# Patient Record
Sex: MALE | Race: WHITE | NOT HISPANIC OR LATINO | Employment: OTHER | URBAN - METROPOLITAN AREA
[De-identification: names, ages, dates, MRNs, and addresses within clinical notes are randomized per-mention and may not be internally consistent; named-entity substitution may affect disease eponyms.]

---

## 2017-03-17 ENCOUNTER — TELEPHONE (OUTPATIENT)
Dept: FAMILY MEDICINE | Facility: CLINIC | Age: 62
End: 2017-03-17

## 2017-03-17 DIAGNOSIS — F41.9 ANXIETY: ICD-10-CM

## 2017-03-17 RX ORDER — ALPRAZOLAM 1 MG/1
1 TABLET ORAL 3 TIMES DAILY PRN
Qty: 90 TABLET | Refills: 0 | Status: CANCELLED | OUTPATIENT
Start: 2017-03-17

## 2017-03-17 NOTE — TELEPHONE ENCOUNTER
----- Message from Stephen Cole sent at 3/17/2017  8:55 AM CDT -----  Contact: Self/[phone]  Refill:  alprazolam (XANAX) 1 MG tablet    Thank you.

## 2017-03-20 ENCOUNTER — TELEPHONE (OUTPATIENT)
Dept: OPTOMETRY | Facility: CLINIC | Age: 62
End: 2017-03-20

## 2017-03-20 ENCOUNTER — OFFICE VISIT (OUTPATIENT)
Dept: FAMILY MEDICINE | Facility: CLINIC | Age: 62
End: 2017-03-20
Payer: MEDICARE

## 2017-03-20 ENCOUNTER — CLINICAL SUPPORT (OUTPATIENT)
Dept: OPHTHALMOLOGY | Facility: CLINIC | Age: 62
End: 2017-03-20
Attending: INTERNAL MEDICINE
Payer: MEDICARE

## 2017-03-20 ENCOUNTER — LAB VISIT (OUTPATIENT)
Dept: LAB | Facility: HOSPITAL | Age: 62
End: 2017-03-20
Attending: INTERNAL MEDICINE
Payer: MEDICARE

## 2017-03-20 VITALS
TEMPERATURE: 98 F | DIASTOLIC BLOOD PRESSURE: 79 MMHG | OXYGEN SATURATION: 98 % | HEIGHT: 71 IN | SYSTOLIC BLOOD PRESSURE: 136 MMHG | BODY MASS INDEX: 31.87 KG/M2 | WEIGHT: 227.63 LBS | HEART RATE: 78 BPM

## 2017-03-20 DIAGNOSIS — E11.9 CONTROLLED TYPE 2 DIABETES MELLITUS WITHOUT COMPLICATION, WITHOUT LONG-TERM CURRENT USE OF INSULIN: ICD-10-CM

## 2017-03-20 DIAGNOSIS — E78.5 HYPERLIPIDEMIA, UNSPECIFIED HYPERLIPIDEMIA TYPE: Chronic | ICD-10-CM

## 2017-03-20 DIAGNOSIS — F13.20 BENZODIAZEPINE DEPENDENCE: Chronic | ICD-10-CM

## 2017-03-20 DIAGNOSIS — I10 HTN (HYPERTENSION), BENIGN: Chronic | ICD-10-CM

## 2017-03-20 DIAGNOSIS — G89.29 OTHER CHRONIC PAIN: Chronic | ICD-10-CM

## 2017-03-20 DIAGNOSIS — M47.816 FACET ARTHRITIS OF LUMBAR REGION: ICD-10-CM

## 2017-03-20 DIAGNOSIS — F17.200 TOBACCO DEPENDENCE: Chronic | ICD-10-CM

## 2017-03-20 DIAGNOSIS — E11.9 CONTROLLED TYPE 2 DIABETES MELLITUS WITHOUT COMPLICATION, WITHOUT LONG-TERM CURRENT USE OF INSULIN: Primary | ICD-10-CM

## 2017-03-20 DIAGNOSIS — M47.812 FACET ARTHRITIS OF CERVICAL REGION: ICD-10-CM

## 2017-03-20 DIAGNOSIS — Z23 NEED FOR VACCINATION FOR STREP PNEUMONIAE: ICD-10-CM

## 2017-03-20 DIAGNOSIS — F32.A DEPRESSION, UNSPECIFIED DEPRESSION TYPE: Chronic | ICD-10-CM

## 2017-03-20 DIAGNOSIS — F41.9 ANXIETY: ICD-10-CM

## 2017-03-20 LAB
ALBUMIN SERPL BCP-MCNC: 4 G/DL
ALP SERPL-CCNC: 39 U/L
ALT SERPL W/O P-5'-P-CCNC: 39 U/L
ANION GAP SERPL CALC-SCNC: 11 MMOL/L
AST SERPL-CCNC: 37 U/L
BILIRUB SERPL-MCNC: 0.6 MG/DL
BUN SERPL-MCNC: 18 MG/DL
CALCIUM SERPL-MCNC: 9.3 MG/DL
CHLORIDE SERPL-SCNC: 106 MMOL/L
CO2 SERPL-SCNC: 24 MMOL/L
CREAT SERPL-MCNC: 1.2 MG/DL
EST. GFR  (AFRICAN AMERICAN): >60 ML/MIN/1.73 M^2
EST. GFR  (NON AFRICAN AMERICAN): >60 ML/MIN/1.73 M^2
GLUCOSE SERPL-MCNC: 157 MG/DL
POTASSIUM SERPL-SCNC: 5.1 MMOL/L
PROT SERPL-MCNC: 7.6 G/DL
SODIUM SERPL-SCNC: 141 MMOL/L

## 2017-03-20 PROCEDURE — 99999 PR PBB SHADOW E&M-EST. PATIENT-LVL III: CPT | Mod: PBBFAC,,, | Performed by: INTERNAL MEDICINE

## 2017-03-20 PROCEDURE — 99999 PR PBB SHADOW E&M-EST. PATIENT-LVL II: CPT | Mod: PBBFAC,,,

## 2017-03-20 PROCEDURE — 36415 COLL VENOUS BLD VENIPUNCTURE: CPT | Mod: PO

## 2017-03-20 PROCEDURE — 80053 COMPREHEN METABOLIC PANEL: CPT

## 2017-03-20 PROCEDURE — 83036 HEMOGLOBIN GLYCOSYLATED A1C: CPT

## 2017-03-20 PROCEDURE — 99215 OFFICE O/P EST HI 40 MIN: CPT | Mod: S$PBB,,, | Performed by: INTERNAL MEDICINE

## 2017-03-20 RX ORDER — ALPRAZOLAM 1 MG/1
1 TABLET ORAL 3 TIMES DAILY PRN
Qty: 90 TABLET | Refills: 0 | Status: SHIPPED | OUTPATIENT
Start: 2017-03-20 | End: 2017-04-17 | Stop reason: SDUPTHER

## 2017-03-20 RX ORDER — CLONIDINE HYDROCHLORIDE 0.2 MG/1
0.2 TABLET ORAL 2 TIMES DAILY
Qty: 60 TABLET | Refills: 6 | Status: SHIPPED | OUTPATIENT
Start: 2017-03-20 | End: 2017-12-22 | Stop reason: SDUPTHER

## 2017-03-20 RX ORDER — METFORMIN HYDROCHLORIDE 500 MG/1
500 TABLET ORAL
Qty: 30 TABLET | Refills: 6 | Status: SHIPPED | OUTPATIENT
Start: 2017-03-20 | End: 2017-12-22 | Stop reason: SDUPTHER

## 2017-03-20 RX ORDER — GABAPENTIN 300 MG/1
CAPSULE ORAL
Qty: 180 CAPSULE | Refills: 6 | Status: SHIPPED | OUTPATIENT
Start: 2017-03-20 | End: 2017-12-22 | Stop reason: SDUPTHER

## 2017-03-20 RX ORDER — ATORVASTATIN CALCIUM 40 MG/1
40 TABLET, FILM COATED ORAL DAILY
Qty: 90 TABLET | Refills: 3 | Status: SHIPPED | OUTPATIENT
Start: 2017-03-20 | End: 2017-12-22 | Stop reason: SDUPTHER

## 2017-03-20 RX ORDER — LISINOPRIL 40 MG/1
40 TABLET ORAL DAILY
Qty: 30 TABLET | Refills: 6 | Status: SHIPPED | OUTPATIENT
Start: 2017-03-20 | End: 2017-12-22 | Stop reason: SDUPTHER

## 2017-03-20 RX ORDER — VERAPAMIL HYDROCHLORIDE 180 MG/1
180 TABLET, FILM COATED, EXTENDED RELEASE ORAL 2 TIMES DAILY
Qty: 60 TABLET | Refills: 6 | Status: SHIPPED | OUTPATIENT
Start: 2017-03-20 | End: 2017-12-22 | Stop reason: SDUPTHER

## 2017-03-20 RX ORDER — SERTRALINE HYDROCHLORIDE 50 MG/1
50 TABLET, FILM COATED ORAL DAILY
Qty: 30 TABLET | Refills: 11 | Status: SHIPPED | OUTPATIENT
Start: 2017-03-20 | End: 2017-03-29 | Stop reason: SDUPTHER

## 2017-03-20 NOTE — MR AVS SNAPSHOT
[facility]  [address]  [place] [address]  Phone: [phone]  Fax: [phone]                  Butch Mcdaniel   3/20/2017 8:20 AM   Office Visit    Description:  Male : 1955   Provider:  Gabriel Evans MD   Department:  [facility]           Reason for Visit     Establish Care           Diagnoses this Visit        Comments    Controlled type 2 diabetes mellitus without complication, without long-term current use of insulin    -  Primary     HTN (hypertension), benign         Hyperlipidemia, unspecified hyperlipidemia type         Anxiety         Depression, unspecified depression type         Benzodiazepine dependence         Tobacco dependence         Other chronic pain         Facet arthritis of cervical region         Facet arthritis of lumbar region         Need for vaccination for Strep pneumoniae                To Do List           Goals (5 Years of Data)     None      Follow-Up and Disposition     Return in about 4 weeks (around 2017) for non-fasting, follow up chronic medical problem.       These Medications        Disp Refills Start End    cloNIDine (CATAPRES) 0.2 MG tablet 60 tablet 6 3/20/2017     Take 1 tablet (0.2 mg total) by mouth 2 (two) times daily. - Oral    [facility] [address]: [phone]       gabapentin (NEURONTIN) 300 MG capsule 180 capsule 6 3/20/2017     TAKE 2 CAPSULES BY MOUTH 3 TIMES A DAY    [facility] [address]: [phone]       lisinopril (PRINIVIL,ZESTRIL) 40 MG tablet 30 tablet 6 3/20/2017     Take 1 tablet (40 mg total) by mouth once daily. - Oral    Pharmacy: [facility] [address] [facility]: [phone]       metformin (GLUCOPHAGE) 500 MG tablet 30 tablet 6 3/20/2017 3/20/2018    Take 1 tablet (500 mg total) by mouth daily with breakfast. -  Oral    Pharmacy: [address] Ph #: [phone]       verapamil (CALAN-SR) 180 MG CR tablet 60 tablet 6 3/20/2017 3/20/2018    Take 1 tablet (180 mg total) by mouth 2 (two) times daily. - Oral    Pharmacy: [address] Ph #: [phone]       sertraline (ZOLOFT) 50 MG tablet 30 tablet 11 3/20/2017 3/20/2018    Take 1 tablet (50 mg total) by mouth once daily. STOP THE CITALOPRAM - Oral    Pharmacy: [address] Ph #: [phone]       alprazolam (XANAX) 1 MG tablet 90 tablet 0 3/20/2017     Take 1 tablet (1 mg total) by mouth 3 (three) times daily as needed for Anxiety. - Oral    Pharmacy: [address] Ph #: [phone]       atorvastatin (LIPITOR) 40 MG tablet 90 tablet 3 3/20/2017 3/20/2018    Take 1 tablet (40 mg total) by mouth once daily. For cholesterol.  Stop the fenofibrate - Oral    Pharmacy: [address] Ph #: [phone]         [facility]     [facility] - 24/7 Assistance  Registered nurses in the [facility] provide clinical advisement, health education, appointment booking, and other advisory services.  Call for this free service at [phone].             Medications           Message regarding Medications     Verify the changes and/or additions to your medication regime listed below are the same as discussed with your clinician today.  If any of these changes or additions are incorrect, please notify your healthcare provider.        START taking these NEW medications        Refills    sertraline (ZOLOFT) 50 MG tablet 11    Sig: Take 1 tablet (50 mg total) by mouth once daily. STOP THE CITALOPRAM    Class: Normal    Route: Oral    atorvastatin (LIPITOR) 40 MG tablet 3    Sig: Take 1 tablet (40 mg  total) by mouth once daily. For cholesterol.  Stop the fenofibrate    Class: Normal    Route: Oral      STOP taking these medications     fenofibrate micronized (LOFIBRA) 134 MG Cap Take 1 capsule (134 mg total) by mouth daily with breakfast.    citalopram (CELEXA) 20 MG tablet Take 1 tablet (20 mg total) by mouth once daily.           Verify that the below list of medications is an accurate representation of the medications you are currently taking.  If none reported, the list may be blank. If incorrect, please contact your healthcare provider. Carry this list with you in case of emergency.           Current Medications     alprazolam (XANAX) 1 MG tablet Take 1 tablet (1 mg total) by mouth 3 (three) times daily as needed for Anxiety.    cloNIDine (CATAPRES) 0.2 MG tablet Take 1 tablet (0.2 mg total) by mouth 2 (two) times daily.    cyclobenzaprine (FLEXERIL) 10 MG tablet Take 10 mg by mouth as needed.    fentaNYL (DURAGESIC) 25 mcg/hr     fluticasone (FLONASE) 50 mcg/actuation nasal spray 1 spray by Each Nare route once daily.    gabapentin (NEURONTIN) 300 MG capsule TAKE 2 CAPSULES BY MOUTH 3 TIMES A DAY    hydrocodone-acetaminophen 10-325mg (NORCO)  mg Tab     lisinopril (PRINIVIL,ZESTRIL) 40 MG tablet Take 1 tablet (40 mg total) by mouth once daily.    metformin (GLUCOPHAGE) 500 MG tablet Take 1 tablet (500 mg total) by mouth daily with breakfast.    verapamil (CALAN-SR) 180 MG CR tablet Take 1 tablet (180 mg total) by mouth 2 (two) times daily.    albuterol (PROAIR HFA) 90 mcg/actuation inhaler Inhale 2 puffs into the lungs every 6 (six) hours as needed for Wheezing.    atorvastatin (LIPITOR) 40 MG tablet Take 1 tablet (40 mg total) by mouth once daily. For cholesterol.  Stop the fenofibrate    diclofenac sodium 1.5 % Drop Apply 40 Units topically 4 (four) times daily as needed.    fish oil-omega-3 fatty acids 300-1,000 mg capsule Take 2 capsules (2 g total) by mouth once daily.    sertraline (ZOLOFT) 50  "MG tablet Take 1 tablet (50 mg total) by mouth once daily. STOP THE CITALOPRAM    tadalafil (CIALIS) 20 MG Tab Take 1 tablet (20 mg total) by mouth once daily.           Clinical Reference Information           Your Vitals Were     BP Pulse Temp Height Weight SpO2    136/79 78 98 °F (36.7 °C) (Oral) 5' 11" (1.803 m) 103.3 kg (227 lb 10 oz) 98%    BMI                31.75 kg/m2          Blood Pressure          Most Recent Value    BP  136/79      Allergies as of 3/20/2017     No Known Allergies      Immunizations Administered on Date of Encounter - 3/20/2017     Name Date Dose VIS Date Route    Pneumococcal Polysaccharide - 23 Valent  Incomplete 0.5 mL 4/24/2015 Intramuscular      Orders Placed During Today's Visit      Normal Orders This Visit    Ambulatory referral to Optometry     Pneumococcal Polysaccharide Vaccine (23 Valent) (SQ/IM)     Future Labs/Procedures Expected by Expires    Comprehensive metabolic panel  3/20/2017 3/20/2018    Hemoglobin A1c  3/20/2017 3/20/2018    Microalbumin/creatinine urine ratio  3/20/2017 3/20/2018    Diabetic Eye Screening Photo  As directed 3/21/2018      Instructions    STOP THE CITALOPRAM.  TAKE THE SERTRALINE (ZOLOFT) INSTEAD. FOR MOOD.    STOP THE FENOFIBRATE.  TAKE ATORVASTATIN INSTEAD - FOR CHOLESTEROL.  Sertraline tablets  What is this medicine?  SERTRALINE (SER tra tania) is used to treat depression. It may also be used to treat obsessive compulsive disorder, panic disorder, post-trauma stress, premenstrual dysphoric disorder (PMDD) or social anxiety.  How should I use this medicine?  Take this medicine by mouth with a glass of water. Follow the directions on the prescription label. You can take it with or without food. Take your medicine at regular intervals. Do not take your medicine more often than directed. Do not stop taking this medicine suddenly except upon the advice of your doctor. Stopping this medicine too quickly may cause serious side effects or your " condition may worsen.  A special MedGuide will be given to you by the pharmacist with each prescription and refill. Be sure to read this information carefully each time.  Talk to your pediatrician regarding the use of this medicine in children. While this drug may be prescribed for children as young as 7 years for selected conditions, precautions do apply.  What side effects may I notice from receiving this medicine?  Side effects that you should report to your doctor or health care professional as soon as possible:  · allergic reactions like skin rash, itching or hives, swelling of the face, lips, or tongue  · black or bloody stools, blood in the urine or vomit  · fast, irregular heartbeat  · feeling faint or lightheaded, falls  · hallucination, loss of contact with reality  · seizures  · suicidal thoughts or other mood changes  · unusual bleeding or bruising  · unusually weak or tired  · vomiting  Side effects that usually do not require medical attention (report to your doctor or health care professional if they continue or are bothersome):  · change in appetite  · change in sex drive or performance  · diarrhea  · increased sweating  · indigestion, nausea  · tremors  What may interact with this medicine?  Do not take this medicine with any of the following medications:  · certain medicines for fungal infections like fluconazole, itraconazole, ketoconazole, posaconazole, voriconazole  · cisapride  · disulfiram  · dofetilide  · linezolid  · MAOIs like Carbex, Eldepryl, Marplan, Nardil, and Parnate  · metronidazole  · methylene blue (injected into a vein)  · pimozide  · thioridazine  · ziprasidone  This medicine may also interact with the following medications:  · alcohol  · aspirin and aspirin-like medicines  · certain medicines for depression, anxiety, or psychotic disturbances  · certain medicines for irregular heart beat like flecainide, propafenone  · certain medicines for migraine headaches like almotriptan,  eletriptan, frovatriptan, naratriptan, rizatriptan, sumatriptan, zolmitriptan  · certain medicines for sleep  · certain medicines for seizures like carbamazepine, valproic acid, phenytoin  · certain medicines that treat or prevent blood clots like warfarin, enoxaparin, dalteparin  · cimetidine  · digoxin  · diuretics  · fentanyl  · furazolidone  · isoniazid  · lithium  · NSAIDs, medicines for pain and inflammation, like ibuprofen or naproxen  · other medicines that prolong the QT interval (cause an abnormal heart rhythm)  · procarbazine  · rasagiline  · supplements like Paia's wort, kava kava, valerian  · tolbutamide  · tramadol  · tryptophan  What if I miss a dose?  If you miss a dose, take it as soon as you can. If it is almost time for your next dose, take only that dose. Do not take double or extra doses.  Where should I keep my medicine?  Keep out of the reach of children.  Store at room temperature between 15 and 30 degrees C (59 and 86 degrees F). Throw away any unused medicine after the expiration date.  What should I tell my health care provider before I take this medicine?  They need to know if you have any of these conditions:  · bipolar disorder or a family history of bipolar disorder  · diabetes  · glaucoma  · heart disease  · high blood pressure  · history of irregular heartbeat  · history of low levels of calcium, magnesium, or potassium in the blood  · if you often drink alcohol  · liver disease  · receiving electroconvulsive therapy  · seizures  · suicidal thoughts, plans, or attempt; a previous suicide attempt by you or a family member  · thyroid disease  · an unusual or allergic reaction to sertraline, other medicines, foods, dyes, or preservatives  · pregnant or trying to get pregnant  · breast-feeding  What should I watch for while using this medicine?  Tell your doctor if your symptoms do not get better or if they get worse. Visit your doctor or health care professional for regular checks  on your progress. Because it may take several weeks to see the full effects of this medicine, it is important to continue your treatment as prescribed by your doctor.  Patients and their families should watch out for new or worsening thoughts of suicide or depression. Also watch out for sudden changes in feelings such as feeling anxious, agitated, panicky, irritable, hostile, aggressive, impulsive, severely restless, overly excited and hyperactive, or not being able to sleep. If this happens, especially at the beginning of treatment or after a change in dose, call your health care professional.  You may get drowsy or dizzy. Do not drive, use machinery, or do anything that needs mental alertness until you know how this medicine affects you. Do not stand or sit up quickly, especially if you are an older patient. This reduces the risk of dizzy or fainting spells. Alcohol may interfere with the effect of this medicine. Avoid alcoholic drinks.  Your mouth may get dry. Chewing sugarless gum or sucking hard candy, and drinking plenty of water may help. Contact your doctor if the problem does not go away or is severe.  Date Last Reviewed:   NOTE:This sheet is a summary. It may not cover all possible information. If you have questions about this medicine, talk to your doctor, pharmacist, or health care provider. Copyright© 2016 [employer]        Atorvastatin tablets  What is this medicine?  ATORVASTATIN (a TORE va sta tin) is known as a HMG-CoA reductase inhibitor or 'statin'. It lowers the level of cholesterol and triglycerides in the blood. This drug may also reduce the risk of heart attack, stroke, or other health problems in patients with risk factors for heart disease. Diet and lifestyle changes are often used with this drug.  How should I use this medicine?  Take this medicine by mouth with a glass of water. Follow the directions on the prescription label. You can take this medicine with or without food. Take your  doses at regular intervals. Do not take your medicine more often than directed.  Talk to your pediatrician regarding the use of this medicine in children. While this drug may be prescribed for children as young as 10 years old for selected conditions, precautions do apply.  What side effects may I notice from receiving this medicine?  Side effects that you should report to your doctor or health care professional as soon as possible:  · allergic reactions like skin rash, itching or hives, swelling of the face, lips, or tongue  · dark urine  · fever  · joint pain  · muscle cramps, pain  · redness, blistering, peeling or loosening of the skin, including inside the mouth  · trouble passing urine or change in the amount of urine  · unusually weak or tired  · yellowing of eyes or skin  Side effects that usually do not require medical attention (report to your doctor or health care professional if they continue or are bothersome):  · constipation  · heartburn  · stomach gas, pain, upset  What may interact with this medicine?  Do not take this medicine with any of the following medications:  · red yeast rice  · telaprevir  · telithromycin  · voriconazole  This medicine may also interact with the following medications:  · alcohol  · antiviral medicines for HIV or AIDS  · boceprevir  · certain antibiotics like clarithromycin, erythromycin, troleandomycin  · certain medicines for cholesterol like fenofibrate or gemfibrozil  · cimetidine  · clarithromycin  · colchicine  · cyclosporine  · digoxin  · female hormones, like estrogens or progestins and birth control pills  · grapefruit juice  · medicines for fungal infections like fluconazole, itraconazole, ketoconazole  · niacin  · rifampin  · spironolactone  What if I miss a dose?  If you miss a dose, take it as soon as you can. If it is almost time for your next dose, take only that dose. Do not take double or extra doses.  Where should I keep my medicine?  Keep out of the reach  of children.  Store at room temperature between 20 to 25 degrees C (68 to 77 degrees F). Throw away any unused medicine after the expiration date.  What should I tell my health care provider before I take this medicine?  They need to know if you have any of these conditions:  · frequently drink alcoholic beverages  · history of stroke, TIA  · kidney disease  · liver disease  · muscle aches or weakness  · other medical condition  · an unusual or allergic reaction to atorvastatin, other medicines, foods, dyes, or preservatives  · pregnant or trying to get pregnant  · breast-feeding  What should I watch for while using this medicine?  Visit your doctor or health care professional for regular check-ups. You may need regular tests to make sure your liver is working properly.  Tell your doctor or health care professional right away if you get any unexplained muscle pain, tenderness, or weakness, especially if you also have a fever and tiredness. Your doctor or health care professional may tell you to stop taking this medicine if you develop muscle problems. If your muscle problems do not go away after stopping this medicine, contact your health care professional.  This drug is only part of a total heart-health program. Your doctor or a dietician can suggest a low-cholesterol and low-fat diet to help. Avoid alcohol and smoking, and keep a proper exercise schedule.  Do not use this drug if you are pregnant or breast-feeding. Serious side effects to an unborn child or to an infant are possible. Talk to your doctor or pharmacist for more information.  This medicine may affect blood sugar levels. If you have diabetes, check with your doctor or health care professional before you change your diet or the dose of your diabetic medicine.  If you are going to have surgery tell your health care professional that you are taking this drug.  Date Last Reviewed:   NOTE:This sheet is a summary. It may not cover all possible information.  If you have questions about this medicine, talk to your doctor, pharmacist, or health care provider. Copyright© 2016 [employer]             Smoking Cessation     If you would like to quit smoking:   You may be eligible for free services if you are a [place] resident and started smoking cigarettes before September 1, 1988.  Call the [employer]ess[employer] ([employer]) toll free at (665) 743-1965 or (054) 256-7223.   Call 1-800-QUIT-NOW if you do not meet the above criteria.            Language Assistance Services     ATTENTION: Language assistance services are available, free of charge. Please call [phone].      ATENCIÓN: Si habla español, tiene a olivares disposición servicios gratuitos de asistencia lingüística. Llame al [phone].     CHÚ Ý: N?u b?n nói Ti?ng Vi?t, có các d?ch v? h? tr? ngôn ng? mi?n phí dành cho b?n. G?i s? [phone].         [facility] complies with applicable Federal civil rights laws and does not discriminate on the basis of race, color, national origin, age, disability, or sex.

## 2017-03-20 NOTE — PROGRESS NOTES
Pneumonia-23 vaccine administered, karoline well. Instructed to wait 15mins for observation, no reaction noted @ time of discharge.

## 2017-03-20 NOTE — PROGRESS NOTES
Assessment & Plan  Controlled type 2 diabetes mellitus without complication, without long-term current use of insulin - check A1c today.  On metformin 500.  Change fenofibrate to atorvastatin today.  Referral submitted for optometry, as well as diabetic eye camera today.  Foot exam done today.  Check urine microalbumin.  Emphasize importance of self regular foot exams.  Need to discuss ASA therapy next visit.  -     metformin (GLUCOPHAGE) 500 MG tablet; Take 1 tablet (500 mg total) by mouth daily with breakfast.  Dispense: 30 tablet; Refill: 6  -     Comprehensive metabolic panel; Future; Expected date: 3/20/17  -     Hemoglobin A1c; Future; Expected date: 3/20/17  -     Microalbumin/creatinine urine ratio; Future; Expected date: 3/20/17  -     Diabetic Eye Screening Photo; Future  -     Ambulatory referral to Optometry    HTN (hypertension), benign-stable, refilled lisinopril, verapamil, clonidine.  -     cloNIDine (CATAPRES) 0.2 MG tablet; Take 1 tablet (0.2 mg total) by mouth 2 (two) times daily.  Dispense: 60 tablet; Refill: 6  -     lisinopril (PRINIVIL,ZESTRIL) 40 MG tablet; Take 1 tablet (40 mg total) by mouth once daily.  Dispense: 30 tablet; Refill: 6  -     verapamil (CALAN-SR) 180 MG CR tablet; Take 1 tablet (180 mg total) by mouth 2 (two) times daily.  Dispense: 60 tablet; Refill: 6    Hyperlipidemia, unspecified hyperlipidemia type-change fenofibrate to atorvastatin.  Rationale discussed.  -     atorvastatin (LIPITOR) 40 MG tablet; Take 1 tablet (40 mg total) by mouth once daily. For cholesterol.  Stop the fenofibrate  Dispense: 90 tablet; Refill: 3    Anxiety-recalls a history of Prozac in the past and side effects of no emotions.  Currently on citalopram.  We talked at length today about benzodiazepines, with risks for dependency as well as for possible increased risk for dementia with this medication.  Is currently taking 1 mg 3 times a day and I would like to titrated downwards.  I'll refill it  today but change the citalopram to Zoloft and titrated Zoloft.    -     sertraline (ZOLOFT) 50 MG tablet; Take 1 tablet (50 mg total) by mouth once daily. STOP THE CITALOPRAM  Dispense: 30 tablet; Refill: 11  -     alprazolam (XANAX) 1 MG tablet; Take 1 tablet (1 mg total) by mouth 3 (three) times daily as needed for Anxiety.  Dispense: 90 tablet; Refill: 0    Depression, unspecified depression type-plan as above.  -     sertraline (ZOLOFT) 50 MG tablet; Take 1 tablet (50 mg total) by mouth once daily. STOP THE CITALOPRAM  Dispense: 30 tablet; Refill: 11  -     alprazolam (XANAX) 1 MG tablet; Take 1 tablet (1 mg total) by mouth 3 (three) times daily as needed for Anxiety.  Dispense: 90 tablet; Refill: 0    Benzodiazepine dependence -for now no change in the alprazolam dose.  I would like to try to wean him down maybe to half a milligram at a time but titrate up the SSRI first.    Tobacco dependence-has a history of albuterol inhaler which he reports he never needs to use.  No PFTs on file.    Other chronic pain - narcotics managed by pain mgmt.  Refilled gabapentin  -     gabapentin (NEURONTIN) 300 MG capsule; TAKE 2 CAPSULES BY MOUTH 3 TIMES A DAY  Dispense: 180 capsule; Refill: 6    Facet arthritis of cervical region-managed by pain management.  -     gabapentin (NEURONTIN) 300 MG capsule; TAKE 2 CAPSULES BY MOUTH 3 TIMES A DAY  Dispense: 180 capsule; Refill: 6    Facet arthritis of lumbar region  -     gabapentin (NEURONTIN) 300 MG capsule; TAKE 2 CAPSULES BY MOUTH 3 TIMES A DAY  Dispense: 180 capsule; Refill: 6    Need for vaccination for Strep pneumoniae  -     Pneumococcal Polysaccharide Vaccine (23 Valent) (SQ/IM)    He declines a flu shot today.    I feel he has limited insight into his medications and disease processes at this time.    Medications Discontinued During This Encounter   Medication Reason    fenofibrate micronized (LOFIBRA) 134 MG Cap     citalopram (CELEXA) 20 MG tablet     cloNIDine  (CATAPRES) 0.2 MG tablet Reorder    gabapentin (NEURONTIN) 300 MG capsule Reorder    lisinopril (PRINIVIL,ZESTRIL) 40 MG tablet Reorder    metformin (GLUCOPHAGE) 500 MG tablet Reorder    verapamil (CALAN-SR) 180 MG CR tablet Reorder    alprazolam (XANAX) 1 MG tablet Reorder       Follow-up: Return in about 4 weeks (around 4/17/2017) for non-fasting, follow up chronic medical problem.  F/u change to zoloft; change to atorvastatin; adjust xanax rx if appropriate; start ASA.      =================================================================      Chief Complaint   Patient presents with    [facility]       \[facility] [place] [facility]\""  Butch is a 61 y.o. male, last appointment with this clinic was 12/15/2016.    DM2; metformin x 6/2016. Son with DM.  One pill daily.  No SE. No outside glc checks.  Does not perform self exams.  Does not have an eye doctor and we talked about the need to establish.   HTN; lisinopril, clonidine; hx of verapamil.  Taking medications regularly.  Blood pressure at goal today.  hyperlipidemia; fenofibrate; does not appear to have had statin therapy. presumed for the high TG in the past up to 400.  Does not recall history of statin therapy.  I would like to try him on statin therapy and the rationale was discussed.  He is agreeable to such.  DJD C spine and L spine; chronic narcotic therapy; gabapentin; was referred to NS for R leg weakness, seen felt not to require surgery, plan was injection  10/16/2013 MRI L spine: L3/4: posterior disk protrusion with mild narrowing of the anterior aspect of the canal and mild bilateral neural foraminal narrowing at this level. L4/5: This is posterior disk extrusion resulting on moderate narrowing of the anterior aspect of the canal and moderate bilateral neural foraminal narrowing. L5/S1: There is a posterior disk protrusion resulting in mild narrowing of the anterior aspect of the canal and moderate bilateral neural foraminal narrowing.  There is grade 1  anterolisthesis of L5 with respect to S1.  There is suspected bilateral L5/S1 pars defects.  10/16/2013 MRI C spine: There are multilevel degenerative changes of the cervical spine most severe at the C4 through C7 levels.  anxiety and depression; chronic BZD use. on citalopram, alprazolam; hx of Lexapro.  90 tablets alprazolam every 30 days.  Hx of lexapro.  Recalls hx of psychiatry eval for anxiety and was started on this.  Recalls a history of prozac years ago - had SE of emotional numbness.  Does not feel like citalopram doing anything at this point.tr  Smoking - Trying to transition smoking from cigarettes to a pipe.      Past 2 weeks - no depression; no issues with concentration;  Does have issues with sleep when he runs out of the medicine.  No loss of interest.  No issues with guilt.  No issues with appetite.    Does have issues with anxiety several days of past 2 weeks;     CHENTE-7 Anxiety Screening Tool    Over the last 2 weeks, how often have you been bothered by the following problems?   (Not at all = 0, Several Days = 1, More than half the days = 2, Nearly Every Day = 3)     1.  Feeling nervous, anxious, or on edge. 1   2.  Not being able to sleep or control worrying. 0   3.  Worrying too much about different things. 0   4.  Trouble relaxing. 0   5.  Being so restless that it is hard to sit still. 1   6.  Becoming easily annoyed or irritable. 1   7.  Feeling afraid as if something awful might happen. 0    Total score: 3      Patient Care Team:  Gabriel Evans MD as PCP - General (Internal Medicine)    Patient Active Problem List    Diagnosis Date Noted    Tobacco dependence 01/12/2016    Non morbid obesity due to excess calories 01/12/2016    Controlled type 2 diabetes mellitus without complication 08/07/2015    Facet arthritis of cervical region 10/28/2013    Facet arthritis of lumbar region 10/28/2013    Benzodiazepine dependence 10/28/2013    Opioid dependence 10/28/2013    ED (erectile  dysfunction) 07/26/2013    DDD (degenerative disc disease), cervical 05/23/2013    DDD (degenerative disc disease), lumbar 05/23/2013    HTN (hypertension), benign 11/01/2012    Hyperlipidemia 11/01/2012    Anemia 11/01/2012    Chronic pain 11/01/2012    Anxiety 11/01/2012    Depression 11/01/2012       PAST MEDICAL HISTORY:  Past Medical History:   Diagnosis Date    Anxiety     Degenerative disc disease     Depression     Diabetes mellitus type II, controlled     ETOH abuse     Hyperlipidemia     Hypertension     MRSA (methicillin resistant Staphylococcus aureus)        PAST SURGICAL HISTORY:  Past Surgical History:   Procedure Laterality Date    APPENDECTOMY       Family History   Problem Relation Age of Onset    Heart disease Mother     Heart disease Father     Alcohol abuse Father     Diabetes Son        SOCIAL HISTORY:  Social History     Social History    Marital status:      Spouse name: N/A    Number of children: N/A    Years of education: N/A     Occupational History    retired -       Social History Main Topics    Smoking status: Current Every Day Smoker     Packs/day: 0.50     Years: 32.00     Types: Cigarettes     Last attempt to quit: 3/4/2013    Smokeless tobacco: Not on file    Alcohol use No    Drug use: No    Sexual activity: Yes     Partners: Female      Comment:  with children, disabled      Other Topics Concern    Not on file     Social History Narrative       ALLERGIES AND MEDICATIONS: updated and reviewed.  Review of patient's allergies indicates:  No Known Allergies  Current Outpatient Prescriptions   Medication Sig Dispense Refill    alprazolam (XANAX) 1 MG tablet Take 1 tablet (1 mg total) by mouth 3 (three) times daily as needed for Anxiety. 90 tablet 0    citalopram (CELEXA) 20 MG tablet Take 1 tablet (20 mg total) by mouth once daily. 30 tablet 6    cloNIDine (CATAPRES) 0.2 MG tablet Take 1 tablet (0.2 mg  total) by mouth 2 (two) times daily. 60 tablet 6    cyclobenzaprine (FLEXERIL) 10 MG tablet Take 10 mg by mouth as needed.  1    fenofibrate micronized (LOFIBRA) 134 MG Cap Take 1 capsule (134 mg total) by mouth daily with breakfast. 30 capsule 6    fentaNYL (DURAGESIC) 25 mcg/hr   0    fluticasone (FLONASE) 50 mcg/actuation nasal spray 1 spray by Each Nare route once daily.      gabapentin (NEURONTIN) 300 MG capsule TAKE 2 CAPSULES BY MOUTH 3 TIMES A  capsule 6    hydrocodone-acetaminophen 10-325mg (NORCO)  mg Tab       lisinopril (PRINIVIL,ZESTRIL) 40 MG tablet Take 1 tablet (40 mg total) by mouth once daily. 30 tablet 6    metformin (GLUCOPHAGE) 500 MG tablet Take 1 tablet (500 mg total) by mouth daily with breakfast. 30 tablet 6    verapamil (CALAN-SR) 180 MG CR tablet Take 1 tablet (180 mg total) by mouth 2 (two) times daily. 60 tablet 6    albuterol (PROAIR HFA) 90 mcg/actuation inhaler Inhale 2 puffs into the lungs every 6 (six) hours as needed for Wheezing. 18 g 6    diclofenac sodium 1.5 % Drop Apply 40 Units topically 4 (four) times daily as needed. 150 mL 3    fish oil-omega-3 fatty acids 300-1,000 mg capsule Take 2 capsules (2 g total) by mouth once daily. 60 capsule 5    tadalafil (CIALIS) 20 MG Tab Take 1 tablet (20 mg total) by mouth once daily. 10 tablet 11     No current facility-administered medications for this visit.        Review of Systems   Constitutional: Negative for fever, malaise/fatigue and weight loss.   HENT: Negative for congestion.    Eyes: Negative for blurred vision and pain.   Respiratory: Negative for shortness of breath and wheezing.    Cardiovascular: Negative for chest pain, palpitations and leg swelling.   Gastrointestinal: Negative for abdominal pain, blood in stool, constipation, diarrhea and heartburn.   Genitourinary: Negative for dysuria, hematuria and urgency.   Musculoskeletal: Negative for joint pain.   Neurological: Negative for tingling,  "focal weakness, weakness and headaches.   Psychiatric/Behavioral: Negative for depression. The patient is not nervous/anxious.         HPI       Physical Exam   Vitals:    03/20/17 0836   BP: 136/79   Pulse: 78   Temp: 98 °F (36.7 °C)   TempSrc: Oral   SpO2: 98%   Weight: 103.3 kg (227 lb 10 oz)   Height: 5' 11" (1.803 m)    Body mass index is 31.75 kg/(m^2).  Weight: 103.3 kg (227 lb 10 oz)   Height: 5' 11" (180.3 cm)     Physical Exam   Constitutional: He is oriented to person, place, and time. He appears well-developed and well-nourished. No distress.   Eyes: EOM are normal.   Cardiovascular: Normal rate, regular rhythm and normal heart sounds.    No murmur heard.  Pulses:       Dorsalis pedis pulses are 1+ on the right side, and 1+ on the left side.        Posterior tibial pulses are 1+ on the right side, and 1+ on the left side.   Pulmonary/Chest: Effort normal and breath sounds normal.   Musculoskeletal: Normal range of motion.        Right foot: There is no deformity.        Left foot: There is no deformity.   Feet:   Right Foot:   Protective Sensation: 5 sites tested. 5 sites sensed.   Skin Integrity: Negative for ulcer, blister, skin breakdown, erythema or warmth.   Left Foot:   Protective Sensation: 5 sites tested.   Skin Integrity: Negative for ulcer, blister, skin breakdown, erythema or warmth.   Neurological: He is alert and oriented to person, place, and time. Coordination normal.   Skin: Skin is warm and dry.   Psychiatric: He has a normal mood and affect. His behavior is normal. Thought content normal.     "

## 2017-03-20 NOTE — PATIENT INSTRUCTIONS
STOP THE CITALOPRAM.  TAKE THE SERTRALINE (ZOLOFT) INSTEAD. FOR MOOD.    STOP THE FENOFIBRATE.  TAKE ATORVASTATIN INSTEAD - FOR CHOLESTEROL.  Sertraline tablets  What is this medicine?  SERTRALINE (SER tra tania) is used to treat depression. It may also be used to treat obsessive compulsive disorder, panic disorder, post-trauma stress, premenstrual dysphoric disorder (PMDD) or social anxiety.  How should I use this medicine?  Take this medicine by mouth with a glass of water. Follow the directions on the prescription label. You can take it with or without food. Take your medicine at regular intervals. Do not take your medicine more often than directed. Do not stop taking this medicine suddenly except upon the advice of your doctor. Stopping this medicine too quickly may cause serious side effects or your condition may worsen.  A special MedGuide will be given to you by the pharmacist with each prescription and refill. Be sure to read this information carefully each time.  Talk to your pediatrician regarding the use of this medicine in children. While this drug may be prescribed for children as young as 7 years for selected conditions, precautions do apply.  What side effects may I notice from receiving this medicine?  Side effects that you should report to your doctor or health care professional as soon as possible:  · allergic reactions like skin rash, itching or hives, swelling of the face, lips, or tongue  · black or bloody stools, blood in the urine or vomit  · fast, irregular heartbeat  · feeling faint or lightheaded, falls  · hallucination, loss of contact with reality  · seizures  · suicidal thoughts or other mood changes  · unusual bleeding or bruising  · unusually weak or tired  · vomiting  Side effects that usually do not require medical attention (report to your doctor or health care professional if they continue or are bothersome):  · change in appetite  · change in sex drive or  performance  · diarrhea  · increased sweating  · indigestion, nausea  · tremors  What may interact with this medicine?  Do not take this medicine with any of the following medications:  · certain medicines for fungal infections like fluconazole, itraconazole, ketoconazole, posaconazole, voriconazole  · cisapride  · disulfiram  · dofetilide  · linezolid  · MAOIs like Carbex, Eldepryl, Marplan, Nardil, and Parnate  · metronidazole  · methylene blue (injected into a vein)  · pimozide  · thioridazine  · ziprasidone  This medicine may also interact with the following medications:  · alcohol  · aspirin and aspirin-like medicines  · certain medicines for depression, anxiety, or psychotic disturbances  · certain medicines for irregular heart beat like flecainide, propafenone  · certain medicines for migraine headaches like almotriptan, eletriptan, frovatriptan, naratriptan, rizatriptan, sumatriptan, zolmitriptan  · certain medicines for sleep  · certain medicines for seizures like carbamazepine, valproic acid, phenytoin  · certain medicines that treat or prevent blood clots like warfarin, enoxaparin, dalteparin  · cimetidine  · digoxin  · diuretics  · fentanyl  · furazolidone  · isoniazid  · lithium  · NSAIDs, medicines for pain and inflammation, like ibuprofen or naproxen  · other medicines that prolong the QT interval (cause an abnormal heart rhythm)  · procarbazine  · rasagiline  · supplements like Jana's wort, kava kava, valerian  · tolbutamide  · tramadol  · tryptophan  What if I miss a dose?  If you miss a dose, take it as soon as you can. If it is almost time for your next dose, take only that dose. Do not take double or extra doses.  Where should I keep my medicine?  Keep out of the reach of children.  Store at room temperature between 15 and 30 degrees C (59 and 86 degrees F). Throw away any unused medicine after the expiration date.  What should I tell my health care provider before I take this medicine?  They  need to know if you have any of these conditions:  · bipolar disorder or a family history of bipolar disorder  · diabetes  · glaucoma  · heart disease  · high blood pressure  · history of irregular heartbeat  · history of low levels of calcium, magnesium, or potassium in the blood  · if you often drink alcohol  · liver disease  · receiving electroconvulsive therapy  · seizures  · suicidal thoughts, plans, or attempt; a previous suicide attempt by you or a family member  · thyroid disease  · an unusual or allergic reaction to sertraline, other medicines, foods, dyes, or preservatives  · pregnant or trying to get pregnant  · breast-feeding  What should I watch for while using this medicine?  Tell your doctor if your symptoms do not get better or if they get worse. Visit your doctor or health care professional for regular checks on your progress. Because it may take several weeks to see the full effects of this medicine, it is important to continue your treatment as prescribed by your doctor.  Patients and their families should watch out for new or worsening thoughts of suicide or depression. Also watch out for sudden changes in feelings such as feeling anxious, agitated, panicky, irritable, hostile, aggressive, impulsive, severely restless, overly excited and hyperactive, or not being able to sleep. If this happens, especially at the beginning of treatment or after a change in dose, call your health care professional.  You may get drowsy or dizzy. Do not drive, use machinery, or do anything that needs mental alertness until you know how this medicine affects you. Do not stand or sit up quickly, especially if you are an older patient. This reduces the risk of dizzy or fainting spells. Alcohol may interfere with the effect of this medicine. Avoid alcoholic drinks.  Your mouth may get dry. Chewing sugarless gum or sucking hard candy, and drinking plenty of water may help. Contact your doctor if the problem does not go  away or is severe.  Date Last Reviewed:   NOTE:This sheet is a summary. It may not cover all possible information. If you have questions about this medicine, talk to your doctor, pharmacist, or health care provider. Copyright© 2016 [employer]        Atorvastatin tablets  What is this medicine?  ATORVASTATIN (a TORE va sta tin) is known as a HMG-CoA reductase inhibitor or 'statin'. It lowers the level of cholesterol and triglycerides in the blood. This drug may also reduce the risk of heart attack, stroke, or other health problems in patients with risk factors for heart disease. Diet and lifestyle changes are often used with this drug.  How should I use this medicine?  Take this medicine by mouth with a glass of water. Follow the directions on the prescription label. You can take this medicine with or without food. Take your doses at regular intervals. Do not take your medicine more often than directed.  Talk to your pediatrician regarding the use of this medicine in children. While this drug may be prescribed for children as young as 10 years old for selected conditions, precautions do apply.  What side effects may I notice from receiving this medicine?  Side effects that you should report to your doctor or health care professional as soon as possible:  · allergic reactions like skin rash, itching or hives, swelling of the face, lips, or tongue  · dark urine  · fever  · joint pain  · muscle cramps, pain  · redness, blistering, peeling or loosening of the skin, including inside the mouth  · trouble passing urine or change in the amount of urine  · unusually weak or tired  · yellowing of eyes or skin  Side effects that usually do not require medical attention (report to your doctor or health care professional if they continue or are bothersome):  · constipation  · heartburn  · stomach gas, pain, upset  What may interact with this medicine?  Do not take this medicine with any of the following medications:  · red  yeast rice  · telaprevir  · telithromycin  · voriconazole  This medicine may also interact with the following medications:  · alcohol  · antiviral medicines for HIV or AIDS  · boceprevir  · certain antibiotics like clarithromycin, erythromycin, troleandomycin  · certain medicines for cholesterol like fenofibrate or gemfibrozil  · cimetidine  · clarithromycin  · colchicine  · cyclosporine  · digoxin  · female hormones, like estrogens or progestins and birth control pills  · grapefruit juice  · medicines for fungal infections like fluconazole, itraconazole, ketoconazole  · niacin  · rifampin  · spironolactone  What if I miss a dose?  If you miss a dose, take it as soon as you can. If it is almost time for your next dose, take only that dose. Do not take double or extra doses.  Where should I keep my medicine?  Keep out of the reach of children.  Store at room temperature between 20 to 25 degrees C (68 to 77 degrees F). Throw away any unused medicine after the expiration date.  What should I tell my health care provider before I take this medicine?  They need to know if you have any of these conditions:  · frequently drink alcoholic beverages  · history of stroke, TIA  · kidney disease  · liver disease  · muscle aches or weakness  · other medical condition  · an unusual or allergic reaction to atorvastatin, other medicines, foods, dyes, or preservatives  · pregnant or trying to get pregnant  · breast-feeding  What should I watch for while using this medicine?  Visit your doctor or health care professional for regular check-ups. You may need regular tests to make sure your liver is working properly.  Tell your doctor or health care professional right away if you get any unexplained muscle pain, tenderness, or weakness, especially if you also have a fever and tiredness. Your doctor or health care professional may tell you to stop taking this medicine if you develop muscle problems. If your muscle problems do not go away  after stopping this medicine, contact your health care professional.  This drug is only part of a total heart-health program. Your doctor or a dietician can suggest a low-cholesterol and low-fat diet to help. Avoid alcohol and smoking, and keep a proper exercise schedule.  Do not use this drug if you are pregnant or breast-feeding. Serious side effects to an unborn child or to an infant are possible. Talk to your doctor or pharmacist for more information.  This medicine may affect blood sugar levels. If you have diabetes, check with your doctor or health care professional before you change your diet or the dose of your diabetic medicine.  If you are going to have surgery tell your health care professional that you are taking this drug.  Date Last Reviewed:   NOTE:This sheet is a summary. It may not cover all possible information. If you have questions about this medicine, talk to your doctor, pharmacist, or health care provider. Copyright© 2016 [employer]

## 2017-03-21 ENCOUNTER — TELEPHONE (OUTPATIENT)
Dept: FAMILY MEDICINE | Facility: CLINIC | Age: 62
End: 2017-03-21

## 2017-03-21 LAB
ESTIMATED AVG GLUCOSE: 151 MG/DL
HBA1C MFR BLD HPLC: 6.9 %

## 2017-03-21 NOTE — TELEPHONE ENCOUNTER
Called the patient to schedule his Optometry referral, patient states that he will call back to schedule an appointment.

## 2017-03-22 ENCOUNTER — OFFICE VISIT (OUTPATIENT)
Dept: OPTOMETRY | Facility: CLINIC | Age: 62
End: 2017-03-22
Payer: MEDICARE

## 2017-03-22 DIAGNOSIS — H52.7 REFRACTIVE ERROR: ICD-10-CM

## 2017-03-22 DIAGNOSIS — H25.13 NUCLEAR SCLEROSIS, BILATERAL: ICD-10-CM

## 2017-03-22 DIAGNOSIS — I10 HTN (HYPERTENSION), BENIGN: Chronic | ICD-10-CM

## 2017-03-22 DIAGNOSIS — E11.9 TYPE 2 DIABETES MELLITUS WITHOUT OPHTHALMIC MANIFESTATIONS: Primary | ICD-10-CM

## 2017-03-22 PROCEDURE — 99999 PR PBB SHADOW E&M-EST. PATIENT-LVL II: CPT | Mod: PBBFAC,,, | Performed by: OPTOMETRIST

## 2017-03-22 PROCEDURE — 92004 COMPRE OPH EXAM NEW PT 1/>: CPT | Mod: S$PBB,,, | Performed by: OPTOMETRIST

## 2017-03-22 PROCEDURE — 99212 OFFICE O/P EST SF 10 MIN: CPT | Mod: PBBFAC,PO | Performed by: OPTOMETRIST

## 2017-03-22 NOTE — MR AVS SNAPSHOT
[facility]  [address]  [place] [address]  Phone: [phone]  Fax: [phone]                  Butch Mcdaniel   3/22/2017 10:30 AM   Office Visit    Description:  Male : 1955   Provider:  Kaila Carrillo OD   Department:  [facility]           Reason for Visit     Concerns About Ocular Health     Diabetic Eye Exam     Hypertensive Eye Exam           Diagnoses this Visit        Comments    Type 2 diabetes mellitus without ophthalmic manifestations    -  Primary     HTN (hypertension), benign         Nuclear sclerosis, bilateral         Refractive error                To Do List           Future Appointments        Provider Department Dept Phone    2017 1:00 PM Gabriel Evans MD [facility] [phone]      Goals (5 Years of Data)     None      Follow-Up and Disposition     Return in about 1 year (around 3/22/2018) for Diabetic Eye Exam.      [facility]     [facility] On Call Nurse Care Line - 24/7 Assistance  Registered nurses in the [facility] provide clinical advisement, health education, appointment booking, and other advisory services.  Call for this free service at [phone].             Medications           Message regarding Medications     Verify the changes and/or additions to your medication regime listed below are the same as discussed with your clinician today.  If any of these changes or additions are incorrect, please notify your healthcare provider.             Verify that the below list of medications is an accurate representation of the medications you are currently taking.  If none reported, the list may be blank. If incorrect, please contact your healthcare provider. Carry this list with you in case of emergency.           Current Medications     alprazolam (XANAX) 1 MG tablet Take 1 tablet (1 mg total) by mouth 3 (three) times daily as needed for Anxiety.    atorvastatin (LIPITOR) 40 MG tablet Take 1  tablet (40 mg total) by mouth once daily. For cholesterol.  Stop the fenofibrate    cloNIDine (CATAPRES) 0.2 MG tablet Take 1 tablet (0.2 mg total) by mouth 2 (two) times daily.    cyclobenzaprine (FLEXERIL) 10 MG tablet Take 10 mg by mouth as needed.    fentaNYL (DURAGESIC) 25 mcg/hr     fish oil-omega-3 fatty acids 300-1,000 mg capsule Take 2 capsules (2 g total) by mouth once daily.    fluticasone (FLONASE) 50 mcg/actuation nasal spray 1 spray by Each Nare route once daily.    gabapentin (NEURONTIN) 300 MG capsule TAKE 2 CAPSULES BY MOUTH 3 TIMES A DAY    hydrocodone-acetaminophen 10-325mg (NORCO)  mg Tab     lisinopril (PRINIVIL,ZESTRIL) 40 MG tablet Take 1 tablet (40 mg total) by mouth once daily.    metformin (GLUCOPHAGE) 500 MG tablet Take 1 tablet (500 mg total) by mouth daily with breakfast.    sertraline (ZOLOFT) 50 MG tablet Take 1 tablet (50 mg total) by mouth once daily. STOP THE CITALOPRAM    tadalafil (CIALIS) 20 MG Tab Take 1 tablet (20 mg total) by mouth once daily.    verapamil (CALAN-SR) 180 MG CR tablet Take 1 tablet (180 mg total) by mouth 2 (two) times daily.    albuterol (PROAIR HFA) 90 mcg/actuation inhaler Inhale 2 puffs into the lungs every 6 (six) hours as needed for Wheezing.    diclofenac sodium 1.5 % Drop Apply 40 Units topically 4 (four) times daily as needed.           Clinical Reference Information           Allergies as of 3/22/2017     No Known Allergies      Immunizations Administered on Date of Encounter - 3/22/2017     None      Smoking Cessation     If you would like to quit smoking:   You may be eligible for free services if you are a [place] resident and started smoking cigarettes before September 1, 1988.  Call the [employer] ([employer]) toll free at (042) 631-6423 or (426) 357-7283.   Call -800-QUIT-NOW if you do not meet the above criteria.            Language Assistance Services     ATTENTION: Language assistance services are available, free of charge.  Please call [phone].      ATENCIÓN: Si habla español, tiene a olivares disposición servicios gratuitos de asistencia lingüística. Llame al [phone].     CHÚ Ý: N?u b?n nói Ti?ng Vi?t, có các d?ch v? h? tr? ngôn ng? mi?n phí dành cho b?n. G?i s? [phone].         [facility] complies with applicable Federal civil rights laws and does not discriminate on the basis of race, color, national origin, age, disability, or sex.

## 2017-03-22 NOTE — LETTER
March 22, 2017      Gabriel Evans MD  [address] [place] [address]           [facility]  [address] [place] [address]  Phone: [phone]  Fax: [phone]          Patient: Butch Mcdaniel   MR Number: [id number]   YOB: 1955   Date of Visit: 3/22/2017       Dear Dr. Gabriel Evans:    Thank you for referring Butch Mcdaniel to me for evaluation. Attached you will find relevant portions of my assessment and plan of care.    If you have questions, please do not hesitate to call me. I look forward to following Butch Mcdaniel along with you.    Sincerely,    Kaila Carrillo, OD    Enclosure  CC:  No Recipients    If you would like to receive this communication electronically, please contact [email or web address] or [phone] to request more information on [facility] access.    For providers and/or their staff who would like to refer a patient to [facility], please contact us through our one-stop-shop provider referral line, [facility], at [phone].    If you feel you have received this communication in error or would no longer like to receive these types of communications, please e-mail [email or web address]

## 2017-03-22 NOTE — PROGRESS NOTES
HPI     Dls: ? Yrs     Pt here for diabetic and hypertensive eye exam.   Pt c/o blurry vision at near ou. Pt wears otc readers. Pt states no   tearing no itching no burning no pain no ha's no floaters.     Eye meds:  None    Hemoglobin A1C       Date                     Value               Ref Range             Status                03/20/2017               6.9 (H)             4.5 - 6.2 %           Final                  12/13/2016               6.6 (H)             4.5 - 6.2 %           Final                  06/28/2016               6.9 (H)             4.5 - 6.2 %           Final            ----------         Last edited by Kaila Carrillo, OD on 3/22/2017 10:53 AM.       Assessment /Plan     For exam results, see Encounter Report.    Type 2 diabetes mellitus without ophthalmic manifestations  - No diabetic retinopathy. Educated patient on importance of good blood sugar control, compliance with meds, and follow up care with PCP. Return in 1 year for dilated eye exam, sooner PRN.    HTN (hypertension), benign  - No hypertensive retinopathy. Continue BP control. RTC 1 year for DFE.    Nuclear sclerosis, bilateral  - Not visually significant. Educated pt on presence of cataracts and effects on vision. No surgery at this time. Recheck in one year.    Refractive error  - Patient declined refraction today, continue with current spectacles.       RTC 1 year(s) or sooner PRN

## 2017-03-23 NOTE — PROGRESS NOTES
a1c good; on metformin  CMP WNL. Changed to statin last OV  microalbumin WNL.  Results to pt.  F/u scheduled for April

## 2017-03-27 ENCOUNTER — TELEPHONE (OUTPATIENT)
Dept: FAMILY MEDICINE | Facility: CLINIC | Age: 62
End: 2017-03-27

## 2017-03-27 NOTE — TELEPHONE ENCOUNTER
----- Message from Pepper Patiño sent at 3/27/2017  9:59 AM CDT -----  Contact: self  Pt needs lab results. Pt would like to know if he needs to se Dr Evans for a follow up. Please  Call [phone]. Thanks

## 2017-03-29 ENCOUNTER — TELEPHONE (OUTPATIENT)
Dept: FAMILY MEDICINE | Facility: CLINIC | Age: 62
End: 2017-03-29

## 2017-03-29 DIAGNOSIS — F32.A DEPRESSION, UNSPECIFIED DEPRESSION TYPE: Chronic | ICD-10-CM

## 2017-03-29 DIAGNOSIS — F41.9 ANXIETY: ICD-10-CM

## 2017-03-29 RX ORDER — SERTRALINE HYDROCHLORIDE 50 MG/1
100 TABLET, FILM COATED ORAL DAILY
Qty: 60 TABLET | Refills: 11
Start: 2017-03-29 | End: 2017-04-10 | Stop reason: SDUPTHER

## 2017-03-29 NOTE — TELEPHONE ENCOUNTER
Spoke with patient and he states that since he was switched from celexa to zoloft, when he and his spouse argue he runs out the house. He is not sure if it is a panic attack or avoiding a argument but he stopped this Sunday and would prefer to go back on celexa. Please advise

## 2017-03-29 NOTE — TELEPHONE ENCOUNTER
Let's try higher dose of the zoloft - try taking 100 mg (2 tablets).  If still with same effect after 2 weeks then change back to celexa.

## 2017-04-10 DIAGNOSIS — F41.9 ANXIETY: ICD-10-CM

## 2017-04-10 DIAGNOSIS — F32.A DEPRESSION, UNSPECIFIED DEPRESSION TYPE: Chronic | ICD-10-CM

## 2017-04-10 RX ORDER — SERTRALINE HYDROCHLORIDE 50 MG/1
100 TABLET, FILM COATED ORAL DAILY
Qty: 60 TABLET | Refills: 10
Start: 2017-04-10 | End: 2017-04-17 | Stop reason: SDUPTHER

## 2017-04-10 NOTE — TELEPHONE ENCOUNTER
----- Message from Pepper Patiño sent at 4/10/2017  1:40 PM CDT -----  Contact: self  Pt needs sertraline (ZOLOFT) 50 MG tablet refilled.pt nees medication sent [facility]. Please call [phone]. Thanks

## 2017-04-17 ENCOUNTER — OFFICE VISIT (OUTPATIENT)
Dept: FAMILY MEDICINE | Facility: CLINIC | Age: 62
End: 2017-04-17
Payer: MEDICARE

## 2017-04-17 VITALS
WEIGHT: 226.94 LBS | SYSTOLIC BLOOD PRESSURE: 126 MMHG | HEIGHT: 71 IN | TEMPERATURE: 98 F | HEART RATE: 82 BPM | OXYGEN SATURATION: 97 % | DIASTOLIC BLOOD PRESSURE: 74 MMHG | BODY MASS INDEX: 31.77 KG/M2

## 2017-04-17 DIAGNOSIS — F41.9 ANXIETY: ICD-10-CM

## 2017-04-17 DIAGNOSIS — Z12.11 SCREENING FOR COLON CANCER: ICD-10-CM

## 2017-04-17 DIAGNOSIS — E11.9 CONTROLLED TYPE 2 DIABETES MELLITUS WITHOUT COMPLICATION, WITHOUT LONG-TERM CURRENT USE OF INSULIN: ICD-10-CM

## 2017-04-17 DIAGNOSIS — T40.2X5A THERAPEUTIC OPIOID-INDUCED CONSTIPATION (OIC): ICD-10-CM

## 2017-04-17 DIAGNOSIS — K59.03 THERAPEUTIC OPIOID-INDUCED CONSTIPATION (OIC): ICD-10-CM

## 2017-04-17 DIAGNOSIS — F32.A DEPRESSION, UNSPECIFIED DEPRESSION TYPE: Primary | Chronic | ICD-10-CM

## 2017-04-17 PROCEDURE — 99999 PR PBB SHADOW E&M-EST. PATIENT-LVL III: CPT | Mod: PBBFAC,,, | Performed by: INTERNAL MEDICINE

## 2017-04-17 PROCEDURE — 99214 OFFICE O/P EST MOD 30 MIN: CPT | Mod: S$PBB,,, | Performed by: INTERNAL MEDICINE

## 2017-04-17 PROCEDURE — 99213 OFFICE O/P EST LOW 20 MIN: CPT | Mod: PBBFAC,PO | Performed by: INTERNAL MEDICINE

## 2017-04-17 RX ORDER — SERTRALINE HYDROCHLORIDE 50 MG/1
150 TABLET, FILM COATED ORAL DAILY
Qty: 90 TABLET | Refills: 11 | Status: SHIPPED | OUTPATIENT
Start: 2017-04-17 | End: 2017-07-11 | Stop reason: ALTCHOICE

## 2017-04-17 RX ORDER — ALPRAZOLAM 1 MG/1
1 TABLET ORAL 3 TIMES DAILY PRN
Qty: 90 TABLET | Refills: 0 | Status: SHIPPED | OUTPATIENT
Start: 2017-05-17 | End: 2017-06-15 | Stop reason: SDUPTHER

## 2017-04-17 RX ORDER — ALPRAZOLAM 1 MG/1
1 TABLET ORAL 3 TIMES DAILY PRN
Qty: 90 TABLET | Refills: 0 | Status: SHIPPED | OUTPATIENT
Start: 2017-04-17 | End: 2017-04-17 | Stop reason: SDUPTHER

## 2017-04-17 RX ORDER — ASPIRIN 81 MG/1
81 TABLET ORAL DAILY
Refills: 0 | Status: ON HOLD | COMMUNITY
Start: 2017-04-17 | End: 2021-10-08 | Stop reason: HOSPADM

## 2017-04-17 NOTE — PATIENT INSTRUCTIONS
Aspirin, ASA oral tablets  What is this medicine?  ASPIRIN (AS pir in) is a pain reliever. It is used to treat mild pain and fever. This medicine is also used as directed by a doctor to prevent and to treat heart attacks, to prevent strokes, and to treat arthritis or inflammation.  How should I use this medicine?  Take this medicine by mouth with a glass of water. Follow the directions on the package or prescription label. You can take this medicine with or without food. If it upsets your stomach, take it with food. Do not take your medicine more often than directed.  Talk to your pediatrician regarding the use of this medicine in children. While this drug may be prescribed for children as young as 12 years of age for selected conditions, precautions do apply. Children and teenagers should not use this medicine to treat chicken pox or flu symptoms unless directed by a doctor.  Patients over 65 years old may have a stronger reaction and need a smaller dose.  What side effects may I notice from receiving this medicine?  Side effects that you should report to your doctor or health care professional as soon as possible:  · allergic reactions like skin rash, itching or hives, swelling of the face, lips, or tongue  · breathing problems  · changes in hearing, ringing in the ears  · confusion  · general ill feeling or flu-like symptoms  · pain on swallowing  · redness, blistering, peeling or loosening of the skin, including inside the mouth or nose  · signs and symptoms of bleeding such as bloody or black, tarry stools; red or dark-brown urine; spitting up blood or brown material that looks like coffee grounds; red spots on the skin; unusual bruising or bleeding from the eye, gums, or nose  · trouble passing urine or change in the amount of urine  · unusually weak or tired  · yellowing of the eyes or skin  Side effects that usually do not require medical attention (report to your doctor or health care professional if they  continue or are bothersome):  · diarrhea or constipation  · headache  · nausea, vomiting  · stomach gas, heartburn  What may interact with this medicine?  Do not take this medicine with any of the following medications:  · cidofovir  · ketorolac  · probenecid  This medicine may also interact with the following medications:  · alcohol  · alendronate  · bismuth subsalicylate  · flavocoxid  · herbal supplements like feverfew, garlic, lesiva, ginkgo biloba, horse chestnut  · medicines for diabetes or glaucoma like acetazolamide, methazolamide  · medicines for gout  · medicines that treat or prevent blood clots like enoxaparin, heparin, ticlopidine, warfarin  · other aspirin and aspirin-like medicines  · NSAIDs, medicines for pain and inflammation, like ibuprofen or naproxen  · pemetrexed  · sulfinpyrazone  · varicella live vaccine  What if I miss a dose?  If you are taking this medicine on a regular schedule and miss a dose, take it as soon as you can. If it is almost time for your next dose, take only that dose. Do not take double or extra doses.  Where should I keep my medicine?  Keep out of the reach of children.  Store at room temperature between 15 and 30 degrees C (59 and 86 degrees F). Protect from heat and moisture. Do not use this medicine if it has a strong vinegar smell. Throw away any unused medicine after the expiration date.  What should I tell my health care provider before I take this medicine?  They need to know if you have any of these conditions:  · anemia  · asthma  · bleeding problems  · child with chickenpox, the flu, or other viral infection  · diabetes  · gout  · if you frequently drink alcohol containing drinks  · kidney disease  · liver disease  · low level of vitamin K  · lupus  · smoke tobacco  · stomach ulcers or other problems  · an unusual or allergic reaction to aspirin, tartrazine dye, other medicines, dyes, or preservatives  · pregnant or trying to get pregnant  · breast-feeding  What  should I watch for while using this medicine?  If you are treating yourself for pain, tell your doctor or health care professional if the pain lasts more than 10 days, if it gets worse, or if there is a new or different kind of pain. Tell your doctor if you see redness or swelling. Also, check with your doctor if you have a fever that lasts for more than 3 days. Only take this medicine to prevent heart attacks or blood clotting if prescribed by your doctor or health care professional.  Do not take aspirin or aspirin-like medicines with this medicine. Too much aspirin can be dangerous. Always read the labels carefully.  This medicine can irritate your stomach or cause bleeding problems. Do not smoke cigarettes or drink alcohol while taking this medicine. Do not lie down for 30 minutes after taking this medicine to prevent irritation to your throat.  If you are scheduled for any medical or dental procedure, tell your healthcare provider that you are taking this medicine. You may need to stop taking this medicine before the procedure.  This medicine may be used to treat migraines. If you take migraine medicines for 10 or more days a month, your migraines may get worse. Keep a diary of headache days and medicine use. Contact your healthcare professional if your migraine attacks occur more frequently.  Date Last Reviewed:   NOTE:This sheet is a summary. It may not cover all possible information. If you have questions about this medicine, talk to your doctor, pharmacist, or health care provider. Copyright© 2016 [employer]        Buspirone tablets  What is this medicine?  BUSPIRONE (byfracisco mobley) is used to treat anxiety disorders.  How should I use this medicine?  Take this medicine by mouth with a glass of water. Follow the directions on the prescription label. You may take this medicine with or without food. To ensure that this medicine always works the same way for you, you should take it either always with or  always without food. Take your doses at regular intervals. Do not take your medicine more often than directed. Do not stop taking except on the advice of your doctor or health care professional.  Talk to your pediatrician regarding the use of this medicine in children. Special care may be needed.  What side effects may I notice from receiving this medicine?  Side effects that you should report to your doctor or health care professional as soon as possible:  · blurred vision or other vision changes  · chest pain  · confusion  · difficulty breathing  · feelings of hostility or anger  · muscle aches and pains  · numbness or tingling in hands or feet  · ringing in the ears  · skin rash and itching  · vomiting  · weakness  Side effects that usually do not require medical attention (report to your doctor or health care professional if they continue or are bothersome):  · disturbed dreams, nightmares  · headache  · nausea  · restlessness or nervousness  · sore throat and nasal congestion  · stomach upset  What may interact with this medicine?  Do not take this medicine with any of the following medications:  · linezolid  · MAOIs like Carbex, Eldepryl, Marplan, Nardil, and Parnate  · methylene blue  · procarbazine  This medicine may also interact with the following medications:  · diazepam  · digoxin  · diltiazem  · erythromycin  · grapefruit juice  · haloperidol  · medicines for mental depression or mood problems  · medicines for seizures like carbamazepine, phenobarbital and phenytoin  · nefazodone  · other medications for anxiety  · rifampin  · ritonavir  · some antifungal medicines like itraconazole, ketoconazole, and voriconazole  · verapamil  · warfarin  What if I miss a dose?  If you miss a dose, take it as soon as you can. If it is almost time for your next dose, take only that dose. Do not take double or extra doses.  Where should I keep my medicine?  Keep out of the reach of children.  Store at room temperature  below 30 degrees C (86 degrees F). Protect from light. Keep container tightly closed. Throw away any unused medicine after the expiration date.  What should I tell my health care provider before I take this medicine?  They need to know if you have any of these conditions:  · kidney or liver disease  · an unusual or allergic reaction to buspirone, other medicines, foods, dyes, or preservatives  · pregnant or trying to get pregnant  · breast-feeding  What should I watch for while using this medicine?  Visit your doctor or health care professional for regular checks on your progress. It may take 1 to 2 weeks before your anxiety gets better.  You may get drowsy or dizzy. Do not drive, use machinery, or do anything that needs mental alertness until you know how this drug affects you. Do not stand or sit up quickly, especially if you are an older patient. This reduces the risk of dizzy or fainting spells. Alcohol can make you more drowsy and dizzy. Avoid alcoholic drinks.  Date Last Reviewed:   NOTE:This sheet is a summary. It may not cover all possible information. If you have questions about this medicine, talk to your doctor, pharmacist, or health care provider. Copyright© 2016 [employer]

## 2017-04-17 NOTE — PROGRESS NOTES
Assessment & Plan  Depression, unspecified depression type  Anxiety - increase the zoloft.  150 mg.  Stay on xanax 1 mg AM/2 mg PM for now.  Plan to eventually try transition to Buspar. Info sheet given to pt.    -     sertraline (ZOLOFT) 50 MG tablet; Take 3 tablets (150 mg total) by mouth once daily. Increased dose  Dispense: 90 tablet; Refill: 11  -     alprazolam (XANAX) 1 MG tablet; Take 1 tablet (1 mg total) by mouth 3 (three) times daily as needed for Anxiety.  Dispense: 90 tablet; Refill: 0  -alprazolam rx for May 2017 sent to pharmacy as well.    Therapeutic opioid-induced constipation (OIC) - trial OTC Miralax    Controlled type 2 diabetes mellitus without complication, without long-term current use of insulin - start ASA.  -     aspirin (ECOTRIN) 81 MG EC tablet; Take 1 tablet (81 mg total) by mouth once daily.; Refill: 0    Screening for colon cancer  -     Case request GI: COLONOSCOPY      Medications Discontinued During This Encounter   Medication Reason    sertraline (ZOLOFT) 50 MG tablet Reorder    alprazolam (XANAX) 1 MG tablet Reorder       Follow-up: No Follow-up on file. follow-up 1 month, follow-up on mood with higher dose Zoloft, plan to introduce BuSpar if good control.  Plan for BuSpar in a.m., Xanax in evening to start.      =================================================================      Chief Complaint   Patient presents with    lab work follow up       HPI  Butch is a 61 y.o. male, last appointment with this clinic was 3/20/2017.    Constipation.  Using ex lax.  Thinks it's either from the fibrate or from the narcotic.  Takes an OTC laxative/stool softener.  That seemed to decrease in efficacy so changed to ex lax.  Drinks sufficient water he estimates.  Never miralax.  Is due for a colonoscopy.  Did see some blood with wiping.  Has had to try manual disimpaction.    Taking the atorvastatin. 90 day supply.      zoloft - he had called c/o bad nerves and crying.  Increased the  dose to 100 mg (2 tablets).  Was increasing his xanax use.  Finds the 100 mg better than the 50 mg.  As compared to the celexa, about the same.  He is unable to quantify how many days of the past 2 weeks he has had anxiety symptoms.  He is anxious about our discussion to eventually wean off of Xanax.  Has never tried BuSpar before.  He takes Xanax 1 tablet in the mornings, 2 at night.  It helps to calm him and to help him sleep as well.    Patient Care Team:  Gabriel Evans MD as PCP - General (Internal Medicine)    Patient Active Problem List    Diagnosis Date Noted    Tobacco dependence 01/12/2016    Non morbid obesity due to excess calories 01/12/2016    Controlled type 2 diabetes mellitus without complication 08/07/2015    Facet arthritis of cervical region 10/28/2013    Facet arthritis of lumbar region 10/28/2013    Benzodiazepine dependence 10/28/2013    Opioid dependence 10/28/2013    ED (erectile dysfunction) 07/26/2013    DDD (degenerative disc disease), cervical 05/23/2013    DDD (degenerative disc disease), lumbar 05/23/2013    HTN (hypertension), benign 11/01/2012    Hyperlipidemia 11/01/2012    Anemia 11/01/2012    Chronic pain 11/01/2012    Anxiety 11/01/2012    Depression 11/01/2012       PAST MEDICAL HISTORY:  Past Medical History:   Diagnosis Date    Anxiety     Degenerative disc disease     Depression     Diabetes mellitus type II, controlled     ETOH abuse     Eye injury as a child    stick hit eye ? eye, hit in [place] due to boxing    Hyperlipidemia     Hypertension     MRSA (methicillin resistant Staphylococcus aureus)        PAST SURGICAL HISTORY:  Past Surgical History:   Procedure Laterality Date    APPENDECTOMY         SOCIAL HISTORY:  Social History     Social History    Marital status:      Spouse name: N/A    Number of children: N/A    Years of education: N/A     Occupational History    retired -       Social History Main Topics     Smoking status: Current Every Day Smoker     Packs/day: 0.50     Years: 32.00     Types: Cigarettes     Last attempt to quit: 3/4/2013    Smokeless tobacco: Not on file    Alcohol use No    Drug use: No    Sexual activity: Yes     Partners: Female      Comment:  with children, disabled      Other Topics Concern    Not on file     Social History Narrative       ALLERGIES AND MEDICATIONS: updated and reviewed.  Review of patient's allergies indicates:  No Known Allergies  Current Outpatient Prescriptions   Medication Sig Dispense Refill    alprazolam (XANAX) 1 MG tablet Take 1 tablet (1 mg total) by mouth 3 (three) times daily as needed for Anxiety. 90 tablet 0    atorvastatin (LIPITOR) 40 MG tablet Take 1 tablet (40 mg total) by mouth once daily. For cholesterol.  Stop the fenofibrate 90 tablet 3    cloNIDine (CATAPRES) 0.2 MG tablet Take 1 tablet (0.2 mg total) by mouth 2 (two) times daily. 60 tablet 6    fentaNYL (DURAGESIC) 25 mcg/hr   0    gabapentin (NEURONTIN) 300 MG capsule TAKE 2 CAPSULES BY MOUTH 3 TIMES A  capsule 6    hydrocodone-acetaminophen 10-325mg (NORCO)  mg Tab       lisinopril (PRINIVIL,ZESTRIL) 40 MG tablet Take 1 tablet (40 mg total) by mouth once daily. 30 tablet 6    metformin (GLUCOPHAGE) 500 MG tablet Take 1 tablet (500 mg total) by mouth daily with breakfast. 30 tablet 6    sertraline (ZOLOFT) 50 MG tablet Take 2 tablets (100 mg total) by mouth once daily. STOP THE CITALOPRAM 60 tablet 10    verapamil (CALAN-SR) 180 MG CR tablet Take 1 tablet (180 mg total) by mouth 2 (two) times daily. 60 tablet 6    albuterol (PROAIR HFA) 90 mcg/actuation inhaler Inhale 2 puffs into the lungs every 6 (six) hours as needed for Wheezing. 18 g 6    cyclobenzaprine (FLEXERIL) 10 MG tablet Take 10 mg by mouth as needed.  1    diclofenac sodium 1.5 % Drop Apply 40 Units topically 4 (four) times daily as needed. 150 mL 3    fish oil-omega-3 fatty acids  "300-1,000 mg capsule Take 2 capsules (2 g total) by mouth once daily. 60 capsule 5    fluticasone (FLONASE) 50 mcg/actuation nasal spray 1 spray by Each Nare route once daily.      tadalafil (CIALIS) 20 MG Tab Take 1 tablet (20 mg total) by mouth once daily. 10 tablet 11     No current facility-administered medications for this visit.        Review of Systems   Constitutional: Negative for chills and fever.   Gastrointestinal: Positive for blood in stool and constipation.   Psychiatric/Behavioral: The patient is nervous/anxious and has insomnia.        Physical Exam   Vitals:    04/17/17 1000   BP: 126/74   BP Location: Left arm   Patient Position: Sitting   BP Method: Manual   Pulse: 82   Temp: 98.1 °F (36.7 °C)   TempSrc: Oral   SpO2: 97%   Weight: 103 kg (226 lb 15.4 oz)   Height: 5' 11" (1.803 m)    Body mass index is 31.66 kg/(m^2).  Weight: 103 kg (226 lb 15.4 oz)   Height: 5' 11" (180.3 cm)     Physical Exam   Constitutional: He is oriented to person, place, and time. He appears well-developed and well-nourished. No distress.   Neurological: He is alert and oriented to person, place, and time.   Psychiatric: He has a normal mood and affect. His behavior is normal. Thought content normal.     "

## 2017-05-09 ENCOUNTER — ANESTHESIA EVENT (OUTPATIENT)
Dept: ENDOSCOPY | Facility: HOSPITAL | Age: 62
End: 2017-05-09
Payer: MEDICARE

## 2017-05-10 ENCOUNTER — HOSPITAL ENCOUNTER (OUTPATIENT)
Facility: HOSPITAL | Age: 62
Discharge: HOME | End: 2017-05-10
Attending: INTERNAL MEDICINE | Admitting: INTERNAL MEDICINE
Payer: MEDICARE

## 2017-05-10 ENCOUNTER — SURGERY (OUTPATIENT)
Age: 62
End: 2017-05-10

## 2017-05-10 ENCOUNTER — ANESTHESIA (OUTPATIENT)
Dept: ENDOSCOPY | Facility: HOSPITAL | Age: 62
End: 2017-05-10
Payer: MEDICARE

## 2017-05-10 VITALS
HEIGHT: 71 IN | WEIGHT: 230 LBS | BODY MASS INDEX: 32.2 KG/M2 | RESPIRATION RATE: 18 BRPM | DIASTOLIC BLOOD PRESSURE: 60 MMHG | SYSTOLIC BLOOD PRESSURE: 134 MMHG | HEART RATE: 60 BPM | OXYGEN SATURATION: 98 % | TEMPERATURE: 98 F

## 2017-05-10 DIAGNOSIS — Z12.11 COLON CANCER SCREENING: ICD-10-CM

## 2017-05-10 LAB — POCT GLUCOSE: 143 MG/DL (ref 70–110)

## 2017-05-10 PROCEDURE — 37000008 HC ANESTHESIA 1ST 15 MINUTES: Performed by: INTERNAL MEDICINE

## 2017-05-10 PROCEDURE — 25000003 PHARM REV CODE 250: Performed by: ANESTHESIOLOGY

## 2017-05-10 PROCEDURE — 45380 COLONOSCOPY AND BIOPSY: CPT | Performed by: INTERNAL MEDICINE

## 2017-05-10 PROCEDURE — 45385 COLONOSCOPY W/LESION REMOVAL: CPT | Performed by: INTERNAL MEDICINE

## 2017-05-10 PROCEDURE — 25000003 PHARM REV CODE 250

## 2017-05-10 PROCEDURE — 27201012 HC FORCEPS, HOT/COLD, DISP: Performed by: INTERNAL MEDICINE

## 2017-05-10 PROCEDURE — 37000009 HC ANESTHESIA EA ADD 15 MINS: Performed by: INTERNAL MEDICINE

## 2017-05-10 PROCEDURE — 88305 TISSUE EXAM BY PATHOLOGIST: CPT | Mod: 26,,, | Performed by: PATHOLOGY

## 2017-05-10 PROCEDURE — 27201089 HC SNARE, DISP (ANY): Performed by: INTERNAL MEDICINE

## 2017-05-10 PROCEDURE — 63600175 PHARM REV CODE 636 W HCPCS

## 2017-05-10 PROCEDURE — D9220A PRA ANESTHESIA: Mod: PT,CRNA,, | Performed by: NURSE ANESTHETIST, CERTIFIED REGISTERED

## 2017-05-10 PROCEDURE — D9220A PRA ANESTHESIA: Mod: PT,ANES,, | Performed by: ANESTHESIOLOGY

## 2017-05-10 PROCEDURE — 88305 TISSUE EXAM BY PATHOLOGIST: CPT | Performed by: PATHOLOGY

## 2017-05-10 RX ORDER — PROPOFOL 10 MG/ML
VIAL (ML) INTRAVENOUS
Status: COMPLETED
Start: 2017-05-10 | End: 2017-05-10

## 2017-05-10 RX ORDER — LIDOCAINE HYDROCHLORIDE 20 MG/ML
INJECTION, SOLUTION EPIDURAL; INFILTRATION; INTRACAUDAL; PERINEURAL
Status: COMPLETED
Start: 2017-05-10 | End: 2017-05-10

## 2017-05-10 RX ORDER — SODIUM CHLORIDE 9 MG/ML
INJECTION, SOLUTION INTRAVENOUS CONTINUOUS
Status: DISCONTINUED | OUTPATIENT
Start: 2017-05-10 | End: 2017-05-10 | Stop reason: HOSPADM

## 2017-05-10 RX ADMIN — PROPOFOL 50 MG: 10 INJECTION, EMULSION INTRAVENOUS at 10:05

## 2017-05-10 RX ADMIN — PROPOFOL 100 MG: 10 INJECTION, EMULSION INTRAVENOUS at 10:05

## 2017-05-10 RX ADMIN — SODIUM CHLORIDE: 0.9 INJECTION, SOLUTION INTRAVENOUS at 09:05

## 2017-05-10 RX ADMIN — LIDOCAINE HYDROCHLORIDE 100 MG: 20 INJECTION, SOLUTION INTRAVENOUS at 10:05

## 2017-05-10 NOTE — ANESTHESIA PREPROCEDURE EVALUATION
05/10/2017  Butch Mcdaniel is a 61 y.o., male   Pre-operative evaluation for Procedure(s) (LRB):  COLONOSCOPY (N/A)    Butch Mcdaniel is a 61 y.o. male     Patient Active Problem List   Diagnosis    HTN (hypertension), benign    Hyperlipidemia    Anemia    Chronic pain    Anxiety    Depression    DDD (degenerative disc disease), cervical    DDD (degenerative disc disease), lumbar    ED (erectile dysfunction)    Facet arthritis of cervical region    Facet arthritis of lumbar region    Benzodiazepine dependence    Opioid dependence    Controlled type 2 diabetes mellitus without complication    Tobacco dependence    Non morbid obesity due to excess calories    Therapeutic opioid-induced constipation (OIC)       Review of patient's allergies indicates:  No Known Allergies    No current facility-administered medications on file prior to encounter.      Current Outpatient Prescriptions on File Prior to Encounter   Medication Sig Dispense Refill    albuterol (PROAIR HFA) 90 mcg/actuation inhaler Inhale 2 puffs into the lungs every 6 (six) hours as needed for Wheezing. 18 g 6    [START ON 5/17/2017] alprazolam (XANAX) 1 MG tablet Take 1 tablet (1 mg total) by mouth 3 (three) times daily as needed for Anxiety. 90 tablet 0    aspirin (ECOTRIN) 81 MG EC tablet Take 1 tablet (81 mg total) by mouth once daily.  0    atorvastatin (LIPITOR) 40 MG tablet Take 1 tablet (40 mg total) by mouth once daily. For cholesterol.  Stop the fenofibrate 90 tablet 3    cloNIDine (CATAPRES) 0.2 MG tablet Take 1 tablet (0.2 mg total) by mouth 2 (two) times daily. 60 tablet 6    cyclobenzaprine (FLEXERIL) 10 MG tablet Take 10 mg by mouth as needed.  1    diclofenac sodium 1.5 % Drop Apply 40 Units topically 4 (four) times daily as needed. 150 mL 3    fentaNYL (DURAGESIC) 25 mcg/hr   0    fish oil-omega-3 fatty acids  300-1,000 mg capsule Take 2 capsules (2 g total) by mouth once daily. 60 capsule 5    fluticasone (FLONASE) 50 mcg/actuation nasal spray 1 spray by Each Nare route once daily.      gabapentin (NEURONTIN) 300 MG capsule TAKE 2 CAPSULES BY MOUTH 3 TIMES A  capsule 6    hydrocodone-acetaminophen 10-325mg (NORCO)  mg Tab       lisinopril (PRINIVIL,ZESTRIL) 40 MG tablet Take 1 tablet (40 mg total) by mouth once daily. 30 tablet 6    metformin (GLUCOPHAGE) 500 MG tablet Take 1 tablet (500 mg total) by mouth daily with breakfast. 30 tablet 6    sertraline (ZOLOFT) 50 MG tablet Take 3 tablets (150 mg total) by mouth once daily. Increased dose 90 tablet 11    tadalafil (CIALIS) 20 MG Tab Take 1 tablet (20 mg total) by mouth once daily. 10 tablet 11    verapamil (CALAN-SR) 180 MG CR tablet Take 1 tablet (180 mg total) by mouth 2 (two) times daily. 60 tablet 6       Past Surgical History:   Procedure Laterality Date    APPENDECTOMY         Social History     Social History    Marital status:      Spouse name: N/A    Number of children: N/A    Years of education: N/A     Occupational History    retired -       Social History Main Topics    Smoking status: Current Every Day Smoker     Packs/day: 0.50     Years: 32.00     Types: Cigarettes     Last attempt to quit: 3/4/2013    Smokeless tobacco: Not on file    Alcohol use No    Drug use: No    Sexual activity: Yes     Partners: Female      Comment:  with children, disabled      Other Topics Concern    Not on file     Social History Narrative         Vital Signs Range (Last 24H):         Lab Results   Component Value Date    WBC 10.57 01/15/2016    HGB 14.4 01/15/2016    HCT 41.7 01/15/2016    MCV 96 01/15/2016     01/15/2016           Anesthesia Evaluation    I have reviewed the Patient Summary Reports.     I have reviewed the Medications.     Review of Systems  Anesthesia Hx:  No problems with  previous Anesthesia Denies Hx of Anesthetic complications  History of prior surgery of interest to airway management or planning: Denies Family Hx of Anesthesia complications.   Denies Personal Hx of Anesthesia complications.   Social:  Smoker    Hematology/Oncology:  Hematology Normal   Oncology Normal     EENT/Dental:EENT/Dental Normal   Cardiovascular:   Exercise tolerance: good Hypertension    Pulmonary:  Pulmonary Normal    Renal/:  Renal/ Normal     Hepatic/GI:  Hepatic/GI Normal Bowel Prep.    Musculoskeletal:   Arthritis   Spine Disorders: lumbar Degenerative disease, Disc disease and Chronic Pain    Neurological:  Neurology Normal    Endocrine:   Diabetes, type 2    Psych:   depression          Physical Exam  General:  Obesity    Airway/Jaw/Neck:  Airway Findings: Mouth Opening: Normal Tongue: Normal  General Airway Assessment: Adult, Average  Mallampati: II  TM Distance: Normal, at least 6 cm  Jaw/Neck Findings:  Neck ROM: Normal ROM      Dental:  Dental Findings: Edentulous   Chest/Lungs:  Chest/Lungs Findings: Normal Respiratory Rate, Clear to auscultation     Heart/Vascular:  Heart Findings: Rate: Normal  Rhythm: Regular Rhythm        Mental Status:  Mental Status Findings:  Alert and Oriented, Cooperative         Anesthesia Plan  Type of Anesthesia, risks & benefits discussed:  Anesthesia Type:  general  Patient's Preference:   Intra-op Monitoring Plan: standard ASA monitors  Intra-op Monitoring Plan Comments:   Post Op Pain Control Plan:   Post Op Pain Control Plan Comments:   Induction:   IV  Beta Blocker:  Patient is not currently on a Beta-Blocker (No further documentation required).       Informed Consent: Patient understands risks and agrees with Anesthesia plan.  Questions answered. Anesthesia consent signed with patient.  ASA Score: 3     Day of Surgery Review of History & Physical:    H&P update referred to the provider.         Ready For Surgery From Anesthesia Perspective.

## 2017-05-10 NOTE — ADDENDUM NOTE
Addendum  created 05/10/17 1120 by Sara Caceres CRNA    Anesthesia Event edited, Procedure Event Log accessed

## 2017-05-10 NOTE — H&P
"Gastroenterology    CC: screening    HPI 61 y.o. male here for screening      Past Medical History:   Diagnosis Date    Anxiety     Degenerative disc disease     Depression     Diabetes mellitus type II, controlled     ETOH abuse     Eye injury as a child    stick hit eye ? eye, hit in [place] due to boxing    Hyperlipidemia     Hypertension     MRSA (methicillin resistant Staphylococcus aureus)          Review of Systems  General ROS: negative for chills, fever or weight loss  Cardiovascular ROS: no chest pain or dyspnea on exertion    Physical Examination  BP (!) 142/66 (BP Location: Right arm, BP Method: Automatic)  Pulse (!) 50  Temp 97.8 °F (36.6 °C) (Oral)   Resp 18  Ht 5' 11" (1.803 m)  Wt 104.3 kg (230 lb)  SpO2 99%  BMI 32.08 kg/m2  General appearance: alert, cooperative, no distress  HENT: Normocephalic, atraumatic, neck symmetrical, no nasal discharge   Eyes: conjunctivae/corneas clear, no icterus, EOM's intact  Lungs: resp non-labored  Heart: regular rate   Abdomen: soft, non-tender; bowel sounds normoactive; no organomegaly  Extremities: extremities symmetric; no clubbing, cyanosis, or edema  Neurologic: Alert and oriented X 3, normal strength, normal coordination and gait    Assessment:     Screening    Plan:   Colon exam    "

## 2017-05-10 NOTE — IP AVS SNAPSHOT
[place] [address] [place] [address]  Phone: [phone]           Patient Discharge Instructions   Our goal is to set you up for success. This packet includes information on your condition, medications, and your home care.  It will help you care for yourself to prevent having to return to the hospital.     Please ask your nurse if you have any questions.      There are many details to remember when preparing to leave the hospital. Here is what you will need to do:    1. Take your medicine. If you are prescribed medications, review your Medication List on the following pages. You may have new medications to  at the pharmacy and others that you'll need to stop taking. Review the instructions for how and when to take your medications. Talk with your doctor or nurses if you are unsure of what to do.     2. Go to your follow-up appointments. Specific follow-up information is listed in the following pages. Your may be contacted by a nurse or clinical provider about future appointments. Be sure we have all of the phone numbers to reach you. Please contact your provider's office if you are unable to make an appointment.     3. Watch for warning signs. Your doctor or nurse will give you detailed warning signs to watch for and when to call for assistance. These instructions may also include educational information about your condition. If you experience any of warning signs to your health, call your doctor.               ** Verify the list of medication(s) below is accurate and up to date. Carry this with you in case of emergency. If your medications have changed, please notify your healthcare provider.             Medication List      ASK your doctor about these medications        Additional Info                      albuterol 90 mcg/actuation inhaler   Commonly known as:  PROAIR HFA   Quantity:  18 g   Refills:  6   Dose:  2 puff    Instructions:  Inhale 2 puffs into the lungs  every 6 (six) hours as needed for Wheezing.     Begin Date    AM    Noon    PM    Bedtime       alprazolam 1 MG tablet   Commonly known as:  XANAX   Quantity:  90 tablet   Refills:  0   Dose:  1 mg    Start Date:  5/17/2017   Instructions:  Take 1 tablet (1 mg total) by mouth 3 (three) times daily as needed for Anxiety.     Begin Date    AM    Noon    PM    Bedtime       aspirin 81 MG EC tablet   Commonly known as:  ECOTRIN   Refills:  0   Dose:  81 mg    Instructions:  Take 1 tablet (81 mg total) by mouth once daily.     Begin Date    AM    Noon    PM    Bedtime       atorvastatin 40 MG tablet   Commonly known as:  LIPITOR   Quantity:  90 tablet   Refills:  3   Dose:  40 mg    Instructions:  Take 1 tablet (40 mg total) by mouth once daily. For cholesterol.  Stop the fenofibrate     Begin Date    AM    Noon    PM    Bedtime       cloNIDine 0.2 MG tablet   Commonly known as:  CATAPRES   Quantity:  60 tablet   Refills:  6   Dose:  0.2 mg    Instructions:  Take 1 tablet (0.2 mg total) by mouth 2 (two) times daily.     Begin Date    AM    Noon    PM    Bedtime       cyclobenzaprine 10 MG tablet   Commonly known as:  FLEXERIL   Refills:  1   Dose:  10 mg    Instructions:  Take 10 mg by mouth as needed.     Begin Date    AM    Noon    PM    Bedtime       diclofenac sodium 1.5 % Drop   Quantity:  150 mL   Refills:  3   Dose:  40 Units    Instructions:  Apply 40 Units topically 4 (four) times daily as needed.     Begin Date    AM    Noon    PM    Bedtime       fentaNYL 25 mcg/hr   Commonly known as:  DURAGESIC   Refills:  0      Begin Date    AM    Noon    PM    Bedtime       fish oil-omega-3 fatty acids 300-1,000 mg capsule   Quantity:  60 capsule   Refills:  5   Dose:  2 g   Indications:  Hypertriglyceridemia    Instructions:  Take 2 capsules (2 g total) by mouth once daily.     Begin Date    AM    Noon    PM    Bedtime       fluticasone 50 mcg/actuation nasal spray   Commonly known as:  FLONASE   Refills:  0   Dose:  1  spray   Indications:  Allergic Rhinitis    Instructions:  1 spray by Each Nare route once daily.     Begin Date    AM    Noon    PM    Bedtime       gabapentin 300 MG capsule   Commonly known as:  NEURONTIN   Quantity:  180 capsule   Refills:  6    Instructions:  TAKE 2 CAPSULES BY MOUTH 3 TIMES A DAY     Begin Date    AM    Noon    PM    Bedtime       hydrocodone-acetaminophen 10-325mg  mg Tab   Commonly known as:  NORCO   Refills:  0      Begin Date    AM    Noon    PM    Bedtime       lisinopril 40 MG tablet   Commonly known as:  PRINIVIL,ZESTRIL   Quantity:  30 tablet   Refills:  6   Dose:  40 mg    Instructions:  Take 1 tablet (40 mg total) by mouth once daily.     Begin Date    AM    Noon    PM    Bedtime       metformin 500 MG tablet   Commonly known as:  GLUCOPHAGE   Quantity:  30 tablet   Refills:  6   Dose:  500 mg    Instructions:  Take 1 tablet (500 mg total) by mouth daily with breakfast.     Begin Date    AM    Noon    PM    Bedtime       sertraline 50 MG tablet   Commonly known as:  ZOLOFT   Quantity:  90 tablet   Refills:  11   Dose:  150 mg    Instructions:  Take 3 tablets (150 mg total) by mouth once daily. Increased dose     Begin Date    AM    Noon    PM    Bedtime       tadalafil 20 MG Tab   Commonly known as:  CIALIS   Quantity:  10 tablet   Refills:  11   Dose:  20 mg    Instructions:  Take 1 tablet (20 mg total) by mouth once daily.     Begin Date    AM    Noon    PM    Bedtime       verapamil 180 MG CR tablet   Commonly known as:  CALAN-SR   Quantity:  60 tablet   Refills:  6   Dose:  180 mg    Instructions:  Take 1 tablet (180 mg total) by mouth 2 (two) times daily.     Begin Date    AM    Noon    PM    Bedtime                  Please bring to all follow up appointments:    1. A copy of your discharge instructions.  2. All medicines you are currently taking in their original bottles.  3. Identification and insurance card.    Please arrive 15 minutes ahead of scheduled appointment  time.    Please call 24 hours in advance if you must reschedule your appointment and/or time.        Your Scheduled Appointments     May 22, 2017 10:40 AM CDT   Established Patient Visit with Gabriel Evans MD   [facility]    [address] [facility]  [place] [address]   [phone]                  Discharge Instructions         Diverticulosis    Diverticulosis means that small pouches have formed in the wall of your large intestine (colon). Most often, this problem causes no symptoms and is common as people age. But the pouches in the colon are at risk of becoming infected. When this happens, the condition is called diverticulitis. Although most people with diverticulosis never develop diverticulitis, it is still not uncommon. Rectal bleeding can also occur and in less common situations, a type of colon inflammation called colitis.  While most people do not have symptoms, some people with diverticulosis may have:  · Abdominal cramps and pain  · Bloating  · Constipation  · Change in bowel habits  Causes  The exact cause of diverticulosis (and diverticulitis) has not been proved, but a few things are associated with the condition:  · Low-fiber diet  · Constipation  · Lack of exercise  Your healthcare provider will talk with you about how to manage your condition. Diet changes may be all that are needed to help control diverticulosis and prevent progression to diverticulitis. If you develop diverticulitis, you will likely need other treatments.  Home care  You may be told to take fiber supplements daily. Fiber adds bulk to the stool so that it passes through the colon more easily. Stool softeners may be recommended. You may also be given medications for pain relief. Be sure to take all medications as directed.  In the past, people were told to avoid corn, nuts, and seeds. This is no longer necessary.  Follow these guidelines when caring for yourself at home:  · Eat unprocessed foods  that are high in fiber. Whole grains, fruits, and vegetables are good choices.  · Drink 6 to 8 glasses of water every day unless your healthcare provider has you limit how much fluid you should have.  · Watch for changes in your bowel movements. Tell your provider if you notice any changes.  · Begin an exercise program. Ask your provider how to get started. Generally, walking is the best.  · Get plenty of rest and sleep.  Follow-up care  Follow up with your healthcare provider, or as advised. Regular visits may be needed to check on your health. Sometimes special procedures such as colonoscopy, are needed after an episode of diverticulitis or blooding. Be sure to keep all your appointments.  If a stool sample was taken, or cultures were done, you should be told if they are positive, or if your treatment needs to be changed. You can call as directed for the results.  If X-rays were done, a radiologist will look at them. You will be told if there is a change in your treatment.  If antibiotics were prescribed, be sure to finish them all.  When to seek medical advice  Call your healthcare provider right away if any of these occur:  · Fever of 100.4°F (38°C) or higher, or as directed by your healthcare provider  · Severe cramps in the lower left side of the abdomen or pain that is getting worse  · Tenderness in the lower left side of the abdomen or worsening pain throughout the abdomen  · Diarrhea or constipation that doesn't get better within 24 hours  · Nausea and vomiting  · Bleeding from the rectum  Call 911  Call emergency services if any of the following occur:  · Trouble breathing  · Confusion  · Very drowsy or trouble awakening  · Fainting or loss of consciousness  · Rapid heart rate  · Chest pain  Date Last Reviewed: 12/30/2015  © 3043-0645 [employer]. [address] [place] [address], [place] [address]. All rights reserved. This information is not intended as a substitute for professional medical care.  Always follow your healthcare professional's instructions.        High-Fiber Diet  Fiber is in fruits, vegetables, cereals, and grains. Fiber passes through your body undigested. A high-fiber diet helps food move through your intestinal tract. The added bulk is helpful in preventing constipation. In people with diverticulosis, fiber helps clean out the pouches along the colon wall. It also prevents new pouches from forming. A high-fiber diet reduces the risk of colon cancer. It also lowers blood cholesterol and prevents high blood sugar in people with diabetes.    The fiber-rich foods listed below should be part of your diet. If you are not used to high-fiber foods, start with 1 or 2 foods from this list. Every 3 to 4 days add a new one to your diet. Do this until you are eating 4 high-fiber foods per day. This should give you 20 to 35 grams of fiber a day. It is also important to drink a lot of water when you are on this diet. You should have 6 to 8 glasses of water a day. Water makes the fiber swell and increases the benefit.  Foods high in dietary fiber  The following foods are high in dietary fiber:  · Breads. Breads made with 100% whole-wheat flour; juanita, wheat, or rye crackers; whole-grain tortillas, bran muffins.  · Cereals. Whole-grain and bran cereals with bran (shredded wheat, wheat flakes, raisin bran, corn bran); oatmeal, rolled oats, granola, and brown rice.  · Fruits. Fresh fruits and their edible skins (pears, prunes, raisins, berries, apples, and apricots); bananas, citrus fruit, mangoes, pineapple; and prune juice.  · Nuts. Any nuts and seeds.  · Vegetables. Best served raw or lightly cooked. All types, especially: green peas, celery, eggplant, potatoes, spinach, broccoli, [place] sprouts, winter squash, carrots, cauliflower, soybeans, lentils, and fresh and dried beans of all kinds.  · Other. Popcorn, any spices.  Date Last Reviewed: 8/1/2016  © 6724-1822 [employer]. [address] [place] [address]  "[place] [address]. All rights reserved. This information is not intended as a substitute for professional medical care. Always follow your healthcare professional's instructions.            Admission Information     Date & Time Provider Department CSN    5/10/2017  7:53 AM Shantanu Marley MD [facility] [id number]      Care Providers     Provider Role Specialty Primary office phone    Shantanu Marley MD Attending Provider Gastroenterology [phone]    Shantanu Marley MD Surgeon  Gastroenterology [phone]      Your Vitals Were     BP Pulse Temp Resp Height Weight    134/66 62 97.7 °F (36.5 °C) 16 5' 11" (1.803 m) 104.3 kg (230 lb)    SpO2 BMI             96% 32.08 kg/m2         Recent Lab Values        1/10/2013 8/14/2013 1/24/2014 8/6/2015 1/15/2016 6/28/2016 12/13/2016 3/20/2017     11:21 AM 10:14 AM  9:28 AM  9:07 AM  9:08 AM  7:54 AM  8:29 AM  9:49 AM    A1C 6.5 (H) 6.7 (H) 6.4 (H) 6.5 (H) 6.4 (H) 6.9 (H) 6.6 (H) 6.9 (H)    Comment for A1C at  8:29 AM on 12/13/2016:  According to ADA guidelines, hemoglobin A1C <7.0% represents  optimal control in non-pregnant diabetic patients.  Different  metrics may apply to specific populations.   Standards of Medical Care in Diabetes - 2016.  For the purpose of screening for the presence of diabetes:  <5.7%     Consistent with the absence of diabetes  5.7-6.4%  Consistent with increasing risk for diabetes   (prediabetes)  >or=6.5%  Consistent with diabetes  Currently no consensus exists for use of hemoglobin A1C  for diagnosis of diabetes for children.      Comment for A1C at  9:49 AM on 3/20/2017:  According to ADA guidelines, hemoglobin A1C <7.0% represents  optimal control in non-pregnant diabetic patients.  Different  metrics may apply to specific populations.   Standards of Medical Care in Diabetes - 2016.  For the purpose of screening for the presence of diabetes:  <5.7%     Consistent with the absence of diabetes  5.7-6.4%  Consistent with " increasing risk for diabetes   (prediabetes)  >or=6.5%  Consistent with diabetes  Currently no consensus exists for use of hemoglobin A1C  for diagnosis of diabetes for children.        Pending Labs     Order Current Status    Specimen to Pathology - Surgery Collected (05/10/17 1026)      Allergies as of 5/10/2017     No Known Allergies      [facility]     [facility] Nurse Care Line - 24/7 Assistance  Unless otherwise directed by your provider, please contact [facility], our nurse care line that is available for 24/7 assistance.     Registered nurses in the [facility] provide clinical advisement, health education, appointment booking, and other advisory services.  Call for this free service at [phone].        Advance Directives     An advance directive is a document which, in the event you are no longer able to make decisions for yourself, tells your healthcare team what kind of treatment you do or do not want to receive, or who you would like to make those decisions for you.  If you do not currently have an advance directive, [facility] encourages you to create one.  For more information call:  [phone]), [phone] ([phone]),  or log on to [email or web address].        Smoking Cessation     If you would like to quit smoking:   You may be eligible for free services if you are a [place] resident and started smoking cigarettes before September 1, 1988.  Call the Smoking Cessation Trust ([facility]) toll free at (759) 714-5215 or (240) 517-3457.   Call 3-988-QUIT-NOW if you do not meet the above criteria.   Contact us via email: [email or web address]   View our website for more information: [email or web address]        Language Assistance Services     ATTENTION: Language assistance services are available, free of charge. Please call [phone].      ATENCIÓN: Si habla español, tiene a olivares disposición servicios gratuitos de asistencia lingüística.  Joellen lewis [phone].     [facility] Ý: N?u b?n nói Ti?ng Vi?t, có các d?ch v? h? tr? ngôn ng? mi?n phí dành cho b?n. G?i s? [phone].        Diabetes Discharge Instructions                                   [facility] Sign-Up     Activating your [facility] account is as easy as 1-2-3!     1) Visit [email or web address], select Sign Up Now, enter this activation code and your date of birth, then select Next.  Activation code not generated  Current Patient Portal Status: Account disabled      2) Create a username and password to use when you visit [facility] in the future and select a security question in case you lose your password and select Next.    3) Enter your e-mail address and click Sign Up!    Additional Information  If you have questions, please e-mail [email or web address] or call [phone] to talk to our [facility] staff. Remember, [facility] is NOT to be used for urgent needs. For medical emergencies, dial 911.          [facility] complies with applicable Federal civil rights laws and does not discriminate on the basis of race, color, national origin, age, disability, or sex.

## 2017-05-10 NOTE — DISCHARGE INSTRUCTIONS
Diverticulosis    Diverticulosis means that small pouches have formed in the wall of your large intestine (colon). Most often, this problem causes no symptoms and is common as people age. But the pouches in the colon are at risk of becoming infected. When this happens, the condition is called diverticulitis. Although most people with diverticulosis never develop diverticulitis, it is still not uncommon. Rectal bleeding can also occur and in less common situations, a type of colon inflammation called colitis.  While most people do not have symptoms, some people with diverticulosis may have:  · Abdominal cramps and pain  · Bloating  · Constipation  · Change in bowel habits  Causes  The exact cause of diverticulosis (and diverticulitis) has not been proved, but a few things are associated with the condition:  · Low-fiber diet  · Constipation  · Lack of exercise  Your healthcare provider will talk with you about how to manage your condition. Diet changes may be all that are needed to help control diverticulosis and prevent progression to diverticulitis. If you develop diverticulitis, you will likely need other treatments.  Home care  You may be told to take fiber supplements daily. Fiber adds bulk to the stool so that it passes through the colon more easily. Stool softeners may be recommended. You may also be given medications for pain relief. Be sure to take all medications as directed.  In the past, people were told to avoid corn, nuts, and seeds. This is no longer necessary.  Follow these guidelines when caring for yourself at home:  · Eat unprocessed foods that are high in fiber. Whole grains, fruits, and vegetables are good choices.  · Drink 6 to 8 glasses of water every day unless your healthcare provider has you limit how much fluid you should have.  · Watch for changes in your bowel movements. Tell your provider if you notice any changes.  · Begin an exercise program. Ask your provider how to get started.  Generally, walking is the best.  · Get plenty of rest and sleep.  Follow-up care  Follow up with your healthcare provider, or as advised. Regular visits may be needed to check on your health. Sometimes special procedures such as colonoscopy, are needed after an episode of diverticulitis or blooding. Be sure to keep all your appointments.  If a stool sample was taken, or cultures were done, you should be told if they are positive, or if your treatment needs to be changed. You can call as directed for the results.  If X-rays were done, a radiologist will look at them. You will be told if there is a change in your treatment.  If antibiotics were prescribed, be sure to finish them all.  When to seek medical advice  Call your healthcare provider right away if any of these occur:  · Fever of 100.4°F (38°C) or higher, or as directed by your healthcare provider  · Severe cramps in the lower left side of the abdomen or pain that is getting worse  · Tenderness in the lower left side of the abdomen or worsening pain throughout the abdomen  · Diarrhea or constipation that doesn't get better within 24 hours  · Nausea and vomiting  · Bleeding from the rectum  Call 911  Call emergency services if any of the following occur:  · Trouble breathing  · Confusion  · Very drowsy or trouble awakening  · Fainting or loss of consciousness  · Rapid heart rate  · Chest pain  Date Last Reviewed: 12/30/2015 © 2000-2016 [employer]. [address] [place] [address]. All rights reserved. This information is not intended as a substitute for professional medical care. Always follow your healthcare professional's instructions.        High-Fiber Diet  Fiber is in fruits, vegetables, cereals, and grains. Fiber passes through your body undigested. A high-fiber diet helps food move through your intestinal tract. The added bulk is helpful in preventing constipation. In people with diverticulosis, fiber helps clean out the pouches  along the colon wall. It also prevents new pouches from forming. A high-fiber diet reduces the risk of colon cancer. It also lowers blood cholesterol and prevents high blood sugar in people with diabetes.    The fiber-rich foods listed below should be part of your diet. If you are not used to high-fiber foods, start with 1 or 2 foods from this list. Every 3 to 4 days add a new one to your diet. Do this until you are eating 4 high-fiber foods per day. This should give you 20 to 35 grams of fiber a day. It is also important to drink a lot of water when you are on this diet. You should have 6 to 8 glasses of water a day. Water makes the fiber swell and increases the benefit.  Foods high in dietary fiber  The following foods are high in dietary fiber:  · Breads. Breads made with 100% whole-wheat flour; juanita, wheat, or rye crackers; whole-grain tortillas, bran muffins.  · Cereals. Whole-grain and bran cereals with bran (shredded wheat, wheat flakes, raisin bran, corn bran); oatmeal, rolled oats, granola, and brown rice.  · Fruits. Fresh fruits and their edible skins (pears, prunes, raisins, berries, apples, and apricots); bananas, citrus fruit, mangoes, pineapple; and prune juice.  · Nuts. Any nuts and seeds.  · Vegetables. Best served raw or lightly cooked. All types, especially: green peas, celery, eggplant, potatoes, spinach, broccoli, [place] sprouts, winter squash, carrots, cauliflower, soybeans, lentils, and fresh and dried beans of all kinds.  · Other. Popcorn, any spices.  Date Last Reviewed: 8/1/2016  © 6140-6950 [employer]. [address] [place] [address], [place] [address]. All rights reserved. This information is not intended as a substitute for professional medical care. Always follow your healthcare professional's instructions.

## 2017-05-10 NOTE — ANESTHESIA POSTPROCEDURE EVALUATION
"Anesthesia Post Evaluation    Patient: Butch Mcdaniel    Procedure(s) Performed: Procedure(s) (LRB):  COLONOSCOPY (N/A)    Final Anesthesia Type: general  Patient location during evaluation: GI PACU  Patient participation: Yes- Able to Participate  Level of consciousness: awake and alert and oriented  Post-procedure vital signs: reviewed and stable  Pain management: adequate  Airway patency: patent  PONV status at discharge: No PONV  Anesthetic complications: no      Cardiovascular status: blood pressure returned to baseline and hemodynamically stable  Respiratory status: unassisted, spontaneous ventilation and room air  Hydration status: euvolemic  Follow-up not needed.        Visit Vitals    /66    Pulse 62    Temp 36.5 °C (97.7 °F)    Resp 16    Ht 5' 11" (1.803 m)    Wt 104.3 kg (230 lb)    SpO2 96%    BMI 32.08 kg/m2       Pain/Chaparro Score: Pain Assessment Performed: Yes (5/10/2017 10:30 AM)  Presence of Pain: denies (5/10/2017 10:30 AM)  Chaparro Score: 9 (5/10/2017 10:30 AM)      "

## 2017-05-10 NOTE — TRANSFER OF CARE
"Anesthesia Transfer of Care Note    Patient: Butch Mcdaniel    Procedure(s) Performed: Procedure(s) (LRB):  COLONOSCOPY (N/A)    Patient location: PACU    Anesthesia Type: general    Transport from OR: Transported from OR on room air with adequate spontaneous ventilation    Post pain: adequate analgesia    Post assessment: no apparent anesthetic complications    Post vital signs: stable    Level of consciousness: awake, oriented and alert    Nausea/Vomiting: no nausea/vomiting    Complications: none          Last vitals:   Visit Vitals    /66    Pulse 62    Temp 36.5 °C (97.7 °F)    Resp 16    Ht 5' 11" (1.803 m)    Wt 104.3 kg (230 lb)    SpO2 96%    BMI 32.08 kg/m2     "

## 2017-05-17 PROBLEM — D12.6 TUBULAR ADENOMA OF COLON: Chronic | Status: ACTIVE | Noted: 2017-05-10

## 2017-05-17 PROBLEM — D12.6 TUBULAR ADENOMA OF COLON: Status: ACTIVE | Noted: 2017-05-10

## 2017-05-22 ENCOUNTER — OFFICE VISIT (OUTPATIENT)
Dept: FAMILY MEDICINE | Facility: CLINIC | Age: 62
End: 2017-05-22
Payer: MEDICARE

## 2017-05-22 VITALS
HEIGHT: 71 IN | OXYGEN SATURATION: 96 % | HEART RATE: 62 BPM | DIASTOLIC BLOOD PRESSURE: 60 MMHG | TEMPERATURE: 98 F | SYSTOLIC BLOOD PRESSURE: 98 MMHG | BODY MASS INDEX: 32.2 KG/M2 | WEIGHT: 230 LBS

## 2017-05-22 DIAGNOSIS — F32.A DEPRESSION, UNSPECIFIED DEPRESSION TYPE: Primary | Chronic | ICD-10-CM

## 2017-05-22 DIAGNOSIS — F41.9 ANXIETY: ICD-10-CM

## 2017-05-22 PROCEDURE — 99213 OFFICE O/P EST LOW 20 MIN: CPT | Mod: PBBFAC,PO | Performed by: INTERNAL MEDICINE

## 2017-05-22 PROCEDURE — 99999 PR PBB SHADOW E&M-EST. PATIENT-LVL III: CPT | Mod: PBBFAC,,, | Performed by: INTERNAL MEDICINE

## 2017-05-22 PROCEDURE — 99214 OFFICE O/P EST MOD 30 MIN: CPT | Mod: S$PBB,,, | Performed by: INTERNAL MEDICINE

## 2017-05-22 NOTE — PROGRESS NOTES
Assessment & Plan  Depression, unspecified depression type  Anxiety  - sertraline SE of sedation - taking in the AM; stay on the sertraline but take at night.  Still using xanax but has cut down use. Limit use of the xanax - ideally 0-1 tablets a day.  Currently 2-3 tablets a day.      There are no discontinued medications.    Follow-up: No Follow-up on file.OV 1 month follow up on zoloft 150 at night.  Try to cut down on the xanax.  Consider buspar.      =================================================================      Chief Complaint   Patient presents with    Follow-up       DIPESH Rae is a 61 y.o. male, last appointment with this clinic was 4/17/2017.    I previously saw him in mid April.  Anxiety, increased Zoloft.  History of Prozac with side effects.  History of citalopram.  Plan was to stay on Xanax twice daily and eventually consider transition to BuSpar.  Notes sedation.  So avoids taking the xanax in the morning.  Taking 150 mg.  He takes it 3 tablets in the morning.  Has not tried taking it at night.  Has always had a hard time sleeping.  Xanax - 1 tablet in the morning time, and 2 at night.  1-2 at night.   Wife is concerned about his memory issues.  Diabetes  Hyperlipidemia, statin  Hypertension, clonidine, lisinopril, verapamil  Chronic neck and back pain followed by pain management.  Narcotics.  I fill his gabapentin.    CHENTE-7 Anxiety Screening Tool    Over the last 2 weeks, how often have you been bothered by the following problems?   (Not at all = 0, Several Days = 1, More than half the days = 2, Nearly Every Day = 3)     1.  Feeling nervous, anxious, or on edge. 2   2.  Not being able to sleep or control worrying. 1   3.  Worrying too much about different things. 3   4.  Trouble relaxing. 0   5.  Being so restless that it is hard to sit still.  1; (3 due to back pain not psych)   6.  Becoming easily annoyed or irritable. 1   7.  Feeling afraid as if something awful might happen. 0    Total  score: 8    PHQ-9 Depression Patient Health Questionnaire 5/22/2017   In the last two weeks how often have you had little interest or pleasure in doing things 1   In the last two weeks how often have you felt down, depressed or hopeless 0   In the last two weeks how often have you had trouble falling or staying asleep, or sleeping too much 1   In the last two weeks how often have you felt tired or having little energy 0   In the last two weeks how often have you had a poor appetite or overeating 0   In the last two weeks how often have you felt bad about yourself - or that you are a failure or have let yourself or your family down 0   In the last two weeks how often have you had trouble concentrating on things, such as reading the newspaper or watching television 0   In the last two weeks how often have you been moving or speaking so slowly that other people could have noticed. Or the opposite - being so fidgety or restless that you have been moving around a lot more than usual. 3   In the last two weeks how often have you had thoughts that you would be better off dead, or of hurting yourself 0   Total Score 5       Patient Care Team:  Gabriel Evans MD as PCP - General (Internal Medicine)    Patient Active Problem List    Diagnosis Date Noted    Tubular adenoma of colon 05/10/2017     5/10/2017 colonoscopy tubular adenoma      Therapeutic opioid-induced constipation (OIC) 04/17/2017    Tobacco dependence 01/12/2016    Non morbid obesity due to excess calories 01/12/2016    Controlled type 2 diabetes mellitus without complication 08/07/2015    Facet arthritis of cervical region 10/28/2013    Facet arthritis of lumbar region 10/28/2013    Benzodiazepine dependence 10/28/2013    Opioid dependence 10/28/2013    ED (erectile dysfunction) 07/26/2013    DDD (degenerative disc disease), cervical 05/23/2013    DDD (degenerative disc disease), lumbar 05/23/2013    HTN (hypertension), benign 11/01/2012     Hyperlipidemia 11/01/2012    Anemia 11/01/2012    Chronic pain 11/01/2012    Anxiety 11/01/2012    Depression 11/01/2012       PAST MEDICAL HISTORY:  Past Medical History:   Diagnosis Date    Anxiety     Degenerative disc disease     Depression     Diabetes mellitus type II, controlled     ETOH abuse     Eye injury as a child    stick hit eye ? eye, hit in ou due to boxing    Hyperlipidemia     Hypertension     MRSA (methicillin resistant Staphylococcus aureus)        PAST SURGICAL HISTORY:  Past Surgical History:   Procedure Laterality Date    APPENDECTOMY      COLONOSCOPY N/A 5/10/2017    Procedure: COLONOSCOPY;  Surgeon: Shantanu Marley MD;  Location: [facility];  Service: Endoscopy;  Laterality: N/A;       SOCIAL HISTORY:  Social History     Social History    Marital status:      Spouse name: N/A    Number of children: N/A    Years of education: N/A     Occupational History    retired -       Social History Main Topics    Smoking status: Current Every Day Smoker     Packs/day: 0.50     Years: 32.00     Types: Cigarettes     Last attempt to quit: 3/4/2013    Smokeless tobacco: Not on file    Alcohol use No    Drug use: No    Sexual activity: Yes     Partners: Female      Comment:  with children, disabled      Other Topics Concern    Not on file     Social History Narrative    No narrative on file       ALLERGIES AND MEDICATIONS: updated and reviewed.  Review of patient's allergies indicates:  No Known Allergies  Current Outpatient Prescriptions   Medication Sig Dispense Refill    alprazolam (XANAX) 1 MG tablet Take 1 tablet (1 mg total) by mouth 3 (three) times daily as needed for Anxiety. 90 tablet 0    aspirin (ECOTRIN) 81 MG EC tablet Take 1 tablet (81 mg total) by mouth once daily.  0    atorvastatin (LIPITOR) 40 MG tablet Take 1 tablet (40 mg total) by mouth once daily. For cholesterol.  Stop the fenofibrate 90 tablet 3    cloNIDine  "(CATAPRES) 0.2 MG tablet Take 1 tablet (0.2 mg total) by mouth 2 (two) times daily. 60 tablet 6    cyclobenzaprine (FLEXERIL) 10 MG tablet Take 10 mg by mouth as needed.  1    fentaNYL (DURAGESIC) 25 mcg/hr   0    fish oil-omega-3 fatty acids 300-1,000 mg capsule Take 2 capsules (2 g total) by mouth once daily. 60 capsule 5    fluticasone (FLONASE) 50 mcg/actuation nasal spray 1 spray by Each Nare route once daily.      gabapentin (NEURONTIN) 300 MG capsule TAKE 2 CAPSULES BY MOUTH 3 TIMES A  capsule 6    hydrocodone-acetaminophen 10-325mg (NORCO)  mg Tab       lisinopril (PRINIVIL,ZESTRIL) 40 MG tablet Take 1 tablet (40 mg total) by mouth once daily. 30 tablet 6    metformin (GLUCOPHAGE) 500 MG tablet Take 1 tablet (500 mg total) by mouth daily with breakfast. 30 tablet 6    sertraline (ZOLOFT) 50 MG tablet Take 3 tablets (150 mg total) by mouth once daily. Increased dose 90 tablet 11    tadalafil (CIALIS) 20 MG Tab Take 1 tablet (20 mg total) by mouth once daily. 10 tablet 11    verapamil (CALAN-SR) 180 MG CR tablet Take 1 tablet (180 mg total) by mouth 2 (two) times daily. 60 tablet 6    albuterol (PROAIR HFA) 90 mcg/actuation inhaler Inhale 2 puffs into the lungs every 6 (six) hours as needed for Wheezing. 18 g 6    diclofenac sodium 1.5 % Drop Apply 40 Units topically 4 (four) times daily as needed. 150 mL 3     No current facility-administered medications for this visit.        Review of Systems   Constitutional: Positive for malaise/fatigue. Negative for chills and fever.   Psychiatric/Behavioral: The patient is nervous/anxious and has insomnia.        Physical Exam   Vitals:    05/22/17 1341   BP: 98/60   Pulse: 62   Temp: 98 °F (36.7 °C)   SpO2: 96%   Weight: 104.3 kg (230 lb)   Height: 5' 11" (1.803 m)    Body mass index is 32.08 kg/m².  Weight: 104.3 kg (230 lb)   Height: 5' 11" (180.3 cm)     Physical Exam   Constitutional: He appears well-developed and well-nourished. No " distress.   Psychiatric:   No internal stimuli.  No psychomotor retardation or agitation.  Normal eye contact.  Appropriate response to questions.

## 2017-05-22 NOTE — PATIENT INSTRUCTIONS
TAKE THE ZOLOFT AT NIGHT.    GOAL IS TO FURTHER REDUCE YOUR XANAX - IDEALLY, TO TAKE 0-1 TABLETS A DAY.  WOULD TRY TO SEE IF YOU CAN STOP USING THE MORNING DOSE OF XANAX.

## 2017-06-15 DIAGNOSIS — F32.A DEPRESSION, UNSPECIFIED DEPRESSION TYPE: Chronic | ICD-10-CM

## 2017-06-15 DIAGNOSIS — F41.9 ANXIETY: ICD-10-CM

## 2017-06-15 RX ORDER — ALPRAZOLAM 1 MG/1
TABLET ORAL
Qty: 90 TABLET | Refills: 0 | Status: SHIPPED | OUTPATIENT
Start: 2017-06-15 | End: 2017-06-22 | Stop reason: SDUPTHER

## 2017-06-21 NOTE — PROGRESS NOTES
This note was created by combination of typed  and Dragon dictation.  Transcription errors may be present.  If there are any questions, please contact me.    Assessment & Plan  Anxiety  Depression, unspecified depression type  -Scoring on GAD7 and PHQ9 is okay, still using alprazolam typically one in the morning 2 at night.  I like to try to transition him to BuSpar talked about this at length.  Stay on Zoloft.  Alprazolam reduced to 2 tablets at night, stop the morning dose.  Start BuSpar 15 mg in the morning.  -     alprazolam (XANAX) 1 MG tablet; 2 at night, buspar in the AM  Dispense: 60 tablet; Refill: 0  -     busPIRone (BUSPAR) 15 MG tablet; 1 buspar in AM; 2 xanax in evening  Dispense: 30 tablet; Refill: 11    Medications Discontinued During This Encounter   Medication Reason    cyclobenzaprine (FLEXERIL) 10 MG tablet Therapy completed       Follow-up: No Follow-up on file.OV 1 month 7/18 for new medication adjustment.      =================================================================      Chief Complaint   Patient presents with    Follow-up     medications       DIPESH  Butch is a 61 y.o. male, last appointment with this clinic was 5/22/2017.    I previously saw him in mid May.  Depression and anxiety, history of Prozac with side effects.  History of citalopram. Sertraline side effect of sedation, changed him to nighttime dosing.  Had cut down on Xanax.  Would like to see Xanax use 0-1 tablet daily.  Consider BuSpar.  Diabetes, metformin, last A1c March 2017, 6.9, micrograms and was normal  Hyperlipidemia, statin, liver profile 12/2016 good on statin  Hypertension, clonidine, lisinopril, verapamil  Chronic neck and back pain followed by pain management.  Narcotics.  I fill his gabapentin.    Recent death of a friend and also a cousin.  He'd been very close to his cousin.  Handling it OK he feels.  Taking the sertraline at night.  Denies outright depression.  Xanax - takes 2 at night, 1 in  AM.    CHENTE-7 Anxiety Screening Tool    Over the last 2 weeks, how often have you been bothered by the following problems?   (Not at all = 0, Several Days = 1, More than half the days = 2, Nearly Every Day = 3)     1.  Feeling nervous, anxious, or on edge. 1 (previous 2)   2.  Not being able to sleep or control worrying. 0 (b/c of xanax) (previous 1)   3.  Worrying too much about different things. 0 (previous 3)   4.  Trouble relaxing. 0   5.  Being so restless that it is hard to sit still. 0 (previous 1)   6.  Becoming easily annoyed or irritable. 2 (previous 1)   7.  Feeling afraid as if something awful might happen. 0    Total score: 3    PHQ-9 Depression Patient Health Questionnaire 5/22/2017   In the last two weeks how often have you had little interest or pleasure in doing things 1   In the last two weeks how often have you felt down, depressed or hopeless 0   In the last two weeks how often have you had trouble falling or staying asleep, or sleeping too much 1   In the last two weeks how often have you felt tired or having little energy 0   In the last two weeks how often have you had a poor appetite or overeating 0   In the last two weeks how often have you felt bad about yourself - or that you are a failure or have let yourself or your family down 0   In the last two weeks how often have you had trouble concentrating on things, such as reading the newspaper or watching television 0   In the last two weeks how often have you been moving or speaking so slowly that other people could have noticed. Or the opposite - being so fidgety or restless that you have been moving around a lot more than usual. 3   In the last two weeks how often have you had thoughts that you would be better off dead, or of hurting yourself 0   Total Score 5           Patient Care Team:  Gabriel Evans MD as PCP - General (Internal Medicine)    Patient Active Problem List    Diagnosis Date Noted    Tubular adenoma of colon 05/10/2017      5/10/2017 colonoscopy tubular adenoma      Therapeutic opioid-induced constipation (OIC) 04/17/2017    Tobacco dependence 01/12/2016    Non morbid obesity due to excess calories 01/12/2016    Controlled type 2 diabetes mellitus without complication 08/07/2015    Facet arthritis of cervical region 10/28/2013    Facet arthritis of lumbar region 10/28/2013    Benzodiazepine dependence 10/28/2013    Opioid dependence 10/28/2013    ED (erectile dysfunction) 07/26/2013    DDD (degenerative disc disease), cervical 05/23/2013    DDD (degenerative disc disease), lumbar 05/23/2013    HTN (hypertension), benign 11/01/2012    Hyperlipidemia 11/01/2012    Anemia 11/01/2012    Chronic pain 11/01/2012    Anxiety 11/01/2012    Depression 11/01/2012       PAST MEDICAL HISTORY:  Past Medical History:   Diagnosis Date    Anxiety     Degenerative disc disease     Depression     Diabetes mellitus type II, controlled     ETOH abuse     Eye injury as a child    stick hit eye ? eye, hit in [place] due to boxing    Hyperlipidemia     Hypertension     MRSA (methicillin resistant Staphylococcus aureus)        PAST SURGICAL HISTORY:  Past Surgical History:   Procedure Laterality Date    APPENDECTOMY      COLONOSCOPY N/A 5/10/2017    Procedure: COLONOSCOPY;  Surgeon: Shantanu Marley MD;  Location: [facility];  Service: Endoscopy;  Laterality: N/A;       SOCIAL HISTORY:  Social History     Social History    Marital status:      Spouse name: N/A    Number of children: N/A    Years of education: N/A     Occupational History    retired -       Social History Main Topics    Smoking status: Current Every Day Smoker     Packs/day: 0.50     Years: 32.00     Types: Cigarettes     Last attempt to quit: 3/4/2013    Smokeless tobacco: Not on file    Alcohol use No    Drug use: No    Sexual activity: Yes     Partners: Female      Comment:  with children, disabled      Other Topics  Concern    Not on file     Social History Narrative    No narrative on file       ALLERGIES AND MEDICATIONS: updated and reviewed.  Review of patient's allergies indicates:  No Known Allergies  Current Outpatient Prescriptions   Medication Sig Dispense Refill    alprazolam (XANAX) 1 MG tablet TAKE ONE TABLET BY MOUTH THREE TIMES A DAY AS NEEDED FOR ANXIETY 90 tablet 0    aspirin (ECOTRIN) 81 MG EC tablet Take 1 tablet (81 mg total) by mouth once daily.  0    atorvastatin (LIPITOR) 40 MG tablet Take 1 tablet (40 mg total) by mouth once daily. For cholesterol.  Stop the fenofibrate 90 tablet 3    cloNIDine (CATAPRES) 0.2 MG tablet Take 1 tablet (0.2 mg total) by mouth 2 (two) times daily. 60 tablet 6    fentaNYL (DURAGESIC) 25 mcg/hr   0    fish oil-omega-3 fatty acids 300-1,000 mg capsule Take 2 capsules (2 g total) by mouth once daily. 60 capsule 5    fluticasone (FLONASE) 50 mcg/actuation nasal spray 1 spray by Each Nare route once daily.      gabapentin (NEURONTIN) 300 MG capsule TAKE 2 CAPSULES BY MOUTH 3 TIMES A  capsule 6    hydrocodone-acetaminophen 10-325mg (NORCO)  mg Tab       lisinopril (PRINIVIL,ZESTRIL) 40 MG tablet Take 1 tablet (40 mg total) by mouth once daily. 30 tablet 6    metformin (GLUCOPHAGE) 500 MG tablet Take 1 tablet (500 mg total) by mouth daily with breakfast. 30 tablet 6    sertraline (ZOLOFT) 50 MG tablet Take 3 tablets (150 mg total) by mouth once daily. Increased dose 90 tablet 11    tadalafil (CIALIS) 20 MG Tab Take 1 tablet (20 mg total) by mouth once daily. 10 tablet 11    verapamil (CALAN-SR) 180 MG CR tablet Take 1 tablet (180 mg total) by mouth 2 (two) times daily. 60 tablet 6    albuterol (PROAIR HFA) 90 mcg/actuation inhaler Inhale 2 puffs into the lungs every 6 (six) hours as needed for Wheezing. 18 g 6    diclofenac sodium 1.5 % Drop Apply 40 Units topically 4 (four) times daily as needed. 150 mL 3     No current facility-administered medications  "for this visit.        Review of Systems   Psychiatric/Behavioral: Positive for depression. The patient is nervous/anxious.        Physical Exam   Vitals:    06/22/17 1049   Weight: 103 kg (227 lb 2.9 oz)   Height: 5' 11" (1.803 m)    Body mass index is 31.69 kg/m².  Weight: 103 kg (227 lb 2.9 oz)   Height: 5' 11" (180.3 cm)     Physical Exam   Constitutional: He appears well-developed and well-nourished. No distress.   Psychiatric: He has a normal mood and affect. His behavior is normal. Thought content normal.     "

## 2017-06-22 ENCOUNTER — TELEPHONE (OUTPATIENT)
Dept: FAMILY MEDICINE | Facility: CLINIC | Age: 62
End: 2017-06-22

## 2017-06-22 ENCOUNTER — OFFICE VISIT (OUTPATIENT)
Dept: FAMILY MEDICINE | Facility: CLINIC | Age: 62
End: 2017-06-22
Payer: MEDICARE

## 2017-06-22 VITALS — BODY MASS INDEX: 31.81 KG/M2 | WEIGHT: 227.19 LBS | HEIGHT: 71 IN

## 2017-06-22 DIAGNOSIS — G89.29 OTHER CHRONIC PAIN: Primary | Chronic | ICD-10-CM

## 2017-06-22 DIAGNOSIS — F32.A DEPRESSION, UNSPECIFIED DEPRESSION TYPE: Chronic | ICD-10-CM

## 2017-06-22 DIAGNOSIS — F41.9 ANXIETY: ICD-10-CM

## 2017-06-22 PROCEDURE — 99999 PR PBB SHADOW E&M-EST. PATIENT-LVL II: CPT | Mod: PBBFAC,,, | Performed by: INTERNAL MEDICINE

## 2017-06-22 PROCEDURE — 99214 OFFICE O/P EST MOD 30 MIN: CPT | Mod: S$PBB,,, | Performed by: INTERNAL MEDICINE

## 2017-06-22 PROCEDURE — 99212 OFFICE O/P EST SF 10 MIN: CPT | Mod: PBBFAC,PO | Performed by: INTERNAL MEDICINE

## 2017-06-22 RX ORDER — BUSPIRONE HYDROCHLORIDE 15 MG/1
TABLET ORAL
Qty: 30 TABLET | Refills: 11 | Status: SHIPPED | OUTPATIENT
Start: 2017-06-22 | End: 2017-07-11 | Stop reason: ALTCHOICE

## 2017-06-22 RX ORDER — ALPRAZOLAM 1 MG/1
TABLET ORAL
Qty: 60 TABLET | Refills: 0 | Status: SHIPPED | OUTPATIENT
Start: 2017-06-22 | End: 2017-07-11 | Stop reason: SDUPTHER

## 2017-06-22 NOTE — TELEPHONE ENCOUNTER
----- Message from Zainab Bonilla sent at 6/22/2017 11:38 AM CDT -----  Contact: self  Has questions regarding being taken off the Flexeril . He states he needs it for leg cramps , he can not sleep without it .       [phone]      LL

## 2017-06-23 RX ORDER — CYCLOBENZAPRINE HCL 10 MG
10 TABLET ORAL
Qty: 60 TABLET | Refills: 1 | Status: SHIPPED | OUTPATIENT
Start: 2017-06-23 | End: 2018-05-03

## 2017-06-29 ENCOUNTER — TELEPHONE (OUTPATIENT)
Dept: FAMILY MEDICINE | Facility: CLINIC | Age: 62
End: 2017-06-29

## 2017-07-10 NOTE — PROGRESS NOTES
This note was created by combination of typed  and Dragon dictation.  Transcription errors may be present.  If there are any questions, please contact me.    Assessment & Plan  Anxiety  Depression, unspecified depression type  -did not do well with sertraline and attempts to wean Xanax using BuSpar.  His previous visits with me, notes that he was not under complete control with the sertraline.  He wants to revert back to his previous regimen, so I will revert him back to citalopram 20 g a day with Xanax to take 2 at night and 1 during the day if needed.  Rx's were refilled today.  -     citalopram (CELEXA) 20 MG tablet; Take 1 tablet (20 mg total) by mouth once daily.  Dispense: 90 tablet; Refill: 3  -     alprazolam (XANAX) 1 MG tablet; 2 at night, 1 in AM PRN  Dispense: 90 tablet; Refill: 5      Medications Discontinued During This Encounter   Medication Reason    busPIRone (BUSPAR) 15 MG tablet Therapy completed    sertraline (ZOLOFT) 50 MG tablet Therapy completed       Follow-up: No Follow-up on file. OV 6 months fasting f/u on mood, DM, lipid      =================================================================      Chief Complaint   Patient presents with    Hypertension       DIPESH Rae is a 61 y.o. male, last appointment with this clinic was 6/22/2017.    Depression and anxiety, history of Prozac with side effects.  History of citalopram.  Most recently tried him on sertraline and BuSpar to transition from Xanax.  He had episodes of hypotension and feeling poorly that ultimately seemed to improve with reverting back to Xanax and cutting down the dose of sertraline.  Xanax 2 HS.  Diabetes, metformin, last A1c March 2017, 6.9, microalbumin was normal  Hyperlipidemia, statin, liver profile 12/2016 good on statin  Hypertension, clonidine, lisinopril, verapamil  Chronic neck and back pain followed by pain management.  Narcotics.  I fill his gabapentin.    Really wants to revert back to what he was  taking.  He and his wife note that [place] [facility] [place] [facility] [place] and had had him on what they felt was a very reasonable regimen.  He's been stable on this for years.  Citalopram and Xanax.  When I first met him he told me that he didn't feel like Celexa was doing anything and that is why I try to transition him to Zoloft.      He recalls going through a similar time when he established care here in the past with Dr. Nolasco, that she had wanted to make adjust this to his medications as well with the idea of trying to wean down his Xanax.      Phone call earlier this month-he had gotten hypotensive, and he realizes in hindsight now that it was combination of BuSpar as well as the Flexeril.      He has been taking Xanax now, taking 2 at night, and 1 during the day of his blood pressure is up.  He attributes fluctuations in his blood pressure to anxiety.  His wife monitors his intake and let him have no more than 3 in a day.  She also has been rigorous and monitoring his narcotic regimen as well.      He takes the Flexeril regularly.  Without it he gets muscle cramping regularly.      Checks his blood pressures twice a day and for a diastolic greater than 90 takes the alprazolam.        Patient Care Team:  Gabriel Evans MD as PCP - General (Internal Medicine)  Vlad Nava MD (Pain Medicine)  Kaila Carrillo OD as Consulting Physician (Optometry)  Eamon Hammond MD as Consulting Physician (Neurosurgery)    Patient Active Problem List    Diagnosis Date Noted    Tubular adenoma of colon 05/10/2017     5/10/2017 colonoscopy tubular adenoma      Therapeutic opioid-induced constipation (OIC) 04/17/2017    Tobacco dependence 01/12/2016    Non morbid obesity due to excess calories 01/12/2016    Controlled type 2 diabetes mellitus without complication 08/07/2015    Facet arthritis of cervical region 10/28/2013    Facet arthritis of lumbar region 10/28/2013     Benzodiazepine dependence 10/28/2013    Opioid dependence 10/28/2013    ED (erectile dysfunction) 07/26/2013    DDD (degenerative disc disease), cervical 05/23/2013    DDD (degenerative disc disease), lumbar 05/23/2013    HTN (hypertension), benign 11/01/2012    Hyperlipidemia 11/01/2012    Anemia 11/01/2012    Chronic pain 11/01/2012    Anxiety 11/01/2012    Depression 11/01/2012       PAST MEDICAL HISTORY:  Past Medical History:   Diagnosis Date    Anxiety     Degenerative disc disease     Depression     Diabetes mellitus type II, controlled     ETOH abuse     Eye injury as a child    stick hit eye ? eye, hit in ou due to boxing    Hyperlipidemia     Hypertension     MRSA (methicillin resistant Staphylococcus aureus)        PAST SURGICAL HISTORY:  Past Surgical History:   Procedure Laterality Date    APPENDECTOMY      COLONOSCOPY N/A 5/10/2017    Procedure: COLONOSCOPY;  Surgeon: Shantanu Marley MD;  Location: [facility];  Service: Endoscopy;  Laterality: N/A;       SOCIAL HISTORY:  Social History     Social History    Marital status:      Spouse name: N/A    Number of children: N/A    Years of education: N/A     Occupational History    retired -       Social History Main Topics    Smoking status: Current Every Day Smoker     Packs/day: 0.50     Years: 32.00     Types: Cigarettes     Last attempt to quit: 3/4/2013    Smokeless tobacco: Never Used    Alcohol use No    Drug use: No    Sexual activity: Yes     Partners: Female      Comment:  with children, disabled      Other Topics Concern    Not on file     Social History Narrative    No narrative on file       ALLERGIES AND MEDICATIONS: updated and reviewed.  Review of patient's allergies indicates:  No Known Allergies  Current Outpatient Prescriptions   Medication Sig Dispense Refill    alprazolam (XANAX) 1 MG tablet 2 at night, buspar in the AM 60 tablet 0    aspirin (ECOTRIN) 81 MG EC  "tablet Take 1 tablet (81 mg total) by mouth once daily.  0    atorvastatin (LIPITOR) 40 MG tablet Take 1 tablet (40 mg total) by mouth once daily. For cholesterol.  Stop the fenofibrate 90 tablet 3    cloNIDine (CATAPRES) 0.2 MG tablet Take 1 tablet (0.2 mg total) by mouth 2 (two) times daily. 60 tablet 6    cyclobenzaprine (FLEXERIL) 10 MG tablet Take 1 tablet (10 mg total) by mouth as needed. 60 tablet 1    fentaNYL (DURAGESIC) 25 mcg/hr   0    fish oil-omega-3 fatty acids 300-1,000 mg capsule Take 2 capsules (2 g total) by mouth once daily. 60 capsule 5    gabapentin (NEURONTIN) 300 MG capsule TAKE 2 CAPSULES BY MOUTH 3 TIMES A  capsule 6    hydrocodone-acetaminophen 10-325mg (NORCO)  mg Tab       lisinopril (PRINIVIL,ZESTRIL) 40 MG tablet Take 1 tablet (40 mg total) by mouth once daily. 30 tablet 6    metformin (GLUCOPHAGE) 500 MG tablet Take 1 tablet (500 mg total) by mouth daily with breakfast. 30 tablet 6    POLYETHYLENE GLYCOL 3350 (MIRALAX ORAL) Take 1 application by mouth.      tadalafil (CIALIS) 20 MG Tab Take 1 tablet (20 mg total) by mouth once daily. 10 tablet 11    verapamil (CALAN-SR) 180 MG CR tablet Take 1 tablet (180 mg total) by mouth 2 (two) times daily. 60 tablet 6     No current facility-administered medications for this visit.        Review of Systems   Constitutional: Negative for chills and fever.   Respiratory: Negative for shortness of breath.    Cardiovascular: Negative for chest pain.       Physical Exam  Vitals:    07/11/17 1122   BP: (!) 148/80   Pulse: 75   Temp: 98.6 °F (37 °C)   TempSrc: Oral   SpO2: 97%   Weight: 99.8 kg (220 lb 0.3 oz)   Height: 5' 11" (1.803 m)    Body mass index is 30.69 kg/m².  Weight: 99.8 kg (220 lb 0.3 oz)   Height: 5' 11" (180.3 cm)     Physical Exam   Constitutional: He appears well-developed and well-nourished. No distress.   Psychiatric: He has a normal mood and affect. His behavior is normal. Thought content normal.     "

## 2017-07-11 ENCOUNTER — OFFICE VISIT (OUTPATIENT)
Dept: FAMILY MEDICINE | Facility: CLINIC | Age: 62
End: 2017-07-11
Payer: MEDICARE

## 2017-07-11 VITALS
SYSTOLIC BLOOD PRESSURE: 148 MMHG | WEIGHT: 220 LBS | BODY MASS INDEX: 30.8 KG/M2 | TEMPERATURE: 99 F | HEIGHT: 71 IN | DIASTOLIC BLOOD PRESSURE: 80 MMHG | OXYGEN SATURATION: 97 % | HEART RATE: 75 BPM

## 2017-07-11 DIAGNOSIS — F41.9 ANXIETY: ICD-10-CM

## 2017-07-11 DIAGNOSIS — F32.A DEPRESSION, UNSPECIFIED DEPRESSION TYPE: Chronic | ICD-10-CM

## 2017-07-11 PROCEDURE — 99999 PR PBB SHADOW E&M-EST. PATIENT-LVL III: CPT | Mod: PBBFAC,,, | Performed by: INTERNAL MEDICINE

## 2017-07-11 PROCEDURE — 99213 OFFICE O/P EST LOW 20 MIN: CPT | Mod: PBBFAC,PO | Performed by: INTERNAL MEDICINE

## 2017-07-11 PROCEDURE — 99214 OFFICE O/P EST MOD 30 MIN: CPT | Mod: S$PBB,,, | Performed by: INTERNAL MEDICINE

## 2017-07-11 RX ORDER — ALPRAZOLAM 1 MG/1
TABLET ORAL
Qty: 90 TABLET | Refills: 5 | Status: SHIPPED | OUTPATIENT
Start: 2017-07-11 | End: 2017-12-22 | Stop reason: SDUPTHER

## 2017-07-11 RX ORDER — CITALOPRAM 20 MG/1
20 TABLET, FILM COATED ORAL DAILY
Qty: 90 TABLET | Refills: 3 | Status: SHIPPED | OUTPATIENT
Start: 2017-07-11 | End: 2018-05-03 | Stop reason: SDUPTHER

## 2017-10-13 DIAGNOSIS — E11.9 TYPE 2 DIABETES MELLITUS WITHOUT COMPLICATION: ICD-10-CM

## 2017-11-29 ENCOUNTER — TELEPHONE (OUTPATIENT)
Dept: FAMILY MEDICINE | Facility: CLINIC | Age: 62
End: 2017-11-29

## 2017-11-29 DIAGNOSIS — I10 ESSENTIAL HYPERTENSION: ICD-10-CM

## 2017-11-29 DIAGNOSIS — E11.9 CONTROLLED TYPE 2 DIABETES MELLITUS WITHOUT COMPLICATION, WITHOUT LONG-TERM CURRENT USE OF INSULIN: ICD-10-CM

## 2017-11-29 DIAGNOSIS — E78.5 HYPERLIPIDEMIA, UNSPECIFIED HYPERLIPIDEMIA TYPE: Primary | ICD-10-CM

## 2017-11-29 NOTE — TELEPHONE ENCOUNTER
----- Message from Rupa Ruelas sent at 11/29/2017 12:58 PM CST -----  Contact: self  Pt calling to have labs placed in system so he may have them done on 12/22. Please call [phone].

## 2017-12-15 ENCOUNTER — LAB VISIT (OUTPATIENT)
Dept: LAB | Facility: HOSPITAL | Age: 62
End: 2017-12-15
Attending: INTERNAL MEDICINE
Payer: MEDICARE

## 2017-12-15 DIAGNOSIS — E78.5 HYPERLIPIDEMIA, UNSPECIFIED HYPERLIPIDEMIA TYPE: ICD-10-CM

## 2017-12-15 DIAGNOSIS — E11.9 TYPE 2 DIABETES MELLITUS WITHOUT COMPLICATION: ICD-10-CM

## 2017-12-15 DIAGNOSIS — E11.9 CONTROLLED TYPE 2 DIABETES MELLITUS WITHOUT COMPLICATION, WITHOUT LONG-TERM CURRENT USE OF INSULIN: ICD-10-CM

## 2017-12-15 DIAGNOSIS — I10 ESSENTIAL HYPERTENSION: ICD-10-CM

## 2017-12-15 LAB
ALBUMIN SERPL BCP-MCNC: 3.6 G/DL
ALP SERPL-CCNC: 63 U/L
ALT SERPL W/O P-5'-P-CCNC: 36 U/L
ANION GAP SERPL CALC-SCNC: 7 MMOL/L
AST SERPL-CCNC: 36 U/L
BILIRUB SERPL-MCNC: 1.2 MG/DL
BUN SERPL-MCNC: 23 MG/DL
CALCIUM SERPL-MCNC: 9.2 MG/DL
CHLORIDE SERPL-SCNC: 101 MMOL/L
CHOLEST SERPL-MCNC: 100 MG/DL
CHOLEST/HDLC SERPL: 4 {RATIO}
CO2 SERPL-SCNC: 29 MMOL/L
CREAT SERPL-MCNC: 1.9 MG/DL
EST. GFR  (AFRICAN AMERICAN): 42.4 ML/MIN/1.73 M^2
EST. GFR  (NON AFRICAN AMERICAN): 36.7 ML/MIN/1.73 M^2
ESTIMATED AVG GLUCOSE: 192 MG/DL
ESTIMATED AVG GLUCOSE: 192 MG/DL
GLUCOSE SERPL-MCNC: 147 MG/DL
HBA1C MFR BLD HPLC: 8.3 %
HBA1C MFR BLD HPLC: 8.3 %
HDLC SERPL-MCNC: 25 MG/DL
HDLC SERPL: 25 %
LDLC SERPL CALC-MCNC: 39.8 MG/DL
NONHDLC SERPL-MCNC: 75 MG/DL
POTASSIUM SERPL-SCNC: 5.2 MMOL/L
PROT SERPL-MCNC: 7.2 G/DL
SODIUM SERPL-SCNC: 137 MMOL/L
TRIGL SERPL-MCNC: 176 MG/DL

## 2017-12-15 PROCEDURE — 83036 HEMOGLOBIN GLYCOSYLATED A1C: CPT

## 2017-12-15 PROCEDURE — 36415 COLL VENOUS BLD VENIPUNCTURE: CPT | Mod: PO

## 2017-12-15 PROCEDURE — 80061 LIPID PANEL: CPT

## 2017-12-15 PROCEDURE — 80053 COMPREHEN METABOLIC PANEL: CPT

## 2017-12-21 NOTE — PROGRESS NOTES
This note was created by combination of typed  and Dragon dictation.  Transcription errors may be present.  If there are any questions, please contact me.    Assessment & Plan  Uncontrolled type 2 diabetes mellitus without complication, without long-term current use of insulin  -I was initially going to increase his metformin but on discussion he drinks 2 L of soda a day.  I've asked him to cut this out completely.  No sweet drinks.  Just water.  For now no change in metformin but has room to increase if necessary.  Metformin once daily.  -     metFORMIN (GLUCOPHAGE) 500 MG tablet; Take 1 tablet (500 mg total) by mouth daily with breakfast.  Dispense: 30 tablet; Refill: 6    Depression, unspecified depression type  Anxiety  -His pain specialist was concerned about interactions of benzodiazepines with his narcotics.  I agree with attempts to lower his benzodiazepine dose.  Xanax 2 mg at night, BuSpar 15 mg in the morning.  Also on citalopram.  Did not do well with Zoloft  -     ALPRAZolam (XANAX) 1 MG tablet; 2 at night PRN; buspar in the morning  Dispense: 60 tablet; Refill: 2  -     busPIRone (BUSPAR) 15 MG tablet; Take 1 tablet (15 mg total) by mouth every morning.  Dispense: 30 tablet; Refill: 11    HTN (hypertension), benign-stable, verapamil, lisinopril, clonidine  -     verapamil (CALAN-SR) 180 MG CR tablet; Take 1 tablet (180 mg total) by mouth 2 (two) times daily.  Dispense: 60 tablet; Refill: 6  -     lisinopril (PRINIVIL,ZESTRIL) 40 MG tablet; Take 1 tablet (40 mg total) by mouth once daily.  Dispense: 30 tablet; Refill: 6  -     cloNIDine (CATAPRES) 0.2 MG tablet; Take 1 tablet (0.2 mg total) by mouth 2 (two) times daily.  Dispense: 60 tablet; Refill: 6    Hyperlipidemia, unspecified hyperlipidemia type-refilled atorvastatin.  -     atorvastatin (LIPITOR) 40 MG tablet; Take 1 tablet (40 mg total) by mouth once daily. For cholesterol.  Stop the fenofibrate  Dispense: 90 tablet; Refill: 3    Other  chronic pain  Facet arthritis of cervical region  Facet arthritis of lumbar region  -Gets narcotics from pain management.  I've refilled his gabapentin.  -     gabapentin (NEURONTIN) 300 MG capsule; TAKE 2 CAPSULES BY MOUTH 3 TIMES A DAY  Dispense: 180 capsule; Refill: 6    Elevated serum creatinine-hopefully due to fasting state.  He is nonfasting today, repeat basic metabolic panel.  -     Basic metabolic panel; Future; Expected date: 12/22/2017    Needs flu shot  -     Influenza - Quadrivalent (3 years & older) (PF)    Medications Discontinued During This Encounter   Medication Reason    alprazolam (XANAX) 1 MG tablet Reorder    metformin (GLUCOPHAGE) 500 MG tablet Reorder    verapamil (CALAN-SR) 180 MG CR tablet Reorder    lisinopril (PRINIVIL,ZESTRIL) 40 MG tablet Reorder    cloNIDine (CATAPRES) 0.2 MG tablet Reorder    atorvastatin (LIPITOR) 40 MG tablet Reorder    gabapentin (NEURONTIN) 300 MG capsule Reorder       Follow-up: No Follow-up on file. OV 3 months recall entered      =================================================================      Chief Complaint   Patient presents with    Medication Refill       HPI  Butch is a 62 y.o. male, last appointment with this clinic was 7/11/2017.    Depression and anxiety, history of Prozac with side effects.  History of citalopram.  Most recently tried him on sertraline and BuSpar to transition from Xanax.  He had episodes of hypotension and feeling poorly that ultimately seemed to improve with reverting back to Xanax and cutting down the dose of sertraline.  Xanax 2 HS.  Diabetes, metformin, last A1c March 2017, 6.9, microalbumin was normal  Hyperlipidemia, statin, liver profile 12/2016 good on statin  Hypertension, clonidine, lisinopril, verapamil  Chronic neck and back pain followed by pain management.  Narcotics.  I fill his gabapentin.    Last visit - did not do well with sertraline and attempts to wean Xanax using BuSpar. Reverted back to previous  regimen: celexa 20, xanax 2 PM/1 AM    previsit labs Cr elevated.  Needs repeat.   a1c high 8.3.  He takes metformin once a day.  On discussion however he drinks 2 L of soft drink every day.  I've instructed him that he absolutely needs to stop that.  For now I won't change his metformin unless his sugars do not improve.     12/7 norco #90, fentanyl  12/1 xanax 1 mg #90    Would like to change back to 2 xanax at night and 1 buspar in the morning.  His pain specialist discussed with him the high-risk nature of his medications in combination with each other and the patient would like to reduce his benzodiazepine use.  Finds that the citalopram is helpful.    Patient tells me that his pain specialist would like a copy of his labs and his notes.    Patient Care Team:  Gabriel Evans MD as PCP - General (Internal Medicine)  Vlda Nava MD (Pain Medicine)  Kaila Carrillo OD as Consulting Physician (Optometry)    Patient Active Problem List    Diagnosis Date Noted    Tubular adenoma of colon 05/10/2017     5/10/2017 colonoscopy tubular adenoma      Therapeutic opioid-induced constipation (OIC) 04/17/2017    Tobacco dependence 01/12/2016    Non morbid obesity due to excess calories 01/12/2016    Controlled type 2 diabetes mellitus without complication 08/07/2015    Facet arthritis of cervical region 10/28/2013    Facet arthritis of lumbar region 10/28/2013    Benzodiazepine dependence 10/28/2013    Opioid dependence 10/28/2013    ED (erectile dysfunction) 07/26/2013    DDD (degenerative disc disease), cervical 05/23/2013    DDD (degenerative disc disease), lumbar 05/23/2013    HTN (hypertension), benign 11/01/2012    Hyperlipidemia 11/01/2012    Anemia 11/01/2012    Chronic pain 11/01/2012    Anxiety 11/01/2012    Depression 11/01/2012       PAST MEDICAL HISTORY:  Past Medical History:   Diagnosis Date    Anxiety     Degenerative disc disease     Depression     Diabetes mellitus type II, controlled      ETOH abuse     Eye injury as a child    stick hit eye ? eye, hit in [place] due to boxing    Hyperlipidemia     Hypertension     MRSA (methicillin resistant Staphylococcus aureus)        PAST SURGICAL HISTORY:  Past Surgical History:   Procedure Laterality Date    APPENDECTOMY      COLONOSCOPY N/A 5/10/2017    Procedure: COLONOSCOPY;  Surgeon: Shantanu Marley MD;  Location: [facility];  Service: Endoscopy;  Laterality: N/A;       SOCIAL HISTORY:  Social History     Social History    Marital status:      Spouse name: N/A    Number of children: N/A    Years of education: N/A     Occupational History    retired -       Social History Main Topics    Smoking status: Current Every Day Smoker     Packs/day: 0.50     Years: 32.00     Types: Cigarettes     Last attempt to quit: 3/4/2013    Smokeless tobacco: Never Used    Alcohol use No    Drug use: No    Sexual activity: Yes     Partners: Female      Comment:  with children, disabled      Other Topics Concern    Not on file     Social History Narrative    No narrative on file       ALLERGIES AND MEDICATIONS: updated and reviewed.  Review of patient's allergies indicates:   Allergen Reactions    Buspar [buspirone] Other (See Comments)     Hypotension with flexeril     Current Outpatient Prescriptions   Medication Sig Dispense Refill    alprazolam (XANAX) 1 MG tablet 2 at night, 1 in AM PRN 90 tablet 5    aspirin (ECOTRIN) 81 MG EC tablet Take 1 tablet (81 mg total) by mouth once daily.  0    atorvastatin (LIPITOR) 40 MG tablet Take 1 tablet (40 mg total) by mouth once daily. For cholesterol.  Stop the fenofibrate 90 tablet 3    citalopram (CELEXA) 20 MG tablet Take 1 tablet (20 mg total) by mouth once daily. 90 tablet 3    cloNIDine (CATAPRES) 0.2 MG tablet Take 1 tablet (0.2 mg total) by mouth 2 (two) times daily. 60 tablet 6    cyclobenzaprine (FLEXERIL) 10 MG tablet Take 1 tablet (10 mg total) by mouth as  "needed. 60 tablet 1    fentaNYL (DURAGESIC) 25 mcg/hr   0    fish oil-omega-3 fatty acids 300-1,000 mg capsule Take 2 capsules (2 g total) by mouth once daily. 60 capsule 5    gabapentin (NEURONTIN) 300 MG capsule TAKE 2 CAPSULES BY MOUTH 3 TIMES A  capsule 6    hydrocodone-acetaminophen 10-325mg (NORCO)  mg Tab       lisinopril (PRINIVIL,ZESTRIL) 40 MG tablet Take 1 tablet (40 mg total) by mouth once daily. 30 tablet 6    metformin (GLUCOPHAGE) 500 MG tablet Take 1 tablet (500 mg total) by mouth daily with breakfast. 30 tablet 6    POLYETHYLENE GLYCOL 3350 (MIRALAX ORAL) Take 1 application by mouth.      tadalafil (CIALIS) 20 MG Tab Take 1 tablet (20 mg total) by mouth once daily. 10 tablet 11    verapamil (CALAN-SR) 180 MG CR tablet Take 1 tablet (180 mg total) by mouth 2 (two) times daily. 60 tablet 6     No current facility-administered medications for this visit.        Review of Systems   Constitutional: Negative for chills and fever.   Respiratory: Negative for shortness of breath.    Cardiovascular: Negative for chest pain and palpitations.       Physical Exam   Vitals:    12/22/17 1105   BP: 128/88   Pulse: 78   Temp: 98 °F (36.7 °C)   SpO2: 99%   Weight: 101.2 kg (223 lb 3.5 oz)   Height: 5' 11" (1.803 m)    Body mass index is 31.13 kg/m².            Physical Exam   Constitutional: He is oriented to person, place, and time. He appears well-developed and well-nourished. No distress.   Eyes: EOM are normal.   Cardiovascular: Normal rate, regular rhythm and normal heart sounds.    No murmur heard.  Pulmonary/Chest: Effort normal and breath sounds normal.   Musculoskeletal: Normal range of motion.   Neurological: He is alert and oriented to person, place, and time. Coordination normal.   Skin: Skin is warm and dry.   Psychiatric: He has a normal mood and affect. His behavior is normal. Thought content normal.        Component      Latest Ref Rng & Units 12/15/2017 12/15/2017          " 10:20 AM 10:20 AM   Sodium      136 - 145 mmol/L  137   Potassium      3.5 - 5.1 mmol/L  5.2 (H)   Chloride      95 - 110 mmol/L  101   CO2      23 - 29 mmol/L  29   Glucose      70 - 110 mg/dL  147 (H)   BUN, Bld      8 - 23 mg/dL  23   Creatinine      0.5 - 1.4 mg/dL  1.9 (H)   Calcium      8.7 - 10.5 mg/dL  9.2   Total Protein      6.0 - 8.4 g/dL  7.2   Albumin      3.5 - 5.2 g/dL  3.6   Total Bilirubin      0.1 - 1.0 mg/dL  1.2 (H)   Alkaline Phosphatase      55 - 135 U/L  63   AST      10 - 40 U/L  36   ALT      10 - 44 U/L  36   Anion Gap      8 - 16 mmol/L  7 (L)   eGFR if African American      >60 mL/min/1.73 m:2  42.4 (A)   eGFR if non African American      >60 mL/min/1.73 m:2  36.7 (A)   Cholesterol      120 - 199 mg/dL  100 (L)   Triglycerides      30 - 150 mg/dL  176 (H)   HDL      40 - 75 mg/dL  25 (L)   LDL Cholesterol      63.0 - 159.0 mg/dL  39.8 (L)   HDL/Chol Ratio      20.0 - 50.0 %  25.0   Total Cholesterol/HDL Ratio      2.0 - 5.0  4.0   Non-HDL Cholesterol      mg/dL  75   Hemoglobin A1C      4.0 - 5.6 % 8.3 (H) 8.3 (H)   Estimated Avg Glucose      68 - 131 mg/dL 192 (H) 192 (H)

## 2017-12-22 ENCOUNTER — LAB VISIT (OUTPATIENT)
Dept: LAB | Facility: HOSPITAL | Age: 62
End: 2017-12-22
Attending: INTERNAL MEDICINE
Payer: MEDICARE

## 2017-12-22 ENCOUNTER — OFFICE VISIT (OUTPATIENT)
Dept: FAMILY MEDICINE | Facility: CLINIC | Age: 62
End: 2017-12-22
Payer: MEDICARE

## 2017-12-22 VITALS
OXYGEN SATURATION: 99 % | TEMPERATURE: 98 F | BODY MASS INDEX: 31.25 KG/M2 | SYSTOLIC BLOOD PRESSURE: 128 MMHG | WEIGHT: 223.19 LBS | DIASTOLIC BLOOD PRESSURE: 88 MMHG | HEIGHT: 71 IN | HEART RATE: 78 BPM

## 2017-12-22 DIAGNOSIS — E78.5 HYPERLIPIDEMIA, UNSPECIFIED HYPERLIPIDEMIA TYPE: Chronic | ICD-10-CM

## 2017-12-22 DIAGNOSIS — G89.29 OTHER CHRONIC PAIN: Chronic | ICD-10-CM

## 2017-12-22 DIAGNOSIS — M47.812 FACET ARTHRITIS OF CERVICAL REGION: ICD-10-CM

## 2017-12-22 DIAGNOSIS — F41.9 ANXIETY: ICD-10-CM

## 2017-12-22 DIAGNOSIS — Z23 NEEDS FLU SHOT: ICD-10-CM

## 2017-12-22 DIAGNOSIS — R79.89 ELEVATED SERUM CREATININE: ICD-10-CM

## 2017-12-22 DIAGNOSIS — I10 HTN (HYPERTENSION), BENIGN: Chronic | ICD-10-CM

## 2017-12-22 DIAGNOSIS — F32.A DEPRESSION, UNSPECIFIED DEPRESSION TYPE: Chronic | ICD-10-CM

## 2017-12-22 DIAGNOSIS — M47.816 FACET ARTHRITIS OF LUMBAR REGION: ICD-10-CM

## 2017-12-22 LAB
ANION GAP SERPL CALC-SCNC: 6 MMOL/L
BUN SERPL-MCNC: 15 MG/DL
CALCIUM SERPL-MCNC: 9.7 MG/DL
CHLORIDE SERPL-SCNC: 104 MMOL/L
CO2 SERPL-SCNC: 28 MMOL/L
CREAT SERPL-MCNC: 1.1 MG/DL
EST. GFR  (AFRICAN AMERICAN): >60 ML/MIN/1.73 M^2
EST. GFR  (NON AFRICAN AMERICAN): >60 ML/MIN/1.73 M^2
GLUCOSE SERPL-MCNC: 167 MG/DL
POTASSIUM SERPL-SCNC: 5.1 MMOL/L
SODIUM SERPL-SCNC: 138 MMOL/L

## 2017-12-22 PROCEDURE — G0008 ADMIN INFLUENZA VIRUS VAC: HCPCS | Mod: PBBFAC,PO

## 2017-12-22 PROCEDURE — 80048 BASIC METABOLIC PNL TOTAL CA: CPT

## 2017-12-22 PROCEDURE — 99214 OFFICE O/P EST MOD 30 MIN: CPT | Mod: S$PBB,,, | Performed by: INTERNAL MEDICINE

## 2017-12-22 PROCEDURE — 36415 COLL VENOUS BLD VENIPUNCTURE: CPT | Mod: PO

## 2017-12-22 PROCEDURE — 99213 OFFICE O/P EST LOW 20 MIN: CPT | Mod: PBBFAC,PO | Performed by: INTERNAL MEDICINE

## 2017-12-22 PROCEDURE — 99999 PR PBB SHADOW E&M-EST. PATIENT-LVL III: CPT | Mod: PBBFAC,,, | Performed by: INTERNAL MEDICINE

## 2017-12-22 RX ORDER — BUSPIRONE HYDROCHLORIDE 15 MG/1
15 TABLET ORAL EVERY MORNING
Qty: 30 TABLET | Refills: 11 | Status: SHIPPED | OUTPATIENT
Start: 2017-12-22 | End: 2018-05-03 | Stop reason: SDUPTHER

## 2017-12-22 RX ORDER — CLONIDINE HYDROCHLORIDE 0.2 MG/1
0.2 TABLET ORAL 2 TIMES DAILY
Qty: 60 TABLET | Refills: 6 | Status: SHIPPED | OUTPATIENT
Start: 2017-12-22 | End: 2018-05-03 | Stop reason: SDUPTHER

## 2017-12-22 RX ORDER — METFORMIN HYDROCHLORIDE 500 MG/1
500 TABLET ORAL
Qty: 30 TABLET | Refills: 6 | Status: SHIPPED | OUTPATIENT
Start: 2017-12-22 | End: 2018-05-03 | Stop reason: SDUPTHER

## 2017-12-22 RX ORDER — ATORVASTATIN CALCIUM 40 MG/1
40 TABLET, FILM COATED ORAL DAILY
Qty: 90 TABLET | Refills: 3 | Status: SHIPPED | OUTPATIENT
Start: 2017-12-22 | End: 2018-05-03 | Stop reason: SDUPTHER

## 2017-12-22 RX ORDER — ALPRAZOLAM 1 MG/1
TABLET ORAL
Qty: 60 TABLET | Refills: 2 | Status: SHIPPED | OUTPATIENT
Start: 2017-12-22 | End: 2018-03-28 | Stop reason: SDUPTHER

## 2017-12-22 RX ORDER — LISINOPRIL 40 MG/1
40 TABLET ORAL DAILY
Qty: 30 TABLET | Refills: 6 | Status: SHIPPED | OUTPATIENT
Start: 2017-12-22 | End: 2018-05-03 | Stop reason: SDUPTHER

## 2017-12-22 RX ORDER — VERAPAMIL HYDROCHLORIDE 180 MG/1
180 TABLET, FILM COATED, EXTENDED RELEASE ORAL 2 TIMES DAILY
Qty: 60 TABLET | Refills: 6 | Status: SHIPPED | OUTPATIENT
Start: 2017-12-22 | End: 2018-05-03 | Stop reason: SDUPTHER

## 2017-12-22 RX ORDER — GABAPENTIN 300 MG/1
CAPSULE ORAL
Qty: 180 CAPSULE | Refills: 6 | Status: SHIPPED | OUTPATIENT
Start: 2017-12-22 | End: 2018-05-03 | Stop reason: SDUPTHER

## 2017-12-22 NOTE — PROGRESS NOTES
Influenza vaccine administered, karoline well. Instructed to wait 15mins for observation, no reaction noted @ time of discharge.

## 2018-01-05 ENCOUNTER — TELEPHONE (OUTPATIENT)
Dept: FAMILY MEDICINE | Facility: CLINIC | Age: 63
End: 2018-01-05

## 2018-01-05 NOTE — TELEPHONE ENCOUNTER
----- Message from Kirit Singh sent at 1/5/2018 11:58 AM CST -----  Contact: [phone]/PT   Calling to get lab results for bone and joint . Need copy for bone and joint doc

## 2018-01-19 ENCOUNTER — DOCUMENTATION ONLY (OUTPATIENT)
Dept: FAMILY MEDICINE | Facility: CLINIC | Age: 63
End: 2018-01-19

## 2018-01-19 NOTE — PROGRESS NOTES
rec'd notice from insurance that he is not on statin.  Has been on atorvastatin.  Will need to inquire with pt at follow up as to whether he is actually filling and taking the rx, though the labs 12/2017 sure do look like he's taking it with LDL of 39.

## 2018-03-06 NOTE — TELEPHONE ENCOUNTER
----- Message from Dia Chin sent at 6/29/2017 10:24 AM CDT -----  Contact: Deann / wife  Wife requested a call from [facility].    Pt's wife is calling because  is taking medications wrong and BP dropped.  Please call Deann at  or .    Thanks   
----- Message from Juliette Asif sent at 6/29/2017  1:22 PM CDT -----  Contact: Self  Pt calling to speak to a nurse regarding medications. Please call [phone]  
66/44, 94/56, 89/52, 97/57, 80/50, 85/50, patient is coming in tomorrow with all pills to go over.   
PATIENT STATES HE WOULD LIKE HIS ZOLOFT & BUSPAR CHANGED BACK TO THE ORIGINAL PRESCRIPTION. PATIENT LAST OV 6/22/17.  
Patient came in with all of his medications and his bp machine and these are the numbers that I copied down. 107/65 P-70  67/39 P 70  73/45 P 64  75/46 P 68  70/40 P 74  80/50 P 76  73/54 P 81  79/49 P 85  84/37 P 83  74/47 P 88  89/52 P 76  97/57 P 86  80/51 P 84  85/50 P 87  94/56 P 76  71/42 P 69  66/44 P 79  58/44 P 81    medcard has been reconciled   
Spoke again with the wife.  She notes his blood pressure just now is much better, with systolic 120s.  Discussed at length with the patient and wife about my concerns, that his hypotension could represent something else such as infection/sepsis, or cardiac.  He's feeling better.  However I told him repeatedly that if his blood pressure was low again tomorrow morning that he needs to be evaluated in the ER.  I emphasized with them multiple times.  
Spoke with patient. Stated he is feeling fine.. bp have been running   153/98  122/79  129/75  140/79  96/68  Did feel different  115/85  137/72    Said he stopped the buspar and zoloft on Friday and restarted his xanax (2 hs)  
Spoke with pt's wife extended length of time.  Pt lethargic.  Wife convinced it was transitioning from the xanax that caused low BP.  Discussed with her that that would have opposite effect most likely.  Given lethargy, continued low BP, concerning that something else is going on.  Strongly recommended she take him to ER tonight.  
150

## 2018-03-28 DIAGNOSIS — F32.A DEPRESSION, UNSPECIFIED DEPRESSION TYPE: Chronic | ICD-10-CM

## 2018-03-28 DIAGNOSIS — F41.9 ANXIETY: ICD-10-CM

## 2018-03-28 RX ORDER — ALPRAZOLAM 1 MG/1
TABLET ORAL
Qty: 60 TABLET | Refills: 2 | Status: SHIPPED | OUTPATIENT
Start: 2018-03-28 | End: 2018-05-03 | Stop reason: SDUPTHER

## 2018-04-13 DIAGNOSIS — E11.9 DIABETES MELLITUS WITHOUT COMPLICATION: ICD-10-CM

## 2018-04-27 ENCOUNTER — TELEPHONE (OUTPATIENT)
Dept: FAMILY MEDICINE | Facility: CLINIC | Age: 63
End: 2018-04-27

## 2018-04-27 DIAGNOSIS — I10 HTN (HYPERTENSION), BENIGN: Primary | ICD-10-CM

## 2018-04-27 DIAGNOSIS — E78.5 HYPERLIPIDEMIA, UNSPECIFIED HYPERLIPIDEMIA TYPE: ICD-10-CM

## 2018-04-27 NOTE — TELEPHONE ENCOUNTER
----- Message from Jalen Jordan sent at 4/27/2018  1:50 PM CDT -----  Contact: Butch [phone]  Pt has set an appointment for May 1, but would like orders in for blood work on April 30. Please call at your earliest convenience.

## 2018-04-27 NOTE — TELEPHONE ENCOUNTER
Patient booked... This is the wrong number   Patient stated this number has been disconnected a long time... I updated his number.

## 2018-05-02 ENCOUNTER — LAB VISIT (OUTPATIENT)
Dept: LAB | Facility: HOSPITAL | Age: 63
End: 2018-05-02
Attending: INTERNAL MEDICINE
Payer: MEDICARE

## 2018-05-02 DIAGNOSIS — I10 HTN (HYPERTENSION), BENIGN: ICD-10-CM

## 2018-05-02 DIAGNOSIS — E78.5 HYPERLIPIDEMIA, UNSPECIFIED HYPERLIPIDEMIA TYPE: ICD-10-CM

## 2018-05-02 LAB
ALBUMIN SERPL BCP-MCNC: 4.2 G/DL
ALP SERPL-CCNC: 61 U/L
ALT SERPL W/O P-5'-P-CCNC: 33 U/L
ANION GAP SERPL CALC-SCNC: 9 MMOL/L
AST SERPL-CCNC: 29 U/L
BILIRUB SERPL-MCNC: 1.1 MG/DL
BUN SERPL-MCNC: 20 MG/DL
CALCIUM SERPL-MCNC: 9.9 MG/DL
CHLORIDE SERPL-SCNC: 105 MMOL/L
CHOLEST SERPL-MCNC: 110 MG/DL
CHOLEST/HDLC SERPL: 3.9 {RATIO}
CO2 SERPL-SCNC: 25 MMOL/L
CREAT SERPL-MCNC: 1.2 MG/DL
EST. GFR  (AFRICAN AMERICAN): >60 ML/MIN/1.73 M^2
EST. GFR  (NON AFRICAN AMERICAN): >60 ML/MIN/1.73 M^2
ESTIMATED AVG GLUCOSE: 163 MG/DL
GLUCOSE SERPL-MCNC: 151 MG/DL
HBA1C MFR BLD HPLC: 7.3 %
HDLC SERPL-MCNC: 28 MG/DL
HDLC SERPL: 25.5 %
LDLC SERPL CALC-MCNC: 51 MG/DL
NONHDLC SERPL-MCNC: 82 MG/DL
POTASSIUM SERPL-SCNC: 5 MMOL/L
PROT SERPL-MCNC: 7.7 G/DL
SODIUM SERPL-SCNC: 139 MMOL/L
TRIGL SERPL-MCNC: 155 MG/DL

## 2018-05-02 PROCEDURE — 36415 COLL VENOUS BLD VENIPUNCTURE: CPT | Mod: PO

## 2018-05-02 PROCEDURE — 80053 COMPREHEN METABOLIC PANEL: CPT

## 2018-05-02 PROCEDURE — 83036 HEMOGLOBIN GLYCOSYLATED A1C: CPT

## 2018-05-02 PROCEDURE — 80061 LIPID PANEL: CPT

## 2018-05-02 NOTE — PROGRESS NOTES
This note was created by combination of typed  and Dragon dictation.  Transcription errors may be present.  If there are any questions, please contact me.    Assessment & Plan:   Essential hypertension - stable refilled clonidine, lisinopril, verapamil  -     cloNIDine (CATAPRES) 0.2 MG tablet; Take 1 tablet (0.2 mg total) by mouth 2 (two) times daily.  Dispense: 60 tablet; Refill: 6  -     lisinopril (PRINIVIL,ZESTRIL) 40 MG tablet; Take 1 tablet (40 mg total) by mouth once daily.  Dispense: 30 tablet; Refill: 6  -     verapamil (CALAN-SR) 180 MG CR tablet; Take 1 tablet (180 mg total) by mouth 2 (two) times daily.  Dispense: 60 tablet; Refill: 6    Mixed hyperlipidemia - good on lipitor no change refilled to pharmacy.  -     atorvastatin (LIPITOR) 40 MG tablet; Take 1 tablet (40 mg total) by mouth once daily. For cholesterol  Dispense: 30 tablet; Refill: 5    Controlled type 2 diabetes mellitus without complication, without long-term current use of insulin - ordered eye photo but forgot to tell the pt and will need to order next visit.  -     Diabetic Eye Screening Photo; Future  -     metFORMIN (GLUCOPHAGE) 500 MG tablet; Take 1 tablet (500 mg total) by mouth daily with breakfast.  Dispense: 30 tablet; Refill: 6    Need for shingles vaccine - rx to pharmacy.  -     varicella-zoster gE-AS01B, PF, (SHINGRIX, PF,) 50 mcg/0.5 mL injection; Inject 0.5 mLs into the muscle once.  Dispense: 0.5 mL; Refill: 1    Uncomplicated opioid dependence  DDD (degenerative disc disease), cervical  DDD (degenerative disc disease), lumbar  Other chronic pain  Facet arthritis of cervical region  Facet arthritis of lumbar region   - managed by ortho.  They are concerned about combined sedatives with BZD, opiates and they declined to continue muscle relaxer.  Discussed high risk nature of medications additively and will not rx the muscle relaxer.  Most recently tizanidine.  -     gabapentin (NEURONTIN) 300 MG capsule; TAKE 2  CAPSULES BY MOUTH 3 TIMES A DAY  Dispense: 180 capsule; Refill: 6      Benzodiazepine dependence  Anxiety  Depression, unspecified depression type - stable on current regimen celexa, xanax 2 at night, buspar in AM.  Unsuccessful at attempts to wean down.  -     citalopram (CELEXA) 20 MG tablet; Take 1 tablet (20 mg total) by mouth once daily.  Dispense: 90 tablet; Refill: 3  -     busPIRone (BUSPAR) 15 MG tablet; Take 1 tablet (15 mg total) by mouth every morning.  Dispense: 30 tablet; Refill: 11  -     ALPRAZolam (XANAX) 1 MG tablet; TAKE 2 TABLETS BY MOUTH AT night (taking buspirone IN THE MORNING)  Dispense: 60 tablet; Refill: 2    Memory loss - he thinks this is due to not paying attention/listening except when he thinks it's important.  Did OK actually on the MOCA today.  Will refer to neurology for assistance in evaluation as to whether truly with memory issues.  -     Ambulatory referral to Neurology    Other orders  -     Cancel: varicella-zoster gE-AS01B, PF, (SHINGRIX, PF,) 50 mcg/0.5 mL injection; Inject 0.5 mLs into the muscle once.  Dispense: 0.5 mL; Refill: 0        Medications Discontinued During This Encounter   Medication Reason    cyclobenzaprine (FLEXERIL) 10 MG tablet     gabapentin (NEURONTIN) 300 MG capsule Reorder    citalopram (CELEXA) 20 MG tablet Reorder    busPIRone (BUSPAR) 15 MG tablet Reorder    ALPRAZolam (XANAX) 1 MG tablet Reorder    cloNIDine (CATAPRES) 0.2 MG tablet Reorder    lisinopril (PRINIVIL,ZESTRIL) 40 MG tablet Reorder    verapamil (CALAN-SR) 180 MG CR tablet Reorder    metFORMIN (GLUCOPHAGE) 500 MG tablet Reorder    atorvastatin (LIPITOR) 40 MG tablet Reorder     Modified Medications    Modified Medication Previous Medication    ALPRAZOLAM (XANAX) 1 MG TABLET ALPRAZolam (XANAX) 1 MG tablet       TAKE 2 TABLETS BY MOUTH AT night (taking buspirone IN THE MORNING)    TAKE 2 TABLETS BY MOUTH AT night (taking buspirone IN THE MORNING)    ATORVASTATIN (LIPITOR) 40 MG  TABLET atorvastatin (LIPITOR) 40 MG tablet       Take 1 tablet (40 mg total) by mouth once daily. For cholesterol    Take 1 tablet (40 mg total) by mouth once daily. For cholesterol.  Stop the fenofibrate    BUSPIRONE (BUSPAR) 15 MG TABLET busPIRone (BUSPAR) 15 MG tablet       Take 1 tablet (15 mg total) by mouth every morning.    Take 1 tablet (15 mg total) by mouth every morning.    CITALOPRAM (CELEXA) 20 MG TABLET citalopram (CELEXA) 20 MG tablet       Take 1 tablet (20 mg total) by mouth once daily.    Take 1 tablet (20 mg total) by mouth once daily.    CLONIDINE (CATAPRES) 0.2 MG TABLET cloNIDine (CATAPRES) 0.2 MG tablet       Take 1 tablet (0.2 mg total) by mouth 2 (two) times daily.    Take 1 tablet (0.2 mg total) by mouth 2 (two) times daily.    GABAPENTIN (NEURONTIN) 300 MG CAPSULE gabapentin (NEURONTIN) 300 MG capsule       TAKE 2 CAPSULES BY MOUTH 3 TIMES A DAY    TAKE 2 CAPSULES BY MOUTH 3 TIMES A DAY    LISINOPRIL (PRINIVIL,ZESTRIL) 40 MG TABLET lisinopril (PRINIVIL,ZESTRIL) 40 MG tablet       Take 1 tablet (40 mg total) by mouth once daily.    Take 1 tablet (40 mg total) by mouth once daily.    METFORMIN (GLUCOPHAGE) 500 MG TABLET metFORMIN (GLUCOPHAGE) 500 MG tablet       Take 1 tablet (500 mg total) by mouth daily with breakfast.    Take 1 tablet (500 mg total) by mouth daily with breakfast.    VERAPAMIL (CALAN-SR) 180 MG CR TABLET verapamil (CALAN-SR) 180 MG CR tablet       Take 1 tablet (180 mg total) by mouth 2 (two) times daily.    Take 1 tablet (180 mg total) by mouth 2 (two) times daily.     New Prescriptions    VARICELLA-ZOSTER GE-AS01B, PF, (SHINGRIX, PF,) 50 MCG/0.5 ML INJECTION    Inject 0.5 mLs into the muscle once.       Follow Up: No Follow-up on file. OV 6 months.    Subjective:     Chief Complaint   Patient presents with    Diabetes       DIPESH Rae is a 62 y.o. male, last appointment with this clinic was 12/22/2017.    Depression and anxiety, history of Prozac with side effects.   History of citalopram.  did not do well with sertraline and attempts to wean Xanax using BuSpar. Xanax 2 HS.  Diabetes, metformin, last A1c March 2017, 6.9, microalbumin was normal  Hyperlipidemia, statin, liver profile 12/2016 good on statin  Hypertension, clonidine, lisinopril, verapamil  Chronic neck and back pain followed by pain management.  Narcotics.  I fill his gabapentin.    Last visit 12/2017.  a1c high but drinking 2 liters soda daily.     previsit labs a1c 7.3.  Lipid good. microalbumin WNL.  He has cut down on his soda drinking tremendously.  Now drinks mostly water.    He is followed by pain management doctor for his lumbar spine as well as his cervical spine.  He brings me a copy of his C-spine MRI.  Shows multilevel degeneration and stenosis but the patient notes that his main issue is his lumbar spine.  He is due to get a lumbar injection next week.  He most recently was seen by the PA with pain management.  He was getting muscle relaxers but PA was concerned about his other medications namely his benzodiazepine as well as his narcotics and did not fill his muscle relaxer.  I had tried previously to wean him off of Xanax without success.  I did discuss with him the concerns from pain management and that I would recommend he stay off of sedating muscle relaxers given that he is already on benzodiazepines and opiate therapy.  Most recently on tizanidine and pain mgmt declined to refill those.     He is going to get lumbar injection next week.  They recommended CBD rub but it's expensive $40 so not going to get it.     Wife is concerned about his memory.  She notes he forgets things on the stove, and the other day ate a bowl of cereal and forgot to rinse it out before putting the dish away.  He thinks that this is due to him not really listening to her.  He will remember things that are important to him and not remember details when they are not important to him.    Animal fluency test 15 animals in 1  minute. MOCA 27/30 today.                    Patient Care Team:  Gabriel Evans MD as PCP - General (Internal Medicine)  Oniel Elmore MD as PCP - [facility] Attributed  Vlad Nava MD (Pain Medicine)  Kaila Carrillo OD as Consulting Physician (Optometry)    Patient Active Problem List    Diagnosis Date Noted    Tubular adenoma of colon 05/10/2017     5/10/2017 colonoscopy tubular adenoma      Therapeutic opioid-induced constipation (OIC) 04/17/2017    Tobacco dependence 01/12/2016    Non morbid obesity due to excess calories 01/12/2016    Controlled type 2 diabetes mellitus without complication, without long-term current use of insulin 08/07/2015    Facet arthritis of cervical region 10/28/2013    Facet arthritis of lumbar region 10/28/2013    Benzodiazepine dependence 10/28/2013    Opioid dependence 10/28/2013    ED (erectile dysfunction) 07/26/2013    DDD (degenerative disc disease), cervical 05/23/2013    DDD (degenerative disc disease), lumbar 05/23/2013    Essential hypertension 11/01/2012    Mixed hyperlipidemia 11/01/2012    Anemia 11/01/2012    Chronic pain 11/01/2012    Anxiety 11/01/2012    Depression 11/01/2012       PAST MEDICAL HISTORY:  Past Medical History:   Diagnosis Date    Anxiety     Degenerative disc disease     Depression     Diabetes mellitus type II, controlled     ETOH abuse     Eye injury as a child    stick hit eye ? eye, hit in [place] due to boxing    Hyperlipidemia     Hypertension     MRSA (methicillin resistant Staphylococcus aureus)        PAST SURGICAL HISTORY:  Past Surgical History:   Procedure Laterality Date    APPENDECTOMY      COLONOSCOPY N/A 5/10/2017    Procedure: COLONOSCOPY;  Surgeon: Shantanu Marley MD;  Location: [facility];  Service: Endoscopy;  Laterality: N/A;       SOCIAL HISTORY:  Social History     Social History    Marital status:      Spouse name: N/A    Number of children: N/A    Years of education: N/A     Occupational History     retired -       Social History Main Topics    Smoking status: Current Every Day Smoker     Packs/day: 0.50     Years: 32.00     Types: Cigarettes     Last attempt to quit: 3/4/2013    Smokeless tobacco: Never Used    Alcohol use No    Drug use: No    Sexual activity: Yes     Partners: Female      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  Review of patient's allergies indicates:   Allergen Reactions    Buspar [buspirone] Other (See Comments)     Hypotension with flexeril     Current Outpatient Prescriptions   Medication Sig Dispense Refill    ALPRAZolam (XANAX) 1 MG tablet TAKE 2 TABLETS BY MOUTH AT night (taking buspirone IN THE MORNING) 60 tablet 2    aspirin (ECOTRIN) 81 MG EC tablet Take 1 tablet (81 mg total) by mouth once daily.  0    atorvastatin (LIPITOR) 40 MG tablet Take 1 tablet (40 mg total) by mouth once daily. For cholesterol.  Stop the fenofibrate 90 tablet 3    busPIRone (BUSPAR) 15 MG tablet Take 1 tablet (15 mg total) by mouth every morning. 30 tablet 11    citalopram (CELEXA) 20 MG tablet Take 1 tablet (20 mg total) by mouth once daily. 90 tablet 3    cloNIDine (CATAPRES) 0.2 MG tablet Take 1 tablet (0.2 mg total) by mouth 2 (two) times daily. 60 tablet 6    fentaNYL (DURAGESIC) 25 mcg/hr   0    fish oil-omega-3 fatty acids 300-1,000 mg capsule Take 2 capsules (2 g total) by mouth once daily. 60 capsule 5    gabapentin (NEURONTIN) 300 MG capsule TAKE 2 CAPSULES BY MOUTH 3 TIMES A  capsule 6    hydrocodone-acetaminophen 10-325mg (NORCO)  mg Tab       lisinopril (PRINIVIL,ZESTRIL) 40 MG tablet Take 1 tablet (40 mg total) by mouth once daily. 30 tablet 6    metFORMIN (GLUCOPHAGE) 500 MG tablet Take 1 tablet (500 mg total) by mouth daily with breakfast. 30 tablet 6    POLYETHYLENE GLYCOL  "3350 (MIRALAX ORAL) Take 1 application by mouth.      tadalafil (CIALIS) 20 MG Tab Take 1 tablet (20 mg total) by mouth once daily. 10 tablet 11    verapamil (CALAN-SR) 180 MG CR tablet Take 1 tablet (180 mg total) by mouth 2 (two) times daily. 60 tablet 6     No current facility-administered medications for this visit.        Review of Systems   All other systems reviewed and are negative.      Objective:   Physical Exam   Vitals:    05/03/18 1336   BP: 119/71   Pulse: (!) 48   Temp: 98 °F (36.7 °C)   SpO2: 97%   Weight: 100 kg (220 lb 7.4 oz)   Height: 5' 11" (1.803 m)    Body mass index is 30.75 kg/m².  Weight: 100 kg (220 lb 7.4 oz)   Height: 5' 11" (180.3 cm)     Physical Exam   Constitutional: He is oriented to person, place, and time. He appears well-developed and well-nourished. No distress.   HENT:   Head: Normocephalic and atraumatic.   Eyes: No scleral icterus.   Musculoskeletal: He exhibits no edema.   Neurological: He is alert and oriented to person, place, and time.   Skin: Skin is warm and dry. No rash noted.   Psychiatric: He has a normal mood and affect. His behavior is normal. Thought content normal.        Component      Latest Ref Rng & Units 5/2/2018   Sodium      136 - 145 mmol/L 139   Potassium      3.5 - 5.1 mmol/L 5.0   Chloride      95 - 110 mmol/L 105   CO2      23 - 29 mmol/L 25   Glucose      70 - 110 mg/dL 151 (H)   BUN, Bld      8 - 23 mg/dL 20   Creatinine      0.5 - 1.4 mg/dL 1.2   Calcium      8.7 - 10.5 mg/dL 9.9   Total Protein      6.0 - 8.4 g/dL 7.7   Albumin      3.5 - 5.2 g/dL 4.2   Total Bilirubin      0.1 - 1.0 mg/dL 1.1 (H)   Alkaline Phosphatase      55 - 135 U/L 61   AST      10 - 40 U/L 29   ALT      10 - 44 U/L 33   Anion Gap      8 - 16 mmol/L 9   eGFR if African American      >60 mL/min/1.73 m:2 >60.0   eGFR if non African American      >60 mL/min/1.73 m:2 >60.0   Cholesterol      120 - 199 mg/dL 110 (L)   Triglycerides      30 - 150 mg/dL 155 (H)   HDL      40 - 75 " mg/dL 28 (L)   LDL Cholesterol      63.0 - 159.0 mg/dL 51.0 (L)   HDL/Chol Ratio      20.0 - 50.0 % 25.5   Total Cholesterol/HDL Ratio      2.0 - 5.0 3.9   Non-HDL Cholesterol      mg/dL 82   Microalbum.,U,Random      ug/mL 22.0   Creatinine, Random Ur      23.0 - 375.0 mg/dL 220.0   Microalb Creat Ratio      0.0 - 30.0 ug/mg 10.0   Hemoglobin A1C      4.0 - 5.6 % 7.3 (H)   Estimated Avg Glucose      68 - 131 mg/dL 163 (H)

## 2018-05-03 ENCOUNTER — OFFICE VISIT (OUTPATIENT)
Dept: FAMILY MEDICINE | Facility: CLINIC | Age: 63
End: 2018-05-03
Payer: MEDICARE

## 2018-05-03 VITALS
HEIGHT: 71 IN | SYSTOLIC BLOOD PRESSURE: 119 MMHG | OXYGEN SATURATION: 97 % | BODY MASS INDEX: 30.86 KG/M2 | TEMPERATURE: 98 F | WEIGHT: 220.44 LBS | DIASTOLIC BLOOD PRESSURE: 71 MMHG | HEART RATE: 48 BPM

## 2018-05-03 DIAGNOSIS — Z23 NEED FOR SHINGLES VACCINE: ICD-10-CM

## 2018-05-03 DIAGNOSIS — M50.30 DDD (DEGENERATIVE DISC DISEASE), CERVICAL: Chronic | ICD-10-CM

## 2018-05-03 DIAGNOSIS — I10 ESSENTIAL HYPERTENSION: Primary | ICD-10-CM

## 2018-05-03 DIAGNOSIS — M51.36 DDD (DEGENERATIVE DISC DISEASE), LUMBAR: Chronic | ICD-10-CM

## 2018-05-03 DIAGNOSIS — F11.20 UNCOMPLICATED OPIOID DEPENDENCE: Chronic | ICD-10-CM

## 2018-05-03 DIAGNOSIS — E11.9 CONTROLLED TYPE 2 DIABETES MELLITUS WITHOUT COMPLICATION, WITHOUT LONG-TERM CURRENT USE OF INSULIN: ICD-10-CM

## 2018-05-03 DIAGNOSIS — M47.812 FACET ARTHRITIS OF CERVICAL REGION: ICD-10-CM

## 2018-05-03 DIAGNOSIS — G89.29 OTHER CHRONIC PAIN: Chronic | ICD-10-CM

## 2018-05-03 DIAGNOSIS — F32.A DEPRESSION, UNSPECIFIED DEPRESSION TYPE: Chronic | ICD-10-CM

## 2018-05-03 DIAGNOSIS — F13.20 BENZODIAZEPINE DEPENDENCE: Chronic | ICD-10-CM

## 2018-05-03 DIAGNOSIS — R41.3 MEMORY LOSS: ICD-10-CM

## 2018-05-03 DIAGNOSIS — F41.9 ANXIETY: ICD-10-CM

## 2018-05-03 DIAGNOSIS — E78.2 MIXED HYPERLIPIDEMIA: ICD-10-CM

## 2018-05-03 DIAGNOSIS — M47.816 FACET ARTHRITIS OF LUMBAR REGION: ICD-10-CM

## 2018-05-03 PROCEDURE — 99214 OFFICE O/P EST MOD 30 MIN: CPT | Mod: PBBFAC,PO | Performed by: INTERNAL MEDICINE

## 2018-05-03 PROCEDURE — 99999 PR PBB SHADOW E&M-EST. PATIENT-LVL IV: CPT | Mod: PBBFAC,,, | Performed by: INTERNAL MEDICINE

## 2018-05-03 PROCEDURE — 99214 OFFICE O/P EST MOD 30 MIN: CPT | Mod: S$PBB,,, | Performed by: INTERNAL MEDICINE

## 2018-05-03 RX ORDER — CITALOPRAM 20 MG/1
20 TABLET, FILM COATED ORAL DAILY
Qty: 90 TABLET | Refills: 3 | Status: SHIPPED | OUTPATIENT
Start: 2018-05-03 | End: 2018-09-10 | Stop reason: SDUPTHER

## 2018-05-03 RX ORDER — VERAPAMIL HYDROCHLORIDE 180 MG/1
180 TABLET, FILM COATED, EXTENDED RELEASE ORAL 2 TIMES DAILY
Qty: 60 TABLET | Refills: 6 | Status: SHIPPED | OUTPATIENT
Start: 2018-05-03 | End: 2018-09-10 | Stop reason: SDUPTHER

## 2018-05-03 RX ORDER — ALPRAZOLAM 1 MG/1
TABLET ORAL
Qty: 60 TABLET | Refills: 2 | Status: SHIPPED | OUTPATIENT
Start: 2018-05-03 | End: 2018-09-10

## 2018-05-03 RX ORDER — METFORMIN HYDROCHLORIDE 500 MG/1
500 TABLET ORAL
Qty: 30 TABLET | Refills: 6 | Status: SHIPPED | OUTPATIENT
Start: 2018-05-03 | End: 2018-09-10 | Stop reason: SDUPTHER

## 2018-05-03 RX ORDER — BUSPIRONE HYDROCHLORIDE 15 MG/1
15 TABLET ORAL EVERY MORNING
Qty: 30 TABLET | Refills: 11 | Status: SHIPPED | OUTPATIENT
Start: 2018-05-03 | End: 2018-08-27

## 2018-05-03 RX ORDER — GABAPENTIN 300 MG/1
CAPSULE ORAL
Qty: 180 CAPSULE | Refills: 6 | Status: SHIPPED | OUTPATIENT
Start: 2018-05-03 | End: 2018-09-10 | Stop reason: SDUPTHER

## 2018-05-03 RX ORDER — LISINOPRIL 40 MG/1
40 TABLET ORAL DAILY
Qty: 30 TABLET | Refills: 6 | Status: SHIPPED | OUTPATIENT
Start: 2018-05-03 | End: 2018-09-10 | Stop reason: SDUPTHER

## 2018-05-03 RX ORDER — ATORVASTATIN CALCIUM 40 MG/1
40 TABLET, FILM COATED ORAL DAILY
Qty: 30 TABLET | Refills: 5 | Status: SHIPPED | OUTPATIENT
Start: 2018-05-03 | End: 2018-09-10 | Stop reason: SDUPTHER

## 2018-05-03 RX ORDER — CLONIDINE HYDROCHLORIDE 0.2 MG/1
0.2 TABLET ORAL 2 TIMES DAILY
Qty: 60 TABLET | Refills: 6 | Status: SHIPPED | OUTPATIENT
Start: 2018-05-03 | End: 2018-09-10 | Stop reason: SDUPTHER

## 2018-05-04 ENCOUNTER — TELEPHONE (OUTPATIENT)
Dept: FAMILY MEDICINE | Facility: CLINIC | Age: 63
End: 2018-05-04

## 2018-05-04 DIAGNOSIS — Z23 NEED FOR SHINGLES VACCINE: Primary | ICD-10-CM

## 2018-05-04 RX ORDER — TADALAFIL 20 MG/1
20 TABLET ORAL DAILY
Qty: 10 TABLET | Refills: 10 | Status: SHIPPED | OUTPATIENT
Start: 2018-05-04 | End: 2022-04-21

## 2018-05-04 RX ORDER — ALBUTEROL SULFATE 90 UG/1
2 AEROSOL, METERED RESPIRATORY (INHALATION) 2 TIMES DAILY PRN
Qty: 18 G | Refills: 0 | Status: SHIPPED | OUTPATIENT
Start: 2018-05-04 | End: 2019-05-04

## 2018-05-04 NOTE — TELEPHONE ENCOUNTER
----- Message from Shannon Mancilla sent at 5/4/2018  8:27 AM CDT -----  Contact: Self/ [phone]  Pt requesting a phone call from staff regarding some personal issues. Thank you.

## 2018-05-04 NOTE — TELEPHONE ENCOUNTER
----- Message from Rae Gonzalez sent at 5/4/2018  3:47 PM CDT -----  Contact: Self   Patient would like to speak with you about his Shingles injection. Please call at [phone].

## 2018-05-04 NOTE — TELEPHONE ENCOUNTER
Spoke w/patient, requesting refill. States the Zoster vaccine at the store cost 178.00 out of pocket and he cannot afford it at this time. States the albuterol inhaler is used twice daily as needed.

## 2018-05-07 PROBLEM — M48.02 CERVICAL STENOSIS OF SPINAL CANAL: Status: ACTIVE | Noted: 2018-05-07

## 2018-05-08 NOTE — TELEPHONE ENCOUNTER
Patient states he was advised by the pharmacy it would cost between $125 and $170 to obtain the shingles injection and he cannot afford that.  He states that he has not spoken with his insurance company regarding their coverage of the cost.  Instructed to contact his insurance to determine how much of the vaccine cost they cover as it costs around $300 out of pocket and also where he should receive the injection.  Verbalized understanding and states that he will call back after speaking with his insurance.

## 2018-05-08 NOTE — TELEPHONE ENCOUNTER
Patient states that his insurance does not cover the cost of the injection and was advised that he may contact the [facility] about obtaining the vaccine.  Verbalized understanding and states that he would like to cancel his appointment for today because he was only coming to obtain the injection.  Appointment cancelled.

## 2018-05-14 ENCOUNTER — TELEPHONE (OUTPATIENT)
Dept: FAMILY MEDICINE | Facility: CLINIC | Age: 63
End: 2018-05-14

## 2018-05-14 NOTE — TELEPHONE ENCOUNTER
I spoke with the patient regarding a referral to Neurology, he refuse to schedule without a explanation.

## 2018-08-27 ENCOUNTER — OFFICE VISIT (OUTPATIENT)
Dept: FAMILY MEDICINE | Facility: CLINIC | Age: 63
End: 2018-08-27
Payer: MEDICARE

## 2018-08-27 ENCOUNTER — TELEPHONE (OUTPATIENT)
Dept: FAMILY MEDICINE | Facility: CLINIC | Age: 63
End: 2018-08-27

## 2018-08-27 VITALS
SYSTOLIC BLOOD PRESSURE: 108 MMHG | HEART RATE: 43 BPM | HEIGHT: 71 IN | OXYGEN SATURATION: 97 % | DIASTOLIC BLOOD PRESSURE: 50 MMHG | WEIGHT: 220 LBS | TEMPERATURE: 98 F | BODY MASS INDEX: 30.8 KG/M2

## 2018-08-27 DIAGNOSIS — J06.9 VIRAL URI WITH COUGH: Primary | ICD-10-CM

## 2018-08-27 DIAGNOSIS — F17.200 TOBACCO DEPENDENCE: Chronic | ICD-10-CM

## 2018-08-27 DIAGNOSIS — E11.9 CONTROLLED TYPE 2 DIABETES MELLITUS WITHOUT COMPLICATION, WITHOUT LONG-TERM CURRENT USE OF INSULIN: ICD-10-CM

## 2018-08-27 DIAGNOSIS — E78.2 MIXED HYPERLIPIDEMIA: ICD-10-CM

## 2018-08-27 DIAGNOSIS — I10 ESSENTIAL HYPERTENSION: ICD-10-CM

## 2018-08-27 PROCEDURE — 99999 PR PBB SHADOW E&M-EST. PATIENT-LVL IV: CPT | Mod: PBBFAC,,, | Performed by: NURSE PRACTITIONER

## 2018-08-27 PROCEDURE — 99214 OFFICE O/P EST MOD 30 MIN: CPT | Mod: PBBFAC,PO | Performed by: NURSE PRACTITIONER

## 2018-08-27 PROCEDURE — 99214 OFFICE O/P EST MOD 30 MIN: CPT | Mod: S$PBB,,, | Performed by: NURSE PRACTITIONER

## 2018-08-27 RX ORDER — LEVOCETIRIZINE DIHYDROCHLORIDE 5 MG/1
5 TABLET, FILM COATED ORAL NIGHTLY
Qty: 30 TABLET | Refills: 0 | Status: SHIPPED | OUTPATIENT
Start: 2018-08-27 | End: 2018-10-01 | Stop reason: SDUPTHER

## 2018-08-27 RX ORDER — FLUTICASONE PROPIONATE 50 MCG
1 SPRAY, SUSPENSION (ML) NASAL DAILY
Qty: 1 BOTTLE | Refills: 0 | Status: SHIPPED | OUTPATIENT
Start: 2018-08-27 | End: 2018-09-30 | Stop reason: SDUPTHER

## 2018-08-27 RX ORDER — ALBUTEROL SULFATE 90 UG/1
2 AEROSOL, METERED RESPIRATORY (INHALATION) EVERY 6 HOURS PRN
Qty: 18 G | Refills: 0 | Status: SHIPPED | OUTPATIENT
Start: 2018-08-27 | End: 2020-06-03 | Stop reason: SDUPTHER

## 2018-08-27 RX ORDER — BENZONATATE 200 MG/1
200 CAPSULE ORAL 3 TIMES DAILY PRN
Qty: 90 CAPSULE | Refills: 0 | Status: SHIPPED | OUTPATIENT
Start: 2018-08-27 | End: 2018-09-10 | Stop reason: ALTCHOICE

## 2018-08-27 NOTE — TELEPHONE ENCOUNTER
----- Message from Gordy Pabon sent at 8/27/2018 11:26 AM CDT -----  Contact: Self/[phone]  The patient would like orders placed for blood work.    Thank you

## 2018-08-27 NOTE — PROGRESS NOTES
History of Present Illness   Butch Mcdaniel is a 63 y.o. man with medical history as listed below who presents today for evaluation of URI symptoms for four days. He reports congestion, post nasal drip, scratchy throat, ear fullness, sinus pressure, productive cough, and wheezing. He has had no fevers or chills. He has been using Flonase with minimal relief. He has had known sick contacts. He has no additional complaints and is otherwise healthy on today's visit.    Past Medical History:   Diagnosis Date    Anxiety     Degenerative disc disease     Depression     Diabetes mellitus type II, controlled     ETOH abuse     Eye injury as a child    stick hit eye ? eye, hit in [place] due to boxing    Hyperlipidemia     Hypertension     MRSA (methicillin resistant Staphylococcus aureus)        Past Surgical History:   Procedure Laterality Date    APPENDECTOMY         Social History     Socioeconomic History    Marital status:      Spouse name: None    Number of children: None    Years of education: None    Highest education level: None   Social Needs    Financial resource strain: None    Food insecurity - worry: None    Food insecurity - inability: None    Transportation needs - medical: None    Transportation needs - non-medical: None   Occupational History    Occupation: retired -    Tobacco Use    Smoking status: Current Every Day Smoker     Packs/day: 0.50     Years: 32.00     Pack years: 16.00     Types: Cigarettes     Last attempt to quit: 3/4/2013     Years since quittin.4    Smokeless tobacco: Never Used   Substance and Sexual Activity    Alcohol use: No     Alcohol/week: 0.0 oz    Drug use: No    Sexual activity: Yes     Partners: Female     Comment:  with children, disabled    Other Topics Concern    None   Social History Narrative    None       Family History   Problem Relation Age of Onset    Heart disease Mother     Heart disease Father  "    Alcohol abuse Father     Diabetes Son     No Known Problems Sister     No Known Problems Brother     No Known Problems Maternal Aunt     No Known Problems Maternal Uncle     No Known Problems Paternal Aunt     No Known Problems Paternal Uncle     No Known Problems Maternal Grandmother     No Known Problems Maternal Grandfather     No Known Problems Paternal Grandmother     No Known Problems Paternal Grandfather     Amblyopia Neg Hx     Blindness Neg Hx     Cancer Neg Hx     Cataracts Neg Hx     Glaucoma Neg Hx     Hypertension Neg Hx     Macular degeneration Neg Hx     Retinal detachment Neg Hx     Strabismus Neg Hx     Stroke Neg Hx     Thyroid disease Neg Hx        Review of Systems  Review of Systems   Constitutional: Positive for malaise/fatigue. Negative for chills and fever.   HENT: Positive for congestion, ear pain, sinus pain and sore throat. Negative for ear discharge.    Eyes: Negative for discharge and redness.   Respiratory: Positive for cough and wheezing. Negative for sputum production and shortness of breath.    Cardiovascular: Negative for chest pain.   Gastrointestinal: Negative for nausea and vomiting.   Neurological: Positive for headaches.     A complete review of systems was otherwise negative.    Physical Exam  BP (!) 108/50 (BP Location: Right arm, Patient Position: Sitting, BP Method: Medium (Manual))   Pulse (!) 43   Temp 97.9 °F (36.6 °C) (Oral)   Ht 5' 11" (1.803 m)   Wt 99.8 kg (220 lb)   SpO2 97%   BMI 30.68 kg/m²   General appearance: alert, appears stated age, cooperative and no distress  Eyes: negative findings: lids and lashes normal and conjunctivae and sclerae normal  Ears: normal TM's and external ear canals both ears and bilateral middle ear effusion  Nose: clear discharge, moderate congestion, turbinates red, swollen, sinus tenderness bilateral  Throat: lips, mucosa, and tongue normal; teeth and gums normal and moderate erythema with clear post " nasal drainage  Lungs: clear to auscultation bilaterally and bronchial wheezing, expiratory  Heart: regular rate and rhythm, S1, S2 normal, no murmur, click, rub or gallop  Extremities: extremities normal, atraumatic, no cyanosis or edema  Pulses: 2+ and symmetric  Skin: Skin color, texture, turgor normal. No rashes or lesions  Lymph nodes: Cervical, supraclavicular, and axillary nodes normal.  Neurologic: Grossly normal    Assessment/Plan  Viral URI with cough  Xyzal and Flonase daily.  Tessalon PRN cough.  Albuterol Q6H for bronchospasm and wheezing.  Tylenol for fever.  Rest and drink plenty of fluids.  Cut back on the smoking.  ER precautions discussed.  RTC PRN.  -     fluticasone (FLONASE) 50 mcg/actuation nasal spray; 1 spray (50 mcg total) by Each Nare route once daily.  Dispense: 1 Bottle; Refill: 0  -     levocetirizine (XYZAL) 5 MG tablet; Take 1 tablet (5 mg total) by mouth every evening.  Dispense: 30 tablet; Refill: 0  -     benzonatate (TESSALON) 200 MG capsule; Take 1 capsule (200 mg total) by mouth 3 (three) times daily as needed.  Dispense: 90 capsule; Refill: 0  -     albuterol 90 mcg/actuation inhaler; Inhale 2 puffs into the lungs every 6 (six) hours as needed for Wheezing. Rescue  Dispense: 18 g; Refill: 0  -     inhalation spacing device; Use as directed for inhalation.  Dispense: 1 each; Refill: 0    Essential hypertension  The current medical regimen is effective;  continue present plan and medications.  -     Comprehensive metabolic panel; Future; Expected date: 08/27/2018    Mixed hyperlipidemia  The current medical regimen is effective;  continue present plan and medications.    Controlled type 2 diabetes mellitus without complication, without long-term current use of insulin  The current medical regimen is effective;  continue present plan and medications.  -     Comprehensive metabolic panel; Future; Expected date: 08/27/2018  -     Hemoglobin A1c; Future; Expected date:  08/27/2018    Tobacco dependence  Discussed importance of cessation, patient not interested in attempt at this time.      Follow-up in about 2 weeks (around 9/10/2018) for as scheduled with PCP.

## 2018-08-27 NOTE — PATIENT INSTRUCTIONS
Viral Upper Respiratory Illness (Adult)  You have a viral upper respiratory illness (URI), which is another term for the common cold. This illness is contagious during the first few days. It is spread through the air by coughing and sneezing. It may also be spread by direct contact (touching the sick person and then touching your own eyes, nose, or mouth). Frequent handwashing will decrease risk of spread. Most viral illnesses go away within 7 to 10 days with rest and simple home remedies. Sometimes the illness may last for several weeks. Antibiotics will not kill a virus, and they are generally not prescribed for this condition.    Home care  · If symptoms are severe, rest at home for the first 2 to 3 days. When you resume activity, don't let yourself get too tired.  · Avoid being exposed to cigarette smoke (yours or others).  · You may use acetaminophen or ibuprofen to control pain and fever, unless another medicine was prescribed. (Note: If you have chronic liver or kidney disease, have ever had a stomach ulcer or gastrointestinal bleeding, or are taking blood-thinning medicines, talk with your healthcare provider before using these medicines.) Aspirin should never be given to anyone under 18 years of age who is ill with a viral infection or fever. It may cause severe liver or brain damage.  · Your appetite may be poor, so a light diet is fine. Avoid dehydration by drinking 6 to 8 glasses of fluids per day (water, soft drinks, juices, tea, or soup). Extra fluids will help loosen secretions in the nose and lungs.  · Over-the-counter cold medicines will not shorten the length of time youre sick, but they may be helpful for the following symptoms: cough, sore throat, and nasal and sinus congestion. (Note: Do not use decongestants if you have high blood pressure.)  Follow-up care  Follow up with your healthcare provider, or as advised.  When to seek medical advice  Call your healthcare provider right away if any  of these occur:  · Cough with lots of colored sputum (mucus)  · Severe headache; face, neck, or ear pain  · Difficulty swallowing due to throat pain  · Fever of 100.4°F (38°C)  Call 911, or get immediate medical care  Call emergency services right away if any of these occur:  · Chest pain, shortness of breath, wheezing, or difficulty breathing  · Coughing up blood  · Inability to swallow due to throat pain  Date Last Reviewed: 9/13/2015  © 8012-2637 [employer]. [address] [place] [address]. All rights reserved. This information is not intended as a substitute for professional medical care. Always follow your healthcare professional's instructions.

## 2018-08-27 NOTE — TELEPHONE ENCOUNTER
Patient being seen today by A Juma NP. Suresh Eller MA patient is requesting lab work to mention to MATTEO Dennison.

## 2018-08-29 ENCOUNTER — TELEPHONE (OUTPATIENT)
Dept: FAMILY MEDICINE | Facility: CLINIC | Age: 63
End: 2018-08-29

## 2018-08-29 DIAGNOSIS — E11.9 DIABETES MELLITUS WITHOUT COMPLICATION: Primary | ICD-10-CM

## 2018-08-29 DIAGNOSIS — Z23 NEED FOR PROPHYLACTIC VACCINATION AND INOCULATION AGAINST INFLUENZA: ICD-10-CM

## 2018-08-29 NOTE — TELEPHONE ENCOUNTER
Spoke w/ patient, overdue Health Maintenance was discussed.     Diabetic Eye Exam- Referral placed today.    Diabetic Foot Exam- To be done at the visit.    Immunizations- Flu ordered. Patient to receive at the visit. Zoster/Tetanus not ordered secondary to patient's insurance. Retail pharmacy preferred.

## 2018-09-07 ENCOUNTER — LAB VISIT (OUTPATIENT)
Dept: LAB | Facility: HOSPITAL | Age: 63
End: 2018-09-07
Attending: INTERNAL MEDICINE
Payer: MEDICARE

## 2018-09-07 DIAGNOSIS — E11.9 CONTROLLED TYPE 2 DIABETES MELLITUS WITHOUT COMPLICATION, WITHOUT LONG-TERM CURRENT USE OF INSULIN: ICD-10-CM

## 2018-09-07 DIAGNOSIS — I10 ESSENTIAL HYPERTENSION: ICD-10-CM

## 2018-09-07 LAB
ALBUMIN SERPL BCP-MCNC: 3.8 G/DL
ALP SERPL-CCNC: 65 U/L
ALT SERPL W/O P-5'-P-CCNC: 18 U/L
ANION GAP SERPL CALC-SCNC: 9 MMOL/L
AST SERPL-CCNC: 23 U/L
BILIRUB SERPL-MCNC: 1 MG/DL
BUN SERPL-MCNC: 17 MG/DL
CALCIUM SERPL-MCNC: 9.7 MG/DL
CHLORIDE SERPL-SCNC: 105 MMOL/L
CO2 SERPL-SCNC: 26 MMOL/L
CREAT SERPL-MCNC: 1.4 MG/DL
EST. GFR  (AFRICAN AMERICAN): >60 ML/MIN/1.73 M^2
EST. GFR  (NON AFRICAN AMERICAN): 52.7 ML/MIN/1.73 M^2
ESTIMATED AVG GLUCOSE: 154 MG/DL
GLUCOSE SERPL-MCNC: 155 MG/DL
HBA1C MFR BLD HPLC: 7 %
POTASSIUM SERPL-SCNC: 4.8 MMOL/L
PROT SERPL-MCNC: 7.5 G/DL
SODIUM SERPL-SCNC: 140 MMOL/L

## 2018-09-07 PROCEDURE — 83036 HEMOGLOBIN GLYCOSYLATED A1C: CPT

## 2018-09-07 PROCEDURE — 80053 COMPREHEN METABOLIC PANEL: CPT

## 2018-09-07 PROCEDURE — 36415 COLL VENOUS BLD VENIPUNCTURE: CPT | Mod: PO

## 2018-09-07 NOTE — PROGRESS NOTES
This note was created by combination of typed  and Dragon dictation.  Transcription errors may be present.  If there are any questions, please contact me.    Assessment & Plan:   Controlled type 2 diabetes mellitus without complication, without long-term current use of insulin  -A1c good 7.0.  No changes, refilled metformin.  Overdue for eye exam, unable to stay for 1 today, scheduled for photograph tomorrow.  Future labs ordered.  On ACE-inhibitor.  On statin.  Foot exam done today.  -     metFORMIN (GLUCOPHAGE) 500 MG tablet; Take 1 tablet (500 mg total) by mouth daily with breakfast.  Dispense: 30 tablet; Refill: 6  -     Comprehensive metabolic panel; Future; Expected date: 03/09/2019  -     Lipid panel; Future; Expected date: 03/09/2019  -     Hemoglobin A1c; Future; Expected date: 03/09/2019  -     Diabetic Eye Screening Photo; Future    Anxiety  Depression, unspecified depression type  Benzodiazepine dependence  -unable to wean off of benzodiazepine. Xanax 2 mg at night, has been taking 2 tablets of 1 mg, rx changed to 2 mg, 1 tablet at night. buspar durign the day  -     ALPRAZolam (XANAX) 2 MG Tab; Take 1 tablet (2 mg total) by mouth nightly as needed for Anxiety. TAKE 2 TABLETS BY MOUTH AT night (taking buspirone IN THE MORNING)  Dispense: 30 tablet; Refill: 2  -     citalopram (CELEXA) 20 MG tablet; Take 1 tablet (20 mg total) by mouth once daily.  Dispense: 90 tablet; Refill: 3  -     buspar 15 mg in AM, #30 with 11 RF    Essential hypertension  -stable, refilled clonidine, lisinopril, verapamil.  -     cloNIDine (CATAPRES) 0.2 MG tablet; Take 1 tablet (0.2 mg total) by mouth 2 (two) times daily.  Dispense: 60 tablet; Refill: 6  -     lisinopril (PRINIVIL,ZESTRIL) 40 MG tablet; Take 1 tablet (40 mg total) by mouth once daily.  Dispense: 30 tablet; Refill: 6  -     verapamil (CALAN-SR) 180 MG CR tablet; Take 1 tablet (180 mg total) by mouth 2 (two) times daily.  Dispense: 60 tablet; Refill:  6    Mixed hyperlipidemia  -labs in May good, no change, lipitor 40  -     atorvastatin (LIPITOR) 40 MG tablet; Take 1 tablet (40 mg total) by mouth once daily. For cholesterol  Dispense: 30 tablet; Refill: 5    Other chronic pain  Facet arthritis of cervical region  Facet arthritis of lumbar region  DDD (degenerative disc disease), cervical  DDD (degenerative disc disease), lumbar  -narcotics with pain mgmt; gabapentin refilled today.  -     gabapentin (NEURONTIN) 300 MG capsule; TAKE 2 CAPSULES BY MOUTH 3 TIMES A DAY  Dispense: 180 capsule; Refill: 6    Needs flu shot  -     Influenza - Quadrivalent (3 years & older) (PF)    Tobacco dependence, hx of bupropion  -quit in anticipation of wife undergoing surgical procedure.  Encouraged the both of them to continue to abstain from tobacco.  Qualifies for smoking cessation classes if they want to in the future.    Screening for prostate cancer  -future PSA testing with upcoming labs 6 months.  -     PSA, Screening; Future; Expected date: 03/09/2019        Medications Discontinued During This Encounter   Medication Reason    benzonatate (TESSALON) 200 MG capsule Therapy completed    ALPRAZolam (XANAX) 1 MG tablet     citalopram (CELEXA) 20 MG tablet Reorder    cloNIDine (CATAPRES) 0.2 MG tablet Reorder    lisinopril (PRINIVIL,ZESTRIL) 40 MG tablet Reorder    verapamil (CALAN-SR) 180 MG CR tablet Reorder    atorvastatin (LIPITOR) 40 MG tablet Reorder    metFORMIN (GLUCOPHAGE) 500 MG tablet Reorder    gabapentin (NEURONTIN) 300 MG capsule Reorder         Current Outpatient Medications:     albuterol 90 mcg/actuation inhaler, Inhale 2 puffs into the lungs 2 (two) times daily as needed for Wheezing. Rescue, Disp: 18 g, Rfl: 0    albuterol 90 mcg/actuation inhaler, Inhale 2 puffs into the lungs every 6 (six) hours as needed for Wheezing. Rescue, Disp: 18 g, Rfl: 0    ALPRAZolam (XANAX) 2 MG Tab, Take 1 tablet (2 mg total) by mouth nightly as needed for Anxiety.  TAKE 2 TABLETS BY MOUTH AT night (taking buspirone IN THE MORNING), Disp: 30 tablet, Rfl: 2    aspirin (ECOTRIN) 81 MG EC tablet, Take 1 tablet (81 mg total) by mouth once daily., Disp: , Rfl: 0    atorvastatin (LIPITOR) 40 MG tablet, Take 1 tablet (40 mg total) by mouth once daily. For cholesterol, Disp: 30 tablet, Rfl: 5    citalopram (CELEXA) 20 MG tablet, Take 1 tablet (20 mg total) by mouth once daily., Disp: 90 tablet, Rfl: 3    cloNIDine (CATAPRES) 0.2 MG tablet, Take 1 tablet (0.2 mg total) by mouth 2 (two) times daily., Disp: 60 tablet, Rfl: 6    fentaNYL (DURAGESIC) 25 mcg/hr, , Disp: , Rfl: 0    fish oil-omega-3 fatty acids 300-1,000 mg capsule, Take 2 capsules (2 g total) by mouth once daily., Disp: 60 capsule, Rfl: 5    fluticasone (FLONASE) 50 mcg/actuation nasal spray, 1 spray (50 mcg total) by Each Nare route once daily., Disp: 1 Bottle, Rfl: 0    gabapentin (NEURONTIN) 300 MG capsule, TAKE 2 CAPSULES BY MOUTH 3 TIMES A DAY, Disp: 180 capsule, Rfl: 6    hydrocodone-acetaminophen 10-325mg (NORCO)  mg Tab, , Disp: , Rfl:     inhalation spacing device, Use as directed for inhalation., Disp: 1 each, Rfl: 0    levocetirizine (XYZAL) 5 MG tablet, Take 1 tablet (5 mg total) by mouth every evening., Disp: 30 tablet, Rfl: 0    lisinopril (PRINIVIL,ZESTRIL) 40 MG tablet, Take 1 tablet (40 mg total) by mouth once daily., Disp: 30 tablet, Rfl: 6    metFORMIN (GLUCOPHAGE) 500 MG tablet, Take 1 tablet (500 mg total) by mouth daily with breakfast., Disp: 30 tablet, Rfl: 6    POLYETHYLENE GLYCOL 3350 (MIRALAX ORAL), Take 1 application by mouth., Disp: , Rfl:     tadalafil (CIALIS) 20 MG Tab, Take 1 tablet (20 mg total) by mouth once daily., Disp: 10 tablet, Rfl: 10    verapamil (CALAN-SR) 180 MG CR tablet, Take 1 tablet (180 mg total) by mouth 2 (two) times daily., Disp: 60 tablet, Rfl: 6    busPIRone (BUSPAR) 15 MG tablet, Take 1 tablet (15 mg total) by mouth once daily., Disp: 30 tablet,  Rfl: 11    Follow Up: No Follow-up on file. OV 6 months recall entered, previsit labs ordered.    Subjective:     Chief Complaint   Patient presents with    Diabetes       HPI  Butch is a 63 y.o. male, last appointment with this clinic was 8/27/2018.    Depression and anxiety, history of Prozac with side effects.  History of citalopram.  did not do well with sertraline and attempts to wean Xanax using BuSpar. Xanax 2 HS.  Diabetes, metforminl  Hyperlipidemia, atorvastatin  Hypertension, clonidine, lisinopril, verapamil  Chronic neck and back pain followed by pain management.  Narcotics.  I fill his gabapentin.  Smoker.    Last visit - memory issues.  MOCA test 27/30, animal fluency test 15 in 1 minute.  He thinks it's due to not paying attention.  Referred to neuro.  He refused.    Unsuccessful previously in weaning down BZD    Eligible for digital HTN but does not have a smart phone.    Pharmacy changed.  He needs rx's printed.  He wants 3 month rx's.   Taking buspar 15 mg in the AM, Xanax 2 mg at night.  I have been giving him 1 mg pills, 2 to take at night and I will change him to 2 mg, 1 tablet at night for simplicity.    previsit labs - creatinine bumped. Had come in fasting but also recent illness with decreased PO intake.  He is slowly recovering.    Continues to smoke.  Wife also smokes.  However they both have recently stopped smoking in anticipation of surgical procedure on his wife upcoming in October.  They plan to try and continue this after the surgery.  I did discuss with them that they probably both qualify 1st 3 smoking cessation classes.  They declined at this time.    Patient Care Team:  Gabriel Evans MD as PCP - General (Internal Medicine)  Oniel Elmore MD as PCP - MSSP Attributed  Vlad Nava MD (Pain Medicine)  Kaila Carrillo OD as Consulting Physician (Optometry)    Patient Active Problem List    Diagnosis Date Noted    Cervical stenosis of spinal canal 05/07/2018 02/14/2018 MRI  C-spine impression:  Slight retrolisthesis of C4 with respect to C5.  Narrowing of the central spinal canal most prominent at the C4-C5, C5-C6, and C6-C7 levels and to a lesser extent at C2-C3 and C3-C4.  Annular disc bulges posteriorly at C2-C3, C3-C4.  Diffuse disc herniation/protrusion posteriorly at C4-C5 level and a disc herniation/extrusion posteriorly at the C5-C6 level with a central disc herniation/protrusion posteriorly at the C6-C7 level.  Narrowing of the neural foramen bilaterally from C3-C4 through C6-C7.      Tubular adenoma of colon 5/10/17 05/10/2017     5/10/2017 colonoscopy tubular adenoma      Therapeutic opioid-induced constipation (OIC) 04/17/2017    Tobacco dependence 01/12/2016    Non morbid obesity due to excess calories 01/12/2016    Controlled type 2 diabetes mellitus without complication, without long-term current use of insulin 08/07/2015    Facet arthritis of cervical region 10/28/2013    Facet arthritis of lumbar region 10/28/2013    Benzodiazepine dependence, did not do well with attempt to wean down 2017 10/28/2013    Opioid dependence 10/28/2013    ED (erectile dysfunction) 07/26/2013    DDD (degenerative disc disease), cervical 05/23/2013    DDD (degenerative disc disease), lumbar 05/23/2013    Essential hypertension 11/01/2012    Mixed hyperlipidemia 11/01/2012    Anemia 11/01/2012    Chronic pain 11/01/2012    Anxiety 11/01/2012    Depression 11/01/2012       PAST MEDICAL HISTORY:  Past Medical History:   Diagnosis Date    Anxiety     Degenerative disc disease     Depression     Diabetes mellitus type II, controlled     ETOH abuse     Eye injury as a child    stick hit eye ? eye, hit in ou due to boxing    Hyperlipidemia     Hypertension     MRSA (methicillin resistant Staphylococcus aureus)        PAST SURGICAL HISTORY:  Past Surgical History:   Procedure Laterality Date    APPENDECTOMY      COLONOSCOPY N/A 5/10/2017    Procedure: COLONOSCOPY;   Surgeon: Shantanu Marley MD;  Location: [facility] END[facility];  Service: Endoscopy;  Laterality: N/A;    COLONOSCOPY N/A 5/10/2017    Performed by Shantanu Marley MD at [facility]       SOCIAL HISTORY:  Social History     Socioeconomic History    Marital status:      Spouse name: Not on file    Number of children: Not on file    Years of education: Not on file    Highest education level: Not on file   Social Needs    Financial resource strain: Not on file    Food insecurity - worry: Not on file    Food insecurity - inability: Not on file    Transportation needs - medical: Not on file    Transportation needs - non-medical: Not on file   Occupational History    Occupation: retired -    Tobacco Use    Smoking status: Current Every Day Smoker     Packs/day: 0.50     Years: 32.00     Pack years: 16.00     Types: Cigarettes     Last attempt to quit: 3/4/2013     Years since quittin.5    Smokeless tobacco: Never Used   Substance and Sexual Activity    Alcohol use: No     Alcohol/week: 0.0 oz    Drug use: No    Sexual activity: Yes     Partners: Female     Comment:  with children, disabled    Other Topics Concern    Not on file   Social History Narrative    Not on file       ALLERGIES AND MEDICATIONS: updated and reviewed.  Review of patient's allergies indicates:   Allergen Reactions    Buspar [buspirone] Other (See Comments)     Hypotension with flexeril     Current Outpatient Medications   Medication Sig Dispense Refill    albuterol 90 mcg/actuation inhaler Inhale 2 puffs into the lungs 2 (two) times daily as needed for Wheezing. Rescue 18 g 0    albuterol 90 mcg/actuation inhaler Inhale 2 puffs into the lungs every 6 (six) hours as needed for Wheezing. Rescue 18 g 0    ALPRAZolam (XANAX) 1 MG tablet TAKE 2 TABLETS BY MOUTH AT night (taking buspirone IN THE MORNING) 60 tablet 2    aspirin (ECOTRIN) 81 MG EC tablet Take 1 tablet (81 mg total) by mouth once daily.  0  "   atorvastatin (LIPITOR) 40 MG tablet Take 1 tablet (40 mg total) by mouth once daily. For cholesterol 30 tablet 5    citalopram (CELEXA) 20 MG tablet Take 1 tablet (20 mg total) by mouth once daily. 90 tablet 3    cloNIDine (CATAPRES) 0.2 MG tablet Take 1 tablet (0.2 mg total) by mouth 2 (two) times daily. 60 tablet 6    fentaNYL (DURAGESIC) 25 mcg/hr   0    fish oil-omega-3 fatty acids 300-1,000 mg capsule Take 2 capsules (2 g total) by mouth once daily. 60 capsule 5    fluticasone (FLONASE) 50 mcg/actuation nasal spray 1 spray (50 mcg total) by Each Nare route once daily. 1 Bottle 0    gabapentin (NEURONTIN) 300 MG capsule TAKE 2 CAPSULES BY MOUTH 3 TIMES A  capsule 6    hydrocodone-acetaminophen 10-325mg (NORCO)  mg Tab       inhalation spacing device Use as directed for inhalation. 1 each 0    levocetirizine (XYZAL) 5 MG tablet Take 1 tablet (5 mg total) by mouth every evening. 30 tablet 0    lisinopril (PRINIVIL,ZESTRIL) 40 MG tablet Take 1 tablet (40 mg total) by mouth once daily. 30 tablet 6    metFORMIN (GLUCOPHAGE) 500 MG tablet Take 1 tablet (500 mg total) by mouth daily with breakfast. 30 tablet 6    POLYETHYLENE GLYCOL 3350 (MIRALAX ORAL) Take 1 application by mouth.      tadalafil (CIALIS) 20 MG Tab Take 1 tablet (20 mg total) by mouth once daily. 10 tablet 10    verapamil (CALAN-SR) 180 MG CR tablet Take 1 tablet (180 mg total) by mouth 2 (two) times daily. 60 tablet 6     No current facility-administered medications for this visit.        Review of Systems   Constitutional: Negative for chills and fever.   Respiratory: Negative for shortness of breath.    Cardiovascular: Negative for chest pain and palpitations.       Objective:   Physical Exam   Vitals:    09/10/18 1102   BP: 100/60   Pulse: (!) 58   Temp: 98.4 °F (36.9 °C)   SpO2: 95%   Weight: 100.4 kg (221 lb 3.7 oz)   Height: 5' 11" (1.803 m)    Body mass index is 30.86 kg/m².  Weight: 100.4 kg (221 lb 3.7 oz) " "  Height: 5' 11" (180.3 cm)     Physical Exam   Constitutional: He is oriented to person, place, and time. He appears well-developed and well-nourished. No distress.   Eyes: EOM are normal.   Cardiovascular: Normal rate, regular rhythm and normal heart sounds.   No murmur heard.  Pulses:       Dorsalis pedis pulses are 1+ on the right side.        Posterior tibial pulses are 1+ on the right side.   Pulmonary/Chest: Effort normal and breath sounds normal.   Musculoskeletal: Normal range of motion.        Right foot: There is no deformity.   Feet:   Right Foot:   Protective Sensation: 5 sites tested. 5 sites sensed.   Skin Integrity: Negative for ulcer, blister, skin breakdown, erythema or warmth.   Neurological: He is alert and oriented to person, place, and time. Coordination normal.   Skin: Skin is warm and dry.   Psychiatric: He has a normal mood and affect. His behavior is normal. Thought content normal.     Component      Latest Ref Rng & Units 9/7/2018   Sodium      136 - 145 mmol/L 140   Potassium      3.5 - 5.1 mmol/L 4.8   Chloride      95 - 110 mmol/L 105   CO2      23 - 29 mmol/L 26   Glucose      70 - 110 mg/dL 155 (H)   BUN, Bld      8 - 23 mg/dL 17   Creatinine      0.5 - 1.4 mg/dL 1.4   Calcium      8.7 - 10.5 mg/dL 9.7   Total Protein      6.0 - 8.4 g/dL 7.5   Albumin      3.5 - 5.2 g/dL 3.8   Total Bilirubin      0.1 - 1.0 mg/dL 1.0   Alkaline Phosphatase      55 - 135 U/L 65   AST      10 - 40 U/L 23   ALT      10 - 44 U/L 18   Anion Gap      8 - 16 mmol/L 9   eGFR if African American      >60 mL/min/1.73 m:2 >60.0   eGFR if non African American      >60 mL/min/1.73 m:2 52.7 (A)   Hemoglobin A1C      4.0 - 5.6 % 7.0 (H)   Estimated Avg Glucose      68 - 131 mg/dL 154 (H)     "

## 2018-09-10 ENCOUNTER — OFFICE VISIT (OUTPATIENT)
Dept: FAMILY MEDICINE | Facility: CLINIC | Age: 63
End: 2018-09-10
Payer: MEDICARE

## 2018-09-10 VITALS
SYSTOLIC BLOOD PRESSURE: 100 MMHG | TEMPERATURE: 98 F | HEIGHT: 71 IN | BODY MASS INDEX: 30.98 KG/M2 | DIASTOLIC BLOOD PRESSURE: 60 MMHG | HEART RATE: 58 BPM | OXYGEN SATURATION: 95 % | WEIGHT: 221.25 LBS

## 2018-09-10 DIAGNOSIS — F41.9 ANXIETY: ICD-10-CM

## 2018-09-10 DIAGNOSIS — Z23 NEEDS FLU SHOT: ICD-10-CM

## 2018-09-10 DIAGNOSIS — E78.2 MIXED HYPERLIPIDEMIA: ICD-10-CM

## 2018-09-10 DIAGNOSIS — E11.9 CONTROLLED TYPE 2 DIABETES MELLITUS WITHOUT COMPLICATION, WITHOUT LONG-TERM CURRENT USE OF INSULIN: Primary | ICD-10-CM

## 2018-09-10 DIAGNOSIS — M50.30 DDD (DEGENERATIVE DISC DISEASE), CERVICAL: Chronic | ICD-10-CM

## 2018-09-10 DIAGNOSIS — F13.20 BENZODIAZEPINE DEPENDENCE: Chronic | ICD-10-CM

## 2018-09-10 DIAGNOSIS — Z12.5 SCREENING FOR PROSTATE CANCER: ICD-10-CM

## 2018-09-10 DIAGNOSIS — I10 ESSENTIAL HYPERTENSION: ICD-10-CM

## 2018-09-10 DIAGNOSIS — G89.29 OTHER CHRONIC PAIN: Chronic | ICD-10-CM

## 2018-09-10 DIAGNOSIS — M47.816 FACET ARTHRITIS OF LUMBAR REGION: ICD-10-CM

## 2018-09-10 DIAGNOSIS — M51.36 DDD (DEGENERATIVE DISC DISEASE), LUMBAR: Chronic | ICD-10-CM

## 2018-09-10 DIAGNOSIS — M47.812 FACET ARTHRITIS OF CERVICAL REGION: ICD-10-CM

## 2018-09-10 DIAGNOSIS — F32.A DEPRESSION, UNSPECIFIED DEPRESSION TYPE: Chronic | ICD-10-CM

## 2018-09-10 DIAGNOSIS — F17.200 TOBACCO DEPENDENCE: Chronic | ICD-10-CM

## 2018-09-10 PROCEDURE — 99214 OFFICE O/P EST MOD 30 MIN: CPT | Mod: S$PBB,,, | Performed by: INTERNAL MEDICINE

## 2018-09-10 PROCEDURE — 99213 OFFICE O/P EST LOW 20 MIN: CPT | Mod: PBBFAC,PO | Performed by: INTERNAL MEDICINE

## 2018-09-10 PROCEDURE — 99999 PR PBB SHADOW E&M-EST. PATIENT-LVL III: CPT | Mod: PBBFAC,,, | Performed by: INTERNAL MEDICINE

## 2018-09-10 PROCEDURE — 90686 IIV4 VACC NO PRSV 0.5 ML IM: CPT | Mod: PBBFAC,PO

## 2018-09-10 RX ORDER — CITALOPRAM 20 MG/1
20 TABLET, FILM COATED ORAL DAILY
Qty: 90 TABLET | Refills: 3 | Status: SHIPPED | OUTPATIENT
Start: 2018-09-10 | End: 2019-05-03 | Stop reason: SDUPTHER

## 2018-09-10 RX ORDER — BUSPIRONE HYDROCHLORIDE 15 MG/1
15 TABLET ORAL DAILY
Qty: 30 TABLET | Refills: 11 | Status: SHIPPED | OUTPATIENT
Start: 2018-09-10 | End: 2019-05-04 | Stop reason: SDUPTHER

## 2018-09-10 RX ORDER — CLONIDINE HYDROCHLORIDE 0.2 MG/1
0.2 TABLET ORAL 2 TIMES DAILY
Qty: 60 TABLET | Refills: 6 | Status: SHIPPED | OUTPATIENT
Start: 2018-09-10 | End: 2019-07-05 | Stop reason: SDUPTHER

## 2018-09-10 RX ORDER — LISINOPRIL 40 MG/1
40 TABLET ORAL DAILY
Qty: 30 TABLET | Refills: 6 | Status: SHIPPED | OUTPATIENT
Start: 2018-09-10 | End: 2019-07-05 | Stop reason: SDUPTHER

## 2018-09-10 RX ORDER — METFORMIN HYDROCHLORIDE 500 MG/1
500 TABLET ORAL
Qty: 30 TABLET | Refills: 6 | Status: SHIPPED | OUTPATIENT
Start: 2018-09-10 | End: 2019-07-05 | Stop reason: SDUPTHER

## 2018-09-10 RX ORDER — GABAPENTIN 300 MG/1
CAPSULE ORAL
Qty: 180 CAPSULE | Refills: 6 | Status: SHIPPED | OUTPATIENT
Start: 2018-09-10 | End: 2019-04-03 | Stop reason: ALTCHOICE

## 2018-09-10 RX ORDER — VERAPAMIL HYDROCHLORIDE 180 MG/1
180 TABLET, FILM COATED, EXTENDED RELEASE ORAL 2 TIMES DAILY
Qty: 60 TABLET | Refills: 6 | Status: SHIPPED | OUTPATIENT
Start: 2018-09-10 | End: 2019-04-03 | Stop reason: SDUPTHER

## 2018-09-10 RX ORDER — ATORVASTATIN CALCIUM 40 MG/1
40 TABLET, FILM COATED ORAL DAILY
Qty: 30 TABLET | Refills: 5 | Status: SHIPPED | OUTPATIENT
Start: 2018-09-10 | End: 2019-03-21 | Stop reason: SDUPTHER

## 2018-09-10 RX ORDER — ALPRAZOLAM 2 MG/1
2 TABLET ORAL NIGHTLY PRN
Qty: 30 TABLET | Refills: 2 | Status: SHIPPED | OUTPATIENT
Start: 2018-09-10 | End: 2018-12-19 | Stop reason: SDUPTHER

## 2018-09-11 ENCOUNTER — CLINICAL SUPPORT (OUTPATIENT)
Dept: OPHTHALMOLOGY | Facility: CLINIC | Age: 63
End: 2018-09-11
Attending: INTERNAL MEDICINE
Payer: MEDICARE

## 2018-09-11 ENCOUNTER — TELEPHONE (OUTPATIENT)
Dept: OPTOMETRY | Facility: CLINIC | Age: 63
End: 2018-09-11

## 2018-09-11 DIAGNOSIS — E11.9 CONTROLLED TYPE 2 DIABETES MELLITUS WITHOUT COMPLICATION, WITHOUT LONG-TERM CURRENT USE OF INSULIN: ICD-10-CM

## 2018-09-11 PROBLEM — H26.9 CATARACT, BILATERAL: Status: ACTIVE | Noted: 2018-09-11

## 2018-09-11 LAB
LEFT EYE DM RETINOPATHY: NEGATIVE
RIGHT EYE DM RETINOPATHY: NEGATIVE

## 2018-09-11 PROCEDURE — 99999 PR PBB SHADOW E&M-EST. PATIENT-LVL I: CPT | Mod: PBBFAC,,,

## 2018-09-11 PROCEDURE — 92250 FUNDUS PHOTOGRAPHY W/I&R: CPT | Mod: 26,S$PBB,, | Performed by: OPHTHALMOLOGY

## 2018-09-11 PROCEDURE — 92250 FUNDUS PHOTOGRAPHY W/I&R: CPT | Mod: PBBFAC,PO

## 2018-09-11 PROCEDURE — 99211 OFF/OP EST MAY X REQ PHY/QHP: CPT | Mod: PBBFAC,PO,25

## 2018-09-11 NOTE — Clinical Note
HPI   Butch Mcdaniel here for diabetic eye exam with non-dilated fundus photos.Small pupils: noPt cooperativity: good  Last edited by Sid Asif on 9/11/2018 11:06 AM. (History)

## 2018-09-11 NOTE — PROGRESS NOTES
HPI     Butch Mcdaniel here for diabetic eye exam with non-dilated fundus photos.    Small pupils: no  Pt cooperativity: good      Last edited by Sid Asif on 9/11/2018 11:06 AM. (History)            Assessment /Plan     For exam results, see Encounter Report.    Controlled type 2 diabetes mellitus without complication, without long-term current use of insulin  -     Diabetic Eye Screening Photo      Please see Dr. Stewart's progress notes for interpretation.

## 2018-09-30 DIAGNOSIS — J06.9 VIRAL URI WITH COUGH: ICD-10-CM

## 2018-10-01 DIAGNOSIS — J06.9 VIRAL URI WITH COUGH: ICD-10-CM

## 2018-10-01 RX ORDER — FLUTICASONE PROPIONATE 50 MCG
SPRAY, SUSPENSION (ML) NASAL
Qty: 16 ML | Refills: 0 | Status: SHIPPED | OUTPATIENT
Start: 2018-10-01 | End: 2018-11-25 | Stop reason: SDUPTHER

## 2018-10-01 RX ORDER — LEVOCETIRIZINE DIHYDROCHLORIDE 5 MG/1
TABLET, FILM COATED ORAL
Qty: 30 TABLET | Refills: 0 | Status: SHIPPED | OUTPATIENT
Start: 2018-10-01 | End: 2018-10-31 | Stop reason: SDUPTHER

## 2018-10-31 DIAGNOSIS — J06.9 VIRAL URI WITH COUGH: ICD-10-CM

## 2018-10-31 RX ORDER — LEVOCETIRIZINE DIHYDROCHLORIDE 5 MG/1
TABLET, FILM COATED ORAL
Qty: 30 TABLET | Refills: 0 | Status: ON HOLD | OUTPATIENT
Start: 2018-10-31 | End: 2021-10-04 | Stop reason: CLARIF

## 2018-11-25 DIAGNOSIS — J06.9 VIRAL URI WITH COUGH: ICD-10-CM

## 2018-11-26 RX ORDER — FLUTICASONE PROPIONATE 50 MCG
SPRAY, SUSPENSION (ML) NASAL
Qty: 16 ML | Refills: 0 | Status: SHIPPED | OUTPATIENT
Start: 2018-11-26 | End: 2022-11-17

## 2018-12-19 ENCOUNTER — TELEPHONE (OUTPATIENT)
Dept: FAMILY MEDICINE | Facility: CLINIC | Age: 63
End: 2018-12-19

## 2018-12-19 DIAGNOSIS — F13.20 BENZODIAZEPINE DEPENDENCE: Chronic | ICD-10-CM

## 2018-12-19 DIAGNOSIS — F41.9 ANXIETY: ICD-10-CM

## 2018-12-19 DIAGNOSIS — F32.A DEPRESSION, UNSPECIFIED DEPRESSION TYPE: Chronic | ICD-10-CM

## 2018-12-19 RX ORDER — ALPRAZOLAM 2 MG/1
2 TABLET ORAL NIGHTLY PRN
Qty: 30 TABLET | Refills: 2 | Status: SHIPPED | OUTPATIENT
Start: 2018-12-19 | End: 2019-02-13 | Stop reason: SDUPTHER

## 2018-12-19 NOTE — TELEPHONE ENCOUNTER
Notified [facility] and spoke with Samra. Informed her that provider wants Xanax 2 mg one tablet nightly

## 2018-12-19 NOTE — TELEPHONE ENCOUNTER
----- Message from Ema Rea sent at 12/19/2018  2:23 PM CST -----  Contact: [facility] /[phone]  [facility] need clarification on  ALPRAZolam (XANAX) 2 MG Tab, 2 different directions were sent over.  Thank you

## 2019-02-13 ENCOUNTER — OFFICE VISIT (OUTPATIENT)
Dept: FAMILY MEDICINE | Facility: CLINIC | Age: 64
End: 2019-02-13
Payer: MEDICARE

## 2019-02-13 VITALS
OXYGEN SATURATION: 97 % | WEIGHT: 221.56 LBS | TEMPERATURE: 98 F | HEART RATE: 75 BPM | BODY MASS INDEX: 31.02 KG/M2 | DIASTOLIC BLOOD PRESSURE: 78 MMHG | SYSTOLIC BLOOD PRESSURE: 132 MMHG | HEIGHT: 71 IN

## 2019-02-13 DIAGNOSIS — F13.20 BENZODIAZEPINE DEPENDENCE: Chronic | ICD-10-CM

## 2019-02-13 DIAGNOSIS — F41.9 ANXIETY: ICD-10-CM

## 2019-02-13 DIAGNOSIS — M47.812 FACET ARTHRITIS OF CERVICAL REGION: ICD-10-CM

## 2019-02-13 DIAGNOSIS — I10 ESSENTIAL HYPERTENSION: ICD-10-CM

## 2019-02-13 DIAGNOSIS — F32.A DEPRESSION, UNSPECIFIED DEPRESSION TYPE: Chronic | ICD-10-CM

## 2019-02-13 DIAGNOSIS — E78.2 MIXED HYPERLIPIDEMIA: ICD-10-CM

## 2019-02-13 DIAGNOSIS — J06.9 VIRAL UPPER RESPIRATORY TRACT INFECTION: Primary | ICD-10-CM

## 2019-02-13 PROCEDURE — 99214 OFFICE O/P EST MOD 30 MIN: CPT | Mod: S$PBB,,, | Performed by: PHYSICIAN ASSISTANT

## 2019-02-13 PROCEDURE — 99999 PR PBB SHADOW E&M-EST. PATIENT-LVL III: ICD-10-PCS | Mod: PBBFAC,,, | Performed by: PHYSICIAN ASSISTANT

## 2019-02-13 PROCEDURE — 99213 OFFICE O/P EST LOW 20 MIN: CPT | Mod: PBBFAC,PO | Performed by: PHYSICIAN ASSISTANT

## 2019-02-13 PROCEDURE — 99499 UNLISTED E&M SERVICE: CPT | Mod: S$PBB,,, | Performed by: PHYSICIAN ASSISTANT

## 2019-02-13 PROCEDURE — 99499 RISK ADDL DX/OHS AUDIT: ICD-10-PCS | Mod: S$PBB,,, | Performed by: PHYSICIAN ASSISTANT

## 2019-02-13 PROCEDURE — 99214 PR OFFICE/OUTPT VISIT, EST, LEVL IV, 30-39 MIN: ICD-10-PCS | Mod: S$PBB,,, | Performed by: PHYSICIAN ASSISTANT

## 2019-02-13 PROCEDURE — 99999 PR PBB SHADOW E&M-EST. PATIENT-LVL III: CPT | Mod: PBBFAC,,, | Performed by: PHYSICIAN ASSISTANT

## 2019-02-13 RX ORDER — ALPRAZOLAM 2 MG/1
2 TABLET ORAL NIGHTLY PRN
Qty: 30 TABLET | Refills: 2 | Status: SHIPPED | OUTPATIENT
Start: 2019-02-13 | End: 2019-02-13

## 2019-02-13 RX ORDER — ALPRAZOLAM 2 MG/1
2 TABLET ORAL NIGHTLY PRN
Qty: 30 TABLET | Refills: 2 | Status: SHIPPED | OUTPATIENT
Start: 2019-02-13 | End: 2019-04-11 | Stop reason: SDUPTHER

## 2019-02-13 RX ORDER — PROMETHAZINE HYDROCHLORIDE AND DEXTROMETHORPHAN HYDROBROMIDE 6.25; 15 MG/5ML; MG/5ML
5 SYRUP ORAL EVERY 6 HOURS PRN
Qty: 118 ML | Refills: 0 | Status: SHIPPED | OUTPATIENT
Start: 2019-02-13 | End: 2019-02-20

## 2019-02-13 RX ORDER — ALPRAZOLAM 2 MG/1
2 TABLET ORAL NIGHTLY PRN
Qty: 30 TABLET | Refills: 2 | Status: CANCELLED | OUTPATIENT
Start: 2019-02-13

## 2019-02-13 RX ORDER — VERAPAMIL HYDROCHLORIDE 180 MG/1
CAPSULE, EXTENDED RELEASE ORAL
COMMUNITY
Start: 2019-01-21 | End: 2019-10-04 | Stop reason: SDUPTHER

## 2019-02-13 NOTE — PROGRESS NOTES
Patient Name: Butch Mcdaniel    : 1955  MRN: [id number]    Subjective:  Butch is a 63 y.o. male who presents today for:    Chief Complaint   Patient presents with    Nasal Congestion    Cough       HPI  Patient has multiple medical diagnoses as listed below in the history. Patient is new to me.  He complains of congestion and cough for approximately one week. It is a nonproductive cough. He describes the congestion as being in his sinus area and causing pressure and neck ache. Associated symptoms include rhinorrhea and post nasal drip causing an irritated throat. He has only taken Zyrtec which he denies relief. He denies any exacerbating factors. He denies fever, chills, fatigue, dyspnea, wheezing or chest pain.     Past Medical History    Past Medical History:   Diagnosis Date    Anxiety     Cataract, bilateral 2018    Degenerative disc disease     Depression     Diabetes mellitus type II, controlled     ETOH abuse     Eye injury as a child    stick hit eye ? eye, hit in [place] due to boxing    Hyperlipidemia     Hypertension     MRSA (methicillin resistant Staphylococcus aureus)        Past Surgical History  Past Surgical History:   Procedure Laterality Date    APPENDECTOMY      COLONOSCOPY N/A 5/10/2017    Performed by Shantanu Marley [facility] at [facility]       Family History  Family History   Problem Relation Age of Onset    Heart disease Mother     Heart disease Father     Alcohol abuse Father     Diabetes Son     No Known Problems Sister     No Known Problems Brother     No Known Problems Maternal Aunt     No Known Problems Maternal Uncle     No Known Problems Paternal Aunt     No Known Problems Paternal Uncle     No Known Problems Maternal Grandmother     No Known Problems Maternal Grandfather     No Known Problems Paternal Grandmother     No Known Problems Paternal Grandfather     Amblyopia Neg Hx     Blindness Neg Hx     Cancer Neg Hx     Cataracts Neg Hx      Glaucoma Neg Hx     Hypertension Neg Hx     Macular degeneration Neg Hx     Retinal detachment Neg Hx     Strabismus Neg Hx     Stroke Neg Hx     Thyroid disease Neg Hx        Social History  Social History     Socioeconomic History    Marital status:      Spouse name: Not on file    Number of children: Not on file    Years of education: Not on file    Highest education level: Not on file   Social Needs    Financial resource strain: Not on file    Food insecurity - worry: Not on file    Food insecurity - inability: Not on file    Transportation needs - medical: Not on file    Transportation needs - non-medical: Not on file   Occupational History    Occupation: retired -    Tobacco Use    Smoking status: Current Every Day Smoker     Packs/day: 0.50     Years: 32.00     Pack years: 16.00     Types: Cigarettes     Last attempt to quit: 3/4/2013     Years since quittin.9    Smokeless tobacco: Never Used   Substance and Sexual Activity    Alcohol use: No     Alcohol/week: 0.0 oz    Drug use: No    Sexual activity: Yes     Partners: Female     Comment:  with children, disabled    Other Topics Concern    Not on file   Social History Narrative    Not on file       Current Medications  Current Outpatient Medications on File Prior to Visit   Medication Sig Dispense Refill    albuterol 90 mcg/actuation inhaler Inhale 2 puffs into the lungs 2 (two) times daily as needed for Wheezing. Rescue 18 g 0    albuterol 90 mcg/actuation inhaler Inhale 2 puffs into the lungs every 6 (six) hours as needed for Wheezing. Rescue 18 g 0    ALPRAZolam (XANAX) 2 MG Tab Take 1 tablet (2 mg total) by mouth nightly as needed. TAKE 2 TABLETS BY MOUTH AT night (taking buspirone IN THE MORNING) 30 tablet 2    atorvastatin (LIPITOR) 40 MG tablet Take 1 tablet (40 mg total) by mouth once daily. For cholesterol 30 tablet 5    busPIRone (BUSPAR) 15 MG tablet Take 1 tablet (15 mg  total) by mouth once daily. 30 tablet 11    citalopram (CELEXA) 20 MG tablet Take 1 tablet (20 mg total) by mouth once daily. 90 tablet 3    cloNIDine (CATAPRES) 0.2 MG tablet Take 1 tablet (0.2 mg total) by mouth 2 (two) times daily. 60 tablet 6    fentaNYL (DURAGESIC) 25 mcg/hr   0    fish oil-omega-3 fatty acids 300-1,000 mg capsule Take 2 capsules (2 g total) by mouth once daily. 60 capsule 5    fluticasone (FLONASE) 50 mcg/actuation nasal spray INHALE 1 SPRAY IN EACH NOSTRIL EVERY DAY 16 mL 0    gabapentin (NEURONTIN) 300 MG capsule TAKE 2 CAPSULES BY MOUTH 3 TIMES A  capsule 6    hydrocodone-acetaminophen 10-325mg (NORCO)  mg Tab       inhalation spacing device Use as directed for inhalation. 1 each 0    levocetirizine (XYZAL) 5 MG tablet TAKE 1 TABLET(5 MG) BY MOUTH EVERY EVENING 30 tablet 0    lisinopril (PRINIVIL,ZESTRIL) 40 MG tablet Take 1 tablet (40 mg total) by mouth once daily. 30 tablet 6    metFORMIN (GLUCOPHAGE) 500 MG tablet Take 1 tablet (500 mg total) by mouth daily with breakfast. 30 tablet 6    POLYETHYLENE GLYCOL 3350 (MIRALAX ORAL) Take 1 application by mouth.      tadalafil (CIALIS) 20 MG Tab Take 1 tablet (20 mg total) by mouth once daily. 10 tablet 10    verapamil (CALAN-SR) 180 MG CR tablet Take 1 tablet (180 mg total) by mouth 2 (two) times daily. 60 tablet 6    verapamil (VERELAN) 180 MG C24P       aspirin (ECOTRIN) 81 MG EC tablet Take 1 tablet (81 mg total) by mouth once daily.  0     No current facility-administered medications on file prior to visit.        Allergies   Review of patient's allergies indicates:   Allergen Reactions    Buspar [buspirone] Other (See Comments)     Hypotension with flexeril         ROS  Review of Systems   Constitutional: Negative for chills, fatigue and fever.   HENT: Positive for congestion, postnasal drip, sinus pressure and sore throat. Negative for ear pain, rhinorrhea, sinus pain and sneezing.    Respiratory: Positive  "for cough. Negative for chest tightness, shortness of breath and wheezing.    Cardiovascular: Negative for chest pain and palpitations.   Gastrointestinal: Negative for abdominal pain and nausea.   Musculoskeletal: Negative for arthralgias and myalgias.   Skin: Negative for pallor and rash.   Neurological: Negative for light-headedness and headaches.   Hematological: Negative for adenopathy.   Psychiatric/Behavioral: Negative for sleep disturbance. The patient is not nervous/anxious.          Objective:    /78   Pulse 75   Temp 98.4 °F (36.9 °C) (Oral)   Ht 5' 11" (1.803 m)   Wt 100.5 kg (221 lb 9 oz)   SpO2 97%   BMI 30.90 kg/m²     Physical Exam   Constitutional: He is oriented to person, place, and time. Vital signs are normal. He appears well-developed.   HENT:   Head: Normocephalic.   Right Ear: Hearing, tympanic membrane, external ear and ear canal normal. Tympanic membrane is not erythematous and not bulging. No middle ear effusion.   Left Ear: Hearing, tympanic membrane, external ear and ear canal normal. Tympanic membrane is not erythematous and not bulging.  No middle ear effusion.   Nose: Mucosal edema and rhinorrhea present. Right sinus exhibits no maxillary sinus tenderness and no frontal sinus tenderness. Left sinus exhibits no maxillary sinus tenderness and no frontal sinus tenderness.   Mouth/Throat: Uvula is midline and mucous membranes are normal. Posterior oropharyngeal edema and posterior oropharyngeal erythema present.   Eyes: Conjunctivae, EOM and lids are normal. Pupils are equal, round, and reactive to light.   Neck: Normal range of motion. Neck supple. Carotid bruit is not present. No thyroid mass present.   Cardiovascular: Regular rhythm, S2 normal and normal heart sounds. Exam reveals no gallop and no friction rub.   No murmur heard.  Pulses:       Radial pulses are 2+ on the right side, and 2+ on the left side.        Dorsalis pedis pulses are 2+ on the right side, and 2+ on " the left side.   Pulmonary/Chest: Effort normal and breath sounds normal. He has no wheezes. He has no rhonchi. He has no rales.   Abdominal: Normal appearance and bowel sounds are normal. There is no tenderness.   Lymphadenopathy:     He has no cervical adenopathy.        Right: No supraclavicular adenopathy present.        Left: No supraclavicular adenopathy present.   Neurological: He is alert and oriented to person, place, and time.   Skin: Skin is warm, dry and intact. No rash noted.   Psychiatric: He has a normal mood and affect. Judgment normal.       Assessment/Plan:  Butch Mcdaniel is a 63 y.o. male who presents today for :    Butch was seen today for nasal congestion and cough.    Diagnoses and all orders for this visit:    Viral upper respiratory tract infection  -     promethazine-dextromethorphan (PROMETHAZINE-DM) 6.25-15 mg/5 mL Syrp; Take 5 mLs by mouth every 6 (six) hours as needed.  Advised patient to seek urgent/emergent care if symptoms intensify in the setting of fever body aches and/or dyspnea  Sent patient with informational material about diagnosis  Counseled regarding treatment of likely upper respiratory viral syndrome with increased fluid intake/hydration, OTC decongestants, mucolytic therapy and analgesics (e.g., ibuprofen, acetaminophen). Encouraged to call/return to clinic if symptoms persist/worsening beyond 48-72 hours approximately.  Patient gave verbal understanding and agreement of plan      Problem list issues addresses during this visit    Essential hypertension  chronic and stable as reviewed in record, controlled with medication   followed by PCP  Continue current treatment plan      Mixed hyperlipidemia  chronic and stable as reviewed in record, controlled with medication   followed by PCP  Continue current treatment plan      Uncontrolled type 2 diabetes mellitus without complication, without long-term current use of insulin  chronic  as reviewed in record, on  home metformin  followed by PCP  Continue current treatment plan      Facet arthritis of cervical region  chronic and stable as reviewed in record, controlled with medication   followed by PCP  Continue current treatment plan      Benzodiazepine dependence, did not do well with attempt to wean down 2017  Currently taking Xanax, followed by PCP           Health maintenance reviewed and disussed, deferred at this time due to acute illness           Encouraged to call/return to clinic if symptoms persist or worsen    Namrata Serna PA-C  [facility]/ Internal Med/ Peds

## 2019-02-13 NOTE — PATIENT INSTRUCTIONS
Viral Upper Respiratory Illness (Adult)  You have a viral upper respiratory illness (URI), which is another term for the common cold. This illness is contagious during the first few days. It is spread through the air by coughing and sneezing. It may also be spread by direct contact (touching the sick person and then touching your own eyes, nose, or mouth). Frequent handwashing will decrease risk of spread. Most viral illnesses go away within 7 to 10 days with rest and simple home remedies. Sometimes the illness may last for several weeks. Antibiotics will not kill a virus, and they are generally not prescribed for this condition.    Home care  · If symptoms are severe, rest at home for the first 2 to 3 days. When you resume activity, don't let yourself get too tired.  · Avoid being exposed to cigarette smoke (yours or others).  · You may use acetaminophen or ibuprofen to control pain and fever, unless another medicine was prescribed. (Note: If you have chronic liver or kidney disease, have ever had a stomach ulcer or gastrointestinal bleeding, or are taking blood-thinning medicines, talk with your healthcare provider before using these medicines.) Aspirin should never be given to anyone under 18 years of age who is ill with a viral infection or fever. It may cause severe liver or brain damage.  · Your appetite may be poor, so a light diet is fine. Avoid dehydration by drinking 6 to 8 glasses of fluids per day (water, soft drinks, juices, tea, or soup). Extra fluids will help loosen secretions in the nose and lungs.  · Over-the-counter cold medicines will not shorten the length of time youre sick, but they may be helpful for the following symptoms: cough, sore throat, and nasal and sinus congestion. (Note: Do not use decongestants if you have high blood pressure.)  Follow-up care  Follow up with your healthcare provider, or as advised.  When to seek medical advice  Call your healthcare provider right away if any  of these occur:  · Cough with lots of colored sputum (mucus)  · Severe headache; face, neck, or ear pain  · Difficulty swallowing due to throat pain  · Fever of 100.4°F (38°C)  Call 911, or get immediate medical care  Call emergency services right away if any of these occur:  · Chest pain, shortness of breath, wheezing, or difficulty breathing  · Coughing up blood  · Inability to swallow due to throat pain  Date Last Reviewed: 9/13/2015  © 8105-0747 [employer]. [address] [place] [id number]. All rights reserved. This information is not intended as a substitute for professional medical care. Always follow your healthcare professional's instructions.

## 2019-02-13 NOTE — ASSESSMENT & PLAN NOTE
chronic and stable as reviewed in record, controlled with medication   followed by PCP  Continue current treatment plan

## 2019-02-13 NOTE — TELEPHONE ENCOUNTER
Take 1 tablet (2 mg total) by mouth nightly as needed. TAKE 2 TABLETS BY MOUTH AT night (taking buspirone IN THE MORNING        Has 2 different sigs      ----- Message from Shannon Mancilla sent at 2/13/2019  1:38 PM CST -----  Contact: [facility]/ [phone]  Pharmacist calling to verify directions for RX of XANAX. Please call to advise. Thank you.  .  [facility] - [place], [place] - [address]  [address] [facility] [place] [address]  Phone: [phone] Fax: [phone]

## 2019-02-13 NOTE — TELEPHONE ENCOUNTER
Patient was going to [facility] pharmacy.  Needs his xanax refilled at a new pharmacy.  Told him its not due till the 19th and he stated please send it when its due.

## 2019-02-13 NOTE — ASSESSMENT & PLAN NOTE
chronic  as reviewed in record, on home metformin  followed by PCP  Continue current treatment plan

## 2019-03-21 DIAGNOSIS — E78.2 MIXED HYPERLIPIDEMIA: ICD-10-CM

## 2019-03-21 RX ORDER — ATORVASTATIN CALCIUM 40 MG/1
TABLET, FILM COATED ORAL
Qty: 30 TABLET | Refills: 0 | Status: SHIPPED | OUTPATIENT
Start: 2019-03-21 | End: 2019-05-03 | Stop reason: SDUPTHER

## 2019-04-02 ENCOUNTER — LAB VISIT (OUTPATIENT)
Dept: LAB | Facility: HOSPITAL | Age: 64
End: 2019-04-02
Attending: INTERNAL MEDICINE
Payer: MEDICARE

## 2019-04-02 DIAGNOSIS — Z12.5 SCREENING FOR PROSTATE CANCER: ICD-10-CM

## 2019-04-02 DIAGNOSIS — E11.9 CONTROLLED TYPE 2 DIABETES MELLITUS WITHOUT COMPLICATION, WITHOUT LONG-TERM CURRENT USE OF INSULIN: ICD-10-CM

## 2019-04-02 LAB
ALBUMIN SERPL BCP-MCNC: 4.1 G/DL (ref 3.5–5.2)
ALP SERPL-CCNC: 59 U/L (ref 55–135)
ALT SERPL W/O P-5'-P-CCNC: 35 U/L (ref 10–44)
ANION GAP SERPL CALC-SCNC: 8 MMOL/L (ref 8–16)
AST SERPL-CCNC: 26 U/L (ref 10–40)
BILIRUB SERPL-MCNC: 1.1 MG/DL (ref 0.1–1)
BUN SERPL-MCNC: 15 MG/DL (ref 8–23)
CALCIUM SERPL-MCNC: 9.5 MG/DL (ref 8.7–10.5)
CHLORIDE SERPL-SCNC: 105 MMOL/L (ref 95–110)
CHOLEST SERPL-MCNC: 106 MG/DL (ref 120–199)
CHOLEST/HDLC SERPL: 3.9 {RATIO} (ref 2–5)
CO2 SERPL-SCNC: 24 MMOL/L (ref 23–29)
COMPLEXED PSA SERPL-MCNC: 0.17 NG/ML (ref 0–4)
CREAT SERPL-MCNC: 1.2 MG/DL (ref 0.5–1.4)
EST. GFR  (AFRICAN AMERICAN): >60 ML/MIN/1.73 M^2
EST. GFR  (NON AFRICAN AMERICAN): >60 ML/MIN/1.73 M^2
ESTIMATED AVG GLUCOSE: 163 MG/DL (ref 68–131)
GLUCOSE SERPL-MCNC: 167 MG/DL (ref 70–110)
HBA1C MFR BLD HPLC: 7.3 % (ref 4–5.6)
HDLC SERPL-MCNC: 27 MG/DL (ref 40–75)
HDLC SERPL: 25.5 % (ref 20–50)
LDLC SERPL CALC-MCNC: 56.6 MG/DL (ref 63–159)
NONHDLC SERPL-MCNC: 79 MG/DL
POTASSIUM SERPL-SCNC: 4.6 MMOL/L (ref 3.5–5.1)
PROT SERPL-MCNC: 7.4 G/DL (ref 6–8.4)
SODIUM SERPL-SCNC: 137 MMOL/L (ref 136–145)
TRIGL SERPL-MCNC: 112 MG/DL (ref 30–150)

## 2019-04-02 PROCEDURE — 80053 COMPREHEN METABOLIC PANEL: CPT

## 2019-04-02 PROCEDURE — 36415 COLL VENOUS BLD VENIPUNCTURE: CPT | Mod: PO

## 2019-04-02 PROCEDURE — 84153 ASSAY OF PSA TOTAL: CPT

## 2019-04-02 PROCEDURE — 80061 LIPID PANEL: CPT

## 2019-04-02 PROCEDURE — 83036 HEMOGLOBIN GLYCOSYLATED A1C: CPT

## 2019-04-02 NOTE — PROGRESS NOTES
This note was created by combination of typed  and Dragon dictation.  Transcription errors may be present.  If there are any questions, please contact me.    Assessment & Plan:   Uncontrolled type 2 diabetes mellitus without complication, without long-term current use of insulin  -A1c higher than ideal.  Previously had been better.  Needs to work on diet modification.  For now no change.  Future labs ordered.  -     Comprehensive metabolic panel; Future; Expected date: 09/30/2019  -     Lipid panel; Future; Expected date: 09/30/2019  -     Hemoglobin A1c; Future; Expected date: 09/30/2019  -     Microalbumin/creatinine urine ratio; Future; Expected date: 09/30/2019    Mixed hyperlipidemia  -stable on Lipitor 40.  Pre visit labs stable.  Possible side effects of statin myalgias.  We talked about taking over-the-counter vitamin-D supplement 1000 units.  If still no improvement consider coenzyme Q10.    Anxiety, unable to wean off BZD  Depression, unspecified depression type  Benzodiazepine dependence, did not do well with attempt to wean down 2017  -stable on citalopram, BuSpar in the mornings, Xanax at night.  No change.    DDD (degenerative disc disease), cervical  Other chronic pain  -managed by pain management.  Gabapentin was not really helping so we talked about stopping this.    Essential hypertension  -stable, on verapamil, clonidine, lisinopril    Need for shingles vaccine  -prescription to pharmacy  -     varicella-zoster gE-AS01B, PF, (SHINGRIX, PF,) 50 mcg/0.5 mL injection; Inject 0.5 mLs into the muscle once. Repeat in 2 months for 1 dose  Dispense: 0.5 mL; Refill: 1    Tobacco dependence, patch with irritation; hx of bupropion  -recalls that bupropion had been helpful in the past.  He would be willing to rechallenge.  Prescription sent to pharmacy.  -     Discontinue: buPROPion (WELLBUTRIN SR) 150 MG TBSR 12 hr tablet; Take 1 tablet (150 mg total) by mouth 2 (two) times daily.  Dispense: 60  tablet; Refill: 5  -     buPROPion (WELLBUTRIN SR) 150 MG TBSR 12 hr tablet; Take 1 tablet (150 mg total) by mouth 2 (two) times daily.  Dispense: 60 tablet; Refill: 5        Medications Discontinued During This Encounter   Medication Reason    verapamil (CALAN-SR) 180 MG CR tablet Duplicate Order    gabapentin (NEURONTIN) 300 MG capsule Therapy completed    buPROPion (WELLBUTRIN SR) 150 MG TBSR 12 hr tablet Reorder       meds sent this encounter:  Medications Ordered This Encounter   Medications    buPROPion (WELLBUTRIN SR) 150 MG TBSR 12 hr tablet     Sig: Take 1 tablet (150 mg total) by mouth 2 (two) times daily.     Dispense:  60 tablet     Refill:  5    varicella-zoster gE-AS01B, PF, (SHINGRIX, PF,) 50 mcg/0.5 mL injection     Sig: Inject 0.5 mLs into the muscle once. Repeat in 2 months for 1 dose     Dispense:  0.5 mL     Refill:  1       Follow Up: No follow-ups on file.  Follow-up 6 months, follow up on smoking on bupropion; follow-up off of gabapentin; follow-up myalgias on vitamin-D    Subjective:     Chief Complaint   Patient presents with    Hyperlipidemia    Hypertension       DIPESH Rae is a 63 y.o. male, last appointment with this clinic was 2/13/2019.    Previous labs A1c acceptable  Depression with benzodiazepine dependence.  Unable to wean off of benzodiazepine.  Xanax 2 mg at night.  BuSpar during day    Charley horses in the legs.  Especially during sleep but sometimes during the day.  A sensation of cramping.  One leg or the other.  But not the same time.  He is taking a statin and I wonder if this is statin myalgias.    Not really finding the gabapentin helpful. Takes 2 maybe daily. Does not really find it helpful for anxiety or for his chronic pain.     We reviewed his labs.  A1c was a bit high.  He knows he can work on dietary modification.    Reports compliance on medications.  Feels like overall his mood is controlled on current regimen.  BuSpar in the morning, Xanax at night,  Celexa.    On review of systems, sounds like a gets a bit orthostatic, with sudden position change from sitting to standing he gets lightheaded.  Otherwise no issues.  No chest pain, shortness of breath    Smoker, had tried patches with irritation.  Previously had tried bupropion and thinks that it was helpful and would be willing to rechallenge.    Patient Care Team:  Gabriel Evans MD as PCP - General (Internal Medicine)  Gabriel Evans MD as PCP - MSSP Attributed  Vlad Nava MD (Pain Medicine)  Kaila Carrillo OD as Consulting Physician (Optometry)    Patient Active Problem List    Diagnosis Date Noted    Cataract, bilateral 09/11/2018    Cervical stenosis of spinal canal 05/07/2018 02/14/2018 MRI C-spine impression:  Slight retrolisthesis of C4 with respect to C5.  Narrowing of the central spinal canal most prominent at the C4-C5, C5-C6, and C6-C7 levels and to a lesser extent at C2-C3 and C3-C4.  Annular disc bulges posteriorly at C2-C3, C3-C4.  Diffuse disc herniation/protrusion posteriorly at C4-C5 level and a disc herniation/extrusion posteriorly at the C5-C6 level with a central disc herniation/protrusion posteriorly at the C6-C7 level.  Narrowing of the neural foramen bilaterally from C3-C4 through C6-C7.      Tubular adenoma of colon 5/10/17 05/10/2017     5/10/2017 colonoscopy tubular adenoma      Therapeutic opioid-induced constipation (OIC) 04/17/2017    Tobacco dependence, hx of bupropion 01/12/2016    Non morbid obesity due to excess calories 01/12/2016    Uncontrolled type 2 diabetes mellitus without complication, without long-term current use of insulin 08/07/2015    Facet arthritis of cervical region 10/28/2013    Facet arthritis of lumbar region 10/28/2013    Benzodiazepine dependence, did not do well with attempt to wean down 2017 10/28/2013    Opioid dependence, pain mgmt 10/28/2013    ED (erectile dysfunction) 07/26/2013    DDD (degenerative disc disease), cervical 05/23/2013     DDD (degenerative disc disease), lumbar 2013    Essential hypertension 2012    Mixed hyperlipidemia 2012    Anemia 2012    Chronic pain 2012    Anxiety, unable to wean off BZD 2012    Depression 2012       PAST MEDICAL HISTORY:  Past Medical History:   Diagnosis Date    Anxiety     Cataract, bilateral 2018    Degenerative disc disease     Depression     Diabetes mellitus type II, controlled     ETOH abuse     Eye injury as a child    stick hit eye ? eye, hit in ou due to boxing    Hyperlipidemia     Hypertension     MRSA (methicillin resistant Staphylococcus aureus)        PAST SURGICAL HISTORY:  Past Surgical History:   Procedure Laterality Date    APPENDECTOMY      COLONOSCOPY N/A 5/10/2017    Performed by [facility]antanu [facility]arlye [facility] at [facility] END[facility]       SOCIAL HISTORY:  Social History     Socioeconomic History    Marital status:      Spouse name: Not on file    Number of children: Not on file    Years of education: Not on file    Highest education level: Not on file   Occupational History    Occupation: retired -    Social Needs    Financial resource strain: Not on file    Food insecurity:     Worry: Not on file     Inability: Not on file    Transportation needs:     Medical: Not on file     Non-medical: Not on file   Tobacco Use    Smoking status: Current Every Day Smoker     Packs/day: 0.50     Years: 32.00     Pack years: 16.00     Types: Cigarettes     Last attempt to quit: 3/4/2013     Years since quittin.0    Smokeless tobacco: Never Used   Substance and Sexual Activity    Alcohol use: No     Alcohol/week: 0.0 oz    Drug use: No    Sexual activity: Yes     Partners: Female     Comment:  with children, disabled    Lifestyle    Physical activity:     Days per week: Not on file     Minutes per session: Not on file    Stress: Very much   Relationships    Social connections:     Talks on  phone: Not on file     Gets together: Not on file     Attends Yarsani service: Not on file     Active member of club or organization: Not on file     Attends meetings of clubs or organizations: Not on file     Relationship status: Not on file   Other Topics Concern    Not on file   Social History Narrative    Not on file       ALLERGIES AND MEDICATIONS: updated and reviewed.  Review of patient's allergies indicates:   Allergen Reactions    Buspar [buspirone] Other (See Comments)     Hypotension with flexeril     Current Outpatient Medications   Medication Sig Dispense Refill    albuterol 90 mcg/actuation inhaler Inhale 2 puffs into the lungs 2 (two) times daily as needed for Wheezing. Rescue 18 g 0    albuterol 90 mcg/actuation inhaler Inhale 2 puffs into the lungs every 6 (six) hours as needed for Wheezing. Rescue 18 g 0    ALPRAZolam (XANAX) 2 MG Tab Take 1 tablet (2 mg total) by mouth nightly as needed. 1 tablet at night; taking buspirone IN THE MORNING 30 tablet 2    aspirin (ECOTRIN) 81 MG EC tablet Take 1 tablet (81 mg total) by mouth once daily.  0    atorvastatin (LIPITOR) 40 MG tablet TAKE 1 TABLET BY MOUTH EVERY DAY FOR CHOLESTEROL 30 tablet 0    busPIRone (BUSPAR) 15 MG tablet Take 1 tablet (15 mg total) by mouth once daily. 30 tablet 11    citalopram (CELEXA) 20 MG tablet Take 1 tablet (20 mg total) by mouth once daily. 90 tablet 3    cloNIDine (CATAPRES) 0.2 MG tablet Take 1 tablet (0.2 mg total) by mouth 2 (two) times daily. 60 tablet 6    fentaNYL (DURAGESIC) 25 mcg/hr   0    fish oil-omega-3 fatty acids 300-1,000 mg capsule Take 2 capsules (2 g total) by mouth once daily. 60 capsule 5    fluticasone (FLONASE) 50 mcg/actuation nasal spray INHALE 1 SPRAY IN EACH NOSTRIL EVERY DAY 16 mL 0    gabapentin (NEURONTIN) 300 MG capsule TAKE 2 CAPSULES BY MOUTH 3 TIMES A  capsule 6    hydrocodone-acetaminophen 10-325mg (NORCO)  mg Tab       inhalation spacing device Use as  "directed for inhalation. 1 each 0    levocetirizine (XYZAL) 5 MG tablet TAKE 1 TABLET(5 MG) BY MOUTH EVERY EVENING 30 tablet 0    lisinopril (PRINIVIL,ZESTRIL) 40 MG tablet Take 1 tablet (40 mg total) by mouth once daily. 30 tablet 6    metFORMIN (GLUCOPHAGE) 500 MG tablet Take 1 tablet (500 mg total) by mouth daily with breakfast. 30 tablet 6    POLYETHYLENE GLYCOL 3350 (MIRALAX ORAL) Take 1 application by mouth.      tadalafil (CIALIS) 20 MG Tab Take 1 tablet (20 mg total) by mouth once daily. 10 tablet 10    verapamil (VERELAN) 180 MG C24P        No current facility-administered medications for this visit.        Review of Systems   All other systems reviewed and are negative.      Objective:   Physical Exam   Vitals:    04/03/19 1329 04/03/19 1352   BP: (!) 140/90 130/70   BP Location:  Left arm   Patient Position:  Sitting   BP Method:  Large (Manual)   Pulse: 78    Temp: 98 °F (36.7 °C)    SpO2: 98%    Weight: 100.8 kg (222 lb 3.6 oz)    Height: 5' 11" (1.803 m)     Body mass index is 30.99 kg/m².  Weight: 100.8 kg (222 lb 3.6 oz)   Height: 5' 11" (180.3 cm)     Physical Exam   Constitutional: He is oriented to person, place, and time. He appears well-developed and well-nourished. No distress.   Eyes: EOM are normal.   Cardiovascular: Normal rate, regular rhythm and normal heart sounds.   No murmur heard.  Pulmonary/Chest: Effort normal and breath sounds normal.   Musculoskeletal: Normal range of motion. He exhibits no edema.   PTpulses 3+ bilaterally   Neurological: He is alert and oriented to person, place, and time. Coordination normal.   Skin: Skin is warm and dry.   Psychiatric: He has a normal mood and affect. His behavior is normal. Thought content normal.        Component      Latest Ref Rng & Units 4/2/2019 9/7/2018 5/2/2018   Sodium      136 - 145 mmol/L 137 140    Potassium      3.5 - 5.1 mmol/L 4.6 4.8    Chloride      95 - 110 mmol/L 105 105    CO2      23 - 29 mmol/L 24 26    Glucose      " 70 - 110 mg/dL 167 (H) 155 (H)    BUN, Bld      8 - 23 mg/dL 15 17    Creatinine      0.5 - 1.4 mg/dL 1.2 1.4    Calcium      8.7 - 10.5 mg/dL 9.5 9.7    Total Protein      6.0 - 8.4 g/dL 7.4 7.5    Albumin      3.5 - 5.2 g/dL 4.1 3.8    Total Bilirubin      0.1 - 1.0 mg/dL 1.1 (H) 1.0    Alkaline Phosphatase      55 - 135 U/L 59 65    AST      10 - 40 U/L 26 23    ALT      10 - 44 U/L 35 18    Anion Gap      8 - 16 mmol/L 8 9    eGFR if African American      >60 mL/min/1.73 m:2 >60.0 >60.0    eGFR if non African American      >60 mL/min/1.73 m:2 >60.0 52.7 (A)    Cholesterol      120 - 199 mg/dL 106 (L)  110 (L)   Triglycerides      30 - 150 mg/dL 112  155 (H)   HDL      40 - 75 mg/dL 27 (L)  28 (L)   LDL Cholesterol      63.0 - 159.0 mg/dL 56.6 (L)  51.0 (L)   HDL/Chol Ratio      20.0 - 50.0 % 25.5  25.5   Total Cholesterol/HDL Ratio      2.0 - 5.0 3.9  3.9   Non-HDL Cholesterol      mg/dL 79  82   Hemoglobin A1C External      4.0 - 5.6 % 7.3 (H) 7.0 (H)    Estimated Avg Glucose      68 - 131 mg/dL 163 (H) 154 (H)    PSA, SCREEN      0.00 - 4.00 ng/mL 0.17

## 2019-04-03 ENCOUNTER — OFFICE VISIT (OUTPATIENT)
Dept: FAMILY MEDICINE | Facility: CLINIC | Age: 64
End: 2019-04-03
Payer: MEDICARE

## 2019-04-03 VITALS
TEMPERATURE: 98 F | DIASTOLIC BLOOD PRESSURE: 70 MMHG | BODY MASS INDEX: 31.11 KG/M2 | WEIGHT: 222.25 LBS | SYSTOLIC BLOOD PRESSURE: 130 MMHG | HEIGHT: 71 IN | OXYGEN SATURATION: 98 % | HEART RATE: 78 BPM

## 2019-04-03 DIAGNOSIS — F32.A DEPRESSION, UNSPECIFIED DEPRESSION TYPE: Chronic | ICD-10-CM

## 2019-04-03 DIAGNOSIS — E78.2 MIXED HYPERLIPIDEMIA: ICD-10-CM

## 2019-04-03 DIAGNOSIS — Z23 NEED FOR SHINGLES VACCINE: ICD-10-CM

## 2019-04-03 DIAGNOSIS — M50.30 DDD (DEGENERATIVE DISC DISEASE), CERVICAL: Chronic | ICD-10-CM

## 2019-04-03 DIAGNOSIS — F41.9 ANXIETY: ICD-10-CM

## 2019-04-03 DIAGNOSIS — G89.29 OTHER CHRONIC PAIN: Chronic | ICD-10-CM

## 2019-04-03 DIAGNOSIS — F13.20 BENZODIAZEPINE DEPENDENCE: Chronic | ICD-10-CM

## 2019-04-03 DIAGNOSIS — F17.200 TOBACCO DEPENDENCE: Chronic | ICD-10-CM

## 2019-04-03 DIAGNOSIS — I10 ESSENTIAL HYPERTENSION: ICD-10-CM

## 2019-04-03 PROCEDURE — 99213 OFFICE O/P EST LOW 20 MIN: CPT | Mod: PBBFAC,PO | Performed by: INTERNAL MEDICINE

## 2019-04-03 PROCEDURE — 2024F 7 FLD RTA PHOTO EVC RTNOPTHY: CPT | Mod: ,,, | Performed by: INTERNAL MEDICINE

## 2019-04-03 PROCEDURE — 99214 PR OFFICE/OUTPT VISIT, EST, LEVL IV, 30-39 MIN: ICD-10-PCS | Mod: S$PBB,,, | Performed by: INTERNAL MEDICINE

## 2019-04-03 PROCEDURE — 99214 OFFICE O/P EST MOD 30 MIN: CPT | Mod: S$PBB,,, | Performed by: INTERNAL MEDICINE

## 2019-04-03 PROCEDURE — 99999 PR PBB SHADOW E&M-EST. PATIENT-LVL III: CPT | Mod: PBBFAC,,, | Performed by: INTERNAL MEDICINE

## 2019-04-03 PROCEDURE — 2024F PR 7 FIELD PHOTOS WITH INTERP/ REVIEW: ICD-10-PCS | Mod: ,,, | Performed by: INTERNAL MEDICINE

## 2019-04-03 PROCEDURE — 99999 PR PBB SHADOW E&M-EST. PATIENT-LVL III: ICD-10-PCS | Mod: PBBFAC,,, | Performed by: INTERNAL MEDICINE

## 2019-04-03 RX ORDER — BUPROPION HYDROCHLORIDE 150 MG/1
150 TABLET, EXTENDED RELEASE ORAL 2 TIMES DAILY
Qty: 60 TABLET | Refills: 5 | Status: SHIPPED | OUTPATIENT
Start: 2019-04-03 | End: 2019-04-03 | Stop reason: SDUPTHER

## 2019-04-03 RX ORDER — BUPROPION HYDROCHLORIDE 150 MG/1
150 TABLET, EXTENDED RELEASE ORAL 2 TIMES DAILY
Qty: 60 TABLET | Refills: 5 | Status: SHIPPED | OUTPATIENT
Start: 2019-04-03 | End: 2019-07-15 | Stop reason: SINTOL

## 2019-04-11 DIAGNOSIS — F13.20 BENZODIAZEPINE DEPENDENCE: Chronic | ICD-10-CM

## 2019-04-11 DIAGNOSIS — F32.A DEPRESSION, UNSPECIFIED DEPRESSION TYPE: Chronic | ICD-10-CM

## 2019-04-11 DIAGNOSIS — F41.9 ANXIETY: ICD-10-CM

## 2019-04-11 RX ORDER — ALPRAZOLAM 2 MG/1
TABLET ORAL
Qty: 30 TABLET | Refills: 0 | Status: SHIPPED | OUTPATIENT
Start: 2019-04-11 | End: 2019-08-29 | Stop reason: SDUPTHER

## 2019-05-03 DIAGNOSIS — F32.A DEPRESSION, UNSPECIFIED DEPRESSION TYPE: Chronic | ICD-10-CM

## 2019-05-03 DIAGNOSIS — F41.9 ANXIETY: ICD-10-CM

## 2019-05-03 DIAGNOSIS — E78.2 MIXED HYPERLIPIDEMIA: ICD-10-CM

## 2019-05-03 RX ORDER — ATORVASTATIN CALCIUM 40 MG/1
TABLET, FILM COATED ORAL
Qty: 90 TABLET | Refills: 1 | Status: SHIPPED | OUTPATIENT
Start: 2019-05-03 | End: 2019-08-02 | Stop reason: SDUPTHER

## 2019-05-03 RX ORDER — CITALOPRAM 20 MG/1
TABLET, FILM COATED ORAL
Qty: 90 TABLET | Refills: 1 | Status: SHIPPED | OUTPATIENT
Start: 2019-05-03 | End: 2019-09-08 | Stop reason: SDUPTHER

## 2019-05-04 DIAGNOSIS — F41.9 ANXIETY: ICD-10-CM

## 2019-05-05 RX ORDER — VERAPAMIL HYDROCHLORIDE 180 MG/1
TABLET, FILM COATED, EXTENDED RELEASE ORAL
Qty: 180 TABLET | Refills: 3 | Status: SHIPPED | OUTPATIENT
Start: 2019-05-05 | End: 2019-10-04 | Stop reason: SDUPTHER

## 2019-05-05 RX ORDER — BUSPIRONE HYDROCHLORIDE 15 MG/1
TABLET ORAL
Qty: 90 TABLET | Refills: 3 | Status: SHIPPED | OUTPATIENT
Start: 2019-05-05 | End: 2019-12-10 | Stop reason: SDUPTHER

## 2019-06-07 ENCOUNTER — TELEPHONE (OUTPATIENT)
Dept: FAMILY MEDICINE | Facility: CLINIC | Age: 64
End: 2019-06-07

## 2019-06-07 NOTE — TELEPHONE ENCOUNTER
----- Message from Jalen Jordan sent at 6/7/2019 10:56 AM CDT -----  Contact: Son from [facility] [phone]  Type:  Pharmacy Calling to Clarify an RX    Name of Caller: Son    Pharmacy Name: [facility]    Prescription Name: ALPRAZolam (XANAX) 2 MG Tab    What do they need to clarify? Son from [facility], called to speak to the staff, in regards to the directions for the patient's medication: ALPRAZolam (XANAX) 2 MG Tab    Best Call Back Number: [phone]

## 2019-06-07 NOTE — TELEPHONE ENCOUNTER
----- Message from Stephanie Boland sent at 6/7/2019 11:49 AM CDT -----  Contact: Self  Type: Patient Call Back    Who called:Butch    What is the request in detail: patient stated he received a summons for jury duty and needs a note. Note can be faxed to [phone]    Can the clinic reply by [facility]?    Would the patient rather a call back or a response via [facility]? Call    Best call back number: [phone]

## 2019-06-07 NOTE — TELEPHONE ENCOUNTER
Alprazolam to take as needed.  Take the wellbutrin (bupropion) twice a day scheduled. And buspar take daily.

## 2019-06-10 NOTE — TELEPHONE ENCOUNTER
He has chronic back pain, and anxiety. Last visit - anxiety was controlled on current regimen.    From my standpoint I don't have cause to give him jury duty note.  If he is trying to get out of duty because of back pain then he needs to get this from his orthopedist/pain management specialist.

## 2019-07-05 DIAGNOSIS — I10 ESSENTIAL HYPERTENSION: ICD-10-CM

## 2019-07-05 DIAGNOSIS — E11.9 CONTROLLED TYPE 2 DIABETES MELLITUS WITHOUT COMPLICATION, WITHOUT LONG-TERM CURRENT USE OF INSULIN: ICD-10-CM

## 2019-07-05 RX ORDER — CLONIDINE HYDROCHLORIDE 0.2 MG/1
TABLET ORAL
Qty: 60 TABLET | Refills: 2 | Status: SHIPPED | OUTPATIENT
Start: 2019-07-05 | End: 2019-10-04 | Stop reason: SDUPTHER

## 2019-07-05 RX ORDER — LISINOPRIL 40 MG/1
TABLET ORAL
Qty: 30 TABLET | Refills: 2 | Status: SHIPPED | OUTPATIENT
Start: 2019-07-05 | End: 2019-10-04 | Stop reason: SDUPTHER

## 2019-07-05 RX ORDER — METFORMIN HYDROCHLORIDE 500 MG/1
TABLET ORAL
Qty: 30 TABLET | Refills: 2 | Status: SHIPPED | OUTPATIENT
Start: 2019-07-05 | End: 2019-10-04

## 2019-07-15 ENCOUNTER — TELEPHONE (OUTPATIENT)
Dept: FAMILY MEDICINE | Facility: CLINIC | Age: 64
End: 2019-07-15

## 2019-07-15 DIAGNOSIS — F17.200 TOBACCO DEPENDENCE: Primary | Chronic | ICD-10-CM

## 2019-07-15 RX ORDER — IBUPROFEN 200 MG
1 TABLET ORAL DAILY
Qty: 30 PATCH | Refills: 2 | Status: SHIPPED | OUTPATIENT
Start: 2019-07-15 | End: 2019-10-08

## 2019-07-15 NOTE — TELEPHONE ENCOUNTER
Already taking celexa. And xanax and buspar. And wellbutrin.    Will taper off the wellbutrin - have him take 1 daily x 3 days, then 1 every other day x 4 days, then stop. stay on celexa and others.  Follow up with me in 1 month.

## 2019-07-15 NOTE — TELEPHONE ENCOUNTER
Spoke with pt requesting a medication changed. Pt states Wellbutrin is no longer effective. Also states he's having trouble remembering things due to the medication. Please advise.

## 2019-07-15 NOTE — TELEPHONE ENCOUNTER
----- Message from Myesha Christianson sent at 7/15/2019  8:05 AM CDT -----  Contact: NIVIA MARROQUIN [[id number]]  Name of Who is Calling: NIVIA MARROQUIN [[id number]]      What is the request in detail: Pt is requesting to speak with clinical staff.Pt is calling about meds to quit smoking.    Please contact to further discuss and advise.       Can the clinic reply by [facility]: N      What Number to Call Back [facility] not [facility]: [phone]

## 2019-07-15 NOTE — TELEPHONE ENCOUNTER
----- Message from Tamika Feliz sent at 7/15/2019  8:32 AM CDT -----  Contact: Patient ph [phone]  Type:  Patient Returning Call    Who Called: Patient    Who Left Message for Patient: Tangela    Does the patient know what this is regarding?: Problem with smoking    Would the patient rather a call back or a response via [facility]? Call back    Best Call Back Number: [phone]

## 2019-07-15 NOTE — TELEPHONE ENCOUNTER
rx sent for nicotine patch but he had issues with skin irritation with this in the past. If it recurs - stop using it.

## 2019-08-02 DIAGNOSIS — E78.2 MIXED HYPERLIPIDEMIA: ICD-10-CM

## 2019-08-02 RX ORDER — ATORVASTATIN CALCIUM 40 MG/1
TABLET, FILM COATED ORAL
Qty: 90 TABLET | Refills: 1 | Status: SHIPPED | OUTPATIENT
Start: 2019-08-02 | End: 2019-10-04 | Stop reason: SDUPTHER

## 2019-08-29 ENCOUNTER — TELEPHONE (OUTPATIENT)
Dept: FAMILY MEDICINE | Facility: CLINIC | Age: 64
End: 2019-08-29

## 2019-08-29 DIAGNOSIS — F13.20 BENZODIAZEPINE DEPENDENCE: Chronic | ICD-10-CM

## 2019-08-29 DIAGNOSIS — F32.A DEPRESSION, UNSPECIFIED DEPRESSION TYPE: Chronic | ICD-10-CM

## 2019-08-29 DIAGNOSIS — F41.9 ANXIETY: ICD-10-CM

## 2019-08-29 RX ORDER — ALPRAZOLAM 2 MG/1
TABLET ORAL
Qty: 30 TABLET | Refills: 0 | Status: SHIPPED | OUTPATIENT
Start: 2019-08-29 | End: 2019-09-27 | Stop reason: SDUPTHER

## 2019-08-29 NOTE — TELEPHONE ENCOUNTER
----- Message from Tamika Feliz sent at 8/29/2019 11:27 AM CDT -----  Contact: Patient ph [phone]  Type: Patient Call Back    Who called: Patient    What is the request in detail: Patient is asking to speak to nurse before setting up appointment with Dr Evans and the blood work. Please call.    Would the patient rather a call back or a response via [facility]? Call back    Best call back number: [phone]

## 2019-08-29 NOTE — TELEPHONE ENCOUNTER
Spoke with patient and schedule lab and office visit for 10/3/19 and 10/04/19. Appointment slips in the mail.

## 2019-09-08 DIAGNOSIS — F32.A DEPRESSION, UNSPECIFIED DEPRESSION TYPE: Chronic | ICD-10-CM

## 2019-09-08 DIAGNOSIS — F41.9 ANXIETY: ICD-10-CM

## 2019-09-08 RX ORDER — CITALOPRAM 20 MG/1
TABLET, FILM COATED ORAL
Qty: 90 TABLET | Refills: 1 | Status: SHIPPED | OUTPATIENT
Start: 2019-09-08 | End: 2020-05-12

## 2019-09-20 ENCOUNTER — PATIENT OUTREACH (OUTPATIENT)
Dept: ADMINISTRATIVE | Facility: HOSPITAL | Age: 64
End: 2019-09-20

## 2019-09-27 DIAGNOSIS — F41.9 ANXIETY: ICD-10-CM

## 2019-09-27 DIAGNOSIS — F32.A DEPRESSION, UNSPECIFIED DEPRESSION TYPE: Chronic | ICD-10-CM

## 2019-09-27 DIAGNOSIS — F13.20 BENZODIAZEPINE DEPENDENCE: Chronic | ICD-10-CM

## 2019-09-27 RX ORDER — ALPRAZOLAM 2 MG/1
TABLET ORAL
Qty: 30 TABLET | Refills: 0 | Status: SHIPPED | OUTPATIENT
Start: 2019-09-27 | End: 2019-10-04 | Stop reason: SDUPTHER

## 2019-09-27 NOTE — TELEPHONE ENCOUNTER
----- Message from Rae Gonzalez sent at 9/27/2019  9:59 AM CDT -----  Contact: Self   Type: RX Refill Request    Who Called:  Self     Have you contacted your pharmacy: no     Refill or New Rx: Refill     RX Name and Strength:ALPRAZolam (XANAX) 2 MG Tab    Preferred Pharmacy with phone number:[facility] [place] - [address] [phone] (Phone)  [phone] (Fax)        Local or Mail Order: Local     Ordering Provider: Dr. Evans     Would the patient rather a call back or a response via [facility]?  Call     Best Call Back Number:[phone]    Additional Information:  Patient says he is scheduled for a appt next Friday but will be out of medication on tomorrow. Please call

## 2019-10-03 ENCOUNTER — LAB VISIT (OUTPATIENT)
Dept: LAB | Facility: HOSPITAL | Age: 64
End: 2019-10-03
Attending: INTERNAL MEDICINE
Payer: MEDICARE

## 2019-10-03 LAB
ALBUMIN SERPL BCP-MCNC: 4.4 G/DL (ref 3.5–5.2)
ALP SERPL-CCNC: 65 U/L (ref 55–135)
ALT SERPL W/O P-5'-P-CCNC: 29 U/L (ref 10–44)
ANION GAP SERPL CALC-SCNC: 14 MMOL/L (ref 8–16)
AST SERPL-CCNC: 27 U/L (ref 10–40)
BILIRUB SERPL-MCNC: 0.6 MG/DL (ref 0.1–1)
BUN SERPL-MCNC: 25 MG/DL (ref 8–23)
CALCIUM SERPL-MCNC: 9.7 MG/DL (ref 8.7–10.5)
CHLORIDE SERPL-SCNC: 103 MMOL/L (ref 95–110)
CHOLEST SERPL-MCNC: 117 MG/DL (ref 120–199)
CHOLEST/HDLC SERPL: 3.9 {RATIO} (ref 2–5)
CO2 SERPL-SCNC: 22 MMOL/L (ref 23–29)
CREAT SERPL-MCNC: 1.5 MG/DL (ref 0.5–1.4)
EST. GFR  (AFRICAN AMERICAN): 55.7 ML/MIN/1.73 M^2
EST. GFR  (NON AFRICAN AMERICAN): 48.2 ML/MIN/1.73 M^2
ESTIMATED AVG GLUCOSE: 183 MG/DL (ref 68–131)
GLUCOSE SERPL-MCNC: 155 MG/DL (ref 70–110)
HBA1C MFR BLD HPLC: 8 % (ref 4–5.6)
HDLC SERPL-MCNC: 30 MG/DL (ref 40–75)
HDLC SERPL: 25.6 % (ref 20–50)
LDLC SERPL CALC-MCNC: 39.8 MG/DL (ref 63–159)
NONHDLC SERPL-MCNC: 87 MG/DL
POTASSIUM SERPL-SCNC: 5 MMOL/L (ref 3.5–5.1)
PROT SERPL-MCNC: 7.9 G/DL (ref 6–8.4)
SODIUM SERPL-SCNC: 139 MMOL/L (ref 136–145)
TRIGL SERPL-MCNC: 236 MG/DL (ref 30–150)

## 2019-10-03 PROCEDURE — 80053 COMPREHEN METABOLIC PANEL: CPT

## 2019-10-03 PROCEDURE — 80061 LIPID PANEL: CPT

## 2019-10-03 PROCEDURE — 36415 COLL VENOUS BLD VENIPUNCTURE: CPT | Mod: PO

## 2019-10-03 PROCEDURE — 83036 HEMOGLOBIN GLYCOSYLATED A1C: CPT

## 2019-10-03 NOTE — PROGRESS NOTES
This note was created by combination of typed  and M-Modal dictation.  Transcription errors may be present.  If there are any questions, please contact me.    Assessment & Plan:   Uncontrolled type 2 diabetes mellitus without complication, without long-term current use of insulin  -needs to work on dietary modification.  Increase the metformin, go from 500 daily to 500 twice daily.  Still has room to increase further if needed.  Diabetic eye photo today.  -     Comprehensive metabolic panel; Future; Expected date: 01/02/2020  -     Hemoglobin A1c; Future; Expected date: 01/02/2020  -     metFORMIN (GLUCOPHAGE) 500 MG tablet; Take 1 tablet (500 mg total) by mouth 2 (two) times daily with meals.  Dispense: 180 tablet; Refill: 3    Screening for AAA (abdominal aortic aneurysm)  -      Abdominal Aorta; Future; Expected date: 10/04/2019    Anxiety, unable to wean off BZD  Depression, unspecified depression type  Benzodiazepine dependence, did not do well with attempt to wean down 2017  Anxiety  Benzodiazepine dependence  -stable on alprazolam has tried unsuccessfully to wean away.  Refilled alprazolam to pharmacy.  On SSRI and buspar as well.  -     ALPRAZolam (XANAX) 2 MG Tab; TAKE ONE TABLET BY MOUTH NIGHTLY AS NEEDED, take buspirone IN THE MORNING  Dispense: 30 tablet; Refill: 2    Essential hypertension  -stable, refilled clonidine, lisinopril, verapamil  -     cloNIDine (CATAPRES) 0.2 MG tablet; Take 1 tablet (0.2 mg total) by mouth 2 (two) times daily.  Dispense: 180 tablet; Refill: 3  -     lisinopril (PRINIVIL,ZESTRIL) 40 MG tablet; Take 1 tablet (40 mg total) by mouth once daily.  Dispense: 90 tablet; Refill: 3  -     verapamil (CALAN-SR) 180 MG CR tablet; Take 1 tablet (180 mg total) by mouth 2 (two) times daily.  Dispense: 180 tablet; Refill: 3    Mixed hyperlipidemia  -stable on atorvastatin  -     atorvastatin (LIPITOR) 40 MG tablet; TAKE ONE TABLET BY MOUTH ONCE A DAY FOR for cholesterol   Dispense: 90 tablet; Refill: 3    Tobacco dependence, patch with irritation; hx of bupropion  -referred for smoking cessation; bupropion with SE  -     Ambulatory referral to Smoking Cessation Program    Needs flu shot  -     Influenza - High Dose (65+) (PF) (IM)    Need for vaccination for Strep pneumoniae  -     (In Office Administered) Pneumococcal Conjugate Vaccine (13 Valent) (IM)        Medications Discontinued During This Encounter   Medication Reason    verapamil (VERELAN) 180 MG C24P Duplicate Order    metFORMIN (GLUCOPHAGE) 500 MG tablet     cloNIDine (CATAPRES) 0.2 MG tablet Reorder    lisinopril (PRINIVIL,ZESTRIL) 40 MG tablet Reorder    atorvastatin (LIPITOR) 40 MG tablet Reorder    verapamil (CALAN-SR) 180 MG CR tablet Reorder    ALPRAZolam (XANAX) 2 MG Tab Reorder       meds sent this encounter:  Medications Ordered This Encounter   Medications    ALPRAZolam (XANAX) 2 MG Tab     Sig: TAKE ONE TABLET BY MOUTH NIGHTLY AS NEEDED, take buspirone IN THE MORNING     Dispense:  30 tablet     Refill:  2    atorvastatin (LIPITOR) 40 MG tablet     Sig: TAKE ONE TABLET BY MOUTH ONCE A DAY FOR for cholesterol     Dispense:  90 tablet     Refill:  3     This prescription was filled on 8/2/2019. Any refills authorized will be placed on file.    cloNIDine (CATAPRES) 0.2 MG tablet     Sig: Take 1 tablet (0.2 mg total) by mouth 2 (two) times daily.     Dispense:  180 tablet     Refill:  3     This prescription was filled on 7/5/2019. Any refills authorized will be placed on file.    lisinopril (PRINIVIL,ZESTRIL) 40 MG tablet     Sig: Take 1 tablet (40 mg total) by mouth once daily.     Dispense:  90 tablet     Refill:  3     This prescription was filled on 7/5/2019. Any refills authorized will be placed on file.    metFORMIN (GLUCOPHAGE) 500 MG tablet     Sig: Take 1 tablet (500 mg total) by mouth 2 (two) times daily with meals.     Dispense:  180 tablet     Refill:  3     This prescription was filled on  7/5/2019. Any refills authorized will be placed on file.    verapamil (CALAN-SR) 180 MG CR tablet     Sig: Take 1 tablet (180 mg total) by mouth 2 (two) times daily.     Dispense:  180 tablet     Refill:  3       Follow Up: Follow up in about 3 months (around 1/4/2020) for follow up chronic medical problem, non-fasting. previsit lab ordered    Subjective:     Chief Complaint   Patient presents with    Follow-up       HPI  Butch is a 64 y.o. male, last appointment with this clinic was Visit date not found.    I previously saw him back in April.  Diabetes at that time not to goal A1c higher than ideal.  Plan was work on dietary modification.  Anxiety unable to wean off of benzos.    Smoking, trial of bupropion.  Stopped in July because of complaints of memory issues and ineffectiveness.  Cervical and lumbar disc disease.  Chronic narcotics.  Followed by pain management.  Hypertension at that time stable    Pre visit labs A1c 8.0 from previous 7.3.    Needs to increase the metformin.    Does not do specific back stretching.  Does bend to  dog waste. Feels like he sufficiently stretches. Just baseline back pain.     previsit labs creatinine elevated. Lipid good.  a1c went up 8.0 from 7.3.    Has been eating more ice cream. 2 cones nightly. He'll try cutting down.     Metformin 500 daily; instructed to increase to BID.     ROS constipation from opioids. Blood with wiping. Occasional laxative.     Needs eye photo, foot, shots, AAA screening    Agreeable for referral to smoking cessation.    Patient Care Team:  Gabriel Evans MD as PCP - General (Internal Medicine)  [facility]  Vlad Nava MD (Pain Medicine)  Kaila Carrillo OD as Consulting Physician (Optometry)  Yasmin Chen MA as Care Coordinator    Patient Active Problem List    Diagnosis Date Noted    Cataract, bilateral 09/11/2018    Cervical stenosis of spinal canal 05/07/2018 02/14/2018 MRI C-spine impression:   Slight retrolisthesis of C4 with respect to C5.  Narrowing of the central spinal canal most prominent at the C4-C5, C5-C6, and C6-C7 levels and to a lesser extent at C2-C3 and C3-C4.  Annular disc bulges posteriorly at C2-C3, C3-C4.  Diffuse disc herniation/protrusion posteriorly at C4-C5 level and a disc herniation/extrusion posteriorly at the C5-C6 level with a central disc herniation/protrusion posteriorly at the C6-C7 level.  Narrowing of the neural foramen bilaterally from C3-C4 through C6-C7.      Tubular adenoma of colon 5/10/17 05/10/2017     5/10/2017 colonoscopy tubular adenoma      Therapeutic opioid-induced constipation (OIC) 04/17/2017    Tobacco dependence, patch with irritation; hx of bupropion 01/12/2016    Non morbid obesity due to excess calories 01/12/2016    Uncontrolled type 2 diabetes mellitus without complication, without long-term current use of insulin 08/07/2015    Facet arthritis of cervical region 10/28/2013    Facet arthritis of lumbar region 10/28/2013    Benzodiazepine dependence, did not do well with attempt to wean down 2017 10/28/2013    Opioid dependence, pain mgmt 10/28/2013    ED (erectile dysfunction) 07/26/2013    DDD (degenerative disc disease), cervical 05/23/2013    DDD (degenerative disc disease), lumbar 05/23/2013    Essential hypertension 11/01/2012    Mixed hyperlipidemia 11/01/2012    Anemia 11/01/2012    Chronic pain 11/01/2012    Anxiety, unable to wean off BZD 11/01/2012    Depression 11/01/2012       PAST MEDICAL HISTORY:  Past Medical History:   Diagnosis Date    Anxiety     Cataract, bilateral 9/11/2018    Degenerative disc disease     Depression     Diabetes mellitus type II, controlled     ETOH abuse     Eye injury as a child    stick hit eye ? eye, hit in [place] due to boxing    Hyperlipidemia     Hypertension     MRSA (methicillin resistant Staphylococcus aureus)        PAST SURGICAL HISTORY:  Past Surgical History:   Procedure  Laterality Date    APPENDECTOMY      COLONOSCOPY N/A 5/10/2017    Procedure: COLONOSCOPY;  Surgeon: Shantanu Marley MD;  [facility];  Service: Endoscopy;  Laterality: N/A;       SOCIAL HISTORY:  Social History     Socioeconomic History    Marital status:      Spouse name: Not on file    Number of children: Not on file    Years of education: Not on file    Highest education level: Not on file   Occupational History    Occupation: retired -    Social Needs    Financial resource strain: Not on file    Food insecurity:     Worry: Not on file     Inability: Not on file    Transportation needs:     Medical: Not on file     Non-medical: Not on file   Tobacco Use    Smoking status: Current Every Day Smoker     Packs/day: 0.50     Years: 32.00     Pack years: 16.00     Types: Cigarettes     Last attempt to quit: 3/4/2013     Years since quittin.5    Smokeless tobacco: Never Used   Substance and Sexual Activity    Alcohol use: No     Alcohol/week: 0.0 standard drinks    Drug use: No    Sexual activity: Yes     Partners: Female     Comment:  with children, disabled    Lifestyle    Physical activity:     Days per week: Not on file     Minutes per session: Not on file    Stress: Very much   Relationships    Social connections:     Talks on phone: Not on file     Gets together: Not on file     Attends Sikh service: Not on file     Active member of club or organization: Not on file     Attends meetings of clubs or organizations: Not on file     Relationship status: Not on file   Other Topics Concern    Not on file   Social History Narrative    Not on file       ALLERGIES AND MEDICATIONS: updated and reviewed.  Review of patient's allergies indicates:  No Known Allergies  Current Outpatient Medications   Medication Sig Dispense Refill    ALPRAZolam (XANAX) 2 MG Tab TAKE ONE TABLET BY MOUTH NIGHTLY AS NEEDED, take buspirone IN THE MORNING 30 tablet 0     atorvastatin (LIPITOR) 40 MG tablet TAKE ONE TABLET BY MOUTH ONCE A DAY FOR for cholesterol 90 tablet 1    busPIRone (BUSPAR) 15 MG tablet TAKE ONE TABLET BY MOUTH EVERY MORNING 90 tablet 3    citalopram (CELEXA) 20 MG tablet TAKE ONE TABLET BY MOUTH ONCE A DAY 90 tablet 1    cloNIDine (CATAPRES) 0.2 MG tablet TAKE ONE TABLET BY MOUTH TWICE DAILY 60 tablet 2    fentaNYL (DURAGESIC) 25 mcg/hr   0    fish oil-omega-3 fatty acids 300-1,000 mg capsule Take 2 capsules (2 g total) by mouth once daily. 60 capsule 5    fluticasone (FLONASE) 50 mcg/actuation nasal spray INHALE 1 SPRAY IN EACH NOSTRIL EVERY DAY 16 mL 0    hydrocodone-acetaminophen 10-325mg (NORCO)  mg Tab       inhalation spacing device Use as directed for inhalation. 1 each 0    levocetirizine (XYZAL) 5 MG tablet TAKE 1 TABLET(5 MG) BY MOUTH EVERY EVENING 30 tablet 0    lisinopril (PRINIVIL,ZESTRIL) 40 MG tablet TAKE ONE TABLET BY MOUTH ONCE A DAY 30 tablet 2    metFORMIN (GLUCOPHAGE) 500 MG tablet TAKE ONE TABLET BY MOUTH ONCE A DAY WITH BREAKFAST 30 tablet 2    nicotine (NICODERM CQ) 21 mg/24 hr Place 1 patch onto the skin once daily. 30 patch 2    POLYETHYLENE GLYCOL 3350 (MIRALAX ORAL) Take 1 application by mouth.      tadalafil (CIALIS) 20 MG Tab Take 1 tablet (20 mg total) by mouth once daily. 10 tablet 10    verapamil (CALAN-SR) 180 MG CR tablet TAKE ONE TABLET BY MOUTH TWICE DAILY 180 tablet 3    verapamil (VERELAN) 180 MG C24P       albuterol 90 mcg/actuation inhaler Inhale 2 puffs into the lungs every 6 (six) hours as needed for Wheezing. Rescue 18 g 0    aspirin (ECOTRIN) 81 MG EC tablet Take 1 tablet (81 mg total) by mouth once daily.  0     No current facility-administered medications for this visit.        Review of Systems   Constitutional: Negative for chills and fever.   Respiratory: Negative for cough.    Cardiovascular: Negative for chest pain and palpitations.       Objective:   Physical Exam   Vitals:     "10/04/19 1056   BP: 137/87   Pulse: 65   Temp: 98 °F (36.7 °C)   TempSrc: Oral   SpO2: 97%   Weight: 100.8 kg (222 lb 5.3 oz)   Height: 5' 11" (1.803 m)    Body mass index is 31.01 kg/m².  Weight: 100.8 kg (222 lb 5.3 oz)   Height: 5' 11" (180.3 cm)     Physical Exam   Constitutional: He is oriented to person, place, and time. He appears well-developed and well-nourished. No distress.   Eyes: EOM are normal.   Cardiovascular: Normal rate, regular rhythm and normal heart sounds.   No murmur heard.  Pulses:       Dorsalis pedis pulses are 3+ on the right side.        Posterior tibial pulses are 3+ on the right side.   Pulmonary/Chest: Effort normal and breath sounds normal.   Musculoskeletal: Normal range of motion.        Right foot: There is no deformity.   Feet:   Right Foot:   Protective Sensation: 5 sites tested. 5 sites sensed.   Skin Integrity: Negative for ulcer, blister, skin breakdown, erythema or warmth.   Neurological: He is alert and oriented to person, place, and time. Coordination normal.   Skin: Skin is warm and dry.   Psychiatric: He has a normal mood and affect. His behavior is normal. Thought content normal.        Component      Latest Ref Rng & Units 10/3/2019 4/2/2019   Sodium      136 - 145 mmol/L 139 137   Potassium      3.5 - 5.1 mmol/L 5.0 4.6   Chloride      95 - 110 mmol/L 103 105   CO2      23 - 29 mmol/L 22 (L) 24   Glucose      70 - 110 mg/dL 155 (H) 167 (H)   BUN, Bld      8 - 23 mg/dL 25 (H) 15   Creatinine      0.5 - 1.4 mg/dL 1.5 (H) 1.2   Calcium      8.7 - 10.5 mg/dL 9.7 9.5   PROTEIN TOTAL      6.0 - 8.4 g/dL 7.9 7.4   Albumin      3.5 - 5.2 g/dL 4.4 4.1   BILIRUBIN TOTAL      0.1 - 1.0 mg/dL 0.6 1.1 (H)   Alkaline Phosphatase      55 - 135 U/L 65 59   AST      10 - 40 U/L 27 26   ALT      10 - 44 U/L 29 35   Anion Gap      8 - 16 mmol/L 14 8   eGFR if African American      >60 mL/min/1.73 m:2 55.7 (A) >60.0   eGFR if non African American      >60 mL/min/1.73 m:2 48.2 (A) >60.0 "   Cholesterol      120 - 199 mg/dL 117 (L) 106 (L)   Triglycerides      30 - 150 mg/dL 236 (H) 112   HDL      40 - 75 mg/dL 30 (L) 27 (L)   LDL Cholesterol External      63.0 - 159.0 mg/dL 39.8 (L) 56.6 (L)   Hdl/Cholesterol Ratio      20.0 - 50.0 % 25.6 25.5   Total Cholesterol/HDL Ratio      2.0 - 5.0 3.9 3.9   Non-HDL Cholesterol      mg/dL 87 79   Microalbum.,U,Random      ug/mL 8.0    Creatinine, Random Ur      23.0 - 375.0 mg/dL 171.0    MICROALB/CREAT RATIO      0.0 - 30.0 ug/mg 4.7    Hemoglobin A1C External      4.0 - 5.6 % 8.0 (H) 7.3 (H)   Estimated Avg Glucose      68 - 131 mg/dL 183 (H) 163 (H)   PSA, SCREEN      0.00 - 4.00 ng/mL  0.17

## 2019-10-04 ENCOUNTER — OFFICE VISIT (OUTPATIENT)
Dept: FAMILY MEDICINE | Facility: CLINIC | Age: 64
End: 2019-10-04
Payer: MEDICARE

## 2019-10-04 ENCOUNTER — CLINICAL SUPPORT (OUTPATIENT)
Dept: OPHTHALMOLOGY | Facility: CLINIC | Age: 64
End: 2019-10-04
Attending: INTERNAL MEDICINE
Payer: MEDICARE

## 2019-10-04 VITALS
TEMPERATURE: 98 F | BODY MASS INDEX: 31.12 KG/M2 | OXYGEN SATURATION: 97 % | SYSTOLIC BLOOD PRESSURE: 137 MMHG | HEART RATE: 65 BPM | DIASTOLIC BLOOD PRESSURE: 87 MMHG | WEIGHT: 222.31 LBS | HEIGHT: 71 IN

## 2019-10-04 DIAGNOSIS — E11.9 CONTROLLED TYPE 2 DIABETES MELLITUS WITHOUT COMPLICATION, WITHOUT LONG-TERM CURRENT USE OF INSULIN: ICD-10-CM

## 2019-10-04 DIAGNOSIS — Z23 NEED FOR VACCINATION FOR STREP PNEUMONIAE: ICD-10-CM

## 2019-10-04 DIAGNOSIS — F41.9 ANXIETY: ICD-10-CM

## 2019-10-04 DIAGNOSIS — I10 ESSENTIAL HYPERTENSION: ICD-10-CM

## 2019-10-04 DIAGNOSIS — F13.20 BENZODIAZEPINE DEPENDENCE: Chronic | ICD-10-CM

## 2019-10-04 DIAGNOSIS — Z13.6 SCREENING FOR AAA (ABDOMINAL AORTIC ANEURYSM): ICD-10-CM

## 2019-10-04 DIAGNOSIS — Z23 NEEDS FLU SHOT: ICD-10-CM

## 2019-10-04 DIAGNOSIS — E78.2 MIXED HYPERLIPIDEMIA: ICD-10-CM

## 2019-10-04 DIAGNOSIS — F17.200 TOBACCO DEPENDENCE: Chronic | ICD-10-CM

## 2019-10-04 DIAGNOSIS — F32.A DEPRESSION, UNSPECIFIED DEPRESSION TYPE: Chronic | ICD-10-CM

## 2019-10-04 DIAGNOSIS — H40.023 OPEN ANGLE WITH BORDERLINE FINDINGS AND HIGH GLAUCOMA RISK IN BOTH EYES: Primary | ICD-10-CM

## 2019-10-04 PROCEDURE — 99999 PR PBB SHADOW E&M-EST. PATIENT-LVL IV: ICD-10-PCS | Mod: PBBFAC,,, | Performed by: INTERNAL MEDICINE

## 2019-10-04 PROCEDURE — G0009 ADMIN PNEUMOCOCCAL VACCINE: HCPCS | Mod: PBBFAC,PO

## 2019-10-04 PROCEDURE — 99214 PR OFFICE/OUTPT VISIT, EST, LEVL IV, 30-39 MIN: ICD-10-PCS | Mod: S$GLB,,, | Performed by: INTERNAL MEDICINE

## 2019-10-04 PROCEDURE — 99499 RISK ADDL DX/OHS AUDIT: ICD-10-PCS | Mod: S$PBB,,, | Performed by: INTERNAL MEDICINE

## 2019-10-04 PROCEDURE — 99214 OFFICE O/P EST MOD 30 MIN: CPT | Mod: S$GLB,,, | Performed by: INTERNAL MEDICINE

## 2019-10-04 PROCEDURE — 92250 DIABETIC EYE SCREENING PHOTO: ICD-10-PCS | Mod: 26,S$GLB,, | Performed by: OPHTHALMOLOGY

## 2019-10-04 PROCEDURE — 92250 FUNDUS PHOTOGRAPHY W/I&R: CPT | Mod: 26,S$GLB,, | Performed by: OPHTHALMOLOGY

## 2019-10-04 PROCEDURE — 99499 UNLISTED E&M SERVICE: CPT | Mod: S$PBB,,, | Performed by: INTERNAL MEDICINE

## 2019-10-04 PROCEDURE — 99214 OFFICE O/P EST MOD 30 MIN: CPT | Mod: PBBFAC,PO | Performed by: INTERNAL MEDICINE

## 2019-10-04 PROCEDURE — 92250 DIABETIC EYE SCREENING PHOTO: ICD-10-PCS | Mod: TC,S$GLB,, | Performed by: INTERNAL MEDICINE

## 2019-10-04 PROCEDURE — 99999 PR PBB SHADOW E&M-EST. PATIENT-LVL IV: CPT | Mod: PBBFAC,,, | Performed by: INTERNAL MEDICINE

## 2019-10-04 PROCEDURE — 92250 FUNDUS PHOTOGRAPHY W/I&R: CPT | Mod: TC,S$GLB,, | Performed by: INTERNAL MEDICINE

## 2019-10-04 PROCEDURE — 90662 IIV NO PRSV INCREASED AG IM: CPT | Mod: PBBFAC,PO

## 2019-10-04 RX ORDER — METFORMIN HYDROCHLORIDE 500 MG/1
500 TABLET ORAL 2 TIMES DAILY WITH MEALS
Qty: 180 TABLET | Refills: 3 | Status: SHIPPED | OUTPATIENT
Start: 2019-10-04 | End: 2020-07-01

## 2019-10-04 RX ORDER — VERAPAMIL HYDROCHLORIDE 180 MG/1
180 TABLET, FILM COATED, EXTENDED RELEASE ORAL 2 TIMES DAILY
Qty: 180 TABLET | Refills: 3 | Status: SHIPPED | OUTPATIENT
Start: 2019-10-04 | End: 2020-05-29

## 2019-10-04 RX ORDER — LISINOPRIL 40 MG/1
40 TABLET ORAL DAILY
Qty: 90 TABLET | Refills: 3 | Status: SHIPPED | OUTPATIENT
Start: 2019-10-04 | End: 2020-07-01

## 2019-10-04 RX ORDER — ALPRAZOLAM 2 MG/1
TABLET ORAL
Qty: 30 TABLET | Refills: 2 | Status: SHIPPED | OUTPATIENT
Start: 2019-10-04 | End: 2019-12-20

## 2019-10-04 RX ORDER — ATORVASTATIN CALCIUM 40 MG/1
TABLET, FILM COATED ORAL
Qty: 90 TABLET | Refills: 3 | Status: SHIPPED | OUTPATIENT
Start: 2019-10-04 | End: 2020-07-01

## 2019-10-04 RX ORDER — CLONIDINE HYDROCHLORIDE 0.2 MG/1
0.2 TABLET ORAL 2 TIMES DAILY
Qty: 180 TABLET | Refills: 3 | Status: SHIPPED | OUTPATIENT
Start: 2019-10-04 | End: 2020-07-01

## 2019-10-04 NOTE — PROGRESS NOTES
Patient received Pneumococcal 13 vaccine and Flu vaccine tolerated it well. Patient received VIS information.

## 2019-10-04 NOTE — PROGRESS NOTES
HPI     63 Y/o here for screening for diabetes retinopathy with non-dilated   fundus photos per Dr. Evans     Last edited by Rakesh Kumar MA on 10/4/2019  1:59 PM. (History)            Assessment /Plan     For exam results, see Encounter Report.    Uncontrolled type 2 diabetes mellitus without complication, without long-term current use of insulin  -     Diabetic Eye Screening Photo      Please see Dr. Stewart progress note interpretation

## 2019-10-08 ENCOUNTER — CLINICAL SUPPORT (OUTPATIENT)
Dept: SMOKING CESSATION | Facility: CLINIC | Age: 64
End: 2019-10-08
Payer: COMMERCIAL

## 2019-10-08 DIAGNOSIS — F17.200 NICOTINE DEPENDENCE: Primary | ICD-10-CM

## 2019-10-08 PROBLEM — H40.023 OPEN ANGLE WITH BORDERLINE FINDINGS AND HIGH GLAUCOMA RISK IN BOTH EYES: Status: ACTIVE | Noted: 2019-10-08

## 2019-10-08 PROCEDURE — 99999 PR PBB SHADOW E&M-EST. PATIENT-LVL I: ICD-10-PCS | Mod: PBBFAC,,,

## 2019-10-08 PROCEDURE — 99999 PR PBB SHADOW E&M-EST. PATIENT-LVL I: CPT | Mod: PBBFAC,,,

## 2019-10-08 PROCEDURE — 99404 PR PREVENT COUNSEL,INDIV,60 MIN: ICD-10-PCS | Mod: S$GLB,,,

## 2019-10-08 PROCEDURE — 99404 PREV MED CNSL INDIV APPRX 60: CPT | Mod: S$GLB,,,

## 2019-10-08 RX ORDER — DM/P-EPHED/ACETAMINOPH/DOXYLAM 30-7.5/3
2 LIQUID (ML) ORAL
Qty: 100 LOZENGE | Refills: 0 | Status: SHIPPED | OUTPATIENT
Start: 2019-10-08 | End: 2019-11-05 | Stop reason: SDUPTHER

## 2019-10-08 RX ORDER — IBUPROFEN 200 MG
1 TABLET ORAL DAILY
Qty: 14 PATCH | Refills: 0 | Status: SHIPPED | OUTPATIENT
Start: 2019-10-08 | End: 2019-10-22 | Stop reason: SDUPTHER

## 2019-10-08 NOTE — Clinical Note
Pt presents for intake smoking 1 pk of cigarettes per day, after assessment and discussion recommend an abrupt quit, recommend the 21mg nicotine patch daily In conjunction with the 2mg nicotine lozenge as needed for strong thoughts and urges to smoke or habit times, discussed strategies to help cut back and to help break the routine and habit associated with smoking, intake handout provided to the patient and discussed, all prescribed NRT discussed in depth as well as tobacco cessation medication s/e as well as usage discussed, contact card provided to the patient. Will continue to follow every two weeks while in the program.

## 2019-10-10 ENCOUNTER — TELEPHONE (OUTPATIENT)
Dept: OPTOMETRY | Facility: CLINIC | Age: 64
End: 2019-10-10

## 2019-10-10 NOTE — TELEPHONE ENCOUNTER
Called patient regarding diabetic eye screening results ; gave patient his results and schedule him an appointment     ----- Message from Kassie Bonilla sent at 10/9/2019  8:22 AM CDT -----      ----- Message -----  From: Gavino Stewart MD  Sent: 10/8/2019   5:22 PM CDT  To: Gerardo Cardenas

## 2019-10-22 ENCOUNTER — CLINICAL SUPPORT (OUTPATIENT)
Dept: SMOKING CESSATION | Facility: CLINIC | Age: 64
End: 2019-10-22
Payer: COMMERCIAL

## 2019-10-22 DIAGNOSIS — F17.200 NICOTINE DEPENDENCE: ICD-10-CM

## 2019-10-22 PROCEDURE — 99402 PREV MED CNSL INDIV APPRX 30: CPT | Mod: S$GLB,,,

## 2019-10-22 PROCEDURE — 99402 PR PREVENT COUNSEL,INDIV,30 MIN: ICD-10-PCS | Mod: S$GLB,,,

## 2019-10-22 PROCEDURE — 99999 PR PBB SHADOW E&M-EST. PATIENT-LVL I: CPT | Mod: PBBFAC,,,

## 2019-10-22 PROCEDURE — 99999 PR PBB SHADOW E&M-EST. PATIENT-LVL I: ICD-10-PCS | Mod: PBBFAC,,,

## 2019-10-22 RX ORDER — IBUPROFEN 200 MG
1 TABLET ORAL DAILY
Qty: 14 PATCH | Refills: 0 | Status: SHIPPED | OUTPATIENT
Start: 2019-10-22 | End: 2019-11-05 | Stop reason: SDUPTHER

## 2019-10-22 NOTE — PROGRESS NOTES
Individual Follow-Up Form    10/22/2019    Quit Date: 10/22    Clinical Status of Patient: Outpatient    Length of Service: 30 minutes    Continuing Medication: yes  Patches or Nicotine Lozenges    Other Medications: n/a     Target Symptoms: Withdrawal and medication side effects. The following were  rated moderate (3) to severe (4) on TCRS:  · Moderate (3): 0  · Severe (4): 0    Comments: pt presents for follow up smoking 5-6 cigarettes per day, he is currently down from a pk per day, he is using the 2mg nicotine lozenge throughout the day a few times and using the 21mg nicotine patch daily. Recommend he continue the 21mg nicotine patch daily and the 2mg nicotine lozenge and increase the use of the lozenge to further cut back from his smoking, discussed triggers and strategies, recommend he continue the same regimen and dose of the nrt at this time, session handout provided and discussed. Will follow, goal set for 3 cigs per day over the next two weeks if not quit completely     Diagnosis: F17.210    Next Visit: 2 weeks

## 2019-10-30 ENCOUNTER — PATIENT OUTREACH (OUTPATIENT)
Dept: ADMINISTRATIVE | Facility: OTHER | Age: 64
End: 2019-10-30

## 2019-11-04 ENCOUNTER — OFFICE VISIT (OUTPATIENT)
Dept: OPTOMETRY | Facility: CLINIC | Age: 64
End: 2019-11-04
Payer: MEDICARE

## 2019-11-04 DIAGNOSIS — Z13.5 SCREENING FOR GLAUCOMA: ICD-10-CM

## 2019-11-04 DIAGNOSIS — E11.9 TYPE 2 DIABETES MELLITUS WITHOUT RETINOPATHY: Primary | ICD-10-CM

## 2019-11-04 DIAGNOSIS — H25.13 NUCLEAR SCLEROSIS OF BOTH EYES: ICD-10-CM

## 2019-11-04 PROCEDURE — 92014 PR EYE EXAM, EST PATIENT,COMPREHESV: ICD-10-PCS | Mod: S$GLB,,, | Performed by: OPTOMETRIST

## 2019-11-04 PROCEDURE — 92014 COMPRE OPH EXAM EST PT 1/>: CPT | Mod: S$GLB,,, | Performed by: OPTOMETRIST

## 2019-11-04 PROCEDURE — 99499 UNLISTED E&M SERVICE: CPT | Mod: S$GLB,,, | Performed by: OPTOMETRIST

## 2019-11-04 PROCEDURE — 99999 PR PBB SHADOW E&M-EST. PATIENT-LVL II: CPT | Mod: PBBFAC,,, | Performed by: OPTOMETRIST

## 2019-11-04 PROCEDURE — 99999 PR PBB SHADOW E&M-EST. PATIENT-LVL II: ICD-10-PCS | Mod: PBBFAC,,, | Performed by: OPTOMETRIST

## 2019-11-04 PROCEDURE — 99499 RISK ADDL DX/OHS AUDIT: ICD-10-PCS | Mod: S$GLB,,, | Performed by: OPTOMETRIST

## 2019-11-04 NOTE — PROGRESS NOTES
Subjective:       Patient ID: Butch Mcdaniel is a 64 y.o. male      Chief Complaint   Patient presents with    Glaucoma Suspect    Diabetic Eye Exam     History of Present Illness  Dls: 3/22/17 Dr. Carrillo     63 y/o male presents today for diabetic and hypertensive eye exam and glaucoma ck.   Pt states no changes in vision. Pt wears otc readers.     LBS ?     No tearing   No itching  No burning  No pain  + ha's  No floaters  No flashes    Eye meds  None    Hemoglobin A1C       Date                     Value               Ref Range           Status              10/03/2019               8.0 (H)             4.0 - 5.6 %         Final           04/02/2019               7.3 (H)             4.0 - 5.6 %         Final        09/07/2018               7.0 (H)             4.0 - 5.6 %         Final         Assessment/Plan:     1. Type 2 diabetes mellitus without retinopathy  No diabetic retinopathy. Discussed with pt the effects of diabetes on vision, importance of good blood sugar control, compliance with meds, and follow up care with PCP. Return in 1 year for dilated eye exam, sooner PRN.    2. Nuclear sclerosis of both eyes  Educated pt on presence of cataracts and effects on vision. No surgery at this time. Recheck in one year.    3. Screening for glaucoma  Glc suspect per eye cam. ONH on exam. IOP WNL. No FHx. No changes related to glaucoma. Monitor yearly.      Follow up in about 1 year (around 11/4/2020) for Diabetic Eye Exam.

## 2019-11-05 ENCOUNTER — CLINICAL SUPPORT (OUTPATIENT)
Dept: SMOKING CESSATION | Facility: CLINIC | Age: 64
End: 2019-11-05
Payer: COMMERCIAL

## 2019-11-05 DIAGNOSIS — F17.200 NICOTINE DEPENDENCE: ICD-10-CM

## 2019-11-05 PROCEDURE — 99402 PR PREVENT COUNSEL,INDIV,30 MIN: ICD-10-PCS | Mod: S$GLB,,,

## 2019-11-05 PROCEDURE — 99402 PREV MED CNSL INDIV APPRX 30: CPT | Mod: S$GLB,,,

## 2019-11-05 PROCEDURE — 99999 PR PBB SHADOW E&M-EST. PATIENT-LVL I: CPT | Mod: PBBFAC,,,

## 2019-11-05 PROCEDURE — 99999 PR PBB SHADOW E&M-EST. PATIENT-LVL I: ICD-10-PCS | Mod: PBBFAC,,,

## 2019-11-05 RX ORDER — IBUPROFEN 200 MG
2 TABLET ORAL DAILY
Qty: 28 PATCH | Refills: 0 | Status: SHIPPED | OUTPATIENT
Start: 2019-11-05 | End: 2019-11-19 | Stop reason: SDUPTHER

## 2019-11-05 RX ORDER — DM/P-EPHED/ACETAMINOPH/DOXYLAM 30-7.5/3
2 LIQUID (ML) ORAL
Qty: 100 LOZENGE | Refills: 0 | Status: SHIPPED | OUTPATIENT
Start: 2019-11-05 | End: 2019-11-19

## 2019-11-05 NOTE — PROGRESS NOTES
Individual Follow-Up Form    11/5/2019    Quit Date: not selected at this time     Clinical Status of Patient: Outpatient    Length of Service: 30 minutes    Continuing Medication: yes  Patches or Nicotine Lozenges    Other Medications: n/a     Target Symptoms: Withdrawal and medication side effects. The following were  rated moderate (3) to severe (4) on TCRS:  · Moderate (3): 0  · Severe (4): 0    Comments: pt presents for follow up smoking 10 cigarettes per day but relighting the cigarettes, which is nothing new for the patient, this is what he would always do, discussed the behavior associated with smoking and the habit, with him this has become a behavior that he does so often that it will take time breaking this and changing. He is currently on the 21mg nicotine patch daily in conjunction with the 2mg nicotine lozenge he is using throughout the day, session handout provided and discussed. After assessment recommend increasing to double nicotine patch use. Feel pt would benefit from the 21mg nicotine patch x 2 daily and to continue the 2mg nicotine lozenge.     Diagnosis: F17.210    Next Visit: 2 weeks

## 2019-11-19 ENCOUNTER — CLINICAL SUPPORT (OUTPATIENT)
Dept: SMOKING CESSATION | Facility: CLINIC | Age: 64
End: 2019-11-19
Payer: COMMERCIAL

## 2019-11-19 DIAGNOSIS — F17.200 NICOTINE DEPENDENCE: Primary | ICD-10-CM

## 2019-11-19 PROCEDURE — 99999 PR PBB SHADOW E&M-EST. PATIENT-LVL I: ICD-10-PCS | Mod: PBBFAC,,,

## 2019-11-19 PROCEDURE — 99402 PREV MED CNSL INDIV APPRX 30: CPT | Mod: S$GLB,,,

## 2019-11-19 PROCEDURE — 99999 PR PBB SHADOW E&M-EST. PATIENT-LVL I: CPT | Mod: PBBFAC,,,

## 2019-11-19 PROCEDURE — 99402 PR PREVENT COUNSEL,INDIV,30 MIN: ICD-10-PCS | Mod: S$GLB,,,

## 2019-11-19 RX ORDER — IBUPROFEN 200 MG
2 TABLET ORAL DAILY
Qty: 28 PATCH | Refills: 0 | Status: SHIPPED | OUTPATIENT
Start: 2019-11-19 | End: 2019-12-03 | Stop reason: SDUPTHER

## 2019-11-19 RX ORDER — MICONAZOLE NITRATE 2 %
2 CREAM (GRAM) TOPICAL
Qty: 14 EACH | Refills: 0 | Status: SHIPPED | OUTPATIENT
Start: 2019-11-19 | End: 2019-12-03

## 2019-12-03 ENCOUNTER — CLINICAL SUPPORT (OUTPATIENT)
Dept: SMOKING CESSATION | Facility: CLINIC | Age: 64
End: 2019-12-03
Payer: COMMERCIAL

## 2019-12-03 DIAGNOSIS — F17.200 NICOTINE DEPENDENCE: Primary | ICD-10-CM

## 2019-12-03 PROCEDURE — 99403 PR PREVENT COUNSEL,INDIV,45 MIN: ICD-10-PCS | Mod: S$GLB,,,

## 2019-12-03 PROCEDURE — 99403 PREV MED CNSL INDIV APPRX 45: CPT | Mod: S$GLB,,,

## 2019-12-03 PROCEDURE — 99999 PR PBB SHADOW E&M-EST. PATIENT-LVL I: CPT | Mod: PBBFAC,,,

## 2019-12-03 PROCEDURE — 99999 PR PBB SHADOW E&M-EST. PATIENT-LVL I: ICD-10-PCS | Mod: PBBFAC,,,

## 2019-12-03 RX ORDER — ASPIRIN/CALCIUM CARB/MAGNESIUM 325 MG
4 TABLET ORAL
Qty: 100 LOZENGE | Refills: 0 | Status: SHIPPED | OUTPATIENT
Start: 2019-12-03 | End: 2019-12-17 | Stop reason: SDUPTHER

## 2019-12-03 RX ORDER — IBUPROFEN 200 MG
2 TABLET ORAL DAILY
Qty: 28 PATCH | Refills: 0 | Status: SHIPPED | OUTPATIENT
Start: 2019-12-03 | End: 2019-12-17 | Stop reason: SDUPTHER

## 2019-12-03 NOTE — Clinical Note
Comments: pt presents for follow up smoking a few cigarettes per day and other days none, he is currently on the 21mg nicotine patches doubled and the 2mg nicotine lozenge, he is satisfied nicotine wise however the routine and habit is what causes him to slip up, recommend he continue this dose of the nicotine patch daily for another 2 weeks. Session handout provided and discussed, will follow

## 2019-12-04 DIAGNOSIS — F17.200 NICOTINE DEPENDENCE: ICD-10-CM

## 2019-12-04 RX ORDER — MICONAZOLE NITRATE 2 %
CREAM (GRAM) TOPICAL
OUTPATIENT
Start: 2019-12-04

## 2019-12-05 NOTE — PROGRESS NOTES
Individual Follow-Up Form    12/5/2019    Quit Date: not selected at this time     Clinical Status of Patient: Outpatient    Length of Service: 30 minutes    Continuing Medication: yes  Patches or Nicotine Lozenges    Other Medications: n/a     Target Symptoms: Withdrawal and medication side effects. The following were  rated moderate (3) to severe (4) on TCRS:  · Moderate (3): 0  · Severe (4): 0    Comments: pt presents for follow up smoking a few cigarettes per day and other days none, he is currently on the 21mg nicotine patches doubled and the 2mg nicotine lozenge, he is satisfied nicotine wise however the routine and habit is what causes him to slip up, recommend he continue this dose of the nicotine patch daily for another 2 weeks. Session handout provided and discussed, will follow     Diagnosis: F17.210    Next Visit: 2 weeks

## 2019-12-10 DIAGNOSIS — F41.9 ANXIETY: ICD-10-CM

## 2019-12-10 RX ORDER — BUSPIRONE HYDROCHLORIDE 15 MG/1
TABLET ORAL
Qty: 90 TABLET | Refills: 3 | Status: SHIPPED | OUTPATIENT
Start: 2019-12-10 | End: 2020-05-08 | Stop reason: DRUGHIGH

## 2019-12-17 ENCOUNTER — CLINICAL SUPPORT (OUTPATIENT)
Dept: SMOKING CESSATION | Facility: CLINIC | Age: 64
End: 2019-12-17
Payer: COMMERCIAL

## 2019-12-17 DIAGNOSIS — F17.200 NICOTINE DEPENDENCE: ICD-10-CM

## 2019-12-17 PROCEDURE — 99999 PR PBB SHADOW E&M-EST. PATIENT-LVL I: ICD-10-PCS | Mod: PBBFAC,,,

## 2019-12-17 PROCEDURE — 99999 PR PBB SHADOW E&M-EST. PATIENT-LVL I: CPT | Mod: PBBFAC,,,

## 2019-12-17 PROCEDURE — 99403 PREV MED CNSL INDIV APPRX 45: CPT | Mod: S$GLB,,,

## 2019-12-17 PROCEDURE — 99403 PR PREVENT COUNSEL,INDIV,45 MIN: ICD-10-PCS | Mod: S$GLB,,,

## 2019-12-17 RX ORDER — ASPIRIN/CALCIUM CARB/MAGNESIUM 325 MG
4 TABLET ORAL
Qty: 100 LOZENGE | Refills: 0 | Status: SHIPPED | OUTPATIENT
Start: 2019-12-17 | End: 2020-01-14

## 2019-12-17 RX ORDER — IBUPROFEN 200 MG
1 TABLET ORAL DAILY
Qty: 14 PATCH | Refills: 0 | Status: SHIPPED | OUTPATIENT
Start: 2019-12-17 | End: 2019-12-31

## 2019-12-17 NOTE — Clinical Note
Comments: pt presents for follow up continuing to cut back, smoking less and less but would not give a number, is working on habit and associations with smoking, it is recommended that he decrease to the 21mg nicotine patches and the 14mg nicotine patch to be worn in conjunction, recommend he continue the 4mg nicotine lozenge, he is using them throughout the day. He has related his smoking at this point in his quit as habit only, he is comfortable nicotine wise and is ready to drop, will need to continue to work on his quit, session handout provided and discussed. Will follow

## 2019-12-17 NOTE — PROGRESS NOTES
Individual Follow-Up Form    12/17/2019    Quit Date: n/a    Clinical Status of Patient: Outpatient    Length of Service: 60 minutes    Continuing Medication: yes  Patches or Nicotine Lozenges    Other Medications: n/a     Target Symptoms: Withdrawal and medication side effects. The following were  rated moderate (3) to severe (4) on TCRS:  · Moderate (3): 0  · Severe (4): 0    Comments: pt presents for follow up continuing to cut back, smoking less and less but would not give a number, is working on habit and associations with smoking, it is recommended that he decrease to the 21mg nicotine patches and the 14mg nicotine patch to be worn in conjunction, recommend he continue the 4mg nicotine lozenge, he is using them throughout the day. He has related his smoking at this point in his quit as habit only, he is comfortable nicotine wise and is ready to drop, will need to continue to work on his quit, session handout provided and discussed. Will follow     Diagnosis: F17.210    Next Visit: 2 weeks

## 2019-12-20 DIAGNOSIS — F41.9 ANXIETY: ICD-10-CM

## 2019-12-20 DIAGNOSIS — F32.A DEPRESSION, UNSPECIFIED DEPRESSION TYPE: Chronic | ICD-10-CM

## 2019-12-20 DIAGNOSIS — F13.20 BENZODIAZEPINE DEPENDENCE: Chronic | ICD-10-CM

## 2019-12-20 RX ORDER — ALPRAZOLAM 2 MG/1
TABLET ORAL
Qty: 30 TABLET | Refills: 2 | Status: SHIPPED | OUTPATIENT
Start: 2019-12-20 | End: 2020-01-08 | Stop reason: SDUPTHER

## 2019-12-31 ENCOUNTER — CLINICAL SUPPORT (OUTPATIENT)
Dept: SMOKING CESSATION | Facility: CLINIC | Age: 64
End: 2019-12-31
Payer: COMMERCIAL

## 2019-12-31 DIAGNOSIS — F17.200 NICOTINE DEPENDENCE: ICD-10-CM

## 2019-12-31 PROCEDURE — 99403 PR PREVENT COUNSEL,INDIV,45 MIN: ICD-10-PCS | Mod: S$GLB,,,

## 2019-12-31 PROCEDURE — 99403 PREV MED CNSL INDIV APPRX 45: CPT | Mod: S$GLB,,,

## 2019-12-31 PROCEDURE — 99999 PR PBB SHADOW E&M-EST. PATIENT-LVL I: CPT | Mod: PBBFAC,,,

## 2019-12-31 PROCEDURE — 99999 PR PBB SHADOW E&M-EST. PATIENT-LVL I: ICD-10-PCS | Mod: PBBFAC,,,

## 2019-12-31 RX ORDER — IBUPROFEN 200 MG
1 TABLET ORAL DAILY
Qty: 14 PATCH | Refills: 0 | Status: SHIPPED | OUTPATIENT
Start: 2019-12-31 | End: 2020-01-14 | Stop reason: SDUPTHER

## 2019-12-31 NOTE — Clinical Note
Comments: pt presents for follow up smoking 1-2 cigarettes per day, he states he is just taking a puff, reminded him that every light is one cigarette, he is currently on the 21mg nicotine patch and the 14mg nicotine patch and using the nicotine lozenge as needed. He finds that he is using the nicotine lozenge less now, he is double patching daily x 24 hours and reports his dreams are vivid, recommend removing patch an hour before bed or after dinner, whatever helps him remember to remove it and this should help with the dreaming, recommend decreasing to the 21mg nicotine patch daily x 14 days, session handout provided and discussed. Will follow

## 2019-12-31 NOTE — PROGRESS NOTES
Individual Follow-Up Form    12/31/2019    Quit Date: n/a    Clinical Status of Patient: Outpatient    Length of Service: 45 minutes    Continuing Medication: yes  Patches or Nicotine Lozenges    Other Medications: n/a     Target Symptoms: Withdrawal and medication side effects. The following were  rated moderate (3) to severe (4) on TCRS:  · Moderate (3): 0  · Severe (4): 0    Comments: pt presents for follow up smoking 1-2 cigarettes per day, he states he is just taking a puff, reminded him that every light is one cigarette, he is currently on the 21mg nicotine patch and the 14mg nicotine patch and using the nicotine lozenge as needed. He finds that he is using the nicotine lozenge less now, he is double patching daily x 24 hours and reports his dreams are vivid, recommend removing patch an hour before bed or after dinner, whatever helps him remember to remove it and this should help with the dreaming, recommend decreasing to the 21mg nicotine patch daily x 14 days, session handout provided and discussed. Will follow     Diagnosis: F17.210    Next Visit: 2 weeks

## 2020-01-06 ENCOUNTER — LAB VISIT (OUTPATIENT)
Dept: LAB | Facility: HOSPITAL | Age: 65
End: 2020-01-06
Attending: INTERNAL MEDICINE
Payer: MEDICARE

## 2020-01-06 LAB
ALBUMIN SERPL BCP-MCNC: 4.1 G/DL (ref 3.5–5.2)
ALP SERPL-CCNC: 54 U/L (ref 55–135)
ALT SERPL W/O P-5'-P-CCNC: 30 U/L (ref 10–44)
ANION GAP SERPL CALC-SCNC: 9 MMOL/L (ref 8–16)
AST SERPL-CCNC: 29 U/L (ref 10–40)
BILIRUB SERPL-MCNC: 0.9 MG/DL (ref 0.1–1)
BUN SERPL-MCNC: 21 MG/DL (ref 8–23)
CALCIUM SERPL-MCNC: 9.4 MG/DL (ref 8.7–10.5)
CHLORIDE SERPL-SCNC: 105 MMOL/L (ref 95–110)
CO2 SERPL-SCNC: 24 MMOL/L (ref 23–29)
CREAT SERPL-MCNC: 1.2 MG/DL (ref 0.5–1.4)
EST. GFR  (AFRICAN AMERICAN): >60 ML/MIN/1.73 M^2
EST. GFR  (NON AFRICAN AMERICAN): >60 ML/MIN/1.73 M^2
ESTIMATED AVG GLUCOSE: 157 MG/DL (ref 68–131)
GLUCOSE SERPL-MCNC: 154 MG/DL (ref 70–110)
HBA1C MFR BLD HPLC: 7.1 % (ref 4–5.6)
POTASSIUM SERPL-SCNC: 4.9 MMOL/L (ref 3.5–5.1)
PROT SERPL-MCNC: 7.2 G/DL (ref 6–8.4)
SODIUM SERPL-SCNC: 138 MMOL/L (ref 136–145)

## 2020-01-06 PROCEDURE — 36415 COLL VENOUS BLD VENIPUNCTURE: CPT | Mod: PO

## 2020-01-06 PROCEDURE — 80053 COMPREHEN METABOLIC PANEL: CPT

## 2020-01-06 PROCEDURE — 83036 HEMOGLOBIN GLYCOSYLATED A1C: CPT

## 2020-01-08 ENCOUNTER — LAB VISIT (OUTPATIENT)
Dept: LAB | Facility: HOSPITAL | Age: 65
End: 2020-01-08
Attending: INTERNAL MEDICINE
Payer: MEDICARE

## 2020-01-08 ENCOUNTER — OFFICE VISIT (OUTPATIENT)
Dept: FAMILY MEDICINE | Facility: CLINIC | Age: 65
End: 2020-01-08
Payer: MEDICARE

## 2020-01-08 VITALS
TEMPERATURE: 98 F | DIASTOLIC BLOOD PRESSURE: 80 MMHG | SYSTOLIC BLOOD PRESSURE: 128 MMHG | WEIGHT: 218 LBS | HEART RATE: 76 BPM | HEIGHT: 71 IN | OXYGEN SATURATION: 98 % | BODY MASS INDEX: 30.52 KG/M2

## 2020-01-08 DIAGNOSIS — F32.A DEPRESSION, UNSPECIFIED DEPRESSION TYPE: Chronic | ICD-10-CM

## 2020-01-08 DIAGNOSIS — I10 ESSENTIAL HYPERTENSION: ICD-10-CM

## 2020-01-08 DIAGNOSIS — E78.2 MIXED HYPERLIPIDEMIA: ICD-10-CM

## 2020-01-08 DIAGNOSIS — N63.20 LEFT BREAST MASS: ICD-10-CM

## 2020-01-08 DIAGNOSIS — N62 GYNECOMASTIA, MALE: ICD-10-CM

## 2020-01-08 DIAGNOSIS — N63.0 BREAST LUMP: ICD-10-CM

## 2020-01-08 DIAGNOSIS — F13.20 BENZODIAZEPINE DEPENDENCE: Chronic | ICD-10-CM

## 2020-01-08 DIAGNOSIS — F41.9 ANXIETY: ICD-10-CM

## 2020-01-08 LAB
FSH SERPL-ACNC: 1.9 MIU/ML (ref 0.95–11.95)
LH SERPL-ACNC: 1 MIU/ML (ref 0.6–12.1)
PROLACTIN SERPL IA-MCNC: 23.6 NG/ML (ref 3.5–19.4)
T4 FREE SERPL-MCNC: 0.87 NG/DL (ref 0.71–1.51)
TESTOST SERPL-MCNC: 82 NG/DL (ref 304–1227)
TSH SERPL DL<=0.005 MIU/L-ACNC: 2.55 UIU/ML (ref 0.4–4)

## 2020-01-08 PROCEDURE — 84443 ASSAY THYROID STIM HORMONE: CPT

## 2020-01-08 PROCEDURE — 99999 PR PBB SHADOW E&M-EST. PATIENT-LVL III: ICD-10-PCS | Mod: PBBFAC,,, | Performed by: INTERNAL MEDICINE

## 2020-01-08 PROCEDURE — 3008F BODY MASS INDEX DOCD: CPT | Mod: CPTII,S$GLB,, | Performed by: INTERNAL MEDICINE

## 2020-01-08 PROCEDURE — 99499 UNLISTED E&M SERVICE: CPT | Mod: S$GLB,,, | Performed by: INTERNAL MEDICINE

## 2020-01-08 PROCEDURE — 3074F PR MOST RECENT SYSTOLIC BLOOD PRESSURE < 130 MM HG: ICD-10-PCS | Mod: CPTII,S$GLB,, | Performed by: INTERNAL MEDICINE

## 2020-01-08 PROCEDURE — 99499 RISK ADDL DX/OHS AUDIT: ICD-10-PCS | Mod: ,,, | Performed by: INTERNAL MEDICINE

## 2020-01-08 PROCEDURE — 82672 ASSAY OF ESTROGEN: CPT

## 2020-01-08 PROCEDURE — 84146 ASSAY OF PROLACTIN: CPT

## 2020-01-08 PROCEDURE — 83002 ASSAY OF GONADOTROPIN (LH): CPT

## 2020-01-08 PROCEDURE — 99499 RISK ADDL DX/OHS AUDIT: ICD-10-PCS | Mod: S$GLB,,, | Performed by: INTERNAL MEDICINE

## 2020-01-08 PROCEDURE — 3079F DIAST BP 80-89 MM HG: CPT | Mod: CPTII,S$GLB,, | Performed by: INTERNAL MEDICINE

## 2020-01-08 PROCEDURE — 3074F SYST BP LT 130 MM HG: CPT | Mod: CPTII,S$GLB,, | Performed by: INTERNAL MEDICINE

## 2020-01-08 PROCEDURE — 3008F PR BODY MASS INDEX (BMI) DOCUMENTED: ICD-10-PCS | Mod: CPTII,S$GLB,, | Performed by: INTERNAL MEDICINE

## 2020-01-08 PROCEDURE — 99999 PR PBB SHADOW E&M-EST. PATIENT-LVL III: CPT | Mod: PBBFAC,,, | Performed by: INTERNAL MEDICINE

## 2020-01-08 PROCEDURE — 99214 OFFICE O/P EST MOD 30 MIN: CPT | Mod: S$GLB,,, | Performed by: INTERNAL MEDICINE

## 2020-01-08 PROCEDURE — 84403 ASSAY OF TOTAL TESTOSTERONE: CPT

## 2020-01-08 PROCEDURE — 36415 COLL VENOUS BLD VENIPUNCTURE: CPT | Mod: PO

## 2020-01-08 PROCEDURE — 3079F PR MOST RECENT DIASTOLIC BLOOD PRESSURE 80-89 MM HG: ICD-10-PCS | Mod: CPTII,S$GLB,, | Performed by: INTERNAL MEDICINE

## 2020-01-08 PROCEDURE — 99214 PR OFFICE/OUTPT VISIT, EST, LEVL IV, 30-39 MIN: ICD-10-PCS | Mod: S$GLB,,, | Performed by: INTERNAL MEDICINE

## 2020-01-08 PROCEDURE — 1101F PT FALLS ASSESS-DOCD LE1/YR: CPT | Mod: CPTII,S$GLB,, | Performed by: INTERNAL MEDICINE

## 2020-01-08 PROCEDURE — 99499 UNLISTED E&M SERVICE: CPT | Mod: ,,, | Performed by: INTERNAL MEDICINE

## 2020-01-08 PROCEDURE — 1101F PR PT FALLS ASSESS DOC 0-1 FALLS W/OUT INJ PAST YR: ICD-10-PCS | Mod: CPTII,S$GLB,, | Performed by: INTERNAL MEDICINE

## 2020-01-08 PROCEDURE — 84439 ASSAY OF FREE THYROXINE: CPT

## 2020-01-08 PROCEDURE — 83001 ASSAY OF GONADOTROPIN (FSH): CPT

## 2020-01-08 RX ORDER — ALPRAZOLAM 1 MG/1
1.5 TABLET ORAL NIGHTLY PRN
Qty: 45 TABLET | Refills: 2 | Status: SHIPPED | OUTPATIENT
Start: 2020-01-08 | End: 2020-03-13

## 2020-01-08 NOTE — PROGRESS NOTES
This note was created by combination of typed  and M-Modal dictation.  Transcription errors may be present.  If there are any questions, please contact me.    Assessment & Plan:   Uncontrolled type 2 diabetes mellitus without complication, without long-term current use of insulin  -improvement in A1c with dietary modification, eating less ice cream.  Congratulated him on his success.  No changes.  -     TSH; Future; Expected date: 01/08/2020  -     T4, free; Future; Expected date: 01/08/2020    Mixed hyperlipidemia  -last lipid profile good on statin    Essential hypertension  -stable, on lisinopril and verapamil    Anxiety  Depression, unspecified depression type  Benzodiazepine dependence  -long discussion with the patient and his wife today.  He is taking high doses of narcotics and high-dose of benzodiazepine.  In combination very high risk.  Has taken both longstanding.  He is agreeable for slow initiation of wean.  Currently taking alprazolam 2 mg daily.  Decrease it to 1.5 mg daily.  Do that for 3 months.  Then will challenge down to 1 mg.  I would like to see if I can get him down to 0.25 or 0.5 daily.  -     ALPRAZolam (XANAX) 1 MG tablet; Take 1.5 tablets (1.5 mg total) by mouth nightly as needed. Reducing overall dose  Dispense: 45 tablet; Refill: 2    Left breast mass  Gynecomastia, male  Breast lump  -suspect gynecomastia but I will check him for a breast mass.  Check mammogram.  Check pituitary hormone axis, estrogen and testosterone.  If all normal would monitor.  Work on TLC.  Don't think he is taking meds taht would cause this - no cimetidine, no THC, no spironolactone etc.   -     Mammo Digital Diagnostic Left; Future; Expected date: 01/08/2020  -     TSH; Future; Expected date: 01/08/2020  -     T4, free; Future; Expected date: 01/08/2020  -     Prolactin; Future; Expected date: 01/08/2020  -     Follicle stimulating hormone; Future; Expected date: 01/08/2020  -     Luteinizing hormone;  Future; Expected date: 01/08/2020  -     ESTROGENS, TOTAL; Future; Expected date: 01/08/2020  -     Testosterone; Future; Expected date: 01/08/2020  -     Mammo Digital Diagnostic Left; Future; Expected date: 01/08/2020    Contact info for radiology dept given to schdule AAA US    Medications Discontinued During This Encounter   Medication Reason    ALPRAZolam (XANAX) 2 MG Tab Reorder       meds sent this encounter:  Medications Ordered This Encounter   Medications    ALPRAZolam (XANAX) 1 MG tablet     Sig: Take 1.5 tablets (1.5 mg total) by mouth nightly as needed. Reducing overall dose     Dispense:  45 tablet     Refill:  2     This prescription was filled on 12/20/2019. Any refills authorized will be placed on file.       Follow Up: No follow-ups on file. OV 3 months work on reducing alprazolam further    Subjective:     Chief Complaint   Patient presents with    Mass       HPI  Butch is a 64 y.o. male, last appointment with this clinic was 10/4/2019.    Last visit increased metformin from 500 daily to 500 b.i.d.  Anxiety and depression, stable on alprazolam  Still seeing smoking cessation program    Did not get AAA screening    Previous labs A1c better 7.1 from previous 8.0.  Creatinine improved.  10/2019 lipid profile was good    Currently xanax 2 mg at night.  Had previously been on 1 mg TID  Taking high doses of narcotics.    Would cut down to 1.5    Has been having tender sensitive lump under the left areola.  Thinks it's growing. Hx of MRSA remote.     Followed by Dr. Nava.     High stress.  Son had hypoglycemic episode, severe requiring EMS.  That certainly was a stressor so he took xanax.  He is prescribed xanax 2 mg. He breaks it into 3 pieces.  Takes 1/3 in the AM. And 2 pieces in the PM.      We talked at length today about risks of high dose narcotic and high dose benzo.  After discussion I am going to slowly wean him down.  Go from 2 mg to 1.5 mg for the next 3 months.     Reports that he went  to his pain specialist and he has concerns about concomitant use of benzodiazepines and narcotics.  I discussed with patient and wife that I agree.  He has taken benzodiazepines longstanding and I am going to wean him down.    a1c better. Has cut out the ice cream with improved a1c.    Patient Care Team:  Gabriel Evans MD as PCP - General (Internal Medicine)  Vlad Nava MD (Pain Medicine)  Kaila Carrillo, RIVERA as Consulting Physician (Optometry)  Yasmin Chen MA as Care Coordinator    Patient Active Problem List    Diagnosis Date Noted    Nuclear sclerosis of both eyes 11/04/2019    Open angle with borderline findings and high glaucoma risk in both eyes 10/08/2019    Cataract, bilateral 09/11/2018    Cervical stenosis of spinal canal 05/07/2018 02/14/2018 MRI C-spine impression:  Slight retrolisthesis of C4 with respect to C5.  Narrowing of the central spinal canal most prominent at the C4-C5, C5-C6, and C6-C7 levels and to a lesser extent at C2-C3 and C3-C4.  Annular disc bulges posteriorly at C2-C3, C3-C4.  Diffuse disc herniation/protrusion posteriorly at C4-C5 level and a disc herniation/extrusion posteriorly at the C5-C6 level with a central disc herniation/protrusion posteriorly at the C6-C7 level.  Narrowing of the neural foramen bilaterally from C3-C4 through C6-C7.      Tubular adenoma of colon 5/10/17 05/10/2017     5/10/2017 colonoscopy tubular adenoma      Therapeutic opioid-induced constipation (OIC) 04/17/2017    Tobacco dependence, patch with irritation; hx of bupropion 01/12/2016    Non morbid obesity due to excess calories 01/12/2016    Uncontrolled type 2 diabetes mellitus without complication, without long-term current use of insulin 08/07/2015    Facet arthritis of cervical region 10/28/2013    Facet arthritis of lumbar region 10/28/2013    Benzodiazepine dependence, did not do well with attempt to wean down 2017 10/28/2013    Opioid dependence, pain mgmt 10/28/2013    ED  (erectile dysfunction) 2013    DDD (degenerative disc disease), cervical 2013    DDD (degenerative disc disease), lumbar 2013    Essential hypertension 2012    Mixed hyperlipidemia 2012    Anemia 2012    Chronic pain 2012    Anxiety, unable to wean off BZD 2012    Depression 2012       PAST MEDICAL HISTORY:  Past Medical History:   Diagnosis Date    Anxiety     Cataract, bilateral 2018    Degenerative disc disease     Depression     Diabetes mellitus type II, controlled     ETOH abuse     Eye injury as a child    stick hit eye ? 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 both eyes 10/8/2019       PAST SURGICAL HISTORY:  Past Surgical History:   Procedure Laterality Date    APPENDECTOMY      COLONOSCOPY N/A 5/10/2017    Procedure: COLONOSCOPY;  Surgeon: Shantanu Marley MD;  Location: [facility];  Service: Endoscopy;  Laterality: N/A;       SOCIAL HISTORY:  Social History     Socioeconomic History    Marital status:      Spouse name: Not on file    Number of children: Not on file    Years of education: Not on file    Highest education level: Not on file   Occupational History    Occupation: retired -    Social Needs    Financial resource strain: Not on file    Food insecurity:     Worry: Not on file     Inability: Not on file    Transportation needs:     Medical: Not on file     Non-medical: Not on file   Tobacco Use    Smoking status: Current Every Day Smoker     Packs/day: 0.50     Years: 32.00     Pack years: 16.00     Types: Cigarettes     Last attempt to quit: 3/4/2013     Years since quittin.8    Smokeless tobacco: Never Used   Substance and Sexual Activity    Alcohol use: No     Alcohol/week: 0.0 standard drinks    Drug use: No    Sexual activity: Yes      Partners: Female     Comment:  with children, disabled    Lifestyle    Physical activity:     Days per week: Not on file     Minutes per session: Not on file    Stress: Very much   Relationships    Social connections:     Talks on phone: Not on file     Gets together: Not on file     Attends Yarsanism service: Not on file     Active member of club or organization: Not on file     Attends meetings of clubs or organizations: Not on file     Relationship status: Not on file   Other Topics Concern    Not on file   Social History Narrative    Not on file       ALLERGIES AND MEDICATIONS: updated and reviewed.  Review of patient's allergies indicates:  No Known Allergies  Current Outpatient Medications   Medication Sig Dispense Refill    albuterol 90 mcg/actuation inhaler Inhale 2 puffs into the lungs every 6 (six) hours as needed for Wheezing. Rescue 18 g 0    ALPRAZolam (XANAX) 2 MG Tab TAKE ONE TABLET BY MOUTH NIGHTLY AS NEEDED TAKE buspirone IN THE MORNING 30 tablet 2    aspirin (ECOTRIN) 81 MG EC tablet Take 1 tablet (81 mg total) by mouth once daily.  0    atorvastatin (LIPITOR) 40 MG tablet TAKE ONE TABLET BY MOUTH ONCE A DAY FOR for cholesterol 90 tablet 3    busPIRone (BUSPAR) 15 MG tablet TAKE ONE TABLET BY MOUTH EVERY MORNING 90 tablet 3    citalopram (CELEXA) 20 MG tablet TAKE ONE TABLET BY MOUTH ONCE A DAY 90 tablet 1    cloNIDine (CATAPRES) 0.2 MG tablet Take 1 tablet (0.2 mg total) by mouth 2 (two) times daily. 180 tablet 3    fentaNYL (DURAGESIC) 25 mcg/hr Place 1 patch onto the skin Every 3 (three) days.  0    fish oil-omega-3 fatty acids 300-1,000 mg capsule Take 2 capsules (2 g total) by mouth once daily. (Patient not taking: Reported on 11/4/2019) 60 capsule 5    fluticasone (FLONASE) 50 mcg/actuation nasal spray INHALE 1 SPRAY IN EACH NOSTRIL EVERY DAY (Patient not taking: Reported on 11/4/2019) 16 mL 0    hydrocodone-acetaminophen 10-325mg (NORCO)  mg Tab  "Take 1 tablet by mouth 2 (two) times daily.      inhalation spacing device Use as directed for inhalation. 1 each 0    levocetirizine (XYZAL) 5 MG tablet TAKE 1 TABLET(5 MG) BY MOUTH EVERY EVENING 30 tablet 0    lisinopril (PRINIVIL,ZESTRIL) 40 MG tablet Take 1 tablet (40 mg total) by mouth once daily. 90 tablet 3    metFORMIN (GLUCOPHAGE) 500 MG tablet Take 1 tablet (500 mg total) by mouth 2 (two) times daily with meals. 180 tablet 3    nicotine (NICODERM CQ) 21 mg/24 hr Place 1 patch onto the skin once daily. 14 patch 0    nicotine polacrilex 4 MG Lozg Take 1 lozenge (4 mg total) by mouth as needed (1 per hour as needed in place of a cigarette, max of 15 per day). 100 lozenge 0    POLYETHYLENE GLYCOL 3350 (MIRALAX ORAL) Take 1 application by mouth.      tadalafil (CIALIS) 20 MG Tab Take 1 tablet (20 mg total) by mouth once daily. 10 tablet 10    verapamil (CALAN-SR) 180 MG CR tablet Take 1 tablet (180 mg total) by mouth 2 (two) times daily. 180 tablet 3     No current facility-administered medications for this visit.        Review of Systems   All other systems reviewed and are negative.      Objective:   Physical Exam   Vitals:    01/08/20 1108   BP: 128/80   BP Location: Right arm   Patient Position: Sitting   BP Method: Medium (Manual)   Pulse: 76   Temp: 98 °F (36.7 °C)   TempSrc: Oral   SpO2: 98%   Weight: 98.9 kg (218 lb)   Height: 5' 11" (1.803 m)    Body mass index is 30.4 kg/m².            Physical Exam   Constitutional: He is oriented to person, place, and time. He appears well-developed and well-nourished. No distress.   Eyes: EOM are normal.   Cardiovascular: Normal rate, regular rhythm and normal heart sounds.   No murmur heard.  Pulmonary/Chest: Effort normal and breath sounds normal.   Left breast under the area along with firm density, it is about 4 cm x 2 cm, nonfluctuant.  No overlying erythema or induration  No left axillary lymphadenopathy   Musculoskeletal: Normal range of motion. "   Neurological: He is alert and oriented to person, place, and time. Coordination normal.   Skin: Skin is warm and dry.   Psychiatric: He has a normal mood and affect. His behavior is normal. Thought content normal.           Component      Latest Ref Rng & Units 1/6/2020   Sodium      136 - 145 mmol/L 138   Potassium      3.5 - 5.1 mmol/L 4.9   Chloride      95 - 110 mmol/L 105   CO2      23 - 29 mmol/L 24   Glucose      70 - 110 mg/dL 154 (H)   BUN, Bld      8 - 23 mg/dL 21   Creatinine      0.5 - 1.4 mg/dL 1.2   Calcium      8.7 - 10.5 mg/dL 9.4   PROTEIN TOTAL      6.0 - 8.4 g/dL 7.2   Albumin      3.5 - 5.2 g/dL 4.1   BILIRUBIN TOTAL      0.1 - 1.0 mg/dL 0.9   Alkaline Phosphatase      55 - 135 U/L 54 (L)   AST      10 - 40 U/L 29   ALT      10 - 44 U/L 30   Anion Gap      8 - 16 mmol/L 9   eGFR if African American      >60 mL/min/1.73 m:2 >60.0   eGFR if non African American      >60 mL/min/1.73 m:2 >60.0   Hemoglobin A1C External      4.0 - 5.6 % 7.1 (H)   Estimated Avg Glucose      68 - 131 mg/dL 157 (H)

## 2020-01-08 NOTE — LETTER
January 8, 2020      [facility]  [address]  [place] [address]  Phone: [phone]  Fax: [phone]       Patient: Butch Mcdaniel  YOB: 1955  Date of Visit: 1/8/20      To Whom It May Concern:    Butch Mcdaniel  was at [facility] on 1/8/20. I am monitoring his chronic alprazolam use.  We discussed this today.  I am working with him to reduce his dose but it will need to be very gradual.      I am reducing his dose from 2 mg to 1.5 at this time.  Eventual goal will be to wean him to below 1 mg.      If you have any questions or concerns, or if I can be of further assistance, please do not hesitate to contact me.      Sincerely,        Gabriel Evans MD

## 2020-01-08 NOTE — PATIENT INSTRUCTIONS
Please call the radiology department to schedule your test at [phone]    Please schedule your abdominal aortic aneurysm ultrasound

## 2020-01-09 ENCOUNTER — CLINICAL SUPPORT (OUTPATIENT)
Dept: SMOKING CESSATION | Facility: CLINIC | Age: 65
End: 2020-01-09
Payer: COMMERCIAL

## 2020-01-09 DIAGNOSIS — F17.200 NICOTINE DEPENDENCE: Primary | ICD-10-CM

## 2020-01-09 PROCEDURE — 99407 BEHAV CHNG SMOKING > 10 MIN: CPT | Mod: S$GLB,,, | Performed by: INTERNAL MEDICINE

## 2020-01-09 PROCEDURE — 99407 PR TOBACCO USE CESSATION INTENSIVE >10 MINUTES: ICD-10-PCS | Mod: S$GLB,,, | Performed by: INTERNAL MEDICINE

## 2020-01-10 LAB — ESTROGEN SERPL-MCNC: 91 PG/ML

## 2020-01-13 ENCOUNTER — TELEPHONE (OUTPATIENT)
Dept: FAMILY MEDICINE | Facility: CLINIC | Age: 65
End: 2020-01-13

## 2020-01-13 DIAGNOSIS — R79.89 ELEVATED PROLACTIN LEVEL: Primary | ICD-10-CM

## 2020-01-13 NOTE — PROGRESS NOTES
Estrogen normal  Testosterone total low.  FSH and LH normal.  Prolactin elevated.  TSH and free T4 were normal.  He will need pituitary imaging.  01/06/2020, creatinine normal.

## 2020-01-13 NOTE — TELEPHONE ENCOUNTER
Please call patient.  His lab tests were abnormal.  There may be a hormone that may be causing him to have breast enlargement and sensitivity.  I need to check an MRI of his brain actually to see if there is something stimulating his hormones.  I need to check his pituitary gland.  I will order the MRI brain.

## 2020-01-14 ENCOUNTER — CLINICAL SUPPORT (OUTPATIENT)
Dept: SMOKING CESSATION | Facility: CLINIC | Age: 65
End: 2020-01-14
Payer: COMMERCIAL

## 2020-01-14 DIAGNOSIS — F17.200 NICOTINE DEPENDENCE: ICD-10-CM

## 2020-01-14 PROCEDURE — 99999 PR PBB SHADOW E&M-EST. PATIENT-LVL I: ICD-10-PCS | Mod: PBBFAC,,,

## 2020-01-14 PROCEDURE — 99403 PREV MED CNSL INDIV APPRX 45: CPT | Mod: S$GLB,,,

## 2020-01-14 PROCEDURE — 99403 PR PREVENT COUNSEL,INDIV,45 MIN: ICD-10-PCS | Mod: S$GLB,,,

## 2020-01-14 PROCEDURE — 99999 PR PBB SHADOW E&M-EST. PATIENT-LVL I: CPT | Mod: PBBFAC,,,

## 2020-01-14 RX ORDER — IBUPROFEN 200 MG
1 TABLET ORAL DAILY
Qty: 14 PATCH | Refills: 0 | Status: SHIPPED | OUTPATIENT
Start: 2020-01-14 | End: 2021-05-21

## 2020-01-14 NOTE — Clinical Note
Comments: pt presents no longer doing the nicotine lozenge, he is smoking a few per day, he wants to quit smoking however does not have the determination to put them down, he has an excuse for smoking and his reasons to him are valid. It will be difficult for him to quit smoking unless it is a health scare. He is currently on the 21mg nicotine patches daily however benefit perior is about to run out therefore recommend the next follow up appt start the weaning process. Session handout provided and discussed, will continue to monitor and follow.

## 2020-01-15 ENCOUNTER — HOSPITAL ENCOUNTER (OUTPATIENT)
Dept: RADIOLOGY | Facility: HOSPITAL | Age: 65
Discharge: HOME | End: 2020-01-15
Attending: INTERNAL MEDICINE
Payer: MEDICARE

## 2020-01-15 VITALS — BODY MASS INDEX: 30.52 KG/M2 | WEIGHT: 218 LBS | HEIGHT: 71 IN

## 2020-01-15 DIAGNOSIS — R92.8 ABNORMAL MAMMOGRAM OF LEFT BREAST: ICD-10-CM

## 2020-01-15 DIAGNOSIS — Z13.6 SCREENING FOR AAA (ABDOMINAL AORTIC ANEURYSM): ICD-10-CM

## 2020-01-15 DIAGNOSIS — N62 GYNECOMASTIA, MALE: ICD-10-CM

## 2020-01-15 DIAGNOSIS — N63.0 BREAST LUMP: ICD-10-CM

## 2020-01-15 DIAGNOSIS — N63.20 LEFT BREAST MASS: ICD-10-CM

## 2020-01-15 PROCEDURE — 77062 BREAST TOMOSYNTHESIS BI: CPT | Mod: TC

## 2020-01-15 PROCEDURE — 77062 MAMMO DIGITAL DIAGNOSTIC BILAT WITH TOMOSYNTHESIS_CAD: ICD-10-PCS | Mod: 26,,, | Performed by: RADIOLOGY

## 2020-01-15 PROCEDURE — 76775 US EXAM ABDO BACK WALL LIM: CPT | Mod: TC

## 2020-01-15 PROCEDURE — 76642 US BREAST LEFT LIMITED: ICD-10-PCS | Mod: 26,LT,, | Performed by: RADIOLOGY

## 2020-01-15 PROCEDURE — 76642 ULTRASOUND BREAST LIMITED: CPT | Mod: TC,LT

## 2020-01-15 PROCEDURE — 77066 DX MAMMO INCL CAD BI: CPT | Mod: 26,,, | Performed by: RADIOLOGY

## 2020-01-15 PROCEDURE — 77062 BREAST TOMOSYNTHESIS BI: CPT | Mod: 26,,, | Performed by: RADIOLOGY

## 2020-01-15 PROCEDURE — 76775 US EXAM ABDO BACK WALL LIM: CPT | Mod: 26,,, | Performed by: RADIOLOGY

## 2020-01-15 PROCEDURE — 76642 ULTRASOUND BREAST LIMITED: CPT | Mod: 26,LT,, | Performed by: RADIOLOGY

## 2020-01-15 PROCEDURE — 76775 US ABDOMINAL AORTA: ICD-10-PCS | Mod: 26,,, | Performed by: RADIOLOGY

## 2020-01-15 PROCEDURE — 77066 MAMMO DIGITAL DIAGNOSTIC BILAT WITH TOMOSYNTHESIS_CAD: ICD-10-PCS | Mod: 26,,, | Performed by: RADIOLOGY

## 2020-01-15 NOTE — PROGRESS NOTES
Individual Follow-Up Form    1/15/2020    Quit Date: n/a    Clinical Status of Patient: Outpatient    Length of Service: 45 minutes    Continuing Medication: yes  Patches    Other Medications: n/a     Target Symptoms: Withdrawal and medication side effects. The following were  rated moderate (3) to severe (4) on TCRS:  · Moderate (3): 0  · Severe (4): 0    Comments: pt presents no longer doing the nicotine lozenge, he is smoking a few per day, he wants to quit smoking however does not have the determination to put them down, he has an excuse for smoking and his reasons to him are valid. It will be difficult for him to quit smoking unless it is a health scare. He is currently on the 21mg nicotine patches daily however benefit perior is about to run out therefore recommend the next follow up appt start the weaning process. Session handout provided and discussed, will continue to monitor and follow.     Diagnosis: F17.210    Next Visit: 2 weeks

## 2020-01-15 NOTE — PROGRESS NOTES
BI-RADS Category 1: Negative     Recommendation:  No fllowup imaging recommended.    Gynecomastia needs MRI pituitary

## 2020-01-15 NOTE — PROGRESS NOTES
1. No aneurysmal degeneration of the imaged abdominal aorta or bilateral common iliac arteries.    AAA screening negative results to pt

## 2020-01-28 ENCOUNTER — CLINICAL SUPPORT (OUTPATIENT)
Dept: SMOKING CESSATION | Facility: CLINIC | Age: 65
End: 2020-01-28
Payer: COMMERCIAL

## 2020-01-28 DIAGNOSIS — F17.200 NICOTINE DEPENDENCE: Primary | ICD-10-CM

## 2020-01-28 PROCEDURE — 99403 PR PREVENT COUNSEL,INDIV,45 MIN: ICD-10-PCS | Mod: S$GLB,,,

## 2020-01-28 PROCEDURE — 99403 PREV MED CNSL INDIV APPRX 45: CPT | Mod: S$GLB,,,

## 2020-01-28 RX ORDER — IBUPROFEN 200 MG
1 TABLET ORAL DAILY
Qty: 14 PATCH | Refills: 0 | Status: SHIPPED | OUTPATIENT
Start: 2020-01-28 | End: 2020-02-28

## 2020-01-28 RX ORDER — DM/P-EPHED/ACETAMINOPH/DOXYLAM 30-7.5/3
2 LIQUID (ML) ORAL
Qty: 100 LOZENGE | Refills: 0 | Status: SHIPPED | OUTPATIENT
Start: 2020-01-28 | End: 2020-02-28

## 2020-01-28 RX ORDER — NICOTINE 7MG/24HR
1 PATCH, TRANSDERMAL 24 HOURS TRANSDERMAL DAILY
Qty: 14 PATCH | Refills: 0 | Status: SHIPPED | OUTPATIENT
Start: 2020-01-28 | End: 2020-02-21

## 2020-01-28 NOTE — PROGRESS NOTES
Individual Follow-Up Form    1/28/2020    Quit Date: n/a    Clinical Status of Patient: Outpatient    Length of Service: 45 minutes    Continuing Medication: yes  Patches or Nicotine gum    Other Medications: n/a     Target Symptoms: Withdrawal and medication side effects. The following were  rated moderate (3) to severe (4) on TCRS:  · Moderate (3): 0  · Severe (4): 0    Comments: pt presents for follow up smoking 10 cigarettes or so a day, was down to just a few per day however has had multiple stressors that have caused him to slip back into smoking more, he continues ot not smoke 2 pks per day as he was prior to this attempt, encouraged that he remain at where he is now and work back down from here. He is currently on the 21mg nicotine patch daily and using the nicotine lozenge throughout the day, recommend he continue to work on his quit and cut back, recommend finishing what he has left of the 21mg nicotine patches then starting on the 14mg nicotine patch x 2 week then follow with the 7mg nicotine patch thereafter for 1 week then every other day until comfortable not wearing at all.session handout provided and discussed will follow     Diagnosis: F17.210    Next Visit: 2 weeks

## 2020-01-28 NOTE — Clinical Note
Comments: pt presents for follow up smoking 10 cigarettes or so a day, was down to just a few per day however has had multiple stressors that have caused him to slip back into smoking more, he continues ot not smoke 2 pks per day as he was prior to this attempt, encouraged that he remain at where he is now and work back down from here. He is currently on the 21mg nicotine patch daily and using the nicotine lozenge throughout the day, recommend he continue to work on his quit and cut back, recommend finishing what he has left of the 21mg nicotine patches then starting on the 14mg nicotine patch x 2 week then follow with the 7mg nicotine patch thereafter for 1 week then every other day until comfortable not wearing at all.session handout provided and discussed will follow

## 2020-03-13 DIAGNOSIS — F41.9 ANXIETY: ICD-10-CM

## 2020-03-13 DIAGNOSIS — F13.20 BENZODIAZEPINE DEPENDENCE: Chronic | ICD-10-CM

## 2020-03-13 DIAGNOSIS — F32.A DEPRESSION, UNSPECIFIED DEPRESSION TYPE: Chronic | ICD-10-CM

## 2020-03-13 RX ORDER — ALPRAZOLAM 1 MG/1
TABLET ORAL
Qty: 45 TABLET | Refills: 0 | Status: SHIPPED | OUTPATIENT
Start: 2020-03-13 | End: 2020-05-08

## 2020-03-20 DIAGNOSIS — R79.89 ELEVATED PROLACTIN LEVEL: Primary | ICD-10-CM

## 2020-05-02 ENCOUNTER — HOSPITAL ENCOUNTER (OUTPATIENT)
Dept: RADIOLOGY | Facility: HOSPITAL | Age: 65
Discharge: HOME | End: 2020-05-02
Attending: INTERNAL MEDICINE
Payer: MEDICARE

## 2020-05-02 DIAGNOSIS — R79.89 ELEVATED PROLACTIN LEVEL: ICD-10-CM

## 2020-05-02 PROCEDURE — 70553 MRI BRAIN STEM W/O & W/DYE: CPT | Mod: TC

## 2020-05-02 PROCEDURE — 25500020 PHARM REV CODE 255: Performed by: INTERNAL MEDICINE

## 2020-05-02 PROCEDURE — 70553 MRI BRAIN STEM W/O & W/DYE: CPT | Mod: 26,,, | Performed by: INTERNAL MEDICINE

## 2020-05-02 PROCEDURE — A9585 GADOBUTROL INJECTION: HCPCS | Performed by: INTERNAL MEDICINE

## 2020-05-02 PROCEDURE — 70553 MRI PITUITARY W W/O CONTRAST: ICD-10-PCS | Mod: 26,,, | Performed by: INTERNAL MEDICINE

## 2020-05-02 RX ORDER — GADOBUTROL 604.72 MG/ML
5 INJECTION INTRAVENOUS
Status: COMPLETED | OUTPATIENT
Start: 2020-05-02 | End: 2020-05-02

## 2020-05-02 RX ADMIN — GADOBUTROL 5 ML: 604.72 INJECTION INTRAVENOUS at 12:05

## 2020-05-07 DIAGNOSIS — E11.9 TYPE 2 DIABETES MELLITUS WITHOUT COMPLICATION: ICD-10-CM

## 2020-05-08 ENCOUNTER — OFFICE VISIT (OUTPATIENT)
Dept: FAMILY MEDICINE | Facility: CLINIC | Age: 65
End: 2020-05-08
Payer: MEDICARE

## 2020-05-08 DIAGNOSIS — F13.20 BENZODIAZEPINE DEPENDENCE: Chronic | ICD-10-CM

## 2020-05-08 DIAGNOSIS — F32.A DEPRESSION, UNSPECIFIED DEPRESSION TYPE: Chronic | ICD-10-CM

## 2020-05-08 DIAGNOSIS — R79.89 LOW TESTOSTERONE: ICD-10-CM

## 2020-05-08 DIAGNOSIS — F41.9 ANXIETY: ICD-10-CM

## 2020-05-08 DIAGNOSIS — R79.89 ELEVATED PROLACTIN LEVEL: Primary | ICD-10-CM

## 2020-05-08 DIAGNOSIS — F13.20 BENZODIAZEPINE DEPENDENCE: ICD-10-CM

## 2020-05-08 DIAGNOSIS — F32.A DEPRESSION, UNSPECIFIED DEPRESSION TYPE: ICD-10-CM

## 2020-05-08 PROCEDURE — 99442 PR PHYSICIAN TELEPHONE EVALUATION 11-20 MIN: ICD-10-PCS | Mod: 95,,, | Performed by: INTERNAL MEDICINE

## 2020-05-08 PROCEDURE — 99442 PR PHYSICIAN TELEPHONE EVALUATION 11-20 MIN: CPT | Mod: 95,,, | Performed by: INTERNAL MEDICINE

## 2020-05-08 RX ORDER — ALPRAZOLAM 1 MG/1
TABLET ORAL
Qty: 45 TABLET | Refills: 0 | Status: SHIPPED | OUTPATIENT
Start: 2020-05-08 | End: 2020-06-03 | Stop reason: SDUPTHER

## 2020-05-08 RX ORDER — BUSPIRONE HYDROCHLORIDE 30 MG/1
30 TABLET ORAL DAILY
Qty: 30 TABLET | Refills: 11 | Status: SHIPPED | OUTPATIENT
Start: 2020-05-08 | End: 2020-11-27

## 2020-05-08 NOTE — PROGRESS NOTES
This note was created by combination of typed  and M-Modal dictation.  Transcription errors may be present.  If there are any questions, please contact me.    Assessment & Plan:   Elevated prolactin level,low testosterone, gynecomastia; ordered MRI pituitary 1/2020  Low testosterone  -markedly low testosterone level.  Elevated prolactin.  MRI pituitary negative for mass/tumor.  Repeat prolactin and check testosterone panel.  If prolactin remains elevated and testosterone markedly low I would have him see endocrinology for evaluation.  -     Prolactin; Future; Expected date: 05/08/2020  -     TESTOSTERONE PANEL; Future; Expected date: 05/08/2020    Uncontrolled type 2 diabetes mellitus without complication, without long-term current use of insulin  -last A1c was good.   On Metformin.    Anxiety  Depression, unspecified depression type  Benzodiazepine dependence  -not controlled, weaning down Xanax as he is already on high risk medication with his chronic narcotic.  Currently 1.5 mg at night.  I would like to get him down 2.25 or 0.5 at night.  Long discussion with the patient regarding the rationale.  He understands.  But overall still feeling anxious.  I will not increase the Xanax back to previous dose.  I will increase the BuSpar to 30 mg in the morning.  Refilled Xanax for same dose, 45 tablets in 1 month.  Plan to challenge in the future on 1 mg.  -     busPIRone (BUSPAR) 30 MG Tab; Take 1 tablet (30 mg total) by mouth once daily.  Dispense: 30 tablet; Refill: 11      Medications Discontinued During This Encounter   Medication Reason    busPIRone (BUSPAR) 15 MG tablet Dose adjustment       meds sent this encounter:  Medications Ordered This Encounter   Medications    busPIRone (BUSPAR) 30 MG Tab     Sig: Take 1 tablet (30 mg total) by mouth once daily.     Dispense:  30 tablet     Refill:  11       Follow Up: No follow-ups on file. f/u lab results    Subjective:     Chief Complaint   Patient presents  with    Low Testosterone    Anxiety       HPI  Butch is a 64 y.o. male, last appointment with this clinic was 1/8/2020.    The patient location is: [place]  The chief complaint leading to consultation is: labs prolactin; MRI results. anxiety  Visit type: Virtual visit with synchronous audio   Total time spent with patient: 15 min  Each patient to whom he or she provides medical services by telemedicine is:  (1) informed of the relationship between the physician and patient and the respective role of any other health care provider with respect to management of the patient; and (2) notified that he or she may decline to receive medical services by telemedicine and may withdraw from such care at any time.    Notes:  Telephonic visit    Last visit with me 1/2020  DM improved with dietary modification.  HTN stable at that time  Anxiety and depression, BZD dependence. Along with chronic narcotic use. Last visit cut him down from 2 mg daily to 1.5 mg daily x 3 months.  Then plan was to cut down to 1 mg daily.   Breast mass/gynecomastia, mammo negative, pituitary testing elevated prolactin.   MRI brain no prolactinoma    10/2019 lipid good   5/3 hydrocodone  4/28 fentanyl  4/10 xanax  3/13 xanax    Would repeat prolactin level, if still high see endo    Anxiety.  I have been trying to wean him down from his Xanax.  He notes though that it does not seem to be controlling him as well.  He takes the BuSpar in the morning.  15 mg.  He cut down the dose of Xanax from 2 mg at night to 1 and half.  But when he does that he feels like he is anxious in the morning, and has supplement that with a half tablet of Xanax.  So he has been trying to conserve-taking 1 mg of Xanax at night and resolving the half for morning if needed.  The BuSpar does not seem to be controlling him.  He feels like it results in high blood pressure and makes him feel funny, makes him feel tired.  He estimates this happens maybe 3 times a week.  I  discussed with him the high risk nature of the benzodiazepine and that coupled with narcotic use, I would continue to reduce the dose.  He denies obvious side effects of the BuSpar.  I will increase the dose.  He notes that BuSpar is much more expensive than the Xanax.  The BuSpar 25 dollars, the Xanax is maybe 4 dollars.    We reviewed the results of his MRI.  Shows no prolactinoma.  His last check of his labs, prolactin was elevated, and testosterone was markedly low.    I am going to repeat labs and if labs remain abnormal with elevated prolactin I will have him see endocrinology.    Otherwise doing OK. Reviewed his meds.  Taking as prescribed.      Patient Care Team:  Gabriel Evans MD as PCP - General (Internal Medicine)  Vlad Nava MD (Pain Medicine)  Kaila Carrillo OD as Consulting Physician (Optometry)  Yasmin Chen MA as Care Coordinator    Patient Active Problem List    Diagnosis Date Noted    Elevated prolactin level,low testosterone, gynecomastia; ordered MRI pituitary 1/2020 01/13/2020    Nuclear sclerosis of both eyes 11/04/2019    Open angle with borderline findings and high glaucoma risk in both eyes 10/08/2019    Cataract, bilateral 09/11/2018    Cervical stenosis of spinal canal 05/07/2018 02/14/2018 MRI C-spine impression:  Slight retrolisthesis of C4 with respect to C5.  Narrowing of the central spinal canal most prominent at the C4-C5, C5-C6, and C6-C7 levels and to a lesser extent at C2-C3 and C3-C4.  Annular disc bulges posteriorly at C2-C3, C3-C4.  Diffuse disc herniation/protrusion posteriorly at C4-C5 level and a disc herniation/extrusion posteriorly at the C5-C6 level with a central disc herniation/protrusion posteriorly at the C6-C7 level.  Narrowing of the neural foramen bilaterally from C3-C4 through C6-C7.      Tubular adenoma of colon 5/10/17 05/10/2017     5/10/2017 colonoscopy tubular adenoma      Therapeutic opioid-induced constipation (OIC) 04/17/2017    Tobacco  dependence, patch with irritation; hx of bupropion 01/12/2016    Non morbid obesity due to excess calories 01/12/2016    Uncontrolled type 2 diabetes mellitus without complication, without long-term current use of insulin 08/07/2015    Facet arthritis of cervical region 10/28/2013    Facet arthritis of lumbar region 10/28/2013    Benzodiazepine dependence, did not do well with attempt to wean down 2017 10/28/2013    Opioid dependence, pain mgmt 10/28/2013    ED (erectile dysfunction) 07/26/2013    DDD (degenerative disc disease), cervical 05/23/2013    DDD (degenerative disc disease), lumbar 05/23/2013    Essential hypertension 11/01/2012    Mixed hyperlipidemia 11/01/2012    Anemia 11/01/2012    Chronic pain 11/01/2012    Anxiety, unable to wean off BZD 11/01/2012    Depression 11/01/2012       PAST MEDICAL HISTORY:  Past Medical History:   Diagnosis Date    Anxiety     Cataract, bilateral 9/11/2018    Degenerative disc disease     Depression     Diabetes mellitus type II, controlled     ETOH abuse     Eye injury as a child    stick hit eye ? 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 both eyes 10/8/2019       PAST SURGICAL HISTORY:  Past Surgical History:   Procedure Laterality Date    APPENDECTOMY      COLONOSCOPY N/A 5/10/2017    Procedure: COLONOSCOPY;  Surgeon: Shantanu Marley MD;  [facility];  Service: Endoscopy;  Laterality: N/A;       SOCIAL HISTORY:  Social History     Socioeconomic History    Marital status:      Spouse name: Not on file    Number of children: Not on file    Years of education: Not on file    Highest education level: Not on file   Occupational History    Occupation: retired -    Social Needs    Financial resource strain: Not on file    Food insecurity:     Worry: Not on file      Inability: Not on file    Transportation needs:     Medical: Not on file     Non-medical: Not on file   Tobacco Use    Smoking status: Current Every Day Smoker     Packs/day: 0.50     Years: 32.00     Pack years: 16.00     Types: Cigarettes     Last attempt to quit: 3/4/2013     Years since quittin.1    Smokeless tobacco: Never Used   Substance and Sexual Activity    Alcohol use: No     Alcohol/week: 0.0 standard drinks    Drug use: No    Sexual activity: Yes     Partners: Female     Comment:  with children, disabled    Lifestyle    Physical activity:     Days per week: Not on file     Minutes per session: Not on file    Stress: Very much   Relationships    Social connections:     Talks on phone: Not on file     Gets together: Not on file     Attends [employer] service: Not on file     Active member of club or organization: Not on file     Attends meetings of clubs or organizations: Not on file     Relationship status: Not on file   Other Topics Concern    Not on file   Social History Narrative    Not on file       ALLERGIES AND MEDICATIONS: updated and reviewed.  Review of patient's allergies indicates:  No Known Allergies  Current Outpatient Medications   Medication Sig Dispense Refill    albuterol 90 mcg/actuation inhaler Inhale 2 puffs into the lungs every 6 (six) hours as needed for Wheezing. Rescue 18 g 0    ALPRAZolam (XANAX) 1 MG tablet TAKE ONE AND ONE-HALF TABLETS BY MOUTH NIGHTLY AS NEEDED reducing overall DOSE 45 tablet 0    aspirin (ECOTRIN) 81 MG EC tablet Take 1 tablet (81 mg total) by mouth once daily.  0    atorvastatin (LIPITOR) 40 MG tablet TAKE ONE TABLET BY MOUTH ONCE A DAY FOR for cholesterol 90 tablet 3    busPIRone (BUSPAR) 15 MG tablet TAKE ONE TABLET BY MOUTH EVERY MORNING 90 tablet 3    citalopram (CELEXA) 20 MG tablet TAKE ONE TABLET BY MOUTH ONCE A DAY 90 tablet 1    cloNIDine (CATAPRES) 0.2 MG tablet Take 1 tablet (0.2 mg total) by mouth 2  (two) times daily. 180 tablet 3    fentaNYL (DURAGESIC) 25 mcg/hr Place 1 patch onto the skin Every 3 (three) days.  0    fish oil-omega-3 fatty acids 300-1,000 mg capsule Take 2 capsules (2 g total) by mouth once daily. (Patient not taking: Reported on 11/4/2019) 60 capsule 5    fluticasone (FLONASE) 50 mcg/actuation nasal spray INHALE 1 SPRAY IN EACH NOSTRIL EVERY DAY (Patient not taking: Reported on 11/4/2019) 16 mL 0    hydrocodone-acetaminophen 10-325mg (NORCO)  mg Tab Take 1 tablet by mouth 2 (two) times daily.      inhalation spacing device Use as directed for inhalation. 1 each 0    levocetirizine (XYZAL) 5 MG tablet TAKE 1 TABLET(5 MG) BY MOUTH EVERY EVENING 30 tablet 0    lisinopril (PRINIVIL,ZESTRIL) 40 MG tablet Take 1 tablet (40 mg total) by mouth once daily. 90 tablet 3    metFORMIN (GLUCOPHAGE) 500 MG tablet Take 1 tablet (500 mg total) by mouth 2 (two) times daily with meals. 180 tablet 3    POLYETHYLENE GLYCOL 3350 (MIRALAX ORAL) Take 1 application by mouth.      tadalafil (CIALIS) 20 MG Tab Take 1 tablet (20 mg total) by mouth once daily. 10 tablet 10    verapamil (CALAN-SR) 180 MG CR tablet Take 1 tablet (180 mg total) by mouth 2 (two) times daily. 180 tablet 3     No current facility-administered medications for this visit.        Review of Systems   Constitutional: Negative for chills and fever.   Respiratory: Negative for shortness of breath.    Cardiovascular: Negative for chest pain.   Psychiatric/Behavioral: The patient is nervous/anxious.        Objective:   Physical Exam   There were no vitals filed for this visit. There is no height or weight on file to calculate BMI.          No physical exam performed-telephonic visit

## 2020-05-12 ENCOUNTER — LAB VISIT (OUTPATIENT)
Dept: LAB | Facility: HOSPITAL | Age: 65
End: 2020-05-12
Attending: INTERNAL MEDICINE
Payer: MEDICARE

## 2020-05-12 DIAGNOSIS — R79.89 LOW TESTOSTERONE: ICD-10-CM

## 2020-05-12 DIAGNOSIS — R79.89 ELEVATED PROLACTIN LEVEL: ICD-10-CM

## 2020-05-12 DIAGNOSIS — F32.A DEPRESSION, UNSPECIFIED DEPRESSION TYPE: Chronic | ICD-10-CM

## 2020-05-12 DIAGNOSIS — F41.9 ANXIETY: ICD-10-CM

## 2020-05-12 DIAGNOSIS — E11.9 TYPE 2 DIABETES MELLITUS WITHOUT COMPLICATION: ICD-10-CM

## 2020-05-12 LAB — PROLACTIN SERPL IA-MCNC: 19.1 NG/ML (ref 3.5–19.4)

## 2020-05-12 PROCEDURE — 82040 ASSAY OF SERUM ALBUMIN: CPT

## 2020-05-12 PROCEDURE — 83036 HEMOGLOBIN GLYCOSYLATED A1C: CPT

## 2020-05-12 PROCEDURE — 84146 ASSAY OF PROLACTIN: CPT

## 2020-05-12 RX ORDER — CITALOPRAM 20 MG/1
TABLET, FILM COATED ORAL
Qty: 90 TABLET | Refills: 1 | Status: SHIPPED | OUTPATIENT
Start: 2020-05-12 | End: 2020-09-04

## 2020-05-13 LAB
ESTIMATED AVG GLUCOSE: 140 MG/DL (ref 68–131)
HBA1C MFR BLD HPLC: 6.5 % (ref 4–5.6)

## 2020-05-13 NOTE — TELEPHONE ENCOUNTER
----- Message from Tamika Feliz sent at 5/13/2020  1:14 PM CDT -----  Contact: Patient [phone]  Type:  Test Results    Who Called:  Patient    Name of Test (Lab/Mammo/Etc): Labs    Date of Test: 05-12-20    Where the test was performed: [facility]    Would the patient rather a call back or a response via [facility]? Call back    Best Call Back Number: [phone]      For Clinical Team:Has the provider reviewed the results?

## 2020-05-17 LAB
ALBUMIN SERPL-MCNC: 4.3 G/DL (ref 3.6–5.1)
SHBG SERPL-SCNC: 27 NMOL/L (ref 22–77)
TESTOST FREE SERPL-MCNC: 12.5 PG/ML (ref 46–224)
TESTOST SERPL-MCNC: 89 NG/DL (ref 250–1100)
TESTOSTERONE.FREE+WB SERPL-MCNC: 24.7 NG/DL (ref 110–575)

## 2020-05-18 NOTE — PROGRESS NOTES
Prolactin previously elevated, normal on repeat.  MRI pituitary normal/negative  Testosterone low - total testosterone; free testosterone low SHBG normal  a1c good  Plan to start testosterone injection 200 mg every 2 weeks.   Has f/u appt 6/2020

## 2020-05-29 DIAGNOSIS — I10 ESSENTIAL HYPERTENSION: ICD-10-CM

## 2020-05-29 RX ORDER — VERAPAMIL HYDROCHLORIDE 180 MG/1
TABLET, FILM COATED, EXTENDED RELEASE ORAL
Qty: 180 TABLET | Refills: 3 | Status: SHIPPED | OUTPATIENT
Start: 2020-05-29 | End: 2021-02-05

## 2020-06-02 NOTE — PROGRESS NOTES
This note was created by combination of typed  and M-Modal dictation.  Transcription errors may be present.  If there are any questions, please contact me.    Assessment & Plan:   Benzodiazepine dependence, did not do well with attempt to wean down 2017  Benzodiazepine dependence  Anxiety  Depression, unspecified depression type  -I think he is still having a tough time adjusting to the decreased dose of Xanax.  For now no change, 45 tablets in 1 month.  BuSpar 30 mg in the morning.  For now no change.  Eventual goal is to challenge on 1 mg total in 1 day.  This may take a while.  -     ALPRAZolam (XANAX) 1 MG tablet; TAKE ONE AND ONE-HALF TABLETS BY MOUTH NIGHTLY AS NEEDED reducing overall DOSE  Dispense: 45 tablet; Refill: 2    Uncomplicated opioid dependence  -managed by pain mgmt    Elevated prolactin level,low testosterone, gynecomastia; MRI pituitary normal 5/2020  -prolactin on repeat normal. No further testing.    Low testosterone in male  -persisting, with low total and free testosterone. SHBG WNL  Start replacement therapy. SE profile discussed.  Including risks of elevated LFTs, elevated hemoglobin, elevated lipids, more aggressive prostate cancer.  ALEXIS done today normal  PSA to be drawn with next labs  He is comfortable with the process of self injection.  Our staff to give 1st injection today.  And I sent in prescription with testing supplies for future self injection every 2 weeks.  After 3 months he will need to schedule labs 7 days after injection to check levels.  Discussed length with him about the scheduling.  -     testosterone cypionate injection 200 mg  -     Comprehensive metabolic panel; Future; Expected date: 09/02/2020  -     CBC auto differential; Future; Expected date: 09/02/2020  -     Lipid Panel; Future; Expected date: 09/02/2020  -     PSA, Screening; Future; Expected date: 09/02/2020  -     Testosterone; Future; Expected date: 09/02/2020  -     testosterone cypionate  "(DEPOTESTOTERONE CYPIONATE) 200 mg/mL injection; Inject 1 mL (200 mg total) into the muscle every 14 (fourteen) days.  Dispense: 10 mL; Refill: 0  -     syringe, disposable, (BD LUER-STARLA SYRINGE) 1 mL Syrg; Testosterone injection every 2 weeks  Dispense: 6 Syringe; Refill: 0  -     needle, disp, 22 G 22 gauge x 1" Ndle; Testosterone injection every 2 weeks  Dispense: 6 each; Refill: 0    Screening for prostate cancer  -future labs  -     PSA, Screening; Future; Expected date: 09/02/2020    Encounter for screening for HIV  -future labs  -     HIV 1/2 Ag/Ab (4th Gen); Future; Expected date: 09/02/2020    Wheezing  -refilled albuterol PRN.  Smoker. Active smoking.  -     albuterol (PROVENTIL/VENTOLIN HFA) 90 mcg/actuation inhaler; Inhale 2 puffs into the lungs every 6 (six) hours as needed for Wheezing. Rescue  Dispense: 18 g; Refill: 0      Medications Discontinued During This Encounter   Medication Reason    albuterol 90 mcg/actuation inhaler Reorder    ALPRAZolam (XANAX) 1 MG tablet Reorder       meds sent this encounter:  Medications Ordered This Encounter   Medications    albuterol (PROVENTIL/VENTOLIN HFA) 90 mcg/actuation inhaler     Sig: Inhale 2 puffs into the lungs every 6 (six) hours as needed for Wheezing. Rescue     Dispense:  18 g     Refill:  0    ALPRAZolam (XANAX) 1 MG tablet     Sig: TAKE ONE AND ONE-HALF TABLETS BY MOUTH NIGHTLY AS NEEDED reducing overall DOSE     Dispense:  45 tablet     Refill:  2    needle, disp, 22 G 22 gauge x 1" Ndle     Sig: Testosterone injection every 2 weeks     Dispense:  6 each     Refill:  0    syringe, disposable, (BD LUER-STARLA SYRINGE) 1 mL Syrg     Sig: Testosterone injection every 2 weeks     Dispense:  6 Syringe     Refill:  0    testosterone cypionate (DEPOTESTOTERONE CYPIONATE) 200 mg/mL injection     Sig: Inject 1 mL (200 mg total) into the muscle every 14 (fourteen) days.     Dispense:  10 mL     Refill:  0    testosterone cypionate injection 200 mg     " Order Specific Question:   Is the patient competent?     Answer:   Yes       Follow Up: No follow-ups on file. labs and OV 3 months. previsit labs ordered.    Subjective:     Chief Complaint   Patient presents with    Wheezing       HPI  Butch is a 64 y.o. male, last appointment with this clinic was 5/8/2020.    Last visit, markedly low testosterone level with elevated prolactin.  MRI pituitary was negative for a mass or tumor.  Plan at that time was repeat labs.  Anxiety and depression and benzodiazepine dependence.  He was having some anxiety, with wean down on the Xanax.  But he is high risk with combination benzodiazepine and chronic narcotic.  I increased his BuSpar instead.    Repeat labs prolactin was normal.  Testosterone was low.  Free testosterone was low and sex hormone binding globulin was normal.  A1c was good.  Plan to start testosterone injection 200 mg every 2 weeks.    Occasional intermittent wheezing.  Still smoking.  Nerves are still up.  So started smoking again. buspar at higher dose - does not think that it has helped.      He is still having significant amount of anxiety.  Keeps coming back to the reduced dose of Xanax.  Takes 1-1 and half a day.  BuSpar in the mornings.  Has grandchildren home and finds them very aggravating.  Talks a lot about the only thing that helps his anxiety when he gets worked up is the Xanax.    Denies obvious side effects of the BuSpar.  I had previously increase the dose.    We talked about his testosterone levels and he would be agreeable to start therapy.  We talked about side effect profile of testosterone therapy.  He would be agreeable to start.  He is familiar with injections, has family members who are diabetic and would be agreeable to inject himself.    Patient Care Team:  Gabriel Evans MD as PCP - General (Internal Medicine)  Vlad Nava MD (Pain Medicine)  Kaila Carrillo OD as Consulting Physician (Optometry)  Yasmin Chen MA as Care  Coordinator    Patient Active Problem List    Diagnosis Date Noted    Elevated prolactin level,low testosterone, gynecomastia; MRI pituitary normal 5/2020 01/13/2020    Nuclear sclerosis of both eyes 11/04/2019    Open angle with borderline findings and high glaucoma risk in both eyes 10/08/2019    Cataract, bilateral 09/11/2018    Cervical stenosis of spinal canal 05/07/2018 02/14/2018 MRI C-spine impression:  Slight retrolisthesis of C4 with respect to C5.  Narrowing of the central spinal canal most prominent at the C4-C5, C5-C6, and C6-C7 levels and to a lesser extent at C2-C3 and C3-C4.  Annular disc bulges posteriorly at C2-C3, C3-C4.  Diffuse disc herniation/protrusion posteriorly at C4-C5 level and a disc herniation/extrusion posteriorly at the C5-C6 level with a central disc herniation/protrusion posteriorly at the C6-C7 level.  Narrowing of the neural foramen bilaterally from C3-C4 through C6-C7.      Tubular adenoma of colon 5/10/17 05/10/2017     5/10/2017 colonoscopy tubular adenoma      Therapeutic opioid-induced constipation (OIC) 04/17/2017    Tobacco dependence, patch with irritation; hx of bupropion 01/12/2016    Non morbid obesity due to excess calories 01/12/2016    Uncontrolled type 2 diabetes mellitus without complication, without long-term current use of insulin 08/07/2015    Facet arthritis of cervical region 10/28/2013    Facet arthritis of lumbar region 10/28/2013    Benzodiazepine dependence, did not do well with attempt to wean down 2017 10/28/2013    Opioid dependence, pain mgmt 10/28/2013    ED (erectile dysfunction) 07/26/2013    DDD (degenerative disc disease), cervical 05/23/2013    DDD (degenerative disc disease), lumbar 05/23/2013    Essential hypertension 11/01/2012    Mixed hyperlipidemia 11/01/2012    Anemia 11/01/2012    Chronic pain 11/01/2012    Anxiety, unable to wean off BZD 11/01/2012    Depression 11/01/2012       PAST MEDICAL HISTORY:  Past  Medical History:   Diagnosis Date    Anxiety     Cataract, bilateral 2018    Degenerative disc disease     Depression     Diabetes mellitus type II, controlled     ETOH abuse     Eye injury as a child    stick hit eye ? 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 both eyes 10/8/2019       PAST SURGICAL HISTORY:  Past Surgical History:   Procedure Laterality Date    APPENDECTOMY      COLONOSCOPY N/A 5/10/2017    Procedure: COLONOSCOPY;  Surgeon: Shantanu Marley MD;  Location: [facility];  Service: Endoscopy;  Laterality: N/A;       SOCIAL HISTORY:  Social History     Socioeconomic History    Marital status:      Spouse name: Not on file    Number of children: Not on file    Years of education: Not on file    Highest education level: Not on file   Occupational History    Occupation: retired -    Social Needs    Financial resource strain: Not on file    Food insecurity:     Worry: Not on file     Inability: Not on file    Transportation needs:     Medical: Not on file     Non-medical: Not on file   Tobacco Use    Smoking status: Current Every Day Smoker     Packs/day: 0.50     Years: 32.00     Pack years: 16.00     Types: Cigarettes     Last attempt to quit: 3/4/2013     Years since quittin.2    Smokeless tobacco: Never Used   Substance and Sexual Activity    Alcohol use: No     Alcohol/week: 0.0 standard drinks    Drug use: No    Sexual activity: Yes     Partners: Female     Comment:  with children, disabled    Lifestyle    Physical activity:     Days per week: Not on file     Minutes per session: Not on file    Stress: Very much   Relationships    Social connections:     Talks on phone: Not on file     Gets together: Not on file     Attends Yarsanism service: Not on file     Active member of club or  organization: Not on file     Attends meetings of clubs or organizations: Not on file     Relationship status: Not on file   Other Topics Concern    Not on file   Social History Narrative    Not on file       ALLERGIES AND MEDICATIONS: updated and reviewed.  Review of patient's allergies indicates:  No Known Allergies  Current Outpatient Medications   Medication Sig Dispense Refill    ALPRAZolam (XANAX) 1 MG tablet TAKE ONE AND ONE-HALF TABLETS BY MOUTH NIGHTLY AS NEEDED reducing overall DOSE 45 tablet 0    atorvastatin (LIPITOR) 40 MG tablet TAKE ONE TABLET BY MOUTH ONCE A DAY FOR for cholesterol 90 tablet 3    busPIRone (BUSPAR) 30 MG Tab Take 1 tablet (30 mg total) by mouth once daily. 30 tablet 11    citalopram (CELEXA) 20 MG tablet TAKE ONE TABLET BY MOUTH ONCE A DAY 90 tablet 1    cloNIDine (CATAPRES) 0.2 MG tablet Take 1 tablet (0.2 mg total) by mouth 2 (two) times daily. 180 tablet 3    fentaNYL (DURAGESIC) 25 mcg/hr Place 1 patch onto the skin Every 3 (three) days.  0    fish oil-omega-3 fatty acids 300-1,000 mg capsule Take 2 capsules (2 g total) by mouth once daily. 60 capsule 5    fluticasone (FLONASE) 50 mcg/actuation nasal spray INHALE 1 SPRAY IN EACH NOSTRIL EVERY DAY 16 mL 0    hydrocodone-acetaminophen 10-325mg (NORCO)  mg Tab Take 1 tablet by mouth 2 (two) times daily.      inhalation spacing device Use as directed for inhalation. 1 each 0    levocetirizine (XYZAL) 5 MG tablet TAKE 1 TABLET(5 MG) BY MOUTH EVERY EVENING 30 tablet 0    lisinopril (PRINIVIL,ZESTRIL) 40 MG tablet Take 1 tablet (40 mg total) by mouth once daily. 90 tablet 3    metFORMIN (GLUCOPHAGE) 500 MG tablet Take 1 tablet (500 mg total) by mouth 2 (two) times daily with meals. 180 tablet 3    POLYETHYLENE GLYCOL 3350 (MIRALAX ORAL) Take 1 application by mouth.      tadalafil (CIALIS) 20 MG Tab Take 1 tablet (20 mg total) by mouth once daily. 10 tablet 10    verapamiL (CALAN-SR) 180 MG CR tablet TAKE ONE  "TABLET BY MOUTH TWICE DAILY 180 tablet 3    albuterol 90 mcg/actuation inhaler Inhale 2 puffs into the lungs every 6 (six) hours as needed for Wheezing. Rescue 18 g 0    aspirin (ECOTRIN) 81 MG EC tablet Take 1 tablet (81 mg total) by mouth once daily.  0     No current facility-administered medications for this visit.        Review of Systems   All other systems reviewed and are negative.      Objective:   Physical Exam   Vitals:    06/03/20 1533 06/03/20 1559   BP: (!) 150/86 (!) 136/54   BP Location: Left arm Left arm   Patient Position: Sitting Sitting   BP Method: Large (Manual) Large (Manual)   Pulse: (!) 49    Temp: 97.8 °F (36.6 °C)    TempSrc: Temporal    SpO2: 97%    Weight: 98 kg (216 lb)    Height: 5' 11" (1.803 m)     Body mass index is 30.13 kg/m².  Weight: 98 kg (216 lb)   Height: 5' 11" (180.3 cm)     Physical Exam   Constitutional: He is oriented to person, place, and time. He appears well-developed and well-nourished. No distress.   Eyes: EOM are normal.   Cardiovascular: Normal rate, regular rhythm and normal heart sounds.   No murmur heard.  Pulmonary/Chest: Effort normal. He has wheezes.   Intermittent inspiratory wheeze, improved with deep inspiration   Genitourinary:   Genitourinary Comments: External rectal exam unremarkable  Prostate unremarkable   Musculoskeletal: Normal range of motion.   Neurological: He is alert and oriented to person, place, and time. Coordination normal.   Skin: Skin is warm and dry.   Psychiatric: He has a normal mood and affect. His behavior is normal. Thought content normal.     "

## 2020-06-03 ENCOUNTER — OFFICE VISIT (OUTPATIENT)
Dept: FAMILY MEDICINE | Facility: CLINIC | Age: 65
End: 2020-06-03
Payer: MEDICARE

## 2020-06-03 VITALS
BODY MASS INDEX: 30.24 KG/M2 | WEIGHT: 216 LBS | HEART RATE: 49 BPM | SYSTOLIC BLOOD PRESSURE: 136 MMHG | TEMPERATURE: 98 F | OXYGEN SATURATION: 97 % | DIASTOLIC BLOOD PRESSURE: 54 MMHG | HEIGHT: 71 IN

## 2020-06-03 DIAGNOSIS — R79.89 LOW TESTOSTERONE IN MALE: ICD-10-CM

## 2020-06-03 DIAGNOSIS — F41.9 ANXIETY: ICD-10-CM

## 2020-06-03 DIAGNOSIS — J06.9 VIRAL URI WITH COUGH: ICD-10-CM

## 2020-06-03 DIAGNOSIS — F32.A DEPRESSION, UNSPECIFIED DEPRESSION TYPE: Chronic | ICD-10-CM

## 2020-06-03 DIAGNOSIS — F13.20 BENZODIAZEPINE DEPENDENCE: Primary | Chronic | ICD-10-CM

## 2020-06-03 DIAGNOSIS — R06.2 WHEEZING: ICD-10-CM

## 2020-06-03 DIAGNOSIS — F11.20 UNCOMPLICATED OPIOID DEPENDENCE: Chronic | ICD-10-CM

## 2020-06-03 DIAGNOSIS — Z11.4 ENCOUNTER FOR SCREENING FOR HIV: ICD-10-CM

## 2020-06-03 DIAGNOSIS — F13.20 BENZODIAZEPINE DEPENDENCE: Chronic | ICD-10-CM

## 2020-06-03 DIAGNOSIS — R79.89 ELEVATED PROLACTIN LEVEL: ICD-10-CM

## 2020-06-03 DIAGNOSIS — Z12.5 SCREENING FOR PROSTATE CANCER: ICD-10-CM

## 2020-06-03 PROCEDURE — 96372 THER/PROPH/DIAG INJ SC/IM: CPT | Mod: S$GLB,,, | Performed by: INTERNAL MEDICINE

## 2020-06-03 PROCEDURE — 1100F PTFALLS ASSESS-DOCD GE2>/YR: CPT | Mod: CPTII,S$GLB,, | Performed by: INTERNAL MEDICINE

## 2020-06-03 PROCEDURE — 99999 PR PBB SHADOW E&M-EST. PATIENT-LVL IV: ICD-10-PCS | Mod: PBBFAC,,, | Performed by: INTERNAL MEDICINE

## 2020-06-03 PROCEDURE — 99214 PR OFFICE/OUTPT VISIT, EST, LEVL IV, 30-39 MIN: ICD-10-PCS | Mod: 25,S$GLB,, | Performed by: INTERNAL MEDICINE

## 2020-06-03 PROCEDURE — 96372 PR INJECTION,THERAP/PROPH/DIAG2ST, IM OR SUBCUT: ICD-10-PCS | Mod: S$GLB,,, | Performed by: INTERNAL MEDICINE

## 2020-06-03 PROCEDURE — 3078F DIAST BP <80 MM HG: CPT | Mod: CPTII,S$GLB,, | Performed by: INTERNAL MEDICINE

## 2020-06-03 PROCEDURE — 99214 OFFICE O/P EST MOD 30 MIN: CPT | Mod: 25,S$GLB,, | Performed by: INTERNAL MEDICINE

## 2020-06-03 PROCEDURE — 3008F PR BODY MASS INDEX (BMI) DOCUMENTED: ICD-10-PCS | Mod: CPTII,S$GLB,, | Performed by: INTERNAL MEDICINE

## 2020-06-03 PROCEDURE — 3075F SYST BP GE 130 - 139MM HG: CPT | Mod: CPTII,S$GLB,, | Performed by: INTERNAL MEDICINE

## 2020-06-03 PROCEDURE — 3078F PR MOST RECENT DIASTOLIC BLOOD PRESSURE < 80 MM HG: ICD-10-PCS | Mod: CPTII,S$GLB,, | Performed by: INTERNAL MEDICINE

## 2020-06-03 PROCEDURE — 99999 PR PBB SHADOW E&M-EST. PATIENT-LVL IV: CPT | Mod: PBBFAC,,, | Performed by: INTERNAL MEDICINE

## 2020-06-03 PROCEDURE — 1100F PR PT FALLS ASSESS DOC 2+ FALLS/FALL W/INJURY/YR: ICD-10-PCS | Mod: CPTII,S$GLB,, | Performed by: INTERNAL MEDICINE

## 2020-06-03 PROCEDURE — 3008F BODY MASS INDEX DOCD: CPT | Mod: CPTII,S$GLB,, | Performed by: INTERNAL MEDICINE

## 2020-06-03 PROCEDURE — 3288F FALL RISK ASSESSMENT DOCD: CPT | Mod: CPTII,S$GLB,, | Performed by: INTERNAL MEDICINE

## 2020-06-03 PROCEDURE — 3288F PR FALLS RISK ASSESSMENT DOCUMENTED: ICD-10-PCS | Mod: CPTII,S$GLB,, | Performed by: INTERNAL MEDICINE

## 2020-06-03 PROCEDURE — 3075F PR MOST RECENT SYSTOLIC BLOOD PRESS GE 130-139MM HG: ICD-10-PCS | Mod: CPTII,S$GLB,, | Performed by: INTERNAL MEDICINE

## 2020-06-03 RX ORDER — ALPRAZOLAM 1 MG/1
TABLET ORAL
Qty: 45 TABLET | Refills: 2 | Status: SHIPPED | OUTPATIENT
Start: 2020-06-03 | End: 2020-07-31

## 2020-06-03 RX ORDER — TESTOSTERONE CYPIONATE 200 MG/ML
200 INJECTION, SOLUTION INTRAMUSCULAR
Status: SHIPPED | OUTPATIENT
Start: 2020-06-03 | End: 2020-08-26

## 2020-06-03 RX ORDER — TESTOSTERONE CYPIONATE 200 MG/ML
200 INJECTION, SOLUTION INTRAMUSCULAR
Qty: 10 ML | Refills: 0 | Status: SHIPPED | OUTPATIENT
Start: 2020-06-03 | End: 2020-09-04 | Stop reason: SDUPTHER

## 2020-06-03 RX ORDER — ALBUTEROL SULFATE 90 UG/1
2 AEROSOL, METERED RESPIRATORY (INHALATION) EVERY 6 HOURS PRN
Qty: 18 G | Refills: 0 | Status: SHIPPED | OUTPATIENT
Start: 2020-06-03 | End: 2021-11-24

## 2020-06-03 RX ADMIN — TESTOSTERONE CYPIONATE 200 MG: 200 INJECTION, SOLUTION INTRAMUSCULAR at 04:06

## 2020-06-03 NOTE — PROGRESS NOTES
Patient tolerate Depo Testosterone 200 mg IM  injection. Patient advise to wait 15 min after injection  for assessment of any posssible side effects.

## 2020-08-21 DIAGNOSIS — E11.9 TYPE 2 DIABETES MELLITUS WITHOUT COMPLICATION: ICD-10-CM

## 2020-09-02 ENCOUNTER — LAB VISIT (OUTPATIENT)
Dept: LAB | Facility: HOSPITAL | Age: 65
End: 2020-09-02
Attending: INTERNAL MEDICINE
Payer: MEDICARE

## 2020-09-02 DIAGNOSIS — R79.89 LOW TESTOSTERONE IN MALE: ICD-10-CM

## 2020-09-02 DIAGNOSIS — Z11.4 ENCOUNTER FOR SCREENING FOR HIV: ICD-10-CM

## 2020-09-02 DIAGNOSIS — Z12.5 SCREENING FOR PROSTATE CANCER: ICD-10-CM

## 2020-09-02 LAB
ALBUMIN SERPL BCP-MCNC: 3.9 G/DL (ref 3.5–5.2)
ALP SERPL-CCNC: 45 U/L (ref 55–135)
ALT SERPL W/O P-5'-P-CCNC: 16 U/L (ref 10–44)
ANION GAP SERPL CALC-SCNC: 9 MMOL/L (ref 8–16)
AST SERPL-CCNC: 21 U/L (ref 10–40)
BASOPHILS # BLD AUTO: 0.05 K/UL (ref 0–0.2)
BASOPHILS NFR BLD: 0.6 % (ref 0–1.9)
BILIRUB SERPL-MCNC: 0.6 MG/DL (ref 0.1–1)
BUN SERPL-MCNC: 18 MG/DL (ref 8–23)
CALCIUM SERPL-MCNC: 9.2 MG/DL (ref 8.7–10.5)
CHLORIDE SERPL-SCNC: 103 MMOL/L (ref 95–110)
CHOLEST SERPL-MCNC: 93 MG/DL (ref 120–199)
CHOLEST/HDLC SERPL: 3.1 {RATIO} (ref 2–5)
CO2 SERPL-SCNC: 26 MMOL/L (ref 23–29)
COMPLEXED PSA SERPL-MCNC: 0.32 NG/ML (ref 0–4)
CREAT SERPL-MCNC: 1.6 MG/DL (ref 0.5–1.4)
DIFFERENTIAL METHOD: ABNORMAL
EOSINOPHIL # BLD AUTO: 0.3 K/UL (ref 0–0.5)
EOSINOPHIL NFR BLD: 3.9 % (ref 0–8)
ERYTHROCYTE [DISTWIDTH] IN BLOOD BY AUTOMATED COUNT: 12.5 % (ref 11.5–14.5)
EST. GFR  (AFRICAN AMERICAN): 51.1 ML/MIN/1.73 M^2
EST. GFR  (NON AFRICAN AMERICAN): 44.2 ML/MIN/1.73 M^2
GLUCOSE SERPL-MCNC: 172 MG/DL (ref 70–110)
HCT VFR BLD AUTO: 46.5 % (ref 40–54)
HDLC SERPL-MCNC: 30 MG/DL (ref 40–75)
HDLC SERPL: 32.3 % (ref 20–50)
HGB BLD-MCNC: 14.6 G/DL (ref 14–18)
IMM GRANULOCYTES # BLD AUTO: 0.03 K/UL (ref 0–0.04)
IMM GRANULOCYTES NFR BLD AUTO: 0.3 % (ref 0–0.5)
LDLC SERPL CALC-MCNC: 42.4 MG/DL (ref 63–159)
LYMPHOCYTES # BLD AUTO: 2.9 K/UL (ref 1–4.8)
LYMPHOCYTES NFR BLD: 32.7 % (ref 18–48)
MCH RBC QN AUTO: 32.4 PG (ref 27–31)
MCHC RBC AUTO-ENTMCNC: 31.4 G/DL (ref 32–36)
MCV RBC AUTO: 103 FL (ref 82–98)
MONOCYTES # BLD AUTO: 0.7 K/UL (ref 0.3–1)
MONOCYTES NFR BLD: 8.3 % (ref 4–15)
NEUTROPHILS # BLD AUTO: 4.8 K/UL (ref 1.8–7.7)
NEUTROPHILS NFR BLD: 54.2 % (ref 38–73)
NONHDLC SERPL-MCNC: 63 MG/DL
NRBC BLD-RTO: 0 /100 WBC
PLATELET # BLD AUTO: 176 K/UL (ref 150–350)
PMV BLD AUTO: 12.4 FL (ref 9.2–12.9)
POTASSIUM SERPL-SCNC: 4.6 MMOL/L (ref 3.5–5.1)
PROT SERPL-MCNC: 6.8 G/DL (ref 6–8.4)
RBC # BLD AUTO: 4.5 M/UL (ref 4.6–6.2)
SODIUM SERPL-SCNC: 138 MMOL/L (ref 136–145)
TESTOST SERPL-MCNC: 450 NG/DL (ref 304–1227)
TRIGL SERPL-MCNC: 103 MG/DL (ref 30–150)
WBC # BLD AUTO: 8.83 K/UL (ref 3.9–12.7)

## 2020-09-02 PROCEDURE — 85025 COMPLETE CBC W/AUTO DIFF WBC: CPT

## 2020-09-02 PROCEDURE — 86703 HIV-1/HIV-2 1 RESULT ANTBDY: CPT

## 2020-09-02 PROCEDURE — 80061 LIPID PANEL: CPT

## 2020-09-02 PROCEDURE — 36415 COLL VENOUS BLD VENIPUNCTURE: CPT | Mod: PO

## 2020-09-02 PROCEDURE — 84403 ASSAY OF TOTAL TESTOSTERONE: CPT

## 2020-09-02 PROCEDURE — 80053 COMPREHEN METABOLIC PANEL: CPT

## 2020-09-02 PROCEDURE — 84153 ASSAY OF PSA TOTAL: CPT

## 2020-09-03 LAB — HIV 1+2 AB+HIV1 P24 AG SERPL QL IA: NEGATIVE

## 2020-09-03 NOTE — PROGRESS NOTES
"This note was created by combination of typed  and M-Modal dictation.  Transcription errors may be present.  If there are any questions, please contact me.    Assessment and Plan:   Anxiety, unable to wean off BZD  Benzodiazepine dependence, did not do well with attempt to wean down 2017  Depression, unspecified depression type  -goal is to still limit exposure to benzodiazepine  Not controlled  Increase Celexa to 40  Stay on buspar once daily  And in the future consider increasing buspar to 30 bid when stabilized on celexa 40  Goal remains to limit xanax intake  Stay on 45 tablets for 30 days.  -     citalopram (CELEXA) 40 MG tablet; Take 1 tablet (40 mg total) by mouth once daily. Increased dose  Dispense: 90 tablet; Refill: 3  -     ALPRAZolam (XANAX) 1 MG tablet; TAKE ONE & ONE-HALF TABLET BY MOUTH NIGHTLY AS NEEDED. Reducing overall DOSE  Dispense: 45 tablet; Refill: 2    Other chronic pain  Uncomplicated opioid dependence  -managed by pain mgmt with reduced dose of hydrocodone. His pain is a factor in his insomnia as well.    Essential hypertension  -stable. No changes. On lisinopril, verapamil.    Tobacco dependence, patch with irritation; hx of bupropion  -precontemplative stage of quitting.    Low testosterone in male  -previsit labs good. No changes.  Refilled for same dose 200 mg every 14 days.  -     testosterone cypionate (DEPOTESTOTERONE CYPIONATE) 200 mg/mL injection; Inject 1 mL (200 mg total) into the muscle every 14 (fourteen) days.  Dispense: 10 mL; Refill: 0  -     needle, disp, 22 G 22 gauge x 1" Ndle; Testosterone injection every 2 weeks  Dispense: 6 each; Refill: 0  -     syringe, disposable, (BD LUER-STARLA SYRINGE) 1 mL Syrg; Testosterone injection every 2 weeks  Dispense: 6 Syringe; Refill: 0  -     Testosterone; Future; Expected date: 12/04/2020    Uncontrolled type 2 diabetes mellitus without complication, without long-term current use of insulin  -check next labs a1c.  -     " "Comprehensive metabolic panel; Future; Expected date: 12/04/2020  -     CBC auto differential; Future; Expected date: 12/04/2020  -     Hemoglobin A1C; Future; Expected date: 12/04/2020    Need for shingles vaccine  -shingrix #1 today.  -     (In Office Administered) Zoster Recombinant Vaccine      Medications Discontinued During This Encounter   Medication Reason    citalopram (CELEXA) 20 MG tablet     testosterone cypionate (DEPOTESTOTERONE CYPIONATE) 200 mg/mL injection Reorder    syringe, disposable, (BD LUER-STARLA SYRINGE) 1 mL Syrg Reorder    needle, disp, 22 G 22 gauge x 1" Ndle Reorder    ALPRAZolam (XANAX) 1 MG tablet Reorder       meds sent this encounter:  Medications Ordered This Encounter   Medications    ALPRAZolam (XANAX) 1 MG tablet     Sig: TAKE ONE & ONE-HALF TABLET BY MOUTH NIGHTLY AS NEEDED. Reducing overall DOSE     Dispense:  45 tablet     Refill:  2     This prescription was filled on 7/31/2020. Any refills authorized will be placed on file.    citalopram (CELEXA) 40 MG tablet     Sig: Take 1 tablet (40 mg total) by mouth once daily. Increased dose     Dispense:  90 tablet     Refill:  3    needle, disp, 22 G 22 gauge x 1" Ndle     Sig: Testosterone injection every 2 weeks     Dispense:  6 each     Refill:  0    syringe, disposable, (BD LUER-STARLA SYRINGE) 1 mL Syrg     Sig: Testosterone injection every 2 weeks     Dispense:  6 Syringe     Refill:  0    testosterone cypionate (DEPOTESTOTERONE CYPIONATE) 200 mg/mL injection     Sig: Inject 1 mL (200 mg total) into the muscle every 14 (fourteen) days.     Dispense:  10 mL     Refill:  0       Follow Up: No follow-ups on file. OV 3 months previsit labs ordered. At that time increase buspar if not controlled on higher dose celexa.    Subjective:     Chief Complaint   Patient presents with    Anxiety    Insomnia    Low Testosterone       HPI  Butch is a 65 y.o. male, last appointment with this clinic was 6/3/2020.    6/2020 OV  BZD " dependence; anxiety and depression  still having a tough time adjusting to the decreased dose of Xanax.  For now no change, 45 tablets in 1 month.  BuSpar 30 mg in the morning.  For now no change.  Eventual goal is to challenge on 1 mg total in 1 day.  This may take a while.  Low testosterone started on replacement therapy.    preOV labs  CBC elevated MCV  Creatinine 1.6  Lipid good  PSA WNL  Testosterone WNL    Creatinine variable between 1.0 and 1.5    Brings in BP cuff - readings are high when wife argues  Otherwise 130s  Poor sleep last night.  And reduced pain pills. Previously 4 pills now down to 2 pills.  Did not sleep well last night. Anticipates that he'll sleep better tonight to catch up with sleep.  Sleep is interrupted by pain.   Increased the buspar without relief.  30 mg daily.  buspar takes in the morning  Takes a 1/2 xanax during the day.   Still high stress - lives with son and several grandchildren, daughter having issues with EtOH I believe.  Overall high stress.    Still on fentanyl and norco, norco BID down from QID    Testosterone Injecting every 2 weeks.  Last use was  8/23 and then labs done 9/2. Self injection in the buttock.    Patient Care Team:  Gabriel Evans MD as PCP - General (Internal Medicine)  Vlad Nava MD (Pain Medicine)  Kaila Carrillo OD as Consulting Physician (Optometry)  Yasmin Chen MA as Care Coordinator    Patient Active Problem List    Diagnosis Date Noted    Low testosterone in male 09/02/2020 6/2020 Repeat labs prolactin was normal.  Testosterone was low.  Free testosterone was low and sex hormone binding globulin was normal      Elevated prolactin level,low testosterone, gynecomastia; MRI pituitary normal 5/2020 01/13/2020    Nuclear sclerosis of both eyes 11/04/2019    Open angle with borderline findings and high glaucoma risk in both eyes 10/08/2019    Cataract, bilateral 09/11/2018    Cervical stenosis of spinal canal 05/07/2018 02/14/2018 MRI  C-spine impression:  Slight retrolisthesis of C4 with respect to C5.  Narrowing of the central spinal canal most prominent at the C4-C5, C5-C6, and C6-C7 levels and to a lesser extent at C2-C3 and C3-C4.  Annular disc bulges posteriorly at C2-C3, C3-C4.  Diffuse disc herniation/protrusion posteriorly at C4-C5 level and a disc herniation/extrusion posteriorly at the C5-C6 level with a central disc herniation/protrusion posteriorly at the C6-C7 level.  Narrowing of the neural foramen bilaterally from C3-C4 through C6-C7.      Tubular adenoma of colon 5/10/17 05/10/2017     5/10/2017 colonoscopy tubular adenoma      Therapeutic opioid-induced constipation (OIC) 04/17/2017    Tobacco dependence, patch with irritation; hx of bupropion 01/12/2016    Non morbid obesity due to excess calories 01/12/2016    Uncontrolled type 2 diabetes mellitus without complication, without long-term current use of insulin 08/07/2015    Facet arthritis of cervical region 10/28/2013    Facet arthritis of lumbar region 10/28/2013    Benzodiazepine dependence, did not do well with attempt to wean down 2017 10/28/2013    Opioid dependence, pain mgmt 10/28/2013    ED (erectile dysfunction) 07/26/2013    DDD (degenerative disc disease), cervical 05/23/2013    DDD (degenerative disc disease), lumbar 05/23/2013    Essential hypertension 11/01/2012    Mixed hyperlipidemia 11/01/2012    Anemia 11/01/2012    Chronic pain 11/01/2012    Anxiety, unable to wean off BZD 11/01/2012    Depression 11/01/2012       PAST MEDICAL HISTORY:  Past Medical History:   Diagnosis Date    Anxiety     Cataract, bilateral 9/11/2018    Degenerative disc disease     Depression     Diabetes mellitus type II, controlled     ETOH abuse     Eye injury as a child    stick hit eye ? eye, hit in ou due to boxing    Hyperlipidemia     Hypertension     MRSA (methicillin resistant Staphylococcus aureus)     Open angle with borderline findings and high  glaucoma risk in both eyes 10/8/2019       PAST SURGICAL HISTORY:  Past Surgical History:   Procedure Laterality Date    APPENDECTOMY      COLONOSCOPY N/A 5/10/2017    Procedure: COLONOSCOPY;  Surgeon: Shantanu Marley MD;  Location: [facility];  Service: Endoscopy;  Laterality: N/A;       SOCIAL HISTORY:  Social History     Socioeconomic History    Marital status:      Spouse name: Not on file    Number of children: Not on file    Years of education: Not on file    Highest education level: Not on file   Occupational History    Occupation: retired -    Social Needs    Financial resource strain: Not on file    Food insecurity     Worry: Not on file     Inability: Not on file    Transportation needs     Medical: Not on file     Non-medical: Not on file   Tobacco Use    Smoking status: Current Every Day Smoker     Packs/day: 0.50     Years: 32.00     Pack years: 16.00     Types: Cigarettes     Last attempt to quit: 3/4/2013     Years since quittin.5    Smokeless tobacco: Never Used   Substance and Sexual Activity    Alcohol use: No     Alcohol/week: 0.0 standard drinks    Drug use: No    Sexual activity: Yes     Partners: Female     Comment:  with children, disabled    Lifestyle    Physical activity     Days per week: Not on file     Minutes per session: Not on file    Stress: Very much   Relationships    Social connections     Talks on phone: Not on file     Gets together: Not on file     Attends Christian service: Not on file     Active member of club or organization: Not on file     Attends meetings of clubs or organizations: Not on file     Relationship status: Not on file   Other Topics Concern    Not on file   Social History Narrative    Not on file       ALLERGIES AND MEDICATIONS: updated and reviewed.  Review of patient's allergies indicates:  No Known Allergies    Medication List with Changes/Refills   Current Medications    ALBUTEROL  "(PROVENTIL/VENTOLIN HFA) 90 MCG/ACTUATION INHALER    Inhale 2 puffs into the lungs every 6 (six) hours as needed for Wheezing. Rescue    ALPRAZOLAM (XANAX) 1 MG TABLET    TAKE ONE & ONE-HALF TABLET BY MOUTH NIGHTLY AS NEEDED. Reducing overall DOSE    ASPIRIN (ECOTRIN) 81 MG EC TABLET    Take 1 tablet (81 mg total) by mouth once daily.    ATORVASTATIN (LIPITOR) 40 MG TABLET    TAKE ONE TABLET BY MOUTH ONCE A DAY FOR cholesterol    BUSPIRONE (BUSPAR) 30 MG TAB    Take 1 tablet (30 mg total) by mouth once daily.    CITALOPRAM (CELEXA) 20 MG TABLET    TAKE ONE TABLET BY MOUTH ONCE A DAY    CLONIDINE (CATAPRES) 0.2 MG TABLET    TAKE ONE TABLET BY MOUTH TWICE DAILY    FENTANYL (DURAGESIC) 25 MCG/HR    Place 1 patch onto the skin Every 3 (three) days.    FISH OIL-OMEGA-3 FATTY ACIDS 300-1,000 MG CAPSULE    Take 2 capsules (2 g total) by mouth once daily.    FLUTICASONE (FLONASE) 50 MCG/ACTUATION NASAL SPRAY    INHALE 1 SPRAY IN EACH NOSTRIL EVERY DAY    HYDROCODONE-ACETAMINOPHEN 10-325MG (NORCO)  MG TAB    Take 1 tablet by mouth 2 (two) times daily.    INHALATION SPACING DEVICE    Use as directed for inhalation.    LEVOCETIRIZINE (XYZAL) 5 MG TABLET    TAKE 1 TABLET(5 MG) BY MOUTH EVERY EVENING    LISINOPRIL (PRINIVIL,ZESTRIL) 40 MG TABLET    TAKE ONE TABLET BY MOUTH ONCE A DAY    METFORMIN (GLUCOPHAGE) 500 MG TABLET    TAKE ONE TABLET BY MOUTH TWICE DAILY WITH MEALS    NEEDLE, DISP, 22 G 22 GAUGE X 1" NDLE    Testosterone injection every 2 weeks    POLYETHYLENE GLYCOL 3350 (MIRALAX ORAL)    Take 1 application by mouth.    SYRINGE, DISPOSABLE, (BD LUER-STARLA SYRINGE) 1 ML SYRG    Testosterone injection every 2 weeks    TADALAFIL (CIALIS) 20 MG TAB    Take 1 tablet (20 mg total) by mouth once daily.    TESTOSTERONE CYPIONATE (DEPOTESTOTERONE CYPIONATE) 200 MG/ML INJECTION    Inject 1 mL (200 mg total) into the muscle every 14 (fourteen) days.    VERAPAMIL (CALAN-SR) 180 MG CR TABLET    TAKE ONE TABLET BY MOUTH TWICE " "DAILY        Review of Systems   Constitutional: Negative for chills and fever.   Respiratory: Negative for cough.    Cardiovascular: Negative for chest pain and palpitations.   Psychiatric/Behavioral: The patient is nervous/anxious and has insomnia.        Objective:   Physical Exam   Vitals:    09/04/20 1036   BP: 134/66   BP Location: Right arm   Patient Position: Sitting   BP Method: Medium (Manual)   Pulse: (!) 56   Temp: 97.7 °F (36.5 °C)   TempSrc: Temporal   SpO2: 97%   Weight: 99 kg (218 lb 4.1 oz)   Height: 5' 11" (1.803 m)    Body mass index is 30.44 kg/m².  Weight: 99 kg (218 lb 4.1 oz)   Height: 5' 11" (180.3 cm)     Physical Exam  Constitutional:       General: He is not in acute distress.     Appearance: He is well-developed.   HENT:      Head: Normocephalic and atraumatic.   Eyes:      General: No scleral icterus.  Pulmonary:      Effort: Pulmonary effort is normal.   Skin:     General: Skin is warm and dry.   Neurological:      Mental Status: He is alert and oriented to person, place, and time.   Psychiatric:         Behavior: Behavior normal.         Thought Content: Thought content normal.         Component      Latest Ref Rng & Units 9/2/2020   WBC      3.90 - 12.70 K/uL 8.83   RBC      4.60 - 6.20 M/uL 4.50 (L)   Hemoglobin      14.0 - 18.0 g/dL 14.6   Hematocrit      40.0 - 54.0 % 46.5   MCV      82 - 98 fL 103 (H)   MCH      27.0 - 31.0 pg 32.4 (H)   MCHC      32.0 - 36.0 g/dL 31.4 (L)   RDW      11.5 - 14.5 % 12.5   Platelets      150 - 350 K/uL 176   MPV      9.2 - 12.9 fL 12.4   Immature Granulocytes      0.0 - 0.5 % 0.3   Gran # (ANC)      1.8 - 7.7 K/uL 4.8   Immature Grans (Abs)      0.00 - 0.04 K/uL 0.03   Lymph #      1.0 - 4.8 K/uL 2.9   Mono #      0.3 - 1.0 K/uL 0.7   Eos #      0.0 - 0.5 K/uL 0.3   Baso #      0.00 - 0.20 K/uL 0.05   nRBC      0 /100 WBC 0   Gran%      38.0 - 73.0 % 54.2   Lymph%      18.0 - 48.0 % 32.7   Mono%      4.0 - 15.0 % 8.3   Eosinophil%      0.0 - 8.0 % " 3.9   Basophil%      0.0 - 1.9 % 0.6   Differential Method       Automated   Sodium      136 - 145 mmol/L 138   Potassium      3.5 - 5.1 mmol/L 4.6   Chloride      95 - 110 mmol/L 103   CO2      23 - 29 mmol/L 26   Glucose      70 - 110 mg/dL 172 (H)   BUN, Bld      8 - 23 mg/dL 18   Creatinine      0.5 - 1.4 mg/dL 1.6 (H)   Calcium      8.7 - 10.5 mg/dL 9.2   PROTEIN TOTAL      6.0 - 8.4 g/dL 6.8   Albumin      3.5 - 5.2 g/dL 3.9   BILIRUBIN TOTAL      0.1 - 1.0 mg/dL 0.6   Alkaline Phosphatase      55 - 135 U/L 45 (L)   AST      10 - 40 U/L 21   ALT      10 - 44 U/L 16   Anion Gap      8 - 16 mmol/L 9   eGFR if African American      >60 mL/min/1.73 m:2 51.1 (A)   eGFR if non African American      >60 mL/min/1.73 m:2 44.2 (A)   Cholesterol      120 - 199 mg/dL 93 (L)   Triglycerides      30 - 150 mg/dL 103   HDL      40 - 75 mg/dL 30 (L)   LDL Cholesterol External      63.0 - 159.0 mg/dL 42.4 (L)   Hdl/Cholesterol Ratio      20.0 - 50.0 % 32.3   Total Cholesterol/HDL Ratio      2.0 - 5.0 3.1   Non-HDL Cholesterol      mg/dL 63   PSA, SCREEN      0.00 - 4.00 ng/mL 0.32   Testosterone, Total      304 - 1227 ng/dL 450

## 2020-09-04 ENCOUNTER — OFFICE VISIT (OUTPATIENT)
Dept: FAMILY MEDICINE | Facility: CLINIC | Age: 65
End: 2020-09-04
Payer: MEDICARE

## 2020-09-04 VITALS
HEART RATE: 56 BPM | DIASTOLIC BLOOD PRESSURE: 66 MMHG | SYSTOLIC BLOOD PRESSURE: 134 MMHG | WEIGHT: 218.25 LBS | BODY MASS INDEX: 30.56 KG/M2 | HEIGHT: 71 IN | OXYGEN SATURATION: 97 % | TEMPERATURE: 98 F

## 2020-09-04 DIAGNOSIS — R79.89 LOW TESTOSTERONE IN MALE: ICD-10-CM

## 2020-09-04 DIAGNOSIS — F32.A DEPRESSION, UNSPECIFIED DEPRESSION TYPE: Chronic | ICD-10-CM

## 2020-09-04 DIAGNOSIS — F13.20 BENZODIAZEPINE DEPENDENCE: Chronic | ICD-10-CM

## 2020-09-04 DIAGNOSIS — F41.9 ANXIETY: Primary | ICD-10-CM

## 2020-09-04 DIAGNOSIS — F17.200 TOBACCO DEPENDENCE: Chronic | ICD-10-CM

## 2020-09-04 DIAGNOSIS — Z23 NEED FOR SHINGLES VACCINE: ICD-10-CM

## 2020-09-04 DIAGNOSIS — F11.20 UNCOMPLICATED OPIOID DEPENDENCE: Chronic | ICD-10-CM

## 2020-09-04 DIAGNOSIS — I10 ESSENTIAL HYPERTENSION: ICD-10-CM

## 2020-09-04 DIAGNOSIS — G89.29 OTHER CHRONIC PAIN: Chronic | ICD-10-CM

## 2020-09-04 PROCEDURE — 3075F PR MOST RECENT SYSTOLIC BLOOD PRESS GE 130-139MM HG: ICD-10-PCS | Mod: CPTII,S$GLB,, | Performed by: INTERNAL MEDICINE

## 2020-09-04 PROCEDURE — 1159F MED LIST DOCD IN RCRD: CPT | Mod: S$GLB,,, | Performed by: INTERNAL MEDICINE

## 2020-09-04 PROCEDURE — 3044F PR MOST RECENT HEMOGLOBIN A1C LEVEL <7.0%: ICD-10-PCS | Mod: CPTII,S$GLB,, | Performed by: INTERNAL MEDICINE

## 2020-09-04 PROCEDURE — 3078F PR MOST RECENT DIASTOLIC BLOOD PRESSURE < 80 MM HG: ICD-10-PCS | Mod: CPTII,S$GLB,, | Performed by: INTERNAL MEDICINE

## 2020-09-04 PROCEDURE — 1126F PR PAIN SEVERITY QUANTIFIED, NO PAIN PRESENT: ICD-10-PCS | Mod: S$GLB,,, | Performed by: INTERNAL MEDICINE

## 2020-09-04 PROCEDURE — 1159F PR MEDICATION LIST DOCUMENTED IN MEDICAL RECORD: ICD-10-PCS | Mod: S$GLB,,, | Performed by: INTERNAL MEDICINE

## 2020-09-04 PROCEDURE — 99999 PR PBB SHADOW E&M-EST. PATIENT-LVL IV: CPT | Mod: PBBFAC,,, | Performed by: INTERNAL MEDICINE

## 2020-09-04 PROCEDURE — 99499 UNLISTED E&M SERVICE: CPT | Mod: S$GLB,,, | Performed by: INTERNAL MEDICINE

## 2020-09-04 PROCEDURE — 3008F BODY MASS INDEX DOCD: CPT | Mod: CPTII,S$GLB,, | Performed by: INTERNAL MEDICINE

## 2020-09-04 PROCEDURE — 99999 PR PBB SHADOW E&M-EST. PATIENT-LVL IV: ICD-10-PCS | Mod: PBBFAC,,, | Performed by: INTERNAL MEDICINE

## 2020-09-04 PROCEDURE — 3008F PR BODY MASS INDEX (BMI) DOCUMENTED: ICD-10-PCS | Mod: CPTII,S$GLB,, | Performed by: INTERNAL MEDICINE

## 2020-09-04 PROCEDURE — 99499 RISK ADDL DX/OHS AUDIT: ICD-10-PCS | Mod: S$GLB,,, | Performed by: INTERNAL MEDICINE

## 2020-09-04 PROCEDURE — 99214 PR OFFICE/OUTPT VISIT, EST, LEVL IV, 30-39 MIN: ICD-10-PCS | Mod: 25,S$GLB,, | Performed by: INTERNAL MEDICINE

## 2020-09-04 PROCEDURE — 1126F AMNT PAIN NOTED NONE PRSNT: CPT | Mod: S$GLB,,, | Performed by: INTERNAL MEDICINE

## 2020-09-04 PROCEDURE — 3078F DIAST BP <80 MM HG: CPT | Mod: CPTII,S$GLB,, | Performed by: INTERNAL MEDICINE

## 2020-09-04 PROCEDURE — 90750 ZOSTER RECOMBINANT VACCINE: ICD-10-PCS | Mod: S$GLB,,, | Performed by: INTERNAL MEDICINE

## 2020-09-04 PROCEDURE — 3044F HG A1C LEVEL LT 7.0%: CPT | Mod: CPTII,S$GLB,, | Performed by: INTERNAL MEDICINE

## 2020-09-04 PROCEDURE — 90750 HZV VACC RECOMBINANT IM: CPT | Mod: S$GLB,,, | Performed by: INTERNAL MEDICINE

## 2020-09-04 PROCEDURE — 99214 OFFICE O/P EST MOD 30 MIN: CPT | Mod: 25,S$GLB,, | Performed by: INTERNAL MEDICINE

## 2020-09-04 PROCEDURE — 90471 ZOSTER RECOMBINANT VACCINE: ICD-10-PCS | Mod: S$GLB,,, | Performed by: INTERNAL MEDICINE

## 2020-09-04 PROCEDURE — 1101F PT FALLS ASSESS-DOCD LE1/YR: CPT | Mod: CPTII,S$GLB,, | Performed by: INTERNAL MEDICINE

## 2020-09-04 PROCEDURE — 3075F SYST BP GE 130 - 139MM HG: CPT | Mod: CPTII,S$GLB,, | Performed by: INTERNAL MEDICINE

## 2020-09-04 PROCEDURE — 1101F PR PT FALLS ASSESS DOC 0-1 FALLS W/OUT INJ PAST YR: ICD-10-PCS | Mod: CPTII,S$GLB,, | Performed by: INTERNAL MEDICINE

## 2020-09-04 PROCEDURE — 90471 IMMUNIZATION ADMIN: CPT | Mod: S$GLB,,, | Performed by: INTERNAL MEDICINE

## 2020-09-04 RX ORDER — CITALOPRAM 40 MG/1
40 TABLET, FILM COATED ORAL DAILY
Qty: 90 TABLET | Refills: 3 | Status: SHIPPED | OUTPATIENT
Start: 2020-09-04 | End: 2021-07-28

## 2020-09-04 RX ORDER — SYRINGE, DISPOSABLE, 1 ML
SYRINGE, EMPTY DISPOSABLE MISCELLANEOUS
Qty: 6 SYRINGE | Refills: 0 | Status: SHIPPED | OUTPATIENT
Start: 2020-09-04

## 2020-09-04 RX ORDER — ALPRAZOLAM 1 MG/1
TABLET ORAL
Qty: 45 TABLET | Refills: 2 | Status: SHIPPED | OUTPATIENT
Start: 2020-09-04 | End: 2020-12-04 | Stop reason: SDUPTHER

## 2020-09-04 RX ORDER — TESTOSTERONE CYPIONATE 200 MG/ML
200 INJECTION, SOLUTION INTRAMUSCULAR
Qty: 10 ML | Refills: 0 | Status: SHIPPED | OUTPATIENT
Start: 2020-09-04 | End: 2020-12-04 | Stop reason: SDUPTHER

## 2020-09-04 NOTE — PROGRESS NOTES
Patient, Butch Mcdaniel (MRN #[id number]), presented with a recent Estimated Glumerular Filtration Rate (EGFR) between 30 and 45 consistent with the definition of chronic kidney disease stage 3 - moderate (ICD10 - N18.3).    eGFR if non    Date Value Ref Range Status   09/02/2020 44.2 (A) >60 mL/min/1.73 m^2 Final     Comment:     Calculation used to obtain the estimated glomerular filtration  rate (eGFR) is the CKD-EPI equation.          The patient's chronic kidney disease stage 3 was monitored, evaluated, addressed and/or treated. This addendum to the medical record is made on 09/04/2020.

## 2020-11-18 ENCOUNTER — CLINICAL SUPPORT (OUTPATIENT)
Dept: SMOKING CESSATION | Facility: CLINIC | Age: 65
End: 2020-11-18
Payer: COMMERCIAL

## 2020-11-18 DIAGNOSIS — F17.200 NICOTINE DEPENDENCE: Primary | ICD-10-CM

## 2020-11-18 PROCEDURE — 99407 PR TOBACCO USE CESSATION INTENSIVE >10 MINUTES: ICD-10-PCS | Mod: S$GLB,,,

## 2020-11-18 PROCEDURE — 99407 BEHAV CHNG SMOKING > 10 MIN: CPT | Mod: S$GLB,,,

## 2020-11-18 NOTE — PROGRESS NOTES
Spoke with patient today in regard to smoking cessation progress for 12 month phone follow up on quit 1. Patient not tobacco free at this time. Patient has scheduled an appointment to return to the program for Quit attempt #2. Informed patient of benefit period, future follow ups, and contact information if any further help or support is needed. Will resolve episode and complete smart form for Quit attempt #1.

## 2020-11-23 ENCOUNTER — TELEPHONE (OUTPATIENT)
Dept: FAMILY MEDICINE | Facility: CLINIC | Age: 65
End: 2020-11-23

## 2020-11-23 NOTE — TELEPHONE ENCOUNTER
----- Message from Gabriel Evans MD sent at 11/23/2020  2:25 PM CST -----  Labs are ordered  ----- Message -----  From: Salma Oden  Sent: 11/23/2020   2:19 PM CST  To: Nathan Rios Staff    Type: Patient Call Back    Who called:self    What is the request in detail:pt has blood work that he want done on Dec  3rd     Can the clinic reply by [facility]LAIN[facility]?no    Would the patient rather a call back or a response via [facility]?call    Best call back number:

## 2020-12-01 ENCOUNTER — CLINICAL SUPPORT (OUTPATIENT)
Dept: SMOKING CESSATION | Facility: CLINIC | Age: 65
End: 2020-12-01
Payer: COMMERCIAL

## 2020-12-01 DIAGNOSIS — F17.200 NICOTINE DEPENDENCE: Primary | ICD-10-CM

## 2020-12-01 PROCEDURE — 99404 PREV MED CNSL INDIV APPRX 60: CPT | Mod: S$GLB,,,

## 2020-12-01 PROCEDURE — 99999 PR PBB SHADOW E&M-EST. PATIENT-LVL I: ICD-10-PCS | Mod: PBBFAC,,,

## 2020-12-01 PROCEDURE — 99999 PR PBB SHADOW E&M-EST. PATIENT-LVL I: CPT | Mod: PBBFAC,,,

## 2020-12-01 PROCEDURE — 99404 PR PREVENT COUNSEL,INDIV,60 MIN: ICD-10-PCS | Mod: S$GLB,,,

## 2020-12-01 RX ORDER — IBUPROFEN 200 MG
1 TABLET ORAL DAILY
Qty: 14 PATCH | Refills: 0 | Status: SHIPPED | OUTPATIENT
Start: 2020-12-01 | End: 2020-12-15 | Stop reason: SDUPTHER

## 2020-12-01 RX ORDER — ASPIRIN/CALCIUM CARB/MAGNESIUM 325 MG
4 TABLET ORAL
Qty: 100 LOZENGE | Refills: 0 | Status: SHIPPED | OUTPATIENT
Start: 2020-12-01 | End: 2021-01-01

## 2020-12-01 NOTE — Clinical Note
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

## 2020-12-03 ENCOUNTER — LAB VISIT (OUTPATIENT)
Dept: LAB | Facility: HOSPITAL | Age: 65
End: 2020-12-03
Attending: INTERNAL MEDICINE
Payer: MEDICARE

## 2020-12-03 DIAGNOSIS — R79.89 LOW TESTOSTERONE IN MALE: ICD-10-CM

## 2020-12-03 LAB
ALBUMIN SERPL BCP-MCNC: 3.8 G/DL (ref 3.5–5.2)
ALP SERPL-CCNC: 44 U/L (ref 55–135)
ALT SERPL W/O P-5'-P-CCNC: 23 U/L (ref 10–44)
ANION GAP SERPL CALC-SCNC: 9 MMOL/L (ref 8–16)
AST SERPL-CCNC: 25 U/L (ref 10–40)
BASOPHILS # BLD AUTO: 0.06 K/UL (ref 0–0.2)
BASOPHILS NFR BLD: 0.5 % (ref 0–1.9)
BILIRUB SERPL-MCNC: 0.9 MG/DL (ref 0.1–1)
BUN SERPL-MCNC: 18 MG/DL (ref 8–23)
CALCIUM SERPL-MCNC: 9 MG/DL (ref 8.7–10.5)
CHLORIDE SERPL-SCNC: 101 MMOL/L (ref 95–110)
CO2 SERPL-SCNC: 30 MMOL/L (ref 23–29)
CREAT SERPL-MCNC: 1.1 MG/DL (ref 0.5–1.4)
DIFFERENTIAL METHOD: ABNORMAL
EOSINOPHIL # BLD AUTO: 0.3 K/UL (ref 0–0.5)
EOSINOPHIL NFR BLD: 2.1 % (ref 0–8)
ERYTHROCYTE [DISTWIDTH] IN BLOOD BY AUTOMATED COUNT: 13.9 % (ref 11.5–14.5)
EST. GFR  (AFRICAN AMERICAN): >60 ML/MIN/1.73 M^2
EST. GFR  (NON AFRICAN AMERICAN): >60 ML/MIN/1.73 M^2
GLUCOSE SERPL-MCNC: 111 MG/DL (ref 70–110)
HCT VFR BLD AUTO: 47.3 % (ref 40–54)
HGB BLD-MCNC: 14.9 G/DL (ref 14–18)
IMM GRANULOCYTES # BLD AUTO: 0.04 K/UL (ref 0–0.04)
IMM GRANULOCYTES NFR BLD AUTO: 0.3 % (ref 0–0.5)
LYMPHOCYTES # BLD AUTO: 2.5 K/UL (ref 1–4.8)
LYMPHOCYTES NFR BLD: 20.7 % (ref 18–48)
MCH RBC QN AUTO: 31.8 PG (ref 27–31)
MCHC RBC AUTO-ENTMCNC: 31.5 G/DL (ref 32–36)
MCV RBC AUTO: 101 FL (ref 82–98)
MONOCYTES # BLD AUTO: 1.2 K/UL (ref 0.3–1)
MONOCYTES NFR BLD: 10.1 % (ref 4–15)
NEUTROPHILS # BLD AUTO: 8 K/UL (ref 1.8–7.7)
NEUTROPHILS NFR BLD: 66.3 % (ref 38–73)
NRBC BLD-RTO: 0 /100 WBC
PLATELET # BLD AUTO: 161 K/UL (ref 150–350)
PMV BLD AUTO: 12.2 FL (ref 9.2–12.9)
POTASSIUM SERPL-SCNC: 5 MMOL/L (ref 3.5–5.1)
PROT SERPL-MCNC: 6.7 G/DL (ref 6–8.4)
RBC # BLD AUTO: 4.69 M/UL (ref 4.6–6.2)
SODIUM SERPL-SCNC: 140 MMOL/L (ref 136–145)
TESTOST SERPL-MCNC: 710 NG/DL (ref 304–1227)
WBC # BLD AUTO: 12.1 K/UL (ref 3.9–12.7)

## 2020-12-03 PROCEDURE — 85025 COMPLETE CBC W/AUTO DIFF WBC: CPT

## 2020-12-03 PROCEDURE — 36415 COLL VENOUS BLD VENIPUNCTURE: CPT | Mod: PO

## 2020-12-03 PROCEDURE — 80053 COMPREHEN METABOLIC PANEL: CPT

## 2020-12-03 PROCEDURE — 84403 ASSAY OF TOTAL TESTOSTERONE: CPT

## 2020-12-03 PROCEDURE — 83036 HEMOGLOBIN GLYCOSYLATED A1C: CPT

## 2020-12-04 ENCOUNTER — OFFICE VISIT (OUTPATIENT)
Dept: FAMILY MEDICINE | Facility: CLINIC | Age: 65
End: 2020-12-04
Payer: MEDICARE

## 2020-12-04 ENCOUNTER — HOSPITAL ENCOUNTER (OUTPATIENT)
Dept: RADIOLOGY | Facility: HOSPITAL | Age: 65
Discharge: HOME | End: 2020-12-04
Attending: INTERNAL MEDICINE
Payer: MEDICARE

## 2020-12-04 VITALS
BODY MASS INDEX: 30.52 KG/M2 | HEART RATE: 86 BPM | WEIGHT: 218 LBS | HEIGHT: 71 IN | TEMPERATURE: 98 F | SYSTOLIC BLOOD PRESSURE: 136 MMHG | OXYGEN SATURATION: 96 % | DIASTOLIC BLOOD PRESSURE: 66 MMHG

## 2020-12-04 DIAGNOSIS — F13.20 BENZODIAZEPINE DEPENDENCE: Primary | Chronic | ICD-10-CM

## 2020-12-04 DIAGNOSIS — D75.89 MACROCYTOSIS: ICD-10-CM

## 2020-12-04 DIAGNOSIS — Z23 NEED FOR TD VACCINE: ICD-10-CM

## 2020-12-04 DIAGNOSIS — Z23 NEEDS FLU SHOT: ICD-10-CM

## 2020-12-04 DIAGNOSIS — E11.9 TYPE 2 DIABETES MELLITUS WITHOUT COMPLICATION: ICD-10-CM

## 2020-12-04 DIAGNOSIS — E11.9 TYPE 2 DIABETES MELLITUS WITHOUT COMPLICATION, WITHOUT LONG-TERM CURRENT USE OF INSULIN: ICD-10-CM

## 2020-12-04 DIAGNOSIS — F32.A DEPRESSION, UNSPECIFIED DEPRESSION TYPE: Chronic | ICD-10-CM

## 2020-12-04 DIAGNOSIS — R79.89 LOW TESTOSTERONE IN MALE: ICD-10-CM

## 2020-12-04 DIAGNOSIS — E78.2 MIXED HYPERLIPIDEMIA: ICD-10-CM

## 2020-12-04 DIAGNOSIS — F41.9 ANXIETY: ICD-10-CM

## 2020-12-04 DIAGNOSIS — R79.89 ELEVATED PROLACTIN LEVEL: ICD-10-CM

## 2020-12-04 DIAGNOSIS — F17.200 TOBACCO DEPENDENCE: ICD-10-CM

## 2020-12-04 DIAGNOSIS — F17.200 TOBACCO DEPENDENCE: Chronic | ICD-10-CM

## 2020-12-04 LAB
ESTIMATED AVG GLUCOSE: 143 MG/DL (ref 68–131)
HBA1C MFR BLD HPLC: 6.6 % (ref 4–5.6)

## 2020-12-04 PROCEDURE — 3288F PR FALLS RISK ASSESSMENT DOCUMENTED: ICD-10-PCS | Mod: CPTII,S$GLB,, | Performed by: INTERNAL MEDICINE

## 2020-12-04 PROCEDURE — 71046 X-RAY EXAM CHEST 2 VIEWS: CPT | Mod: TC,FY,PO

## 2020-12-04 PROCEDURE — 1126F AMNT PAIN NOTED NONE PRSNT: CPT | Mod: S$GLB,,, | Performed by: INTERNAL MEDICINE

## 2020-12-04 PROCEDURE — 3288F FALL RISK ASSESSMENT DOCD: CPT | Mod: CPTII,S$GLB,, | Performed by: INTERNAL MEDICINE

## 2020-12-04 PROCEDURE — 90694 VACC AIIV4 NO PRSRV 0.5ML IM: CPT | Mod: S$GLB,,, | Performed by: INTERNAL MEDICINE

## 2020-12-04 PROCEDURE — 3078F PR MOST RECENT DIASTOLIC BLOOD PRESSURE < 80 MM HG: ICD-10-PCS | Mod: CPTII,S$GLB,, | Performed by: INTERNAL MEDICINE

## 2020-12-04 PROCEDURE — 99214 PR OFFICE/OUTPT VISIT, EST, LEVL IV, 30-39 MIN: ICD-10-PCS | Mod: 25,S$GLB,, | Performed by: INTERNAL MEDICINE

## 2020-12-04 PROCEDURE — 90471 TD VACCINE GREATER THAN OR EQUAL TO 7YO WITH PRESERVATIVE IM: ICD-10-PCS | Mod: S$GLB,,, | Performed by: INTERNAL MEDICINE

## 2020-12-04 PROCEDURE — 90694 FLU VACCINE - QUADRIVALENT - ADJUVANTED: ICD-10-PCS | Mod: S$GLB,,, | Performed by: INTERNAL MEDICINE

## 2020-12-04 PROCEDURE — 1159F PR MEDICATION LIST DOCUMENTED IN MEDICAL RECORD: ICD-10-PCS | Mod: S$GLB,,, | Performed by: INTERNAL MEDICINE

## 2020-12-04 PROCEDURE — 99499 RISK ADDL DX/OHS AUDIT: ICD-10-PCS | Mod: S$GLB,,, | Performed by: INTERNAL MEDICINE

## 2020-12-04 PROCEDURE — 3075F SYST BP GE 130 - 139MM HG: CPT | Mod: CPTII,S$GLB,, | Performed by: INTERNAL MEDICINE

## 2020-12-04 PROCEDURE — 3044F PR MOST RECENT HEMOGLOBIN A1C LEVEL <7.0%: ICD-10-PCS | Mod: CPTII,S$GLB,, | Performed by: INTERNAL MEDICINE

## 2020-12-04 PROCEDURE — 71046 X-RAY EXAM CHEST 2 VIEWS: CPT | Mod: 26,,, | Performed by: RADIOLOGY

## 2020-12-04 PROCEDURE — 99214 OFFICE O/P EST MOD 30 MIN: CPT | Mod: 25,S$GLB,, | Performed by: INTERNAL MEDICINE

## 2020-12-04 PROCEDURE — G0008 ADMIN INFLUENZA VIRUS VAC: HCPCS | Mod: 59,S$GLB,, | Performed by: INTERNAL MEDICINE

## 2020-12-04 PROCEDURE — 1126F PR PAIN SEVERITY QUANTIFIED, NO PAIN PRESENT: ICD-10-PCS | Mod: S$GLB,,, | Performed by: INTERNAL MEDICINE

## 2020-12-04 PROCEDURE — 1159F MED LIST DOCD IN RCRD: CPT | Mod: S$GLB,,, | Performed by: INTERNAL MEDICINE

## 2020-12-04 PROCEDURE — 3008F PR BODY MASS INDEX (BMI) DOCUMENTED: ICD-10-PCS | Mod: CPTII,S$GLB,, | Performed by: INTERNAL MEDICINE

## 2020-12-04 PROCEDURE — 71046 XR CHEST PA AND LATERAL: ICD-10-PCS | Mod: 26,,, | Performed by: RADIOLOGY

## 2020-12-04 PROCEDURE — 1101F PR PT FALLS ASSESS DOC 0-1 FALLS W/OUT INJ PAST YR: ICD-10-PCS | Mod: CPTII,S$GLB,, | Performed by: INTERNAL MEDICINE

## 2020-12-04 PROCEDURE — 3075F PR MOST RECENT SYSTOLIC BLOOD PRESS GE 130-139MM HG: ICD-10-PCS | Mod: CPTII,S$GLB,, | Performed by: INTERNAL MEDICINE

## 2020-12-04 PROCEDURE — G0008 FLU VACCINE - QUADRIVALENT - ADJUVANTED: ICD-10-PCS | Mod: 59,S$GLB,, | Performed by: INTERNAL MEDICINE

## 2020-12-04 PROCEDURE — 1101F PT FALLS ASSESS-DOCD LE1/YR: CPT | Mod: CPTII,S$GLB,, | Performed by: INTERNAL MEDICINE

## 2020-12-04 PROCEDURE — 3078F DIAST BP <80 MM HG: CPT | Mod: CPTII,S$GLB,, | Performed by: INTERNAL MEDICINE

## 2020-12-04 PROCEDURE — 90714 TD VACC NO PRESV 7 YRS+ IM: CPT | Mod: S$GLB,,, | Performed by: INTERNAL MEDICINE

## 2020-12-04 PROCEDURE — 90471 IMMUNIZATION ADMIN: CPT | Mod: S$GLB,,, | Performed by: INTERNAL MEDICINE

## 2020-12-04 PROCEDURE — 90714 TD VACCINE GREATER THAN OR EQUAL TO 7YO WITH PRESERVATIVE IM: ICD-10-PCS | Mod: S$GLB,,, | Performed by: INTERNAL MEDICINE

## 2020-12-04 PROCEDURE — 99999 PR PBB SHADOW E&M-EST. PATIENT-LVL V: CPT | Mod: PBBFAC,,, | Performed by: INTERNAL MEDICINE

## 2020-12-04 PROCEDURE — 3008F BODY MASS INDEX DOCD: CPT | Mod: CPTII,S$GLB,, | Performed by: INTERNAL MEDICINE

## 2020-12-04 PROCEDURE — 3044F HG A1C LEVEL LT 7.0%: CPT | Mod: CPTII,S$GLB,, | Performed by: INTERNAL MEDICINE

## 2020-12-04 PROCEDURE — 99999 PR PBB SHADOW E&M-EST. PATIENT-LVL V: ICD-10-PCS | Mod: PBBFAC,,, | Performed by: INTERNAL MEDICINE

## 2020-12-04 PROCEDURE — 99499 UNLISTED E&M SERVICE: CPT | Mod: S$GLB,,, | Performed by: INTERNAL MEDICINE

## 2020-12-04 RX ORDER — TESTOSTERONE CYPIONATE 200 MG/ML
200 INJECTION, SOLUTION INTRAMUSCULAR
Qty: 10 ML | Refills: 0 | Status: SHIPPED | OUTPATIENT
Start: 2020-12-04 | End: 2021-02-24

## 2020-12-04 RX ORDER — ALPRAZOLAM 1 MG/1
TABLET ORAL
Qty: 45 TABLET | Refills: 2 | Status: SHIPPED | OUTPATIENT
Start: 2020-12-04 | End: 2021-02-24 | Stop reason: SDUPTHER

## 2020-12-04 NOTE — PROGRESS NOTES
This note was created by combination of typed  and M-Modal dictation.  Transcription errors may be present.  If there are any questions, please contact me.    Assessment and Plan:   Benzodiazepine dependence, did not do well with attempt to wean down 2017  Anxiety, unable to wean off BZD  Depression, unspecified depression type  -stable on current dose Xanax, refill to pharmacy, 45 tablets 30 days.  Takes 1-2 tablets at night.  Recently restarted taking BuSpar in the morning and I have asked him to go ahead and take it every day.  Feels like some improvement with the higher dose of Celexa.  Could increase the BuSpar to twice daily if necessary.  -     ALPRAZolam (XANAX) 1 MG tablet; TAKE ONE & ONE-HALF TABLET BY MOUTH NIGHTLY AS NEEDED. Reducing overall DOSE  Dispense: 45 tablet; Refill: 2    Type 2 diabetes mellitus without complication, without long-term current use of insulin  -A1c was good.  No changes.  On metformin.  Foot exam normal.  I have asked him to reschedule his follow-up Optometry as he is overdue.  -     Comprehensive Metabolic Panel; Future; Expected date: 03/04/2021  -     Hemoglobin A1C; Future; Expected date: 03/04/2021    Mixed hyperlipidemia  -check future labs  -     Lipid Panel; Future; Expected date: 03/04/2021    Low testosterone in male  Elevated prolactin level,low testosterone, gynecomastia; MRI pituitary normal 5/2020  -check future testosterone.  Levels are going up.  On 200 mg every 2 weeks.  -     Testosterone; Future; Expected date: 03/04/2021  -     testosterone cypionate (DEPOTESTOTERONE CYPIONATE) 200 mg/mL injection; Inject 1 mL (200 mg total) into the muscle every 14 (fourteen) days.  Dispense: 10 mL; Refill: 0    Tobacco dependence, patch with irritation; hx of bupropion  -check chest x-ray does not qualify for lung CT screening.  Working with smoking cessation classes  -     X-Ray Chest PA And Lateral; Future; Expected date: 12/04/2020    Macrocytosis  -denies  alcohol use.  Check RBC folate and MMA next visit  -     Folate RBC; Future; Expected date: 03/04/2021  -     Methylmalonic Acid, Serum; Future; Expected date: 03/04/2021    Needs flu shot  -     Influenza - Quadrivalent (Adjuvanted)    Need for Td vaccine  -     (In Office Administered) Td Vaccine          Medications Discontinued During This Encounter   Medication Reason    ALPRAZolam (XANAX) 1 MG tablet Reorder    testosterone cypionate (DEPOTESTOTERONE CYPIONATE) 200 mg/mL injection Reorder       meds sent this encounter:  Medications Ordered This Encounter   Medications    ALPRAZolam (XANAX) 1 MG tablet     Sig: TAKE ONE & ONE-HALF TABLET BY MOUTH NIGHTLY AS NEEDED. Reducing overall DOSE     Dispense:  45 tablet     Refill:  2     This prescription was filled on 7/31/2020. Any refills authorized will be placed on file.    testosterone cypionate (DEPOTESTOTERONE CYPIONATE) 200 mg/mL injection     Sig: Inject 1 mL (200 mg total) into the muscle every 14 (fourteen) days.     Dispense:  10 mL     Refill:  0       Follow Up: No follow-ups on file. OV 3 months with pre visit labs ordered.    Subjective:     Chief Complaint   Patient presents with    Follow-up       HPI  Butch is a 65 y.o. male, last appointment with this clinic was 9/4/2020.    Last visit with me in September.  Anxiety with benzodiazepine dependence.  Not controlled and I increased his citalopram.  BuSpar.  Xanax 45 tablets for 30 days.  Chronic narcotic dependence, followed by pain management.  Hypertension stable  Smoker, pre contemplative stage of quitting.  Testosterone replacement therapy.  Labs at that time were good  Diabetes    Taking Xanax 1-2 tablets at night.  Finds it really helps him sleep.  Sounds like he is limited from sleeping because of pain.  He does find like the higher dose Celexa is helping him some.  Feels like he is less irritable.  Stop taking his BuSpar for a couple of weeks period and then restarted it because he was  getting more irritable.  He thought that he was not supposed to take both of them.  I told him could take both BuSpar in the morning and the Xanax at night.  Has tried melatonin without relief    Reports that he had injections for his neck done recently which was helpful.  But he is having some pain in his right hip.  Right hip/back area.  Wonders if this is because he administers the testosterone injection around that area.    Blood pressure is borderline today.  Reports that his home blood pressure cuff he is not sure the readings but he thinks it may be somewhere between [address]335.  He is going to bring in his cuff next time to verify and he is going to pay more attention to it.  He denies over sedation with clonidine so has room to increase if necessary.  Reports compliance on his other medications otherwise.    His testosterone he injects every 2 weeks on Wednesdays.  So he had his most recent lab work done about 8 days afterwards.  I will need to check this and see if it continues to trend up in which case I will need to lower the dose  Does feel like his energy is a bit better on the medication.    Smoking cessation classes.  Still smoking a few cigarettes in the week.  Not limited by breathing. Is limited by pain not by his breathing.   Cough he thinks due to post nasal drip.   He also thinks that he was previously exposed to asbestos.  So I will check a chest x-ray on today.    Elevated MCV, denies EtOH use.    Patient Care Team:  Gabriel Evans MD as PCP - General (Internal Medicine)  Vlad Nava MD (Pain Medicine)  Kaila Carrillo OD as Consulting Physician (Optometry)  Yasmin Chen MA as Care Coordinator    Patient Active Problem List    Diagnosis Date Noted    Low testosterone in male 09/02/2020 6/2020 Repeat labs prolactin was normal.  Testosterone was low.  Free testosterone was low and sex hormone binding globulin was normal      Elevated prolactin level,low testosterone, gynecomastia; MRI  pituitary normal 5/2020 01/13/2020    Nuclear sclerosis of both eyes 11/04/2019    Open angle with borderline findings and high glaucoma risk in both eyes 10/08/2019    Cataract, bilateral 09/11/2018    Cervical stenosis of spinal canal 05/07/2018 02/14/2018 MRI C-spine impression:  Slight retrolisthesis of C4 with respect to C5.  Narrowing of the central spinal canal most prominent at the C4-C5, C5-C6, and C6-C7 levels and to a lesser extent at C2-C3 and C3-C4.  Annular disc bulges posteriorly at C2-C3, C3-C4.  Diffuse disc herniation/protrusion posteriorly at C4-C5 level and a disc herniation/extrusion posteriorly at the C5-C6 level with a central disc herniation/protrusion posteriorly at the C6-C7 level.  Narrowing of the neural foramen bilaterally from C3-C4 through C6-C7.      Tubular adenoma of colon 5/10/17 05/10/2017     5/10/2017 colonoscopy tubular adenoma      Therapeutic opioid-induced constipation (OIC) 04/17/2017    Tobacco dependence, patch with irritation; hx of bupropion 01/12/2016    Non morbid obesity due to excess calories 01/12/2016    Uncontrolled type 2 diabetes mellitus without complication, without long-term current use of insulin 08/07/2015    Facet arthritis of cervical region 10/28/2013    Facet arthritis of lumbar region 10/28/2013    Benzodiazepine dependence, did not do well with attempt to wean down 2017 10/28/2013    Opioid dependence, pain mgmt 10/28/2013    ED (erectile dysfunction) 07/26/2013    DDD (degenerative disc disease), cervical 05/23/2013    DDD (degenerative disc disease), lumbar 05/23/2013    Essential hypertension 11/01/2012    Mixed hyperlipidemia 11/01/2012    Anemia 11/01/2012    Chronic pain 11/01/2012    Anxiety, unable to wean off BZD 11/01/2012    Depression 11/01/2012       PAST MEDICAL HISTORY:  Past Medical History:   Diagnosis Date    Anxiety     Cataract, bilateral 9/11/2018    Degenerative disc disease     Depression      Diabetes mellitus type II, controlled     ETOH abuse     Eye injury as a child    stick hit eye ? 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 both eyes 10/8/2019       PAST SURGICAL HISTORY:  Past Surgical History:   Procedure Laterality Date    APPENDECTOMY      COLONOSCOPY N/A 5/10/2017    Procedure: COLONOSCOPY;  Surgeon: Shantanu Marley MD;  Location: [facility];  Service: Endoscopy;  Laterality: N/A;       SOCIAL HISTORY:  Social History     Socioeconomic History    Marital status:      Spouse name: Not on file    Number of children: Not on file    Years of education: Not on file    Highest education level: Not on file   Occupational History    Occupation: retired -    Social Needs    Financial resource strain: Not on file    Food insecurity     Worry: Not on file     Inability: Not on file    Transportation needs     Medical: Not on file     Non-medical: Not on file   Tobacco Use    Smoking status: Current Every Day Smoker     Packs/day: 0.50     Years: 32.00     Pack years: 16.00     Types: Cigarettes     Last attempt to quit: 3/4/2013     Years since quittin.7    Smokeless tobacco: Never Used   Substance and Sexual Activity    Alcohol use: No     Alcohol/week: 0.0 standard drinks    Drug use: No    Sexual activity: Yes     Partners: Female     Comment:  with children, disabled    Lifestyle    Physical activity     Days per week: Not on file     Minutes per session: Not on file    Stress: Very much   Relationships    Social connections     Talks on phone: Not on file     Gets together: Not on file     Attends Congregation service: Not on file     Active member of club or organization: Not on file     Attends meetings of clubs or organizations: Not on file     Relationship status: Not on file   Other  "Topics Concern    Not on file   Social History Narrative    Not on file       ALLERGIES AND MEDICATIONS: updated and reviewed.  Review of patient's allergies indicates:  No Known Allergies    Medication List with Changes/Refills   Current Medications    ALBUTEROL (PROVENTIL/VENTOLIN HFA) 90 MCG/ACTUATION INHALER    Inhale 2 puffs into the lungs every 6 (six) hours as needed for Wheezing. Rescue    ALPRAZOLAM (XANAX) 1 MG TABLET    TAKE ONE & ONE-HALF TABLET BY MOUTH NIGHTLY AS NEEDED. Reducing overall DOSE    ASPIRIN (ECOTRIN) 81 MG EC TABLET    Take 1 tablet (81 mg total) by mouth once daily.    ATORVASTATIN (LIPITOR) 40 MG TABLET    TAKE ONE TABLET BY MOUTH ONCE A DAY FOR cholesterol    BUSPIRONE (BUSPAR) 30 MG TAB    TAKE ONE TABLET BY MOUTH ONCE A DAY    CITALOPRAM (CELEXA) 40 MG TABLET    Take 1 tablet (40 mg total) by mouth once daily. Increased dose    CLONIDINE (CATAPRES) 0.2 MG TABLET    TAKE ONE TABLET BY MOUTH TWICE DAILY    FENTANYL (DURAGESIC) 25 MCG/HR    Place 1 patch onto the skin Every 3 (three) days.    FISH OIL-OMEGA-3 FATTY ACIDS 300-1,000 MG CAPSULE    Take 2 capsules (2 g total) by mouth once daily.    FLUTICASONE (FLONASE) 50 MCG/ACTUATION NASAL SPRAY    INHALE 1 SPRAY IN EACH NOSTRIL EVERY DAY    HYDROCODONE-ACETAMINOPHEN 10-325MG (NORCO)  MG TAB    Take 1 tablet by mouth 2 (two) times daily.    INHALATION SPACING DEVICE    Use as directed for inhalation.    LEVOCETIRIZINE (XYZAL) 5 MG TABLET    TAKE 1 TABLET(5 MG) BY MOUTH EVERY EVENING    LISINOPRIL (PRINIVIL,ZESTRIL) 40 MG TABLET    TAKE ONE TABLET BY MOUTH ONCE A DAY    METFORMIN (GLUCOPHAGE) 500 MG TABLET    TAKE ONE TABLET BY MOUTH TWICE DAILY WITH MEALS    NEEDLE, DISP, 22 G 22 GAUGE X 1" NDLE    Testosterone injection every 2 weeks    NICOTINE (NICODERM CQ) 21 MG/24 HR    Place 1 patch onto the skin once daily.    NICOTINE POLACRILEX 4 MG LOZG    Take 1 lozenge (4 mg total) by mouth as needed (1 per hour as needed in place of " "a cigarette, max of 20 per day).    POLYETHYLENE GLYCOL 3350 (MIRALAX ORAL)    Take 1 application by mouth.    SYRINGE, DISPOSABLE, (BD LUER-STARLA SYRINGE) 1 ML SYRG    Testosterone injection every 2 weeks    TADALAFIL (CIALIS) 20 MG TAB    Take 1 tablet (20 mg total) by mouth once daily.    TESTOSTERONE CYPIONATE (DEPOTESTOTERONE CYPIONATE) 200 MG/ML INJECTION    Inject 1 mL (200 mg total) into the muscle every 14 (fourteen) days.    VERAPAMIL (CALAN-SR) 180 MG CR TABLET    TAKE ONE TABLET BY MOUTH TWICE DAILY        Review of Systems   All other systems reviewed and are negative.      Objective:   Physical Exam   Vitals:    12/04/20 1103 12/04/20 1126   BP: (!) 140/80 136/66   BP Location: Left arm Left arm   Patient Position: Sitting Sitting   BP Method: Medium (Automatic) Medium (Manual)   Pulse: 86    Temp: 97.7 °F (36.5 °C)    TempSrc: Temporal    SpO2: 96%    Weight: 98.9 kg (218 lb)    Height: 5' 11" (1.803 m)     Body mass index is 30.4 kg/m².            Physical Exam  Constitutional:       General: He is not in acute distress.     Appearance: He is well-developed.   Cardiovascular:      Rate and Rhythm: Normal rate and regular rhythm.      Pulses:           Posterior tibial pulses are 3+ on the right side and 3+ on the left side.      Heart sounds: Normal heart sounds. No murmur.   Pulmonary:      Effort: Pulmonary effort is normal.      Breath sounds: Normal breath sounds.   Musculoskeletal: Normal range of motion.      Right lower leg: No edema.      Left lower leg: No edema.      Right foot: No deformity.      Left foot: No deformity.   Feet:      Right foot:      Protective Sensation: 5 sites tested. 5 sites sensed.      Skin integrity: No ulcer, blister, skin breakdown, erythema or warmth.      Left foot:      Protective Sensation: 5 sites tested. 5 sites sensed.      Skin integrity: No ulcer, blister, skin breakdown, erythema or warmth.   Skin:     General: Skin is warm and dry.   Neurological:      " Mental Status: He is alert and oriented to person, place, and time.      Coordination: Coordination normal.   Psychiatric:         Behavior: Behavior normal.         Thought Content: Thought content normal.          Component      Latest Ref Rng & Units 12/3/2020   WBC      3.90 - 12.70 K/uL 12.10   RBC      4.60 - 6.20 M/uL 4.69   Hemoglobin      14.0 - 18.0 g/dL 14.9   Hematocrit      40.0 - 54.0 % 47.3   MCV      82 - 98 fL 101 (H)   MCH      27.0 - 31.0 pg 31.8 (H)   MCHC      32.0 - 36.0 g/dL 31.5 (L)   RDW      11.5 - 14.5 % 13.9   Platelets      150 - 350 K/uL 161   MPV      9.2 - 12.9 fL 12.2   Immature Granulocytes      0.0 - 0.5 % 0.3   Gran # (ANC)      1.8 - 7.7 K/uL 8.0 (H)   Immature Grans (Abs)      0.00 - 0.04 K/uL 0.04   Lymph #      1.0 - 4.8 K/uL 2.5   Mono #      0.3 - 1.0 K/uL 1.2 (H)   Eos #      0.0 - 0.5 K/uL 0.3   Baso #      0.00 - 0.20 K/uL 0.06   nRBC      0 /100 WBC 0   Gran %      38.0 - 73.0 % 66.3   Lymph %      18.0 - 48.0 % 20.7   Mono %      4.0 - 15.0 % 10.1   Eosinophil %      0.0 - 8.0 % 2.1   Basophil %      0.0 - 1.9 % 0.5   Differential Method       Automated   Sodium      136 - 145 mmol/L 140   Potassium      3.5 - 5.1 mmol/L 5.0   Chloride      95 - 110 mmol/L 101   CO2      23 - 29 mmol/L 30 (H)   Glucose      70 - 110 mg/dL 111 (H)   BUN      8 - 23 mg/dL 18   Creatinine      0.5 - 1.4 mg/dL 1.1   Calcium      8.7 - 10.5 mg/dL 9.0   PROTEIN TOTAL      6.0 - 8.4 g/dL 6.7   Albumin      3.5 - 5.2 g/dL 3.8   BILIRUBIN TOTAL      0.1 - 1.0 mg/dL 0.9   Alkaline Phosphatase      55 - 135 U/L 44 (L)   AST      10 - 40 U/L 25   ALT      10 - 44 U/L 23   Anion Gap      8 - 16 mmol/L 9   eGFR if African American      >60 mL/min/1.73 m:2 >60.0   eGFR if non African American      >60 mL/min/1.73 m:2 >60.0   Hemoglobin A1C External      4.0 - 5.6 % 6.6 (H)   Estimated Avg Glucose      68 - 131 mg/dL 143 (H)   Testosterone, Total      304 - 1227 ng/dL 710

## 2020-12-04 NOTE — PATIENT INSTRUCTIONS
Call Dr. Carrillo (eye doctor) for a follow up - [phone]    Take the buspar every day.    Bring in your blood pressure cuff next visit.

## 2020-12-05 NOTE — PROGRESS NOTES
Chest x-ray normal.  Done for complaints of occasional dyspnea in a smoker, and history of asbestos exposure.  Results to patient.

## 2020-12-15 ENCOUNTER — CLINICAL SUPPORT (OUTPATIENT)
Dept: SMOKING CESSATION | Facility: CLINIC | Age: 65
End: 2020-12-15
Payer: COMMERCIAL

## 2020-12-15 DIAGNOSIS — F17.200 NICOTINE DEPENDENCE: ICD-10-CM

## 2020-12-15 PROCEDURE — 99999 PR PBB SHADOW E&M-EST. PATIENT-LVL I: CPT | Mod: PBBFAC,,,

## 2020-12-15 PROCEDURE — 99999 PR PBB SHADOW E&M-EST. PATIENT-LVL I: ICD-10-PCS | Mod: PBBFAC,,,

## 2020-12-15 PROCEDURE — 99403 PREV MED CNSL INDIV APPRX 45: CPT | Mod: S$GLB,,,

## 2020-12-15 PROCEDURE — 99403 PR PREVENT COUNSEL,INDIV,45 MIN: ICD-10-PCS | Mod: S$GLB,,,

## 2020-12-15 RX ORDER — ASPIRIN/CALCIUM CARB/MAGNESIUM 325 MG
TABLET ORAL
Qty: 72 LOZENGE | Refills: 0 | OUTPATIENT
Start: 2020-12-15

## 2020-12-15 RX ORDER — IBUPROFEN 200 MG
2 TABLET ORAL DAILY
Qty: 42 PATCH | Refills: 0 | Status: SHIPPED | OUTPATIENT
Start: 2020-12-15 | End: 2021-01-05 | Stop reason: SDUPTHER

## 2020-12-15 RX ORDER — IBUPROFEN 200 MG
TABLET ORAL
Qty: 14 PATCH | OUTPATIENT
Start: 2020-12-15

## 2020-12-15 RX ORDER — IBUPROFEN 200 MG
TABLET ORAL
Qty: 14 PATCH | Refills: 0 | OUTPATIENT
Start: 2020-12-15

## 2020-12-15 NOTE — Clinical Note
Comments: pt presents for clinic appt follow up out of nicotine patches, stats he was double nicotine patching the 21mg (which was advised to wear one daily) and admits to smoking more then a pack per day prior to the start of the program, pt ran out of patches and has been smoking again however went days without smoking when double patching, after discussion recommend the 21mg nicotine patch x 2 daily, recommend he quit smoking when double patching, session handout discussed as well as strategies, its challenging for the patient to quit smoking because of his mindset and pain level. Will continue to work with him. Will follow

## 2020-12-15 NOTE — PROGRESS NOTES
Individual Follow-Up Form    12/15/2020    Quit Date: n/a    Clinical Status of Patient: Outpatient    Length of Service: 30 minutes    Continuing Medication: yes  Patches or Nicotine Lozenges    Other Medications: n/a     Target Symptoms: Withdrawal and medication side effects. The following were  rated moderate (3) to severe (4) on TCRS:  · Moderate (3): 0  · Severe (4): 0    Comments: pt presents for clinic appt follow up out of nicotine patches, stats he was double nicotine patching the 21mg (which was advised to wear one daily) and admits to smoking more then a pack per day prior to the start of the program, pt ran out of patches and has been smoking again however went days without smoking when double patching, after discussion recommend the 21mg nicotine patch x 2 daily, recommend he quit smoking when double patching, session handout discussed as well as strategies, its challenging for the patient to quit smoking because of his mindset and pain level. Will continue to work with him. Will follow     Diagnosis: F17.210    Next Visit: 2 weeks

## 2021-01-05 ENCOUNTER — CLINICAL SUPPORT (OUTPATIENT)
Dept: SMOKING CESSATION | Facility: CLINIC | Age: 66
End: 2021-01-05
Payer: COMMERCIAL

## 2021-01-05 DIAGNOSIS — F17.200 NICOTINE DEPENDENCE: ICD-10-CM

## 2021-01-05 PROCEDURE — 99403 PR PREVENT COUNSEL,INDIV,45 MIN: ICD-10-PCS | Mod: S$GLB,,,

## 2021-01-05 PROCEDURE — 99403 PREV MED CNSL INDIV APPRX 45: CPT | Mod: S$GLB,,,

## 2021-01-05 PROCEDURE — 99999 PR PBB SHADOW E&M-EST. PATIENT-LVL I: CPT | Mod: PBBFAC,,,

## 2021-01-05 PROCEDURE — 99999 PR PBB SHADOW E&M-EST. PATIENT-LVL I: ICD-10-PCS | Mod: PBBFAC,,,

## 2021-01-05 RX ORDER — IBUPROFEN 200 MG
2 TABLET ORAL DAILY
Qty: 28 PATCH | Refills: 0 | Status: SHIPPED | OUTPATIENT
Start: 2021-01-05 | End: 2021-01-19 | Stop reason: SDUPTHER

## 2021-01-06 ENCOUNTER — TELEPHONE (OUTPATIENT)
Dept: FAMILY MEDICINE | Facility: CLINIC | Age: 66
End: 2021-01-06

## 2021-01-13 ENCOUNTER — CLINICAL SUPPORT (OUTPATIENT)
Dept: FAMILY MEDICINE | Facility: CLINIC | Age: 66
End: 2021-01-13
Payer: MEDICARE

## 2021-01-13 ENCOUNTER — OFFICE VISIT (OUTPATIENT)
Dept: OPTOMETRY | Facility: CLINIC | Age: 66
End: 2021-01-13
Payer: MEDICARE

## 2021-01-13 DIAGNOSIS — H25.13 NUCLEAR SCLEROSIS OF BOTH EYES: ICD-10-CM

## 2021-01-13 DIAGNOSIS — Z23 NEED FOR ZOSTER VACCINE: Primary | ICD-10-CM

## 2021-01-13 DIAGNOSIS — E11.36 TYPE 2 DIABETES MELLITUS WITH DIABETIC CATARACT, WITHOUT LONG-TERM CURRENT USE OF INSULIN: Primary | ICD-10-CM

## 2021-01-13 PROCEDURE — 90471 ZOSTER RECOMBINANT VACCINE: ICD-10-PCS | Mod: S$GLB,,, | Performed by: INTERNAL MEDICINE

## 2021-01-13 PROCEDURE — 92014 COMPRE OPH EXAM EST PT 1/>: CPT | Mod: S$GLB,,, | Performed by: OPTOMETRIST

## 2021-01-13 PROCEDURE — 99999 PR PBB SHADOW E&M-EST. PATIENT-LVL III: ICD-10-PCS | Mod: PBBFAC,,, | Performed by: OPTOMETRIST

## 2021-01-13 PROCEDURE — 99999 PR PBB SHADOW E&M-EST. PATIENT-LVL III: CPT | Mod: PBBFAC,,, | Performed by: OPTOMETRIST

## 2021-01-13 PROCEDURE — 90471 IMMUNIZATION ADMIN: CPT | Mod: S$GLB,,, | Performed by: INTERNAL MEDICINE

## 2021-01-13 PROCEDURE — 99499 NO LOS: ICD-10-PCS | Mod: S$GLB,,, | Performed by: INTERNAL MEDICINE

## 2021-01-13 PROCEDURE — 1100F PR PT FALLS ASSESS DOC 2+ FALLS/FALL W/INJURY/YR: ICD-10-PCS | Mod: CPTII,S$GLB,, | Performed by: OPTOMETRIST

## 2021-01-13 PROCEDURE — 99499 RISK ADDL DX/OHS AUDIT: ICD-10-PCS | Mod: S$GLB,,, | Performed by: OPTOMETRIST

## 2021-01-13 PROCEDURE — 1100F PTFALLS ASSESS-DOCD GE2>/YR: CPT | Mod: CPTII,S$GLB,, | Performed by: OPTOMETRIST

## 2021-01-13 PROCEDURE — 3288F PR FALLS RISK ASSESSMENT DOCUMENTED: ICD-10-PCS | Mod: CPTII,S$GLB,, | Performed by: OPTOMETRIST

## 2021-01-13 PROCEDURE — 3288F FALL RISK ASSESSMENT DOCD: CPT | Mod: CPTII,S$GLB,, | Performed by: OPTOMETRIST

## 2021-01-13 PROCEDURE — 99499 UNLISTED E&M SERVICE: CPT | Mod: S$GLB,,, | Performed by: OPTOMETRIST

## 2021-01-13 PROCEDURE — 92014 PR EYE EXAM, EST PATIENT,COMPREHESV: ICD-10-PCS | Mod: S$GLB,,, | Performed by: OPTOMETRIST

## 2021-01-13 PROCEDURE — 90750 HZV VACC RECOMBINANT IM: CPT | Mod: S$GLB,,, | Performed by: INTERNAL MEDICINE

## 2021-01-13 PROCEDURE — 99499 UNLISTED E&M SERVICE: CPT | Mod: S$GLB,,, | Performed by: INTERNAL MEDICINE

## 2021-01-13 PROCEDURE — 90750 ZOSTER RECOMBINANT VACCINE: ICD-10-PCS | Mod: S$GLB,,, | Performed by: INTERNAL MEDICINE

## 2021-01-19 ENCOUNTER — CLINICAL SUPPORT (OUTPATIENT)
Dept: SMOKING CESSATION | Facility: CLINIC | Age: 66
End: 2021-01-19
Payer: COMMERCIAL

## 2021-01-19 DIAGNOSIS — F17.200 NICOTINE DEPENDENCE: ICD-10-CM

## 2021-01-19 PROCEDURE — 99999 PR PBB SHADOW E&M-EST. PATIENT-LVL I: CPT | Mod: PBBFAC,,,

## 2021-01-19 PROCEDURE — 99403 PREV MED CNSL INDIV APPRX 45: CPT | Mod: S$GLB,,,

## 2021-01-19 PROCEDURE — 99999 PR PBB SHADOW E&M-EST. PATIENT-LVL I: ICD-10-PCS | Mod: PBBFAC,,,

## 2021-01-19 PROCEDURE — 99403 PR PREVENT COUNSEL,INDIV,45 MIN: ICD-10-PCS | Mod: S$GLB,,,

## 2021-01-19 RX ORDER — IBUPROFEN 200 MG
2 TABLET ORAL DAILY
Qty: 28 PATCH | Refills: 0 | Status: SHIPPED | OUTPATIENT
Start: 2021-01-19 | End: 2021-02-02

## 2021-02-02 ENCOUNTER — CLINICAL SUPPORT (OUTPATIENT)
Dept: SMOKING CESSATION | Facility: CLINIC | Age: 66
End: 2021-02-02
Payer: COMMERCIAL

## 2021-02-02 DIAGNOSIS — F17.200 NICOTINE DEPENDENCE: Primary | ICD-10-CM

## 2021-02-02 PROCEDURE — 99402 PR PREVENT COUNSEL,INDIV,30 MIN: ICD-10-PCS | Mod: S$GLB,,,

## 2021-02-02 PROCEDURE — 99999 PR PBB SHADOW E&M-EST. PATIENT-LVL I: ICD-10-PCS | Mod: PBBFAC,,,

## 2021-02-02 PROCEDURE — 99402 PREV MED CNSL INDIV APPRX 30: CPT | Mod: S$GLB,,,

## 2021-02-02 PROCEDURE — 99999 PR PBB SHADOW E&M-EST. PATIENT-LVL I: CPT | Mod: PBBFAC,,,

## 2021-02-02 RX ORDER — DM/P-EPHED/ACETAMINOPH/DOXYLAM 30-7.5/3
2 LIQUID (ML) ORAL
Qty: 100 LOZENGE | Refills: 0 | Status: SHIPPED | OUTPATIENT
Start: 2021-02-02 | End: 2021-03-02 | Stop reason: SDUPTHER

## 2021-02-02 RX ORDER — IBUPROFEN 200 MG
1 TABLET ORAL DAILY
Qty: 14 PATCH | Refills: 0 | Status: SHIPPED | OUTPATIENT
Start: 2021-02-02 | End: 2021-02-17 | Stop reason: SDUPTHER

## 2021-02-17 ENCOUNTER — CLINICAL SUPPORT (OUTPATIENT)
Dept: SMOKING CESSATION | Facility: CLINIC | Age: 66
End: 2021-02-17
Payer: COMMERCIAL

## 2021-02-17 DIAGNOSIS — F17.200 NICOTINE DEPENDENCE: Primary | ICD-10-CM

## 2021-02-17 PROCEDURE — 99999 PR PBB SHADOW E&M-EST. PATIENT-LVL I: CPT | Mod: PBBFAC,,,

## 2021-02-17 PROCEDURE — 99402 PR PREVENT COUNSEL,INDIV,30 MIN: ICD-10-PCS | Mod: S$GLB,,,

## 2021-02-17 PROCEDURE — 99402 PREV MED CNSL INDIV APPRX 30: CPT | Mod: S$GLB,,,

## 2021-02-17 PROCEDURE — 99999 PR PBB SHADOW E&M-EST. PATIENT-LVL I: ICD-10-PCS | Mod: PBBFAC,,,

## 2021-02-17 RX ORDER — IBUPROFEN 200 MG
1 TABLET ORAL DAILY
Qty: 14 PATCH | Refills: 0 | Status: SHIPPED | OUTPATIENT
Start: 2021-02-17 | End: 2021-03-02

## 2021-02-23 ENCOUNTER — LAB VISIT (OUTPATIENT)
Dept: LAB | Facility: HOSPITAL | Age: 66
End: 2021-02-23
Attending: INTERNAL MEDICINE
Payer: MEDICARE

## 2021-02-23 DIAGNOSIS — D75.89 MACROCYTOSIS: ICD-10-CM

## 2021-02-23 DIAGNOSIS — E78.2 MIXED HYPERLIPIDEMIA: ICD-10-CM

## 2021-02-23 DIAGNOSIS — R79.89 LOW TESTOSTERONE IN MALE: ICD-10-CM

## 2021-02-23 DIAGNOSIS — E11.9 TYPE 2 DIABETES MELLITUS WITHOUT COMPLICATION, WITHOUT LONG-TERM CURRENT USE OF INSULIN: ICD-10-CM

## 2021-02-23 LAB
ALBUMIN SERPL BCP-MCNC: 3.9 G/DL (ref 3.5–5.2)
ALP SERPL-CCNC: 48 U/L (ref 55–135)
ALT SERPL W/O P-5'-P-CCNC: 19 U/L (ref 10–44)
ANION GAP SERPL CALC-SCNC: 9 MMOL/L (ref 8–16)
AST SERPL-CCNC: 18 U/L (ref 10–40)
BILIRUB SERPL-MCNC: 0.7 MG/DL (ref 0.1–1)
BUN SERPL-MCNC: 14 MG/DL (ref 8–23)
CALCIUM SERPL-MCNC: 8.6 MG/DL (ref 8.7–10.5)
CHLORIDE SERPL-SCNC: 100 MMOL/L (ref 95–110)
CHOLEST SERPL-MCNC: 102 MG/DL (ref 120–199)
CHOLEST/HDLC SERPL: 3.4 {RATIO} (ref 2–5)
CO2 SERPL-SCNC: 27 MMOL/L (ref 23–29)
CREAT SERPL-MCNC: 1.3 MG/DL (ref 0.5–1.4)
EST. GFR  (AFRICAN AMERICAN): >60 ML/MIN/1.73 M^2
EST. GFR  (NON AFRICAN AMERICAN): 57 ML/MIN/1.73 M^2
ESTIMATED AVG GLUCOSE: 128 MG/DL (ref 68–131)
GLUCOSE SERPL-MCNC: 137 MG/DL (ref 70–110)
HBA1C MFR BLD: 6.1 % (ref 4–5.6)
HDLC SERPL-MCNC: 30 MG/DL (ref 40–75)
HDLC SERPL: 29.4 % (ref 20–50)
LDLC SERPL CALC-MCNC: 39 MG/DL (ref 63–159)
NONHDLC SERPL-MCNC: 72 MG/DL
POTASSIUM SERPL-SCNC: 5 MMOL/L (ref 3.5–5.1)
PROT SERPL-MCNC: 6.9 G/DL (ref 6–8.4)
SODIUM SERPL-SCNC: 136 MMOL/L (ref 136–145)
TRIGL SERPL-MCNC: 165 MG/DL (ref 30–150)

## 2021-02-23 PROCEDURE — 83921 ORGANIC ACID SINGLE QUANT: CPT

## 2021-02-23 PROCEDURE — 36415 COLL VENOUS BLD VENIPUNCTURE: CPT | Mod: PO

## 2021-02-23 PROCEDURE — 80061 LIPID PANEL: CPT

## 2021-02-23 PROCEDURE — 83036 HEMOGLOBIN GLYCOSYLATED A1C: CPT

## 2021-02-23 PROCEDURE — 84403 ASSAY OF TOTAL TESTOSTERONE: CPT

## 2021-02-23 PROCEDURE — 80053 COMPREHEN METABOLIC PANEL: CPT

## 2021-02-23 PROCEDURE — 82747 ASSAY OF FOLIC ACID RBC: CPT

## 2021-02-24 ENCOUNTER — OFFICE VISIT (OUTPATIENT)
Dept: FAMILY MEDICINE | Facility: CLINIC | Age: 66
End: 2021-02-24
Payer: MEDICARE

## 2021-02-24 VITALS
OXYGEN SATURATION: 98 % | TEMPERATURE: 98 F | HEIGHT: 71 IN | HEART RATE: 79 BPM | SYSTOLIC BLOOD PRESSURE: 136 MMHG | DIASTOLIC BLOOD PRESSURE: 86 MMHG | BODY MASS INDEX: 30.4 KG/M2

## 2021-02-24 DIAGNOSIS — G89.29 OTHER CHRONIC PAIN: Chronic | ICD-10-CM

## 2021-02-24 DIAGNOSIS — F41.9 ANXIETY: ICD-10-CM

## 2021-02-24 DIAGNOSIS — I10 ESSENTIAL HYPERTENSION: ICD-10-CM

## 2021-02-24 DIAGNOSIS — E11.9 TYPE 2 DIABETES MELLITUS WITHOUT COMPLICATION, WITHOUT LONG-TERM CURRENT USE OF INSULIN: ICD-10-CM

## 2021-02-24 DIAGNOSIS — F11.20 UNCOMPLICATED OPIOID DEPENDENCE: Chronic | ICD-10-CM

## 2021-02-24 DIAGNOSIS — S83.92XA SPRAIN OF LEFT KNEE, UNSPECIFIED LIGAMENT, INITIAL ENCOUNTER: Primary | ICD-10-CM

## 2021-02-24 DIAGNOSIS — R79.89 LOW TESTOSTERONE IN MALE: ICD-10-CM

## 2021-02-24 DIAGNOSIS — F17.200 TOBACCO DEPENDENCE: Chronic | ICD-10-CM

## 2021-02-24 DIAGNOSIS — E78.2 MIXED HYPERLIPIDEMIA: ICD-10-CM

## 2021-02-24 DIAGNOSIS — F13.20 BENZODIAZEPINE DEPENDENCE: Chronic | ICD-10-CM

## 2021-02-24 DIAGNOSIS — F33.41 RECURRENT MAJOR DEPRESSIVE DISORDER, IN PARTIAL REMISSION: Chronic | ICD-10-CM

## 2021-02-24 DIAGNOSIS — M48.02 CERVICAL STENOSIS OF SPINAL CANAL: ICD-10-CM

## 2021-02-24 LAB — TESTOST SERPL-MCNC: 1000 NG/DL (ref 304–1227)

## 2021-02-24 PROCEDURE — 99499 RISK ADDL DX/OHS AUDIT: ICD-10-PCS | Mod: S$GLB,,, | Performed by: INTERNAL MEDICINE

## 2021-02-24 PROCEDURE — 99999 PR PBB SHADOW E&M-EST. PATIENT-LVL V: CPT | Mod: PBBFAC,,, | Performed by: INTERNAL MEDICINE

## 2021-02-24 PROCEDURE — 3044F PR MOST RECENT HEMOGLOBIN A1C LEVEL <7.0%: ICD-10-PCS | Mod: CPTII,S$GLB,, | Performed by: INTERNAL MEDICINE

## 2021-02-24 PROCEDURE — 99499 UNLISTED E&M SERVICE: CPT | Mod: S$GLB,,, | Performed by: INTERNAL MEDICINE

## 2021-02-24 PROCEDURE — 3288F FALL RISK ASSESSMENT DOCD: CPT | Mod: CPTII,S$GLB,, | Performed by: INTERNAL MEDICINE

## 2021-02-24 PROCEDURE — 1159F MED LIST DOCD IN RCRD: CPT | Mod: S$GLB,,, | Performed by: INTERNAL MEDICINE

## 2021-02-24 PROCEDURE — 99999 PR PBB SHADOW E&M-EST. PATIENT-LVL V: ICD-10-PCS | Mod: PBBFAC,,, | Performed by: INTERNAL MEDICINE

## 2021-02-24 PROCEDURE — 3288F PR FALLS RISK ASSESSMENT DOCUMENTED: ICD-10-PCS | Mod: CPTII,S$GLB,, | Performed by: INTERNAL MEDICINE

## 2021-02-24 PROCEDURE — 1125F AMNT PAIN NOTED PAIN PRSNT: CPT | Mod: S$GLB,,, | Performed by: INTERNAL MEDICINE

## 2021-02-24 PROCEDURE — 1101F PR PT FALLS ASSESS DOC 0-1 FALLS W/OUT INJ PAST YR: ICD-10-PCS | Mod: CPTII,S$GLB,, | Performed by: INTERNAL MEDICINE

## 2021-02-24 PROCEDURE — 3008F BODY MASS INDEX DOCD: CPT | Mod: CPTII,S$GLB,, | Performed by: INTERNAL MEDICINE

## 2021-02-24 PROCEDURE — 99214 OFFICE O/P EST MOD 30 MIN: CPT | Mod: S$GLB,,, | Performed by: INTERNAL MEDICINE

## 2021-02-24 PROCEDURE — 1101F PT FALLS ASSESS-DOCD LE1/YR: CPT | Mod: CPTII,S$GLB,, | Performed by: INTERNAL MEDICINE

## 2021-02-24 PROCEDURE — 99214 PR OFFICE/OUTPT VISIT, EST, LEVL IV, 30-39 MIN: ICD-10-PCS | Mod: S$GLB,,, | Performed by: INTERNAL MEDICINE

## 2021-02-24 PROCEDURE — 3044F HG A1C LEVEL LT 7.0%: CPT | Mod: CPTII,S$GLB,, | Performed by: INTERNAL MEDICINE

## 2021-02-24 PROCEDURE — 1159F PR MEDICATION LIST DOCUMENTED IN MEDICAL RECORD: ICD-10-PCS | Mod: S$GLB,,, | Performed by: INTERNAL MEDICINE

## 2021-02-24 PROCEDURE — 3075F SYST BP GE 130 - 139MM HG: CPT | Mod: CPTII,S$GLB,, | Performed by: INTERNAL MEDICINE

## 2021-02-24 PROCEDURE — 3079F DIAST BP 80-89 MM HG: CPT | Mod: CPTII,S$GLB,, | Performed by: INTERNAL MEDICINE

## 2021-02-24 PROCEDURE — 3008F PR BODY MASS INDEX (BMI) DOCUMENTED: ICD-10-PCS | Mod: CPTII,S$GLB,, | Performed by: INTERNAL MEDICINE

## 2021-02-24 PROCEDURE — 3075F PR MOST RECENT SYSTOLIC BLOOD PRESS GE 130-139MM HG: ICD-10-PCS | Mod: CPTII,S$GLB,, | Performed by: INTERNAL MEDICINE

## 2021-02-24 PROCEDURE — 3079F PR MOST RECENT DIASTOLIC BLOOD PRESSURE 80-89 MM HG: ICD-10-PCS | Mod: CPTII,S$GLB,, | Performed by: INTERNAL MEDICINE

## 2021-02-24 PROCEDURE — 1125F PR PAIN SEVERITY QUANTIFIED, PAIN PRESENT: ICD-10-PCS | Mod: S$GLB,,, | Performed by: INTERNAL MEDICINE

## 2021-02-24 RX ORDER — BUSPIRONE HYDROCHLORIDE 30 MG/1
30 TABLET ORAL 2 TIMES DAILY
Qty: 60 TABLET | Refills: 11 | Status: SHIPPED | OUTPATIENT
Start: 2021-02-24 | End: 2022-02-24 | Stop reason: SDUPTHER

## 2021-02-24 RX ORDER — ALPRAZOLAM 2 MG/1
2 TABLET ORAL NIGHTLY PRN
Status: CANCELLED | OUTPATIENT
Start: 2021-02-24 | End: 2021-03-26

## 2021-02-24 RX ORDER — TESTOSTERONE CYPIONATE 200 MG/ML
100 INJECTION, SOLUTION INTRAMUSCULAR
Qty: 10 ML | Refills: 0 | Status: SHIPPED | OUTPATIENT
Start: 2021-02-24 | End: 2021-08-17 | Stop reason: SDUPTHER

## 2021-02-24 RX ORDER — ALPRAZOLAM 1 MG/1
TABLET ORAL
Qty: 45 TABLET | Refills: 2 | Status: SHIPPED | OUTPATIENT
Start: 2021-02-24 | End: 2021-06-20

## 2021-02-25 LAB — FOLATE RBC-MCNC: 681 NG/ML (ref 280–791)

## 2021-03-01 LAB — METHYLMALONATE SERPL-SCNC: 0.18 UMOL/L

## 2021-03-02 ENCOUNTER — CLINICAL SUPPORT (OUTPATIENT)
Dept: SMOKING CESSATION | Facility: CLINIC | Age: 66
End: 2021-03-02
Payer: COMMERCIAL

## 2021-03-02 DIAGNOSIS — F17.200 NICOTINE DEPENDENCE: Primary | ICD-10-CM

## 2021-03-02 PROCEDURE — 99999 PR PBB SHADOW E&M-EST. PATIENT-LVL I: ICD-10-PCS | Mod: PBBFAC,,,

## 2021-03-02 PROCEDURE — 99402 PREV MED CNSL INDIV APPRX 30: CPT | Mod: S$GLB,,,

## 2021-03-02 PROCEDURE — 99402 PR PREVENT COUNSEL,INDIV,30 MIN: ICD-10-PCS | Mod: S$GLB,,,

## 2021-03-02 PROCEDURE — 99999 PR PBB SHADOW E&M-EST. PATIENT-LVL I: CPT | Mod: PBBFAC,,,

## 2021-03-02 RX ORDER — DM/P-EPHED/ACETAMINOPH/DOXYLAM 30-7.5/3
2 LIQUID (ML) ORAL
Qty: 100 LOZENGE | Refills: 0 | Status: SHIPPED | OUTPATIENT
Start: 2021-03-02 | End: 2021-03-18 | Stop reason: SDUPTHER

## 2021-03-02 RX ORDER — IBUPROFEN 200 MG
1 TABLET ORAL DAILY
Qty: 14 PATCH | Refills: 0 | Status: SHIPPED | OUTPATIENT
Start: 2021-03-02 | End: 2021-03-18 | Stop reason: SDUPTHER

## 2021-03-18 ENCOUNTER — CLINICAL SUPPORT (OUTPATIENT)
Dept: SMOKING CESSATION | Facility: CLINIC | Age: 66
End: 2021-03-18
Payer: COMMERCIAL

## 2021-03-18 DIAGNOSIS — F17.200 NICOTINE DEPENDENCE: ICD-10-CM

## 2021-03-18 PROCEDURE — 99402 PR PREVENT COUNSEL,INDIV,30 MIN: ICD-10-PCS | Mod: S$GLB,,,

## 2021-03-18 PROCEDURE — 99999 PR PBB SHADOW E&M-EST. PATIENT-LVL I: CPT | Mod: PBBFAC,,,

## 2021-03-18 PROCEDURE — 99402 PREV MED CNSL INDIV APPRX 30: CPT | Mod: S$GLB,,,

## 2021-03-18 PROCEDURE — 99999 PR PBB SHADOW E&M-EST. PATIENT-LVL I: ICD-10-PCS | Mod: PBBFAC,,,

## 2021-03-18 RX ORDER — IBUPROFEN 200 MG
1 TABLET ORAL DAILY
Qty: 14 PATCH | Refills: 0 | Status: SHIPPED | OUTPATIENT
Start: 2021-03-18 | End: 2021-03-30 | Stop reason: SDUPTHER

## 2021-03-18 RX ORDER — DM/P-EPHED/ACETAMINOPH/DOXYLAM 30-7.5/3
2 LIQUID (ML) ORAL
Qty: 100 LOZENGE | Refills: 0 | Status: SHIPPED | OUTPATIENT
Start: 2021-03-18 | End: 2021-04-13 | Stop reason: SDUPTHER

## 2021-03-30 ENCOUNTER — CLINICAL SUPPORT (OUTPATIENT)
Dept: SMOKING CESSATION | Facility: CLINIC | Age: 66
End: 2021-03-30
Payer: COMMERCIAL

## 2021-03-30 DIAGNOSIS — F17.200 NICOTINE DEPENDENCE: Primary | ICD-10-CM

## 2021-03-30 PROCEDURE — 99999 PR PBB SHADOW E&M-EST. PATIENT-LVL I: CPT | Mod: PBBFAC,,,

## 2021-03-30 PROCEDURE — 99402 PREV MED CNSL INDIV APPRX 30: CPT | Mod: S$GLB,,,

## 2021-03-30 PROCEDURE — 99402 PR PREVENT COUNSEL,INDIV,30 MIN: ICD-10-PCS | Mod: S$GLB,,,

## 2021-03-30 PROCEDURE — 99999 PR PBB SHADOW E&M-EST. PATIENT-LVL I: ICD-10-PCS | Mod: PBBFAC,,,

## 2021-03-30 RX ORDER — IBUPROFEN 200 MG
1 TABLET ORAL DAILY
Qty: 14 PATCH | Refills: 0 | Status: SHIPPED | OUTPATIENT
Start: 2021-03-30 | End: 2021-04-13 | Stop reason: SDUPTHER

## 2021-04-02 DIAGNOSIS — F17.200 NICOTINE DEPENDENCE: ICD-10-CM

## 2021-04-05 RX ORDER — NICOTINE POLACRILEX 2 MG/1
LOZENGE ORAL
Refills: 0 | OUTPATIENT
Start: 2021-04-05

## 2021-04-13 ENCOUNTER — CLINICAL SUPPORT (OUTPATIENT)
Dept: SMOKING CESSATION | Facility: CLINIC | Age: 66
End: 2021-04-13
Payer: COMMERCIAL

## 2021-04-13 DIAGNOSIS — F17.200 NICOTINE DEPENDENCE: ICD-10-CM

## 2021-04-13 PROCEDURE — 99402 PR PREVENT COUNSEL,INDIV,30 MIN: ICD-10-PCS | Mod: S$GLB,,,

## 2021-04-13 PROCEDURE — 99402 PREV MED CNSL INDIV APPRX 30: CPT | Mod: S$GLB,,,

## 2021-04-13 RX ORDER — DM/P-EPHED/ACETAMINOPH/DOXYLAM 30-7.5/3
2 LIQUID (ML) ORAL
Qty: 100 LOZENGE | Refills: 0 | Status: SHIPPED | OUTPATIENT
Start: 2021-04-13 | End: 2021-04-27 | Stop reason: SDUPTHER

## 2021-04-13 RX ORDER — IBUPROFEN 200 MG
1 TABLET ORAL DAILY
Qty: 14 PATCH | Refills: 0 | Status: SHIPPED | OUTPATIENT
Start: 2021-04-13 | End: 2021-04-27

## 2021-04-27 ENCOUNTER — CLINICAL SUPPORT (OUTPATIENT)
Dept: SMOKING CESSATION | Facility: CLINIC | Age: 66
End: 2021-04-27
Payer: COMMERCIAL

## 2021-04-27 DIAGNOSIS — F17.200 NICOTINE DEPENDENCE: ICD-10-CM

## 2021-04-27 PROCEDURE — 99404 PREV MED CNSL INDIV APPRX 60: CPT | Mod: S$GLB,,,

## 2021-04-27 PROCEDURE — 99404 PR PREVENT COUNSEL,INDIV,60 MIN: ICD-10-PCS | Mod: S$GLB,,,

## 2021-04-27 RX ORDER — DM/P-EPHED/ACETAMINOPH/DOXYLAM 30-7.5/3
2 LIQUID (ML) ORAL
Qty: 100 LOZENGE | Refills: 0 | Status: SHIPPED | OUTPATIENT
Start: 2021-04-27 | End: 2021-05-18 | Stop reason: SDUPTHER

## 2021-04-27 RX ORDER — IBUPROFEN 200 MG
1 TABLET ORAL DAILY
Qty: 14 PATCH | Refills: 0 | Status: SHIPPED | OUTPATIENT
Start: 2021-04-27 | End: 2021-05-18 | Stop reason: SDUPTHER

## 2021-05-18 ENCOUNTER — CLINICAL SUPPORT (OUTPATIENT)
Dept: SMOKING CESSATION | Facility: CLINIC | Age: 66
End: 2021-05-18
Payer: COMMERCIAL

## 2021-05-18 ENCOUNTER — TELEPHONE (OUTPATIENT)
Dept: SMOKING CESSATION | Facility: CLINIC | Age: 66
End: 2021-05-18

## 2021-05-18 DIAGNOSIS — F17.200 NICOTINE DEPENDENCE: Primary | ICD-10-CM

## 2021-05-18 PROCEDURE — 99999 PR PBB SHADOW E&M-EST. PATIENT-LVL I: ICD-10-PCS | Mod: PBBFAC,,,

## 2021-05-18 PROCEDURE — 99999 PR PBB SHADOW E&M-EST. PATIENT-LVL I: CPT | Mod: PBBFAC,,,

## 2021-05-18 PROCEDURE — 99402 PR PREVENT COUNSEL,INDIV,30 MIN: ICD-10-PCS | Mod: S$GLB,,,

## 2021-05-18 PROCEDURE — 99402 PREV MED CNSL INDIV APPRX 30: CPT | Mod: S$GLB,,,

## 2021-05-18 RX ORDER — IBUPROFEN 200 MG
1 TABLET ORAL DAILY
Qty: 14 PATCH | Refills: 0 | Status: SHIPPED | OUTPATIENT
Start: 2021-05-18 | End: 2021-05-21

## 2021-05-18 RX ORDER — DM/P-EPHED/ACETAMINOPH/DOXYLAM 30-7.5/3
2 LIQUID (ML) ORAL
Qty: 100 LOZENGE | Refills: 0 | Status: SHIPPED | OUTPATIENT
Start: 2021-05-18 | End: 2021-06-09

## 2021-05-20 DIAGNOSIS — F17.200 NICOTINE DEPENDENCE: ICD-10-CM

## 2021-05-20 RX ORDER — IBUPROFEN 200 MG
TABLET ORAL
Qty: 42 PATCH | Refills: 0 | OUTPATIENT
Start: 2021-05-20

## 2021-05-21 ENCOUNTER — CLINICAL SUPPORT (OUTPATIENT)
Dept: SMOKING CESSATION | Facility: CLINIC | Age: 66
End: 2021-05-21
Payer: COMMERCIAL

## 2021-05-21 DIAGNOSIS — F17.200 NICOTINE DEPENDENCE: Primary | ICD-10-CM

## 2021-05-21 PROCEDURE — 99407 BEHAV CHNG SMOKING > 10 MIN: CPT | Mod: S$GLB,,,

## 2021-05-21 PROCEDURE — 99407 PR TOBACCO USE CESSATION INTENSIVE >10 MINUTES: ICD-10-PCS | Mod: S$GLB,,,

## 2021-06-09 ENCOUNTER — CLINICAL SUPPORT (OUTPATIENT)
Dept: SMOKING CESSATION | Facility: CLINIC | Age: 66
End: 2021-06-09
Payer: COMMERCIAL

## 2021-06-09 DIAGNOSIS — F17.200 NICOTINE DEPENDENCE: Primary | ICD-10-CM

## 2021-06-09 PROCEDURE — 99402 PREV MED CNSL INDIV APPRX 30: CPT | Mod: S$GLB,,,

## 2021-06-09 PROCEDURE — 99402 PR PREVENT COUNSEL,INDIV,30 MIN: ICD-10-PCS | Mod: S$GLB,,,

## 2021-06-09 PROCEDURE — 99999 PR PBB SHADOW E&M-EST. PATIENT-LVL I: CPT | Mod: PBBFAC,,,

## 2021-06-09 PROCEDURE — 99999 PR PBB SHADOW E&M-EST. PATIENT-LVL I: ICD-10-PCS | Mod: PBBFAC,,,

## 2021-06-09 RX ORDER — DIPHENHYDRAMINE HCL 25 MG
4 CAPSULE ORAL
Qty: 100 EACH | Refills: 0 | Status: SHIPPED | OUTPATIENT
Start: 2021-06-09 | End: 2021-06-30 | Stop reason: SDUPTHER

## 2021-06-09 RX ORDER — ASPIRIN/CALCIUM CARB/MAGNESIUM 325 MG
4 TABLET ORAL
Qty: 100 LOZENGE | Refills: 0 | Status: SHIPPED | OUTPATIENT
Start: 2021-06-09 | End: 2021-06-30 | Stop reason: SDUPTHER

## 2021-06-24 ENCOUNTER — TELEPHONE (OUTPATIENT)
Dept: SMOKING CESSATION | Facility: CLINIC | Age: 66
End: 2021-06-24

## 2021-06-30 ENCOUNTER — CLINICAL SUPPORT (OUTPATIENT)
Dept: SMOKING CESSATION | Facility: CLINIC | Age: 66
End: 2021-06-30
Payer: COMMERCIAL

## 2021-06-30 DIAGNOSIS — F17.200 NICOTINE DEPENDENCE: ICD-10-CM

## 2021-06-30 PROCEDURE — 99999 PR PBB SHADOW E&M-EST. PATIENT-LVL I: CPT | Mod: PBBFAC,,,

## 2021-06-30 PROCEDURE — 99402 PR PREVENT COUNSEL,INDIV,30 MIN: ICD-10-PCS | Mod: S$GLB,,,

## 2021-06-30 PROCEDURE — 99402 PREV MED CNSL INDIV APPRX 30: CPT | Mod: S$GLB,,,

## 2021-06-30 PROCEDURE — 99999 PR PBB SHADOW E&M-EST. PATIENT-LVL I: ICD-10-PCS | Mod: PBBFAC,,,

## 2021-06-30 RX ORDER — ASPIRIN/CALCIUM CARB/MAGNESIUM 325 MG
4 TABLET ORAL
Qty: 100 LOZENGE | Refills: 0 | Status: SHIPPED | OUTPATIENT
Start: 2021-06-30 | End: 2021-07-14 | Stop reason: SDUPTHER

## 2021-06-30 RX ORDER — DIPHENHYDRAMINE HCL 25 MG
4 CAPSULE ORAL
Qty: 100 EACH | Refills: 0 | Status: SHIPPED | OUTPATIENT
Start: 2021-06-30 | End: 2021-07-14 | Stop reason: SDUPTHER

## 2021-07-14 ENCOUNTER — CLINICAL SUPPORT (OUTPATIENT)
Dept: SMOKING CESSATION | Facility: CLINIC | Age: 66
End: 2021-07-14
Payer: COMMERCIAL

## 2021-07-14 DIAGNOSIS — F17.200 NICOTINE DEPENDENCE: ICD-10-CM

## 2021-07-14 PROCEDURE — 99999 PR PBB SHADOW E&M-EST. PATIENT-LVL I: ICD-10-PCS | Mod: PBBFAC,,,

## 2021-07-14 PROCEDURE — 99402 PR PREVENT COUNSEL,INDIV,30 MIN: ICD-10-PCS | Mod: S$GLB,,,

## 2021-07-14 PROCEDURE — 99999 PR PBB SHADOW E&M-EST. PATIENT-LVL I: CPT | Mod: PBBFAC,,,

## 2021-07-14 PROCEDURE — 99402 PREV MED CNSL INDIV APPRX 30: CPT | Mod: S$GLB,,,

## 2021-07-14 RX ORDER — DIPHENHYDRAMINE HCL 25 MG
4 CAPSULE ORAL
Qty: 100 EACH | Refills: 0 | Status: SHIPPED | OUTPATIENT
Start: 2021-07-14 | End: 2021-07-28 | Stop reason: SDUPTHER

## 2021-07-14 RX ORDER — ASPIRIN/CALCIUM CARB/MAGNESIUM 325 MG
4 TABLET ORAL
Qty: 100 LOZENGE | Refills: 0 | Status: SHIPPED | OUTPATIENT
Start: 2021-07-14 | End: 2021-07-28 | Stop reason: SDUPTHER

## 2021-07-28 ENCOUNTER — CLINICAL SUPPORT (OUTPATIENT)
Dept: SMOKING CESSATION | Facility: CLINIC | Age: 66
End: 2021-07-28
Payer: COMMERCIAL

## 2021-07-28 DIAGNOSIS — F17.200 NICOTINE DEPENDENCE: Primary | ICD-10-CM

## 2021-07-28 PROCEDURE — 99407 PR TOBACCO USE CESSATION INTENSIVE >10 MINUTES: ICD-10-PCS | Mod: S$GLB,,,

## 2021-07-28 PROCEDURE — 99407 BEHAV CHNG SMOKING > 10 MIN: CPT | Mod: S$GLB,,,

## 2021-07-28 RX ORDER — DIPHENHYDRAMINE HCL 25 MG
4 CAPSULE ORAL
Qty: 100 EACH | Refills: 0 | Status: SHIPPED | OUTPATIENT
Start: 2021-07-28 | End: 2021-08-27

## 2021-07-28 RX ORDER — ASPIRIN/CALCIUM CARB/MAGNESIUM 325 MG
4 TABLET ORAL
Qty: 100 LOZENGE | Refills: 0 | Status: SHIPPED | OUTPATIENT
Start: 2021-07-28 | End: 2021-08-27

## 2021-08-07 ENCOUNTER — LAB VISIT (OUTPATIENT)
Dept: LAB | Facility: HOSPITAL | Age: 66
End: 2021-08-07
Attending: INTERNAL MEDICINE
Payer: MEDICARE

## 2021-08-07 DIAGNOSIS — E11.9 TYPE 2 DIABETES MELLITUS WITHOUT COMPLICATION, WITHOUT LONG-TERM CURRENT USE OF INSULIN: ICD-10-CM

## 2021-08-07 DIAGNOSIS — R79.89 LOW TESTOSTERONE IN MALE: ICD-10-CM

## 2021-08-07 DIAGNOSIS — E78.2 MIXED HYPERLIPIDEMIA: ICD-10-CM

## 2021-08-07 LAB
ALBUMIN SERPL BCP-MCNC: 3.9 G/DL (ref 3.5–5.2)
ALP SERPL-CCNC: 42 U/L (ref 55–135)
ALT SERPL W/O P-5'-P-CCNC: 19 U/L (ref 10–44)
ANION GAP SERPL CALC-SCNC: 11 MMOL/L (ref 8–16)
AST SERPL-CCNC: 17 U/L (ref 10–40)
BASOPHILS # BLD AUTO: 0.05 K/UL (ref 0–0.2)
BASOPHILS NFR BLD: 0.6 % (ref 0–1.9)
BILIRUB SERPL-MCNC: 0.8 MG/DL (ref 0.1–1)
BUN SERPL-MCNC: 12 MG/DL (ref 8–23)
CALCIUM SERPL-MCNC: 9.1 MG/DL (ref 8.7–10.5)
CHLORIDE SERPL-SCNC: 107 MMOL/L (ref 95–110)
CHOLEST SERPL-MCNC: 113 MG/DL (ref 120–199)
CHOLEST/HDLC SERPL: 3.2 {RATIO} (ref 2–5)
CO2 SERPL-SCNC: 23 MMOL/L (ref 23–29)
CREAT SERPL-MCNC: 1.2 MG/DL (ref 0.5–1.4)
DIFFERENTIAL METHOD: ABNORMAL
EOSINOPHIL # BLD AUTO: 0.2 K/UL (ref 0–0.5)
EOSINOPHIL NFR BLD: 1.9 % (ref 0–8)
ERYTHROCYTE [DISTWIDTH] IN BLOOD BY AUTOMATED COUNT: 12.3 % (ref 11.5–14.5)
EST. GFR  (AFRICAN AMERICAN): >60 ML/MIN/1.73 M^2
EST. GFR  (NON AFRICAN AMERICAN): >60 ML/MIN/1.73 M^2
ESTIMATED AVG GLUCOSE: 131 MG/DL (ref 68–131)
GLUCOSE SERPL-MCNC: 123 MG/DL (ref 70–110)
HBA1C MFR BLD: 6.2 % (ref 4–5.6)
HCT VFR BLD AUTO: 41.4 % (ref 40–54)
HDLC SERPL-MCNC: 35 MG/DL (ref 40–75)
HDLC SERPL: 31 % (ref 20–50)
HGB BLD-MCNC: 13.7 G/DL (ref 14–18)
IMM GRANULOCYTES # BLD AUTO: 0.02 K/UL (ref 0–0.04)
IMM GRANULOCYTES NFR BLD AUTO: 0.2 % (ref 0–0.5)
LDLC SERPL CALC-MCNC: 57.4 MG/DL (ref 63–159)
LYMPHOCYTES # BLD AUTO: 2.4 K/UL (ref 1–4.8)
LYMPHOCYTES NFR BLD: 28.1 % (ref 18–48)
MCH RBC QN AUTO: 33.1 PG (ref 27–31)
MCHC RBC AUTO-ENTMCNC: 33.1 G/DL (ref 32–36)
MCV RBC AUTO: 100 FL (ref 82–98)
MONOCYTES # BLD AUTO: 0.5 K/UL (ref 0.3–1)
MONOCYTES NFR BLD: 6.1 % (ref 4–15)
NEUTROPHILS # BLD AUTO: 5.4 K/UL (ref 1.8–7.7)
NEUTROPHILS NFR BLD: 63.1 % (ref 38–73)
NONHDLC SERPL-MCNC: 78 MG/DL
NRBC BLD-RTO: 0 /100 WBC
PLATELET # BLD AUTO: 186 K/UL (ref 150–450)
PMV BLD AUTO: 12 FL (ref 9.2–12.9)
POTASSIUM SERPL-SCNC: 4.8 MMOL/L (ref 3.5–5.1)
PROT SERPL-MCNC: 6.8 G/DL (ref 6–8.4)
RBC # BLD AUTO: 4.14 M/UL (ref 4.6–6.2)
SODIUM SERPL-SCNC: 141 MMOL/L (ref 136–145)
TESTOST SERPL-MCNC: 265 NG/DL (ref 304–1227)
TRIGL SERPL-MCNC: 103 MG/DL (ref 30–150)
WBC # BLD AUTO: 8.55 K/UL (ref 3.9–12.7)

## 2021-08-07 PROCEDURE — 84403 ASSAY OF TOTAL TESTOSTERONE: CPT | Performed by: INTERNAL MEDICINE

## 2021-08-07 PROCEDURE — 36415 COLL VENOUS BLD VENIPUNCTURE: CPT | Mod: PO | Performed by: INTERNAL MEDICINE

## 2021-08-07 PROCEDURE — 80061 LIPID PANEL: CPT | Performed by: INTERNAL MEDICINE

## 2021-08-07 PROCEDURE — 85025 COMPLETE CBC W/AUTO DIFF WBC: CPT | Performed by: INTERNAL MEDICINE

## 2021-08-07 PROCEDURE — 83036 HEMOGLOBIN GLYCOSYLATED A1C: CPT | Performed by: INTERNAL MEDICINE

## 2021-08-07 PROCEDURE — 80053 COMPREHEN METABOLIC PANEL: CPT | Performed by: INTERNAL MEDICINE

## 2021-08-17 ENCOUNTER — OFFICE VISIT (OUTPATIENT)
Dept: FAMILY MEDICINE | Facility: CLINIC | Age: 66
End: 2021-08-17
Payer: MEDICARE

## 2021-08-17 VITALS
SYSTOLIC BLOOD PRESSURE: 136 MMHG | HEIGHT: 71 IN | TEMPERATURE: 98 F | WEIGHT: 210.75 LBS | BODY MASS INDEX: 29.5 KG/M2 | DIASTOLIC BLOOD PRESSURE: 80 MMHG | OXYGEN SATURATION: 97 % | HEART RATE: 63 BPM

## 2021-08-17 DIAGNOSIS — M48.02 CERVICAL STENOSIS OF SPINAL CANAL: ICD-10-CM

## 2021-08-17 DIAGNOSIS — M51.36 DDD (DEGENERATIVE DISC DISEASE), LUMBAR: ICD-10-CM

## 2021-08-17 DIAGNOSIS — F33.41 RECURRENT MAJOR DEPRESSIVE DISORDER, IN PARTIAL REMISSION: ICD-10-CM

## 2021-08-17 DIAGNOSIS — F41.9 ANXIETY: ICD-10-CM

## 2021-08-17 DIAGNOSIS — R79.89 LOW TESTOSTERONE IN MALE: ICD-10-CM

## 2021-08-17 DIAGNOSIS — E66.3 OVERWEIGHT (BMI 25.0-29.9): ICD-10-CM

## 2021-08-17 DIAGNOSIS — Z72.0 TOBACCO USE: ICD-10-CM

## 2021-08-17 DIAGNOSIS — G89.29 OTHER CHRONIC PAIN: ICD-10-CM

## 2021-08-17 DIAGNOSIS — M50.30 DDD (DEGENERATIVE DISC DISEASE), CERVICAL: ICD-10-CM

## 2021-08-17 DIAGNOSIS — I10 ESSENTIAL HYPERTENSION: ICD-10-CM

## 2021-08-17 DIAGNOSIS — Z00.00 ANNUAL PHYSICAL EXAM: Primary | ICD-10-CM

## 2021-08-17 DIAGNOSIS — E11.9 TYPE 2 DIABETES MELLITUS WITHOUT COMPLICATION, WITHOUT LONG-TERM CURRENT USE OF INSULIN: ICD-10-CM

## 2021-08-17 DIAGNOSIS — E78.2 MIXED HYPERLIPIDEMIA: ICD-10-CM

## 2021-08-17 DIAGNOSIS — F11.20 UNCOMPLICATED OPIOID DEPENDENCE: ICD-10-CM

## 2021-08-17 PROCEDURE — 99214 OFFICE O/P EST MOD 30 MIN: CPT | Mod: S$GLB,,, | Performed by: FAMILY MEDICINE

## 2021-08-17 PROCEDURE — 1159F PR MEDICATION LIST DOCUMENTED IN MEDICAL RECORD: ICD-10-PCS | Mod: CPTII,S$GLB,, | Performed by: FAMILY MEDICINE

## 2021-08-17 PROCEDURE — 3288F PR FALLS RISK ASSESSMENT DOCUMENTED: ICD-10-PCS | Mod: CPTII,S$GLB,, | Performed by: FAMILY MEDICINE

## 2021-08-17 PROCEDURE — 1101F PT FALLS ASSESS-DOCD LE1/YR: CPT | Mod: CPTII,S$GLB,, | Performed by: FAMILY MEDICINE

## 2021-08-17 PROCEDURE — 1160F PR REVIEW ALL MEDS BY PRESCRIBER/CLIN PHARMACIST DOCUMENTED: ICD-10-PCS | Mod: CPTII,S$GLB,, | Performed by: FAMILY MEDICINE

## 2021-08-17 PROCEDURE — 1160F RVW MEDS BY RX/DR IN RCRD: CPT | Mod: CPTII,S$GLB,, | Performed by: FAMILY MEDICINE

## 2021-08-17 PROCEDURE — 1101F PR PT FALLS ASSESS DOC 0-1 FALLS W/OUT INJ PAST YR: ICD-10-PCS | Mod: CPTII,S$GLB,, | Performed by: FAMILY MEDICINE

## 2021-08-17 PROCEDURE — 1125F PR PAIN SEVERITY QUANTIFIED, PAIN PRESENT: ICD-10-PCS | Mod: CPTII,S$GLB,, | Performed by: FAMILY MEDICINE

## 2021-08-17 PROCEDURE — 3075F SYST BP GE 130 - 139MM HG: CPT | Mod: CPTII,S$GLB,, | Performed by: FAMILY MEDICINE

## 2021-08-17 PROCEDURE — 3079F PR MOST RECENT DIASTOLIC BLOOD PRESSURE 80-89 MM HG: ICD-10-PCS | Mod: CPTII,S$GLB,, | Performed by: FAMILY MEDICINE

## 2021-08-17 PROCEDURE — 99214 PR OFFICE/OUTPT VISIT, EST, LEVL IV, 30-39 MIN: ICD-10-PCS | Mod: S$GLB,,, | Performed by: FAMILY MEDICINE

## 2021-08-17 PROCEDURE — 3079F DIAST BP 80-89 MM HG: CPT | Mod: CPTII,S$GLB,, | Performed by: FAMILY MEDICINE

## 2021-08-17 PROCEDURE — 3075F PR MOST RECENT SYSTOLIC BLOOD PRESS GE 130-139MM HG: ICD-10-PCS | Mod: CPTII,S$GLB,, | Performed by: FAMILY MEDICINE

## 2021-08-17 PROCEDURE — 3008F PR BODY MASS INDEX (BMI) DOCUMENTED: ICD-10-PCS | Mod: CPTII,S$GLB,, | Performed by: FAMILY MEDICINE

## 2021-08-17 PROCEDURE — 99499 RISK ADDL DX/OHS AUDIT: ICD-10-PCS | Mod: S$GLB,,, | Performed by: FAMILY MEDICINE

## 2021-08-17 PROCEDURE — 1157F PR ADVANCE CARE PLAN OR EQUIV PRESENT IN MEDICAL RECORD: ICD-10-PCS | Mod: CPTII,S$GLB,, | Performed by: FAMILY MEDICINE

## 2021-08-17 PROCEDURE — 3288F FALL RISK ASSESSMENT DOCD: CPT | Mod: CPTII,S$GLB,, | Performed by: FAMILY MEDICINE

## 2021-08-17 PROCEDURE — 1157F ADVNC CARE PLAN IN RCRD: CPT | Mod: CPTII,S$GLB,, | Performed by: FAMILY MEDICINE

## 2021-08-17 PROCEDURE — 1159F MED LIST DOCD IN RCRD: CPT | Mod: CPTII,S$GLB,, | Performed by: FAMILY MEDICINE

## 2021-08-17 PROCEDURE — 3044F PR MOST RECENT HEMOGLOBIN A1C LEVEL <7.0%: ICD-10-PCS | Mod: CPTII,S$GLB,, | Performed by: FAMILY MEDICINE

## 2021-08-17 PROCEDURE — 3008F BODY MASS INDEX DOCD: CPT | Mod: CPTII,S$GLB,, | Performed by: FAMILY MEDICINE

## 2021-08-17 PROCEDURE — 99499 UNLISTED E&M SERVICE: CPT | Mod: S$GLB,,, | Performed by: FAMILY MEDICINE

## 2021-08-17 PROCEDURE — 3044F HG A1C LEVEL LT 7.0%: CPT | Mod: CPTII,S$GLB,, | Performed by: FAMILY MEDICINE

## 2021-08-17 PROCEDURE — 1125F AMNT PAIN NOTED PAIN PRSNT: CPT | Mod: CPTII,S$GLB,, | Performed by: FAMILY MEDICINE

## 2021-08-17 PROCEDURE — 99999 PR PBB SHADOW E&M-EST. PATIENT-LVL III: CPT | Mod: PBBFAC,,, | Performed by: FAMILY MEDICINE

## 2021-08-17 PROCEDURE — 99999 PR PBB SHADOW E&M-EST. PATIENT-LVL III: ICD-10-PCS | Mod: PBBFAC,,, | Performed by: FAMILY MEDICINE

## 2021-08-17 RX ORDER — TESTOSTERONE CYPIONATE 200 MG/ML
200 INJECTION, SOLUTION INTRAMUSCULAR
Qty: 10 ML | Refills: 0 | Status: SHIPPED | OUTPATIENT
Start: 2021-08-17 | End: 2021-12-01

## 2021-10-01 ENCOUNTER — HOSPITAL ENCOUNTER (INPATIENT)
Facility: HOSPITAL | Age: 66
LOS: 6 days | Discharge: NURSING, REHAB OR LONG-TERM CARE | DRG: 518 | End: 2021-10-09
Attending: EMERGENCY MEDICINE | Admitting: NEUROLOGICAL SURGERY
Payer: MEDICARE

## 2021-10-01 DIAGNOSIS — E11.9 TYPE 2 DIABETES MELLITUS WITHOUT COMPLICATION, WITHOUT LONG-TERM CURRENT USE OF INSULIN: ICD-10-CM

## 2021-10-01 DIAGNOSIS — M48.02 CERVICAL STENOSIS OF SPINAL CANAL: ICD-10-CM

## 2021-10-01 DIAGNOSIS — I10 HYPERTENSION, UNSPECIFIED TYPE: ICD-10-CM

## 2021-10-01 DIAGNOSIS — R90.89 ABNORMAL CT OF BRAIN: ICD-10-CM

## 2021-10-01 DIAGNOSIS — Z74.09 IMPAIRED MOBILITY AND ADLS: ICD-10-CM

## 2021-10-01 DIAGNOSIS — Z78.9 IMPAIRED MOBILITY AND ADLS: ICD-10-CM

## 2021-10-01 DIAGNOSIS — I10 ESSENTIAL HYPERTENSION: ICD-10-CM

## 2021-10-01 DIAGNOSIS — R20.2 PARESTHESIA OF LEFT UPPER AND LOWER EXTREMITY: Primary | ICD-10-CM

## 2021-10-01 DIAGNOSIS — E87.1 HYPONATREMIA: ICD-10-CM

## 2021-10-01 LAB
ALBUMIN SERPL BCP-MCNC: 4.2 G/DL (ref 3.5–5.2)
ALP SERPL-CCNC: 47 U/L (ref 55–135)
ALT SERPL W/O P-5'-P-CCNC: 22 U/L (ref 10–44)
ANION GAP SERPL CALC-SCNC: 12 MMOL/L (ref 8–16)
ANION GAP SERPL CALC-SCNC: 19 MMOL/L (ref 8–16)
AST SERPL-CCNC: 21 U/L (ref 10–40)
BASOPHILS # BLD AUTO: 0.05 K/UL (ref 0–0.2)
BASOPHILS NFR BLD: 0.3 % (ref 0–1.9)
BILIRUB SERPL-MCNC: 1.2 MG/DL (ref 0.1–1)
BUN SERPL-MCNC: 11 MG/DL (ref 8–23)
BUN SERPL-MCNC: 12 MG/DL (ref 6–30)
CALCIUM SERPL-MCNC: 10 MG/DL (ref 8.7–10.5)
CHLORIDE SERPL-SCNC: 102 MMOL/L (ref 95–110)
CHLORIDE SERPL-SCNC: 107 MMOL/L (ref 95–110)
CHOLEST SERPL-MCNC: 106 MG/DL (ref 120–199)
CHOLEST/HDLC SERPL: 3.4 {RATIO} (ref 2–5)
CO2 SERPL-SCNC: 20 MMOL/L (ref 23–29)
CREAT SERPL-MCNC: 0.9 MG/DL (ref 0.5–1.4)
CREAT SERPL-MCNC: 1.1 MG/DL (ref 0.5–1.4)
CTP QC/QA: YES
DIFFERENTIAL METHOD: ABNORMAL
EOSINOPHIL # BLD AUTO: 0 K/UL (ref 0–0.5)
EOSINOPHIL NFR BLD: 0.1 % (ref 0–8)
ERYTHROCYTE [DISTWIDTH] IN BLOOD BY AUTOMATED COUNT: 13.3 % (ref 11.5–14.5)
EST. GFR  (AFRICAN AMERICAN): >60 ML/MIN/1.73 M^2
EST. GFR  (NON AFRICAN AMERICAN): >60 ML/MIN/1.73 M^2
GLUCOSE SERPL-MCNC: 147 MG/DL (ref 70–110)
GLUCOSE SERPL-MCNC: 150 MG/DL (ref 70–110)
HCT VFR BLD AUTO: 45.6 % (ref 40–54)
HCT VFR BLD CALC: 49 %PCV (ref 36–54)
HDLC SERPL-MCNC: 31 MG/DL (ref 40–75)
HDLC SERPL: 29.2 % (ref 20–50)
HGB BLD-MCNC: 16 G/DL (ref 14–18)
IMM GRANULOCYTES # BLD AUTO: 0.06 K/UL (ref 0–0.04)
IMM GRANULOCYTES NFR BLD AUTO: 0.4 % (ref 0–0.5)
INR PPP: 1 (ref 0.8–1.2)
LDLC SERPL CALC-MCNC: 50.2 MG/DL (ref 63–159)
LYMPHOCYTES # BLD AUTO: 1 K/UL (ref 1–4.8)
LYMPHOCYTES NFR BLD: 6 % (ref 18–48)
MCH RBC QN AUTO: 34.4 PG (ref 27–31)
MCHC RBC AUTO-ENTMCNC: 35.1 G/DL (ref 32–36)
MCV RBC AUTO: 98 FL (ref 82–98)
MONOCYTES # BLD AUTO: 0.1 K/UL (ref 0.3–1)
MONOCYTES NFR BLD: 0.9 % (ref 4–15)
NEUTROPHILS # BLD AUTO: 14.6 K/UL (ref 1.8–7.7)
NEUTROPHILS NFR BLD: 92.3 % (ref 38–73)
NONHDLC SERPL-MCNC: 75 MG/DL
NRBC BLD-RTO: 0 /100 WBC
PLATELET # BLD AUTO: 291 K/UL (ref 150–450)
PMV BLD AUTO: 10.9 FL (ref 9.2–12.9)
POC IONIZED CALCIUM: 1.24 MMOL/L (ref 1.06–1.42)
POC TCO2 (MEASURED): 23 MMOL/L (ref 23–29)
POCT GLUCOSE: 142 MG/DL (ref 70–110)
POTASSIUM BLD-SCNC: 4.4 MMOL/L (ref 3.5–5.1)
POTASSIUM SERPL-SCNC: 4.5 MMOL/L (ref 3.5–5.1)
PROT SERPL-MCNC: 7.6 G/DL (ref 6–8.4)
PROTHROMBIN TIME: 10.8 SEC (ref 9–12.5)
RBC # BLD AUTO: 4.65 M/UL (ref 4.6–6.2)
SAMPLE: ABNORMAL
SARS-COV-2 RDRP RESP QL NAA+PROBE: NEGATIVE
SODIUM BLD-SCNC: 138 MMOL/L (ref 136–145)
SODIUM SERPL-SCNC: 139 MMOL/L (ref 136–145)
TRIGL SERPL-MCNC: 124 MG/DL (ref 30–150)
TSH SERPL DL<=0.005 MIU/L-ACNC: 1.9 UIU/ML (ref 0.4–4)
WBC # BLD AUTO: 15.82 K/UL (ref 3.9–12.7)

## 2021-10-01 PROCEDURE — 99291 CRITICAL CARE FIRST HOUR: CPT | Mod: 25

## 2021-10-01 PROCEDURE — 96365 THER/PROPH/DIAG IV INF INIT: CPT

## 2021-10-01 PROCEDURE — 84443 ASSAY THYROID STIM HORMONE: CPT | Performed by: EMERGENCY MEDICINE

## 2021-10-01 PROCEDURE — 80053 COMPREHEN METABOLIC PANEL: CPT | Performed by: EMERGENCY MEDICINE

## 2021-10-01 PROCEDURE — 99900035 HC TECH TIME PER 15 MIN (STAT)

## 2021-10-01 PROCEDURE — 82565 ASSAY OF CREATININE: CPT

## 2021-10-01 PROCEDURE — 82962 GLUCOSE BLOOD TEST: CPT

## 2021-10-01 PROCEDURE — 93005 ELECTROCARDIOGRAM TRACING: CPT

## 2021-10-01 PROCEDURE — 82330 ASSAY OF CALCIUM: CPT

## 2021-10-01 PROCEDURE — 84295 ASSAY OF SERUM SODIUM: CPT

## 2021-10-01 PROCEDURE — G0425 PR INPT TELEHEALTH CONSULT 30M: ICD-10-PCS | Mod: 95,,, | Performed by: PSYCHIATRY & NEUROLOGY

## 2021-10-01 PROCEDURE — 25500020 PHARM REV CODE 255: Performed by: EMERGENCY MEDICINE

## 2021-10-01 PROCEDURE — 80061 LIPID PANEL: CPT | Performed by: EMERGENCY MEDICINE

## 2021-10-01 PROCEDURE — G0378 HOSPITAL OBSERVATION PER HR: HCPCS

## 2021-10-01 PROCEDURE — 63600175 PHARM REV CODE 636 W HCPCS: Performed by: EMERGENCY MEDICINE

## 2021-10-01 PROCEDURE — 84132 ASSAY OF SERUM POTASSIUM: CPT

## 2021-10-01 PROCEDURE — 93010 EKG 12-LEAD: ICD-10-PCS | Mod: ,,, | Performed by: INTERNAL MEDICINE

## 2021-10-01 PROCEDURE — 99223 1ST HOSP IP/OBS HIGH 75: CPT | Mod: GC,,, | Performed by: NEUROLOGICAL SURGERY

## 2021-10-01 PROCEDURE — 96366 THER/PROPH/DIAG IV INF ADDON: CPT

## 2021-10-01 PROCEDURE — 25000003 PHARM REV CODE 250: Performed by: PHYSICIAN ASSISTANT

## 2021-10-01 PROCEDURE — 85610 PROTHROMBIN TIME: CPT | Performed by: EMERGENCY MEDICINE

## 2021-10-01 PROCEDURE — G0425 INPT/ED TELECONSULT30: HCPCS | Mod: 95,,, | Performed by: PSYCHIATRY & NEUROLOGY

## 2021-10-01 PROCEDURE — 85025 COMPLETE CBC W/AUTO DIFF WBC: CPT | Performed by: EMERGENCY MEDICINE

## 2021-10-01 PROCEDURE — 93010 ELECTROCARDIOGRAM REPORT: CPT | Mod: ,,, | Performed by: INTERNAL MEDICINE

## 2021-10-01 PROCEDURE — 85014 HEMATOCRIT: CPT

## 2021-10-01 PROCEDURE — U0002 COVID-19 LAB TEST NON-CDC: HCPCS | Performed by: EMERGENCY MEDICINE

## 2021-10-01 PROCEDURE — 96375 TX/PRO/DX INJ NEW DRUG ADDON: CPT

## 2021-10-01 PROCEDURE — 99223 PR INITIAL HOSPITAL CARE,LEVL III: ICD-10-PCS | Mod: GC,,, | Performed by: NEUROLOGICAL SURGERY

## 2021-10-01 RX ORDER — IBUPROFEN 200 MG
16 TABLET ORAL
Status: DISCONTINUED | OUTPATIENT
Start: 2021-10-02 | End: 2021-10-02

## 2021-10-01 RX ORDER — DEXAMETHASONE SODIUM PHOSPHATE 4 MG/ML
4 INJECTION, SOLUTION INTRA-ARTICULAR; INTRALESIONAL; INTRAMUSCULAR; INTRAVENOUS; SOFT TISSUE EVERY 6 HOURS
Status: DISCONTINUED | OUTPATIENT
Start: 2021-10-02 | End: 2021-10-06

## 2021-10-01 RX ORDER — IBUPROFEN 200 MG
24 TABLET ORAL
Status: DISCONTINUED | OUTPATIENT
Start: 2021-10-02 | End: 2021-10-09 | Stop reason: HOSPADM

## 2021-10-01 RX ORDER — GABAPENTIN 400 MG/1
400 CAPSULE ORAL 3 TIMES DAILY
Status: DISCONTINUED | OUTPATIENT
Start: 2021-10-02 | End: 2021-10-09 | Stop reason: HOSPADM

## 2021-10-01 RX ORDER — MORPHINE SULFATE 2 MG/ML
3 INJECTION, SOLUTION INTRAMUSCULAR; INTRAVENOUS
Status: COMPLETED | OUTPATIENT
Start: 2021-10-01 | End: 2021-10-01

## 2021-10-01 RX ORDER — LISINOPRIL 20 MG/1
40 TABLET ORAL DAILY
Status: DISCONTINUED | OUTPATIENT
Start: 2021-10-02 | End: 2021-10-02

## 2021-10-01 RX ORDER — CITALOPRAM 10 MG/1
40 TABLET ORAL DAILY
Status: DISCONTINUED | OUTPATIENT
Start: 2021-10-02 | End: 2021-10-09 | Stop reason: HOSPADM

## 2021-10-01 RX ORDER — HYDROCODONE BITARTRATE AND ACETAMINOPHEN 5; 325 MG/1; MG/1
1 TABLET ORAL EVERY 4 HOURS PRN
Status: DISCONTINUED | OUTPATIENT
Start: 2021-10-02 | End: 2021-10-03

## 2021-10-01 RX ORDER — LORAZEPAM 2 MG/ML
1 INJECTION INTRAMUSCULAR
Status: COMPLETED | OUTPATIENT
Start: 2021-10-01 | End: 2021-10-01

## 2021-10-01 RX ORDER — NICARDIPINE HYDROCHLORIDE 0.2 MG/ML
0-15 INJECTION INTRAVENOUS CONTINUOUS
Status: DISCONTINUED | OUTPATIENT
Start: 2021-10-01 | End: 2021-10-02

## 2021-10-01 RX ORDER — BUSPIRONE HYDROCHLORIDE 10 MG/1
30 TABLET ORAL 2 TIMES DAILY
Status: DISCONTINUED | OUTPATIENT
Start: 2021-10-02 | End: 2021-10-09 | Stop reason: HOSPADM

## 2021-10-01 RX ORDER — IBUPROFEN 200 MG
24 TABLET ORAL
Status: DISCONTINUED | OUTPATIENT
Start: 2021-10-02 | End: 2021-10-02

## 2021-10-01 RX ORDER — INSULIN ASPART 100 [IU]/ML
1-10 INJECTION, SOLUTION INTRAVENOUS; SUBCUTANEOUS
Status: DISCONTINUED | OUTPATIENT
Start: 2021-10-02 | End: 2021-10-09 | Stop reason: HOSPADM

## 2021-10-01 RX ORDER — ONDANSETRON 8 MG/1
8 TABLET, ORALLY DISINTEGRATING ORAL EVERY 6 HOURS PRN
Status: DISCONTINUED | OUTPATIENT
Start: 2021-10-02 | End: 2021-10-09 | Stop reason: HOSPADM

## 2021-10-01 RX ORDER — HEPARIN SODIUM 5000 [USP'U]/ML
5000 INJECTION, SOLUTION INTRAVENOUS; SUBCUTANEOUS EVERY 8 HOURS
Status: DISCONTINUED | OUTPATIENT
Start: 2021-10-02 | End: 2021-10-02

## 2021-10-01 RX ORDER — IBUPROFEN 200 MG
16 TABLET ORAL
Status: DISCONTINUED | OUTPATIENT
Start: 2021-10-02 | End: 2021-10-09 | Stop reason: HOSPADM

## 2021-10-01 RX ORDER — VERAPAMIL HYDROCHLORIDE 180 MG/1
180 TABLET, FILM COATED, EXTENDED RELEASE ORAL 2 TIMES DAILY
Status: DISCONTINUED | OUTPATIENT
Start: 2021-10-02 | End: 2021-10-09 | Stop reason: HOSPADM

## 2021-10-01 RX ORDER — HYDROCODONE BITARTRATE AND ACETAMINOPHEN 10; 325 MG/1; MG/1
1 TABLET ORAL 2 TIMES DAILY
Status: DISCONTINUED | OUTPATIENT
Start: 2021-10-02 | End: 2021-10-03

## 2021-10-01 RX ORDER — GLUCAGON 1 MG
1 KIT INJECTION
Status: DISCONTINUED | OUTPATIENT
Start: 2021-10-02 | End: 2021-10-02

## 2021-10-01 RX ORDER — ALPRAZOLAM 1 MG/1
1 TABLET ORAL 2 TIMES DAILY PRN
Status: DISCONTINUED | OUTPATIENT
Start: 2021-10-02 | End: 2021-10-08

## 2021-10-01 RX ORDER — GLUCAGON 1 MG
1 KIT INJECTION
Status: DISCONTINUED | OUTPATIENT
Start: 2021-10-02 | End: 2021-10-09 | Stop reason: HOSPADM

## 2021-10-01 RX ORDER — METHOCARBAMOL 500 MG/1
500 TABLET, FILM COATED ORAL 4 TIMES DAILY
Status: DISCONTINUED | OUTPATIENT
Start: 2021-10-02 | End: 2021-10-03

## 2021-10-01 RX ORDER — DOCUSATE SODIUM 100 MG/1
100 CAPSULE, LIQUID FILLED ORAL 2 TIMES DAILY
Status: DISCONTINUED | OUTPATIENT
Start: 2021-10-02 | End: 2021-10-09 | Stop reason: HOSPADM

## 2021-10-01 RX ORDER — HYDRALAZINE HYDROCHLORIDE 20 MG/ML
10 INJECTION INTRAMUSCULAR; INTRAVENOUS
Status: COMPLETED | OUTPATIENT
Start: 2021-10-01 | End: 2021-10-01

## 2021-10-01 RX ORDER — SODIUM CHLORIDE 9 MG/ML
INJECTION, SOLUTION INTRAVENOUS CONTINUOUS
Status: DISCONTINUED | OUTPATIENT
Start: 2021-10-02 | End: 2021-10-03

## 2021-10-01 RX ADMIN — IOHEXOL 75 ML: 350 INJECTION, SOLUTION INTRAVENOUS at 05:10

## 2021-10-01 RX ADMIN — MORPHINE SULFATE 3 MG: 2 INJECTION, SOLUTION INTRAMUSCULAR; INTRAVENOUS at 10:10

## 2021-10-01 RX ADMIN — NICARDIPINE HYDROCHLORIDE 5 MG/HR: 0.2 INJECTION, SOLUTION INTRAVENOUS at 08:10

## 2021-10-01 RX ADMIN — LORAZEPAM 1 MG: 2 INJECTION INTRAMUSCULAR; INTRAVENOUS at 06:10

## 2021-10-01 RX ADMIN — HYDRALAZINE HYDROCHLORIDE 10 MG: 20 INJECTION INTRAMUSCULAR; INTRAVENOUS at 06:10

## 2021-10-02 PROBLEM — Z87.891 HISTORY OF SMOKING 10-25 PACK YEARS: Status: ACTIVE | Noted: 2021-10-02

## 2021-10-02 PROBLEM — E87.20 METABOLIC ACIDOSIS: Status: ACTIVE | Noted: 2021-10-02

## 2021-10-02 LAB
ANION GAP SERPL CALC-SCNC: 12 MMOL/L (ref 8–16)
BASOPHILS # BLD AUTO: 0.02 K/UL (ref 0–0.2)
BASOPHILS NFR BLD: 0.2 % (ref 0–1.9)
BUN SERPL-MCNC: 16 MG/DL (ref 8–23)
CALCIUM SERPL-MCNC: 9.2 MG/DL (ref 8.7–10.5)
CHLORIDE SERPL-SCNC: 107 MMOL/L (ref 95–110)
CO2 SERPL-SCNC: 16 MMOL/L (ref 23–29)
CREAT SERPL-MCNC: 1.1 MG/DL (ref 0.5–1.4)
DIFFERENTIAL METHOD: ABNORMAL
EOSINOPHIL # BLD AUTO: 0 K/UL (ref 0–0.5)
EOSINOPHIL NFR BLD: 0 % (ref 0–8)
ERYTHROCYTE [DISTWIDTH] IN BLOOD BY AUTOMATED COUNT: 13.5 % (ref 11.5–14.5)
EST. GFR  (AFRICAN AMERICAN): >60 ML/MIN/1.73 M^2
EST. GFR  (NON AFRICAN AMERICAN): >60 ML/MIN/1.73 M^2
GLUCOSE SERPL-MCNC: 174 MG/DL (ref 70–110)
HCT VFR BLD AUTO: 44.4 % (ref 40–54)
HGB BLD-MCNC: 14.8 G/DL (ref 14–18)
IMM GRANULOCYTES # BLD AUTO: 0.06 K/UL (ref 0–0.04)
IMM GRANULOCYTES NFR BLD AUTO: 0.5 % (ref 0–0.5)
LYMPHOCYTES # BLD AUTO: 1 K/UL (ref 1–4.8)
LYMPHOCYTES NFR BLD: 7.5 % (ref 18–48)
MCH RBC QN AUTO: 33.4 PG (ref 27–31)
MCHC RBC AUTO-ENTMCNC: 33.3 G/DL (ref 32–36)
MCV RBC AUTO: 100 FL (ref 82–98)
MONOCYTES # BLD AUTO: 0.2 K/UL (ref 0.3–1)
MONOCYTES NFR BLD: 1.1 % (ref 4–15)
NEUTROPHILS # BLD AUTO: 11.9 K/UL (ref 1.8–7.7)
NEUTROPHILS NFR BLD: 90.7 % (ref 38–73)
NRBC BLD-RTO: 0 /100 WBC
PLATELET # BLD AUTO: 258 K/UL (ref 150–450)
PMV BLD AUTO: 10.9 FL (ref 9.2–12.9)
POCT GLUCOSE: 155 MG/DL (ref 70–110)
POCT GLUCOSE: 166 MG/DL (ref 70–110)
POCT GLUCOSE: 181 MG/DL (ref 70–110)
POTASSIUM SERPL-SCNC: 4.7 MMOL/L (ref 3.5–5.1)
RBC # BLD AUTO: 4.43 M/UL (ref 4.6–6.2)
SODIUM SERPL-SCNC: 135 MMOL/L (ref 136–145)
WBC # BLD AUTO: 13.09 K/UL (ref 3.9–12.7)

## 2021-10-02 PROCEDURE — 63600175 PHARM REV CODE 636 W HCPCS: Performed by: STUDENT IN AN ORGANIZED HEALTH CARE EDUCATION/TRAINING PROGRAM

## 2021-10-02 PROCEDURE — 25000003 PHARM REV CODE 250: Performed by: STUDENT IN AN ORGANIZED HEALTH CARE EDUCATION/TRAINING PROGRAM

## 2021-10-02 PROCEDURE — G0378 HOSPITAL OBSERVATION PER HR: HCPCS

## 2021-10-02 PROCEDURE — 80048 BASIC METABOLIC PNL TOTAL CA: CPT | Performed by: STUDENT IN AN ORGANIZED HEALTH CARE EDUCATION/TRAINING PROGRAM

## 2021-10-02 PROCEDURE — 94640 AIRWAY INHALATION TREATMENT: CPT

## 2021-10-02 PROCEDURE — 85025 COMPLETE CBC W/AUTO DIFF WBC: CPT | Performed by: STUDENT IN AN ORGANIZED HEALTH CARE EDUCATION/TRAINING PROGRAM

## 2021-10-02 PROCEDURE — 94761 N-INVAS EAR/PLS OXIMETRY MLT: CPT

## 2021-10-02 PROCEDURE — 25000242 PHARM REV CODE 250 ALT 637 W/ HCPCS: Performed by: STUDENT IN AN ORGANIZED HEALTH CARE EDUCATION/TRAINING PROGRAM

## 2021-10-02 RX ORDER — LABETALOL HCL 20 MG/4 ML
10 SYRINGE (ML) INTRAVENOUS EVERY 10 MIN PRN
Status: DISCONTINUED | OUTPATIENT
Start: 2021-10-02 | End: 2021-10-03

## 2021-10-02 RX ORDER — ATORVASTATIN CALCIUM 20 MG/1
40 TABLET, FILM COATED ORAL DAILY
Status: DISCONTINUED | OUTPATIENT
Start: 2021-10-02 | End: 2021-10-09 | Stop reason: HOSPADM

## 2021-10-02 RX ORDER — IPRATROPIUM BROMIDE AND ALBUTEROL SULFATE 2.5; .5 MG/3ML; MG/3ML
3 SOLUTION RESPIRATORY (INHALATION)
Status: DISCONTINUED | OUTPATIENT
Start: 2021-10-02 | End: 2021-10-03

## 2021-10-02 RX ADMIN — INSULIN ASPART 2 UNITS: 100 INJECTION, SOLUTION INTRAVENOUS; SUBCUTANEOUS at 05:10

## 2021-10-02 RX ADMIN — BUSPIRONE HYDROCHLORIDE 30 MG: 10 TABLET ORAL at 09:10

## 2021-10-02 RX ADMIN — METHOCARBAMOL 500 MG: 500 TABLET ORAL at 01:10

## 2021-10-02 RX ADMIN — VERAPAMIL HYDROCHLORIDE 180 MG: 180 TABLET, FILM COATED, EXTENDED RELEASE ORAL at 09:10

## 2021-10-02 RX ADMIN — IPRATROPIUM BROMIDE AND ALBUTEROL SULFATE 3 ML: .5; 3 SOLUTION RESPIRATORY (INHALATION) at 04:10

## 2021-10-02 RX ADMIN — DEXAMETHASONE SODIUM PHOSPHATE 4 MG: 4 INJECTION INTRA-ARTICULAR; INTRALESIONAL; INTRAMUSCULAR; INTRAVENOUS; SOFT TISSUE at 01:10

## 2021-10-02 RX ADMIN — DEXAMETHASONE SODIUM PHOSPHATE 4 MG: 4 INJECTION INTRA-ARTICULAR; INTRALESIONAL; INTRAMUSCULAR; INTRAVENOUS; SOFT TISSUE at 11:10

## 2021-10-02 RX ADMIN — DOCUSATE SODIUM 100 MG: 100 CAPSULE, LIQUID FILLED ORAL at 09:10

## 2021-10-02 RX ADMIN — IPRATROPIUM BROMIDE AND ALBUTEROL SULFATE 3 ML: .5; 3 SOLUTION RESPIRATORY (INHALATION) at 11:10

## 2021-10-02 RX ADMIN — FLUTICASONE FUROATE 1 PUFF: 100 POWDER RESPIRATORY (INHALATION) at 04:10

## 2021-10-02 RX ADMIN — HYDROCODONE BITARTRATE AND ACETAMINOPHEN 1 TABLET: 10; 325 TABLET ORAL at 09:10

## 2021-10-02 RX ADMIN — BUSPIRONE HYDROCHLORIDE 30 MG: 10 TABLET ORAL at 08:10

## 2021-10-02 RX ADMIN — BUSPIRONE HYDROCHLORIDE 30 MG: 10 TABLET ORAL at 01:10

## 2021-10-02 RX ADMIN — SODIUM CHLORIDE: 0.9 INJECTION, SOLUTION INTRAVENOUS at 01:10

## 2021-10-02 RX ADMIN — INSULIN ASPART 2 UNITS: 100 INJECTION, SOLUTION INTRAVENOUS; SUBCUTANEOUS at 11:10

## 2021-10-02 RX ADMIN — HEPARIN SODIUM 5000 UNITS: 5000 INJECTION INTRAVENOUS; SUBCUTANEOUS at 05:10

## 2021-10-02 RX ADMIN — DEXAMETHASONE SODIUM PHOSPHATE 4 MG: 4 INJECTION INTRA-ARTICULAR; INTRALESIONAL; INTRAMUSCULAR; INTRAVENOUS; SOFT TISSUE at 05:10

## 2021-10-02 RX ADMIN — METHOCARBAMOL 500 MG: 500 TABLET ORAL at 05:10

## 2021-10-02 RX ADMIN — GABAPENTIN 400 MG: 400 CAPSULE ORAL at 08:10

## 2021-10-02 RX ADMIN — HYDROCODONE BITARTRATE AND ACETAMINOPHEN 1 TABLET: 10; 325 TABLET ORAL at 08:10

## 2021-10-02 RX ADMIN — INSULIN ASPART 2 UNITS: 100 INJECTION, SOLUTION INTRAVENOUS; SUBCUTANEOUS at 09:10

## 2021-10-02 RX ADMIN — CITALOPRAM HYDROBROMIDE 40 MG: 10 TABLET ORAL at 09:10

## 2021-10-02 RX ADMIN — DOCUSATE SODIUM 100 MG: 100 CAPSULE, LIQUID FILLED ORAL at 01:10

## 2021-10-02 RX ADMIN — VERAPAMIL HYDROCHLORIDE 180 MG: 180 TABLET, FILM COATED, EXTENDED RELEASE ORAL at 08:10

## 2021-10-02 RX ADMIN — ATORVASTATIN CALCIUM 40 MG: 20 TABLET, FILM COATED ORAL at 11:10

## 2021-10-02 RX ADMIN — HYDROCODONE BITARTRATE AND ACETAMINOPHEN 1 TABLET: 5; 325 TABLET ORAL at 05:10

## 2021-10-02 RX ADMIN — HYDROCODONE BITARTRATE AND ACETAMINOPHEN 1 TABLET: 10; 325 TABLET ORAL at 01:10

## 2021-10-02 RX ADMIN — ALPRAZOLAM 1 MG: 1 TABLET ORAL at 05:10

## 2021-10-02 RX ADMIN — GABAPENTIN 400 MG: 400 CAPSULE ORAL at 09:10

## 2021-10-02 RX ADMIN — LISINOPRIL 40 MG: 20 TABLET ORAL at 09:10

## 2021-10-02 RX ADMIN — METHOCARBAMOL 500 MG: 500 TABLET ORAL at 09:10

## 2021-10-02 RX ADMIN — GABAPENTIN 400 MG: 400 CAPSULE ORAL at 02:10

## 2021-10-02 RX ADMIN — SODIUM CHLORIDE: 0.9 INJECTION, SOLUTION INTRAVENOUS at 11:10

## 2021-10-02 RX ADMIN — DOCUSATE SODIUM 100 MG: 100 CAPSULE, LIQUID FILLED ORAL at 08:10

## 2021-10-02 RX ADMIN — TIOTROPIUM BROMIDE INHALATION SPRAY 2 PUFF: 3.12 SPRAY, METERED RESPIRATORY (INHALATION) at 04:10

## 2021-10-02 RX ADMIN — METHOCARBAMOL 500 MG: 500 TABLET ORAL at 08:10

## 2021-10-03 ENCOUNTER — ANESTHESIA EVENT (OUTPATIENT)
Dept: SURGERY | Facility: HOSPITAL | Age: 66
DRG: 518 | End: 2021-10-03
Payer: MEDICARE

## 2021-10-03 ENCOUNTER — ANESTHESIA (OUTPATIENT)
Dept: SURGERY | Facility: HOSPITAL | Age: 66
DRG: 518 | End: 2021-10-03
Payer: MEDICARE

## 2021-10-03 PROBLEM — Z98.890 STATUS POST SURGERY: Status: ACTIVE | Noted: 2021-10-03

## 2021-10-03 LAB
ABO + RH BLD: NORMAL
ANION GAP SERPL CALC-SCNC: 10 MMOL/L (ref 8–16)
BASOPHILS # BLD AUTO: 0.02 K/UL (ref 0–0.2)
BASOPHILS NFR BLD: 0.1 % (ref 0–1.9)
BLD GP AB SCN CELLS X3 SERPL QL: NORMAL
BUN SERPL-MCNC: 20 MG/DL (ref 8–23)
CALCIUM SERPL-MCNC: 8.7 MG/DL (ref 8.7–10.5)
CHLORIDE SERPL-SCNC: 107 MMOL/L (ref 95–110)
CO2 SERPL-SCNC: 21 MMOL/L (ref 23–29)
CREAT SERPL-MCNC: 0.9 MG/DL (ref 0.5–1.4)
DIFFERENTIAL METHOD: ABNORMAL
EOSINOPHIL # BLD AUTO: 0 K/UL (ref 0–0.5)
EOSINOPHIL NFR BLD: 0 % (ref 0–8)
ERYTHROCYTE [DISTWIDTH] IN BLOOD BY AUTOMATED COUNT: 13.7 % (ref 11.5–14.5)
EST. GFR  (AFRICAN AMERICAN): >60 ML/MIN/1.73 M^2
EST. GFR  (NON AFRICAN AMERICAN): >60 ML/MIN/1.73 M^2
GLUCOSE SERPL-MCNC: 164 MG/DL (ref 70–110)
HCT VFR BLD AUTO: 39.9 % (ref 40–54)
HGB BLD-MCNC: 13.6 G/DL (ref 14–18)
IMM GRANULOCYTES # BLD AUTO: 0.2 K/UL (ref 0–0.04)
IMM GRANULOCYTES NFR BLD AUTO: 1.1 % (ref 0–0.5)
LYMPHOCYTES # BLD AUTO: 0.9 K/UL (ref 1–4.8)
LYMPHOCYTES NFR BLD: 5 % (ref 18–48)
MCH RBC QN AUTO: 33.4 PG (ref 27–31)
MCHC RBC AUTO-ENTMCNC: 34.1 G/DL (ref 32–36)
MCV RBC AUTO: 98 FL (ref 82–98)
MONOCYTES # BLD AUTO: 0.6 K/UL (ref 0.3–1)
MONOCYTES NFR BLD: 3.4 % (ref 4–15)
NEUTROPHILS # BLD AUTO: 16.7 K/UL (ref 1.8–7.7)
NEUTROPHILS NFR BLD: 90.4 % (ref 38–73)
NRBC BLD-RTO: 0 /100 WBC
PLATELET # BLD AUTO: 258 K/UL (ref 150–450)
PMV BLD AUTO: 11.5 FL (ref 9.2–12.9)
POCT GLUCOSE: 159 MG/DL (ref 70–110)
POCT GLUCOSE: 179 MG/DL (ref 70–110)
POCT GLUCOSE: 203 MG/DL (ref 70–110)
POCT GLUCOSE: 229 MG/DL (ref 70–110)
POTASSIUM SERPL-SCNC: 4.5 MMOL/L (ref 3.5–5.1)
RBC # BLD AUTO: 4.07 M/UL (ref 4.6–6.2)
SODIUM SERPL-SCNC: 138 MMOL/L (ref 136–145)
WBC # BLD AUTO: 18.43 K/UL (ref 3.9–12.7)

## 2021-10-03 PROCEDURE — 99900035 HC TECH TIME PER 15 MIN (STAT)

## 2021-10-03 PROCEDURE — 99232 SBSQ HOSP IP/OBS MODERATE 35: CPT | Mod: GC,,, | Performed by: INTERNAL MEDICINE

## 2021-10-03 PROCEDURE — 36620 INSERTION CATHETER ARTERY: CPT | Mod: 59,,, | Performed by: ANESTHESIOLOGY

## 2021-10-03 PROCEDURE — 20000000 HC ICU ROOM

## 2021-10-03 PROCEDURE — 25000242 PHARM REV CODE 250 ALT 637 W/ HCPCS: Performed by: STUDENT IN AN ORGANIZED HEALTH CARE EDUCATION/TRAINING PROGRAM

## 2021-10-03 PROCEDURE — 22600 ARTHRD PST TQ 1NTRSPC CRV: CPT | Mod: 51,,, | Performed by: NEUROLOGICAL SURGERY

## 2021-10-03 PROCEDURE — 20930 SP BONE ALGRFT MORSEL ADD-ON: CPT | Mod: ,,, | Performed by: NEUROLOGICAL SURGERY

## 2021-10-03 PROCEDURE — 25000242 PHARM REV CODE 250 ALT 637 W/ HCPCS: Performed by: PSYCHIATRY & NEUROLOGY

## 2021-10-03 PROCEDURE — 25000003 PHARM REV CODE 250: Performed by: STUDENT IN AN ORGANIZED HEALTH CARE EDUCATION/TRAINING PROGRAM

## 2021-10-03 PROCEDURE — 63600175 PHARM REV CODE 636 W HCPCS: Performed by: STUDENT IN AN ORGANIZED HEALTH CARE EDUCATION/TRAINING PROGRAM

## 2021-10-03 PROCEDURE — 22843 INSERT SPINE FIXATION DEVICE: CPT | Mod: ,,, | Performed by: NEUROLOGICAL SURGERY

## 2021-10-03 PROCEDURE — 37000008 HC ANESTHESIA 1ST 15 MINUTES: Performed by: NEUROLOGICAL SURGERY

## 2021-10-03 PROCEDURE — 99233 SBSQ HOSP IP/OBS HIGH 50: CPT | Mod: 95,,, | Performed by: PSYCHIATRY & NEUROLOGY

## 2021-10-03 PROCEDURE — D9220A PRA ANESTHESIA: Mod: ANES,,, | Performed by: ANESTHESIOLOGY

## 2021-10-03 PROCEDURE — 22843 PR POSTERIOR SEGMENTAL INSTRUMENTATION 7-12 VRT SEG: ICD-10-PCS | Mod: ,,, | Performed by: NEUROLOGICAL SURGERY

## 2021-10-03 PROCEDURE — 25000242 PHARM REV CODE 250 ALT 637 W/ HCPCS: Performed by: ANESTHESIOLOGY

## 2021-10-03 PROCEDURE — 99232 PR SUBSEQUENT HOSPITAL CARE,LEVL II: ICD-10-PCS | Mod: 57,GC,, | Performed by: NEUROLOGICAL SURGERY

## 2021-10-03 PROCEDURE — D9220A PRA ANESTHESIA: ICD-10-PCS | Mod: ANES,,, | Performed by: ANESTHESIOLOGY

## 2021-10-03 PROCEDURE — 37000009 HC ANESTHESIA EA ADD 15 MINS: Performed by: NEUROLOGICAL SURGERY

## 2021-10-03 PROCEDURE — 22600 PR ARTHRODESIS, POST/POSTLAT, SNGL INTERSPACE, CERVICAL BELOW C2: ICD-10-PCS | Mod: 51,,, | Performed by: NEUROLOGICAL SURGERY

## 2021-10-03 PROCEDURE — 36415 COLL VENOUS BLD VENIPUNCTURE: CPT | Performed by: NEUROLOGICAL SURGERY

## 2021-10-03 PROCEDURE — 99232 SBSQ HOSP IP/OBS MODERATE 35: CPT | Mod: 57,GC,, | Performed by: NEUROLOGICAL SURGERY

## 2021-10-03 PROCEDURE — C1729 CATH, DRAINAGE: HCPCS | Performed by: NEUROLOGICAL SURGERY

## 2021-10-03 PROCEDURE — 63600175 PHARM REV CODE 636 W HCPCS: Performed by: NEUROLOGICAL SURGERY

## 2021-10-03 PROCEDURE — 27000221 HC OXYGEN, UP TO 24 HOURS

## 2021-10-03 PROCEDURE — D9220A PRA ANESTHESIA: Mod: CRNA,,, | Performed by: STUDENT IN AN ORGANIZED HEALTH CARE EDUCATION/TRAINING PROGRAM

## 2021-10-03 PROCEDURE — 20936 PR AUTOGRAFT SPINE SURGERY LOCAL FROM SAME INCISION: ICD-10-PCS | Mod: ,,, | Performed by: NEUROLOGICAL SURGERY

## 2021-10-03 PROCEDURE — 99232 PR SUBSEQUENT HOSPITAL CARE,LEVL II: ICD-10-PCS | Mod: GC,,, | Performed by: INTERNAL MEDICINE

## 2021-10-03 PROCEDURE — 63015 PR LAMINECTOMY,>2 SGMT,CERVICAL: ICD-10-PCS | Mod: ,,, | Performed by: NEUROLOGICAL SURGERY

## 2021-10-03 PROCEDURE — 36000711: Performed by: NEUROLOGICAL SURGERY

## 2021-10-03 PROCEDURE — 20930 PR ALLOGRAFT FOR SPINE SURGERY ONLY MORSELIZED: ICD-10-PCS | Mod: ,,, | Performed by: NEUROLOGICAL SURGERY

## 2021-10-03 PROCEDURE — 99233 PR SUBSEQUENT HOSPITAL CARE,LEVL III: ICD-10-PCS | Mod: 95,,, | Performed by: PSYCHIATRY & NEUROLOGY

## 2021-10-03 PROCEDURE — 94640 AIRWAY INHALATION TREATMENT: CPT

## 2021-10-03 PROCEDURE — 36000710: Performed by: NEUROLOGICAL SURGERY

## 2021-10-03 PROCEDURE — 85025 COMPLETE CBC W/AUTO DIFF WBC: CPT | Performed by: NEUROLOGICAL SURGERY

## 2021-10-03 PROCEDURE — 20936 SP BONE AGRFT LOCAL ADD-ON: CPT | Mod: ,,, | Performed by: NEUROLOGICAL SURGERY

## 2021-10-03 PROCEDURE — 25000003 PHARM REV CODE 250: Performed by: NEUROLOGICAL SURGERY

## 2021-10-03 PROCEDURE — 86920 COMPATIBILITY TEST SPIN: CPT | Performed by: NEUROLOGICAL SURGERY

## 2021-10-03 PROCEDURE — 36620 ARTERIAL: ICD-10-PCS | Mod: 59,,, | Performed by: ANESTHESIOLOGY

## 2021-10-03 PROCEDURE — C1713 ANCHOR/SCREW BN/BN,TIS/BN: HCPCS | Performed by: NEUROLOGICAL SURGERY

## 2021-10-03 PROCEDURE — 63015 REMOVE SPINE LAMINA >2 CRVCL: CPT | Mod: ,,, | Performed by: NEUROLOGICAL SURGERY

## 2021-10-03 PROCEDURE — 25000003 PHARM REV CODE 250: Performed by: ANESTHESIOLOGY

## 2021-10-03 PROCEDURE — 22614 PR ARTHRODESIS, POST/POSTLAT, SNGL INTERSPACE, EA ADDTL: ICD-10-PCS | Mod: ,,, | Performed by: NEUROLOGICAL SURGERY

## 2021-10-03 PROCEDURE — 86900 BLOOD TYPING SEROLOGIC ABO: CPT | Performed by: NEUROLOGICAL SURGERY

## 2021-10-03 PROCEDURE — 80048 BASIC METABOLIC PNL TOTAL CA: CPT | Performed by: NEUROLOGICAL SURGERY

## 2021-10-03 PROCEDURE — 94761 N-INVAS EAR/PLS OXIMETRY MLT: CPT

## 2021-10-03 PROCEDURE — D9220A PRA ANESTHESIA: ICD-10-PCS | Mod: CRNA,,, | Performed by: STUDENT IN AN ORGANIZED HEALTH CARE EDUCATION/TRAINING PROGRAM

## 2021-10-03 PROCEDURE — 22614 ARTHRD PST TQ 1NTRSPC EA ADD: CPT | Mod: ,,, | Performed by: NEUROLOGICAL SURGERY

## 2021-10-03 PROCEDURE — 82962 GLUCOSE BLOOD TEST: CPT | Performed by: NEUROLOGICAL SURGERY

## 2021-10-03 PROCEDURE — 25000003 PHARM REV CODE 250: Performed by: NURSE PRACTITIONER

## 2021-10-03 PROCEDURE — 27201423 OPTIME MED/SURG SUP & DEVICES STERILE SUPPLY: Performed by: NEUROLOGICAL SURGERY

## 2021-10-03 PROCEDURE — 27800903 OPTIME MED/SURG SUP & DEVICES OTHER IMPLANTS: Performed by: NEUROLOGICAL SURGERY

## 2021-10-03 DEVICE — SCREW POLYAXIAL CERV 3.8X14MM: Type: IMPLANTABLE DEVICE | Site: SPINE CERVICAL | Status: FUNCTIONAL

## 2021-10-03 DEVICE — SCREW POLYAXIAL CERV 3.8X18MM: Type: IMPLANTABLE DEVICE | Site: SPINE CERVICAL | Status: FUNCTIONAL

## 2021-10-03 DEVICE — SCREW PROFICIENT LOCKING: Type: IMPLANTABLE DEVICE | Site: SPINE CERVICAL | Status: FUNCTIONAL

## 2021-10-03 DEVICE — GRAFT ALTAPORE MED CYL 2.6ML: Type: IMPLANTABLE DEVICE | Site: SPINE CERVICAL | Status: FUNCTIONAL

## 2021-10-03 DEVICE — GRAFT ALTAPORE BIOACTIVE 5ML: Type: IMPLANTABLE DEVICE | Site: SPINE CERVICAL | Status: FUNCTIONAL

## 2021-10-03 DEVICE — IMPLANTABLE DEVICE: Type: IMPLANTABLE DEVICE | Site: SPINE CERVICAL | Status: FUNCTIONAL

## 2021-10-03 RX ORDER — HYDROMORPHONE HYDROCHLORIDE 1 MG/ML
0.5 INJECTION, SOLUTION INTRAMUSCULAR; INTRAVENOUS; SUBCUTANEOUS EVERY 5 MIN PRN
Status: CANCELLED | OUTPATIENT
Start: 2021-10-03

## 2021-10-03 RX ORDER — LABETALOL HYDROCHLORIDE 5 MG/ML
INJECTION, SOLUTION INTRAVENOUS
Status: DISCONTINUED | OUTPATIENT
Start: 2021-10-03 | End: 2021-10-03

## 2021-10-03 RX ORDER — HEPARIN SODIUM 5000 [USP'U]/ML
5000 INJECTION, SOLUTION INTRAVENOUS; SUBCUTANEOUS EVERY 8 HOURS
Status: DISCONTINUED | OUTPATIENT
Start: 2021-10-03 | End: 2021-10-09 | Stop reason: HOSPADM

## 2021-10-03 RX ORDER — PROPOFOL 10 MG/ML
VIAL (ML) INTRAVENOUS CONTINUOUS PRN
Status: DISCONTINUED | OUTPATIENT
Start: 2021-10-03 | End: 2021-10-03

## 2021-10-03 RX ORDER — DEXAMETHASONE SODIUM PHOSPHATE 4 MG/ML
INJECTION, SOLUTION INTRA-ARTICULAR; INTRALESIONAL; INTRAMUSCULAR; INTRAVENOUS; SOFT TISSUE
Status: DISCONTINUED | OUTPATIENT
Start: 2021-10-03 | End: 2021-10-03

## 2021-10-03 RX ORDER — IPRATROPIUM BROMIDE AND ALBUTEROL SULFATE 2.5; .5 MG/3ML; MG/3ML
3 SOLUTION RESPIRATORY (INHALATION) EVERY 6 HOURS
Status: DISCONTINUED | OUTPATIENT
Start: 2021-10-03 | End: 2021-10-09 | Stop reason: HOSPADM

## 2021-10-03 RX ORDER — FENTANYL CITRATE 50 UG/ML
25 INJECTION, SOLUTION INTRAMUSCULAR; INTRAVENOUS EVERY 5 MIN PRN
Status: CANCELLED | OUTPATIENT
Start: 2021-10-03

## 2021-10-03 RX ORDER — PROPOFOL 10 MG/ML
VIAL (ML) INTRAVENOUS
Status: DISCONTINUED | OUTPATIENT
Start: 2021-10-03 | End: 2021-10-03

## 2021-10-03 RX ORDER — OXYCODONE HYDROCHLORIDE 10 MG/1
10 TABLET ORAL EVERY 4 HOURS PRN
Status: DISCONTINUED | OUTPATIENT
Start: 2021-10-03 | End: 2021-10-04

## 2021-10-03 RX ORDER — METHOCARBAMOL 750 MG/1
750 TABLET, FILM COATED ORAL 4 TIMES DAILY
Status: DISCONTINUED | OUTPATIENT
Start: 2021-10-03 | End: 2021-10-04

## 2021-10-03 RX ORDER — SUCCINYLCHOLINE CHLORIDE 20 MG/ML
INJECTION INTRAMUSCULAR; INTRAVENOUS
Status: DISCONTINUED | OUTPATIENT
Start: 2021-10-03 | End: 2021-10-03

## 2021-10-03 RX ORDER — HYDRALAZINE HYDROCHLORIDE 25 MG/1
25 TABLET, FILM COATED ORAL EVERY 8 HOURS PRN
Status: DISCONTINUED | OUTPATIENT
Start: 2021-10-03 | End: 2021-10-05

## 2021-10-03 RX ORDER — ROCURONIUM BROMIDE 10 MG/ML
INJECTION, SOLUTION INTRAVENOUS
Status: DISCONTINUED | OUTPATIENT
Start: 2021-10-03 | End: 2021-10-03

## 2021-10-03 RX ORDER — CEFTRIAXONE 1 G/1
INJECTION, POWDER, FOR SOLUTION INTRAMUSCULAR; INTRAVENOUS
Status: DISCONTINUED | OUTPATIENT
Start: 2021-10-03 | End: 2021-10-03 | Stop reason: HOSPADM

## 2021-10-03 RX ORDER — PHENYLEPHRINE HYDROCHLORIDE 10 MG/ML
INJECTION INTRAVENOUS
Status: DISCONTINUED | OUTPATIENT
Start: 2021-10-03 | End: 2021-10-03

## 2021-10-03 RX ORDER — LIDOCAINE HYDROCHLORIDE AND EPINEPHRINE 10; 10 MG/ML; UG/ML
INJECTION, SOLUTION INFILTRATION; PERINEURAL
Status: DISCONTINUED | OUTPATIENT
Start: 2021-10-03 | End: 2021-10-03 | Stop reason: HOSPADM

## 2021-10-03 RX ORDER — ONDANSETRON 2 MG/ML
INJECTION INTRAMUSCULAR; INTRAVENOUS
Status: DISCONTINUED | OUTPATIENT
Start: 2021-10-03 | End: 2021-10-03

## 2021-10-03 RX ORDER — FENTANYL CITRATE 50 UG/ML
INJECTION, SOLUTION INTRAMUSCULAR; INTRAVENOUS
Status: DISCONTINUED | OUTPATIENT
Start: 2021-10-03 | End: 2021-10-03

## 2021-10-03 RX ORDER — HYDROCODONE BITARTRATE AND ACETAMINOPHEN 10; 325 MG/1; MG/1
1 TABLET ORAL EVERY 4 HOURS PRN
Status: DISCONTINUED | OUTPATIENT
Start: 2021-10-03 | End: 2021-10-03

## 2021-10-03 RX ORDER — LIDOCAINE HYDROCHLORIDE 20 MG/ML
INJECTION INTRAVENOUS
Status: DISCONTINUED | OUTPATIENT
Start: 2021-10-03 | End: 2021-10-03

## 2021-10-03 RX ORDER — PROCHLORPERAZINE EDISYLATE 5 MG/ML
5 INJECTION INTRAMUSCULAR; INTRAVENOUS EVERY 30 MIN PRN
Status: CANCELLED | OUTPATIENT
Start: 2021-10-03

## 2021-10-03 RX ORDER — BUPIVACAINE HYDROCHLORIDE AND EPINEPHRINE 5; 5 MG/ML; UG/ML
INJECTION, SOLUTION EPIDURAL; INTRACAUDAL; PERINEURAL
Status: DISCONTINUED | OUTPATIENT
Start: 2021-10-03 | End: 2021-10-03 | Stop reason: HOSPADM

## 2021-10-03 RX ORDER — ACETAMINOPHEN 10 MG/ML
INJECTION, SOLUTION INTRAVENOUS
Status: DISCONTINUED | OUTPATIENT
Start: 2021-10-03 | End: 2021-10-03

## 2021-10-03 RX ORDER — OXYCODONE HYDROCHLORIDE 5 MG/1
5 TABLET ORAL
Status: CANCELLED | OUTPATIENT
Start: 2021-10-03

## 2021-10-03 RX ORDER — ONDANSETRON 2 MG/ML
4 INJECTION INTRAMUSCULAR; INTRAVENOUS DAILY PRN
Status: CANCELLED | OUTPATIENT
Start: 2021-10-03

## 2021-10-03 RX ORDER — CEFAZOLIN SODIUM 1 G/3ML
2 INJECTION, POWDER, FOR SOLUTION INTRAMUSCULAR; INTRAVENOUS
Status: DISCONTINUED | OUTPATIENT
Start: 2021-10-03 | End: 2021-10-06

## 2021-10-03 RX ORDER — NICARDIPINE HYDROCHLORIDE 0.2 MG/ML
0-15 INJECTION INTRAVENOUS CONTINUOUS
Status: DISCONTINUED | OUTPATIENT
Start: 2021-10-03 | End: 2021-10-04

## 2021-10-03 RX ORDER — BACITRACIN ZINC 500 UNIT/G
OINTMENT (GRAM) TOPICAL
Status: DISCONTINUED | OUTPATIENT
Start: 2021-10-03 | End: 2021-10-03 | Stop reason: HOSPADM

## 2021-10-03 RX ORDER — HYDROMORPHONE HYDROCHLORIDE 2 MG/ML
INJECTION, SOLUTION INTRAMUSCULAR; INTRAVENOUS; SUBCUTANEOUS
Status: DISCONTINUED | OUTPATIENT
Start: 2021-10-03 | End: 2021-10-03

## 2021-10-03 RX ORDER — HYDROMORPHONE HYDROCHLORIDE 1 MG/ML
2 INJECTION, SOLUTION INTRAMUSCULAR; INTRAVENOUS; SUBCUTANEOUS
Status: DISCONTINUED | OUTPATIENT
Start: 2021-10-03 | End: 2021-10-07

## 2021-10-03 RX ORDER — ALBUTEROL SULFATE 90 UG/1
AEROSOL, METERED RESPIRATORY (INHALATION)
Status: DISCONTINUED | OUTPATIENT
Start: 2021-10-03 | End: 2021-10-03

## 2021-10-03 RX ORDER — KETAMINE HCL IN 0.9 % NACL 50 MG/5 ML
SYRINGE (ML) INTRAVENOUS
Status: DISCONTINUED | OUTPATIENT
Start: 2021-10-03 | End: 2021-10-03

## 2021-10-03 RX ORDER — CEFAZOLIN SODIUM 1 G/3ML
INJECTION, POWDER, FOR SOLUTION INTRAMUSCULAR; INTRAVENOUS
Status: DISCONTINUED | OUTPATIENT
Start: 2021-10-03 | End: 2021-10-03

## 2021-10-03 RX ORDER — NICARDIPINE HYDROCHLORIDE 0.2 MG/ML
0-15 INJECTION INTRAVENOUS CONTINUOUS
Status: DISCONTINUED | OUTPATIENT
Start: 2021-10-03 | End: 2021-10-03

## 2021-10-03 RX ORDER — SODIUM CHLORIDE 9 MG/ML
INJECTION, SOLUTION INTRAVENOUS CONTINUOUS
Status: DISCONTINUED | OUTPATIENT
Start: 2021-10-03 | End: 2021-10-04

## 2021-10-03 RX ORDER — MUPIROCIN 20 MG/G
OINTMENT TOPICAL 2 TIMES DAILY
Status: COMPLETED | OUTPATIENT
Start: 2021-10-03 | End: 2021-10-08

## 2021-10-03 RX ORDER — EPHEDRINE SULFATE 50 MG/ML
INJECTION, SOLUTION INTRAVENOUS
Status: DISCONTINUED | OUTPATIENT
Start: 2021-10-03 | End: 2021-10-03

## 2021-10-03 RX ORDER — ACETAMINOPHEN 325 MG/1
650 TABLET ORAL EVERY 6 HOURS PRN
Status: DISCONTINUED | OUTPATIENT
Start: 2021-10-03 | End: 2021-10-04

## 2021-10-03 RX ADMIN — ACETAMINOPHEN 1000 MG: 10 INJECTION INTRAVENOUS at 11:10

## 2021-10-03 RX ADMIN — INSULIN ASPART 2 UNITS: 100 INJECTION, SOLUTION INTRAVENOUS; SUBCUTANEOUS at 09:10

## 2021-10-03 RX ADMIN — HYDROMORPHONE HYDROCHLORIDE 0.4 MG: 2 INJECTION INTRAMUSCULAR; INTRAVENOUS; SUBCUTANEOUS at 11:10

## 2021-10-03 RX ADMIN — SODIUM CHLORIDE 100 ML/HR: 0.9 INJECTION, SOLUTION INTRAVENOUS at 02:10

## 2021-10-03 RX ADMIN — DOCUSATE SODIUM 100 MG: 100 CAPSULE, LIQUID FILLED ORAL at 08:10

## 2021-10-03 RX ADMIN — ACETAMINOPHEN 650 MG: 325 TABLET ORAL at 10:10

## 2021-10-03 RX ADMIN — FENTANYL CITRATE 100 MCG: 50 INJECTION, SOLUTION INTRAMUSCULAR; INTRAVENOUS at 08:10

## 2021-10-03 RX ADMIN — HYDROMORPHONE HYDROCHLORIDE 2 MG: 1 INJECTION, SOLUTION INTRAMUSCULAR; INTRAVENOUS; SUBCUTANEOUS at 04:10

## 2021-10-03 RX ADMIN — ALPRAZOLAM 1 MG: 1 TABLET ORAL at 09:10

## 2021-10-03 RX ADMIN — HYDROCODONE BITARTRATE AND ACETAMINOPHEN 1 TABLET: 10; 325 TABLET ORAL at 08:10

## 2021-10-03 RX ADMIN — SUCCINYLCHOLINE CHLORIDE 140 MG: 20 INJECTION, SOLUTION INTRAMUSCULAR; INTRAVENOUS at 08:10

## 2021-10-03 RX ADMIN — VERAPAMIL HYDROCHLORIDE 180 MG: 180 TABLET, FILM COATED, EXTENDED RELEASE ORAL at 09:10

## 2021-10-03 RX ADMIN — ALBUTEROL SULFATE 2 PUFF: 90 AEROSOL, METERED RESPIRATORY (INHALATION) at 01:10

## 2021-10-03 RX ADMIN — SODIUM CHLORIDE: 0.9 INJECTION, SOLUTION INTRAVENOUS at 12:10

## 2021-10-03 RX ADMIN — DEXAMETHASONE SODIUM PHOSPHATE 4 MG: 4 INJECTION INTRA-ARTICULAR; INTRALESIONAL; INTRAMUSCULAR; INTRAVENOUS; SOFT TISSUE at 05:10

## 2021-10-03 RX ADMIN — IPRATROPIUM BROMIDE AND ALBUTEROL SULFATE 3 ML: .5; 3 SOLUTION RESPIRATORY (INHALATION) at 03:10

## 2021-10-03 RX ADMIN — METHOCARBAMOL 750 MG: 750 TABLET ORAL at 08:10

## 2021-10-03 RX ADMIN — GABAPENTIN 400 MG: 400 CAPSULE ORAL at 08:10

## 2021-10-03 RX ADMIN — Medication 125 MCG/KG/MIN: at 08:10

## 2021-10-03 RX ADMIN — Medication 10 MG: at 09:10

## 2021-10-03 RX ADMIN — GLYCOPYRROLATE 0.2 MG: 0.2 INJECTION, SOLUTION INTRAMUSCULAR; INTRAVITREAL at 12:10

## 2021-10-03 RX ADMIN — EPHEDRINE SULFATE 20 MG: 50 INJECTION INTRAVENOUS at 09:10

## 2021-10-03 RX ADMIN — DEXAMETHASONE SODIUM PHOSPHATE 4 MG: 4 INJECTION INTRA-ARTICULAR; INTRALESIONAL; INTRAMUSCULAR; INTRAVENOUS; SOFT TISSUE at 11:10

## 2021-10-03 RX ADMIN — DEXAMETHASONE SODIUM PHOSPHATE 10 MG: 4 INJECTION, SOLUTION INTRAMUSCULAR; INTRAVENOUS at 08:10

## 2021-10-03 RX ADMIN — NICARDIPINE HYDROCHLORIDE 5 MG/HR: 0.2 INJECTION INTRAVENOUS at 02:10

## 2021-10-03 RX ADMIN — BUSPIRONE HYDROCHLORIDE 30 MG: 10 TABLET ORAL at 08:10

## 2021-10-03 RX ADMIN — FENTANYL CITRATE 50 MCG: 50 INJECTION, SOLUTION INTRAMUSCULAR; INTRAVENOUS at 10:10

## 2021-10-03 RX ADMIN — HYDROMORPHONE HYDROCHLORIDE 2 MG: 1 INJECTION, SOLUTION INTRAMUSCULAR; INTRAVENOUS; SUBCUTANEOUS at 11:10

## 2021-10-03 RX ADMIN — SODIUM CHLORIDE: 0.9 INJECTION, SOLUTION INTRAVENOUS at 08:10

## 2021-10-03 RX ADMIN — HYDROCODONE BITARTRATE AND ACETAMINOPHEN 1 TABLET: 10; 325 TABLET ORAL at 03:10

## 2021-10-03 RX ADMIN — HYDROMORPHONE HYDROCHLORIDE 0.4 MG: 2 INJECTION INTRAMUSCULAR; INTRAVENOUS; SUBCUTANEOUS at 01:10

## 2021-10-03 RX ADMIN — Medication 10 MG: at 10:10

## 2021-10-03 RX ADMIN — PHENYLEPHRINE HYDROCHLORIDE 100 MCG: 10 INJECTION INTRAVENOUS at 08:10

## 2021-10-03 RX ADMIN — INSULIN ASPART 2 UNITS: 100 INJECTION, SOLUTION INTRAVENOUS; SUBCUTANEOUS at 05:10

## 2021-10-03 RX ADMIN — PROPOFOL 50 MG: 10 INJECTION, EMULSION INTRAVENOUS at 09:10

## 2021-10-03 RX ADMIN — HEPARIN SODIUM 5000 UNITS: 5000 INJECTION INTRAVENOUS; SUBCUTANEOUS at 09:10

## 2021-10-03 RX ADMIN — EPHEDRINE SULFATE 10 MG: 50 INJECTION INTRAVENOUS at 11:10

## 2021-10-03 RX ADMIN — HYDROMORPHONE HYDROCHLORIDE 0.2 MG: 2 INJECTION INTRAMUSCULAR; INTRAVENOUS; SUBCUTANEOUS at 11:10

## 2021-10-03 RX ADMIN — MUPIROCIN: 20 OINTMENT TOPICAL at 08:10

## 2021-10-03 RX ADMIN — NICARDIPINE HYDROCHLORIDE 12.5 MG/HR: 0.2 INJECTION INTRAVENOUS at 10:10

## 2021-10-03 RX ADMIN — CEFAZOLIN 2 G: 330 INJECTION, POWDER, FOR SOLUTION INTRAMUSCULAR; INTRAVENOUS at 08:10

## 2021-10-03 RX ADMIN — SODIUM CHLORIDE: 0.9 INJECTION, SOLUTION INTRAVENOUS at 02:10

## 2021-10-03 RX ADMIN — PROPOFOL 50 MG: 10 INJECTION, EMULSION INTRAVENOUS at 08:10

## 2021-10-03 RX ADMIN — LIDOCAINE HYDROCHLORIDE 100 MG: 20 INJECTION, SOLUTION INTRAVENOUS at 08:10

## 2021-10-03 RX ADMIN — Medication 20 MG: at 08:10

## 2021-10-03 RX ADMIN — SODIUM CHLORIDE 0.3 MCG/KG/MIN: 9 INJECTION, SOLUTION INTRAVENOUS at 08:10

## 2021-10-03 RX ADMIN — Medication 10 MG: at 11:10

## 2021-10-03 RX ADMIN — HYDROMORPHONE HYDROCHLORIDE 0.2 MG: 2 INJECTION INTRAMUSCULAR; INTRAVENOUS; SUBCUTANEOUS at 10:10

## 2021-10-03 RX ADMIN — SUGAMMADEX 200 MG: 100 INJECTION, SOLUTION INTRAVENOUS at 10:10

## 2021-10-03 RX ADMIN — ROCURONIUM BROMIDE 20 MG: 10 INJECTION, SOLUTION INTRAVENOUS at 09:10

## 2021-10-03 RX ADMIN — OXYCODONE HYDROCHLORIDE 10 MG: 10 TABLET ORAL at 10:10

## 2021-10-03 RX ADMIN — LABETALOL HYDROCHLORIDE 5 MG: 5 INJECTION, SOLUTION INTRAVENOUS at 02:10

## 2021-10-03 RX ADMIN — HYDROMORPHONE HYDROCHLORIDE 2 MG: 1 INJECTION, SOLUTION INTRAMUSCULAR; INTRAVENOUS; SUBCUTANEOUS at 07:10

## 2021-10-03 RX ADMIN — PHENYLEPHRINE HYDROCHLORIDE 150 MCG: 10 INJECTION INTRAVENOUS at 08:10

## 2021-10-03 RX ADMIN — PROPOFOL 200 MG: 10 INJECTION, EMULSION INTRAVENOUS at 08:10

## 2021-10-03 RX ADMIN — LABETALOL HYDROCHLORIDE 10 MG: 5 INJECTION, SOLUTION INTRAVENOUS at 01:10

## 2021-10-03 RX ADMIN — FENTANYL CITRATE 50 MCG: 50 INJECTION, SOLUTION INTRAMUSCULAR; INTRAVENOUS at 09:10

## 2021-10-03 RX ADMIN — CEFAZOLIN 2 G: 330 INJECTION, POWDER, FOR SOLUTION INTRAMUSCULAR; INTRAVENOUS at 12:10

## 2021-10-03 RX ADMIN — ONDANSETRON 4 MG: 2 INJECTION INTRAMUSCULAR; INTRAVENOUS at 12:10

## 2021-10-03 RX ADMIN — GABAPENTIN 400 MG: 400 CAPSULE ORAL at 03:10

## 2021-10-03 RX ADMIN — REMIFENTANIL HYDROCHLORIDE 0.2 MCG/KG/MIN: 1 INJECTION, POWDER, LYOPHILIZED, FOR SOLUTION INTRAVENOUS at 08:10

## 2021-10-03 RX ADMIN — HYDROMORPHONE HYDROCHLORIDE 0.2 MG: 2 INJECTION INTRAMUSCULAR; INTRAVENOUS; SUBCUTANEOUS at 12:10

## 2021-10-03 RX ADMIN — METHOCARBAMOL 750 MG: 750 TABLET ORAL at 04:10

## 2021-10-03 RX ADMIN — NICARDIPINE HYDROCHLORIDE 5 MG/HR: 0.2 INJECTION INTRAVENOUS at 04:10

## 2021-10-03 RX ADMIN — ALPRAZOLAM 1 MG: 1 TABLET ORAL at 12:10

## 2021-10-03 RX ADMIN — IPRATROPIUM BROMIDE AND ALBUTEROL SULFATE 3 ML: 2.5; .5 SOLUTION RESPIRATORY (INHALATION) at 07:10

## 2021-10-03 RX ADMIN — PHENYLEPHRINE HYDROCHLORIDE 100 MCG: 10 INJECTION INTRAVENOUS at 09:10

## 2021-10-03 RX ADMIN — DEXAMETHASONE SODIUM PHOSPHATE 4 MG: 4 INJECTION INTRA-ARTICULAR; INTRALESIONAL; INTRAMUSCULAR; INTRAVENOUS; SOFT TISSUE at 12:10

## 2021-10-04 LAB
ANION GAP SERPL CALC-SCNC: 10 MMOL/L (ref 8–16)
BASOPHILS # BLD AUTO: 0.02 K/UL (ref 0–0.2)
BASOPHILS NFR BLD: 0.1 % (ref 0–1.9)
BUN SERPL-MCNC: 23 MG/DL (ref 8–23)
CALCIUM SERPL-MCNC: 7.5 MG/DL (ref 8.7–10.5)
CHLORIDE SERPL-SCNC: 109 MMOL/L (ref 95–110)
CO2 SERPL-SCNC: 19 MMOL/L (ref 23–29)
CREAT SERPL-MCNC: 1.3 MG/DL (ref 0.5–1.4)
DIFFERENTIAL METHOD: ABNORMAL
EOSINOPHIL # BLD AUTO: 0 K/UL (ref 0–0.5)
EOSINOPHIL NFR BLD: 0 % (ref 0–8)
ERYTHROCYTE [DISTWIDTH] IN BLOOD BY AUTOMATED COUNT: 14 % (ref 11.5–14.5)
EST. GFR  (AFRICAN AMERICAN): >60 ML/MIN/1.73 M^2
EST. GFR  (NON AFRICAN AMERICAN): 56.5 ML/MIN/1.73 M^2
GLUCOSE SERPL-MCNC: 169 MG/DL (ref 70–110)
GLUCOSE SERPL-MCNC: 192 MG/DL (ref 70–110)
HCO3 UR-SCNC: 24.8 MMOL/L (ref 24–28)
HCT VFR BLD AUTO: 35.6 % (ref 40–54)
HCT VFR BLD CALC: 40 %PCV (ref 36–54)
HGB BLD-MCNC: 12 G/DL (ref 14–18)
IMM GRANULOCYTES # BLD AUTO: 0.17 K/UL (ref 0–0.04)
IMM GRANULOCYTES NFR BLD AUTO: 0.8 % (ref 0–0.5)
LYMPHOCYTES # BLD AUTO: 0.6 K/UL (ref 1–4.8)
LYMPHOCYTES NFR BLD: 2.8 % (ref 18–48)
MAGNESIUM SERPL-MCNC: 1.9 MG/DL (ref 1.6–2.6)
MCH RBC QN AUTO: 33.9 PG (ref 27–31)
MCHC RBC AUTO-ENTMCNC: 33.7 G/DL (ref 32–36)
MCV RBC AUTO: 101 FL (ref 82–98)
MONOCYTES # BLD AUTO: 1.1 K/UL (ref 0.3–1)
MONOCYTES NFR BLD: 5.2 % (ref 4–15)
NEUTROPHILS # BLD AUTO: 19.9 K/UL (ref 1.8–7.7)
NEUTROPHILS NFR BLD: 91.1 % (ref 38–73)
NRBC BLD-RTO: 0 /100 WBC
PCO2 BLDA: 48.4 MMHG (ref 35–45)
PH SMN: 7.32 [PH] (ref 7.35–7.45)
PHOSPHATE SERPL-MCNC: 3.3 MG/DL (ref 2.7–4.5)
PLATELET # BLD AUTO: 212 K/UL (ref 150–450)
PMV BLD AUTO: 11.4 FL (ref 9.2–12.9)
PO2 BLDA: 100 MMHG (ref 80–100)
POC BE: -1 MMOL/L
POC IONIZED CALCIUM: 1.07 MMOL/L (ref 1.06–1.42)
POC SATURATED O2: 97 % (ref 95–100)
POC TCO2: 26 MMOL/L (ref 23–27)
POCT GLUCOSE: 166 MG/DL (ref 70–110)
POCT GLUCOSE: 202 MG/DL (ref 70–110)
POCT GLUCOSE: 212 MG/DL (ref 70–110)
POCT GLUCOSE: 213 MG/DL (ref 70–110)
POCT GLUCOSE: 230 MG/DL (ref 70–110)
POTASSIUM BLD-SCNC: 5.1 MMOL/L (ref 3.5–5.1)
POTASSIUM SERPL-SCNC: 4.7 MMOL/L (ref 3.5–5.1)
RBC # BLD AUTO: 3.54 M/UL (ref 4.6–6.2)
SAMPLE: ABNORMAL
SODIUM BLD-SCNC: 139 MMOL/L (ref 136–145)
SODIUM SERPL-SCNC: 138 MMOL/L (ref 136–145)
WBC # BLD AUTO: 21.88 K/UL (ref 3.9–12.7)

## 2021-10-04 PROCEDURE — 94640 AIRWAY INHALATION TREATMENT: CPT

## 2021-10-04 PROCEDURE — 20000000 HC ICU ROOM

## 2021-10-04 PROCEDURE — 97110 THERAPEUTIC EXERCISES: CPT

## 2021-10-04 PROCEDURE — 97162 PT EVAL MOD COMPLEX 30 MIN: CPT

## 2021-10-04 PROCEDURE — 25000003 PHARM REV CODE 250: Performed by: STUDENT IN AN ORGANIZED HEALTH CARE EDUCATION/TRAINING PROGRAM

## 2021-10-04 PROCEDURE — 25000242 PHARM REV CODE 250 ALT 637 W/ HCPCS: Performed by: PSYCHIATRY & NEUROLOGY

## 2021-10-04 PROCEDURE — 97530 THERAPEUTIC ACTIVITIES: CPT

## 2021-10-04 PROCEDURE — 97165 OT EVAL LOW COMPLEX 30 MIN: CPT

## 2021-10-04 PROCEDURE — 27000221 HC OXYGEN, UP TO 24 HOURS

## 2021-10-04 PROCEDURE — 94761 N-INVAS EAR/PLS OXIMETRY MLT: CPT

## 2021-10-04 PROCEDURE — 97112 NEUROMUSCULAR REEDUCATION: CPT

## 2021-10-04 PROCEDURE — 84100 ASSAY OF PHOSPHORUS: CPT | Performed by: NURSE PRACTITIONER

## 2021-10-04 PROCEDURE — 99233 SBSQ HOSP IP/OBS HIGH 50: CPT | Mod: GC,,, | Performed by: PSYCHIATRY & NEUROLOGY

## 2021-10-04 PROCEDURE — 97116 GAIT TRAINING THERAPY: CPT

## 2021-10-04 PROCEDURE — 25000242 PHARM REV CODE 250 ALT 637 W/ HCPCS: Performed by: STUDENT IN AN ORGANIZED HEALTH CARE EDUCATION/TRAINING PROGRAM

## 2021-10-04 PROCEDURE — 80048 BASIC METABOLIC PNL TOTAL CA: CPT | Performed by: STUDENT IN AN ORGANIZED HEALTH CARE EDUCATION/TRAINING PROGRAM

## 2021-10-04 PROCEDURE — 63600175 PHARM REV CODE 636 W HCPCS: Performed by: STUDENT IN AN ORGANIZED HEALTH CARE EDUCATION/TRAINING PROGRAM

## 2021-10-04 PROCEDURE — 94799 UNLISTED PULMONARY SVC/PX: CPT

## 2021-10-04 PROCEDURE — 99900035 HC TECH TIME PER 15 MIN (STAT)

## 2021-10-04 PROCEDURE — 83735 ASSAY OF MAGNESIUM: CPT | Performed by: NURSE PRACTITIONER

## 2021-10-04 PROCEDURE — 85025 COMPLETE CBC W/AUTO DIFF WBC: CPT | Performed by: STUDENT IN AN ORGANIZED HEALTH CARE EDUCATION/TRAINING PROGRAM

## 2021-10-04 PROCEDURE — 25000003 PHARM REV CODE 250: Performed by: NURSE PRACTITIONER

## 2021-10-04 PROCEDURE — 99233 PR SUBSEQUENT HOSPITAL CARE,LEVL III: ICD-10-PCS | Mod: GC,,, | Performed by: PSYCHIATRY & NEUROLOGY

## 2021-10-04 RX ORDER — METHOCARBAMOL 500 MG/1
1000 TABLET, FILM COATED ORAL 4 TIMES DAILY
Status: DISCONTINUED | OUTPATIENT
Start: 2021-10-04 | End: 2021-10-09 | Stop reason: HOSPADM

## 2021-10-04 RX ORDER — CLONIDINE HYDROCHLORIDE 0.2 MG/1
0.2 TABLET ORAL 2 TIMES DAILY
Status: DISCONTINUED | OUTPATIENT
Start: 2021-10-04 | End: 2021-10-06

## 2021-10-04 RX ORDER — NICARDIPINE HYDROCHLORIDE 0.2 MG/ML
0-15 INJECTION INTRAVENOUS CONTINUOUS
Status: DISCONTINUED | OUTPATIENT
Start: 2021-10-04 | End: 2021-10-05

## 2021-10-04 RX ORDER — NOREPINEPHRINE BITARTRATE/D5W 4MG/250ML
PLASTIC BAG, INJECTION (ML) INTRAVENOUS
Status: DISPENSED
Start: 2021-10-04 | End: 2021-10-04

## 2021-10-04 RX ORDER — ACETAMINOPHEN 325 MG/1
650 TABLET ORAL EVERY 6 HOURS
Status: DISCONTINUED | OUTPATIENT
Start: 2021-10-04 | End: 2021-10-09 | Stop reason: HOSPADM

## 2021-10-04 RX ADMIN — OXYCODONE HYDROCHLORIDE 15 MG: 10 TABLET ORAL at 07:10

## 2021-10-04 RX ADMIN — HYDROMORPHONE HYDROCHLORIDE 2 MG: 1 INJECTION, SOLUTION INTRAMUSCULAR; INTRAVENOUS; SUBCUTANEOUS at 10:10

## 2021-10-04 RX ADMIN — GABAPENTIN 400 MG: 400 CAPSULE ORAL at 02:10

## 2021-10-04 RX ADMIN — DEXAMETHASONE SODIUM PHOSPHATE 4 MG: 4 INJECTION INTRA-ARTICULAR; INTRALESIONAL; INTRAMUSCULAR; INTRAVENOUS; SOFT TISSUE at 11:10

## 2021-10-04 RX ADMIN — ACETAMINOPHEN 650 MG: 325 TABLET ORAL at 12:10

## 2021-10-04 RX ADMIN — DOCUSATE SODIUM 100 MG: 100 CAPSULE, LIQUID FILLED ORAL at 08:10

## 2021-10-04 RX ADMIN — METHOCARBAMOL 1000 MG: 500 TABLET ORAL at 08:10

## 2021-10-04 RX ADMIN — ATORVASTATIN CALCIUM 40 MG: 20 TABLET, FILM COATED ORAL at 09:10

## 2021-10-04 RX ADMIN — FLUTICASONE FUROATE 1 PUFF: 100 POWDER RESPIRATORY (INHALATION) at 09:10

## 2021-10-04 RX ADMIN — IPRATROPIUM BROMIDE AND ALBUTEROL SULFATE 3 ML: 2.5; .5 SOLUTION RESPIRATORY (INHALATION) at 08:10

## 2021-10-04 RX ADMIN — CEFAZOLIN 2 G: 330 INJECTION, POWDER, FOR SOLUTION INTRAMUSCULAR; INTRAVENOUS at 05:10

## 2021-10-04 RX ADMIN — OXYCODONE HYDROCHLORIDE 15 MG: 10 TABLET ORAL at 08:10

## 2021-10-04 RX ADMIN — ACETAMINOPHEN 650 MG: 325 TABLET ORAL at 05:10

## 2021-10-04 RX ADMIN — CITALOPRAM HYDROBROMIDE 40 MG: 10 TABLET ORAL at 09:10

## 2021-10-04 RX ADMIN — GABAPENTIN 400 MG: 400 CAPSULE ORAL at 08:10

## 2021-10-04 RX ADMIN — DEXAMETHASONE SODIUM PHOSPHATE 4 MG: 4 INJECTION INTRA-ARTICULAR; INTRALESIONAL; INTRAMUSCULAR; INTRAVENOUS; SOFT TISSUE at 05:10

## 2021-10-04 RX ADMIN — ALPRAZOLAM 1 MG: 1 TABLET ORAL at 10:10

## 2021-10-04 RX ADMIN — ACETAMINOPHEN 650 MG: 325 TABLET ORAL at 11:10

## 2021-10-04 RX ADMIN — OXYCODONE HYDROCHLORIDE 15 MG: 10 TABLET ORAL at 03:10

## 2021-10-04 RX ADMIN — MUPIROCIN: 20 OINTMENT TOPICAL at 08:10

## 2021-10-04 RX ADMIN — CLONIDINE HYDROCHLORIDE 0.2 MG: 0.2 TABLET ORAL at 10:10

## 2021-10-04 RX ADMIN — CEFAZOLIN 2 G: 330 INJECTION, POWDER, FOR SOLUTION INTRAMUSCULAR; INTRAVENOUS at 08:10

## 2021-10-04 RX ADMIN — HYDROMORPHONE HYDROCHLORIDE 2 MG: 1 INJECTION, SOLUTION INTRAMUSCULAR; INTRAVENOUS; SUBCUTANEOUS at 05:10

## 2021-10-04 RX ADMIN — VERAPAMIL HYDROCHLORIDE 180 MG: 180 TABLET, FILM COATED, EXTENDED RELEASE ORAL at 09:10

## 2021-10-04 RX ADMIN — METHOCARBAMOL 1000 MG: 500 TABLET ORAL at 09:10

## 2021-10-04 RX ADMIN — INSULIN ASPART 2 UNITS: 100 INJECTION, SOLUTION INTRAVENOUS; SUBCUTANEOUS at 08:10

## 2021-10-04 RX ADMIN — NICARDIPINE HYDROCHLORIDE 7.5 MG/HR: 0.2 INJECTION INTRAVENOUS at 03:10

## 2021-10-04 RX ADMIN — CLONIDINE HYDROCHLORIDE 0.2 MG: 0.2 TABLET ORAL at 08:10

## 2021-10-04 RX ADMIN — HEPARIN SODIUM 5000 UNITS: 5000 INJECTION INTRAVENOUS; SUBCUTANEOUS at 02:10

## 2021-10-04 RX ADMIN — HYDROMORPHONE HYDROCHLORIDE 2 MG: 1 INJECTION, SOLUTION INTRAMUSCULAR; INTRAVENOUS; SUBCUTANEOUS at 03:10

## 2021-10-04 RX ADMIN — HEPARIN SODIUM 5000 UNITS: 5000 INJECTION INTRAVENOUS; SUBCUTANEOUS at 09:10

## 2021-10-04 RX ADMIN — CEFAZOLIN 2 G: 330 INJECTION, POWDER, FOR SOLUTION INTRAMUSCULAR; INTRAVENOUS at 12:10

## 2021-10-04 RX ADMIN — GABAPENTIN 400 MG: 400 CAPSULE ORAL at 09:10

## 2021-10-04 RX ADMIN — INSULIN ASPART 4 UNITS: 100 INJECTION, SOLUTION INTRAVENOUS; SUBCUTANEOUS at 12:10

## 2021-10-04 RX ADMIN — VERAPAMIL HYDROCHLORIDE 180 MG: 180 TABLET, FILM COATED, EXTENDED RELEASE ORAL at 08:10

## 2021-10-04 RX ADMIN — IPRATROPIUM BROMIDE AND ALBUTEROL SULFATE 3 ML: 2.5; .5 SOLUTION RESPIRATORY (INHALATION) at 12:10

## 2021-10-04 RX ADMIN — BUSPIRONE HYDROCHLORIDE 30 MG: 10 TABLET ORAL at 08:10

## 2021-10-04 RX ADMIN — HEPARIN SODIUM 5000 UNITS: 5000 INJECTION INTRAVENOUS; SUBCUTANEOUS at 05:10

## 2021-10-04 RX ADMIN — BUSPIRONE HYDROCHLORIDE 30 MG: 10 TABLET ORAL at 09:10

## 2021-10-04 RX ADMIN — METHOCARBAMOL 1000 MG: 500 TABLET ORAL at 12:10

## 2021-10-04 RX ADMIN — DOCUSATE SODIUM 100 MG: 100 CAPSULE, LIQUID FILLED ORAL at 09:10

## 2021-10-04 RX ADMIN — OXYCODONE HYDROCHLORIDE 15 MG: 10 TABLET ORAL at 12:10

## 2021-10-04 RX ADMIN — HYDRALAZINE HYDROCHLORIDE 25 MG: 25 TABLET, FILM COATED ORAL at 06:10

## 2021-10-04 RX ADMIN — METHOCARBAMOL 1000 MG: 500 TABLET ORAL at 05:10

## 2021-10-04 RX ADMIN — NICARDIPINE HYDROCHLORIDE 10 MG/HR: 0.2 INJECTION INTRAVENOUS at 10:10

## 2021-10-04 RX ADMIN — DEXAMETHASONE SODIUM PHOSPHATE 4 MG: 4 INJECTION INTRA-ARTICULAR; INTRALESIONAL; INTRAMUSCULAR; INTRAVENOUS; SOFT TISSUE at 12:10

## 2021-10-05 LAB
ANION GAP SERPL CALC-SCNC: 9 MMOL/L (ref 8–16)
BASOPHILS # BLD AUTO: 0.01 K/UL (ref 0–0.2)
BASOPHILS NFR BLD: 0.1 % (ref 0–1.9)
BUN SERPL-MCNC: 33 MG/DL (ref 8–23)
CALCIUM SERPL-MCNC: 7.6 MG/DL (ref 8.7–10.5)
CHLORIDE SERPL-SCNC: 107 MMOL/L (ref 95–110)
CO2 SERPL-SCNC: 18 MMOL/L (ref 23–29)
CREAT SERPL-MCNC: 1 MG/DL (ref 0.5–1.4)
DIFFERENTIAL METHOD: ABNORMAL
EOSINOPHIL # BLD AUTO: 0 K/UL (ref 0–0.5)
EOSINOPHIL NFR BLD: 0 % (ref 0–8)
ERYTHROCYTE [DISTWIDTH] IN BLOOD BY AUTOMATED COUNT: 13.8 % (ref 11.5–14.5)
EST. GFR  (AFRICAN AMERICAN): >60 ML/MIN/1.73 M^2
EST. GFR  (NON AFRICAN AMERICAN): >60 ML/MIN/1.73 M^2
GLUCOSE SERPL-MCNC: 155 MG/DL (ref 70–110)
HCT VFR BLD AUTO: 33 % (ref 40–54)
HGB BLD-MCNC: 11.1 G/DL (ref 14–18)
IMM GRANULOCYTES # BLD AUTO: 0.13 K/UL (ref 0–0.04)
IMM GRANULOCYTES NFR BLD AUTO: 0.8 % (ref 0–0.5)
LYMPHOCYTES # BLD AUTO: 0.7 K/UL (ref 1–4.8)
LYMPHOCYTES NFR BLD: 4.5 % (ref 18–48)
MAGNESIUM SERPL-MCNC: 2.3 MG/DL (ref 1.6–2.6)
MCH RBC QN AUTO: 33.9 PG (ref 27–31)
MCHC RBC AUTO-ENTMCNC: 33.6 G/DL (ref 32–36)
MCV RBC AUTO: 101 FL (ref 82–98)
MONOCYTES # BLD AUTO: 0.8 K/UL (ref 0.3–1)
MONOCYTES NFR BLD: 5.1 % (ref 4–15)
NEUTROPHILS # BLD AUTO: 14 K/UL (ref 1.8–7.7)
NEUTROPHILS NFR BLD: 89.5 % (ref 38–73)
NRBC BLD-RTO: 0 /100 WBC
PHOSPHATE SERPL-MCNC: 3.4 MG/DL (ref 2.7–4.5)
PLATELET # BLD AUTO: 170 K/UL (ref 150–450)
PMV BLD AUTO: 11.8 FL (ref 9.2–12.9)
POCT GLUCOSE: 164 MG/DL (ref 70–110)
POCT GLUCOSE: 180 MG/DL (ref 70–110)
POCT GLUCOSE: 189 MG/DL (ref 70–110)
POCT GLUCOSE: 250 MG/DL (ref 70–110)
POTASSIUM SERPL-SCNC: 5 MMOL/L (ref 3.5–5.1)
RBC # BLD AUTO: 3.27 M/UL (ref 4.6–6.2)
SODIUM SERPL-SCNC: 134 MMOL/L (ref 136–145)
WBC # BLD AUTO: 15.61 K/UL (ref 3.9–12.7)

## 2021-10-05 PROCEDURE — 85025 COMPLETE CBC W/AUTO DIFF WBC: CPT | Performed by: STUDENT IN AN ORGANIZED HEALTH CARE EDUCATION/TRAINING PROGRAM

## 2021-10-05 PROCEDURE — 94640 AIRWAY INHALATION TREATMENT: CPT

## 2021-10-05 PROCEDURE — 80048 BASIC METABOLIC PNL TOTAL CA: CPT | Performed by: STUDENT IN AN ORGANIZED HEALTH CARE EDUCATION/TRAINING PROGRAM

## 2021-10-05 PROCEDURE — 25000242 PHARM REV CODE 250 ALT 637 W/ HCPCS: Performed by: PSYCHIATRY & NEUROLOGY

## 2021-10-05 PROCEDURE — 25000003 PHARM REV CODE 250

## 2021-10-05 PROCEDURE — 97530 THERAPEUTIC ACTIVITIES: CPT

## 2021-10-05 PROCEDURE — 25000003 PHARM REV CODE 250: Performed by: STUDENT IN AN ORGANIZED HEALTH CARE EDUCATION/TRAINING PROGRAM

## 2021-10-05 PROCEDURE — 99233 SBSQ HOSP IP/OBS HIGH 50: CPT | Mod: GC,,, | Performed by: PSYCHIATRY & NEUROLOGY

## 2021-10-05 PROCEDURE — 99900035 HC TECH TIME PER 15 MIN (STAT)

## 2021-10-05 PROCEDURE — 27000221 HC OXYGEN, UP TO 24 HOURS

## 2021-10-05 PROCEDURE — 25000003 PHARM REV CODE 250: Performed by: NURSE PRACTITIONER

## 2021-10-05 PROCEDURE — 84100 ASSAY OF PHOSPHORUS: CPT | Performed by: NURSE PRACTITIONER

## 2021-10-05 PROCEDURE — 63600175 PHARM REV CODE 636 W HCPCS: Performed by: STUDENT IN AN ORGANIZED HEALTH CARE EDUCATION/TRAINING PROGRAM

## 2021-10-05 PROCEDURE — 63600175 PHARM REV CODE 636 W HCPCS

## 2021-10-05 PROCEDURE — 94761 N-INVAS EAR/PLS OXIMETRY MLT: CPT

## 2021-10-05 PROCEDURE — 83735 ASSAY OF MAGNESIUM: CPT | Performed by: NURSE PRACTITIONER

## 2021-10-05 PROCEDURE — 25000242 PHARM REV CODE 250 ALT 637 W/ HCPCS

## 2021-10-05 PROCEDURE — 99233 PR SUBSEQUENT HOSPITAL CARE,LEVL III: ICD-10-PCS | Mod: GC,,, | Performed by: PSYCHIATRY & NEUROLOGY

## 2021-10-05 PROCEDURE — 94799 UNLISTED PULMONARY SVC/PX: CPT

## 2021-10-05 PROCEDURE — 20000000 HC ICU ROOM

## 2021-10-05 RX ORDER — BISACODYL 10 MG
10 SUPPOSITORY, RECTAL RECTAL DAILY PRN
Status: DISCONTINUED | OUTPATIENT
Start: 2021-10-05 | End: 2021-10-09 | Stop reason: HOSPADM

## 2021-10-05 RX ORDER — SENNOSIDES 8.6 MG/1
8.6 TABLET ORAL 2 TIMES DAILY
Status: DISCONTINUED | OUTPATIENT
Start: 2021-10-05 | End: 2021-10-09 | Stop reason: HOSPADM

## 2021-10-05 RX ORDER — POLYETHYLENE GLYCOL 3350 17 G/17G
17 POWDER, FOR SOLUTION ORAL DAILY
Status: DISCONTINUED | OUTPATIENT
Start: 2021-10-05 | End: 2021-10-05

## 2021-10-05 RX ORDER — SYRING-NEEDL,DISP,INSUL,0.3 ML 29 G X1/2"
296 SYRINGE, EMPTY DISPOSABLE MISCELLANEOUS ONCE
Status: COMPLETED | OUTPATIENT
Start: 2021-10-05 | End: 2021-10-05

## 2021-10-05 RX ORDER — SENNOSIDES 8.6 MG/1
8.6 TABLET ORAL DAILY
Status: DISCONTINUED | OUTPATIENT
Start: 2021-10-05 | End: 2021-10-05

## 2021-10-05 RX ORDER — LABETALOL HCL 20 MG/4 ML
10 SYRINGE (ML) INTRAVENOUS EVERY 6 HOURS PRN
Status: DISCONTINUED | OUTPATIENT
Start: 2021-10-05 | End: 2021-10-07

## 2021-10-05 RX ORDER — SENNOSIDES 8.6 MG/1
8.6 TABLET ORAL DAILY PRN
Status: DISCONTINUED | OUTPATIENT
Start: 2021-10-05 | End: 2021-10-05

## 2021-10-05 RX ORDER — LABETALOL HCL 20 MG/4 ML
SYRINGE (ML) INTRAVENOUS
Status: COMPLETED
Start: 2021-10-05 | End: 2021-10-05

## 2021-10-05 RX ORDER — AMLODIPINE BESYLATE 5 MG/1
5 TABLET ORAL DAILY
Status: DISCONTINUED | OUTPATIENT
Start: 2021-10-05 | End: 2021-10-05

## 2021-10-05 RX ORDER — LISINOPRIL 20 MG/1
40 TABLET ORAL DAILY
Status: DISCONTINUED | OUTPATIENT
Start: 2021-10-05 | End: 2021-10-09 | Stop reason: HOSPADM

## 2021-10-05 RX ORDER — POLYETHYLENE GLYCOL 3350 17 G/17G
17 POWDER, FOR SOLUTION ORAL 2 TIMES DAILY
Status: DISCONTINUED | OUTPATIENT
Start: 2021-10-05 | End: 2021-10-09 | Stop reason: HOSPADM

## 2021-10-05 RX ORDER — HYDRALAZINE HYDROCHLORIDE 20 MG/ML
10 INJECTION INTRAMUSCULAR; INTRAVENOUS ONCE
Status: COMPLETED | OUTPATIENT
Start: 2021-10-05 | End: 2021-10-05

## 2021-10-05 RX ORDER — SYRING-NEEDL,DISP,INSUL,0.3 ML 29 G X1/2"
296 SYRINGE, EMPTY DISPOSABLE MISCELLANEOUS ONCE AS NEEDED
Status: DISCONTINUED | OUTPATIENT
Start: 2021-10-05 | End: 2021-10-05

## 2021-10-05 RX ORDER — HYDRALAZINE HYDROCHLORIDE 20 MG/ML
10 INJECTION INTRAMUSCULAR; INTRAVENOUS EVERY 4 HOURS PRN
Status: DISCONTINUED | OUTPATIENT
Start: 2021-10-05 | End: 2021-10-09 | Stop reason: HOSPADM

## 2021-10-05 RX ADMIN — HYDRALAZINE HYDROCHLORIDE 10 MG: 20 INJECTION, SOLUTION INTRAMUSCULAR; INTRAVENOUS at 04:10

## 2021-10-05 RX ADMIN — SENNOSIDES 8.6 MG: 8.6 TABLET, COATED ORAL at 08:10

## 2021-10-05 RX ADMIN — INSULIN ASPART 4 UNITS: 100 INJECTION, SOLUTION INTRAVENOUS; SUBCUTANEOUS at 12:10

## 2021-10-05 RX ADMIN — OXYCODONE HYDROCHLORIDE 15 MG: 10 TABLET ORAL at 04:10

## 2021-10-05 RX ADMIN — INSULIN ASPART 1 UNITS: 100 INJECTION, SOLUTION INTRAVENOUS; SUBCUTANEOUS at 08:10

## 2021-10-05 RX ADMIN — IPRATROPIUM BROMIDE AND ALBUTEROL SULFATE 3 ML: 2.5; .5 SOLUTION RESPIRATORY (INHALATION) at 12:10

## 2021-10-05 RX ADMIN — GABAPENTIN 400 MG: 400 CAPSULE ORAL at 08:10

## 2021-10-05 RX ADMIN — CEFAZOLIN 2 G: 330 INJECTION, POWDER, FOR SOLUTION INTRAMUSCULAR; INTRAVENOUS at 12:10

## 2021-10-05 RX ADMIN — DOCUSATE SODIUM 100 MG: 100 CAPSULE, LIQUID FILLED ORAL at 08:10

## 2021-10-05 RX ADMIN — CITALOPRAM HYDROBROMIDE 40 MG: 10 TABLET ORAL at 08:10

## 2021-10-05 RX ADMIN — ATORVASTATIN CALCIUM 40 MG: 20 TABLET, FILM COATED ORAL at 08:10

## 2021-10-05 RX ADMIN — CEFAZOLIN 2 G: 330 INJECTION, POWDER, FOR SOLUTION INTRAMUSCULAR; INTRAVENOUS at 05:10

## 2021-10-05 RX ADMIN — HEPARIN SODIUM 5000 UNITS: 5000 INJECTION INTRAVENOUS; SUBCUTANEOUS at 09:10

## 2021-10-05 RX ADMIN — METHOCARBAMOL 1000 MG: 500 TABLET ORAL at 08:10

## 2021-10-05 RX ADMIN — GABAPENTIN 400 MG: 400 CAPSULE ORAL at 03:10

## 2021-10-05 RX ADMIN — HYDRALAZINE HYDROCHLORIDE 25 MG: 25 TABLET, FILM COATED ORAL at 04:10

## 2021-10-05 RX ADMIN — ALPRAZOLAM 1 MG: 1 TABLET ORAL at 09:10

## 2021-10-05 RX ADMIN — ACETAMINOPHEN 650 MG: 325 TABLET ORAL at 12:10

## 2021-10-05 RX ADMIN — ALPRAZOLAM 1 MG: 1 TABLET ORAL at 04:10

## 2021-10-05 RX ADMIN — METHOCARBAMOL 1000 MG: 500 TABLET ORAL at 01:10

## 2021-10-05 RX ADMIN — HEPARIN SODIUM 5000 UNITS: 5000 INJECTION INTRAVENOUS; SUBCUTANEOUS at 05:10

## 2021-10-05 RX ADMIN — CLONIDINE HYDROCHLORIDE 0.2 MG: 0.2 TABLET ORAL at 08:10

## 2021-10-05 RX ADMIN — FLUTICASONE FUROATE 1 PUFF: 100 POWDER RESPIRATORY (INHALATION) at 08:10

## 2021-10-05 RX ADMIN — ACETAMINOPHEN 650 MG: 325 TABLET ORAL at 05:10

## 2021-10-05 RX ADMIN — MUPIROCIN: 20 OINTMENT TOPICAL at 08:10

## 2021-10-05 RX ADMIN — MAGNESIUM CITRATE 296 ML: 1.75 LIQUID ORAL at 03:10

## 2021-10-05 RX ADMIN — DEXAMETHASONE SODIUM PHOSPHATE 4 MG: 4 INJECTION INTRA-ARTICULAR; INTRALESIONAL; INTRAMUSCULAR; INTRAVENOUS; SOFT TISSUE at 06:10

## 2021-10-05 RX ADMIN — HYDROMORPHONE HYDROCHLORIDE 2 MG: 1 INJECTION, SOLUTION INTRAMUSCULAR; INTRAVENOUS; SUBCUTANEOUS at 11:10

## 2021-10-05 RX ADMIN — INSULIN ASPART 2 UNITS: 100 INJECTION, SOLUTION INTRAVENOUS; SUBCUTANEOUS at 07:10

## 2021-10-05 RX ADMIN — VERAPAMIL HYDROCHLORIDE 180 MG: 180 TABLET, FILM COATED, EXTENDED RELEASE ORAL at 08:10

## 2021-10-05 RX ADMIN — LABETALOL HYDROCHLORIDE 10 MG: 5 INJECTION, SOLUTION INTRAVENOUS at 12:10

## 2021-10-05 RX ADMIN — HEPARIN SODIUM 5000 UNITS: 5000 INJECTION INTRAVENOUS; SUBCUTANEOUS at 02:10

## 2021-10-05 RX ADMIN — OXYCODONE HYDROCHLORIDE 15 MG: 10 TABLET ORAL at 07:10

## 2021-10-05 RX ADMIN — IPRATROPIUM BROMIDE AND ALBUTEROL SULFATE 3 ML: 2.5; .5 SOLUTION RESPIRATORY (INHALATION) at 07:10

## 2021-10-05 RX ADMIN — LISINOPRIL 40 MG: 20 TABLET ORAL at 08:10

## 2021-10-05 RX ADMIN — METHOCARBAMOL 1000 MG: 500 TABLET ORAL at 04:10

## 2021-10-05 RX ADMIN — IPRATROPIUM BROMIDE AND ALBUTEROL SULFATE 3 ML: 2.5; .5 SOLUTION RESPIRATORY (INHALATION) at 08:10

## 2021-10-05 RX ADMIN — Medication 10 MG: at 12:10

## 2021-10-05 RX ADMIN — BUSPIRONE HYDROCHLORIDE 30 MG: 10 TABLET ORAL at 08:10

## 2021-10-05 RX ADMIN — HYDROMORPHONE HYDROCHLORIDE 2 MG: 1 INJECTION, SOLUTION INTRAMUSCULAR; INTRAVENOUS; SUBCUTANEOUS at 03:10

## 2021-10-05 RX ADMIN — DEXAMETHASONE SODIUM PHOSPHATE 4 MG: 4 INJECTION INTRA-ARTICULAR; INTRALESIONAL; INTRAMUSCULAR; INTRAVENOUS; SOFT TISSUE at 05:10

## 2021-10-05 RX ADMIN — CEFAZOLIN 2 G: 330 INJECTION, POWDER, FOR SOLUTION INTRAMUSCULAR; INTRAVENOUS at 08:10

## 2021-10-05 RX ADMIN — HYDROMORPHONE HYDROCHLORIDE 2 MG: 1 INJECTION, SOLUTION INTRAMUSCULAR; INTRAVENOUS; SUBCUTANEOUS at 06:10

## 2021-10-05 RX ADMIN — OXYCODONE HYDROCHLORIDE 15 MG: 10 TABLET ORAL at 09:10

## 2021-10-05 RX ADMIN — INSULIN ASPART 2 UNITS: 100 INJECTION, SOLUTION INTRAVENOUS; SUBCUTANEOUS at 04:10

## 2021-10-05 RX ADMIN — DEXAMETHASONE SODIUM PHOSPHATE 4 MG: 4 INJECTION INTRA-ARTICULAR; INTRALESIONAL; INTRAMUSCULAR; INTRAVENOUS; SOFT TISSUE at 12:10

## 2021-10-05 RX ADMIN — ACETAMINOPHEN 650 MG: 325 TABLET ORAL at 06:10

## 2021-10-05 RX ADMIN — SENNOSIDES 8.6 MG: 8.6 TABLET, COATED ORAL at 03:10

## 2021-10-06 LAB
ANION GAP SERPL CALC-SCNC: 12 MMOL/L (ref 8–16)
BASOPHILS # BLD AUTO: 0.02 K/UL (ref 0–0.2)
BASOPHILS NFR BLD: 0.1 % (ref 0–1.9)
BUN SERPL-MCNC: 33 MG/DL (ref 8–23)
CALCIUM SERPL-MCNC: 7.9 MG/DL (ref 8.7–10.5)
CHLORIDE SERPL-SCNC: 103 MMOL/L (ref 95–110)
CO2 SERPL-SCNC: 19 MMOL/L (ref 23–29)
CREAT SERPL-MCNC: 1 MG/DL (ref 0.5–1.4)
DIFFERENTIAL METHOD: ABNORMAL
EOSINOPHIL # BLD AUTO: 0 K/UL (ref 0–0.5)
EOSINOPHIL NFR BLD: 0 % (ref 0–8)
ERYTHROCYTE [DISTWIDTH] IN BLOOD BY AUTOMATED COUNT: 13.8 % (ref 11.5–14.5)
EST. GFR  (AFRICAN AMERICAN): >60 ML/MIN/1.73 M^2
EST. GFR  (NON AFRICAN AMERICAN): >60 ML/MIN/1.73 M^2
GLUCOSE SERPL-MCNC: 184 MG/DL (ref 70–110)
HCT VFR BLD AUTO: 32.9 % (ref 40–54)
HGB BLD-MCNC: 11.4 G/DL (ref 14–18)
IMM GRANULOCYTES # BLD AUTO: 0.13 K/UL (ref 0–0.04)
IMM GRANULOCYTES NFR BLD AUTO: 0.9 % (ref 0–0.5)
LYMPHOCYTES # BLD AUTO: 0.9 K/UL (ref 1–4.8)
LYMPHOCYTES NFR BLD: 6.1 % (ref 18–48)
MAGNESIUM SERPL-MCNC: 2.8 MG/DL (ref 1.6–2.6)
MCH RBC QN AUTO: 34 PG (ref 27–31)
MCHC RBC AUTO-ENTMCNC: 34.7 G/DL (ref 32–36)
MCV RBC AUTO: 98 FL (ref 82–98)
MONOCYTES # BLD AUTO: 0.7 K/UL (ref 0.3–1)
MONOCYTES NFR BLD: 4.6 % (ref 4–15)
NEUTROPHILS # BLD AUTO: 12.5 K/UL (ref 1.8–7.7)
NEUTROPHILS NFR BLD: 88.3 % (ref 38–73)
NRBC BLD-RTO: 0 /100 WBC
PHOSPHATE SERPL-MCNC: 1.9 MG/DL (ref 2.7–4.5)
PLATELET # BLD AUTO: 179 K/UL (ref 150–450)
PMV BLD AUTO: 11.8 FL (ref 9.2–12.9)
POCT GLUCOSE: 145 MG/DL (ref 70–110)
POCT GLUCOSE: 146 MG/DL (ref 70–110)
POCT GLUCOSE: 160 MG/DL (ref 70–110)
POTASSIUM SERPL-SCNC: 5.3 MMOL/L (ref 3.5–5.1)
RBC # BLD AUTO: 3.35 M/UL (ref 4.6–6.2)
SODIUM SERPL-SCNC: 134 MMOL/L (ref 136–145)
WBC # BLD AUTO: 14.17 K/UL (ref 3.9–12.7)

## 2021-10-06 PROCEDURE — 85025 COMPLETE CBC W/AUTO DIFF WBC: CPT | Performed by: STUDENT IN AN ORGANIZED HEALTH CARE EDUCATION/TRAINING PROGRAM

## 2021-10-06 PROCEDURE — 94640 AIRWAY INHALATION TREATMENT: CPT

## 2021-10-06 PROCEDURE — 63600175 PHARM REV CODE 636 W HCPCS: Performed by: STUDENT IN AN ORGANIZED HEALTH CARE EDUCATION/TRAINING PROGRAM

## 2021-10-06 PROCEDURE — 25000242 PHARM REV CODE 250 ALT 637 W/ HCPCS

## 2021-10-06 PROCEDURE — 25000003 PHARM REV CODE 250: Performed by: STUDENT IN AN ORGANIZED HEALTH CARE EDUCATION/TRAINING PROGRAM

## 2021-10-06 PROCEDURE — 94761 N-INVAS EAR/PLS OXIMETRY MLT: CPT

## 2021-10-06 PROCEDURE — 63600175 PHARM REV CODE 636 W HCPCS: Performed by: NURSE PRACTITIONER

## 2021-10-06 PROCEDURE — 97116 GAIT TRAINING THERAPY: CPT

## 2021-10-06 PROCEDURE — 99233 PR SUBSEQUENT HOSPITAL CARE,LEVL III: ICD-10-PCS | Mod: GC,,, | Performed by: PSYCHIATRY & NEUROLOGY

## 2021-10-06 PROCEDURE — 99233 SBSQ HOSP IP/OBS HIGH 50: CPT | Mod: GC,,, | Performed by: PSYCHIATRY & NEUROLOGY

## 2021-10-06 PROCEDURE — 63600175 PHARM REV CODE 636 W HCPCS

## 2021-10-06 PROCEDURE — 25000003 PHARM REV CODE 250

## 2021-10-06 PROCEDURE — 25000003 PHARM REV CODE 250: Performed by: PHYSICIAN ASSISTANT

## 2021-10-06 PROCEDURE — 83735 ASSAY OF MAGNESIUM: CPT

## 2021-10-06 PROCEDURE — 25000003 PHARM REV CODE 250: Performed by: NURSE PRACTITIONER

## 2021-10-06 PROCEDURE — 84100 ASSAY OF PHOSPHORUS: CPT

## 2021-10-06 PROCEDURE — 11000001 HC ACUTE MED/SURG PRIVATE ROOM

## 2021-10-06 PROCEDURE — 80048 BASIC METABOLIC PNL TOTAL CA: CPT | Performed by: STUDENT IN AN ORGANIZED HEALTH CARE EDUCATION/TRAINING PROGRAM

## 2021-10-06 RX ORDER — DEXAMETHASONE SODIUM PHOSPHATE 4 MG/ML
4 INJECTION, SOLUTION INTRA-ARTICULAR; INTRALESIONAL; INTRAMUSCULAR; INTRAVENOUS; SOFT TISSUE EVERY 12 HOURS
Status: DISCONTINUED | OUTPATIENT
Start: 2021-10-06 | End: 2021-10-09

## 2021-10-06 RX ORDER — SYRING-NEEDL,DISP,INSUL,0.3 ML 29 G X1/2"
296 SYRINGE, EMPTY DISPOSABLE MISCELLANEOUS ONCE
Status: DISCONTINUED | OUTPATIENT
Start: 2021-10-06 | End: 2021-10-09 | Stop reason: HOSPADM

## 2021-10-06 RX ORDER — CLONIDINE HYDROCHLORIDE 0.1 MG/1
0.2 TABLET ORAL 3 TIMES DAILY
Status: DISCONTINUED | OUTPATIENT
Start: 2021-10-06 | End: 2021-10-07

## 2021-10-06 RX ADMIN — CITALOPRAM HYDROBROMIDE 40 MG: 10 TABLET ORAL at 08:10

## 2021-10-06 RX ADMIN — ACETAMINOPHEN 650 MG: 325 TABLET ORAL at 05:10

## 2021-10-06 RX ADMIN — METHOCARBAMOL 1000 MG: 500 TABLET ORAL at 05:10

## 2021-10-06 RX ADMIN — VERAPAMIL HYDROCHLORIDE 180 MG: 180 TABLET, FILM COATED, EXTENDED RELEASE ORAL at 08:10

## 2021-10-06 RX ADMIN — FLUTICASONE FUROATE 1 PUFF: 100 POWDER RESPIRATORY (INHALATION) at 08:10

## 2021-10-06 RX ADMIN — BISACODYL 10 MG: 10 SUPPOSITORY RECTAL at 11:10

## 2021-10-06 RX ADMIN — CLONIDINE HYDROCHLORIDE 0.2 MG: 0.2 TABLET ORAL at 08:10

## 2021-10-06 RX ADMIN — GABAPENTIN 400 MG: 400 CAPSULE ORAL at 08:10

## 2021-10-06 RX ADMIN — GABAPENTIN 400 MG: 400 CAPSULE ORAL at 02:10

## 2021-10-06 RX ADMIN — IPRATROPIUM BROMIDE AND ALBUTEROL SULFATE 3 ML: 2.5; .5 SOLUTION RESPIRATORY (INHALATION) at 07:10

## 2021-10-06 RX ADMIN — ACETAMINOPHEN 650 MG: 325 TABLET ORAL at 06:10

## 2021-10-06 RX ADMIN — IPRATROPIUM BROMIDE AND ALBUTEROL SULFATE 3 ML: 2.5; .5 SOLUTION RESPIRATORY (INHALATION) at 02:10

## 2021-10-06 RX ADMIN — SENNOSIDES 8.6 MG: 8.6 TABLET, COATED ORAL at 08:10

## 2021-10-06 RX ADMIN — METHOCARBAMOL 1000 MG: 500 TABLET ORAL at 02:10

## 2021-10-06 RX ADMIN — ACETAMINOPHEN 650 MG: 325 TABLET ORAL at 12:10

## 2021-10-06 RX ADMIN — MUPIROCIN: 20 OINTMENT TOPICAL at 08:10

## 2021-10-06 RX ADMIN — POLYETHYLENE GLYCOL 3350 17 G: 17 POWDER, FOR SOLUTION ORAL at 08:10

## 2021-10-06 RX ADMIN — BUSPIRONE HYDROCHLORIDE 30 MG: 10 TABLET ORAL at 08:10

## 2021-10-06 RX ADMIN — IPRATROPIUM BROMIDE AND ALBUTEROL SULFATE 3 ML: 2.5; .5 SOLUTION RESPIRATORY (INHALATION) at 12:10

## 2021-10-06 RX ADMIN — ALPRAZOLAM 1 MG: 1 TABLET ORAL at 08:10

## 2021-10-06 RX ADMIN — CLONIDINE HYDROCHLORIDE 0.2 MG: 0.1 TABLET ORAL at 08:10

## 2021-10-06 RX ADMIN — HYDRALAZINE HYDROCHLORIDE 10 MG: 20 INJECTION, SOLUTION INTRAMUSCULAR; INTRAVENOUS at 08:10

## 2021-10-06 RX ADMIN — METHOCARBAMOL 1000 MG: 500 TABLET ORAL at 08:10

## 2021-10-06 RX ADMIN — DOCUSATE SODIUM 100 MG: 100 CAPSULE, LIQUID FILLED ORAL at 08:10

## 2021-10-06 RX ADMIN — OXYCODONE HYDROCHLORIDE 15 MG: 10 TABLET ORAL at 05:10

## 2021-10-06 RX ADMIN — MUPIROCIN: 20 OINTMENT TOPICAL at 09:10

## 2021-10-06 RX ADMIN — LISINOPRIL 40 MG: 20 TABLET ORAL at 08:10

## 2021-10-06 RX ADMIN — CLONIDINE HYDROCHLORIDE 0.2 MG: 0.1 TABLET ORAL at 02:10

## 2021-10-06 RX ADMIN — HEPARIN SODIUM 5000 UNITS: 5000 INJECTION INTRAVENOUS; SUBCUTANEOUS at 09:10

## 2021-10-06 RX ADMIN — HYDROMORPHONE HYDROCHLORIDE 2 MG: 1 INJECTION, SOLUTION INTRAMUSCULAR; INTRAVENOUS; SUBCUTANEOUS at 09:10

## 2021-10-06 RX ADMIN — CEFAZOLIN 2 G: 330 INJECTION, POWDER, FOR SOLUTION INTRAMUSCULAR; INTRAVENOUS at 04:10

## 2021-10-06 RX ADMIN — HEPARIN SODIUM 5000 UNITS: 5000 INJECTION INTRAVENOUS; SUBCUTANEOUS at 02:10

## 2021-10-06 RX ADMIN — DEXAMETHASONE SODIUM PHOSPHATE 4 MG: 4 INJECTION INTRA-ARTICULAR; INTRALESIONAL; INTRAMUSCULAR; INTRAVENOUS; SOFT TISSUE at 12:10

## 2021-10-06 RX ADMIN — OXYCODONE HYDROCHLORIDE 15 MG: 10 TABLET ORAL at 08:10

## 2021-10-06 RX ADMIN — HYDROMORPHONE HYDROCHLORIDE 2 MG: 1 INJECTION, SOLUTION INTRAMUSCULAR; INTRAVENOUS; SUBCUTANEOUS at 06:10

## 2021-10-06 RX ADMIN — DEXAMETHASONE SODIUM PHOSPHATE 4 MG: 4 INJECTION INTRA-ARTICULAR; INTRALESIONAL; INTRAMUSCULAR; INTRAVENOUS; SOFT TISSUE at 08:10

## 2021-10-06 RX ADMIN — HEPARIN SODIUM 5000 UNITS: 5000 INJECTION INTRAVENOUS; SUBCUTANEOUS at 05:10

## 2021-10-06 RX ADMIN — DEXAMETHASONE SODIUM PHOSPHATE 4 MG: 4 INJECTION INTRA-ARTICULAR; INTRALESIONAL; INTRAMUSCULAR; INTRAVENOUS; SOFT TISSUE at 05:10

## 2021-10-06 RX ADMIN — INSULIN ASPART 2 UNITS: 100 INJECTION, SOLUTION INTRAVENOUS; SUBCUTANEOUS at 07:10

## 2021-10-06 RX ADMIN — ATORVASTATIN CALCIUM 40 MG: 20 TABLET, FILM COATED ORAL at 08:10

## 2021-10-07 PROBLEM — E66.9 OBESITY (BMI 30-39.9): Status: ACTIVE | Noted: 2021-10-07

## 2021-10-07 PROBLEM — Z74.09 IMPAIRED MOBILITY AND ADLS: Status: ACTIVE | Noted: 2021-10-07

## 2021-10-07 PROBLEM — E87.1 HYPONATREMIA: Status: ACTIVE | Noted: 2021-10-07

## 2021-10-07 PROBLEM — Z78.9 IMPAIRED MOBILITY AND ADLS: Status: ACTIVE | Noted: 2021-10-07

## 2021-10-07 LAB
ANION GAP SERPL CALC-SCNC: 11 MMOL/L (ref 8–16)
BASOPHILS # BLD AUTO: 0.02 K/UL (ref 0–0.2)
BASOPHILS NFR BLD: 0.1 % (ref 0–1.9)
BLD PROD TYP BPU: NORMAL
BLD PROD TYP BPU: NORMAL
BLOOD UNIT EXPIRATION DATE: NORMAL
BLOOD UNIT EXPIRATION DATE: NORMAL
BLOOD UNIT TYPE CODE: 6200
BLOOD UNIT TYPE CODE: 6200
BLOOD UNIT TYPE: NORMAL
BLOOD UNIT TYPE: NORMAL
BUN SERPL-MCNC: 30 MG/DL (ref 8–23)
CALCIUM SERPL-MCNC: 8.1 MG/DL (ref 8.7–10.5)
CHLORIDE SERPL-SCNC: 99 MMOL/L (ref 95–110)
CO2 SERPL-SCNC: 22 MMOL/L (ref 23–29)
CODING SYSTEM: NORMAL
CODING SYSTEM: NORMAL
CREAT SERPL-MCNC: 1 MG/DL (ref 0.5–1.4)
DIFFERENTIAL METHOD: ABNORMAL
DISPENSE STATUS: NORMAL
DISPENSE STATUS: NORMAL
EOSINOPHIL # BLD AUTO: 0 K/UL (ref 0–0.5)
EOSINOPHIL NFR BLD: 0 % (ref 0–8)
ERYTHROCYTE [DISTWIDTH] IN BLOOD BY AUTOMATED COUNT: 13.8 % (ref 11.5–14.5)
EST. GFR  (AFRICAN AMERICAN): >60 ML/MIN/1.73 M^2
EST. GFR  (NON AFRICAN AMERICAN): >60 ML/MIN/1.73 M^2
GLUCOSE SERPL-MCNC: 193 MG/DL (ref 70–110)
HCT VFR BLD AUTO: 35.5 % (ref 40–54)
HGB BLD-MCNC: 11.8 G/DL (ref 14–18)
IMM GRANULOCYTES # BLD AUTO: 0.22 K/UL (ref 0–0.04)
IMM GRANULOCYTES NFR BLD AUTO: 1.3 % (ref 0–0.5)
LYMPHOCYTES # BLD AUTO: 1.8 K/UL (ref 1–4.8)
LYMPHOCYTES NFR BLD: 10.3 % (ref 18–48)
MCH RBC QN AUTO: 33.5 PG (ref 27–31)
MCHC RBC AUTO-ENTMCNC: 33.2 G/DL (ref 32–36)
MCV RBC AUTO: 101 FL (ref 82–98)
MONOCYTES # BLD AUTO: 1.2 K/UL (ref 0.3–1)
MONOCYTES NFR BLD: 6.7 % (ref 4–15)
NEUTROPHILS # BLD AUTO: 14.1 K/UL (ref 1.8–7.7)
NEUTROPHILS NFR BLD: 81.6 % (ref 38–73)
NRBC BLD-RTO: 0 /100 WBC
OSMOLALITY SERPL: 301 MOSM/KG (ref 280–300)
PLATELET # BLD AUTO: 208 K/UL (ref 150–450)
PMV BLD AUTO: 11.8 FL (ref 9.2–12.9)
POCT GLUCOSE: 143 MG/DL (ref 70–110)
POCT GLUCOSE: 150 MG/DL (ref 70–110)
POCT GLUCOSE: 157 MG/DL (ref 70–110)
POCT GLUCOSE: 174 MG/DL (ref 70–110)
POTASSIUM SERPL-SCNC: 5.1 MMOL/L (ref 3.5–5.1)
RBC # BLD AUTO: 3.52 M/UL (ref 4.6–6.2)
SODIUM SERPL-SCNC: 132 MMOL/L (ref 136–145)
TRANS ERYTHROCYTES VOL PATIENT: NORMAL ML
TRANS ERYTHROCYTES VOL PATIENT: NORMAL ML
WBC # BLD AUTO: 17.25 K/UL (ref 3.9–12.7)

## 2021-10-07 PROCEDURE — 99233 SBSQ HOSP IP/OBS HIGH 50: CPT | Mod: GC,,, | Performed by: HOSPITALIST

## 2021-10-07 PROCEDURE — 97535 SELF CARE MNGMENT TRAINING: CPT

## 2021-10-07 PROCEDURE — 25000003 PHARM REV CODE 250: Performed by: PHYSICIAN ASSISTANT

## 2021-10-07 PROCEDURE — 25000003 PHARM REV CODE 250: Performed by: STUDENT IN AN ORGANIZED HEALTH CARE EDUCATION/TRAINING PROGRAM

## 2021-10-07 PROCEDURE — 63600175 PHARM REV CODE 636 W HCPCS

## 2021-10-07 PROCEDURE — 25000003 PHARM REV CODE 250

## 2021-10-07 PROCEDURE — 63600175 PHARM REV CODE 636 W HCPCS: Performed by: STUDENT IN AN ORGANIZED HEALTH CARE EDUCATION/TRAINING PROGRAM

## 2021-10-07 PROCEDURE — 94761 N-INVAS EAR/PLS OXIMETRY MLT: CPT

## 2021-10-07 PROCEDURE — 83930 ASSAY OF BLOOD OSMOLALITY: CPT | Performed by: STUDENT IN AN ORGANIZED HEALTH CARE EDUCATION/TRAINING PROGRAM

## 2021-10-07 PROCEDURE — 25000242 PHARM REV CODE 250 ALT 637 W/ HCPCS

## 2021-10-07 PROCEDURE — 99222 PR INITIAL HOSPITAL CARE,LEVL II: ICD-10-PCS | Mod: ,,, | Performed by: NURSE PRACTITIONER

## 2021-10-07 PROCEDURE — 11000001 HC ACUTE MED/SURG PRIVATE ROOM

## 2021-10-07 PROCEDURE — 99024 POSTOP FOLLOW-UP VISIT: CPT | Mod: ,,, | Performed by: PHYSICIAN ASSISTANT

## 2021-10-07 PROCEDURE — 99222 1ST HOSP IP/OBS MODERATE 55: CPT | Mod: ,,, | Performed by: NURSE PRACTITIONER

## 2021-10-07 PROCEDURE — 99024 PR POST-OP FOLLOW-UP VISIT: ICD-10-PCS | Mod: ,,, | Performed by: PHYSICIAN ASSISTANT

## 2021-10-07 PROCEDURE — 94640 AIRWAY INHALATION TREATMENT: CPT

## 2021-10-07 PROCEDURE — 99900035 HC TECH TIME PER 15 MIN (STAT)

## 2021-10-07 PROCEDURE — 99233 PR SUBSEQUENT HOSPITAL CARE,LEVL III: ICD-10-PCS | Mod: GC,,, | Performed by: HOSPITALIST

## 2021-10-07 PROCEDURE — 85025 COMPLETE CBC W/AUTO DIFF WBC: CPT | Performed by: STUDENT IN AN ORGANIZED HEALTH CARE EDUCATION/TRAINING PROGRAM

## 2021-10-07 PROCEDURE — 25000003 PHARM REV CODE 250: Performed by: NURSE PRACTITIONER

## 2021-10-07 PROCEDURE — 97530 THERAPEUTIC ACTIVITIES: CPT

## 2021-10-07 PROCEDURE — 36415 COLL VENOUS BLD VENIPUNCTURE: CPT | Performed by: STUDENT IN AN ORGANIZED HEALTH CARE EDUCATION/TRAINING PROGRAM

## 2021-10-07 PROCEDURE — 97116 GAIT TRAINING THERAPY: CPT

## 2021-10-07 PROCEDURE — 80048 BASIC METABOLIC PNL TOTAL CA: CPT | Performed by: STUDENT IN AN ORGANIZED HEALTH CARE EDUCATION/TRAINING PROGRAM

## 2021-10-07 RX ORDER — NIFEDIPINE 30 MG/1
30 TABLET, EXTENDED RELEASE ORAL DAILY
Status: DISCONTINUED | OUTPATIENT
Start: 2021-10-07 | End: 2021-10-09 | Stop reason: HOSPADM

## 2021-10-07 RX ORDER — CLONIDINE HYDROCHLORIDE 0.3 MG/1
0.3 TABLET ORAL 3 TIMES DAILY
Status: DISCONTINUED | OUTPATIENT
Start: 2021-10-07 | End: 2021-10-09 | Stop reason: HOSPADM

## 2021-10-07 RX ORDER — NIFEDIPINE 30 MG/1
30 TABLET, EXTENDED RELEASE ORAL DAILY
Status: DISCONTINUED | OUTPATIENT
Start: 2021-10-07 | End: 2021-10-07

## 2021-10-07 RX ORDER — FAMOTIDINE 20 MG/1
20 TABLET, FILM COATED ORAL 2 TIMES DAILY
Status: DISCONTINUED | OUTPATIENT
Start: 2021-10-07 | End: 2021-10-09 | Stop reason: HOSPADM

## 2021-10-07 RX ADMIN — FAMOTIDINE 20 MG: 20 TABLET, FILM COATED ORAL at 10:10

## 2021-10-07 RX ADMIN — HYDROMORPHONE HYDROCHLORIDE 2 MG: 1 INJECTION, SOLUTION INTRAMUSCULAR; INTRAVENOUS; SUBCUTANEOUS at 12:10

## 2021-10-07 RX ADMIN — BUSPIRONE HYDROCHLORIDE 30 MG: 10 TABLET ORAL at 10:10

## 2021-10-07 RX ADMIN — GABAPENTIN 400 MG: 400 CAPSULE ORAL at 08:10

## 2021-10-07 RX ADMIN — DEXAMETHASONE SODIUM PHOSPHATE 4 MG: 4 INJECTION INTRA-ARTICULAR; INTRALESIONAL; INTRAMUSCULAR; INTRAVENOUS; SOFT TISSUE at 08:10

## 2021-10-07 RX ADMIN — ATORVASTATIN CALCIUM 40 MG: 20 TABLET, FILM COATED ORAL at 08:10

## 2021-10-07 RX ADMIN — LISINOPRIL 40 MG: 20 TABLET ORAL at 08:10

## 2021-10-07 RX ADMIN — SENNOSIDES 8.6 MG: 8.6 TABLET, COATED ORAL at 08:10

## 2021-10-07 RX ADMIN — OXYCODONE HYDROCHLORIDE 15 MG: 10 TABLET ORAL at 08:10

## 2021-10-07 RX ADMIN — HEPARIN SODIUM 5000 UNITS: 5000 INJECTION INTRAVENOUS; SUBCUTANEOUS at 05:10

## 2021-10-07 RX ADMIN — GABAPENTIN 400 MG: 400 CAPSULE ORAL at 02:10

## 2021-10-07 RX ADMIN — BUSPIRONE HYDROCHLORIDE 30 MG: 10 TABLET ORAL at 08:10

## 2021-10-07 RX ADMIN — DOCUSATE SODIUM 100 MG: 100 CAPSULE, LIQUID FILLED ORAL at 08:10

## 2021-10-07 RX ADMIN — ACETAMINOPHEN 650 MG: 325 TABLET ORAL at 11:10

## 2021-10-07 RX ADMIN — VERAPAMIL HYDROCHLORIDE 180 MG: 180 TABLET, FILM COATED, EXTENDED RELEASE ORAL at 10:10

## 2021-10-07 RX ADMIN — DEXAMETHASONE SODIUM PHOSPHATE 4 MG: 4 INJECTION INTRA-ARTICULAR; INTRALESIONAL; INTRAMUSCULAR; INTRAVENOUS; SOFT TISSUE at 10:10

## 2021-10-07 RX ADMIN — ACETAMINOPHEN 650 MG: 325 TABLET ORAL at 12:10

## 2021-10-07 RX ADMIN — CLONIDINE HYDROCHLORIDE 0.2 MG: 0.1 TABLET ORAL at 08:10

## 2021-10-07 RX ADMIN — OXYCODONE HYDROCHLORIDE 15 MG: 10 TABLET ORAL at 01:10

## 2021-10-07 RX ADMIN — SENNOSIDES 8.6 MG: 8.6 TABLET, COATED ORAL at 10:10

## 2021-10-07 RX ADMIN — METHOCARBAMOL 1000 MG: 500 TABLET ORAL at 05:10

## 2021-10-07 RX ADMIN — OXYCODONE HYDROCHLORIDE 15 MG: 10 TABLET ORAL at 10:10

## 2021-10-07 RX ADMIN — CLONIDINE HYDROCHLORIDE 0.3 MG: 0.3 TABLET ORAL at 10:10

## 2021-10-07 RX ADMIN — HEPARIN SODIUM 5000 UNITS: 5000 INJECTION INTRAVENOUS; SUBCUTANEOUS at 02:10

## 2021-10-07 RX ADMIN — OXYCODONE HYDROCHLORIDE 15 MG: 10 TABLET ORAL at 05:10

## 2021-10-07 RX ADMIN — GABAPENTIN 400 MG: 400 CAPSULE ORAL at 10:10

## 2021-10-07 RX ADMIN — IPRATROPIUM BROMIDE AND ALBUTEROL SULFATE 3 ML: 2.5; .5 SOLUTION RESPIRATORY (INHALATION) at 12:10

## 2021-10-07 RX ADMIN — MUPIROCIN: 20 OINTMENT TOPICAL at 10:10

## 2021-10-07 RX ADMIN — ACETAMINOPHEN 650 MG: 325 TABLET ORAL at 05:10

## 2021-10-07 RX ADMIN — IPRATROPIUM BROMIDE AND ALBUTEROL SULFATE 3 ML: 2.5; .5 SOLUTION RESPIRATORY (INHALATION) at 07:10

## 2021-10-07 RX ADMIN — CLONIDINE HYDROCHLORIDE 0.3 MG: 0.3 TABLET ORAL at 03:10

## 2021-10-07 RX ADMIN — VERAPAMIL HYDROCHLORIDE 180 MG: 180 TABLET, FILM COATED, EXTENDED RELEASE ORAL at 08:10

## 2021-10-07 RX ADMIN — POLYETHYLENE GLYCOL 3350 17 G: 17 POWDER, FOR SOLUTION ORAL at 08:10

## 2021-10-07 RX ADMIN — ALPRAZOLAM 1 MG: 1 TABLET ORAL at 08:10

## 2021-10-07 RX ADMIN — CITALOPRAM HYDROBROMIDE 40 MG: 10 TABLET ORAL at 08:10

## 2021-10-07 RX ADMIN — METHOCARBAMOL 1000 MG: 500 TABLET ORAL at 10:10

## 2021-10-07 RX ADMIN — FLUTICASONE FUROATE 1 PUFF: 100 POWDER RESPIRATORY (INHALATION) at 09:10

## 2021-10-07 RX ADMIN — DOCUSATE SODIUM 100 MG: 100 CAPSULE, LIQUID FILLED ORAL at 10:10

## 2021-10-07 RX ADMIN — HEPARIN SODIUM 5000 UNITS: 5000 INJECTION INTRAVENOUS; SUBCUTANEOUS at 10:10

## 2021-10-07 RX ADMIN — NIFEDIPINE 30 MG: 30 TABLET, FILM COATED, EXTENDED RELEASE ORAL at 11:10

## 2021-10-07 RX ADMIN — ALPRAZOLAM 1 MG: 1 TABLET ORAL at 10:10

## 2021-10-07 RX ADMIN — METHOCARBAMOL 1000 MG: 500 TABLET ORAL at 01:10

## 2021-10-07 RX ADMIN — MUPIROCIN: 20 OINTMENT TOPICAL at 08:10

## 2021-10-07 RX ADMIN — OXYCODONE HYDROCHLORIDE 15 MG: 10 TABLET ORAL at 03:10

## 2021-10-07 RX ADMIN — METHOCARBAMOL 1000 MG: 500 TABLET ORAL at 08:10

## 2021-10-08 LAB
ANION GAP SERPL CALC-SCNC: 9 MMOL/L (ref 8–16)
BASOPHILS # BLD AUTO: 0.03 K/UL (ref 0–0.2)
BASOPHILS NFR BLD: 0.2 % (ref 0–1.9)
BUN SERPL-MCNC: 26 MG/DL (ref 8–23)
CALCIUM SERPL-MCNC: 8.5 MG/DL (ref 8.7–10.5)
CHLORIDE SERPL-SCNC: 99 MMOL/L (ref 95–110)
CO2 SERPL-SCNC: 24 MMOL/L (ref 23–29)
CREAT SERPL-MCNC: 0.8 MG/DL (ref 0.5–1.4)
DIFFERENTIAL METHOD: ABNORMAL
EOSINOPHIL # BLD AUTO: 0 K/UL (ref 0–0.5)
EOSINOPHIL NFR BLD: 0.1 % (ref 0–8)
ERYTHROCYTE [DISTWIDTH] IN BLOOD BY AUTOMATED COUNT: 13.3 % (ref 11.5–14.5)
EST. GFR  (AFRICAN AMERICAN): >60 ML/MIN/1.73 M^2
EST. GFR  (NON AFRICAN AMERICAN): >60 ML/MIN/1.73 M^2
GLUCOSE SERPL-MCNC: 143 MG/DL (ref 70–110)
HCT VFR BLD AUTO: 33.7 % (ref 40–54)
HGB BLD-MCNC: 11.8 G/DL (ref 14–18)
IMM GRANULOCYTES # BLD AUTO: 0.26 K/UL (ref 0–0.04)
IMM GRANULOCYTES NFR BLD AUTO: 1.6 % (ref 0–0.5)
LYMPHOCYTES # BLD AUTO: 1.6 K/UL (ref 1–4.8)
LYMPHOCYTES NFR BLD: 10.2 % (ref 18–48)
MCH RBC QN AUTO: 34.2 PG (ref 27–31)
MCHC RBC AUTO-ENTMCNC: 35 G/DL (ref 32–36)
MCV RBC AUTO: 98 FL (ref 82–98)
MONOCYTES # BLD AUTO: 1.1 K/UL (ref 0.3–1)
MONOCYTES NFR BLD: 6.9 % (ref 4–15)
NEUTROPHILS # BLD AUTO: 12.9 K/UL (ref 1.8–7.7)
NEUTROPHILS NFR BLD: 81 % (ref 38–73)
NRBC BLD-RTO: 0 /100 WBC
PLATELET # BLD AUTO: 185 K/UL (ref 150–450)
PMV BLD AUTO: 11.9 FL (ref 9.2–12.9)
POCT GLUCOSE: 170 MG/DL (ref 70–110)
POCT GLUCOSE: 212 MG/DL (ref 70–110)
POTASSIUM SERPL-SCNC: 5 MMOL/L (ref 3.5–5.1)
RBC # BLD AUTO: 3.45 M/UL (ref 4.6–6.2)
SODIUM SERPL-SCNC: 132 MMOL/L (ref 136–145)
WBC # BLD AUTO: 15.93 K/UL (ref 3.9–12.7)

## 2021-10-08 PROCEDURE — 99024 POSTOP FOLLOW-UP VISIT: CPT | Mod: ,,, | Performed by: PHYSICIAN ASSISTANT

## 2021-10-08 PROCEDURE — 85025 COMPLETE CBC W/AUTO DIFF WBC: CPT | Performed by: STUDENT IN AN ORGANIZED HEALTH CARE EDUCATION/TRAINING PROGRAM

## 2021-10-08 PROCEDURE — 94761 N-INVAS EAR/PLS OXIMETRY MLT: CPT

## 2021-10-08 PROCEDURE — 25000003 PHARM REV CODE 250: Performed by: STUDENT IN AN ORGANIZED HEALTH CARE EDUCATION/TRAINING PROGRAM

## 2021-10-08 PROCEDURE — 80048 BASIC METABOLIC PNL TOTAL CA: CPT | Performed by: STUDENT IN AN ORGANIZED HEALTH CARE EDUCATION/TRAINING PROGRAM

## 2021-10-08 PROCEDURE — 94799 UNLISTED PULMONARY SVC/PX: CPT

## 2021-10-08 PROCEDURE — 25000003 PHARM REV CODE 250: Performed by: PHYSICIAN ASSISTANT

## 2021-10-08 PROCEDURE — 99024 PR POST-OP FOLLOW-UP VISIT: ICD-10-PCS | Mod: ,,, | Performed by: PHYSICIAN ASSISTANT

## 2021-10-08 PROCEDURE — 25000242 PHARM REV CODE 250 ALT 637 W/ HCPCS

## 2021-10-08 PROCEDURE — 25000003 PHARM REV CODE 250: Performed by: NURSE PRACTITIONER

## 2021-10-08 PROCEDURE — 97535 SELF CARE MNGMENT TRAINING: CPT

## 2021-10-08 PROCEDURE — 25000003 PHARM REV CODE 250

## 2021-10-08 PROCEDURE — 97530 THERAPEUTIC ACTIVITIES: CPT

## 2021-10-08 PROCEDURE — 99900035 HC TECH TIME PER 15 MIN (STAT)

## 2021-10-08 PROCEDURE — 11000001 HC ACUTE MED/SURG PRIVATE ROOM

## 2021-10-08 PROCEDURE — 99232 SBSQ HOSP IP/OBS MODERATE 35: CPT | Mod: ,,, | Performed by: HOSPITALIST

## 2021-10-08 PROCEDURE — 99232 PR SUBSEQUENT HOSPITAL CARE,LEVL II: ICD-10-PCS | Mod: ,,, | Performed by: HOSPITALIST

## 2021-10-08 PROCEDURE — 63600175 PHARM REV CODE 636 W HCPCS

## 2021-10-08 PROCEDURE — 36415 COLL VENOUS BLD VENIPUNCTURE: CPT | Performed by: STUDENT IN AN ORGANIZED HEALTH CARE EDUCATION/TRAINING PROGRAM

## 2021-10-08 PROCEDURE — 94640 AIRWAY INHALATION TREATMENT: CPT

## 2021-10-08 PROCEDURE — 63600175 PHARM REV CODE 636 W HCPCS: Performed by: STUDENT IN AN ORGANIZED HEALTH CARE EDUCATION/TRAINING PROGRAM

## 2021-10-08 RX ORDER — METHOCARBAMOL 500 MG/1
1000 TABLET, FILM COATED ORAL 4 TIMES DAILY
Qty: 80 TABLET | Refills: 0
Start: 2021-10-08 | End: 2021-10-18

## 2021-10-08 RX ORDER — NIFEDIPINE 30 MG/1
30 TABLET, EXTENDED RELEASE ORAL DAILY
Qty: 30 TABLET | Refills: 11
Start: 2021-10-08 | End: 2021-11-24

## 2021-10-08 RX ORDER — DIAZEPAM 5 MG/1
5 TABLET ORAL EVERY 6 HOURS PRN
Start: 2021-10-08 | End: 2021-10-08 | Stop reason: HOSPADM

## 2021-10-08 RX ORDER — ALPRAZOLAM 1 MG/1
1 TABLET ORAL 2 TIMES DAILY PRN
Start: 2021-10-08 | End: 2021-10-18

## 2021-10-08 RX ORDER — FAMOTIDINE 20 MG/1
20 TABLET, FILM COATED ORAL 2 TIMES DAILY
Qty: 60 TABLET | Refills: 11
Start: 2021-10-08 | End: 2021-11-24

## 2021-10-08 RX ORDER — CLONIDINE HYDROCHLORIDE 0.3 MG/1
0.3 TABLET ORAL 3 TIMES DAILY
Qty: 90 TABLET | Refills: 11
Start: 2021-10-08 | End: 2021-11-24 | Stop reason: DRUGHIGH

## 2021-10-08 RX ORDER — ACETAMINOPHEN 325 MG/1
650 TABLET ORAL EVERY 6 HOURS
Refills: 0
Start: 2021-10-08 | End: 2021-10-08

## 2021-10-08 RX ORDER — OXYCODONE HYDROCHLORIDE 15 MG/1
15 TABLET ORAL EVERY 6 HOURS PRN
Refills: 0
Start: 2021-10-08 | End: 2021-10-08 | Stop reason: HOSPADM

## 2021-10-08 RX ORDER — HEPARIN SODIUM 5000 [USP'U]/ML
5000 INJECTION, SOLUTION INTRAVENOUS; SUBCUTANEOUS EVERY 8 HOURS
Start: 2021-10-08 | End: 2021-11-24 | Stop reason: ALTCHOICE

## 2021-10-08 RX ORDER — DIAZEPAM 5 MG/1
5 TABLET ORAL EVERY 6 HOURS PRN
Status: DISCONTINUED | OUTPATIENT
Start: 2021-10-08 | End: 2021-10-08

## 2021-10-08 RX ORDER — SENNOSIDES 8.6 MG/1
1 TABLET ORAL 2 TIMES DAILY
Start: 2021-10-08

## 2021-10-08 RX ORDER — ALPRAZOLAM 1 MG/1
1 TABLET ORAL 2 TIMES DAILY PRN
Status: DISCONTINUED | OUTPATIENT
Start: 2021-10-08 | End: 2021-10-09 | Stop reason: HOSPADM

## 2021-10-08 RX ORDER — HYDRALAZINE HYDROCHLORIDE 20 MG/ML
10 INJECTION INTRAMUSCULAR; INTRAVENOUS EVERY 4 HOURS PRN
Start: 2021-10-08 | End: 2021-11-24

## 2021-10-08 RX ORDER — VERAPAMIL HYDROCHLORIDE 180 MG/1
180 TABLET, FILM COATED, EXTENDED RELEASE ORAL 2 TIMES DAILY
Qty: 60 TABLET | Refills: 11
Start: 2021-10-08 | End: 2021-11-24 | Stop reason: SDUPTHER

## 2021-10-08 RX ORDER — INSULIN ASPART 100 [IU]/ML
1-10 INJECTION, SOLUTION INTRAVENOUS; SUBCUTANEOUS
Refills: 0
Start: 2021-10-08 | End: 2021-11-24

## 2021-10-08 RX ORDER — MORPHINE SULFATE 15 MG/1
15 TABLET, FILM COATED, EXTENDED RELEASE ORAL EVERY 12 HOURS PRN
Status: DISCONTINUED | OUTPATIENT
Start: 2021-10-08 | End: 2021-10-09 | Stop reason: HOSPADM

## 2021-10-08 RX ORDER — CALCIUM CARBONATE 200(500)MG
1000 TABLET,CHEWABLE ORAL ONCE
Status: COMPLETED | OUTPATIENT
Start: 2021-10-08 | End: 2021-10-08

## 2021-10-08 RX ORDER — CLONIDINE HYDROCHLORIDE 0.3 MG/1
0.3 TABLET ORAL 3 TIMES DAILY
Qty: 90 TABLET | Refills: 11 | Status: SHIPPED | OUTPATIENT
Start: 2021-10-08 | End: 2021-10-08

## 2021-10-08 RX ORDER — MORPHINE SULFATE 15 MG/1
15 TABLET, FILM COATED, EXTENDED RELEASE ORAL EVERY 12 HOURS PRN
Refills: 0
Start: 2021-10-08 | End: 2021-10-20 | Stop reason: SDUPTHER

## 2021-10-08 RX ORDER — ACETAMINOPHEN 325 MG/1
650 TABLET ORAL EVERY 6 HOURS
Refills: 0
Start: 2021-10-08 | End: 2021-11-10

## 2021-10-08 RX ORDER — OXYCODONE HYDROCHLORIDE 15 MG/1
15 TABLET ORAL EVERY 6 HOURS PRN
Refills: 0
Start: 2021-10-08 | End: 2021-10-20 | Stop reason: SDUPTHER

## 2021-10-08 RX ORDER — GABAPENTIN 400 MG/1
400 CAPSULE ORAL 3 TIMES DAILY
Qty: 90 CAPSULE | Refills: 11 | Status: SHIPPED | OUTPATIENT
Start: 2021-10-08 | End: 2022-02-08 | Stop reason: SDUPTHER

## 2021-10-08 RX ORDER — MORPHINE SULFATE 15 MG/1
15 TABLET, FILM COATED, EXTENDED RELEASE ORAL EVERY 12 HOURS PRN
Status: DISCONTINUED | OUTPATIENT
Start: 2021-10-08 | End: 2021-10-08

## 2021-10-08 RX ORDER — DEXAMETHASONE 2 MG/1
TABLET ORAL
Qty: 16 TABLET | Refills: 0
Start: 2021-10-08 | End: 2021-11-24

## 2021-10-08 RX ORDER — VERAPAMIL HYDROCHLORIDE 180 MG/1
180 TABLET, FILM COATED, EXTENDED RELEASE ORAL 2 TIMES DAILY
Qty: 60 TABLET | Refills: 11 | Status: SHIPPED | OUTPATIENT
Start: 2021-10-08 | End: 2021-10-08

## 2021-10-08 RX ADMIN — ALPRAZOLAM 1 MG: 1 TABLET ORAL at 10:10

## 2021-10-08 RX ADMIN — DOCUSATE SODIUM 100 MG: 100 CAPSULE, LIQUID FILLED ORAL at 10:10

## 2021-10-08 RX ADMIN — OXYCODONE HYDROCHLORIDE 15 MG: 10 TABLET ORAL at 06:10

## 2021-10-08 RX ADMIN — IPRATROPIUM BROMIDE AND ALBUTEROL SULFATE 3 ML: 2.5; .5 SOLUTION RESPIRATORY (INHALATION) at 07:10

## 2021-10-08 RX ADMIN — NIFEDIPINE 30 MG: 30 TABLET, FILM COATED, EXTENDED RELEASE ORAL at 09:10

## 2021-10-08 RX ADMIN — GABAPENTIN 400 MG: 400 CAPSULE ORAL at 03:10

## 2021-10-08 RX ADMIN — DOCUSATE SODIUM 100 MG: 100 CAPSULE, LIQUID FILLED ORAL at 09:10

## 2021-10-08 RX ADMIN — INSULIN ASPART 2 UNITS: 100 INJECTION, SOLUTION INTRAVENOUS; SUBCUTANEOUS at 11:10

## 2021-10-08 RX ADMIN — ACETAMINOPHEN 650 MG: 325 TABLET ORAL at 12:10

## 2021-10-08 RX ADMIN — VERAPAMIL HYDROCHLORIDE 180 MG: 180 TABLET, FILM COATED, EXTENDED RELEASE ORAL at 10:10

## 2021-10-08 RX ADMIN — INSULIN ASPART 4 UNITS: 100 INJECTION, SOLUTION INTRAVENOUS; SUBCUTANEOUS at 05:10

## 2021-10-08 RX ADMIN — CALCIUM CARBONATE (ANTACID) CHEW TAB 500 MG 1000 MG: 500 CHEW TAB at 10:10

## 2021-10-08 RX ADMIN — HEPARIN SODIUM 5000 UNITS: 5000 INJECTION INTRAVENOUS; SUBCUTANEOUS at 10:10

## 2021-10-08 RX ADMIN — CLONIDINE HYDROCHLORIDE 0.3 MG: 0.3 TABLET ORAL at 10:10

## 2021-10-08 RX ADMIN — FAMOTIDINE 20 MG: 20 TABLET, FILM COATED ORAL at 11:10

## 2021-10-08 RX ADMIN — DEXAMETHASONE SODIUM PHOSPHATE 4 MG: 4 INJECTION INTRA-ARTICULAR; INTRALESIONAL; INTRAMUSCULAR; INTRAVENOUS; SOFT TISSUE at 09:10

## 2021-10-08 RX ADMIN — IPRATROPIUM BROMIDE AND ALBUTEROL SULFATE 3 ML: 2.5; .5 SOLUTION RESPIRATORY (INHALATION) at 12:10

## 2021-10-08 RX ADMIN — METHOCARBAMOL 1000 MG: 500 TABLET ORAL at 10:10

## 2021-10-08 RX ADMIN — METHOCARBAMOL 1000 MG: 500 TABLET ORAL at 12:10

## 2021-10-08 RX ADMIN — ATORVASTATIN CALCIUM 40 MG: 20 TABLET, FILM COATED ORAL at 09:10

## 2021-10-08 RX ADMIN — FAMOTIDINE 20 MG: 20 TABLET, FILM COATED ORAL at 10:10

## 2021-10-08 RX ADMIN — DEXAMETHASONE SODIUM PHOSPHATE 4 MG: 4 INJECTION INTRA-ARTICULAR; INTRALESIONAL; INTRAMUSCULAR; INTRAVENOUS; SOFT TISSUE at 10:10

## 2021-10-08 RX ADMIN — IPRATROPIUM BROMIDE AND ALBUTEROL SULFATE 3 ML: 2.5; .5 SOLUTION RESPIRATORY (INHALATION) at 08:10

## 2021-10-08 RX ADMIN — BUSPIRONE HYDROCHLORIDE 30 MG: 10 TABLET ORAL at 10:10

## 2021-10-08 RX ADMIN — ACETAMINOPHEN 650 MG: 325 TABLET ORAL at 05:10

## 2021-10-08 RX ADMIN — POLYETHYLENE GLYCOL 3350 17 G: 17 POWDER, FOR SOLUTION ORAL at 09:10

## 2021-10-08 RX ADMIN — SENNOSIDES 8.6 MG: 8.6 TABLET, COATED ORAL at 09:10

## 2021-10-08 RX ADMIN — CLONIDINE HYDROCHLORIDE 0.3 MG: 0.3 TABLET ORAL at 03:10

## 2021-10-08 RX ADMIN — SENNOSIDES 8.6 MG: 8.6 TABLET, COATED ORAL at 10:10

## 2021-10-08 RX ADMIN — FLUTICASONE FUROATE 1 PUFF: 100 POWDER RESPIRATORY (INHALATION) at 08:10

## 2021-10-08 RX ADMIN — GABAPENTIN 400 MG: 400 CAPSULE ORAL at 10:10

## 2021-10-08 RX ADMIN — POLYETHYLENE GLYCOL 3350 17 G: 17 POWDER, FOR SOLUTION ORAL at 10:10

## 2021-10-08 RX ADMIN — CITALOPRAM HYDROBROMIDE 40 MG: 10 TABLET ORAL at 10:10

## 2021-10-08 RX ADMIN — HEPARIN SODIUM 5000 UNITS: 5000 INJECTION INTRAVENOUS; SUBCUTANEOUS at 05:10

## 2021-10-08 RX ADMIN — MORPHINE SULFATE 15 MG: 15 TABLET, FILM COATED, EXTENDED RELEASE ORAL at 03:10

## 2021-10-08 RX ADMIN — GABAPENTIN 400 MG: 400 CAPSULE ORAL at 09:10

## 2021-10-08 RX ADMIN — DIAZEPAM 5 MG: 5 TABLET ORAL at 12:10

## 2021-10-08 RX ADMIN — METHOCARBAMOL 1000 MG: 500 TABLET ORAL at 05:10

## 2021-10-08 RX ADMIN — HEPARIN SODIUM 5000 UNITS: 5000 INJECTION INTRAVENOUS; SUBCUTANEOUS at 03:10

## 2021-10-08 RX ADMIN — OXYCODONE HYDROCHLORIDE 15 MG: 10 TABLET ORAL at 10:10

## 2021-10-08 RX ADMIN — SODIUM CHLORIDE TAB 1 GM 2 G: 1 TAB at 10:10

## 2021-10-08 RX ADMIN — MUPIROCIN: 20 OINTMENT TOPICAL at 10:10

## 2021-10-08 RX ADMIN — LISINOPRIL 40 MG: 20 TABLET ORAL at 10:10

## 2021-10-08 RX ADMIN — OXYCODONE HYDROCHLORIDE 15 MG: 10 TABLET ORAL at 04:10

## 2021-10-08 RX ADMIN — INSULIN ASPART 1 UNITS: 100 INJECTION, SOLUTION INTRAVENOUS; SUBCUTANEOUS at 10:10

## 2021-10-09 VITALS
DIASTOLIC BLOOD PRESSURE: 78 MMHG | RESPIRATION RATE: 16 BRPM | TEMPERATURE: 98 F | HEIGHT: 71 IN | WEIGHT: 218.06 LBS | SYSTOLIC BLOOD PRESSURE: 170 MMHG | BODY MASS INDEX: 30.53 KG/M2 | HEART RATE: 97 BPM | OXYGEN SATURATION: 96 %

## 2021-10-09 LAB
ANION GAP SERPL CALC-SCNC: 10 MMOL/L (ref 8–16)
BASOPHILS # BLD AUTO: 0.08 K/UL (ref 0–0.2)
BASOPHILS NFR BLD: 0.3 % (ref 0–1.9)
BUN SERPL-MCNC: 26 MG/DL (ref 8–23)
CALCIUM SERPL-MCNC: 8.8 MG/DL (ref 8.7–10.5)
CHLORIDE SERPL-SCNC: 98 MMOL/L (ref 95–110)
CO2 SERPL-SCNC: 22 MMOL/L (ref 23–29)
CREAT SERPL-MCNC: 0.8 MG/DL (ref 0.5–1.4)
DIFFERENTIAL METHOD: ABNORMAL
EOSINOPHIL # BLD AUTO: 0.1 K/UL (ref 0–0.5)
EOSINOPHIL NFR BLD: 0.6 % (ref 0–8)
ERYTHROCYTE [DISTWIDTH] IN BLOOD BY AUTOMATED COUNT: 13.6 % (ref 11.5–14.5)
EST. GFR  (AFRICAN AMERICAN): >60 ML/MIN/1.73 M^2
EST. GFR  (NON AFRICAN AMERICAN): >60 ML/MIN/1.73 M^2
GLUCOSE SERPL-MCNC: 102 MG/DL (ref 70–110)
HCT VFR BLD AUTO: 38.7 % (ref 40–54)
HGB BLD-MCNC: 13.2 G/DL (ref 14–18)
IMM GRANULOCYTES # BLD AUTO: 0.88 K/UL (ref 0–0.04)
IMM GRANULOCYTES NFR BLD AUTO: 3.7 % (ref 0–0.5)
LYMPHOCYTES # BLD AUTO: 4.5 K/UL (ref 1–4.8)
LYMPHOCYTES NFR BLD: 18.8 % (ref 18–48)
MCH RBC QN AUTO: 34 PG (ref 27–31)
MCHC RBC AUTO-ENTMCNC: 34.1 G/DL (ref 32–36)
MCV RBC AUTO: 100 FL (ref 82–98)
MONOCYTES # BLD AUTO: 1.6 K/UL (ref 0.3–1)
MONOCYTES NFR BLD: 6.7 % (ref 4–15)
NEUTROPHILS # BLD AUTO: 16.6 K/UL (ref 1.8–7.7)
NEUTROPHILS NFR BLD: 69.9 % (ref 38–73)
NRBC BLD-RTO: 0 /100 WBC
PLATELET # BLD AUTO: 228 K/UL (ref 150–450)
PMV BLD AUTO: 12.1 FL (ref 9.2–12.9)
POCT GLUCOSE: 106 MG/DL (ref 70–110)
POCT GLUCOSE: 172 MG/DL (ref 70–110)
POTASSIUM SERPL-SCNC: 4.3 MMOL/L (ref 3.5–5.1)
RBC # BLD AUTO: 3.88 M/UL (ref 4.6–6.2)
SODIUM SERPL-SCNC: 130 MMOL/L (ref 136–145)
WBC # BLD AUTO: 23.71 K/UL (ref 3.9–12.7)

## 2021-10-09 PROCEDURE — 25000003 PHARM REV CODE 250: Performed by: NURSE PRACTITIONER

## 2021-10-09 PROCEDURE — 25000003 PHARM REV CODE 250: Performed by: PHYSICIAN ASSISTANT

## 2021-10-09 PROCEDURE — 85025 COMPLETE CBC W/AUTO DIFF WBC: CPT | Performed by: STUDENT IN AN ORGANIZED HEALTH CARE EDUCATION/TRAINING PROGRAM

## 2021-10-09 PROCEDURE — 25000003 PHARM REV CODE 250: Performed by: STUDENT IN AN ORGANIZED HEALTH CARE EDUCATION/TRAINING PROGRAM

## 2021-10-09 PROCEDURE — 63600175 PHARM REV CODE 636 W HCPCS

## 2021-10-09 PROCEDURE — 80048 BASIC METABOLIC PNL TOTAL CA: CPT | Performed by: STUDENT IN AN ORGANIZED HEALTH CARE EDUCATION/TRAINING PROGRAM

## 2021-10-09 PROCEDURE — 99232 PR SUBSEQUENT HOSPITAL CARE,LEVL II: ICD-10-PCS | Mod: GC,,, | Performed by: HOSPITALIST

## 2021-10-09 PROCEDURE — 94640 AIRWAY INHALATION TREATMENT: CPT

## 2021-10-09 PROCEDURE — 99232 SBSQ HOSP IP/OBS MODERATE 35: CPT | Mod: GC,,, | Performed by: HOSPITALIST

## 2021-10-09 PROCEDURE — 36415 COLL VENOUS BLD VENIPUNCTURE: CPT | Performed by: STUDENT IN AN ORGANIZED HEALTH CARE EDUCATION/TRAINING PROGRAM

## 2021-10-09 PROCEDURE — 25000003 PHARM REV CODE 250

## 2021-10-09 RX ORDER — DEXAMETHASONE SODIUM PHOSPHATE 4 MG/ML
3 INJECTION, SOLUTION INTRA-ARTICULAR; INTRALESIONAL; INTRAMUSCULAR; INTRAVENOUS; SOFT TISSUE EVERY 8 HOURS
Status: DISCONTINUED | OUTPATIENT
Start: 2021-10-09 | End: 2021-10-09 | Stop reason: HOSPADM

## 2021-10-09 RX ADMIN — BUSPIRONE HYDROCHLORIDE 30 MG: 10 TABLET ORAL at 09:10

## 2021-10-09 RX ADMIN — ATORVASTATIN CALCIUM 40 MG: 20 TABLET, FILM COATED ORAL at 09:10

## 2021-10-09 RX ADMIN — LISINOPRIL 40 MG: 20 TABLET ORAL at 09:10

## 2021-10-09 RX ADMIN — GABAPENTIN 400 MG: 400 CAPSULE ORAL at 09:10

## 2021-10-09 RX ADMIN — CITALOPRAM HYDROBROMIDE 40 MG: 10 TABLET ORAL at 09:10

## 2021-10-09 RX ADMIN — ACETAMINOPHEN 650 MG: 325 TABLET ORAL at 06:10

## 2021-10-09 RX ADMIN — MORPHINE SULFATE 15 MG: 15 TABLET, FILM COATED, EXTENDED RELEASE ORAL at 09:10

## 2021-10-09 RX ADMIN — NIFEDIPINE 30 MG: 30 TABLET, FILM COATED, EXTENDED RELEASE ORAL at 09:10

## 2021-10-09 RX ADMIN — OXYCODONE HYDROCHLORIDE 15 MG: 10 TABLET ORAL at 11:10

## 2021-10-09 RX ADMIN — FLUTICASONE FUROATE 1 PUFF: 100 POWDER RESPIRATORY (INHALATION) at 08:10

## 2021-10-09 RX ADMIN — SENNOSIDES 8.6 MG: 8.6 TABLET, COATED ORAL at 09:10

## 2021-10-09 RX ADMIN — HEPARIN SODIUM 5000 UNITS: 5000 INJECTION INTRAVENOUS; SUBCUTANEOUS at 06:10

## 2021-10-09 RX ADMIN — DOCUSATE SODIUM 100 MG: 100 CAPSULE, LIQUID FILLED ORAL at 09:10

## 2021-10-09 RX ADMIN — ALPRAZOLAM 1 MG: 1 TABLET ORAL at 09:10

## 2021-10-09 RX ADMIN — OXYCODONE HYDROCHLORIDE 15 MG: 10 TABLET ORAL at 12:10

## 2021-10-09 RX ADMIN — OXYCODONE HYDROCHLORIDE 15 MG: 10 TABLET ORAL at 06:10

## 2021-10-09 RX ADMIN — VERAPAMIL HYDROCHLORIDE 180 MG: 180 TABLET, FILM COATED, EXTENDED RELEASE ORAL at 09:10

## 2021-10-09 RX ADMIN — FAMOTIDINE 20 MG: 20 TABLET, FILM COATED ORAL at 09:10

## 2021-10-09 RX ADMIN — POLYETHYLENE GLYCOL 3350 17 G: 17 POWDER, FOR SOLUTION ORAL at 09:10

## 2021-10-09 RX ADMIN — ACETAMINOPHEN 650 MG: 325 TABLET ORAL at 12:10

## 2021-10-09 RX ADMIN — METHOCARBAMOL 1000 MG: 500 TABLET ORAL at 09:10

## 2021-10-09 RX ADMIN — CLONIDINE HYDROCHLORIDE 0.3 MG: 0.3 TABLET ORAL at 09:10

## 2021-10-13 ENCOUNTER — HOSPITAL ENCOUNTER (OUTPATIENT)
Dept: RADIOLOGY | Facility: HOSPITAL | Age: 66
Discharge: HOME | End: 2021-10-13
Attending: NURSE PRACTITIONER
Payer: MEDICARE

## 2021-10-13 PROCEDURE — 71045 XR CHEST 1 VIEW: ICD-10-PCS | Mod: 26,,, | Performed by: RADIOLOGY

## 2021-10-13 PROCEDURE — 71045 X-RAY EXAM CHEST 1 VIEW: CPT | Mod: 26,,, | Performed by: RADIOLOGY

## 2021-10-18 DIAGNOSIS — M48.02 CERVICAL STENOSIS OF SPINAL CANAL: Primary | ICD-10-CM

## 2021-10-20 ENCOUNTER — CLINICAL SUPPORT (OUTPATIENT)
Dept: NEUROSURGERY | Facility: CLINIC | Age: 66
End: 2021-10-20
Payer: MEDICARE

## 2021-10-20 RX ORDER — NALOXONE HYDROCHLORIDE 4 MG/.1ML
SPRAY NASAL
Qty: 1 EACH | Refills: 11 | Status: SHIPPED | OUTPATIENT
Start: 2021-10-20

## 2021-10-20 RX ORDER — OXYCODONE HYDROCHLORIDE 15 MG/1
15 TABLET ORAL EVERY 8 HOURS PRN
Qty: 30 TABLET | Refills: 0
Start: 2021-10-20 | End: 2021-10-25 | Stop reason: SDUPTHER

## 2021-10-20 RX ORDER — MORPHINE SULFATE 15 MG/1
15 TABLET, FILM COATED, EXTENDED RELEASE ORAL EVERY 12 HOURS PRN
Qty: 20 TABLET | Refills: 0
Start: 2021-10-20 | End: 2021-10-25 | Stop reason: SDUPTHER

## 2021-10-21 PROCEDURE — G0180 PR HOME HEALTH MD CERTIFICATION: ICD-10-PCS | Mod: ,,, | Performed by: STUDENT IN AN ORGANIZED HEALTH CARE EDUCATION/TRAINING PROGRAM

## 2021-10-21 PROCEDURE — G0180 MD CERTIFICATION HHA PATIENT: HCPCS | Mod: ,,, | Performed by: STUDENT IN AN ORGANIZED HEALTH CARE EDUCATION/TRAINING PROGRAM

## 2021-10-25 ENCOUNTER — PES CALL (OUTPATIENT)
Dept: ADMINISTRATIVE | Facility: CLINIC | Age: 66
End: 2021-10-25
Payer: MEDICARE

## 2021-10-25 ENCOUNTER — OFFICE VISIT (OUTPATIENT)
Dept: FAMILY MEDICINE | Facility: CLINIC | Age: 66
End: 2021-10-25
Payer: MEDICARE

## 2021-10-25 VITALS
DIASTOLIC BLOOD PRESSURE: 60 MMHG | TEMPERATURE: 98 F | SYSTOLIC BLOOD PRESSURE: 122 MMHG | OXYGEN SATURATION: 96 % | RESPIRATION RATE: 18 BRPM | HEART RATE: 60 BPM | HEIGHT: 71 IN | BODY MASS INDEX: 30.41 KG/M2

## 2021-10-25 DIAGNOSIS — F41.1 GAD (GENERALIZED ANXIETY DISORDER): ICD-10-CM

## 2021-10-25 DIAGNOSIS — G89.28 OTHER CHRONIC POSTPROCEDURAL PAIN: ICD-10-CM

## 2021-10-25 DIAGNOSIS — L73.9 FOLLICULITIS: Primary | ICD-10-CM

## 2021-10-25 DIAGNOSIS — Z23 NEEDS FLU SHOT: ICD-10-CM

## 2021-10-25 DIAGNOSIS — F13.20 SEDATIVE, HYPNOTIC OR ANXIOLYTIC DEPENDENCE, UNCOMPLICATED: ICD-10-CM

## 2021-10-25 DIAGNOSIS — F41.9 ANXIETY DISORDER, UNSPECIFIED: ICD-10-CM

## 2021-10-25 DIAGNOSIS — Z79.891 CHRONIC PRESCRIPTION OPIATE USE: ICD-10-CM

## 2021-10-25 PROCEDURE — 1157F PR ADVANCE CARE PLAN OR EQUIV PRESENT IN MEDICAL RECORD: ICD-10-PCS | Mod: CPTII,S$GLB,, | Performed by: STUDENT IN AN ORGANIZED HEALTH CARE EDUCATION/TRAINING PROGRAM

## 2021-10-25 PROCEDURE — 99214 PR OFFICE/OUTPT VISIT, EST, LEVL IV, 30-39 MIN: ICD-10-PCS | Mod: 25,S$GLB,, | Performed by: STUDENT IN AN ORGANIZED HEALTH CARE EDUCATION/TRAINING PROGRAM

## 2021-10-25 PROCEDURE — 99999 PR PBB SHADOW E&M-EST. PATIENT-LVL III: CPT | Mod: PBBFAC,,, | Performed by: STUDENT IN AN ORGANIZED HEALTH CARE EDUCATION/TRAINING PROGRAM

## 2021-10-25 PROCEDURE — 3078F PR MOST RECENT DIASTOLIC BLOOD PRESSURE < 80 MM HG: ICD-10-PCS | Mod: CPTII,S$GLB,, | Performed by: STUDENT IN AN ORGANIZED HEALTH CARE EDUCATION/TRAINING PROGRAM

## 2021-10-25 PROCEDURE — 3066F PR DOCUMENTATION OF TREATMENT FOR NEPHROPATHY: ICD-10-PCS | Mod: CPTII,S$GLB,, | Performed by: STUDENT IN AN ORGANIZED HEALTH CARE EDUCATION/TRAINING PROGRAM

## 2021-10-25 PROCEDURE — 90694 VACC AIIV4 NO PRSRV 0.5ML IM: CPT | Mod: S$GLB,,, | Performed by: STUDENT IN AN ORGANIZED HEALTH CARE EDUCATION/TRAINING PROGRAM

## 2021-10-25 PROCEDURE — 1125F AMNT PAIN NOTED PAIN PRSNT: CPT | Mod: CPTII,S$GLB,, | Performed by: STUDENT IN AN ORGANIZED HEALTH CARE EDUCATION/TRAINING PROGRAM

## 2021-10-25 PROCEDURE — 3288F FALL RISK ASSESSMENT DOCD: CPT | Mod: CPTII,S$GLB,, | Performed by: STUDENT IN AN ORGANIZED HEALTH CARE EDUCATION/TRAINING PROGRAM

## 2021-10-25 PROCEDURE — 3061F NEG MICROALBUMINURIA REV: CPT | Mod: CPTII,S$GLB,, | Performed by: STUDENT IN AN ORGANIZED HEALTH CARE EDUCATION/TRAINING PROGRAM

## 2021-10-25 PROCEDURE — 3078F DIAST BP <80 MM HG: CPT | Mod: CPTII,S$GLB,, | Performed by: STUDENT IN AN ORGANIZED HEALTH CARE EDUCATION/TRAINING PROGRAM

## 2021-10-25 PROCEDURE — 4010F ACE/ARB THERAPY RXD/TAKEN: CPT | Mod: CPTII,S$GLB,, | Performed by: STUDENT IN AN ORGANIZED HEALTH CARE EDUCATION/TRAINING PROGRAM

## 2021-10-25 PROCEDURE — 3074F PR MOST RECENT SYSTOLIC BLOOD PRESSURE < 130 MM HG: ICD-10-PCS | Mod: CPTII,S$GLB,, | Performed by: STUDENT IN AN ORGANIZED HEALTH CARE EDUCATION/TRAINING PROGRAM

## 2021-10-25 PROCEDURE — 3066F NEPHROPATHY DOC TX: CPT | Mod: CPTII,S$GLB,, | Performed by: STUDENT IN AN ORGANIZED HEALTH CARE EDUCATION/TRAINING PROGRAM

## 2021-10-25 PROCEDURE — 1111F DSCHRG MED/CURRENT MED MERGE: CPT | Mod: CPTII,S$GLB,, | Performed by: STUDENT IN AN ORGANIZED HEALTH CARE EDUCATION/TRAINING PROGRAM

## 2021-10-25 PROCEDURE — 1100F PTFALLS ASSESS-DOCD GE2>/YR: CPT | Mod: CPTII,S$GLB,, | Performed by: STUDENT IN AN ORGANIZED HEALTH CARE EDUCATION/TRAINING PROGRAM

## 2021-10-25 PROCEDURE — 99214 OFFICE O/P EST MOD 30 MIN: CPT | Mod: 25,S$GLB,, | Performed by: STUDENT IN AN ORGANIZED HEALTH CARE EDUCATION/TRAINING PROGRAM

## 2021-10-25 PROCEDURE — 3044F PR MOST RECENT HEMOGLOBIN A1C LEVEL <7.0%: ICD-10-PCS | Mod: CPTII,S$GLB,, | Performed by: STUDENT IN AN ORGANIZED HEALTH CARE EDUCATION/TRAINING PROGRAM

## 2021-10-25 PROCEDURE — G0008 FLU VACCINE - QUADRIVALENT - ADJUVANTED: ICD-10-PCS | Mod: S$GLB,,, | Performed by: STUDENT IN AN ORGANIZED HEALTH CARE EDUCATION/TRAINING PROGRAM

## 2021-10-25 PROCEDURE — 3288F PR FALLS RISK ASSESSMENT DOCUMENTED: ICD-10-PCS | Mod: CPTII,S$GLB,, | Performed by: STUDENT IN AN ORGANIZED HEALTH CARE EDUCATION/TRAINING PROGRAM

## 2021-10-25 PROCEDURE — 3044F HG A1C LEVEL LT 7.0%: CPT | Mod: CPTII,S$GLB,, | Performed by: STUDENT IN AN ORGANIZED HEALTH CARE EDUCATION/TRAINING PROGRAM

## 2021-10-25 PROCEDURE — 3061F PR NEG MICROALBUMINURIA RESULT DOCUMENTED/REVIEW: ICD-10-PCS | Mod: CPTII,S$GLB,, | Performed by: STUDENT IN AN ORGANIZED HEALTH CARE EDUCATION/TRAINING PROGRAM

## 2021-10-25 PROCEDURE — 3008F BODY MASS INDEX DOCD: CPT | Mod: CPTII,S$GLB,, | Performed by: STUDENT IN AN ORGANIZED HEALTH CARE EDUCATION/TRAINING PROGRAM

## 2021-10-25 PROCEDURE — 1159F MED LIST DOCD IN RCRD: CPT | Mod: CPTII,S$GLB,, | Performed by: STUDENT IN AN ORGANIZED HEALTH CARE EDUCATION/TRAINING PROGRAM

## 2021-10-25 PROCEDURE — 3008F PR BODY MASS INDEX (BMI) DOCUMENTED: ICD-10-PCS | Mod: CPTII,S$GLB,, | Performed by: STUDENT IN AN ORGANIZED HEALTH CARE EDUCATION/TRAINING PROGRAM

## 2021-10-25 PROCEDURE — 3074F SYST BP LT 130 MM HG: CPT | Mod: CPTII,S$GLB,, | Performed by: STUDENT IN AN ORGANIZED HEALTH CARE EDUCATION/TRAINING PROGRAM

## 2021-10-25 PROCEDURE — 1100F PR PT FALLS ASSESS DOC 2+ FALLS/FALL W/INJURY/YR: ICD-10-PCS | Mod: CPTII,S$GLB,, | Performed by: STUDENT IN AN ORGANIZED HEALTH CARE EDUCATION/TRAINING PROGRAM

## 2021-10-25 PROCEDURE — 4010F PR ACE/ARB THEARPY RXD/TAKEN: ICD-10-PCS | Mod: CPTII,S$GLB,, | Performed by: STUDENT IN AN ORGANIZED HEALTH CARE EDUCATION/TRAINING PROGRAM

## 2021-10-25 PROCEDURE — 1157F ADVNC CARE PLAN IN RCRD: CPT | Mod: CPTII,S$GLB,, | Performed by: STUDENT IN AN ORGANIZED HEALTH CARE EDUCATION/TRAINING PROGRAM

## 2021-10-25 PROCEDURE — 1125F PR PAIN SEVERITY QUANTIFIED, PAIN PRESENT: ICD-10-PCS | Mod: CPTII,S$GLB,, | Performed by: STUDENT IN AN ORGANIZED HEALTH CARE EDUCATION/TRAINING PROGRAM

## 2021-10-25 PROCEDURE — G0008 ADMIN INFLUENZA VIRUS VAC: HCPCS | Mod: S$GLB,,, | Performed by: STUDENT IN AN ORGANIZED HEALTH CARE EDUCATION/TRAINING PROGRAM

## 2021-10-25 PROCEDURE — 99999 PR PBB SHADOW E&M-EST. PATIENT-LVL III: ICD-10-PCS | Mod: PBBFAC,,, | Performed by: STUDENT IN AN ORGANIZED HEALTH CARE EDUCATION/TRAINING PROGRAM

## 2021-10-25 PROCEDURE — 1111F PR DISCHARGE MEDS RECONCILED W/ CURRENT OUTPATIENT MED LIST: ICD-10-PCS | Mod: CPTII,S$GLB,, | Performed by: STUDENT IN AN ORGANIZED HEALTH CARE EDUCATION/TRAINING PROGRAM

## 2021-10-25 PROCEDURE — 1159F PR MEDICATION LIST DOCUMENTED IN MEDICAL RECORD: ICD-10-PCS | Mod: CPTII,S$GLB,, | Performed by: STUDENT IN AN ORGANIZED HEALTH CARE EDUCATION/TRAINING PROGRAM

## 2021-10-25 PROCEDURE — 90694 FLU VACCINE - QUADRIVALENT - ADJUVANTED: ICD-10-PCS | Mod: S$GLB,,, | Performed by: STUDENT IN AN ORGANIZED HEALTH CARE EDUCATION/TRAINING PROGRAM

## 2021-10-25 RX ORDER — OXYCODONE HYDROCHLORIDE 15 MG/1
15 TABLET ORAL EVERY 8 HOURS PRN
Qty: 48 TABLET | Refills: 0 | Status: SHIPPED | OUTPATIENT
Start: 2021-10-25 | End: 2021-11-10

## 2021-10-25 RX ORDER — CHLORHEXIDINE GLUCONATE 40 MG/ML
SOLUTION TOPICAL 2 TIMES DAILY
Qty: 473 ML | Refills: 0 | Status: SHIPPED | OUTPATIENT
Start: 2021-10-25 | End: 2022-02-24

## 2021-10-25 RX ORDER — METHOCARBAMOL 500 MG/1
1000 TABLET, FILM COATED ORAL 4 TIMES DAILY
COMMUNITY
Start: 2021-10-18 | End: 2021-11-24

## 2021-10-25 RX ORDER — MORPHINE SULFATE 15 MG/1
15 TABLET, FILM COATED, EXTENDED RELEASE ORAL EVERY 12 HOURS PRN
Qty: 32 TABLET | Refills: 0
Start: 2021-10-25 | End: 2021-10-25 | Stop reason: SDUPTHER

## 2021-10-25 RX ORDER — NALOXONE HYDROCHLORIDE 4 MG/.1ML
1 SPRAY NASAL
Status: DISCONTINUED | OUTPATIENT
Start: 2021-10-25 | End: 2021-11-24

## 2021-10-25 RX ORDER — IPRATROPIUM BROMIDE AND ALBUTEROL SULFATE 2.5; .5 MG/3ML; MG/3ML
3 SOLUTION RESPIRATORY (INHALATION)
COMMUNITY
Start: 2021-10-18 | End: 2021-11-17

## 2021-10-25 RX ORDER — ALPRAZOLAM 1 MG/1
1 TABLET ORAL 2 TIMES DAILY PRN
Qty: 45 TABLET | Refills: 0 | Status: SHIPPED | OUTPATIENT
Start: 2021-10-25 | End: 2022-02-24 | Stop reason: SDUPTHER

## 2021-10-25 RX ORDER — MORPHINE SULFATE 15 MG/1
15 TABLET, FILM COATED, EXTENDED RELEASE ORAL EVERY 12 HOURS PRN
Qty: 32 TABLET | Refills: 0 | Status: SHIPPED | OUTPATIENT
Start: 2021-10-25 | End: 2021-11-10 | Stop reason: SDUPTHER

## 2021-10-25 RX ORDER — OXYCODONE HYDROCHLORIDE 15 MG/1
15 TABLET ORAL EVERY 8 HOURS PRN
Qty: 30 TABLET | Refills: 0 | Status: CANCELLED
Start: 2021-10-25 | End: 2021-11-04

## 2021-10-25 RX ORDER — IPRATROPIUM BROMIDE AND ALBUTEROL SULFATE 2.5; .5 MG/3ML; MG/3ML
SOLUTION RESPIRATORY (INHALATION)
COMMUNITY
Start: 2021-10-18 | End: 2021-11-24

## 2021-10-25 RX ORDER — OXYCODONE HYDROCHLORIDE 15 MG/1
15 TABLET ORAL EVERY 8 HOURS PRN
Qty: 48 TABLET | Refills: 0
Start: 2021-10-25 | End: 2021-10-25 | Stop reason: SDUPTHER

## 2021-10-26 ENCOUNTER — DOCUMENT SCAN (OUTPATIENT)
Dept: HOME HEALTH SERVICES | Facility: HOSPITAL | Age: 66
End: 2021-10-26
Payer: MEDICARE

## 2021-11-01 ENCOUNTER — EXTERNAL HOME HEALTH (OUTPATIENT)
Dept: HOME HEALTH SERVICES | Facility: HOSPITAL | Age: 66
End: 2021-11-01
Payer: MEDICARE

## 2021-11-09 ENCOUNTER — TELEPHONE (OUTPATIENT)
Dept: PAIN MEDICINE | Facility: CLINIC | Age: 66
End: 2021-11-09
Payer: MEDICARE

## 2021-11-10 ENCOUNTER — OFFICE VISIT (OUTPATIENT)
Dept: PAIN MEDICINE | Facility: CLINIC | Age: 66
End: 2021-11-10
Payer: MEDICARE

## 2021-11-10 VITALS
DIASTOLIC BLOOD PRESSURE: 61 MMHG | HEART RATE: 60 BPM | WEIGHT: 205.13 LBS | BODY MASS INDEX: 28.72 KG/M2 | TEMPERATURE: 98 F | OXYGEN SATURATION: 93 % | SYSTOLIC BLOOD PRESSURE: 134 MMHG | HEIGHT: 71 IN

## 2021-11-10 DIAGNOSIS — Z79.891 CHRONIC PRESCRIPTION OPIATE USE: ICD-10-CM

## 2021-11-10 DIAGNOSIS — M48.02 CERVICAL STENOSIS OF SPINAL CANAL: Primary | ICD-10-CM

## 2021-11-10 DIAGNOSIS — R20.2 PARESTHESIA OF LEFT UPPER AND LOWER EXTREMITY: ICD-10-CM

## 2021-11-10 DIAGNOSIS — G89.28 OTHER CHRONIC POSTPROCEDURAL PAIN: ICD-10-CM

## 2021-11-10 DIAGNOSIS — M50.30 DDD (DEGENERATIVE DISC DISEASE), CERVICAL: Chronic | ICD-10-CM

## 2021-11-10 PROCEDURE — 1160F PR REVIEW ALL MEDS BY PRESCRIBER/CLIN PHARMACIST DOCUMENTED: ICD-10-PCS | Mod: CPTII,S$GLB,, | Performed by: PAIN MEDICINE

## 2021-11-10 PROCEDURE — 1125F PR PAIN SEVERITY QUANTIFIED, PAIN PRESENT: ICD-10-PCS | Mod: CPTII,S$GLB,, | Performed by: PAIN MEDICINE

## 2021-11-10 PROCEDURE — 99204 OFFICE O/P NEW MOD 45 MIN: CPT | Mod: S$GLB,,, | Performed by: PAIN MEDICINE

## 2021-11-10 PROCEDURE — 1159F PR MEDICATION LIST DOCUMENTED IN MEDICAL RECORD: ICD-10-PCS | Mod: CPTII,S$GLB,, | Performed by: PAIN MEDICINE

## 2021-11-10 PROCEDURE — 1101F PT FALLS ASSESS-DOCD LE1/YR: CPT | Mod: CPTII,S$GLB,, | Performed by: PAIN MEDICINE

## 2021-11-10 PROCEDURE — 3008F PR BODY MASS INDEX (BMI) DOCUMENTED: ICD-10-PCS | Mod: CPTII,S$GLB,, | Performed by: PAIN MEDICINE

## 2021-11-10 PROCEDURE — 3066F PR DOCUMENTATION OF TREATMENT FOR NEPHROPATHY: ICD-10-PCS | Mod: CPTII,S$GLB,, | Performed by: PAIN MEDICINE

## 2021-11-10 PROCEDURE — 3288F FALL RISK ASSESSMENT DOCD: CPT | Mod: CPTII,S$GLB,, | Performed by: PAIN MEDICINE

## 2021-11-10 PROCEDURE — 1160F RVW MEDS BY RX/DR IN RCRD: CPT | Mod: CPTII,S$GLB,, | Performed by: PAIN MEDICINE

## 2021-11-10 PROCEDURE — 3075F PR MOST RECENT SYSTOLIC BLOOD PRESS GE 130-139MM HG: ICD-10-PCS | Mod: CPTII,S$GLB,, | Performed by: PAIN MEDICINE

## 2021-11-10 PROCEDURE — 99999 PR PBB SHADOW E&M-EST. PATIENT-LVL V: CPT | Mod: PBBFAC,,, | Performed by: PAIN MEDICINE

## 2021-11-10 PROCEDURE — 3044F HG A1C LEVEL LT 7.0%: CPT | Mod: CPTII,S$GLB,, | Performed by: PAIN MEDICINE

## 2021-11-10 PROCEDURE — 3044F PR MOST RECENT HEMOGLOBIN A1C LEVEL <7.0%: ICD-10-PCS | Mod: CPTII,S$GLB,, | Performed by: PAIN MEDICINE

## 2021-11-10 PROCEDURE — 99204 PR OFFICE/OUTPT VISIT, NEW, LEVL IV, 45-59 MIN: ICD-10-PCS | Mod: S$GLB,,, | Performed by: PAIN MEDICINE

## 2021-11-10 PROCEDURE — 3078F PR MOST RECENT DIASTOLIC BLOOD PRESSURE < 80 MM HG: ICD-10-PCS | Mod: CPTII,S$GLB,, | Performed by: PAIN MEDICINE

## 2021-11-10 PROCEDURE — 3061F NEG MICROALBUMINURIA REV: CPT | Mod: CPTII,S$GLB,, | Performed by: PAIN MEDICINE

## 2021-11-10 PROCEDURE — 3061F PR NEG MICROALBUMINURIA RESULT DOCUMENTED/REVIEW: ICD-10-PCS | Mod: CPTII,S$GLB,, | Performed by: PAIN MEDICINE

## 2021-11-10 PROCEDURE — 3078F DIAST BP <80 MM HG: CPT | Mod: CPTII,S$GLB,, | Performed by: PAIN MEDICINE

## 2021-11-10 PROCEDURE — 3066F NEPHROPATHY DOC TX: CPT | Mod: CPTII,S$GLB,, | Performed by: PAIN MEDICINE

## 2021-11-10 PROCEDURE — 4010F ACE/ARB THERAPY RXD/TAKEN: CPT | Mod: CPTII,S$GLB,, | Performed by: PAIN MEDICINE

## 2021-11-10 PROCEDURE — 1157F ADVNC CARE PLAN IN RCRD: CPT | Mod: CPTII,S$GLB,, | Performed by: PAIN MEDICINE

## 2021-11-10 PROCEDURE — 99999 PR PBB SHADOW E&M-EST. PATIENT-LVL V: ICD-10-PCS | Mod: PBBFAC,,, | Performed by: PAIN MEDICINE

## 2021-11-10 PROCEDURE — 1159F MED LIST DOCD IN RCRD: CPT | Mod: CPTII,S$GLB,, | Performed by: PAIN MEDICINE

## 2021-11-10 PROCEDURE — 1101F PR PT FALLS ASSESS DOC 0-1 FALLS W/OUT INJ PAST YR: ICD-10-PCS | Mod: CPTII,S$GLB,, | Performed by: PAIN MEDICINE

## 2021-11-10 PROCEDURE — 3075F SYST BP GE 130 - 139MM HG: CPT | Mod: CPTII,S$GLB,, | Performed by: PAIN MEDICINE

## 2021-11-10 PROCEDURE — 3008F BODY MASS INDEX DOCD: CPT | Mod: CPTII,S$GLB,, | Performed by: PAIN MEDICINE

## 2021-11-10 PROCEDURE — 4010F PR ACE/ARB THEARPY RXD/TAKEN: ICD-10-PCS | Mod: CPTII,S$GLB,, | Performed by: PAIN MEDICINE

## 2021-11-10 PROCEDURE — 1157F PR ADVANCE CARE PLAN OR EQUIV PRESENT IN MEDICAL RECORD: ICD-10-PCS | Mod: CPTII,S$GLB,, | Performed by: PAIN MEDICINE

## 2021-11-10 PROCEDURE — 1125F AMNT PAIN NOTED PAIN PRSNT: CPT | Mod: CPTII,S$GLB,, | Performed by: PAIN MEDICINE

## 2021-11-10 PROCEDURE — 3288F PR FALLS RISK ASSESSMENT DOCUMENTED: ICD-10-PCS | Mod: CPTII,S$GLB,, | Performed by: PAIN MEDICINE

## 2021-11-10 RX ORDER — MORPHINE SULFATE 15 MG/1
15 TABLET, FILM COATED, EXTENDED RELEASE ORAL EVERY 12 HOURS
Qty: 60 TABLET | Refills: 0 | Status: SHIPPED | OUTPATIENT
Start: 2021-11-13 | End: 2021-12-13 | Stop reason: SDUPTHER

## 2021-11-10 RX ORDER — OXYCODONE HYDROCHLORIDE 10 MG/1
10 TABLET ORAL EVERY 8 HOURS PRN
Qty: 90 TABLET | Refills: 0 | Status: SHIPPED | OUTPATIENT
Start: 2021-11-13 | End: 2021-12-13 | Stop reason: SDUPTHER

## 2021-11-15 ENCOUNTER — HOSPITAL ENCOUNTER (OUTPATIENT)
Dept: RADIOLOGY | Facility: HOSPITAL | Age: 66
Discharge: HOME | End: 2021-11-15
Attending: PHYSICIAN ASSISTANT
Payer: MEDICARE

## 2021-11-15 DIAGNOSIS — M48.02 CERVICAL STENOSIS OF SPINAL CANAL: ICD-10-CM

## 2021-11-15 PROCEDURE — 72040 X-RAY EXAM NECK SPINE 2-3 VW: CPT | Mod: 26,,, | Performed by: RADIOLOGY

## 2021-11-15 PROCEDURE — 72040 X-RAY EXAM NECK SPINE 2-3 VW: CPT | Mod: TC,FY,PO

## 2021-11-15 PROCEDURE — 72040 XR CERVICAL SPINE AP LATERAL: ICD-10-PCS | Mod: 26,,, | Performed by: RADIOLOGY

## 2021-11-17 DIAGNOSIS — E11.9 TYPE 2 DIABETES MELLITUS WITHOUT COMPLICATION: ICD-10-CM

## 2021-11-18 ENCOUNTER — OFFICE VISIT (OUTPATIENT)
Dept: NEUROSURGERY | Facility: CLINIC | Age: 66
End: 2021-11-18
Payer: MEDICARE

## 2021-11-18 VITALS — HEART RATE: 69 BPM | SYSTOLIC BLOOD PRESSURE: 136 MMHG | DIASTOLIC BLOOD PRESSURE: 65 MMHG

## 2021-11-18 DIAGNOSIS — M48.02 CERVICAL STENOSIS OF SPINAL CANAL: Primary | ICD-10-CM

## 2021-11-18 PROCEDURE — 4010F ACE/ARB THERAPY RXD/TAKEN: CPT | Mod: CPTII,S$GLB,, | Performed by: NEUROLOGICAL SURGERY

## 2021-11-18 PROCEDURE — 3044F PR MOST RECENT HEMOGLOBIN A1C LEVEL <7.0%: ICD-10-PCS | Mod: CPTII,S$GLB,, | Performed by: NEUROLOGICAL SURGERY

## 2021-11-18 PROCEDURE — 3075F PR MOST RECENT SYSTOLIC BLOOD PRESS GE 130-139MM HG: ICD-10-PCS | Mod: CPTII,S$GLB,, | Performed by: NEUROLOGICAL SURGERY

## 2021-11-18 PROCEDURE — 3066F NEPHROPATHY DOC TX: CPT | Mod: CPTII,S$GLB,, | Performed by: NEUROLOGICAL SURGERY

## 2021-11-18 PROCEDURE — 1157F PR ADVANCE CARE PLAN OR EQUIV PRESENT IN MEDICAL RECORD: ICD-10-PCS | Mod: CPTII,S$GLB,, | Performed by: NEUROLOGICAL SURGERY

## 2021-11-18 PROCEDURE — 3078F DIAST BP <80 MM HG: CPT | Mod: CPTII,S$GLB,, | Performed by: NEUROLOGICAL SURGERY

## 2021-11-18 PROCEDURE — 4010F PR ACE/ARB THEARPY RXD/TAKEN: ICD-10-PCS | Mod: CPTII,S$GLB,, | Performed by: NEUROLOGICAL SURGERY

## 2021-11-18 PROCEDURE — 1159F MED LIST DOCD IN RCRD: CPT | Mod: CPTII,S$GLB,, | Performed by: NEUROLOGICAL SURGERY

## 2021-11-18 PROCEDURE — 99999 PR PBB SHADOW E&M-EST. PATIENT-LVL IV: ICD-10-PCS | Mod: PBBFAC,,, | Performed by: NEUROLOGICAL SURGERY

## 2021-11-18 PROCEDURE — 99024 PR POST-OP FOLLOW-UP VISIT: ICD-10-PCS | Mod: S$GLB,,, | Performed by: NEUROLOGICAL SURGERY

## 2021-11-18 PROCEDURE — 99024 POSTOP FOLLOW-UP VISIT: CPT | Mod: S$GLB,,, | Performed by: NEUROLOGICAL SURGERY

## 2021-11-18 PROCEDURE — 3061F PR NEG MICROALBUMINURIA RESULT DOCUMENTED/REVIEW: ICD-10-PCS | Mod: CPTII,S$GLB,, | Performed by: NEUROLOGICAL SURGERY

## 2021-11-18 PROCEDURE — 3078F PR MOST RECENT DIASTOLIC BLOOD PRESSURE < 80 MM HG: ICD-10-PCS | Mod: CPTII,S$GLB,, | Performed by: NEUROLOGICAL SURGERY

## 2021-11-18 PROCEDURE — 1159F PR MEDICATION LIST DOCUMENTED IN MEDICAL RECORD: ICD-10-PCS | Mod: CPTII,S$GLB,, | Performed by: NEUROLOGICAL SURGERY

## 2021-11-18 PROCEDURE — 3066F PR DOCUMENTATION OF TREATMENT FOR NEPHROPATHY: ICD-10-PCS | Mod: CPTII,S$GLB,, | Performed by: NEUROLOGICAL SURGERY

## 2021-11-18 PROCEDURE — 3044F HG A1C LEVEL LT 7.0%: CPT | Mod: CPTII,S$GLB,, | Performed by: NEUROLOGICAL SURGERY

## 2021-11-18 PROCEDURE — 99999 PR PBB SHADOW E&M-EST. PATIENT-LVL IV: CPT | Mod: PBBFAC,,, | Performed by: NEUROLOGICAL SURGERY

## 2021-11-18 PROCEDURE — 3075F SYST BP GE 130 - 139MM HG: CPT | Mod: CPTII,S$GLB,, | Performed by: NEUROLOGICAL SURGERY

## 2021-11-18 PROCEDURE — 1125F AMNT PAIN NOTED PAIN PRSNT: CPT | Mod: CPTII,S$GLB,, | Performed by: NEUROLOGICAL SURGERY

## 2021-11-18 PROCEDURE — 1125F PR PAIN SEVERITY QUANTIFIED, PAIN PRESENT: ICD-10-PCS | Mod: CPTII,S$GLB,, | Performed by: NEUROLOGICAL SURGERY

## 2021-11-18 PROCEDURE — 1157F ADVNC CARE PLAN IN RCRD: CPT | Mod: CPTII,S$GLB,, | Performed by: NEUROLOGICAL SURGERY

## 2021-11-18 PROCEDURE — 3061F NEG MICROALBUMINURIA REV: CPT | Mod: CPTII,S$GLB,, | Performed by: NEUROLOGICAL SURGERY

## 2021-11-24 ENCOUNTER — OFFICE VISIT (OUTPATIENT)
Dept: FAMILY MEDICINE | Facility: CLINIC | Age: 66
End: 2021-11-24
Payer: MEDICARE

## 2021-11-24 ENCOUNTER — LAB VISIT (OUTPATIENT)
Dept: LAB | Facility: HOSPITAL | Age: 66
End: 2021-11-24
Attending: INTERNAL MEDICINE
Payer: MEDICARE

## 2021-11-24 VITALS
DIASTOLIC BLOOD PRESSURE: 70 MMHG | OXYGEN SATURATION: 96 % | TEMPERATURE: 98 F | HEIGHT: 71 IN | HEART RATE: 84 BPM | WEIGHT: 207.31 LBS | BODY MASS INDEX: 29.02 KG/M2 | SYSTOLIC BLOOD PRESSURE: 132 MMHG

## 2021-11-24 DIAGNOSIS — Z12.5 SCREENING FOR PROSTATE CANCER: ICD-10-CM

## 2021-11-24 DIAGNOSIS — F41.9 ANXIETY: ICD-10-CM

## 2021-11-24 DIAGNOSIS — M51.36 DDD (DEGENERATIVE DISC DISEASE), LUMBAR: ICD-10-CM

## 2021-11-24 DIAGNOSIS — R79.89 LOW TESTOSTERONE IN MALE: ICD-10-CM

## 2021-11-24 DIAGNOSIS — I10 ESSENTIAL HYPERTENSION: Primary | ICD-10-CM

## 2021-11-24 DIAGNOSIS — F33.41 RECURRENT MAJOR DEPRESSIVE DISORDER, IN PARTIAL REMISSION: ICD-10-CM

## 2021-11-24 DIAGNOSIS — E11.9 TYPE 2 DIABETES MELLITUS WITHOUT COMPLICATION: ICD-10-CM

## 2021-11-24 DIAGNOSIS — M50.30 DDD (DEGENERATIVE DISC DISEASE), CERVICAL: ICD-10-CM

## 2021-11-24 LAB
COMPLEXED PSA SERPL-MCNC: 0.78 NG/ML (ref 0–4)
ESTIMATED AVG GLUCOSE: 146 MG/DL (ref 68–131)
HBA1C MFR BLD: 6.7 % (ref 4–5.6)
TESTOST SERPL-MCNC: >1500 NG/DL (ref 304–1227)

## 2021-11-24 PROCEDURE — 99214 PR OFFICE/OUTPT VISIT, EST, LEVL IV, 30-39 MIN: ICD-10-PCS | Mod: S$GLB,,, | Performed by: INTERNAL MEDICINE

## 2021-11-24 PROCEDURE — 3061F NEG MICROALBUMINURIA REV: CPT | Mod: CPTII,S$GLB,, | Performed by: INTERNAL MEDICINE

## 2021-11-24 PROCEDURE — 84403 ASSAY OF TOTAL TESTOSTERONE: CPT | Performed by: INTERNAL MEDICINE

## 2021-11-24 PROCEDURE — 3061F PR NEG MICROALBUMINURIA RESULT DOCUMENTED/REVIEW: ICD-10-PCS | Mod: CPTII,S$GLB,, | Performed by: INTERNAL MEDICINE

## 2021-11-24 PROCEDURE — 3066F PR DOCUMENTATION OF TREATMENT FOR NEPHROPATHY: ICD-10-PCS | Mod: CPTII,S$GLB,, | Performed by: INTERNAL MEDICINE

## 2021-11-24 PROCEDURE — 4010F PR ACE/ARB THEARPY RXD/TAKEN: ICD-10-PCS | Mod: CPTII,S$GLB,, | Performed by: INTERNAL MEDICINE

## 2021-11-24 PROCEDURE — 1157F ADVNC CARE PLAN IN RCRD: CPT | Mod: CPTII,S$GLB,, | Performed by: INTERNAL MEDICINE

## 2021-11-24 PROCEDURE — 99499 RISK ADDL DX/OHS AUDIT: ICD-10-PCS | Mod: S$GLB,,, | Performed by: INTERNAL MEDICINE

## 2021-11-24 PROCEDURE — 99499 UNLISTED E&M SERVICE: CPT | Mod: S$GLB,,, | Performed by: INTERNAL MEDICINE

## 2021-11-24 PROCEDURE — 83036 HEMOGLOBIN GLYCOSYLATED A1C: CPT | Performed by: INTERNAL MEDICINE

## 2021-11-24 PROCEDURE — 84153 ASSAY OF PSA TOTAL: CPT | Performed by: INTERNAL MEDICINE

## 2021-11-24 PROCEDURE — 99999 PR PBB SHADOW E&M-EST. PATIENT-LVL IV: CPT | Mod: PBBFAC,,, | Performed by: INTERNAL MEDICINE

## 2021-11-24 PROCEDURE — 36415 COLL VENOUS BLD VENIPUNCTURE: CPT | Mod: PO | Performed by: INTERNAL MEDICINE

## 2021-11-24 PROCEDURE — 4010F ACE/ARB THERAPY RXD/TAKEN: CPT | Mod: CPTII,S$GLB,, | Performed by: INTERNAL MEDICINE

## 2021-11-24 PROCEDURE — 99999 PR PBB SHADOW E&M-EST. PATIENT-LVL IV: ICD-10-PCS | Mod: PBBFAC,,, | Performed by: INTERNAL MEDICINE

## 2021-11-24 PROCEDURE — 1157F PR ADVANCE CARE PLAN OR EQUIV PRESENT IN MEDICAL RECORD: ICD-10-PCS | Mod: CPTII,S$GLB,, | Performed by: INTERNAL MEDICINE

## 2021-11-24 PROCEDURE — 3066F NEPHROPATHY DOC TX: CPT | Mod: CPTII,S$GLB,, | Performed by: INTERNAL MEDICINE

## 2021-11-24 PROCEDURE — 99214 OFFICE O/P EST MOD 30 MIN: CPT | Mod: S$GLB,,, | Performed by: INTERNAL MEDICINE

## 2021-11-24 RX ORDER — VERAPAMIL HYDROCHLORIDE 180 MG/1
180 TABLET, FILM COATED, EXTENDED RELEASE ORAL 2 TIMES DAILY
Qty: 60 TABLET | Refills: 11
Start: 2021-11-24 | End: 2021-12-27 | Stop reason: SDUPTHER

## 2021-11-24 RX ORDER — CLONIDINE HYDROCHLORIDE 0.2 MG/1
0.2 TABLET ORAL 2 TIMES DAILY
Qty: 60 TABLET | Refills: 11 | Status: SHIPPED | OUTPATIENT
Start: 2021-11-24 | End: 2022-01-04

## 2021-11-29 ENCOUNTER — TELEPHONE (OUTPATIENT)
Dept: FAMILY MEDICINE | Facility: CLINIC | Age: 66
End: 2021-11-29
Payer: MEDICARE

## 2021-12-01 ENCOUNTER — TELEPHONE (OUTPATIENT)
Dept: FAMILY MEDICINE | Facility: CLINIC | Age: 66
End: 2021-12-01
Payer: MEDICARE

## 2021-12-01 DIAGNOSIS — R79.89 LOW TESTOSTERONE IN MALE: ICD-10-CM

## 2021-12-01 DIAGNOSIS — E11.9 TYPE 2 DIABETES MELLITUS WITHOUT COMPLICATION, WITHOUT LONG-TERM CURRENT USE OF INSULIN: Primary | ICD-10-CM

## 2021-12-01 RX ORDER — TESTOSTERONE CYPIONATE 200 MG/ML
150 INJECTION, SOLUTION INTRAMUSCULAR
Qty: 10 ML | Refills: 0 | Status: SHIPPED | OUTPATIENT
Start: 2021-12-01 | End: 2022-02-24 | Stop reason: SDUPTHER

## 2021-12-02 ENCOUNTER — TELEPHONE (OUTPATIENT)
Dept: NEUROSURGERY | Facility: CLINIC | Age: 66
End: 2021-12-02
Payer: MEDICARE

## 2021-12-02 DIAGNOSIS — M48.02 CERVICAL STENOSIS OF SPINAL CANAL: Primary | ICD-10-CM

## 2021-12-02 DIAGNOSIS — Z98.890 STATUS POST SURGERY: ICD-10-CM

## 2021-12-09 ENCOUNTER — TELEPHONE (OUTPATIENT)
Dept: PAIN MEDICINE | Facility: CLINIC | Age: 66
End: 2021-12-09
Payer: MEDICARE

## 2021-12-10 ENCOUNTER — TELEPHONE (OUTPATIENT)
Dept: PAIN MEDICINE | Facility: CLINIC | Age: 66
End: 2021-12-10
Payer: MEDICARE

## 2021-12-13 ENCOUNTER — OFFICE VISIT (OUTPATIENT)
Dept: PAIN MEDICINE | Facility: CLINIC | Age: 66
End: 2021-12-13
Payer: MEDICARE

## 2021-12-13 VITALS
TEMPERATURE: 98 F | BODY MASS INDEX: 27.75 KG/M2 | DIASTOLIC BLOOD PRESSURE: 88 MMHG | SYSTOLIC BLOOD PRESSURE: 177 MMHG | HEIGHT: 71 IN | RESPIRATION RATE: 18 BRPM | WEIGHT: 198.19 LBS | HEART RATE: 50 BPM | OXYGEN SATURATION: 100 %

## 2021-12-13 DIAGNOSIS — Z98.1 S/P CERVICAL SPINAL FUSION: ICD-10-CM

## 2021-12-13 DIAGNOSIS — Z79.891 CHRONIC PRESCRIPTION OPIATE USE: ICD-10-CM

## 2021-12-13 DIAGNOSIS — G89.4 CHRONIC PAIN DISORDER: Primary | ICD-10-CM

## 2021-12-13 DIAGNOSIS — M47.812 CERVICAL SPONDYLOSIS: ICD-10-CM

## 2021-12-13 DIAGNOSIS — M48.02 CERVICAL STENOSIS OF SPINAL CANAL: ICD-10-CM

## 2021-12-13 DIAGNOSIS — M96.1 POSTLAMINECTOMY SYNDROME OF CERVICAL REGION: ICD-10-CM

## 2021-12-13 DIAGNOSIS — Z79.891 ENCOUNTER FOR LONG-TERM OPIATE ANALGESIC USE: ICD-10-CM

## 2021-12-13 DIAGNOSIS — G89.28 OTHER CHRONIC POSTPROCEDURAL PAIN: ICD-10-CM

## 2021-12-13 DIAGNOSIS — M50.30 DDD (DEGENERATIVE DISC DISEASE), CERVICAL: ICD-10-CM

## 2021-12-13 DIAGNOSIS — M54.12 CERVICAL RADICULOPATHY: ICD-10-CM

## 2021-12-13 PROCEDURE — 3066F NEPHROPATHY DOC TX: CPT | Mod: CPTII,S$GLB,, | Performed by: NURSE PRACTITIONER

## 2021-12-13 PROCEDURE — 99999 PR PBB SHADOW E&M-EST. PATIENT-LVL V: ICD-10-PCS | Mod: PBBFAC,,, | Performed by: NURSE PRACTITIONER

## 2021-12-13 PROCEDURE — 3061F PR NEG MICROALBUMINURIA RESULT DOCUMENTED/REVIEW: ICD-10-PCS | Mod: CPTII,S$GLB,, | Performed by: NURSE PRACTITIONER

## 2021-12-13 PROCEDURE — 1157F PR ADVANCE CARE PLAN OR EQUIV PRESENT IN MEDICAL RECORD: ICD-10-PCS | Mod: CPTII,S$GLB,, | Performed by: NURSE PRACTITIONER

## 2021-12-13 PROCEDURE — 1157F ADVNC CARE PLAN IN RCRD: CPT | Mod: CPTII,S$GLB,, | Performed by: NURSE PRACTITIONER

## 2021-12-13 PROCEDURE — 99999 PR PBB SHADOW E&M-EST. PATIENT-LVL V: CPT | Mod: PBBFAC,,, | Performed by: NURSE PRACTITIONER

## 2021-12-13 PROCEDURE — 99214 OFFICE O/P EST MOD 30 MIN: CPT | Mod: S$GLB,,, | Performed by: NURSE PRACTITIONER

## 2021-12-13 PROCEDURE — 3066F PR DOCUMENTATION OF TREATMENT FOR NEPHROPATHY: ICD-10-PCS | Mod: CPTII,S$GLB,, | Performed by: NURSE PRACTITIONER

## 2021-12-13 PROCEDURE — 3061F NEG MICROALBUMINURIA REV: CPT | Mod: CPTII,S$GLB,, | Performed by: NURSE PRACTITIONER

## 2021-12-13 PROCEDURE — 99214 PR OFFICE/OUTPT VISIT, EST, LEVL IV, 30-39 MIN: ICD-10-PCS | Mod: S$GLB,,, | Performed by: NURSE PRACTITIONER

## 2021-12-13 PROCEDURE — 4010F PR ACE/ARB THEARPY RXD/TAKEN: ICD-10-PCS | Mod: CPTII,S$GLB,, | Performed by: NURSE PRACTITIONER

## 2021-12-13 PROCEDURE — 4010F ACE/ARB THERAPY RXD/TAKEN: CPT | Mod: CPTII,S$GLB,, | Performed by: NURSE PRACTITIONER

## 2021-12-13 PROCEDURE — 99499 UNLISTED E&M SERVICE: CPT | Mod: S$GLB,,, | Performed by: NURSE PRACTITIONER

## 2021-12-13 PROCEDURE — 80307 DRUG TEST PRSMV CHEM ANLYZR: CPT | Performed by: NURSE PRACTITIONER

## 2021-12-13 PROCEDURE — 99499 RISK ADDL DX/OHS AUDIT: ICD-10-PCS | Mod: S$GLB,,, | Performed by: NURSE PRACTITIONER

## 2021-12-13 RX ORDER — OXYCODONE HYDROCHLORIDE 10 MG/1
10 TABLET ORAL EVERY 12 HOURS PRN
Qty: 28 TABLET | Refills: 0 | Status: SHIPPED | OUTPATIENT
Start: 2021-12-13 | End: 2021-12-27

## 2021-12-13 RX ORDER — MORPHINE SULFATE 15 MG/1
15 TABLET, FILM COATED, EXTENDED RELEASE ORAL DAILY
Qty: 14 TABLET | Refills: 0 | Status: SHIPPED | OUTPATIENT
Start: 2021-12-13 | End: 2021-12-27

## 2021-12-15 ENCOUNTER — CLINICAL SUPPORT (OUTPATIENT)
Dept: SMOKING CESSATION | Facility: CLINIC | Age: 66
End: 2021-12-15
Payer: COMMERCIAL

## 2021-12-15 DIAGNOSIS — F17.200 NICOTINE DEPENDENCE: Primary | ICD-10-CM

## 2021-12-15 PROCEDURE — 99407 BEHAV CHNG SMOKING > 10 MIN: CPT | Mod: S$GLB,,,

## 2021-12-15 PROCEDURE — 99407 PR TOBACCO USE CESSATION INTENSIVE >10 MINUTES: ICD-10-PCS | Mod: S$GLB,,,

## 2021-12-17 ENCOUNTER — CLINICAL SUPPORT (OUTPATIENT)
Dept: SMOKING CESSATION | Facility: CLINIC | Age: 66
End: 2021-12-17
Payer: COMMERCIAL

## 2021-12-17 DIAGNOSIS — F17.200 NICOTINE DEPENDENCE: Primary | ICD-10-CM

## 2021-12-17 PROCEDURE — 99999 PR PBB SHADOW E&M-EST. PATIENT-LVL I: CPT | Mod: PBBFAC,,,

## 2021-12-17 PROCEDURE — 99999 PR PBB SHADOW E&M-EST. PATIENT-LVL I: ICD-10-PCS | Mod: PBBFAC,,,

## 2021-12-17 PROCEDURE — 99404 PREV MED CNSL INDIV APPRX 60: CPT | Mod: S$GLB,,,

## 2021-12-17 PROCEDURE — 99404 PR PREVENT COUNSEL,INDIV,60 MIN: ICD-10-PCS | Mod: S$GLB,,,

## 2021-12-17 RX ORDER — IBUPROFEN 200 MG
1 TABLET ORAL DAILY
Qty: 28 PATCH | Refills: 0 | Status: SHIPPED | OUTPATIENT
Start: 2021-12-17 | End: 2022-01-07 | Stop reason: SDUPTHER

## 2021-12-19 LAB
6MAM UR QL: NOT DETECTED
7AMINOCLONAZEPAM UR QL: NOT DETECTED
A-OH ALPRAZ UR QL: PRESENT
ALPHA-OH-MIDAZOLAM: NOT DETECTED
ALPRAZ UR QL: PRESENT
AMPHET UR QL SCN: NOT DETECTED
ANNOTATION COMMENT IMP: NORMAL
ANNOTATION COMMENT IMP: NORMAL
BARBITURATES UR QL: NOT DETECTED
BUPRENORPHINE UR QL: NOT DETECTED
BZE UR QL: NOT DETECTED
CARBOXYTHC UR QL: PRESENT
CARISOPRODOL UR QL: NOT DETECTED
CLONAZEPAM UR QL: NOT DETECTED
CODEINE UR QL: NOT DETECTED
CREAT UR-MCNC: 169.4 MG/DL (ref 20–400)
DIAZEPAM UR QL: NOT DETECTED
ETHYL GLUCURONIDE UR QL: NOT DETECTED
FENTANYL UR QL: NOT DETECTED
GABAPENTIN: NOT DETECTED
HYDROCODONE UR QL: NOT DETECTED
HYDROMORPHONE UR QL: NOT DETECTED
LORAZEPAM UR QL: NOT DETECTED
MDA UR QL: NOT DETECTED
MDEA UR QL: NOT DETECTED
MDMA UR QL: NOT DETECTED
ME-PHENIDATE UR QL: NOT DETECTED
METHADONE UR QL: NOT DETECTED
METHAMPHET UR QL: NOT DETECTED
MIDAZOLAM UR QL SCN: NOT DETECTED
MORPHINE UR QL: PRESENT
NALOXONE: NOT DETECTED
NORBUPRENORPHINE UR QL CFM: NOT DETECTED
NORDIAZEPAM UR QL: PRESENT
NORFENTANYL UR QL: NOT DETECTED
NORHYDROCODONE UR QL CFM: NOT DETECTED
NORMEPERIDINE UR QL CFM: NOT DETECTED
NOROXYCODONE UR QL CFM: PRESENT
NOROXYMORPHONE UR QL SCN: NOT DETECTED
OXAZEPAM UR QL: PRESENT
OXYCODONE UR QL: PRESENT
OXYMORPHONE UR QL: PRESENT
PATHOLOGY STUDY: NORMAL
PCP UR QL: NOT DETECTED
PHENTERMINE UR QL: NOT DETECTED
PREGABALIN: NOT DETECTED
SERVICE CMNT-IMP: NORMAL
TAPENTADOL UR QL SCN: NOT DETECTED
TAPENTADOL UR QL SCN: NOT DETECTED
TEMAZEPAM UR QL: NOT DETECTED
TRAMADOL UR QL: NOT DETECTED
ZOLPIDEM METABOLITE: NOT DETECTED
ZOLPIDEM UR QL: NOT DETECTED

## 2021-12-27 ENCOUNTER — OFFICE VISIT (OUTPATIENT)
Dept: SPINE | Facility: CLINIC | Age: 66
End: 2021-12-27
Payer: MEDICARE

## 2021-12-27 VITALS
WEIGHT: 198.19 LBS | BODY MASS INDEX: 27.75 KG/M2 | DIASTOLIC BLOOD PRESSURE: 81 MMHG | SYSTOLIC BLOOD PRESSURE: 177 MMHG | HEIGHT: 71 IN | HEART RATE: 66 BPM

## 2021-12-27 DIAGNOSIS — M53.3 SACROILIAC JOINT PAIN: Primary | ICD-10-CM

## 2021-12-27 DIAGNOSIS — I10 ESSENTIAL HYPERTENSION: ICD-10-CM

## 2021-12-27 DIAGNOSIS — F11.90 CHRONIC, CONTINUOUS USE OF OPIOIDS: ICD-10-CM

## 2021-12-27 DIAGNOSIS — G89.18 POSTOPERATIVE PAIN AFTER SPINAL SURGERY: ICD-10-CM

## 2021-12-27 PROBLEM — M46.1 SACROILIITIS: Status: ACTIVE | Noted: 2021-12-27

## 2021-12-27 PROBLEM — M54.50 CHRONIC LOW BACK PAIN: Status: ACTIVE | Noted: 2021-12-27

## 2021-12-27 PROBLEM — G89.29 CHRONIC LOW BACK PAIN: Status: ACTIVE | Noted: 2021-12-27

## 2021-12-27 PROCEDURE — 3288F FALL RISK ASSESSMENT DOCD: CPT | Mod: CPTII,S$GLB,, | Performed by: ANESTHESIOLOGY

## 2021-12-27 PROCEDURE — 99214 PR OFFICE/OUTPT VISIT, EST, LEVL IV, 30-39 MIN: ICD-10-PCS | Mod: S$GLB,,, | Performed by: ANESTHESIOLOGY

## 2021-12-27 PROCEDURE — 3061F NEG MICROALBUMINURIA REV: CPT | Mod: CPTII,S$GLB,, | Performed by: ANESTHESIOLOGY

## 2021-12-27 PROCEDURE — 1159F MED LIST DOCD IN RCRD: CPT | Mod: CPTII,S$GLB,, | Performed by: ANESTHESIOLOGY

## 2021-12-27 PROCEDURE — 4010F PR ACE/ARB THEARPY RXD/TAKEN: ICD-10-PCS | Mod: CPTII,S$GLB,, | Performed by: ANESTHESIOLOGY

## 2021-12-27 PROCEDURE — 3061F PR NEG MICROALBUMINURIA RESULT DOCUMENTED/REVIEW: ICD-10-PCS | Mod: CPTII,S$GLB,, | Performed by: ANESTHESIOLOGY

## 2021-12-27 PROCEDURE — 1157F PR ADVANCE CARE PLAN OR EQUIV PRESENT IN MEDICAL RECORD: ICD-10-PCS | Mod: CPTII,S$GLB,, | Performed by: ANESTHESIOLOGY

## 2021-12-27 PROCEDURE — 3288F PR FALLS RISK ASSESSMENT DOCUMENTED: ICD-10-PCS | Mod: CPTII,S$GLB,, | Performed by: ANESTHESIOLOGY

## 2021-12-27 PROCEDURE — 3008F BODY MASS INDEX DOCD: CPT | Mod: CPTII,S$GLB,, | Performed by: ANESTHESIOLOGY

## 2021-12-27 PROCEDURE — 4010F ACE/ARB THERAPY RXD/TAKEN: CPT | Mod: CPTII,S$GLB,, | Performed by: ANESTHESIOLOGY

## 2021-12-27 PROCEDURE — 1125F PR PAIN SEVERITY QUANTIFIED, PAIN PRESENT: ICD-10-PCS | Mod: CPTII,S$GLB,, | Performed by: ANESTHESIOLOGY

## 2021-12-27 PROCEDURE — 3044F HG A1C LEVEL LT 7.0%: CPT | Mod: CPTII,S$GLB,, | Performed by: ANESTHESIOLOGY

## 2021-12-27 PROCEDURE — 1101F PR PT FALLS ASSESS DOC 0-1 FALLS W/OUT INJ PAST YR: ICD-10-PCS | Mod: CPTII,S$GLB,, | Performed by: ANESTHESIOLOGY

## 2021-12-27 PROCEDURE — 99499 RISK ADDL DX/OHS AUDIT: ICD-10-PCS | Mod: S$GLB,,, | Performed by: ANESTHESIOLOGY

## 2021-12-27 PROCEDURE — 3079F PR MOST RECENT DIASTOLIC BLOOD PRESSURE 80-89 MM HG: ICD-10-PCS | Mod: CPTII,S$GLB,, | Performed by: ANESTHESIOLOGY

## 2021-12-27 PROCEDURE — 99999 PR PBB SHADOW E&M-EST. PATIENT-LVL IV: ICD-10-PCS | Mod: PBBFAC,,, | Performed by: ANESTHESIOLOGY

## 2021-12-27 PROCEDURE — 3008F PR BODY MASS INDEX (BMI) DOCUMENTED: ICD-10-PCS | Mod: CPTII,S$GLB,, | Performed by: ANESTHESIOLOGY

## 2021-12-27 PROCEDURE — 1101F PT FALLS ASSESS-DOCD LE1/YR: CPT | Mod: CPTII,S$GLB,, | Performed by: ANESTHESIOLOGY

## 2021-12-27 PROCEDURE — 99999 PR PBB SHADOW E&M-EST. PATIENT-LVL IV: CPT | Mod: PBBFAC,,, | Performed by: ANESTHESIOLOGY

## 2021-12-27 PROCEDURE — 1160F PR REVIEW ALL MEDS BY PRESCRIBER/CLIN PHARMACIST DOCUMENTED: ICD-10-PCS | Mod: CPTII,S$GLB,, | Performed by: ANESTHESIOLOGY

## 2021-12-27 PROCEDURE — 3077F PR MOST RECENT SYSTOLIC BLOOD PRESSURE >= 140 MM HG: ICD-10-PCS | Mod: CPTII,S$GLB,, | Performed by: ANESTHESIOLOGY

## 2021-12-27 PROCEDURE — 3066F NEPHROPATHY DOC TX: CPT | Mod: CPTII,S$GLB,, | Performed by: ANESTHESIOLOGY

## 2021-12-27 PROCEDURE — 1160F RVW MEDS BY RX/DR IN RCRD: CPT | Mod: CPTII,S$GLB,, | Performed by: ANESTHESIOLOGY

## 2021-12-27 PROCEDURE — 3066F PR DOCUMENTATION OF TREATMENT FOR NEPHROPATHY: ICD-10-PCS | Mod: CPTII,S$GLB,, | Performed by: ANESTHESIOLOGY

## 2021-12-27 PROCEDURE — 1125F AMNT PAIN NOTED PAIN PRSNT: CPT | Mod: CPTII,S$GLB,, | Performed by: ANESTHESIOLOGY

## 2021-12-27 PROCEDURE — 1159F PR MEDICATION LIST DOCUMENTED IN MEDICAL RECORD: ICD-10-PCS | Mod: CPTII,S$GLB,, | Performed by: ANESTHESIOLOGY

## 2021-12-27 PROCEDURE — 3044F PR MOST RECENT HEMOGLOBIN A1C LEVEL <7.0%: ICD-10-PCS | Mod: CPTII,S$GLB,, | Performed by: ANESTHESIOLOGY

## 2021-12-27 PROCEDURE — 3077F SYST BP >= 140 MM HG: CPT | Mod: CPTII,S$GLB,, | Performed by: ANESTHESIOLOGY

## 2021-12-27 PROCEDURE — 3079F DIAST BP 80-89 MM HG: CPT | Mod: CPTII,S$GLB,, | Performed by: ANESTHESIOLOGY

## 2021-12-27 PROCEDURE — 99499 UNLISTED E&M SERVICE: CPT | Mod: S$GLB,,, | Performed by: ANESTHESIOLOGY

## 2021-12-27 PROCEDURE — 99214 OFFICE O/P EST MOD 30 MIN: CPT | Mod: S$GLB,,, | Performed by: ANESTHESIOLOGY

## 2021-12-27 PROCEDURE — 1157F ADVNC CARE PLAN IN RCRD: CPT | Mod: CPTII,S$GLB,, | Performed by: ANESTHESIOLOGY

## 2021-12-27 RX ORDER — VERAPAMIL HYDROCHLORIDE 180 MG/1
180 TABLET, FILM COATED, EXTENDED RELEASE ORAL 2 TIMES DAILY
Qty: 180 TABLET | Refills: 3 | Status: SHIPPED | OUTPATIENT
Start: 2021-12-27 | End: 2022-02-24 | Stop reason: SDUPTHER

## 2021-12-27 RX ORDER — OXYCODONE HYDROCHLORIDE 10 MG/1
10 TABLET ORAL EVERY 8 HOURS PRN
Qty: 42 TABLET | Refills: 0 | Status: SHIPPED | OUTPATIENT
Start: 2021-12-27 | End: 2022-01-10

## 2021-12-28 ENCOUNTER — TELEPHONE (OUTPATIENT)
Dept: PAIN MEDICINE | Facility: CLINIC | Age: 66
End: 2021-12-28
Payer: MEDICARE

## 2021-12-28 ENCOUNTER — TELEPHONE (OUTPATIENT)
Dept: ADMINISTRATIVE | Facility: OTHER | Age: 66
End: 2021-12-28
Payer: MEDICARE

## 2021-12-28 ENCOUNTER — TELEPHONE (OUTPATIENT)
Dept: NEUROSURGERY | Facility: CLINIC | Age: 66
End: 2021-12-28
Payer: MEDICARE

## 2021-12-28 ENCOUNTER — TELEPHONE (OUTPATIENT)
Dept: FAMILY MEDICINE | Facility: CLINIC | Age: 66
End: 2021-12-28
Payer: MEDICARE

## 2021-12-28 DIAGNOSIS — M48.02 CERVICAL STENOSIS OF SPINAL CANAL: Primary | ICD-10-CM

## 2021-12-29 ENCOUNTER — TELEPHONE (OUTPATIENT)
Dept: FAMILY MEDICINE | Facility: CLINIC | Age: 66
End: 2021-12-29
Payer: MEDICARE

## 2021-12-29 DIAGNOSIS — I10 ESSENTIAL HYPERTENSION: Primary | ICD-10-CM

## 2022-01-03 ENCOUNTER — TELEPHONE (OUTPATIENT)
Dept: FAMILY MEDICINE | Facility: CLINIC | Age: 67
End: 2022-01-03
Payer: MEDICARE

## 2022-01-03 ENCOUNTER — NURSE TRIAGE (OUTPATIENT)
Dept: ADMINISTRATIVE | Facility: CLINIC | Age: 67
End: 2022-01-03
Payer: MEDICARE

## 2022-01-03 NOTE — TELEPHONE ENCOUNTER
Appt with Dr. Evans tomorrow, BP trending high.     @ 1530 BP spiked to 236/114 with HR 79, took .2mg clonidine improve to 201/103 at 1600. Took xanax and then repeat BP improved to 173/93 at 1715. Repeat BP at 1748: 176/85. Pt states also history of chronic back pain that elevates BP. Pt upset that new pain mgmt doctors took pt off all his pain meds. Pt thinks his BP issues are r/t pain control.    Has appt with Dr. Evans tomorrow.     Reason for Disposition   Systolic BP  >= 160 OR Diastolic >= 100    Additional Information   Negative: Difficult to awaken or acting confused (e.g., disoriented, slurred speech)   Negative: Severe difficulty breathing (e.g., struggling for each breath, speaks in single words)   Negative: [1] Weakness of the face, arm or leg on one side of the body AND [2] new onset   Negative: [1] Numbness (i.e., loss of sensation) of the face, arm or leg on one side of the body AND [2] new onset   Negative: [1] Chest pain lasts > 5 minutes AND [2] history of heart disease  (i.e., heart attack, bypass surgery, angina, angioplasty, CHF)   Negative: [1] Chest pain AND [2] took nitrogylcerin AND [3] pain was not relieved   Negative: Sounds like a life-threatening emergency to the triager   Negative: [1] Systolic BP  >= 160 OR Diastolic >= 100 AND [2] cardiac or neurologic symptoms (e.g., chest pain, difficulty breathing, unsteady gait, blurred vision)   Negative: [1] Systolic BP  >= 200 OR Diastolic >= 120  AND [2] having NO cardiac or neurologic symptoms   Negative: [1] Postpartum < 6 weeks AND [2] BP Systolic BP  >= 140 OR Diastolic >= 90   Negative: [1] Systolic BP  >= 180 OR Diastolic >= 110 AND [2] missed most recent dose of blood pressure medication   Negative: Systolic BP  >= 180 OR Diastolic >= 110   Negative: Ran out of BP medications    Protocols used: HIGH BLOOD PRESSURE-A-AH

## 2022-01-03 NOTE — TELEPHONE ENCOUNTER
----- Message from Shannon Lyles sent at 1/3/2022 11:55 AM CST -----  Name of Caller pt  Reason for Visit/Symptomspt requesting to be seen for bp. Call patient  Best Contact Number or Confirm if Mychart Preferred [phone] (home)     Preferred Date/Time of Appointment  Interested in Virtual Visit (yes/no)  Additional Information        
Patient stated that his BP has been runnin/90                                                                           184/108                                                                           165/90                                                                            146/74                                                                             167/101                                                                            181/93                                                                             145/85    1/3                                                                          236/114 ( over the phone while talking to patient. Patient was very upset talking on the phone about the neck and back pain he has been having. Stated that his xanax was cut down two a day and [place] 3x day. Has been taking an extra clonide to help with his blood pressure. Stated that he gets dizziness with taking the extra bp me.Patient was scheduled for an appt on tomorrow.   
Applied

## 2022-01-04 ENCOUNTER — OFFICE VISIT (OUTPATIENT)
Dept: FAMILY MEDICINE | Facility: CLINIC | Age: 67
End: 2022-01-04
Payer: MEDICARE

## 2022-01-04 VITALS
DIASTOLIC BLOOD PRESSURE: 70 MMHG | TEMPERATURE: 98 F | HEIGHT: 71 IN | OXYGEN SATURATION: 97 % | SYSTOLIC BLOOD PRESSURE: 138 MMHG | HEART RATE: 78 BPM | BODY MASS INDEX: 27.64 KG/M2

## 2022-01-04 DIAGNOSIS — F11.20 UNCOMPLICATED OPIOID DEPENDENCE: ICD-10-CM

## 2022-01-04 DIAGNOSIS — F13.20 BENZODIAZEPINE DEPENDENCE: ICD-10-CM

## 2022-01-04 DIAGNOSIS — F41.9 ANXIETY: ICD-10-CM

## 2022-01-04 DIAGNOSIS — M46.92 UNSPECIFIED INFLAMMATORY SPONDYLOPATHY, CERVICAL REGION: ICD-10-CM

## 2022-01-04 DIAGNOSIS — I10 ESSENTIAL HYPERTENSION: Primary | ICD-10-CM

## 2022-01-04 DIAGNOSIS — E11.36 TYPE 2 DIABETES MELLITUS WITH DIABETIC CATARACT, WITHOUT LONG-TERM CURRENT USE OF INSULIN: ICD-10-CM

## 2022-01-04 DIAGNOSIS — F33.41 RECURRENT MAJOR DEPRESSIVE DISORDER, IN PARTIAL REMISSION: ICD-10-CM

## 2022-01-04 DIAGNOSIS — M51.36 DDD (DEGENERATIVE DISC DISEASE), LUMBAR: ICD-10-CM

## 2022-01-04 DIAGNOSIS — M50.30 DDD (DEGENERATIVE DISC DISEASE), CERVICAL: ICD-10-CM

## 2022-01-04 PROCEDURE — 99213 PR OFFICE/OUTPT VISIT, EST, LEVL III, 20-29 MIN: ICD-10-PCS | Mod: S$GLB,,, | Performed by: INTERNAL MEDICINE

## 2022-01-04 PROCEDURE — 3008F BODY MASS INDEX DOCD: CPT | Mod: CPTII,S$GLB,, | Performed by: INTERNAL MEDICINE

## 2022-01-04 PROCEDURE — 1159F MED LIST DOCD IN RCRD: CPT | Mod: CPTII,S$GLB,, | Performed by: INTERNAL MEDICINE

## 2022-01-04 PROCEDURE — 3078F DIAST BP <80 MM HG: CPT | Mod: CPTII,S$GLB,, | Performed by: INTERNAL MEDICINE

## 2022-01-04 PROCEDURE — 1101F PR PT FALLS ASSESS DOC 0-1 FALLS W/OUT INJ PAST YR: ICD-10-PCS | Mod: CPTII,S$GLB,, | Performed by: INTERNAL MEDICINE

## 2022-01-04 PROCEDURE — 99999 PR PBB SHADOW E&M-EST. PATIENT-LVL V: ICD-10-PCS | Mod: PBBFAC,,, | Performed by: INTERNAL MEDICINE

## 2022-01-04 PROCEDURE — 1160F PR REVIEW ALL MEDS BY PRESCRIBER/CLIN PHARMACIST DOCUMENTED: ICD-10-PCS | Mod: CPTII,S$GLB,, | Performed by: INTERNAL MEDICINE

## 2022-01-04 PROCEDURE — 3008F PR BODY MASS INDEX (BMI) DOCUMENTED: ICD-10-PCS | Mod: CPTII,S$GLB,, | Performed by: INTERNAL MEDICINE

## 2022-01-04 PROCEDURE — 99999 PR PBB SHADOW E&M-EST. PATIENT-LVL V: CPT | Mod: PBBFAC,,, | Performed by: INTERNAL MEDICINE

## 2022-01-04 PROCEDURE — 1126F AMNT PAIN NOTED NONE PRSNT: CPT | Mod: CPTII,S$GLB,, | Performed by: INTERNAL MEDICINE

## 2022-01-04 PROCEDURE — 1160F RVW MEDS BY RX/DR IN RCRD: CPT | Mod: CPTII,S$GLB,, | Performed by: INTERNAL MEDICINE

## 2022-01-04 PROCEDURE — 1159F PR MEDICATION LIST DOCUMENTED IN MEDICAL RECORD: ICD-10-PCS | Mod: CPTII,S$GLB,, | Performed by: INTERNAL MEDICINE

## 2022-01-04 PROCEDURE — 1157F ADVNC CARE PLAN IN RCRD: CPT | Mod: CPTII,S$GLB,, | Performed by: INTERNAL MEDICINE

## 2022-01-04 PROCEDURE — 3075F PR MOST RECENT SYSTOLIC BLOOD PRESS GE 130-139MM HG: ICD-10-PCS | Mod: CPTII,S$GLB,, | Performed by: INTERNAL MEDICINE

## 2022-01-04 PROCEDURE — 99213 OFFICE O/P EST LOW 20 MIN: CPT | Mod: S$GLB,,, | Performed by: INTERNAL MEDICINE

## 2022-01-04 PROCEDURE — 3288F FALL RISK ASSESSMENT DOCD: CPT | Mod: CPTII,S$GLB,, | Performed by: INTERNAL MEDICINE

## 2022-01-04 PROCEDURE — 1101F PT FALLS ASSESS-DOCD LE1/YR: CPT | Mod: CPTII,S$GLB,, | Performed by: INTERNAL MEDICINE

## 2022-01-04 PROCEDURE — 3075F SYST BP GE 130 - 139MM HG: CPT | Mod: CPTII,S$GLB,, | Performed by: INTERNAL MEDICINE

## 2022-01-04 PROCEDURE — 1157F PR ADVANCE CARE PLAN OR EQUIV PRESENT IN MEDICAL RECORD: ICD-10-PCS | Mod: CPTII,S$GLB,, | Performed by: INTERNAL MEDICINE

## 2022-01-04 PROCEDURE — 1126F PR PAIN SEVERITY QUANTIFIED, NO PAIN PRESENT: ICD-10-PCS | Mod: CPTII,S$GLB,, | Performed by: INTERNAL MEDICINE

## 2022-01-04 PROCEDURE — 3078F PR MOST RECENT DIASTOLIC BLOOD PRESSURE < 80 MM HG: ICD-10-PCS | Mod: CPTII,S$GLB,, | Performed by: INTERNAL MEDICINE

## 2022-01-04 PROCEDURE — 3288F PR FALLS RISK ASSESSMENT DOCUMENTED: ICD-10-PCS | Mod: CPTII,S$GLB,, | Performed by: INTERNAL MEDICINE

## 2022-01-04 RX ORDER — FLUTICASONE PROPIONATE AND SALMETEROL 100; 50 UG/1; UG/1
1 POWDER RESPIRATORY (INHALATION)
COMMUNITY

## 2022-01-04 RX ORDER — CLONIDINE HYDROCHLORIDE 0.2 MG/1
0.2 TABLET ORAL 3 TIMES DAILY
Qty: 90 TABLET | Refills: 11 | Status: SHIPPED | OUTPATIENT
Start: 2022-01-04 | End: 2022-02-24 | Stop reason: SDUPTHER

## 2022-01-04 NOTE — PROGRESS NOTES
This note was created by combination of typed  and M-Modal dictation.  Transcription errors may be present.  If there are any questions, please contact me.    Assessment and Plan:   Essential hypertension  -home cuff over reads by about 5-7.  But essentially in the same ballpark.  Has had very high blood pressures and is symptomatic  Increase clonidine 0.2 mg to t.i.d. from b.i.d.  Stay on lisinopril and stay on verapamil  Never diuretic  I suspect clonidine was prescribed for a component to help with anxiety  -     cloNIDine (CATAPRES) 0.2 MG tablet; Take 1 tablet (0.2 mg total) by mouth 3 (three) times daily. Increased frequency  Dispense: 90 tablet; Refill: 11    Anxiety, unable to wean off BZD  Benzodiazepine dependence, did not do well with attempt to wean down 2017  Recurrent major depressive disorder, in partial remission  -stable on Xanax 1.5 tablets daily    Type 2 diabetes mellitus with diabetic cataract, without long-term current use of insulin  -     MICROALBUMIN / CREATININE RATIO URINE; Future; Expected date: 02/16/2022    Unspecified inflammatory spondylopathy, cervical region  DDD (degenerative disc disease), cervical  DDD (degenerative disc disease), lumbar  Uncomplicated opioid dependence  -has now reverted back to his outside pain management doctor Brandy, now on oxycodone 10 mg, 4 times daily  From a high of fentanyl 25, with hydrocodone 10 bid    Testosterone   -taking 150 mg every 2 weeks  Check prior to next OV    Medications Discontinued During This Encounter   Medication Reason    cloNIDine (CATAPRES) 0.2 MG tablet        meds sent this encounter:  Medications Ordered This Encounter   Medications    cloNIDine (CATAPRES) 0.2 MG tablet     Sig: Take 1 tablet (0.2 mg total) by mouth 3 (three) times daily. Increased frequency     Dispense:  90 tablet     Refill:  11       Follow Up: No follow-ups on file. OV 2/24 scheduled with labs prior    Subjective:     Chief Complaint   Patient  presents with    Anxiety    Hypertension       HPI  Butch is a 66 y.o. male, last appointment with this clinic was 2021.  Social History     Tobacco Use    Smoking status: Current Every Day Smoker     Packs/day: 0.50     Years: 32.00     Pack years: 16.00     Types: Cigarettes     Last attempt to quit: 3/4/2013     Years since quittin.8    Smokeless tobacco: Current User    Tobacco comment: weed    Substance Use Topics    Alcohol use: No     Alcohol/week: 0.0 standard drinks      Social History     Occupational History    Occupation: retired -       Social History     Social History Narrative    Not on file       DM a1c good on metformin  Lipid/statin  HTN borderline control  Anxiety BZD lower dose. Increased buspar to BID. Citalopram already on 40  Low testosterone, 200 mg q2 weeks. Decreased to 100  Chronic pain/chronic narcotic  Elevated MCV    Saw my colleague 2021  Testosterone low. Reverted back to 200 mg IM q2 weeks  a1c 6.2      ran out of benefits for nicotine patches until 2022     10/3/2021 LAMINECTOMY, SPINE, CERVICAL, WITH POSTERIOR FUSION C2-T2  10/9-10/18 inpt rehab    Saw [facility] pain mgmt after surgery.  rec's were to ultimately wean down the narcotic.    Saw neurosurgery in f/u.  Pt expressed to her that he wanted to see different pain management  I would like him to see his PCP and/or cardiology for his recent bradycardia and also to address his complaints of polypharmacy. I will also offer him an outpatient referral for pain management.     Last visit with me in late November  At the time his blood pressures were labile.  His clonidine was increased on discharge.  Previously baseline he was taking 0.2 mg b.i.d. and he was discharged on 0.3 mg t.i.d..  His home BP cuff over reads.  He was also restarted on gabapentin at 400 mg t.i.d..  Previously had been on 300 mg t.i.d..    At the time of his visit he had been taking 1.5 tablets of Xanax as I had  previously prescribed.  Taking it mainly at night.  Without it he does not sleep.  This is from a high of 3 tablets of Xanax daily.  History of psych consult, citalopram ineffective.    So last visit plan was revert back to previous dose clonidine 0.2 mg b.i.d..  Stay on verapamil and lisinopril.  Home cuff over reading.  Anxiety stay on alprazolam 1.5 tablets daily  Cervical and lumbar degenerative disc disease status post cervical laminectomy and posterior fusion.  Had been seeing outside pain management with fentanyl patch  Discharged from most recent surgery off of fentanyl patch with MS Contin and oxycodone  Saw our pain management who recommended long-term that he wean off of narcotics  The patient is lukewarm about following up with this pain management doctor and is interested in another opinion  He has not seen Dr. Nava since the epidural injection and I discussed that could go back to see him as that would complete the feedback loop, Dr. Nava never got informed about his events since the epidural  I also reinforced that long-term, narcotics are high risk, and that study she have shown that narcotics do not improve overall pain control in chronic pain  Low testosterone in male  -check labs, has reverted the past 2 times to 200 mg dose.  Last injection was about 3 days ago    Testosterone high, injection 200 mg about 3 days prior to labs  Previous testosterone low on 100  Would recommend 150 mg  A1c 6.7  PSA normal    12/13 Saw pain mgmt, referred to Dr. Guardado for long term management.  We discussed the risks of concurrent benzodiazepine use. Our goal is to wean off completely. We will take this slowly. He verbalized understanding.    - Decrease MS Contin to 15 mg daily.    - Decrease Oxycodone 10 mg to every 12 hours PRN breakthrough pain.   - Continue Gabapentin.     12/27 pain mgmt visit:  2) Continue tapering of opioids: discontinue MS Contin 15 mg and increase frequency of oxycodone 10 mg from q12h PRN  to q8h PRN  3) Discussed importance of only taking pain medication AS NEEDED, and to avoid scheduling narcotics  4) Next visit we will change to BID or switch back to Hydrocodone  5) Schedule Left SI joint injection for low back pain  6) Can consider repeat lumbar MRI if no relief of pain after procedure. (Most recent is 5 years old)  7) Follow-up in 2 weeks     Here with wife  Notes BP high  Taking meds as rx'd  Shows me log of readings   One as high as 200 systolic  Brings in new BP cuff  Verified accuracy - overreads by about 5-7 mm Hg  Took an extra dose of 1/2 clonidine with systolic going down from 200 to 150    Today on intake borderline, borderline systolic on repeat    Went back to previous pain mgmt Dr. Nava  Adjusted his pain meds   Oxycodone 10 mg 4 times daily (from q8 hours)  And plan is MRI L spine  Was encouraged to stop smoking in order to facilitate fusion of the neck surgery.     Patient Care Team:  Gabriel Evans MD as PCP - General (Internal Medicine)  Vlad Nava MD (Pain Medicine)  Kaila Carrillo OD as Consulting Physician (Optometry)  Yasmin Chen MA as Care Coordinator    Patient Active Problem List    Diagnosis Date Noted    Chronic low back pain 12/27/2021    Sacroiliac joint pain 12/27/2021    Sacroiliitis 12/27/2021    Impaired mobility and ADLs 10/07/2021    Obesity (BMI 30-39.9) 10/07/2021    Hyponatremia 10/07/2021    Status post surgery 10/03/2021    History of smoking 10-25 pack years 10/02/2021    Metabolic acidosis 10/02/2021    Paresthesia of left upper and lower extremity 10/01/2021    Low testosterone in male 09/02/2020 6/2020 Repeat labs prolactin was normal.  Testosterone was low.  Free testosterone was low and sex hormone binding globulin was normal      Elevated prolactin level,low testosterone, gynecomastia; MRI pituitary normal 5/2020 01/13/2020    Nuclear sclerosis of both eyes 11/04/2019    Open angle with borderline findings and high glaucoma  risk in both eyes 10/08/2019    Cataract, bilateral 09/11/2018    Cervical stenosis of spinal canal 10/3/2021 LAMINECTOMY, SPINE, CERVICAL, WITH POSTERIOR FUSION C2-T2 05/07/2018 02/14/2018 MRI C-spine impression:  Slight retrolisthesis of C4 with respect to C5.  Narrowing of the central spinal canal most prominent at the C4-C5, C5-C6, and C6-C7 levels and to a lesser extent at C2-C3 and C3-C4.  Annular disc bulges posteriorly at C2-C3, C3-C4.  Diffuse disc herniation/protrusion posteriorly at C4-C5 level and a disc herniation/extrusion posteriorly at the C5-C6 level with a central disc herniation/protrusion posteriorly at the C6-C7 level.  Narrowing of the neural foramen bilaterally from C3-C4 through C6-C7.  10/1/2021 admitted for L sided numbness after epidural injection, initially CT interpreted at [facility]; however subsequent MRI no hemorrhage but severe spinal canal stenosis    10/1/2021 MRI brain: Negative MRI of the brain for hemorrhage, mass or infarction. Specifically, no evidence of subarachnoid blood or blood products in the extra-axial spaces on SWI or diffusion-weighted images.  In light of the previous CTs and history of epidural injections at outside facility, this raises the question of in ejected contrast in the central spinal canal with migration into the intracranial subarachnoid spaces.  Subarachnoid hemorrhage or exudate are considered less likely.  10/1/2021 MRI C spine: Severe spinal canal stenosis with complete effacement of the CSF and spinal cord compression at the level of C5-C6 due to posterior disc osteophyte complex with superimposed right disc protrusion and congenitally narrow spinal canal.  High T2 signal within the cord secondary to either edema or myelomalacia.    10/3/2021 LAMINECTOMY, SPINE, CERVICAL, WITH POSTERIOR FUSION C2-T2      Tubular adenoma of colon 5/10/17 05/10/2017     5/10/2017 colonoscopy tubular adenoma      Therapeutic opioid-induced constipation (OIC)  04/17/2017    Tobacco dependence, patch with irritation; hx of bupropion 01/12/2016    Non morbid obesity due to excess calories 01/12/2016    Type 2 diabetes mellitus without complication, without long-term current use of insulin 08/07/2015    Facet arthritis of cervical region 10/28/2013    Facet arthritis of lumbar region 10/28/2013    Benzodiazepine dependence, did not do well with attempt to wean down 2017 10/28/2013    Opioid dependence, pain mgmt 10/28/2013    ED (erectile dysfunction) 07/26/2013    DDD (degenerative disc disease), cervical 05/23/2013    DDD (degenerative disc disease), lumbar 05/23/2013    Essential hypertension 11/01/2012    Hyperlipidemia, mixed 11/01/2012    Anemia 11/01/2012    Chronic pain 11/01/2012    Anxiety, unable to wean off BZD 11/01/2012    Depression 11/01/2012       PAST MEDICAL PROBLEMS, PAST SURGICAL HISTORY: please see relevant portions of the electronic medical record    ALLERGIES AND MEDICATIONS: updated and reviewed.  Review of patient's allergies indicates:  No Known Allergies    Medication List with Changes/Refills   Current Medications    ALPRAZOLAM (XANAX) 1 MG TABLET    Take 1 tablet (1 mg total) by mouth 2 (two) times daily as needed for Anxiety.    ATORVASTATIN (LIPITOR) 40 MG TABLET    TAKE ONE TABLET BY MOUTH ONCE A DAY FOR cholesterol    BUSPIRONE (BUSPAR) 30 MG TAB    Take 1 tablet (30 mg total) by mouth 2 (two) times daily.    CHLORHEXIDINE (SCRUB CHLORHEXIDINE GLUCONATE) 4 % EXTERNAL LIQUID    Apply topically 2 (two) times a day.    CITALOPRAM (CELEXA) 40 MG TABLET    TAKE ONE TABLET BY MOUTH ONCE A DAY    CLONIDINE (CATAPRES) 0.2 MG TABLET    Take 1 tablet (0.2 mg total) by mouth 2 (two) times daily. Decreased dose    FLUTICASONE (FLONASE) 50 MCG/ACTUATION NASAL SPRAY    INHALE 1 SPRAY IN EACH NOSTRIL EVERY DAY    FLUTICASONE FUROATE (ARNUITY ELLIPTA) 100 MCG/ACTUATION INHALER    Inhale 1 puff into the lungs once daily. Controller     "GABAPENTIN (NEURONTIN) 400 MG CAPSULE    Take 1 capsule (400 mg total) by mouth 3 (three) times daily.    INHALATION SPACING DEVICE    Use as directed for inhalation.    LISINOPRIL (PRINIVIL,ZESTRIL) 40 MG TABLET    TAKE ONE TABLET BY MOUTH ONCE A DAY    METFORMIN (GLUCOPHAGE) 500 MG TABLET    TAKE ONE TABLET BY MOUTH TWICE DAILY WITH MEALS    NALOXONE (NARCAN) 4 MG/ACTUATION SPRY    4mg by nasal route as needed for opioid overdose; may repeat every 2-3 minutes in alternating nostrils until medical help arrives. Call 911    NEEDLE, DISP, 22 G 22 GAUGE X 1" NDLE    Testosterone injection every 2 weeks    NICOTINE (NICODERM CQ) 21 MG/24 HR    Place 1 patch onto the skin once daily.    OXYCODONE (ROXICODONE) 10 MG TAB IMMEDIATE RELEASE TABLET    Take 1 tablet (10 mg total) by mouth every 8 (eight) hours as needed for Pain.    POLYETHYLENE GLYCOL 3350 (MIRALAX ORAL)    Take 1 application by mouth.    PROAIR HFA 90 MCG/ACTUATION INHALER    INHALE TWO PUFFS BY MOUTH EVERY 6 HOURS AS NEEDED FOR WHEEZING (rescue)    SENNA (SENOKOT) 8.6 MG TABLET    Take 1 tablet by mouth 2 (two) times a day.    SYRINGE, DISPOSABLE, (BD LUER-STARLA SYRINGE) 1 ML SYRG    Testosterone injection every 2 weeks    TADALAFIL (CIALIS) 20 MG TAB    Take 1 tablet (20 mg total) by mouth once daily.    TESTOSTERONE CYPIONATE (DEPOTESTOTERONE CYPIONATE) 200 MG/ML INJECTION    Inject 0.75 mLs (150 mg total) into the muscle every 14 (fourteen) days. Decreased dose    VERAPAMIL (CALAN-SR) 180 MG CR TABLET    Take 1 tablet (180 mg total) by mouth 2 (two) times daily.        Objective:   Physical Exam   Vitals:    01/04/22 1347 01/04/22 1414   BP: (!) 172/84 138/70   BP Location: Right arm Left arm   Patient Position: Sitting Sitting   BP Method: Large (Manual) Medium (Manual)   Pulse: 78    Temp: 98.3 °F (36.8 °C)    TempSrc: Oral    SpO2: 97%    Height: 5' 11" (1.803 m)     Body mass index is 27.64 kg/m².            Physical Exam  Constitutional:       " General: He is not in acute distress.     Appearance: He is well-developed and well-nourished.   Eyes:      Extraocular Movements: EOM normal.   Cardiovascular:      Rate and Rhythm: Normal rate and regular rhythm.      Heart sounds: Normal heart sounds. No murmur heard.      Pulmonary:      Effort: Pulmonary effort is normal.      Breath sounds: Normal breath sounds.   Musculoskeletal:         General: Normal range of motion.   Skin:     General: Skin is warm and dry.   Neurological:      Mental Status: He is alert and oriented to person, place, and time.      Coordination: Coordination normal.   Psychiatric:         Mood and Affect: Mood and affect normal.         Behavior: Behavior normal.         Thought Content: Thought content normal.

## 2022-01-07 ENCOUNTER — CLINICAL SUPPORT (OUTPATIENT)
Dept: SMOKING CESSATION | Facility: CLINIC | Age: 67
End: 2022-01-07
Payer: COMMERCIAL

## 2022-01-07 DIAGNOSIS — F17.200 NICOTINE DEPENDENCE: ICD-10-CM

## 2022-01-07 PROCEDURE — 99402 PR PREVENT COUNSEL,INDIV,30 MIN: ICD-10-PCS | Mod: S$GLB,,,

## 2022-01-07 PROCEDURE — 99402 PREV MED CNSL INDIV APPRX 30: CPT | Mod: S$GLB,,,

## 2022-01-07 PROCEDURE — 99999 PR PBB SHADOW E&M-EST. PATIENT-LVL I: ICD-10-PCS | Mod: PBBFAC,,,

## 2022-01-07 PROCEDURE — 99999 PR PBB SHADOW E&M-EST. PATIENT-LVL I: CPT | Mod: PBBFAC,,,

## 2022-01-07 RX ORDER — IBUPROFEN 200 MG
1 TABLET ORAL DAILY
Qty: 28 PATCH | Refills: 0 | Status: SHIPPED | OUTPATIENT
Start: 2022-01-07 | End: 2022-02-09 | Stop reason: SDUPTHER

## 2022-01-07 NOTE — PROGRESS NOTES
Individual Follow-Up Form    1/7/2022    Quit Date: n/a    Clinical Status of Patient: Outpatient    Length of Service: 30 minutes    Continuing Medication: yes  Patches    Other Medications: n/a     Target Symptoms: Withdrawal and medication side effects. The following were  rated moderate (3) to severe (4) on TCRS:  · Moderate (3): 0  · Severe (4): 0    Comments: patient presents for follow up telephonically smoking a few per day and feeling much better. He is currently on the 21mg nicotine patch daily and working on rate reduction and getting his wife on board to quit, also spoke with her and will schedule her an appt for Monday at 3pm also telephonically. Recommend he remain on the same dose of the nicotine patch for another 2 weeks, strategies discussed as well as session handout, will follow     Diagnosis: F17.210    Next Visit: 2 weeks

## 2022-01-07 NOTE — Clinical Note
Comments: patient presents for follow up telephonically smoking a few per day and feeling much better. He is currently on the 21mg nicotine patch daily and working on rate reduction and getting his wife on board to quit, also spoke with her and will schedule her an appt for Monday at 3pm also telephonically. Recommend he remain on the same dose of the nicotine patch for another 2 weeks, strategies discussed as well as session handout, will follow

## 2022-01-13 ENCOUNTER — HOSPITAL ENCOUNTER (OUTPATIENT)
Dept: RADIOLOGY | Facility: HOSPITAL | Age: 67
Discharge: HOME | End: 2022-01-13
Attending: NEUROLOGICAL SURGERY
Payer: MEDICARE

## 2022-01-13 ENCOUNTER — PES CALL (OUTPATIENT)
Dept: ADMINISTRATIVE | Facility: CLINIC | Age: 67
End: 2022-01-13
Payer: MEDICARE

## 2022-01-13 ENCOUNTER — OFFICE VISIT (OUTPATIENT)
Dept: NEUROSURGERY | Facility: CLINIC | Age: 67
End: 2022-01-13
Payer: MEDICARE

## 2022-01-13 VITALS
HEART RATE: 43 BPM | SYSTOLIC BLOOD PRESSURE: 145 MMHG | WEIGHT: 200 LBS | HEIGHT: 71 IN | BODY MASS INDEX: 28 KG/M2 | DIASTOLIC BLOOD PRESSURE: 71 MMHG

## 2022-01-13 DIAGNOSIS — M48.02 CERVICAL STENOSIS OF SPINAL CANAL: Primary | ICD-10-CM

## 2022-01-13 DIAGNOSIS — M48.02 CERVICAL STENOSIS OF SPINAL CANAL: ICD-10-CM

## 2022-01-13 PROCEDURE — 1100F PR PT FALLS ASSESS DOC 2+ FALLS/FALL W/INJURY/YR: ICD-10-PCS | Mod: CPTII,S$GLB,, | Performed by: NEUROLOGICAL SURGERY

## 2022-01-13 PROCEDURE — 1157F PR ADVANCE CARE PLAN OR EQUIV PRESENT IN MEDICAL RECORD: ICD-10-PCS | Mod: CPTII,S$GLB,, | Performed by: NEUROLOGICAL SURGERY

## 2022-01-13 PROCEDURE — 3078F PR MOST RECENT DIASTOLIC BLOOD PRESSURE < 80 MM HG: ICD-10-PCS | Mod: CPTII,S$GLB,, | Performed by: NEUROLOGICAL SURGERY

## 2022-01-13 PROCEDURE — 3077F SYST BP >= 140 MM HG: CPT | Mod: CPTII,S$GLB,, | Performed by: NEUROLOGICAL SURGERY

## 2022-01-13 PROCEDURE — 1159F MED LIST DOCD IN RCRD: CPT | Mod: CPTII,S$GLB,, | Performed by: NEUROLOGICAL SURGERY

## 2022-01-13 PROCEDURE — 1157F ADVNC CARE PLAN IN RCRD: CPT | Mod: CPTII,S$GLB,, | Performed by: NEUROLOGICAL SURGERY

## 2022-01-13 PROCEDURE — 1100F PTFALLS ASSESS-DOCD GE2>/YR: CPT | Mod: CPTII,S$GLB,, | Performed by: NEUROLOGICAL SURGERY

## 2022-01-13 PROCEDURE — 3077F PR MOST RECENT SYSTOLIC BLOOD PRESSURE >= 140 MM HG: ICD-10-PCS | Mod: CPTII,S$GLB,, | Performed by: NEUROLOGICAL SURGERY

## 2022-01-13 PROCEDURE — 99999 PR PBB SHADOW E&M-EST. PATIENT-LVL IV: CPT | Mod: PBBFAC,,, | Performed by: NEUROLOGICAL SURGERY

## 2022-01-13 PROCEDURE — 99024 PR POST-OP FOLLOW-UP VISIT: ICD-10-PCS | Mod: S$GLB,,, | Performed by: NEUROLOGICAL SURGERY

## 2022-01-13 PROCEDURE — 1125F PR PAIN SEVERITY QUANTIFIED, PAIN PRESENT: ICD-10-PCS | Mod: CPTII,S$GLB,, | Performed by: NEUROLOGICAL SURGERY

## 2022-01-13 PROCEDURE — 3288F FALL RISK ASSESSMENT DOCD: CPT | Mod: CPTII,S$GLB,, | Performed by: NEUROLOGICAL SURGERY

## 2022-01-13 PROCEDURE — 3008F BODY MASS INDEX DOCD: CPT | Mod: CPTII,S$GLB,, | Performed by: NEUROLOGICAL SURGERY

## 2022-01-13 PROCEDURE — 99024 POSTOP FOLLOW-UP VISIT: CPT | Mod: S$GLB,,, | Performed by: NEUROLOGICAL SURGERY

## 2022-01-13 PROCEDURE — 1159F PR MEDICATION LIST DOCUMENTED IN MEDICAL RECORD: ICD-10-PCS | Mod: CPTII,S$GLB,, | Performed by: NEUROLOGICAL SURGERY

## 2022-01-13 PROCEDURE — 72040 X-RAY EXAM NECK SPINE 2-3 VW: CPT | Mod: TC

## 2022-01-13 PROCEDURE — 99999 PR PBB SHADOW E&M-EST. PATIENT-LVL IV: ICD-10-PCS | Mod: PBBFAC,,, | Performed by: NEUROLOGICAL SURGERY

## 2022-01-13 PROCEDURE — 72040 X-RAY EXAM NECK SPINE 2-3 VW: CPT | Mod: 26,,, | Performed by: RADIOLOGY

## 2022-01-13 PROCEDURE — 3078F DIAST BP <80 MM HG: CPT | Mod: CPTII,S$GLB,, | Performed by: NEUROLOGICAL SURGERY

## 2022-01-13 PROCEDURE — 3008F PR BODY MASS INDEX (BMI) DOCUMENTED: ICD-10-PCS | Mod: CPTII,S$GLB,, | Performed by: NEUROLOGICAL SURGERY

## 2022-01-13 PROCEDURE — 1125F AMNT PAIN NOTED PAIN PRSNT: CPT | Mod: CPTII,S$GLB,, | Performed by: NEUROLOGICAL SURGERY

## 2022-01-13 PROCEDURE — 72040 XR CERVICAL SPINE AP LATERAL: ICD-10-PCS | Mod: 26,,, | Performed by: RADIOLOGY

## 2022-01-13 PROCEDURE — 3288F PR FALLS RISK ASSESSMENT DOCUMENTED: ICD-10-PCS | Mod: CPTII,S$GLB,, | Performed by: NEUROLOGICAL SURGERY

## 2022-01-13 RX ORDER — OXYCODONE AND ACETAMINOPHEN 10; 325 MG/1; MG/1
1 TABLET ORAL EVERY 6 HOURS PRN
COMMUNITY
Start: 2022-01-07 | End: 2022-11-17

## 2022-01-13 NOTE — PROGRESS NOTES
Neurosurgery  Established Patient    SUBJECTIVE:     History of Present Illness:  Butch Mcdaniel is a 66 y.o. male who presented with profound sensory changes after receiving an epidural steroid injection at an outside facility. CT and MRI demonstrated significant cervical stenosis and myelomalacia with segmental OPLL. Thus, we offered him posterior cervical decompression and fusion. He underwent C2-T2 fusion with C2-C7 laminectomy on 10/3/21.     As of 11/18/21, the patient reports his hand function has improved significantly since surgery. He does have paresthesias, worst in the left foot.     He has completed HHPT. He is ready to do outpatient therapy. He still has home health nurse services until December 15.     He is smoking about a quarter to a half a pack a day. He is also smoking marijuana to help with his pain. He had a difference of opinions with his last pain doctor, and he is also reluctant to return to the doctor who injected him initially.     As of 1/13/22, the patient reports that he has been doing well. He is not interested in doing PT. He is now able to button his shirt. He has quit smoking tobacco. He is still experiencing low back pain.     Review of patient's allergies indicates:  No Known Allergies    Current Outpatient Medications   Medication Sig Dispense Refill    ALPRAZolam (XANAX) 1 MG tablet Take 1 tablet (1 mg total) by mouth 2 (two) times daily as needed for Anxiety. 45 tablet 0    atorvastatin (LIPITOR) 40 MG tablet TAKE ONE TABLET BY MOUTH ONCE A DAY FOR cholesterol 90 tablet 3    busPIRone (BUSPAR) 30 MG Tab Take 1 tablet (30 mg total) by mouth 2 (two) times daily. 60 tablet 11    chlorhexidine (SCRUB CHLORHEXIDINE GLUCONATE) 4 % external liquid Apply topically 2 (two) times a day. 473 mL 0    citalopram (CELEXA) 40 MG tablet TAKE ONE TABLET BY MOUTH ONCE A DAY 90 tablet 3    cloNIDine (CATAPRES) 0.2 MG tablet Take 1 tablet (0.2 mg total) by mouth 3 (three)  "times daily. Increased frequency 90 tablet 11    fluticasone (FLONASE) 50 mcg/actuation nasal spray INHALE 1 SPRAY IN EACH NOSTRIL EVERY DAY 16 mL 0    fluticasone furoate (ARNUITY ELLIPTA) 100 mcg/actuation inhaler Inhale 1 puff into the lungs once daily. Controller      fluticasone-salmeterol diskus inhaler 100-50 mcg Inhale 1 puff into the lungs.      gabapentin (NEURONTIN) 400 MG capsule Take 1 capsule (400 mg total) by mouth 3 (three) times daily. 90 capsule 11    inhalation spacing device Use as directed for inhalation. 1 each 0    lisinopriL (PRINIVIL,ZESTRIL) 40 MG tablet TAKE ONE TABLET BY MOUTH ONCE A DAY 90 tablet 3    metFORMIN (GLUCOPHAGE) 500 MG tablet TAKE ONE TABLET BY MOUTH TWICE DAILY WITH MEALS 180 tablet 3    naloxone (NARCAN) 4 mg/actuation Spry 4mg by nasal route as needed for opioid overdose; may repeat every 2-3 minutes in alternating nostrils until medical help arrives. Call 911 1 each 11    needle, disp, 22 G 22 gauge x 1" Ndle Testosterone injection every 2 weeks 6 each 0    nicotine (NICODERM CQ) 21 mg/24 hr Place 1 patch onto the skin once daily. 28 patch 0    oxyCODONE-acetaminophen (PERCOCET)  mg per tablet Take 1 tablet by mouth every 6 (six) hours as needed.      POLYETHYLENE GLYCOL 3350 (MIRALAX ORAL) Take 1 application by mouth.      PROAIR HFA 90 mcg/actuation inhaler INHALE TWO PUFFS BY MOUTH EVERY 6 HOURS AS NEEDED FOR WHEEZING (rescue) 8.5 g 0    senna (SENOKOT) 8.6 mg tablet Take 1 tablet by mouth 2 (two) times a day.      syringe, disposable, (BD LUER-STARLA SYRINGE) 1 mL Syrg Testosterone injection every 2 weeks 6 Syringe 0    tadalafil (CIALIS) 20 MG Tab Take 1 tablet (20 mg total) by mouth once daily. 10 tablet 10    testosterone cypionate (DEPOTESTOTERONE CYPIONATE) 200 mg/mL injection Inject 0.75 mLs (150 mg total) into the muscle every 14 (fourteen) days. Decreased dose 10 mL 0    verapamiL (CALAN-SR) 180 MG CR tablet Take 1 tablet (180 mg total) by " mouth 2 (two) times daily. 180 tablet 3     No current facility-administered medications for this visit.       Past Medical History:   Diagnosis Date    Anxiety     Cataract, bilateral 2018    Degenerative disc disease     Depression     Diabetes mellitus type II, controlled     ETOH abuse     Eye injury as a child    stick hit eye ? 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 both eyes 10/8/2019     Past Surgical History:   Procedure Laterality Date    APPENDECTOMY      COLONOSCOPY N/A 5/10/2017    Procedure: COLONOSCOPY;  Surgeon: Shantanu Marley MD;  [facility] ENDO;  Service: Endoscopy;  Laterality: N/A;    POSTERIOR FUSION OF CERVICAL SPINE WITH LAMINECTOMY N/A 10/3/2021    Procedure: LAMINECTOMY, SPINE, CERVICAL, WITH POSTERIOR FUSION C2-T2;  Surgeon: Ana Rosa Geller MD;  Location: [facility] OR [address] [place] [address];  Service: Neurosurgery;  Laterality: N/A;     Family History     Problem Relation (Age of Onset)    Alcohol abuse Father    Diabetes Son    Heart disease Mother, Father    No Known Problems Sister, Brother, Maternal Aunt, Maternal Uncle, Paternal Aunt, Paternal Uncle, Maternal Grandmother, Maternal Grandfather, Paternal Grandmother, Paternal Grandfather        Social History     Socioeconomic History    Marital status:    Occupational History    Occupation: retired -    Tobacco Use    Smoking status: Current Every Day Smoker     Packs/day: 0.50     Years: 32.00     Pack years: 16.00     Types: Cigarettes     Last attempt to quit: 3/4/2013     Years since quittin.8    Smokeless tobacco: Current User    Tobacco comment: weed    Substance and Sexual Activity    Alcohol use: No     Alcohol/week: 0.0 standard drinks    Drug use: No    Sexual activity: Yes     Partners: Female     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   HENT: Negative for nosebleeds.    Eyes: Negative for visual disturbance.   Respiratory: Negative for shortness of breath.    Cardiovascular: Negative for chest pain.   Gastrointestinal: Negative for vomiting.   Endocrine: Negative for cold intolerance.   Genitourinary: Negative for difficulty urinating.   Musculoskeletal: Positive for neck pain.   Skin: Negative for color change.   Neurological: Positive for numbness.   Hematological: Bruises/bleeds easily.   Psychiatric/Behavioral: Positive for dysphoric mood.       OBJECTIVE:     Vital Signs  Pulse: (!) 43  BP: (!) 145/71  Pain Score:   4  Height: 5' 11" (180.3 cm)  Weight: 90.7 kg (200 lb)  Body mass index is 27.89 kg/m².    Physical Exam:    Constitutional: He appears well-developed and well-nourished. No distress.     Eyes: EOM are normal.     Abdominal: Soft.     Skin:   Incision very well healed      Psych/Behavior: He is alert. He is oriented to person, place, and time.     Musculoskeletal:      Comments: No focal weakness     Pulmonary: nonlabored respirations     Hematologic: no bruising noted     Diagnostic Results:  Xrays show stable position of hardware     ASSESSMENT/PLAN:     Butch Mcdaniel is a 66 y.o. male who presented with cervical spinal cord injury, now s/p C2-T2 fusion with laminectomy on 10/3/21.     Overall, he has been doing well since surgery. His strength is significantly improved, and his incision has healed beautifully. He has quit smoking (smoking cessation was praised). We will work to get him a bone growth stimulator. He no longer needs to wear his cervical collar.      I would like him to participate with outpatient PT. He is willing to contemplate this. I would like to see him back about 6 months postop with CT of the cervical spine.     I have encouraged him to contact the clinic in interim with any questions, concerns, or adverse clinical " change.

## 2022-01-24 ENCOUNTER — OFFICE VISIT (OUTPATIENT)
Dept: CARDIOLOGY | Facility: CLINIC | Age: 67
End: 2022-01-24
Payer: MEDICARE

## 2022-01-24 VITALS
DIASTOLIC BLOOD PRESSURE: 65 MMHG | HEART RATE: 55 BPM | SYSTOLIC BLOOD PRESSURE: 110 MMHG | WEIGHT: 200 LBS | BODY MASS INDEX: 28 KG/M2 | HEIGHT: 71 IN

## 2022-01-24 DIAGNOSIS — D64.9 ANEMIA, UNSPECIFIED TYPE: ICD-10-CM

## 2022-01-24 DIAGNOSIS — M50.30 DDD (DEGENERATIVE DISC DISEASE), CERVICAL: ICD-10-CM

## 2022-01-24 DIAGNOSIS — E66.9 OBESITY (BMI 30-39.9): ICD-10-CM

## 2022-01-24 DIAGNOSIS — Z78.9 IMPAIRED MOBILITY AND ADLS: ICD-10-CM

## 2022-01-24 DIAGNOSIS — H25.13 NUCLEAR SCLEROSIS OF BOTH EYES: ICD-10-CM

## 2022-01-24 DIAGNOSIS — T40.2X5A THERAPEUTIC OPIOID-INDUCED CONSTIPATION (OIC): ICD-10-CM

## 2022-01-24 DIAGNOSIS — Z74.09 IMPAIRED MOBILITY AND ADLS: ICD-10-CM

## 2022-01-24 DIAGNOSIS — F11.20 UNCOMPLICATED OPIOID DEPENDENCE: ICD-10-CM

## 2022-01-24 DIAGNOSIS — K59.03 THERAPEUTIC OPIOID-INDUCED CONSTIPATION (OIC): ICD-10-CM

## 2022-01-24 DIAGNOSIS — I10 ESSENTIAL HYPERTENSION: ICD-10-CM

## 2022-01-24 DIAGNOSIS — E11.9 TYPE 2 DIABETES MELLITUS WITHOUT COMPLICATION, WITHOUT LONG-TERM CURRENT USE OF INSULIN: ICD-10-CM

## 2022-01-24 DIAGNOSIS — E78.2 HYPERLIPIDEMIA, MIXED: Primary | ICD-10-CM

## 2022-01-24 DIAGNOSIS — E87.20 METABOLIC ACIDOSIS: ICD-10-CM

## 2022-01-24 DIAGNOSIS — N52.9 ERECTILE DYSFUNCTION, UNSPECIFIED ERECTILE DYSFUNCTION TYPE: ICD-10-CM

## 2022-01-24 DIAGNOSIS — F17.200 TOBACCO DEPENDENCE: ICD-10-CM

## 2022-01-24 PROCEDURE — 1159F PR MEDICATION LIST DOCUMENTED IN MEDICAL RECORD: ICD-10-PCS | Mod: CPTII,S$GLB,, | Performed by: INTERNAL MEDICINE

## 2022-01-24 PROCEDURE — 3078F PR MOST RECENT DIASTOLIC BLOOD PRESSURE < 80 MM HG: ICD-10-PCS | Mod: CPTII,S$GLB,, | Performed by: INTERNAL MEDICINE

## 2022-01-24 PROCEDURE — 3288F FALL RISK ASSESSMENT DOCD: CPT | Mod: CPTII,S$GLB,, | Performed by: INTERNAL MEDICINE

## 2022-01-24 PROCEDURE — 99204 PR OFFICE/OUTPT VISIT, NEW, LEVL IV, 45-59 MIN: ICD-10-PCS | Mod: S$GLB,,, | Performed by: INTERNAL MEDICINE

## 2022-01-24 PROCEDURE — 1160F RVW MEDS BY RX/DR IN RCRD: CPT | Mod: CPTII,S$GLB,, | Performed by: INTERNAL MEDICINE

## 2022-01-24 PROCEDURE — 1157F PR ADVANCE CARE PLAN OR EQUIV PRESENT IN MEDICAL RECORD: ICD-10-PCS | Mod: CPTII,S$GLB,, | Performed by: INTERNAL MEDICINE

## 2022-01-24 PROCEDURE — 3074F SYST BP LT 130 MM HG: CPT | Mod: CPTII,S$GLB,, | Performed by: INTERNAL MEDICINE

## 2022-01-24 PROCEDURE — 1157F ADVNC CARE PLAN IN RCRD: CPT | Mod: CPTII,S$GLB,, | Performed by: INTERNAL MEDICINE

## 2022-01-24 PROCEDURE — 3074F PR MOST RECENT SYSTOLIC BLOOD PRESSURE < 130 MM HG: ICD-10-PCS | Mod: CPTII,S$GLB,, | Performed by: INTERNAL MEDICINE

## 2022-01-24 PROCEDURE — 1100F PTFALLS ASSESS-DOCD GE2>/YR: CPT | Mod: CPTII,S$GLB,, | Performed by: INTERNAL MEDICINE

## 2022-01-24 PROCEDURE — 1100F PR PT FALLS ASSESS DOC 2+ FALLS/FALL W/INJURY/YR: ICD-10-PCS | Mod: CPTII,S$GLB,, | Performed by: INTERNAL MEDICINE

## 2022-01-24 PROCEDURE — 99499 RISK ADDL DX/OHS AUDIT: ICD-10-PCS | Mod: S$GLB,,, | Performed by: INTERNAL MEDICINE

## 2022-01-24 PROCEDURE — 1160F PR REVIEW ALL MEDS BY PRESCRIBER/CLIN PHARMACIST DOCUMENTED: ICD-10-PCS | Mod: CPTII,S$GLB,, | Performed by: INTERNAL MEDICINE

## 2022-01-24 PROCEDURE — 3288F PR FALLS RISK ASSESSMENT DOCUMENTED: ICD-10-PCS | Mod: CPTII,S$GLB,, | Performed by: INTERNAL MEDICINE

## 2022-01-24 PROCEDURE — 3008F BODY MASS INDEX DOCD: CPT | Mod: CPTII,S$GLB,, | Performed by: INTERNAL MEDICINE

## 2022-01-24 PROCEDURE — 99999 PR PBB SHADOW E&M-EST. PATIENT-LVL V: ICD-10-PCS | Mod: PBBFAC,,, | Performed by: INTERNAL MEDICINE

## 2022-01-24 PROCEDURE — 99204 OFFICE O/P NEW MOD 45 MIN: CPT | Mod: S$GLB,,, | Performed by: INTERNAL MEDICINE

## 2022-01-24 PROCEDURE — 3008F PR BODY MASS INDEX (BMI) DOCUMENTED: ICD-10-PCS | Mod: CPTII,S$GLB,, | Performed by: INTERNAL MEDICINE

## 2022-01-24 PROCEDURE — 1125F AMNT PAIN NOTED PAIN PRSNT: CPT | Mod: CPTII,S$GLB,, | Performed by: INTERNAL MEDICINE

## 2022-01-24 PROCEDURE — 99499 UNLISTED E&M SERVICE: CPT | Mod: S$GLB,,, | Performed by: INTERNAL MEDICINE

## 2022-01-24 PROCEDURE — 99999 PR PBB SHADOW E&M-EST. PATIENT-LVL V: CPT | Mod: PBBFAC,,, | Performed by: INTERNAL MEDICINE

## 2022-01-24 PROCEDURE — 1125F PR PAIN SEVERITY QUANTIFIED, PAIN PRESENT: ICD-10-PCS | Mod: CPTII,S$GLB,, | Performed by: INTERNAL MEDICINE

## 2022-01-24 PROCEDURE — 3078F DIAST BP <80 MM HG: CPT | Mod: CPTII,S$GLB,, | Performed by: INTERNAL MEDICINE

## 2022-01-24 PROCEDURE — 1159F MED LIST DOCD IN RCRD: CPT | Mod: CPTII,S$GLB,, | Performed by: INTERNAL MEDICINE

## 2022-01-24 RX ORDER — HYDRALAZINE HYDROCHLORIDE 25 MG/1
25 TABLET, FILM COATED ORAL 3 TIMES DAILY
Qty: 90 TABLET | Refills: 3 | Status: SHIPPED | OUTPATIENT
Start: 2022-01-24 | End: 2022-02-21

## 2022-01-24 NOTE — PROGRESS NOTES
CARDIOVASCULAR CONSULTATION    REASON FOR CONSULT:   Butch Mcdaniel is a 66 y.o. male who presents for evaluation.      HISTORY OF PRESENT ILLNESS:     Patient is a pleasant 66-year-old man.  Here for evaluation.  States blood pressure fluctuates a lot at home.  Fluctuates between 110-200 mmHg.  States that he has clonidine at home and whenever his blood pressure is elevated he takes an extra dose of clonidine.  Denies any chest pains at rest on exertion.  Denies orthopnea, PND, swelling of feet.      PAST MEDICAL HISTORY:     Past Medical History:   Diagnosis Date    Anxiety     Cataract, bilateral 9/11/2018    Degenerative disc disease     Depression     Diabetes mellitus type II, controlled     ETOH abuse     Eye injury as a child    stick hit eye ? 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 both eyes 10/8/2019       PAST SURGICAL HISTORY:     Past Surgical History:   Procedure Laterality Date    APPENDECTOMY      COLONOSCOPY N/A 5/10/2017    Procedure: COLONOSCOPY;  Surgeon: Shantanu Marley MD;  Location: [facility];  Service: Endoscopy;  Laterality: N/A;    POSTERIOR FUSION OF CERVICAL SPINE WITH LAMINECTOMY N/A 10/3/2021    Procedure: LAMINECTOMY, SPINE, CERVICAL, WITH POSTERIOR FUSION C2-T2;  Surgeon: Ana Rosa Geller MD;  Location: [facility] OR [address] [place] [address];  Service: Neurosurgery;  Laterality: N/A;       ALLERGIES AND MEDICATION:   Review of patient's allergies indicates:  No Known Allergies     Medication List          Accurate as of January 24, 2022 11:15 AM. If you have any questions, ask your nurse or doctor.            CONTINUE taking these medications    ALPRAZolam 1 MG tablet  Commonly known as: XANAX  Take 1 tablet (1 mg total) by mouth 2 (two) times daily as needed for Anxiety.     atorvastatin 40 MG tablet  Commonly known as: LIPITOR  TAKE ONE  "TABLET BY MOUTH ONCE A DAY FOR cholesterol     BD LUER-STARLA SYRINGE 1 mL Syrg  Generic drug: syringe (disposable)  Testosterone injection every 2 weeks     busPIRone 30 MG Tab  Commonly known as: BUSPAR  Take 1 tablet (30 mg total) by mouth 2 (two) times daily.     chlorhexidine 4 % external liquid  Commonly known as: SCRUB CHLORHEXIDINE GLUCONATE  Apply topically 2 (two) times a day.     citalopram 40 MG tablet  Commonly known as: CeleXA  TAKE ONE TABLET BY MOUTH ONCE A DAY     cloNIDine 0.2 MG tablet  Commonly known as: CATAPRES  Take 1 tablet (0.2 mg total) by mouth 3 (three) times daily. Increased frequency     fluticasone furoate 100 mcg/actuation inhaler  Commonly known as: ARNUITY ELLIPTA  Inhale 1 puff into the lungs once daily. Controller     fluticasone propionate 50 mcg/actuation nasal spray  Commonly known as: FLONASE  INHALE 1 SPRAY IN EACH NOSTRIL EVERY DAY     fluticasone-salmeterol 100-50 mcg/dose 100-50 mcg/dose diskus inhaler  Commonly known as: ADVAIR     gabapentin 400 MG capsule  Commonly known as: NEURONTIN  Take 1 capsule (400 mg total) by mouth 3 (three) times daily.     inhalation spacing device  Use as directed for inhalation.     lisinopriL 40 MG tablet  Commonly known as: PRINIVIL,ZESTRIL  TAKE ONE TABLET BY MOUTH ONCE A DAY     metFORMIN 500 MG tablet  Commonly known as: GLUCOPHAGE  TAKE ONE TABLET BY MOUTH TWICE DAILY WITH MEALS     MIRALAX ORAL     naloxone 4 mg/actuation Spry  Commonly known as: NARCAN  4mg by nasal route as needed for opioid overdose; may repeat every 2-3 minutes in alternating nostrils until medical help arrives. Call 911     needle (disp) 22 G 22 gauge x 1" Ndle  Testosterone injection every 2 weeks     nicotine 21 mg/24 hr  Commonly known as: NICODERM CQ  Place 1 patch onto the skin once daily.     oxyCODONE-acetaminophen  mg per tablet  Commonly known as: PERCOCET     PROAIR HFA 90 mcg/actuation inhaler  Generic drug: albuterol  INHALE TWO PUFFS BY MOUTH " EVERY 6 HOURS AS NEEDED FOR WHEEZING (rescue)     senna 8.6 mg tablet  Commonly known as: SENOKOT  Take 1 tablet by mouth 2 (two) times a day.     tadalafiL 20 MG Tab  Commonly known as: CIALIS  Take 1 tablet (20 mg total) by mouth once daily.     testosterone cypionate 200 mg/mL injection  Commonly known as: DEPOTESTOTERONE CYPIONATE  Inject 0.75 mLs (150 mg total) into the muscle every 14 (fourteen) days. Decreased dose     verapamiL 180 MG CR tablet  Commonly known as: CALAN-SR  Take 1 tablet (180 mg total) by mouth 2 (two) times daily.            SOCIAL HISTORY:     Social History     Socioeconomic History    Marital status:    Occupational History    Occupation: retired - tug boat captain   Tobacco Use    Smoking status: Current Every Day Smoker     Packs/day: 0.50     Years: 32.00     Pack years: 16.00     Types: Cigarettes     Last attempt to quit: 3/4/2013     Years since quittin.8    Smokeless tobacco: Current User    Tobacco comment: weed    Substance and Sexual Activity    Alcohol use: No     Alcohol/week: 0.0 standard drinks    Drug use: No    Sexual activity: Yes     Partners: Female     Comment:  with children, disabled tug boat captain       FAMILY HISTORY:     Family History   Problem Relation Age of Onset    Heart disease Mother     Heart disease Father     Alcohol abuse Father     Diabetes Son     No Known Problems Sister     No Known Problems Brother     No Known Problems Maternal Aunt     No Known Problems Maternal Uncle     No Known Problems Paternal Aunt     No Known Problems Paternal Uncle     No Known Problems Maternal Grandmother     No Known Problems Maternal Grandfather     No Known Problems Paternal Grandmother     No Known Problems Paternal Grandfather     Amblyopia Neg Hx     Blindness Neg Hx     Cancer Neg Hx     Cataracts Neg Hx     Glaucoma Neg Hx     Hypertension Neg Hx     Macular degeneration Neg Hx     Retinal detachment Neg Hx      "Strabismus Neg Hx     Stroke Neg Hx     Thyroid disease Neg Hx        REVIEW OF SYSTEMS:   Review of Systems   Constitutional: Negative.   HENT: Negative.    Eyes: Negative.    Cardiovascular: Negative.    Respiratory: Negative.    Endocrine: Negative.    Hematologic/Lymphatic: Negative.    Skin: Negative.    Musculoskeletal: Negative.    Gastrointestinal: Negative.    Genitourinary: Negative.    Neurological: Negative.    Psychiatric/Behavioral: Negative.    Allergic/Immunologic: Negative.        A 10 point review of systems was performed and all the pertinent positives have been mentioned. Rest of review of systems was negative.        PHYSICAL EXAM:     Vitals:    01/24/22 1103   BP: 110/65   Pulse: (!) 55    Body mass index is 27.89 kg/m².  Weight: 90.7 kg (200 lb)   Height: 5' 11" (180.3 cm)     Physical Exam  Vitals reviewed.   Constitutional:       Appearance: He is well-developed and well-nourished.   HENT:      Head: Normocephalic.   Eyes:      Conjunctiva/sclera: Conjunctivae normal.      Pupils: Pupils are equal, round, and reactive to light.   Cardiovascular:      Rate and Rhythm: Normal rate and regular rhythm.      Heart sounds: Normal heart sounds.   Pulmonary:      Effort: Pulmonary effort is normal.      Breath sounds: Normal breath sounds.   Abdominal:      General: Bowel sounds are normal.      Palpations: Abdomen is soft.   Musculoskeletal:      Cervical back: Normal range of motion and neck supple.   Skin:     General: Skin is warm.   Neurological:      Mental Status: He is alert and oriented to person, place, and time.           DATA:     Laboratory:  CBC:  Recent Labs   Lab 10/07/21  0812 10/08/21  0713 10/09/21  0952   WBC 17.25 H 15.93 H 23.71 H   Hemoglobin 11.8 L 11.8 L 13.2 L   Hematocrit 35.5 L 33.7 L 38.7 L   Platelets 208 185 228       CHEMISTRIES:  Recent Labs   Lab 10/04/21  0211 10/04/21  0211 10/05/21  0148 10/05/21  0148 10/06/21  0423 10/06/21  0423 10/07/21  0812 " 10/08/21  0713 10/09/21  0952   Glucose 169 H   < > 155 H   < > 184 H   < > 193 H 143 H 102   Sodium 138   < > 134 L   < > 134 L   < > 132 L 132 L 130 L   Potassium 4.7   < > 5.0   < > 5.3 H   < > 5.1 5.0 4.3   BUN 23   < > 33 H   < > 33 H   < > 30 H 26 H 26 H   Creatinine 1.3   < > 1.0   < > 1.0   < > 1.0 0.8 0.8   eGFR if African American >60.0   < > >60.0   < > >60.0   < > >60.0 >60.0 >60.0   eGFR if non  56.5 A   < > >60.0   < > >60.0   < > >60.0 >60.0 >60.0   Calcium 7.5 L   < > 7.6 L   < > 7.9 L   < > 8.1 L 8.5 L 8.8   Magnesium 1.9  --  2.3  --  2.8 H  --   --   --   --     < > = values in this interval not displayed.       CARDIAC BIOMARKERS:        COAGS:  Recent Labs   Lab 10/01/21  1610   INR 1.0       LIPIDS/LFTS:  Recent Labs   Lab 02/23/21  1040 08/07/21  0839 10/01/21  1610   Cholesterol 102 L 113 L 106 L   Triglycerides 165 H 103 124   HDL 30 L 35 L 31 L   LDL Cholesterol 39.0 L 57.4 L 50.2 L   Non-HDL Cholesterol 72 78 75   AST 18 17 21   ALT 19 19 22       Hemoglobin A1C   Date Value Ref Range Status   11/24/2021 6.7 (H) 4.0 - 5.6 % Final     Comment:     ADA Screening Guidelines:  5.7-6.4%  Consistent with prediabetes  >or=6.5%  Consistent with diabetes    High levels of fetal hemoglobin interfere with the HbA1C  assay. Heterozygous hemoglobin variants (HbS, HgC, etc)do  not significantly interfere with this assay.   However, presence of multiple variants may affect accuracy.     08/07/2021 6.2 (H) 4.0 - 5.6 % Final     Comment:     ADA Screening Guidelines:  5.7-6.4%  Consistent with prediabetes  >or=6.5%  Consistent with diabetes    High levels of fetal hemoglobin interfere with the HbA1C  assay. Heterozygous hemoglobin variants (HbS, HgC, etc)do  not significantly interfere with this assay.   However, presence of multiple variants may affect accuracy.     02/23/2021 6.1 (H) 4.0 - 5.6 % Final     Comment:     ADA Screening Guidelines:  5.7-6.4%  Consistent with  prediabetes  >or=6.5%  Consistent with diabetes    High levels of fetal hemoglobin interfere with the HbA1C  assay. Heterozygous hemoglobin variants (HbS, HgC, etc)do  not significantly interfere with this assay.   However, presence of multiple variants may affect accuracy.         TSH  Recent Labs   Lab 01/08/20  1153 10/01/21  1610   TSH 2.552 1.897       The ASCVD Risk score (Juan Carlos GARCIA Jr., et al., 2013) failed to calculate for the following reasons:    The valid total cholesterol range is 130 to 320 mg/dL             ASSESSMENT AND PLAN     Patient Active Problem List   Diagnosis    Essential hypertension    Hyperlipidemia, mixed    Anemia    Chronic pain    Anxiety, unable to wean off BZD    Depression    DDD (degenerative disc disease), cervical    DDD (degenerative disc disease), lumbar    ED (erectile dysfunction)    Facet arthritis of cervical region    Facet arthritis of lumbar region    Benzodiazepine dependence, did not do well with attempt to wean down 2017    Opioid dependence, pain mgmt    Type 2 diabetes mellitus without complication, without long-term current use of insulin    Tobacco dependence, patch with irritation; hx of bupropion    Non morbid obesity due to excess calories    Therapeutic opioid-induced constipation (OIC)    Tubular adenoma of colon 5/10/17    Cervical stenosis of spinal canal 10/3/2021 LAMINECTOMY, SPINE, CERVICAL, WITH POSTERIOR FUSION C2-T2    Cataract, bilateral    Open angle with borderline findings and high glaucoma risk in both eyes    Nuclear sclerosis of both eyes    Elevated prolactin level,low testosterone, gynecomastia; MRI pituitary normal 5/2020    Low testosterone in male    Paresthesia of left upper and lower extremity    History of smoking 10-25 pack years    Metabolic acidosis    Status post surgery    Impaired mobility and ADLs    Obesity (BMI 30-39.9)    Hyponatremia    Chronic low back pain    Sacroiliac joint pain     Sacroiliitis       Uncontrolled hypertension:  Add hydralazine 25 mg t.i.d. to current regimen.  Will titrate as needed.  Continue other therapy.  Check 2D echo and Holter.  Follow-up after testing.      Asymptomatic bradycardia:  Check Holter        Thank you very much for involving me in the care of your patient.  Please do not hesitate to contact me if there are any questions.      Adriana Pete MD, FAC, [facility]  Interventional Cardiologist, [facility].           This note was dictated with the help of speech recognition software.  There might be un-intended errors and/or substitutions.

## 2022-02-03 ENCOUNTER — HOSPITAL ENCOUNTER (OUTPATIENT)
Dept: CARDIOLOGY | Facility: HOSPITAL | Age: 67
Discharge: HOME | End: 2022-02-03
Attending: INTERNAL MEDICINE
Payer: MEDICARE

## 2022-02-03 VITALS — WEIGHT: 200 LBS | BODY MASS INDEX: 28 KG/M2 | HEIGHT: 71 IN

## 2022-02-03 DIAGNOSIS — I10 ESSENTIAL HYPERTENSION: ICD-10-CM

## 2022-02-03 LAB
AORTIC ROOT ANNULUS: 3.1 CM
AORTIC VALVE CUSP SEPERATION: 2.07 CM
AV INDEX (PROSTH): 0.58
AV MEAN GRADIENT: 9 MMHG
AV PEAK GRADIENT: 16 MMHG
AV VALVE AREA: 2.71 CM2
AV VELOCITY RATIO: 0.56
BSA FOR ECHO PROCEDURE: 2.13 M2
CV ECHO LV RWT: 0.69 CM
DOP CALC AO PEAK VEL: 1.97 M/S
DOP CALC AO VTI: 50.21 CM
DOP CALC LVOT AREA: 4.7 CM2
DOP CALC LVOT DIAMETER: 2.44 CM
DOP CALC LVOT PEAK VEL: 1.11 M/S
DOP CALC LVOT STROKE VOLUME: 135.95 CM3
DOP CALCLVOT PEAK VEL VTI: 29.09 CM
E WAVE DECELERATION TIME: 236.67 MSEC
E/A RATIO: 1.6
E/E' RATIO: 11.89 M/S
ECHO LV POSTERIOR WALL: 1.59 CM (ref 0.6–1.1)
EJECTION FRACTION: 60 %
FRACTIONAL SHORTENING: 39 % (ref 28–44)
INTERVENTRICULAR SEPTUM: 1.66 CM (ref 0.6–1.1)
IVRT: 133.79 MSEC
LA MAJOR: 5.78 CM
LA MINOR: 5.31 CM
LA WIDTH: 4.54 CM
LEFT ATRIUM SIZE: 3.81 CM
LEFT ATRIUM VOLUME INDEX: 38.6 ML/M2
LEFT ATRIUM VOLUME: 81.38 CM3
LEFT INTERNAL DIMENSION IN SYSTOLE: 2.85 CM (ref 2.1–4)
LEFT VENTRICLE DIASTOLIC VOLUME INDEX: 47.17 ML/M2
LEFT VENTRICLE DIASTOLIC VOLUME: 99.52 ML
LEFT VENTRICLE MASS INDEX: 155 G/M2
LEFT VENTRICLE SYSTOLIC VOLUME INDEX: 14.7 ML/M2
LEFT VENTRICLE SYSTOLIC VOLUME: 30.93 ML
LEFT VENTRICULAR INTERNAL DIMENSION IN DIASTOLE: 4.64 CM (ref 3.5–6)
LEFT VENTRICULAR MASS: 326.14 G
LV LATERAL E/E' RATIO: 10.7 M/S
LV SEPTAL E/E' RATIO: 13.38 M/S
MV PEAK A VEL: 0.67 M/S
MV PEAK E VEL: 1.07 M/S
PISA TR MAX VEL: 2.48 M/S
PULM VEIN S/D RATIO: 1.13
PV PEAK D VEL: 0.52 M/S
PV PEAK S VEL: 0.59 M/S
PV PEAK VELOCITY: 1.38 CM/S
RA MAJOR: 5.8 CM
RA PRESSURE: 8 MMHG
RA WIDTH: 4.22 CM
RIGHT VENTRICULAR END-DIASTOLIC DIMENSION: 4.21 CM
RV TISSUE DOPPLER FREE WALL SYSTOLIC VELOCITY 1 (APICAL 4 CHAMBER VIEW): 14.37 CM/S
SINUS: 3.11 CM
STJ: 2.62 CM
TDI LATERAL: 0.1 M/S
TDI SEPTAL: 0.08 M/S
TDI: 0.09 M/S
TR MAX PG: 25 MMHG
TRICUSPID ANNULAR PLANE SYSTOLIC EXCURSION: 2.23 CM
TV REST PULMONARY ARTERY PRESSURE: 33 MMHG

## 2022-02-03 PROCEDURE — 93306 TTE W/DOPPLER COMPLETE: CPT | Mod: 26,,, | Performed by: INTERNAL MEDICINE

## 2022-02-03 PROCEDURE — 93306 TTE W/DOPPLER COMPLETE: CPT

## 2022-02-03 PROCEDURE — 93306 ECHO (CUPID ONLY): ICD-10-PCS | Mod: 26,,, | Performed by: INTERNAL MEDICINE

## 2022-02-08 ENCOUNTER — CLINICAL SUPPORT (OUTPATIENT)
Dept: SMOKING CESSATION | Facility: CLINIC | Age: 67
End: 2022-02-08
Payer: COMMERCIAL

## 2022-02-08 DIAGNOSIS — F17.200 NICOTINE DEPENDENCE: ICD-10-CM

## 2022-02-08 PROCEDURE — 99999 PR PBB SHADOW E&M-EST. PATIENT-LVL II: ICD-10-PCS | Mod: PBBFAC,,,

## 2022-02-08 PROCEDURE — 99402 PR PREVENT COUNSEL,INDIV,30 MIN: ICD-10-PCS | Mod: S$GLB,,,

## 2022-02-08 PROCEDURE — 99402 PREV MED CNSL INDIV APPRX 30: CPT | Mod: S$GLB,,,

## 2022-02-08 PROCEDURE — 99999 PR PBB SHADOW E&M-EST. PATIENT-LVL II: CPT | Mod: PBBFAC,,,

## 2022-02-08 RX ORDER — GABAPENTIN 400 MG/1
400 CAPSULE ORAL 3 TIMES DAILY
Qty: 90 CAPSULE | Refills: 11 | Status: SHIPPED | OUTPATIENT
Start: 2022-02-08 | End: 2022-05-02 | Stop reason: DRUGHIGH

## 2022-02-08 NOTE — TELEPHONE ENCOUNTER
----- Message from Zoë Gonzalez sent at 2/8/2022 10:45 AM CST -----  Regarding: Refill  Contact: Patient  Type: Patient Call Back    Who called: Patient    What is the request in detail: Patient is requesting a refill: gabapentin (NEURONTIN) 400 MG capsule    Can the clinic reply by [facility]? No    Would the patient rather a call back or a response via [facility]? Call    Best call back number: [phone]    Additional Information:    [facility] [place] [id number] [place], [place] [address] [address]  Phone: [phone] Fax: [phone]    Thanks

## 2022-02-08 NOTE — TELEPHONE ENCOUNTER
No new care gaps identified.  Powered by [employer]. Reference number: [id number].   2/08/2022 10:56:48 AM CST

## 2022-02-08 NOTE — Clinical Note
Comments: patient presents for follow up as smoking some days a few and other days only one with his coffee in the morning, he is doing great but is not really progressing towards a complete quit, encouraged him to do so, he is wearing the 21mg nicotine patch daily, recommend he continue and keep working on eliminating cigarettes. Its hard in his home environment. He is struggling with his cigarette with coffee. Strategies to help him provided as well as encouragement. Session handout provided. Will follow

## 2022-02-09 RX ORDER — IBUPROFEN 200 MG
1 TABLET ORAL DAILY
Qty: 28 PATCH | Refills: 0 | Status: SHIPPED | OUTPATIENT
Start: 2022-02-09 | End: 2022-02-24 | Stop reason: SDUPTHER

## 2022-02-09 NOTE — PROGRESS NOTES
Individual Follow-Up Form    2/9/2022    Quit Date: n/a    Clinical Status of Patient: Outpatient    Length of Service: 30 minutes    Continuing Medication: yes  Patches    Other Medications: n/a     Target Symptoms: Withdrawal and medication side effects. The following were  rated moderate (3) to severe (4) on TCRS:  · Moderate (3): 0  · Severe (4): 0    Comments: patient presents for follow up as smoking some days a few and other days only one with his coffee in the morning, he is doing great but is not really progressing towards a complete quit, encouraged him to do so, he is wearing the 21mg nicotine patch daily, recommend he continue and keep working on eliminating cigarettes. Its hard in his home environment. He is struggling with his cigarette with coffee. Strategies to help him provided as well as encouragement. Session handout provided. Will follow     Diagnosis: F17.210    Next Visit: 2 weeks

## 2022-02-16 ENCOUNTER — LAB VISIT (OUTPATIENT)
Dept: LAB | Facility: HOSPITAL | Age: 67
End: 2022-02-16
Attending: INTERNAL MEDICINE
Payer: MEDICARE

## 2022-02-16 DIAGNOSIS — E11.36 TYPE 2 DIABETES MELLITUS WITH DIABETIC CATARACT, WITHOUT LONG-TERM CURRENT USE OF INSULIN: ICD-10-CM

## 2022-02-16 LAB
ALBUMIN/CREAT UR: 12.1 UG/MG (ref 0–30)
CREAT UR-MCNC: 124 MG/DL (ref 23–375)
MICROALBUMIN UR DL<=1MG/L-MCNC: 15 UG/ML

## 2022-02-16 PROCEDURE — 82570 ASSAY OF URINE CREATININE: CPT | Performed by: INTERNAL MEDICINE

## 2022-02-21 ENCOUNTER — OFFICE VISIT (OUTPATIENT)
Dept: CARDIOLOGY | Facility: CLINIC | Age: 67
End: 2022-02-21
Payer: MEDICARE

## 2022-02-21 VITALS
WEIGHT: 200 LBS | BODY MASS INDEX: 28 KG/M2 | HEIGHT: 71 IN | DIASTOLIC BLOOD PRESSURE: 88 MMHG | SYSTOLIC BLOOD PRESSURE: 168 MMHG

## 2022-02-21 DIAGNOSIS — M51.36 DDD (DEGENERATIVE DISC DISEASE), LUMBAR: ICD-10-CM

## 2022-02-21 DIAGNOSIS — F11.20 UNCOMPLICATED OPIOID DEPENDENCE: ICD-10-CM

## 2022-02-21 DIAGNOSIS — F41.9 ANXIETY: ICD-10-CM

## 2022-02-21 DIAGNOSIS — E78.2 HYPERLIPIDEMIA, MIXED: ICD-10-CM

## 2022-02-21 DIAGNOSIS — I10 PRIMARY HYPERTENSION: Primary | ICD-10-CM

## 2022-02-21 DIAGNOSIS — E11.9 TYPE 2 DIABETES MELLITUS WITHOUT COMPLICATION, WITHOUT LONG-TERM CURRENT USE OF INSULIN: ICD-10-CM

## 2022-02-21 DIAGNOSIS — M50.30 DDD (DEGENERATIVE DISC DISEASE), CERVICAL: ICD-10-CM

## 2022-02-21 DIAGNOSIS — M48.02 CERVICAL STENOSIS OF SPINAL CANAL: ICD-10-CM

## 2022-02-21 DIAGNOSIS — I10 ESSENTIAL HYPERTENSION: ICD-10-CM

## 2022-02-21 DIAGNOSIS — N52.9 ERECTILE DYSFUNCTION, UNSPECIFIED ERECTILE DYSFUNCTION TYPE: ICD-10-CM

## 2022-02-21 DIAGNOSIS — D64.9 ANEMIA, UNSPECIFIED TYPE: ICD-10-CM

## 2022-02-21 PROCEDURE — 3077F SYST BP >= 140 MM HG: CPT | Mod: CPTII,S$GLB,, | Performed by: INTERNAL MEDICINE

## 2022-02-21 PROCEDURE — 3061F NEG MICROALBUMINURIA REV: CPT | Mod: CPTII,S$GLB,, | Performed by: INTERNAL MEDICINE

## 2022-02-21 PROCEDURE — 1125F PR PAIN SEVERITY QUANTIFIED, PAIN PRESENT: ICD-10-PCS | Mod: CPTII,S$GLB,, | Performed by: INTERNAL MEDICINE

## 2022-02-21 PROCEDURE — 3079F PR MOST RECENT DIASTOLIC BLOOD PRESSURE 80-89 MM HG: ICD-10-PCS | Mod: CPTII,S$GLB,, | Performed by: INTERNAL MEDICINE

## 2022-02-21 PROCEDURE — 3044F HG A1C LEVEL LT 7.0%: CPT | Mod: CPTII,S$GLB,, | Performed by: INTERNAL MEDICINE

## 2022-02-21 PROCEDURE — 99499 UNLISTED E&M SERVICE: CPT | Mod: S$GLB,,, | Performed by: INTERNAL MEDICINE

## 2022-02-21 PROCEDURE — 1100F PR PT FALLS ASSESS DOC 2+ FALLS/FALL W/INJURY/YR: ICD-10-PCS | Mod: CPTII,S$GLB,, | Performed by: INTERNAL MEDICINE

## 2022-02-21 PROCEDURE — 1159F MED LIST DOCD IN RCRD: CPT | Mod: CPTII,S$GLB,, | Performed by: INTERNAL MEDICINE

## 2022-02-21 PROCEDURE — 1160F PR REVIEW ALL MEDS BY PRESCRIBER/CLIN PHARMACIST DOCUMENTED: ICD-10-PCS | Mod: CPTII,S$GLB,, | Performed by: INTERNAL MEDICINE

## 2022-02-21 PROCEDURE — 1160F RVW MEDS BY RX/DR IN RCRD: CPT | Mod: CPTII,S$GLB,, | Performed by: INTERNAL MEDICINE

## 2022-02-21 PROCEDURE — 3288F FALL RISK ASSESSMENT DOCD: CPT | Mod: CPTII,S$GLB,, | Performed by: INTERNAL MEDICINE

## 2022-02-21 PROCEDURE — 1157F PR ADVANCE CARE PLAN OR EQUIV PRESENT IN MEDICAL RECORD: ICD-10-PCS | Mod: CPTII,S$GLB,, | Performed by: INTERNAL MEDICINE

## 2022-02-21 PROCEDURE — 1125F AMNT PAIN NOTED PAIN PRSNT: CPT | Mod: CPTII,S$GLB,, | Performed by: INTERNAL MEDICINE

## 2022-02-21 PROCEDURE — 93000 EKG 12-LEAD: ICD-10-PCS | Mod: S$GLB,,, | Performed by: INTERNAL MEDICINE

## 2022-02-21 PROCEDURE — 99214 PR OFFICE/OUTPT VISIT, EST, LEVL IV, 30-39 MIN: ICD-10-PCS | Mod: S$GLB,,, | Performed by: INTERNAL MEDICINE

## 2022-02-21 PROCEDURE — 3079F DIAST BP 80-89 MM HG: CPT | Mod: CPTII,S$GLB,, | Performed by: INTERNAL MEDICINE

## 2022-02-21 PROCEDURE — 3077F PR MOST RECENT SYSTOLIC BLOOD PRESSURE >= 140 MM HG: ICD-10-PCS | Mod: CPTII,S$GLB,, | Performed by: INTERNAL MEDICINE

## 2022-02-21 PROCEDURE — 3288F PR FALLS RISK ASSESSMENT DOCUMENTED: ICD-10-PCS | Mod: CPTII,S$GLB,, | Performed by: INTERNAL MEDICINE

## 2022-02-21 PROCEDURE — 3008F BODY MASS INDEX DOCD: CPT | Mod: CPTII,S$GLB,, | Performed by: INTERNAL MEDICINE

## 2022-02-21 PROCEDURE — 99499 RISK ADDL DX/OHS AUDIT: ICD-10-PCS | Mod: S$GLB,,, | Performed by: INTERNAL MEDICINE

## 2022-02-21 PROCEDURE — 3066F NEPHROPATHY DOC TX: CPT | Mod: CPTII,S$GLB,, | Performed by: INTERNAL MEDICINE

## 2022-02-21 PROCEDURE — 3061F PR NEG MICROALBUMINURIA RESULT DOCUMENTED/REVIEW: ICD-10-PCS | Mod: CPTII,S$GLB,, | Performed by: INTERNAL MEDICINE

## 2022-02-21 PROCEDURE — 99999 PR PBB SHADOW E&M-EST. PATIENT-LVL IV: ICD-10-PCS | Mod: PBBFAC,,, | Performed by: INTERNAL MEDICINE

## 2022-02-21 PROCEDURE — 1157F ADVNC CARE PLAN IN RCRD: CPT | Mod: CPTII,S$GLB,, | Performed by: INTERNAL MEDICINE

## 2022-02-21 PROCEDURE — 1159F PR MEDICATION LIST DOCUMENTED IN MEDICAL RECORD: ICD-10-PCS | Mod: CPTII,S$GLB,, | Performed by: INTERNAL MEDICINE

## 2022-02-21 PROCEDURE — 3044F PR MOST RECENT HEMOGLOBIN A1C LEVEL <7.0%: ICD-10-PCS | Mod: CPTII,S$GLB,, | Performed by: INTERNAL MEDICINE

## 2022-02-21 PROCEDURE — 99214 OFFICE O/P EST MOD 30 MIN: CPT | Mod: S$GLB,,, | Performed by: INTERNAL MEDICINE

## 2022-02-21 PROCEDURE — 93000 ELECTROCARDIOGRAM COMPLETE: CPT | Mod: S$GLB,,, | Performed by: INTERNAL MEDICINE

## 2022-02-21 PROCEDURE — 3066F PR DOCUMENTATION OF TREATMENT FOR NEPHROPATHY: ICD-10-PCS | Mod: CPTII,S$GLB,, | Performed by: INTERNAL MEDICINE

## 2022-02-21 PROCEDURE — 1100F PTFALLS ASSESS-DOCD GE2>/YR: CPT | Mod: CPTII,S$GLB,, | Performed by: INTERNAL MEDICINE

## 2022-02-21 PROCEDURE — 3008F PR BODY MASS INDEX (BMI) DOCUMENTED: ICD-10-PCS | Mod: CPTII,S$GLB,, | Performed by: INTERNAL MEDICINE

## 2022-02-21 PROCEDURE — 99999 PR PBB SHADOW E&M-EST. PATIENT-LVL IV: CPT | Mod: PBBFAC,,, | Performed by: INTERNAL MEDICINE

## 2022-02-21 RX ORDER — HYDRALAZINE HYDROCHLORIDE 50 MG/1
50 TABLET, FILM COATED ORAL 3 TIMES DAILY
Qty: 90 TABLET | Refills: 11 | Status: SHIPPED | OUTPATIENT
Start: 2022-02-21 | End: 2022-04-21

## 2022-02-21 NOTE — PROGRESS NOTES
CARDIOVASCULAR CONSULTATION    REASON FOR CONSULT:   Butch Mcdaniel is a 66 y.o. male who presents for evaluation.      HISTORY OF PRESENT ILLNESS:     Patient is a pleasant 66-year-old man.  Here for evaluation.  States blood pressure fluctuates a lot at home.  Fluctuates between 110-200 mmHg.  States that he has clonidine at home and whenever his blood pressure is elevated he takes an extra dose of clonidine.  Denies any chest pains at rest on exertion.  Denies orthopnea, PND, swelling of feet.      Notes from February 2022:  Patient here for follow-up.  Denies any chest pains at rest on exertion, orthopnea, PND.  Blood pressure staying at home.  Around 160-170 mmHg.      PAST MEDICAL HISTORY:     Past Medical History:   Diagnosis Date    Anxiety     Cataract, bilateral 9/11/2018    Degenerative disc disease     Depression     Diabetes mellitus type II, controlled     ETOH abuse     Eye injury as a child    stick hit eye ? 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 both eyes 10/8/2019       PAST SURGICAL HISTORY:     Past Surgical History:   Procedure Laterality Date    APPENDECTOMY      COLONOSCOPY N/A 5/10/2017    Procedure: COLONOSCOPY;  Surgeon: Shantanu Marley MD;  Location: [facility];  Service: Endoscopy;  Laterality: N/A;    POSTERIOR FUSION OF CERVICAL SPINE WITH LAMINECTOMY N/A 10/3/2021    Procedure: LAMINECTOMY, SPINE, CERVICAL, WITH POSTERIOR FUSION C2-T2;  Surgeon: Ana Rosa Geller MD;  Location: [address];  Service: Neurosurgery;  Laterality: N/A;       ALLERGIES AND MEDICATION:   Review of patient's allergies indicates:  No Known Allergies     Medication List          Accurate as of February 21, 2022  1:23 PM. If you have any questions, ask your nurse or doctor.            CHANGE how you take these medications    hydrALAZINE 50 MG  tablet  Commonly known as: APRESOLINE  Take 1 tablet (50 mg total) by mouth 3 (three) times daily.  What changed:   · medication strength  · how much to take  Changed by: Adriana Pete MD        CONTINUE taking these medications    ALPRAZolam 1 MG tablet  Commonly known as: XANAX  Take 1 tablet (1 mg total) by mouth 2 (two) times daily as needed for Anxiety.     atorvastatin 40 MG tablet  Commonly known as: LIPITOR  TAKE ONE TABLET BY MOUTH ONCE A DAY FOR cholesterol     BD LUER-STARLA SYRINGE 1 mL Syrg  Generic drug: syringe (disposable)  Testosterone injection every 2 weeks     busPIRone 30 MG Tab  Commonly known as: BUSPAR  Take 1 tablet (30 mg total) by mouth 2 (two) times daily.     chlorhexidine 4 % external liquid  Commonly known as: SCRUB CHLORHEXIDINE GLUCONATE  Apply topically 2 (two) times a day.     citalopram 40 MG tablet  Commonly known as: CeleXA  TAKE ONE TABLET BY MOUTH ONCE A DAY     cloNIDine 0.2 MG tablet  Commonly known as: CATAPRES  Take 1 tablet (0.2 mg total) by mouth 3 (three) times daily. Increased frequency     fluticasone furoate 100 mcg/actuation inhaler  Commonly known as: ARNUITY ELLIPTA  Inhale 1 puff into the lungs once daily. Controller     fluticasone propionate 50 mcg/actuation nasal spray  Commonly known as: FLONASE  INHALE 1 SPRAY IN EACH NOSTRIL EVERY DAY     fluticasone-salmeterol 100-50 mcg/dose 100-50 mcg/dose diskus inhaler  Commonly known as: ADVAIR     gabapentin 400 MG capsule  Commonly known as: NEURONTIN  Take 1 capsule (400 mg total) by mouth 3 (three) times daily.     inhalation spacing device  Use as directed for inhalation.     lisinopriL 40 MG tablet  Commonly known as: PRINIVIL,ZESTRIL  TAKE ONE TABLET BY MOUTH ONCE A DAY     metFORMIN 500 MG tablet  Commonly known as: GLUCOPHAGE  TAKE ONE TABLET BY MOUTH TWICE DAILY WITH MEALS     MIRALAX ORAL     naloxone 4 mg/actuation Spry  Commonly known as: NARCAN  4mg by nasal route as needed for opioid overdose; may repeat  "every 2-3 minutes in alternating nostrils until medical help arrives. Call 911     needle (disp) 22 G 22 gauge x 1" Ndle  Testosterone injection every 2 weeks     nicotine 21 mg/24 hr  Commonly known as: NICODERM CQ  Place 1 patch onto the skin once daily.     oxyCODONE-acetaminophen  mg per tablet  Commonly known as: PERCOCET     PROAIR HFA 90 mcg/actuation inhaler  Generic drug: albuterol  INHALE TWO PUFFS BY MOUTH EVERY 6 HOURS AS NEEDED FOR WHEEZING (rescue)     senna 8.6 mg tablet  Commonly known as: SENOKOT  Take 1 tablet by mouth 2 (two) times a day.     tadalafiL 20 MG Tab  Commonly known as: CIALIS  Take 1 tablet (20 mg total) by mouth once daily.     testosterone cypionate 200 mg/mL injection  Commonly known as: DEPOTESTOTERONE CYPIONATE  Inject 0.75 mLs (150 mg total) into the muscle every 14 (fourteen) days. Decreased dose     verapamiL 180 MG CR tablet  Commonly known as: CALAN-SR  Take 1 tablet (180 mg total) by mouth 2 (two) times daily.           Where to Get Your Medications      These medications were sent to [facility] - [place] [id number]   [address] [place] [address]    Phone: [phone]   · hydrALAZINE 50 MG tablet         SOCIAL HISTORY:     Social History     Socioeconomic History    Marital status:    Occupational History    Occupation: retired - tug boat captain   Tobacco Use    Smoking status: Current Every Day Smoker     Packs/day: 0.50     Years: 32.00     Pack years: 16.00     Types: Cigarettes     Last attempt to quit: 3/4/2013     Years since quittin.9    Smokeless tobacco: Current User    Tobacco comment: weed    Substance and Sexual Activity    Alcohol use: No     Alcohol/week: 0.0 standard drinks    Drug use: No    Sexual activity: Yes     Partners: Female     Comment:  with children, disabled        FAMILY HISTORY:     Family History   Problem Relation Age of Onset    Heart disease Mother     Heart disease Father  " "   Alcohol abuse Father     Diabetes Son     No Known Problems Sister     No Known Problems Brother     No Known Problems Maternal Aunt     No Known Problems Maternal Uncle     No Known Problems Paternal Aunt     No Known Problems Paternal Uncle     No Known Problems Maternal Grandmother     No Known Problems Maternal Grandfather     No Known Problems Paternal Grandmother     No Known Problems Paternal Grandfather     Amblyopia Neg Hx     Blindness Neg Hx     Cancer Neg Hx     Cataracts Neg Hx     Glaucoma Neg Hx     Hypertension Neg Hx     Macular degeneration Neg Hx     Retinal detachment Neg Hx     Strabismus Neg Hx     Stroke Neg Hx     Thyroid disease Neg Hx        REVIEW OF SYSTEMS:   Review of Systems   Constitutional: Negative.   HENT: Negative.    Eyes: Negative.    Cardiovascular: Negative.    Respiratory: Negative.    Endocrine: Negative.    Hematologic/Lymphatic: Negative.    Skin: Negative.    Musculoskeletal: Negative.    Gastrointestinal: Negative.    Genitourinary: Negative.    Neurological: Negative.    Psychiatric/Behavioral: Negative.    Allergic/Immunologic: Negative.        A 10 point review of systems was performed and all the pertinent positives have been mentioned. Rest of review of systems was negative.        PHYSICAL EXAM:     Vitals:    02/21/22 1141   BP: (!) 168/88    Body mass index is 27.89 kg/m².  Weight: 90.7 kg (200 lb)   Height: 5' 11" (180.3 cm)     Physical Exam  Vitals reviewed.   Constitutional:       Appearance: He is well-developed.   HENT:      Head: Normocephalic.   Eyes:      Conjunctiva/sclera: Conjunctivae normal.      Pupils: Pupils are equal, round, and reactive to light.   Cardiovascular:      Rate and Rhythm: Normal rate and regular rhythm.      Heart sounds: Normal heart sounds.   Pulmonary:      Effort: Pulmonary effort is normal.      Breath sounds: Normal breath sounds.   Abdominal:      General: Bowel sounds are normal.      Palpations: " Abdomen is soft.   Musculoskeletal:      Cervical back: Normal range of motion and neck supple.   Skin:     General: Skin is warm.   Neurological:      Mental Status: He is alert and oriented to person, place, and time.           DATA:     Laboratory:  CBC:  Recent Labs   Lab 10/08/21  0713 10/09/21  0952 02/16/22  0820   WBC 15.93 H 23.71 H 10.16   Hemoglobin 11.8 L 13.2 L 12.7 L   Hematocrit 33.7 L 38.7 L 40.9   Platelets 185 228 164       CHEMISTRIES:  Recent Labs   Lab 10/04/21  0211 10/05/21  0148 10/06/21  0423 10/07/21  0812 10/08/21  0713 10/09/21  0952 02/16/22  0820   Glucose 169 H 155 H 184 H   < > 143 H 102 97   Sodium 138 134 L 134 L   < > 132 L 130 L 138   Potassium 4.7 5.0 5.3 H   < > 5.0 4.3 5.0   BUN 23 33 H 33 H   < > 26 H 26 H 15   Creatinine 1.3 1.0 1.0   < > 0.8 0.8 1.1   eGFR if African American >60.0 >60.0 >60.0   < > >60.0 >60.0 >60.0   eGFR if non African American 56.5 A >60.0 >60.0   < > >60.0 >60.0 >60.0   Calcium 7.5 L 7.6 L 7.9 L   < > 8.5 L 8.8 8.8   Magnesium 1.9 2.3 2.8 H  --   --   --   --     < > = values in this interval not displayed.       CARDIAC BIOMARKERS:        COAGS:  Recent Labs   Lab 10/01/21  1610   INR 1.0       LIPIDS/LFTS:  Recent Labs   Lab 02/23/21  1040 08/07/21  0839 10/01/21  1610 02/16/22  0820   Cholesterol 102 L 113 L 106 L  --    Triglycerides 165 H 103 124  --    HDL 30 L 35 L 31 L  --    LDL Cholesterol 39.0 L 57.4 L 50.2 L  --    Non-HDL Cholesterol 72 78 75  --    AST 18 17 21 18   ALT 19 19 22 18       Hemoglobin A1C   Date Value Ref Range Status   02/16/2022 6.3 (H) 4.0 - 5.6 % Final     Comment:     ADA Screening Guidelines:  5.7-6.4%  Consistent with prediabetes  >or=6.5%  Consistent with diabetes    High levels of fetal hemoglobin interfere with the HbA1C  assay. Heterozygous hemoglobin variants (HbS, HgC, etc)do  not significantly interfere with this assay.   However, presence of multiple variants may affect accuracy.     11/24/2021 6.7 (H) 4.0 -  5.6 % Final     Comment:     ADA Screening Guidelines:  5.7-6.4%  Consistent with prediabetes  >or=6.5%  Consistent with diabetes    High levels of fetal hemoglobin interfere with the HbA1C  assay. Heterozygous hemoglobin variants (HbS, HgC, etc)do  not significantly interfere with this assay.   However, presence of multiple variants may affect accuracy.     08/07/2021 6.2 (H) 4.0 - 5.6 % Final     Comment:     ADA Screening Guidelines:  5.7-6.4%  Consistent with prediabetes  >or=6.5%  Consistent with diabetes    High levels of fetal hemoglobin interfere with the HbA1C  assay. Heterozygous hemoglobin variants (HbS, HgC, etc)do  not significantly interfere with this assay.   However, presence of multiple variants may affect accuracy.         TSH  Recent Labs   Lab 01/08/20  1153 10/01/21  1610   TSH 2.552 1.897       The ASCVD Risk score (Juan Carlos GARCIA Jr., et al., 2013) failed to calculate for the following reasons:    The valid total cholesterol range is 130 to 320 mg/dL             ASSESSMENT AND PLAN     Patient Active Problem List   Diagnosis    Essential hypertension    Hyperlipidemia, mixed    Anemia    Chronic pain    Anxiety, unable to wean off BZD    Depression    DDD (degenerative disc disease), cervical    DDD (degenerative disc disease), lumbar    ED (erectile dysfunction)    Facet arthritis of cervical region    Facet arthritis of lumbar region    Benzodiazepine dependence, did not do well with attempt to wean down 2017    Opioid dependence, pain mgmt    Type 2 diabetes mellitus without complication, without long-term current use of insulin    Tobacco dependence, patch with irritation; hx of bupropion    Non morbid obesity due to excess calories    Therapeutic opioid-induced constipation (OIC)    Tubular adenoma of colon 5/10/17    Cervical stenosis of spinal canal 10/3/2021 LAMINECTOMY, SPINE, CERVICAL, WITH POSTERIOR FUSION C2-T2    Cataract, bilateral    Open angle with borderline  findings and high glaucoma risk in both eyes    Nuclear sclerosis of both eyes    Elevated prolactin level,low testosterone, gynecomastia; MRI pituitary normal 5/2020    Low testosterone in male    Paresthesia of left upper and lower extremity    History of smoking 10-25 pack years    Metabolic acidosis    Status post surgery    Impaired mobility and ADLs    Obesity (BMI 30-39.9)    Hyponatremia    Chronic low back pain    Sacroiliac joint pain    Sacroiliitis       Uncontrolled hypertension:  hydralazine 50 mg t.i.d. to current regimen.  Will titrate as needed.  Continue other therapy.  Follow-up after testing.      Asymptomatic bradycardia:  Check Holter    Follow-up in 1 month    Thank you very much for involving me in the care of your patient.  Please do not hesitate to contact me if there are any questions.      Adriana Pete MD, FACC, [facility]  Interventional Cardiologist, [facility].           This note was dictated with the help of speech recognition software.  There might be un-intended errors and/or substitutions.

## 2022-02-22 ENCOUNTER — CLINICAL SUPPORT (OUTPATIENT)
Dept: SMOKING CESSATION | Facility: CLINIC | Age: 67
End: 2022-02-22
Payer: COMMERCIAL

## 2022-02-22 DIAGNOSIS — F17.200 NICOTINE DEPENDENCE: Primary | ICD-10-CM

## 2022-02-22 PROCEDURE — 99402 PREV MED CNSL INDIV APPRX 30: CPT | Mod: S$GLB,,,

## 2022-02-22 PROCEDURE — 99999 PR PBB SHADOW E&M-EST. PATIENT-LVL II: ICD-10-PCS | Mod: PBBFAC,,,

## 2022-02-22 PROCEDURE — 99999 PR PBB SHADOW E&M-EST. PATIENT-LVL II: CPT | Mod: PBBFAC,,,

## 2022-02-22 PROCEDURE — 99402 PR PREVENT COUNSEL,INDIV,30 MIN: ICD-10-PCS | Mod: S$GLB,,,

## 2022-02-22 NOTE — Clinical Note
Comments: patient presents for follow up telephonically. He quit smoking! He quit smoking mid Feb, he is currently wearing the 21mg nicotine patch daily, he is doing well and not slipping at all, recommend he remain on this dose for another 1 week, strategies discussed as well as session handout, discussed ways to prevent lapse and slip, will follow

## 2022-02-23 PROBLEM — E87.1 HYPONATREMIA: Status: RESOLVED | Noted: 2021-10-07 | Resolved: 2022-02-23

## 2022-02-23 PROBLEM — E87.20 METABOLIC ACIDOSIS: Status: RESOLVED | Noted: 2021-10-02 | Resolved: 2022-02-23

## 2022-02-24 ENCOUNTER — OFFICE VISIT (OUTPATIENT)
Dept: FAMILY MEDICINE | Facility: CLINIC | Age: 67
End: 2022-02-24
Payer: MEDICARE

## 2022-02-24 VITALS
BODY MASS INDEX: 29.54 KG/M2 | WEIGHT: 211 LBS | SYSTOLIC BLOOD PRESSURE: 132 MMHG | HEIGHT: 71 IN | DIASTOLIC BLOOD PRESSURE: 70 MMHG | TEMPERATURE: 98 F | HEART RATE: 72 BPM | OXYGEN SATURATION: 96 %

## 2022-02-24 DIAGNOSIS — E78.2 MIXED HYPERLIPIDEMIA: ICD-10-CM

## 2022-02-24 DIAGNOSIS — E11.9 TYPE 2 DIABETES MELLITUS WITHOUT COMPLICATION, WITHOUT LONG-TERM CURRENT USE OF INSULIN: Primary | ICD-10-CM

## 2022-02-24 DIAGNOSIS — F11.20 UNCOMPLICATED OPIOID DEPENDENCE: ICD-10-CM

## 2022-02-24 DIAGNOSIS — F33.41 RECURRENT MAJOR DEPRESSIVE DISORDER, IN PARTIAL REMISSION: ICD-10-CM

## 2022-02-24 DIAGNOSIS — E66.9 OBESITY (BMI 30-39.9): ICD-10-CM

## 2022-02-24 DIAGNOSIS — D12.6 TUBULAR ADENOMA OF COLON: ICD-10-CM

## 2022-02-24 DIAGNOSIS — Z86.010 PERSONAL HISTORY OF COLONIC POLYPS: ICD-10-CM

## 2022-02-24 DIAGNOSIS — G89.29 OTHER CHRONIC PAIN: ICD-10-CM

## 2022-02-24 DIAGNOSIS — Z12.5 ENCOUNTER FOR SCREENING FOR MALIGNANT NEOPLASM OF PROSTATE: ICD-10-CM

## 2022-02-24 DIAGNOSIS — R79.89 LOW TESTOSTERONE IN MALE: ICD-10-CM

## 2022-02-24 DIAGNOSIS — I10 ESSENTIAL HYPERTENSION: ICD-10-CM

## 2022-02-24 DIAGNOSIS — F13.20 BENZODIAZEPINE DEPENDENCE: ICD-10-CM

## 2022-02-24 DIAGNOSIS — F41.9 ANXIETY: ICD-10-CM

## 2022-02-24 DIAGNOSIS — Z12.11 SCREENING FOR COLON CANCER: ICD-10-CM

## 2022-02-24 PROCEDURE — 3078F PR MOST RECENT DIASTOLIC BLOOD PRESSURE < 80 MM HG: ICD-10-PCS | Mod: CPTII,S$GLB,, | Performed by: INTERNAL MEDICINE

## 2022-02-24 PROCEDURE — 99214 OFFICE O/P EST MOD 30 MIN: CPT | Mod: S$GLB,,, | Performed by: INTERNAL MEDICINE

## 2022-02-24 PROCEDURE — 4010F ACE/ARB THERAPY RXD/TAKEN: CPT | Mod: CPTII,S$GLB,, | Performed by: INTERNAL MEDICINE

## 2022-02-24 PROCEDURE — 3288F FALL RISK ASSESSMENT DOCD: CPT | Mod: CPTII,S$GLB,, | Performed by: INTERNAL MEDICINE

## 2022-02-24 PROCEDURE — 3008F PR BODY MASS INDEX (BMI) DOCUMENTED: ICD-10-PCS | Mod: CPTII,S$GLB,, | Performed by: INTERNAL MEDICINE

## 2022-02-24 PROCEDURE — 99999 PR PBB SHADOW E&M-EST. PATIENT-LVL III: CPT | Mod: PBBFAC,,, | Performed by: INTERNAL MEDICINE

## 2022-02-24 PROCEDURE — 3078F DIAST BP <80 MM HG: CPT | Mod: CPTII,S$GLB,, | Performed by: INTERNAL MEDICINE

## 2022-02-24 PROCEDURE — 3066F PR DOCUMENTATION OF TREATMENT FOR NEPHROPATHY: ICD-10-PCS | Mod: CPTII,S$GLB,, | Performed by: INTERNAL MEDICINE

## 2022-02-24 PROCEDURE — 1160F RVW MEDS BY RX/DR IN RCRD: CPT | Mod: CPTII,S$GLB,, | Performed by: INTERNAL MEDICINE

## 2022-02-24 PROCEDURE — 3061F NEG MICROALBUMINURIA REV: CPT | Mod: CPTII,S$GLB,, | Performed by: INTERNAL MEDICINE

## 2022-02-24 PROCEDURE — 99499 UNLISTED E&M SERVICE: CPT | Mod: S$GLB,,, | Performed by: INTERNAL MEDICINE

## 2022-02-24 PROCEDURE — 1101F PR PT FALLS ASSESS DOC 0-1 FALLS W/OUT INJ PAST YR: ICD-10-PCS | Mod: CPTII,S$GLB,, | Performed by: INTERNAL MEDICINE

## 2022-02-24 PROCEDURE — 1160F PR REVIEW ALL MEDS BY PRESCRIBER/CLIN PHARMACIST DOCUMENTED: ICD-10-PCS | Mod: CPTII,S$GLB,, | Performed by: INTERNAL MEDICINE

## 2022-02-24 PROCEDURE — 1125F AMNT PAIN NOTED PAIN PRSNT: CPT | Mod: CPTII,S$GLB,, | Performed by: INTERNAL MEDICINE

## 2022-02-24 PROCEDURE — 99999 PR PBB SHADOW E&M-EST. PATIENT-LVL III: ICD-10-PCS | Mod: PBBFAC,,, | Performed by: INTERNAL MEDICINE

## 2022-02-24 PROCEDURE — 99499 RISK ADDL DX/OHS AUDIT: ICD-10-PCS | Mod: S$GLB,,, | Performed by: INTERNAL MEDICINE

## 2022-02-24 PROCEDURE — 3008F BODY MASS INDEX DOCD: CPT | Mod: CPTII,S$GLB,, | Performed by: INTERNAL MEDICINE

## 2022-02-24 PROCEDURE — 3288F PR FALLS RISK ASSESSMENT DOCUMENTED: ICD-10-PCS | Mod: CPTII,S$GLB,, | Performed by: INTERNAL MEDICINE

## 2022-02-24 PROCEDURE — 3066F NEPHROPATHY DOC TX: CPT | Mod: CPTII,S$GLB,, | Performed by: INTERNAL MEDICINE

## 2022-02-24 PROCEDURE — 3044F HG A1C LEVEL LT 7.0%: CPT | Mod: CPTII,S$GLB,, | Performed by: INTERNAL MEDICINE

## 2022-02-24 PROCEDURE — 4010F PR ACE/ARB THEARPY RXD/TAKEN: ICD-10-PCS | Mod: CPTII,S$GLB,, | Performed by: INTERNAL MEDICINE

## 2022-02-24 PROCEDURE — 3061F PR NEG MICROALBUMINURIA RESULT DOCUMENTED/REVIEW: ICD-10-PCS | Mod: CPTII,S$GLB,, | Performed by: INTERNAL MEDICINE

## 2022-02-24 PROCEDURE — 1159F MED LIST DOCD IN RCRD: CPT | Mod: CPTII,S$GLB,, | Performed by: INTERNAL MEDICINE

## 2022-02-24 PROCEDURE — 3044F PR MOST RECENT HEMOGLOBIN A1C LEVEL <7.0%: ICD-10-PCS | Mod: CPTII,S$GLB,, | Performed by: INTERNAL MEDICINE

## 2022-02-24 PROCEDURE — 1159F PR MEDICATION LIST DOCUMENTED IN MEDICAL RECORD: ICD-10-PCS | Mod: CPTII,S$GLB,, | Performed by: INTERNAL MEDICINE

## 2022-02-24 PROCEDURE — 99214 PR OFFICE/OUTPT VISIT, EST, LEVL IV, 30-39 MIN: ICD-10-PCS | Mod: S$GLB,,, | Performed by: INTERNAL MEDICINE

## 2022-02-24 PROCEDURE — 3075F SYST BP GE 130 - 139MM HG: CPT | Mod: CPTII,S$GLB,, | Performed by: INTERNAL MEDICINE

## 2022-02-24 PROCEDURE — 1157F PR ADVANCE CARE PLAN OR EQUIV PRESENT IN MEDICAL RECORD: ICD-10-PCS | Mod: CPTII,S$GLB,, | Performed by: INTERNAL MEDICINE

## 2022-02-24 PROCEDURE — 1101F PT FALLS ASSESS-DOCD LE1/YR: CPT | Mod: CPTII,S$GLB,, | Performed by: INTERNAL MEDICINE

## 2022-02-24 PROCEDURE — 1125F PR PAIN SEVERITY QUANTIFIED, PAIN PRESENT: ICD-10-PCS | Mod: CPTII,S$GLB,, | Performed by: INTERNAL MEDICINE

## 2022-02-24 PROCEDURE — 1157F ADVNC CARE PLAN IN RCRD: CPT | Mod: CPTII,S$GLB,, | Performed by: INTERNAL MEDICINE

## 2022-02-24 PROCEDURE — 3075F PR MOST RECENT SYSTOLIC BLOOD PRESS GE 130-139MM HG: ICD-10-PCS | Mod: CPTII,S$GLB,, | Performed by: INTERNAL MEDICINE

## 2022-02-24 RX ORDER — ALPRAZOLAM 1 MG/1
1 TABLET ORAL 2 TIMES DAILY PRN
Qty: 45 TABLET | Refills: 2 | Status: SHIPPED | OUTPATIENT
Start: 2022-02-24 | End: 2022-05-10

## 2022-02-24 RX ORDER — CLONIDINE HYDROCHLORIDE 0.2 MG/1
0.2 TABLET ORAL 3 TIMES DAILY
Qty: 270 TABLET | Refills: 3 | Status: SHIPPED | OUTPATIENT
Start: 2022-02-24 | End: 2022-03-22

## 2022-02-24 RX ORDER — TESTOSTERONE CYPIONATE 200 MG/ML
150 INJECTION, SOLUTION INTRAMUSCULAR
Qty: 10 ML | Refills: 0 | Status: SHIPPED | OUTPATIENT
Start: 2022-02-24 | End: 2022-11-09

## 2022-02-24 RX ORDER — LISINOPRIL 40 MG/1
40 TABLET ORAL DAILY
Qty: 90 TABLET | Refills: 3 | Status: SHIPPED | OUTPATIENT
Start: 2022-02-24 | End: 2023-03-27

## 2022-02-24 RX ORDER — IBUPROFEN 200 MG
1 TABLET ORAL DAILY
Qty: 28 PATCH | Refills: 0 | Status: SHIPPED | OUTPATIENT
Start: 2022-02-24 | End: 2022-03-27

## 2022-02-24 RX ORDER — BUSPIRONE HYDROCHLORIDE 30 MG/1
30 TABLET ORAL 2 TIMES DAILY
Qty: 180 TABLET | Refills: 3 | Status: SHIPPED | OUTPATIENT
Start: 2022-02-24 | End: 2023-01-02

## 2022-02-24 RX ORDER — METFORMIN HYDROCHLORIDE 500 MG/1
500 TABLET ORAL 2 TIMES DAILY WITH MEALS
Qty: 180 TABLET | Refills: 3 | Status: SHIPPED | OUTPATIENT
Start: 2022-02-24 | End: 2023-05-31

## 2022-02-24 RX ORDER — ATORVASTATIN CALCIUM 40 MG/1
40 TABLET, FILM COATED ORAL NIGHTLY
Qty: 90 TABLET | Refills: 3 | Status: SHIPPED | OUTPATIENT
Start: 2022-02-24 | End: 2023-01-20

## 2022-02-24 RX ORDER — VERAPAMIL HYDROCHLORIDE 180 MG/1
180 TABLET, FILM COATED, EXTENDED RELEASE ORAL 2 TIMES DAILY
Qty: 180 TABLET | Refills: 3 | Status: SHIPPED | OUTPATIENT
Start: 2022-02-24 | End: 2022-08-29

## 2022-02-24 NOTE — PROGRESS NOTES
This note was created by combination of typed  and M-Modal dictation.  Transcription errors may be present.  If there are any questions, please contact me.    Assessment and Plan:   Type 2 diabetes mellitus without complication, without long-term current use of insulin  Obesity (BMI 30-39.9)  -pre visit labs good, no changes, metformin.  Has upcoming eye exam in March  -     Comprehensive Metabolic Panel; Future; Expected date: 08/22/2022  -     Lipid Panel; Future; Expected date: 08/22/2022  -     Hemoglobin A1C; Future; Expected date: 08/22/2022  -     Microalbumin/Creatinine Ratio, Urine; Future; Expected date: 08/22/2022  -     CBC Auto Differential; Future; Expected date: 08/22/2022  -     metFORMIN (GLUCOPHAGE) 500 MG tablet; Take 1 tablet (500 mg total) by mouth 2 (two) times daily with meals.  Dispense: 180 tablet; Refill: 3    Low testosterone in male  -pre visit labs good on 150 mg every 2 weeks.  No changes.  -     Testosterone; Future; Expected date: 08/23/2022  -     testosterone cypionate (DEPOTESTOTERONE CYPIONATE) 200 mg/mL injection; Inject 0.75 mLs (150 mg total) into the muscle every 14 (fourteen) days.  Dispense: 10 mL; Refill: 0    Essential hypertension  -fluctuating, could be caused by or worsened by sleeping on his left side which is very painful for him.  Had most recent follow-up, cardiology increased his hydralazine.  He has not started the higher dose.  Advised him to take the higher dose.  Stay on current dose clonidine 0.2, lisinopril, verapamil  If he manages not to lay on his left side and his blood pressure start going down significantly, may decrease the hydralazine.  -     lisinopriL (PRINIVIL,ZESTRIL) 40 MG tablet; Take 1 tablet (40 mg total) by mouth once daily.  Dispense: 90 tablet; Refill: 3  -     verapamiL (CALAN-SR) 180 MG CR tablet; Take 1 tablet (180 mg total) by mouth 2 (two) times daily.  Dispense: 180 tablet; Refill: 3  -     cloNIDine (CATAPRES) 0.2 MG  tablet; Take 1 tablet (0.2 mg total) by mouth 3 (three) times daily. Increased frequency  Dispense: 270 tablet; Refill: 3    Uncomplicated opioid dependence  Other chronic pain  -managed by outside pain management    Benzodiazepine dependence, did not do well with attempt to wean down 2017  Anxiety, unable to wean off BZD  Recurrent major depressive disorder, in partial remission  -stable, takes the alprazolam at night.  Takes the BuSpar twice daily.  And takes citalopram.   -     ALPRAZolam (XANAX) 1 MG tablet; Take 1 tablet (1 mg total) by mouth 2 (two) times daily as needed for Anxiety.  Dispense: 45 tablet; Refill: 2  -     busPIRone (BUSPAR) 30 MG Tab; Take 1 tablet (30 mg total) by mouth 2 (two) times daily.  Dispense: 180 tablet; Refill: 3    Has stop smoking for the past month.  Congratulated him on his success thus far.    Mixed hyperlipidemia  -refilled atorvastatin, repeat in the fall/November  -     atorvastatin (LIPITOR) 40 MG tablet; Take 1 tablet (40 mg total) by mouth every evening.  Dispense: 90 tablet; Refill: 3    Encounter for screening for malignant neoplasm of prostate   -need PSA around November    Tubular adenoma of colon 5/10/17  Screening for colon cancer  Personal history of colonic polyps  -colonoscopy ordered  -     Case Request Endoscopy: COLONOSCOPY      Medications Discontinued During This Encounter   Medication Reason    chlorhexidine (SCRUB CHLORHEXIDINE GLUCONATE) 4 % external liquid     fluticasone furoate (ARNUITY ELLIPTA) 100 mcg/actuation inhaler Therapy completed    busPIRone (BUSPAR) 30 MG Tab Reorder    metFORMIN (GLUCOPHAGE) 500 MG tablet Reorder    atorvastatin (LIPITOR) 40 MG tablet Reorder    lisinopriL (PRINIVIL,ZESTRIL) 40 MG tablet Reorder    ALPRAZolam (XANAX) 1 MG tablet Reorder    testosterone cypionate (DEPOTESTOTERONE CYPIONATE) 200 mg/mL injection Reorder    verapamiL (CALAN-SR) 180 MG CR tablet Reorder    cloNIDine (CATAPRES) 0.2 MG tablet Reorder        meds sent this encounter:  Medications Ordered This Encounter   Medications    ALPRAZolam (XANAX) 1 MG tablet     Sig: Take 1 tablet (1 mg total) by mouth 2 (two) times daily as needed for Anxiety.     Dispense:  45 tablet     Refill:  2    atorvastatin (LIPITOR) 40 MG tablet     Sig: Take 1 tablet (40 mg total) by mouth every evening.     Dispense:  90 tablet     Refill:  3     This prescription was filled on 3/26/2021. Any refills authorized will be placed on file.    busPIRone (BUSPAR) 30 MG Tab     Sig: Take 1 tablet (30 mg total) by mouth 2 (two) times daily.     Dispense:  180 tablet     Refill:  3    cloNIDine (CATAPRES) 0.2 MG tablet     Sig: Take 1 tablet (0.2 mg total) by mouth 3 (three) times daily. Increased frequency     Dispense:  270 tablet     Refill:  3    lisinopriL (PRINIVIL,ZESTRIL) 40 MG tablet     Sig: Take 1 tablet (40 mg total) by mouth once daily.     Dispense:  90 tablet     Refill:  3     This prescription was filled on 3/26/2021. Any refills authorized will be placed on file.    metFORMIN (GLUCOPHAGE) 500 MG tablet     Sig: Take 1 tablet (500 mg total) by mouth 2 (two) times daily with meals.     Dispense:  180 tablet     Refill:  3    testosterone cypionate (DEPOTESTOTERONE CYPIONATE) 200 mg/mL injection     Sig: Inject 0.75 mLs (150 mg total) into the muscle every 14 (fourteen) days.     Dispense:  10 mL     Refill:  0    verapamiL (CALAN-SR) 180 MG CR tablet     Sig: Take 1 tablet (180 mg total) by mouth 2 (two) times daily.     Dispense:  180 tablet     Refill:  3     .       Follow Up: No follow-ups on file.  Follow-up 6 months with labs    Subjective:     Chief Complaint   Patient presents with    Hypertension       DIPESH Rae is a 66 y.o. male, last appointment with this clinic was 1/4/2022.  Social History     Tobacco Use    Smoking status: Current Every Day Smoker     Packs/day: 0.50     Years: 32.00     Pack years: 16.00     Types: Cigarettes     Last attempt  to quit: 3/4/2013     Years since quittin.9    Smokeless tobacco: Current User    Tobacco comment: weed    Substance Use Topics    Alcohol use: No     Alcohol/week: 0.0 standard drinks      Social History     Occupational History    Occupation: retired -       Social History     Social History Narrative    Not on file     Last visit with me in early January  Hypertension, home cuff over reads slightly.  Not controlled.  Increased clonidine from b.i.d. to t.i.d..  Remain on lisinopril and verapamil.  Clonidine may help with component of anxiety  Anxiety, benzodiazepine dependence, MDD.  Stable on alprazolam  Diabetes stable  Lumbar and cervical degenerative disc disease, opioid dependence.  Followed by outside pain management  Testosterone replacement, 150 mg every 2 week.  Smoking in cessation classes    Saw neuro surgery in follow-up.  Stable.  Plan is try and get new bone growth stimulator.  Physical therapy.    Saw cardiology for labile blood pressures.  Added hydralazine.  Echo and Holter.    Echo was normal  2/3/2022 TTE LV normal size with moderate concentric hypertrophy and normal systolic function LVEF 60%.  Normal RV size and systolic function.  PA pressure 33  Did not get Holter for asymptomatic bradycardia    Increased hydralazine    Pre visit lab CBC macrocytic anemia seen previously  CMP normal  A1c 6.3 from 6.7  Testosterone normal 352    Notes fluctuating blood pressures.  Brings in his home cuff.  There are few readings the seem to be interspersed with many normal readings.  With systolic as high as 200 with diastolic as low as 100. Feels like the nighttime dose of his medication does not last throughout the night.  Last night he awoke with ringing in his ears which is atypical sign to him for high blood pressure and checked and it was high.  He took an extra dose of clonidine.  Was expecting his blood pressure to bottom out but today it is actually pretty normal.    He notes  that the left side is the bedside after his surgery.  Still gets some pain and discomfort on left side.  When he sleeps for a prolonged period on his left side he will wake up in a lot of pain.  And wonders if that has any effect on his blood pressure.  Discussed them that it probably will worsen blood pressure BP's in significant pain.  He was instructed by his pain management not to take narcotic at night because of high risk.  So he is going to try and avoid sleeping on his left side.    Furthermore, he has not started the higher dose hydralazine yet.  He just finished up his lower dose hydralazine.    Reviewed his lab results with him.  Testosterone level is normal.  Taking 150 mg every 2 weeks.  His A1c is stable.  In fact a bit better.    Has stop smoking for the past month.  Congratulated him his success thus far.  He has been using nicotine replacement patches.    Patient Care Team:  Gabriel Evans MD as PCP - General (Internal Medicine)  Vlad Nava MD (Pain Medicine)  Kaila Carrillo OD as Consulting Physician (Optometry)  Yasmin Chen MA as Care Coordinator    Patient Active Problem List    Diagnosis Date Noted    Chronic low back pain 12/27/2021    Sacroiliac joint pain 12/27/2021    Sacroiliitis 12/27/2021    Impaired mobility and ADLs 10/07/2021    Obesity (BMI 30-39.9) 10/07/2021    Status post surgery 10/03/2021    History of smoking 10-25 pack years 10/02/2021    Paresthesia of left upper and lower extremity 10/01/2021    Low testosterone in male 09/02/2020 6/2020 Repeat labs prolactin was normal.  Testosterone was low.  Free testosterone was low and sex hormone binding globulin was normal      Elevated prolactin level,low testosterone, gynecomastia; MRI pituitary normal 5/2020 01/13/2020    Nuclear sclerosis of both eyes 11/04/2019    Open angle with borderline findings and high glaucoma risk in both eyes 10/08/2019    Cataract, bilateral 09/11/2018    Cervical stenosis of  spinal canal 10/3/2021 LAMINECTOMY, SPINE, CERVICAL, WITH POSTERIOR FUSION C2-T2 05/07/2018 02/14/2018 MRI C-spine impression:  Slight retrolisthesis of C4 with respect to C5.  Narrowing of the central spinal canal most prominent at the C4-C5, C5-C6, and C6-C7 levels and to a lesser extent at C2-C3 and C3-C4.  Annular disc bulges posteriorly at C2-C3, C3-C4.  Diffuse disc herniation/protrusion posteriorly at C4-C5 level and a disc herniation/extrusion posteriorly at the C5-C6 level with a central disc herniation/protrusion posteriorly at the C6-C7 level.  Narrowing of the neural foramen bilaterally from C3-C4 through C6-C7.  10/1/2021 admitted for L sided numbness after epidural injection, initially CT interpreted at [facility]; however subsequent MRI no hemorrhage but severe spinal canal stenosis    10/1/2021 MRI brain: Negative MRI of the brain for hemorrhage, mass or infarction. Specifically, no evidence of subarachnoid blood or blood products in the extra-axial spaces on SWI or diffusion-weighted images.  In light of the previous CTs and history of epidural injections at outside facility, this raises the question of in ejected contrast in the central spinal canal with migration into the intracranial subarachnoid spaces.  Subarachnoid hemorrhage or exudate are considered less likely.  10/1/2021 MRI C spine: Severe spinal canal stenosis with complete effacement of the CSF and spinal cord compression at the level of C5-C6 due to posterior disc osteophyte complex with superimposed right disc protrusion and congenitally narrow spinal canal.  High T2 signal within the cord secondary to either edema or myelomalacia.    10/3/2021 LAMINECTOMY, SPINE, CERVICAL, WITH POSTERIOR FUSION C2-T2      Tubular adenoma of colon 5/10/17 05/10/2017     5/10/2017 colonoscopy tubular adenoma      Therapeutic opioid-induced constipation (OIC) 04/17/2017    Tobacco dependence, patch with irritation; hx of bupropion 01/12/2016    Non  morbid obesity due to excess calories 01/12/2016    Type 2 diabetes mellitus without complication, without long-term current use of insulin 08/07/2015    Facet arthritis of cervical region 10/28/2013    Facet arthritis of lumbar region 10/28/2013    Benzodiazepine dependence, did not do well with attempt to wean down 2017 10/28/2013    Uncomplicated opioid dependence; pain mgmt bone and joint 10/28/2013    ED (erectile dysfunction) 07/26/2013    DDD (degenerative disc disease), cervical 05/23/2013    DDD (degenerative disc disease), lumbar 05/23/2013    Essential hypertension 11/01/2012     2/3/2022 TTE LV normal size with moderate concentric hypertrophy and normal systolic function LVEF 60%.  Normal RV size and systolic function.  PA pressure 33      Hyperlipidemia, mixed 11/01/2012    Anemia 11/01/2012    Chronic pain 11/01/2012    Anxiety, unable to wean off BZD 11/01/2012    Recurrent major depressive disorder, in partial remission 11/01/2012       PAST MEDICAL PROBLEMS, PAST SURGICAL HISTORY: please see relevant portions of the electronic medical record    ALLERGIES AND MEDICATIONS: updated and reviewed.  Review of patient's allergies indicates:  No Known Allergies    Medication List with Changes/Refills   Current Medications    ALPRAZOLAM (XANAX) 1 MG TABLET    Take 1 tablet (1 mg total) by mouth 2 (two) times daily as needed for Anxiety.    ATORVASTATIN (LIPITOR) 40 MG TABLET    TAKE ONE TABLET BY MOUTH ONCE A DAY FOR cholesterol    BUSPIRONE (BUSPAR) 30 MG TAB    Take 1 tablet (30 mg total) by mouth 2 (two) times daily.    CHLORHEXIDINE (SCRUB CHLORHEXIDINE GLUCONATE) 4 % EXTERNAL LIQUID    Apply topically 2 (two) times a day.    CITALOPRAM (CELEXA) 40 MG TABLET    TAKE ONE TABLET BY MOUTH ONCE A DAY    CLONIDINE (CATAPRES) 0.2 MG TABLET    Take 1 tablet (0.2 mg total) by mouth 3 (three) times daily. Increased frequency    FLUTICASONE (FLONASE) 50 MCG/ACTUATION NASAL SPRAY    INHALE 1 SPRAY IN  "EACH NOSTRIL EVERY DAY    FLUTICASONE FUROATE (ARNUITY ELLIPTA) 100 MCG/ACTUATION INHALER    Inhale 1 puff into the lungs once daily. Controller    FLUTICASONE-SALMETEROL DISKUS INHALER 100-50 MCG    Inhale 1 puff into the lungs.    GABAPENTIN (NEURONTIN) 400 MG CAPSULE    Take 1 capsule (400 mg total) by mouth 3 (three) times daily.    HYDRALAZINE (APRESOLINE) 50 MG TABLET    Take 1 tablet (50 mg total) by mouth 3 (three) times daily.    INHALATION SPACING DEVICE    Use as directed for inhalation.    LISINOPRIL (PRINIVIL,ZESTRIL) 40 MG TABLET    TAKE ONE TABLET BY MOUTH ONCE A DAY    METFORMIN (GLUCOPHAGE) 500 MG TABLET    TAKE ONE TABLET BY MOUTH TWICE DAILY WITH MEALS    NALOXONE (NARCAN) 4 MG/ACTUATION SPRY    4mg by nasal route as needed for opioid overdose; may repeat every 2-3 minutes in alternating nostrils until medical help arrives. Call 911    NEEDLE, DISP, 22 G 22 GAUGE X 1" NDLE    Testosterone injection every 2 weeks    NICOTINE (NICODERM CQ) 21 MG/24 HR    Place 1 patch onto the skin once daily.    OXYCODONE-ACETAMINOPHEN (PERCOCET)  MG PER TABLET    Take 1 tablet by mouth every 6 (six) hours as needed.    POLYETHYLENE GLYCOL 3350 (MIRALAX ORAL)    Take 1 application by mouth.    PROAIR HFA 90 MCG/ACTUATION INHALER    INHALE TWO PUFFS BY MOUTH EVERY 6 HOURS AS NEEDED FOR WHEEZING (rescue)    SENNA (SENOKOT) 8.6 MG TABLET    Take 1 tablet by mouth 2 (two) times a day.    SYRINGE, DISPOSABLE, (BD LUER-STARLA SYRINGE) 1 ML SYRG    Testosterone injection every 2 weeks    TADALAFIL (CIALIS) 20 MG TAB    Take 1 tablet (20 mg total) by mouth once daily.    TESTOSTERONE CYPIONATE (DEPOTESTOTERONE CYPIONATE) 200 MG/ML INJECTION    Inject 0.75 mLs (150 mg total) into the muscle every 14 (fourteen) days. Decreased dose    VERAPAMIL (CALAN-SR) 180 MG CR TABLET    Take 1 tablet (180 mg total) by mouth 2 (two) times daily.        Objective:   Physical Exam  Vitals:    02/24/22 1054   BP: 132/70   Pulse: 72 " "  Temp: 98.1 °F (36.7 °C)   TempSrc: Oral   SpO2: 96%   Weight: 95.7 kg (210 lb 15.7 oz)   Height: 5' 11" (1.803 m)    Body mass index is 29.43 kg/m².  Weight: 95.7 kg (210 lb 15.7 oz)   Height: 5' 11" (180.3 cm)     Physical Exam  Constitutional:       General: He is not in acute distress.     Appearance: He is well-developed.   Cardiovascular:      Rate and Rhythm: Normal rate and regular rhythm.      Heart sounds: Normal heart sounds. No murmur heard.  Pulmonary:      Effort: Pulmonary effort is normal.      Breath sounds: Normal breath sounds.   Musculoskeletal:         General: Normal range of motion.      Right lower leg: No edema.      Left lower leg: No edema.   Skin:     General: Skin is warm and dry.   Neurological:      Mental Status: He is alert and oriented to person, place, and time.      Coordination: Coordination normal.   Psychiatric:         Behavior: Behavior normal.         Thought Content: Thought content normal.         Component      Latest Ref Rng & Units 2/16/2022 11/24/2021   WBC      3.90 - 12.70 K/uL 10.16    RBC      4.60 - 6.20 M/uL 4.06 (L)    Hemoglobin      14.0 - 18.0 g/dL 12.7 (L)    Hematocrit      40.0 - 54.0 % 40.9    MCV      82 - 98 fL 101 (H)    MCH      27.0 - 31.0 pg 31.3 (H)    MCHC      32.0 - 36.0 g/dL 31.1 (L)    RDW      11.5 - 14.5 % 13.9    Platelets      150 - 450 K/uL 164    MPV      9.2 - 12.9 fL 11.7    Immature Granulocytes      0.0 - 0.5 % 0.5    Gran # (ANC)      1.8 - 7.7 K/uL 6.1    Immature Grans (Abs)      0.00 - 0.04 K/uL 0.05 (H)    Lymph #      1.0 - 4.8 K/uL 3.0    Mono #      0.3 - 1.0 K/uL 0.7    Eos #      0.0 - 0.5 K/uL 0.2    Baso #      0.00 - 0.20 K/uL 0.04    nRBC      0 /100 WBC 0    Gran %      38.0 - 73.0 % 60.3    Lymph %      18.0 - 48.0 % 29.7    Mono %      4.0 - 15.0 % 6.8    Eosinophil %      0.0 - 8.0 % 2.3    Basophil %      0.0 - 1.9 % 0.4    Differential Method       Automated    Sodium      136 - 145 mmol/L 138    Potassium      " 3.5 - 5.1 mmol/L 5.0    Chloride      95 - 110 mmol/L 104    CO2      23 - 29 mmol/L 25    Glucose      70 - 110 mg/dL 97    BUN      8 - 23 mg/dL 15    Creatinine      0.5 - 1.4 mg/dL 1.1    Calcium      8.7 - 10.5 mg/dL 8.8    PROTEIN TOTAL      6.0 - 8.4 g/dL 6.5    Albumin      3.5 - 5.2 g/dL 3.8    BILIRUBIN TOTAL      0.1 - 1.0 mg/dL 0.4    Alkaline Phosphatase      55 - 135 U/L 41 (L)    AST      10 - 40 U/L 18    ALT      10 - 44 U/L 18    Anion Gap      8 - 16 mmol/L 9    eGFR if African American      >60 mL/min/1.73 m:2 >60.0    eGFR if non African American      >60 mL/min/1.73 m:2 >60.0    Microalbumin, Urine      ug/mL 15.0    Creatinine, Urine      23.0 - 375.0 mg/dL 124.0    MICROALB/CREAT RATIO      0.0 - 30.0 ug/mg 12.1    Hemoglobin A1C External      4.0 - 5.6 % 6.3 (H) 6.7 (H)   Estimated Avg Glucose      68 - 131 mg/dL 134 (H) 146 (H)   PSA, Screen      0.00 - 4.00 ng/mL  0.78   Testosterone, Total      304 - 1227 ng/dL 352 >1500 (H)

## 2022-02-24 NOTE — PROGRESS NOTES
Individual Follow-Up Form    2/24/2022    Quit Date: Early Feb    Clinical Status of Patient: Outpatient    Length of Service: 30 minutes    Continuing Medication: yes  Patches    Other Medications: n/a     Target Symptoms: Withdrawal and medication side effects. The following were  rated moderate (3) to severe (4) on TCRS:  · Moderate (3): 0  · Severe (4): 0    Comments: patient presents for follow up telephonically. He quit smoking! He quit smoking mid Feb, he is currently wearing the 21mg nicotine patch daily, he is doing well and not slipping at all, recommend he remain on this dose for another 1 week, strategies discussed as well as session handout, discussed ways to prevent lapse and slip, will follow     Diagnosis: F17.210    Next Visit: 2 weeks

## 2022-03-08 ENCOUNTER — CLINICAL SUPPORT (OUTPATIENT)
Dept: SMOKING CESSATION | Facility: CLINIC | Age: 67
End: 2022-03-08
Payer: COMMERCIAL

## 2022-03-08 DIAGNOSIS — F17.200 NICOTINE DEPENDENCE: Primary | ICD-10-CM

## 2022-03-08 PROCEDURE — 99999 PR PBB SHADOW E&M-EST. PATIENT-LVL II: CPT | Mod: PBBFAC,,,

## 2022-03-08 PROCEDURE — 99402 PREV MED CNSL INDIV APPRX 30: CPT | Mod: S$GLB,,,

## 2022-03-08 PROCEDURE — 99402 PR PREVENT COUNSEL,INDIV,30 MIN: ICD-10-PCS | Mod: S$GLB,,,

## 2022-03-08 PROCEDURE — 99999 PR PBB SHADOW E&M-EST. PATIENT-LVL II: ICD-10-PCS | Mod: PBBFAC,,,

## 2022-03-09 NOTE — PROGRESS NOTES
Individual Follow-Up Form    3/8/2022    Quit Date: n/a    Clinical Status of Patient: Outpatient    Length of Service: 30 minutes    Continuing Medication: yes  Patches    Other Medications: n/a     Target Symptoms: Withdrawal and medication side effects. The following were  rated moderate (3) to severe (4) on TCRS:  · Moderate (3): 0  · Severe (4): 0    Comments:  Patient present telephonically at the time of his appt, he is not smoking however every now and then has one but not daily, he is doing great and much better then he has ever done, he is currently on the 21m nicotine patch daily and doing well, he would like to remain on this dose of the nicotine patch to help control his strong urges, pulls and desired to smoke, he is around others that are smoking in the home daily therefore this is a huge challenge for him. He also suffers with chronic pain which does not help when trying to quit smoking, session handout discussed and strategies suggested. Will follow     Diagnosis: F17.210    Next Visit: 2 weeks

## 2022-03-10 ENCOUNTER — HOSPITAL ENCOUNTER (OUTPATIENT)
Dept: CARDIOLOGY | Facility: HOSPITAL | Age: 67
Discharge: HOME | End: 2022-03-10
Attending: INTERNAL MEDICINE
Payer: MEDICARE

## 2022-03-10 DIAGNOSIS — I10 ESSENTIAL HYPERTENSION: ICD-10-CM

## 2022-03-10 PROCEDURE — 93227 HOLTER MONITOR - 24 HOUR (CUPID ONLY): ICD-10-PCS | Mod: ,,, | Performed by: INTERNAL MEDICINE

## 2022-03-10 PROCEDURE — 93226 XTRNL ECG REC<48 HR SCAN A/R: CPT

## 2022-03-10 PROCEDURE — 93227 XTRNL ECG REC<48 HR R&I: CPT | Mod: ,,, | Performed by: INTERNAL MEDICINE

## 2022-03-14 LAB
OHS CV EVENT MONITOR DAY: 1
OHS CV HOLTER LENGTH DECIMAL HOURS: 48
OHS CV HOLTER LENGTH HOURS: 24
OHS CV HOLTER LENGTH MINUTES: 0
OHS CV HOLTER SINUS AVERAGE HR: 62
OHS CV HOLTER SINUS MAX HR: 109
OHS CV HOLTER SINUS MIN HR: 43

## 2022-03-15 ENCOUNTER — PATIENT OUTREACH (OUTPATIENT)
Dept: ADMINISTRATIVE | Facility: OTHER | Age: 67
End: 2022-03-15
Payer: MEDICARE

## 2022-03-15 ENCOUNTER — OFFICE VISIT (OUTPATIENT)
Dept: OPTOMETRY | Facility: CLINIC | Age: 67
End: 2022-03-15
Payer: MEDICARE

## 2022-03-15 DIAGNOSIS — E11.36 TYPE 2 DIABETES MELLITUS WITH DIABETIC CATARACT, WITHOUT LONG-TERM CURRENT USE OF INSULIN: Primary | ICD-10-CM

## 2022-03-15 DIAGNOSIS — H25.13 NUCLEAR SCLEROSIS OF BOTH EYES: ICD-10-CM

## 2022-03-15 PROCEDURE — 3066F PR DOCUMENTATION OF TREATMENT FOR NEPHROPATHY: ICD-10-PCS | Mod: CPTII,S$GLB,, | Performed by: OPTOMETRIST

## 2022-03-15 PROCEDURE — 3066F NEPHROPATHY DOC TX: CPT | Mod: CPTII,S$GLB,, | Performed by: OPTOMETRIST

## 2022-03-15 PROCEDURE — 4010F PR ACE/ARB THEARPY RXD/TAKEN: ICD-10-PCS | Mod: CPTII,S$GLB,, | Performed by: OPTOMETRIST

## 2022-03-15 PROCEDURE — 1157F ADVNC CARE PLAN IN RCRD: CPT | Mod: CPTII,S$GLB,, | Performed by: OPTOMETRIST

## 2022-03-15 PROCEDURE — 1159F PR MEDICATION LIST DOCUMENTED IN MEDICAL RECORD: ICD-10-PCS | Mod: CPTII,S$GLB,, | Performed by: OPTOMETRIST

## 2022-03-15 PROCEDURE — 92014 COMPRE OPH EXAM EST PT 1/>: CPT | Mod: S$GLB,,, | Performed by: OPTOMETRIST

## 2022-03-15 PROCEDURE — 1160F PR REVIEW ALL MEDS BY PRESCRIBER/CLIN PHARMACIST DOCUMENTED: ICD-10-PCS | Mod: CPTII,S$GLB,, | Performed by: OPTOMETRIST

## 2022-03-15 PROCEDURE — 1100F PR PT FALLS ASSESS DOC 2+ FALLS/FALL W/INJURY/YR: ICD-10-PCS | Mod: CPTII,S$GLB,, | Performed by: OPTOMETRIST

## 2022-03-15 PROCEDURE — 99999 PR PBB SHADOW E&M-EST. PATIENT-LVL III: CPT | Mod: PBBFAC,,, | Performed by: OPTOMETRIST

## 2022-03-15 PROCEDURE — 1100F PTFALLS ASSESS-DOCD GE2>/YR: CPT | Mod: CPTII,S$GLB,, | Performed by: OPTOMETRIST

## 2022-03-15 PROCEDURE — 4010F ACE/ARB THERAPY RXD/TAKEN: CPT | Mod: CPTII,S$GLB,, | Performed by: OPTOMETRIST

## 2022-03-15 PROCEDURE — 1160F RVW MEDS BY RX/DR IN RCRD: CPT | Mod: CPTII,S$GLB,, | Performed by: OPTOMETRIST

## 2022-03-15 PROCEDURE — 99999 PR PBB SHADOW E&M-EST. PATIENT-LVL III: ICD-10-PCS | Mod: PBBFAC,,, | Performed by: OPTOMETRIST

## 2022-03-15 PROCEDURE — 3288F FALL RISK ASSESSMENT DOCD: CPT | Mod: CPTII,S$GLB,, | Performed by: OPTOMETRIST

## 2022-03-15 PROCEDURE — 1157F PR ADVANCE CARE PLAN OR EQUIV PRESENT IN MEDICAL RECORD: ICD-10-PCS | Mod: CPTII,S$GLB,, | Performed by: OPTOMETRIST

## 2022-03-15 PROCEDURE — 92014 PR EYE EXAM, EST PATIENT,COMPREHESV: ICD-10-PCS | Mod: S$GLB,,, | Performed by: OPTOMETRIST

## 2022-03-15 PROCEDURE — 3044F HG A1C LEVEL LT 7.0%: CPT | Mod: CPTII,S$GLB,, | Performed by: OPTOMETRIST

## 2022-03-15 PROCEDURE — 3044F PR MOST RECENT HEMOGLOBIN A1C LEVEL <7.0%: ICD-10-PCS | Mod: CPTII,S$GLB,, | Performed by: OPTOMETRIST

## 2022-03-15 PROCEDURE — 3061F PR NEG MICROALBUMINURIA RESULT DOCUMENTED/REVIEW: ICD-10-PCS | Mod: CPTII,S$GLB,, | Performed by: OPTOMETRIST

## 2022-03-15 PROCEDURE — 3061F NEG MICROALBUMINURIA REV: CPT | Mod: CPTII,S$GLB,, | Performed by: OPTOMETRIST

## 2022-03-15 PROCEDURE — 1126F AMNT PAIN NOTED NONE PRSNT: CPT | Mod: CPTII,S$GLB,, | Performed by: OPTOMETRIST

## 2022-03-15 PROCEDURE — 1126F PR PAIN SEVERITY QUANTIFIED, NO PAIN PRESENT: ICD-10-PCS | Mod: CPTII,S$GLB,, | Performed by: OPTOMETRIST

## 2022-03-15 PROCEDURE — 3288F PR FALLS RISK ASSESSMENT DOCUMENTED: ICD-10-PCS | Mod: CPTII,S$GLB,, | Performed by: OPTOMETRIST

## 2022-03-15 PROCEDURE — 1159F MED LIST DOCD IN RCRD: CPT | Mod: CPTII,S$GLB,, | Performed by: OPTOMETRIST

## 2022-03-15 NOTE — PROGRESS NOTES
Subjective:       Patient ID: Butch Mcdaniel is a 66 y.o. male      Chief Complaint   Patient presents with    Concerns About Ocular Health    Diabetic Eye Exam     History of Present Illness  Dls: 1/13/21 Dr. Carrillo     67 y/o male presents today for diabetic eye exam.   Pt c/o blurry vision at near ou. Pt wears otc readers.    LBS ?     + tearing  No itching  No burning  No pain  No ha's  No floaters  No flashes    Eye meds  None    Hemoglobin A1C       Date                     Value               Ref Range             Status                02/16/2022               6.3 (H)             4.0 - 5.6 %           Final                  11/24/2021               6.7 (H)             4.0 - 5.6 %           Final                  08/07/2021               6.2 (H)             4.0 - 5.6 %           Final                 Assessment/Plan:     1. Type 2 diabetes mellitus with diabetic cataract, without long-term current use of insulin  No diabetic retinopathy. Discussed with pt the effects of diabetes on vision, importance of good blood sugar control, compliance with meds, and follow up care with PCP. Return in 1 year for dilated eye exam, sooner PRN.      2. Nuclear sclerosis of both eyes  Educated pt on presence of cataracts and effects on vision. No surgery at this time. Recheck in one year, sooner PRN.      Follow up in about 1 year (around 3/15/2023) for Diabetic Eye Exam.

## 2022-03-16 ENCOUNTER — TELEPHONE (OUTPATIENT)
Dept: SMOKING CESSATION | Facility: CLINIC | Age: 67
End: 2022-03-16
Payer: MEDICARE

## 2022-03-22 ENCOUNTER — CLINICAL SUPPORT (OUTPATIENT)
Dept: SMOKING CESSATION | Facility: CLINIC | Age: 67
End: 2022-03-22
Payer: COMMERCIAL

## 2022-03-22 ENCOUNTER — TELEPHONE (OUTPATIENT)
Dept: FAMILY MEDICINE | Facility: CLINIC | Age: 67
End: 2022-03-22
Payer: MEDICARE

## 2022-03-22 ENCOUNTER — NURSE TRIAGE (OUTPATIENT)
Dept: ADMINISTRATIVE | Facility: CLINIC | Age: 67
End: 2022-03-22
Payer: MEDICARE

## 2022-03-22 DIAGNOSIS — F17.200 NICOTINE DEPENDENCE: Primary | ICD-10-CM

## 2022-03-22 DIAGNOSIS — I10 ESSENTIAL HYPERTENSION: Primary | ICD-10-CM

## 2022-03-22 PROCEDURE — 90853 PR GROUP PSYCHOTHERAPY: ICD-10-PCS | Mod: S$GLB,,,

## 2022-03-22 PROCEDURE — 99999 PR PBB SHADOW E&M-EST. PATIENT-LVL II: CPT | Mod: PBBFAC,,,

## 2022-03-22 PROCEDURE — 90853 GROUP PSYCHOTHERAPY: CPT | Mod: S$GLB,,,

## 2022-03-22 PROCEDURE — 99999 PR PBB SHADOW E&M-EST. PATIENT-LVL II: ICD-10-PCS | Mod: PBBFAC,,,

## 2022-03-22 RX ORDER — CLONIDINE 0.2 MG/24H
1 PATCH, EXTENDED RELEASE TRANSDERMAL
Qty: 4 PATCH | Refills: 11 | Status: SHIPPED | OUTPATIENT
Start: 2022-03-22 | End: 2022-08-29

## 2022-03-22 NOTE — TELEPHONE ENCOUNTER
Pt called and c/o b/p dropping pt said that its been fluctuating and he took a clonidine at 3 am because of ringing in his ears and then took another 113/49 HR 49 pt said that his bp is going up at this point. Pt said slurred speak at times and he said that he had a stroke in the past. Pt talking good at this point. Pt said he has to go that the Dr'd office is calling. Will route message to MD  Reason for Disposition   Fall in systolic BP > 20 mm Hg from normal and NOT dizzy, lightheaded, or weak    Additional Information   Negative: Systolic BP < 90 and feeling dizzy, lightheaded, or weak   Negative: Started suddenly after an allergic medicine, an allergic food, or bee sting   Negative: Shock suspected (e.g., cold/pale/clammy skin, too weak to stand, low BP, rapid pulse)   Negative: Difficult to awaken or acting confused (e.g., disoriented, slurred speech)   Negative: Fainted   Negative: Chest pain   Negative: Bleeding (e.g., vomiting blood, rectal bleeding or tarry stools, severe vaginal bleeding)   Negative: Extra heart beats or heart is beating fast (i.e., 'palpitations')   Negative: Sounds like a life-threatening emergency to the triager   Negative: Systolic BP < 80 and NOT dizzy, lightheaded or weak (feels normal)   Negative: Abdominal pain   Negative: Major surgery in the past month   Negative: Fever > 100.4 F (38.0 C)   Negative: Drinking very little and has signs of dehydration (e.g., no urine > 12 hours, very dry mouth, very lightheaded)   Negative: Fall in systolic BP > 20 mm Hg from normal and feeling dizzy, lightheaded, or weak   Negative: Patient sounds very sick or weak to the triager   Negative: Systolic BP < 90 and NOT dizzy, lightheaded or weak   Negative: Systolic BP  while taking blood pressure medications and NOT dizzy, lightheaded or weak    Protocols used: BLOOD PRESSURE - LOW-A-OH

## 2022-03-22 NOTE — TELEPHONE ENCOUNTER
Pt placed call to message line & informed staff of his experiencing low blood pressure & requested a call back. This nurse placed calls to pts mobile & home ph numbers & received VM. Messages were left on both accounts for pt to contact clinic. Please advise. Awaiting pt call back. Thank you.

## 2022-03-22 NOTE — TELEPHONE ENCOUNTER
Can't really give advice without further details.  Is this all day long? Some days?  Taking meds regularly? How low is the BP? May need him to come in for eval.

## 2022-03-22 NOTE — TELEPHONE ENCOUNTER
----- Message from Patito Bryson sent at 3/22/2022 11:34 AM CDT -----  Type:  Patient Returning Call    Who Called: wife Deann     Who Left Message for Patient: Lucio    Does the patient know what this is regarding?: yes/ low bp most recent was 117/48 pulse 49    Would the patient rather a call back or a response via My [facility]? Call back     Best Call Back Number: Deann [phone]    Additional info: Wife believes patient is taking too much of his medication.

## 2022-03-22 NOTE — PROGRESS NOTES
Individual Follow-Up Form    3/22/2022    Quit Date: n/a    Clinical Status of Patient: Outpatient      Continuing Medication: yes  Patches    Other Medications: n/a     Target Symptoms: Withdrawal and medication side effects. The following were  rated moderate (3) to severe (4) on TCRS:  · Moderate (3): 0  · Severe (4): 0    Comments: PATIENT PRESENTS FOR TELEPHONE FOLLOW UP WITH HIS WIFE AS A GROUP FOLLOW UP, HE IS A CONTINUED NON SMOKER!! CAN NOT EXPRESS HOW PROUD TTS IS OF THIS PATIENT. HAVE FOLLOWED HIM FOR MANY YEARS, HE IS WEARING THE 21MG NICOTINE PATCH DAILY, HE IS FEELING BETTER AND BREATHING BETTER AS A NONSMOKER, STRATEGIES DISCUSSED WITH HIM REGARDING LAPSE AND PREVENTION. SESSION HANDOUT DISCUSSED IN GROUP SETTING, NO NEGATIVE S/E REPORTED FROM THE NICOTINE PATCHES, will follow       Diagnosis: F17.210    Next Visit: 2 weeks

## 2022-03-22 NOTE — TELEPHONE ENCOUNTER
We could try him with clonidine patch instead of pills - might be smoother and give him more consistent BP over the course of a day.  It's a weekly patch.  Is he willing to try?

## 2022-03-22 NOTE — Clinical Note
Comments: PATIENT PRESENTS FOR TELEPHONE FOLLOW UP WITH HIS WIFE AS A GROUP FOLLOW UP, HE IS A CONTINUED NON SMOKER!! CAN NOT EXPRESS HOW PROUD TTS IS OF THIS PATIENT. HAVE FOLLOWED HIM FOR MANY YEARS, HE IS WEARING THE 21MG NICOTINE PATCH DAILY, HE IS FEELING BETTER AND BREATHING BETTER AS A NONSMOKER, STRATEGIES DISCUSSED WITH HIM REGARDING LAPSE AND PREVENTION. SESSION HANDOUT DISCUSSED IN GROUP SETTING, NO NEGATIVE S/E REPORTED FROM THE NICOTINE PATCHES, will follow

## 2022-04-18 ENCOUNTER — CLINICAL SUPPORT (OUTPATIENT)
Dept: SMOKING CESSATION | Facility: CLINIC | Age: 67
End: 2022-04-18
Payer: COMMERCIAL

## 2022-04-18 DIAGNOSIS — F17.200 NICOTINE DEPENDENCE: Primary | ICD-10-CM

## 2022-04-18 PROCEDURE — 99402 PR PREVENT COUNSEL,INDIV,30 MIN: ICD-10-PCS | Mod: S$GLB,,,

## 2022-04-18 PROCEDURE — 99999 PR PBB SHADOW E&M-EST. PATIENT-LVL II: CPT | Mod: PBBFAC,,,

## 2022-04-18 PROCEDURE — 99999 PR PBB SHADOW E&M-EST. PATIENT-LVL II: ICD-10-PCS | Mod: PBBFAC,,,

## 2022-04-18 PROCEDURE — 99402 PREV MED CNSL INDIV APPRX 30: CPT | Mod: S$GLB,,,

## 2022-04-18 RX ORDER — IBUPROFEN 200 MG
1 TABLET ORAL DAILY
Qty: 28 PATCH | Refills: 0 | Status: SHIPPED | OUTPATIENT
Start: 2022-04-18 | End: 2022-05-03 | Stop reason: SDUPTHER

## 2022-04-18 RX ORDER — IBUPROFEN 200 MG
1 TABLET ORAL DAILY
Qty: 28 PATCH | Refills: 0 | Status: CANCELLED | OUTPATIENT
Start: 2022-04-18 | End: 2022-05-18

## 2022-04-18 NOTE — PROGRESS NOTES
Individual Follow-Up Form    4/18/2022    Quit Date: mid feb however lapsed to smoking a few per day     Clinical Status of Patient: Outpatient    Length of Service: 30 minutes    Continuing Medication: yes  Patches    Other Medications: n/a     Target Symptoms: Withdrawal and medication side effects. The following were  rated moderate (3) to severe (4) on TCRS:  · Moderate (3): 0  · Severe (4): 0    Comments: patient presents for follow up telephonically, he is smoking a few cigarettes per day, he has lapsed into smoking and had a rough week, he is out of nicotine patches and has bernie wearing his wife's patches for the last few days which are a lower dose, recommend getting back on track with the smoking and wear the nicotine patch 21mg daily. Strategies to help him get back on track discussed. Will follow     Diagnosis: F17.210    Next Visit: 2 weeks

## 2022-04-18 NOTE — Clinical Note
Comments: patient presents for follow up telephonically, he is smoking a few cigarettes per day, he has lapsed into smoking and had a rough week, he is out of nicotine patches and has bernie wearing his wife's patches for the last few days which are a lower dose, recommend getting back on track with the smoking and wear the nicotine patch 21mg daily. Strategies to help him get back on track discussed. Will follow

## 2022-04-21 ENCOUNTER — NURSE TRIAGE (OUTPATIENT)
Dept: ADMINISTRATIVE | Facility: CLINIC | Age: 67
End: 2022-04-21
Payer: MEDICARE

## 2022-04-21 ENCOUNTER — OFFICE VISIT (OUTPATIENT)
Dept: FAMILY MEDICINE | Facility: CLINIC | Age: 67
End: 2022-04-21
Payer: MEDICARE

## 2022-04-21 ENCOUNTER — OFFICE VISIT (OUTPATIENT)
Dept: CARDIOLOGY | Facility: CLINIC | Age: 67
End: 2022-04-21
Payer: MEDICARE

## 2022-04-21 ENCOUNTER — PATIENT OUTREACH (OUTPATIENT)
Dept: ADMINISTRATIVE | Facility: OTHER | Age: 67
End: 2022-04-21
Payer: MEDICARE

## 2022-04-21 ENCOUNTER — CLINICAL SUPPORT (OUTPATIENT)
Dept: SMOKING CESSATION | Facility: CLINIC | Age: 67
End: 2022-04-21
Payer: COMMERCIAL

## 2022-04-21 VITALS
HEART RATE: 70 BPM | RESPIRATION RATE: 15 BRPM | SYSTOLIC BLOOD PRESSURE: 107 MMHG | HEIGHT: 71 IN | WEIGHT: 214.31 LBS | BODY MASS INDEX: 30 KG/M2 | OXYGEN SATURATION: 96 % | DIASTOLIC BLOOD PRESSURE: 57 MMHG

## 2022-04-21 VITALS
DIASTOLIC BLOOD PRESSURE: 58 MMHG | HEART RATE: 54 BPM | OXYGEN SATURATION: 96 % | BODY MASS INDEX: 29.54 KG/M2 | SYSTOLIC BLOOD PRESSURE: 138 MMHG | TEMPERATURE: 99 F | WEIGHT: 211 LBS | HEIGHT: 71 IN

## 2022-04-21 DIAGNOSIS — M50.30 DDD (DEGENERATIVE DISC DISEASE), CERVICAL: ICD-10-CM

## 2022-04-21 DIAGNOSIS — M47.816 FACET ARTHRITIS OF LUMBAR REGION: ICD-10-CM

## 2022-04-21 DIAGNOSIS — I10 ESSENTIAL HYPERTENSION: Primary | ICD-10-CM

## 2022-04-21 DIAGNOSIS — F17.200 TOBACCO DEPENDENCE: Chronic | ICD-10-CM

## 2022-04-21 DIAGNOSIS — M48.02 CERVICAL STENOSIS OF SPINAL CANAL: ICD-10-CM

## 2022-04-21 DIAGNOSIS — F41.9 ANXIETY: ICD-10-CM

## 2022-04-21 DIAGNOSIS — M51.36 DDD (DEGENERATIVE DISC DISEASE), LUMBAR: ICD-10-CM

## 2022-04-21 DIAGNOSIS — D64.9 ANEMIA, UNSPECIFIED TYPE: ICD-10-CM

## 2022-04-21 DIAGNOSIS — M51.36 DDD (DEGENERATIVE DISC DISEASE), LUMBAR: Chronic | ICD-10-CM

## 2022-04-21 DIAGNOSIS — F11.20 UNCOMPLICATED OPIOID DEPENDENCE: ICD-10-CM

## 2022-04-21 DIAGNOSIS — F13.20 BENZODIAZEPINE DEPENDENCE: Chronic | ICD-10-CM

## 2022-04-21 DIAGNOSIS — E78.2 HYPERLIPIDEMIA, MIXED: ICD-10-CM

## 2022-04-21 DIAGNOSIS — K59.03 THERAPEUTIC OPIOID-INDUCED CONSTIPATION (OIC): ICD-10-CM

## 2022-04-21 DIAGNOSIS — E11.9 TYPE 2 DIABETES MELLITUS WITHOUT COMPLICATION, WITHOUT LONG-TERM CURRENT USE OF INSULIN: ICD-10-CM

## 2022-04-21 DIAGNOSIS — G89.29 OTHER CHRONIC PAIN: Chronic | ICD-10-CM

## 2022-04-21 DIAGNOSIS — M47.812 FACET ARTHRITIS OF CERVICAL REGION: ICD-10-CM

## 2022-04-21 DIAGNOSIS — T40.2X5A THERAPEUTIC OPIOID-INDUCED CONSTIPATION (OIC): ICD-10-CM

## 2022-04-21 DIAGNOSIS — F33.41 RECURRENT MAJOR DEPRESSIVE DISORDER, IN PARTIAL REMISSION: ICD-10-CM

## 2022-04-21 DIAGNOSIS — M50.30 DDD (DEGENERATIVE DISC DISEASE), CERVICAL: Chronic | ICD-10-CM

## 2022-04-21 DIAGNOSIS — E66.09 NON MORBID OBESITY DUE TO EXCESS CALORIES: ICD-10-CM

## 2022-04-21 DIAGNOSIS — N52.9 ERECTILE DYSFUNCTION, UNSPECIFIED ERECTILE DYSFUNCTION TYPE: ICD-10-CM

## 2022-04-21 DIAGNOSIS — F17.200 NICOTINE DEPENDENCE: Primary | ICD-10-CM

## 2022-04-21 PROBLEM — Z74.09 IMPAIRED MOBILITY: Status: ACTIVE | Noted: 2021-10-10

## 2022-04-21 PROBLEM — Z98.1 ARTHRODESIS STATUS: Status: ACTIVE | Noted: 2021-10-10

## 2022-04-21 PROCEDURE — 4010F PR ACE/ARB THEARPY RXD/TAKEN: ICD-10-PCS | Mod: CPTII,S$GLB,, | Performed by: INTERNAL MEDICINE

## 2022-04-21 PROCEDURE — 3008F BODY MASS INDEX DOCD: CPT | Mod: CPTII,S$GLB,, | Performed by: NURSE PRACTITIONER

## 2022-04-21 PROCEDURE — 3288F FALL RISK ASSESSMENT DOCD: CPT | Mod: CPTII,S$GLB,, | Performed by: NURSE PRACTITIONER

## 2022-04-21 PROCEDURE — 1160F PR REVIEW ALL MEDS BY PRESCRIBER/CLIN PHARMACIST DOCUMENTED: ICD-10-PCS | Mod: CPTII,S$GLB,, | Performed by: INTERNAL MEDICINE

## 2022-04-21 PROCEDURE — 99999 PR PBB SHADOW E&M-EST. PATIENT-LVL I: CPT | Mod: PBBFAC,,,

## 2022-04-21 PROCEDURE — 99999 PR PBB SHADOW E&M-EST. PATIENT-LVL IV: CPT | Mod: PBBFAC,,, | Performed by: NURSE PRACTITIONER

## 2022-04-21 PROCEDURE — 3075F PR MOST RECENT SYSTOLIC BLOOD PRESS GE 130-139MM HG: ICD-10-PCS | Mod: CPTII,S$GLB,, | Performed by: NURSE PRACTITIONER

## 2022-04-21 PROCEDURE — 1125F AMNT PAIN NOTED PAIN PRSNT: CPT | Mod: CPTII,S$GLB,, | Performed by: INTERNAL MEDICINE

## 2022-04-21 PROCEDURE — 3288F PR FALLS RISK ASSESSMENT DOCUMENTED: ICD-10-PCS | Mod: CPTII,S$GLB,, | Performed by: NURSE PRACTITIONER

## 2022-04-21 PROCEDURE — 1125F AMNT PAIN NOTED PAIN PRSNT: CPT | Mod: CPTII,S$GLB,, | Performed by: NURSE PRACTITIONER

## 2022-04-21 PROCEDURE — 3008F PR BODY MASS INDEX (BMI) DOCUMENTED: ICD-10-PCS | Mod: CPTII,S$GLB,, | Performed by: NURSE PRACTITIONER

## 2022-04-21 PROCEDURE — 3074F PR MOST RECENT SYSTOLIC BLOOD PRESSURE < 130 MM HG: ICD-10-PCS | Mod: CPTII,S$GLB,, | Performed by: INTERNAL MEDICINE

## 2022-04-21 PROCEDURE — 3008F PR BODY MASS INDEX (BMI) DOCUMENTED: ICD-10-PCS | Mod: CPTII,S$GLB,, | Performed by: INTERNAL MEDICINE

## 2022-04-21 PROCEDURE — 1159F MED LIST DOCD IN RCRD: CPT | Mod: CPTII,S$GLB,, | Performed by: INTERNAL MEDICINE

## 2022-04-21 PROCEDURE — 1100F PTFALLS ASSESS-DOCD GE2>/YR: CPT | Mod: CPTII,S$GLB,, | Performed by: INTERNAL MEDICINE

## 2022-04-21 PROCEDURE — 99214 OFFICE O/P EST MOD 30 MIN: CPT | Mod: S$GLB,,, | Performed by: NURSE PRACTITIONER

## 2022-04-21 PROCEDURE — 1160F RVW MEDS BY RX/DR IN RCRD: CPT | Mod: CPTII,S$GLB,, | Performed by: INTERNAL MEDICINE

## 2022-04-21 PROCEDURE — 1157F PR ADVANCE CARE PLAN OR EQUIV PRESENT IN MEDICAL RECORD: ICD-10-PCS | Mod: CPTII,S$GLB,, | Performed by: INTERNAL MEDICINE

## 2022-04-21 PROCEDURE — 3075F SYST BP GE 130 - 139MM HG: CPT | Mod: CPTII,S$GLB,, | Performed by: NURSE PRACTITIONER

## 2022-04-21 PROCEDURE — 4010F PR ACE/ARB THEARPY RXD/TAKEN: ICD-10-PCS | Mod: CPTII,S$GLB,, | Performed by: NURSE PRACTITIONER

## 2022-04-21 PROCEDURE — 3066F PR DOCUMENTATION OF TREATMENT FOR NEPHROPATHY: ICD-10-PCS | Mod: CPTII,S$GLB,, | Performed by: NURSE PRACTITIONER

## 2022-04-21 PROCEDURE — 1159F PR MEDICATION LIST DOCUMENTED IN MEDICAL RECORD: ICD-10-PCS | Mod: CPTII,S$GLB,, | Performed by: INTERNAL MEDICINE

## 2022-04-21 PROCEDURE — 4010F ACE/ARB THERAPY RXD/TAKEN: CPT | Mod: CPTII,S$GLB,, | Performed by: NURSE PRACTITIONER

## 2022-04-21 PROCEDURE — 1160F PR REVIEW ALL MEDS BY PRESCRIBER/CLIN PHARMACIST DOCUMENTED: ICD-10-PCS | Mod: CPTII,S$GLB,, | Performed by: NURSE PRACTITIONER

## 2022-04-21 PROCEDURE — 99999 PR PBB SHADOW E&M-EST. PATIENT-LVL V: CPT | Mod: PBBFAC,,, | Performed by: INTERNAL MEDICINE

## 2022-04-21 PROCEDURE — 3044F PR MOST RECENT HEMOGLOBIN A1C LEVEL <7.0%: ICD-10-PCS | Mod: CPTII,S$GLB,, | Performed by: NURSE PRACTITIONER

## 2022-04-21 PROCEDURE — 3044F PR MOST RECENT HEMOGLOBIN A1C LEVEL <7.0%: ICD-10-PCS | Mod: CPTII,S$GLB,, | Performed by: INTERNAL MEDICINE

## 2022-04-21 PROCEDURE — 99214 PR OFFICE/OUTPT VISIT, EST, LEVL IV, 30-39 MIN: ICD-10-PCS | Mod: S$GLB,,, | Performed by: INTERNAL MEDICINE

## 2022-04-21 PROCEDURE — 99999 PR PBB SHADOW E&M-EST. PATIENT-LVL V: ICD-10-PCS | Mod: PBBFAC,,, | Performed by: INTERNAL MEDICINE

## 2022-04-21 PROCEDURE — 1160F RVW MEDS BY RX/DR IN RCRD: CPT | Mod: CPTII,S$GLB,, | Performed by: NURSE PRACTITIONER

## 2022-04-21 PROCEDURE — 1157F PR ADVANCE CARE PLAN OR EQUIV PRESENT IN MEDICAL RECORD: ICD-10-PCS | Mod: CPTII,S$GLB,, | Performed by: NURSE PRACTITIONER

## 2022-04-21 PROCEDURE — 1100F PR PT FALLS ASSESS DOC 2+ FALLS/FALL W/INJURY/YR: ICD-10-PCS | Mod: CPTII,S$GLB,, | Performed by: NURSE PRACTITIONER

## 2022-04-21 PROCEDURE — 3074F SYST BP LT 130 MM HG: CPT | Mod: CPTII,S$GLB,, | Performed by: INTERNAL MEDICINE

## 2022-04-21 PROCEDURE — 99214 OFFICE O/P EST MOD 30 MIN: CPT | Mod: S$GLB,,, | Performed by: INTERNAL MEDICINE

## 2022-04-21 PROCEDURE — 99407 PR TOBACCO USE CESSATION INTENSIVE >10 MINUTES: ICD-10-PCS | Mod: S$GLB,,,

## 2022-04-21 PROCEDURE — 3044F HG A1C LEVEL LT 7.0%: CPT | Mod: CPTII,S$GLB,, | Performed by: INTERNAL MEDICINE

## 2022-04-21 PROCEDURE — 99407 BEHAV CHNG SMOKING > 10 MIN: CPT | Mod: S$GLB,,,

## 2022-04-21 PROCEDURE — 1100F PR PT FALLS ASSESS DOC 2+ FALLS/FALL W/INJURY/YR: ICD-10-PCS | Mod: CPTII,S$GLB,, | Performed by: INTERNAL MEDICINE

## 2022-04-21 PROCEDURE — 3061F PR NEG MICROALBUMINURIA RESULT DOCUMENTED/REVIEW: ICD-10-PCS | Mod: CPTII,S$GLB,, | Performed by: INTERNAL MEDICINE

## 2022-04-21 PROCEDURE — 99214 PR OFFICE/OUTPT VISIT, EST, LEVL IV, 30-39 MIN: ICD-10-PCS | Mod: S$GLB,,, | Performed by: NURSE PRACTITIONER

## 2022-04-21 PROCEDURE — 3066F NEPHROPATHY DOC TX: CPT | Mod: CPTII,S$GLB,, | Performed by: NURSE PRACTITIONER

## 2022-04-21 PROCEDURE — 3044F HG A1C LEVEL LT 7.0%: CPT | Mod: CPTII,S$GLB,, | Performed by: NURSE PRACTITIONER

## 2022-04-21 PROCEDURE — 3061F PR NEG MICROALBUMINURIA RESULT DOCUMENTED/REVIEW: ICD-10-PCS | Mod: CPTII,S$GLB,, | Performed by: NURSE PRACTITIONER

## 2022-04-21 PROCEDURE — 4010F ACE/ARB THERAPY RXD/TAKEN: CPT | Mod: CPTII,S$GLB,, | Performed by: INTERNAL MEDICINE

## 2022-04-21 PROCEDURE — 3066F PR DOCUMENTATION OF TREATMENT FOR NEPHROPATHY: ICD-10-PCS | Mod: CPTII,S$GLB,, | Performed by: INTERNAL MEDICINE

## 2022-04-21 PROCEDURE — 1100F PTFALLS ASSESS-DOCD GE2>/YR: CPT | Mod: CPTII,S$GLB,, | Performed by: NURSE PRACTITIONER

## 2022-04-21 PROCEDURE — 3078F DIAST BP <80 MM HG: CPT | Mod: CPTII,S$GLB,, | Performed by: NURSE PRACTITIONER

## 2022-04-21 PROCEDURE — 3078F PR MOST RECENT DIASTOLIC BLOOD PRESSURE < 80 MM HG: ICD-10-PCS | Mod: CPTII,S$GLB,, | Performed by: INTERNAL MEDICINE

## 2022-04-21 PROCEDURE — 3061F NEG MICROALBUMINURIA REV: CPT | Mod: CPTII,S$GLB,, | Performed by: NURSE PRACTITIONER

## 2022-04-21 PROCEDURE — 99499 UNLISTED E&M SERVICE: CPT | Mod: S$GLB,,, | Performed by: INTERNAL MEDICINE

## 2022-04-21 PROCEDURE — 3078F DIAST BP <80 MM HG: CPT | Mod: CPTII,S$GLB,, | Performed by: INTERNAL MEDICINE

## 2022-04-21 PROCEDURE — 99999 PR PBB SHADOW E&M-EST. PATIENT-LVL I: ICD-10-PCS | Mod: PBBFAC,,,

## 2022-04-21 PROCEDURE — 1157F ADVNC CARE PLAN IN RCRD: CPT | Mod: CPTII,S$GLB,, | Performed by: NURSE PRACTITIONER

## 2022-04-21 PROCEDURE — 3066F NEPHROPATHY DOC TX: CPT | Mod: CPTII,S$GLB,, | Performed by: INTERNAL MEDICINE

## 2022-04-21 PROCEDURE — 99999 PR PBB SHADOW E&M-EST. PATIENT-LVL IV: ICD-10-PCS | Mod: PBBFAC,,, | Performed by: NURSE PRACTITIONER

## 2022-04-21 PROCEDURE — 1125F PR PAIN SEVERITY QUANTIFIED, PAIN PRESENT: ICD-10-PCS | Mod: CPTII,S$GLB,, | Performed by: INTERNAL MEDICINE

## 2022-04-21 PROCEDURE — 1157F ADVNC CARE PLAN IN RCRD: CPT | Mod: CPTII,S$GLB,, | Performed by: INTERNAL MEDICINE

## 2022-04-21 PROCEDURE — 1159F MED LIST DOCD IN RCRD: CPT | Mod: CPTII,S$GLB,, | Performed by: NURSE PRACTITIONER

## 2022-04-21 PROCEDURE — 3288F PR FALLS RISK ASSESSMENT DOCUMENTED: ICD-10-PCS | Mod: CPTII,S$GLB,, | Performed by: INTERNAL MEDICINE

## 2022-04-21 PROCEDURE — 99499 RISK ADDL DX/OHS AUDIT: ICD-10-PCS | Mod: S$GLB,,, | Performed by: INTERNAL MEDICINE

## 2022-04-21 PROCEDURE — 3061F NEG MICROALBUMINURIA REV: CPT | Mod: CPTII,S$GLB,, | Performed by: INTERNAL MEDICINE

## 2022-04-21 PROCEDURE — 3008F BODY MASS INDEX DOCD: CPT | Mod: CPTII,S$GLB,, | Performed by: INTERNAL MEDICINE

## 2022-04-21 PROCEDURE — 1159F PR MEDICATION LIST DOCUMENTED IN MEDICAL RECORD: ICD-10-PCS | Mod: CPTII,S$GLB,, | Performed by: NURSE PRACTITIONER

## 2022-04-21 PROCEDURE — 1125F PR PAIN SEVERITY QUANTIFIED, PAIN PRESENT: ICD-10-PCS | Mod: CPTII,S$GLB,, | Performed by: NURSE PRACTITIONER

## 2022-04-21 PROCEDURE — 3078F PR MOST RECENT DIASTOLIC BLOOD PRESSURE < 80 MM HG: ICD-10-PCS | Mod: CPTII,S$GLB,, | Performed by: NURSE PRACTITIONER

## 2022-04-21 PROCEDURE — 3288F FALL RISK ASSESSMENT DOCD: CPT | Mod: CPTII,S$GLB,, | Performed by: INTERNAL MEDICINE

## 2022-04-21 RX ORDER — HYDRALAZINE HYDROCHLORIDE 25 MG/1
25 TABLET, FILM COATED ORAL 3 TIMES DAILY
Qty: 90 TABLET | Refills: 11 | Status: SHIPPED | OUTPATIENT
Start: 2022-04-21 | End: 2023-05-16

## 2022-04-21 RX ORDER — AMLODIPINE BESYLATE 10 MG/1
10 TABLET ORAL NIGHTLY
Qty: 90 TABLET | Refills: 3 | Status: SHIPPED | OUTPATIENT
Start: 2022-04-21 | End: 2023-02-15

## 2022-04-21 NOTE — TELEPHONE ENCOUNTER
Patient has been struggling with his elevated blood pressures and associated tinnitus, as well as neuropathy to his feet/legs.  He is on clonidine, and taking extra doses when he wakes with elevated blood pressures  At 0400 he woke up with severe tinnitus and his bp was 200/111, so he took an extra clonidine, bp is now 167/98, pulse 77.  He states he is in a lot of pain from the neuropathy to his lower legs, currently on Neurontin, and narcotic medication, as well, with no improvement.   I advised he be seen in clinic today, he was already given an appt for 1100.    Reason for Disposition   Systolic BP >= 180 OR Diastolic >= 110    Additional Information   Negative: Sounds like a life-threatening emergency to the triager   Negative: Symptom is main concern (e.g., headache, chest pain)   Negative: Low blood pressure is main concern   Negative: Pregnant 20 or more weeks or postpartum (< 6 weeks after delivery) with new hand or face swelling   Negative: Pregnant 20 or more weeks or postpartum with Systolic BP >= 140 OR Diastolic >= 90   Negative: Systolic BP >= 160 OR Diastolic >= 100, and any cardiac or neurologic symptoms (e.g., chest pain, difficulty breathing, unsteady gait, blurred vision)   Negative: Patient sounds very sick or weak to the triager   Negative: Systolic BP >= 200 OR Diastolic >= 120 and having NO cardiac or neurologic symptoms   Negative: Systolic BP >= 180 OR Diastolic >= 110, and missed most recent dose of blood pressure medication    Protocols used: BLOOD PRESSURE - HIGH-A-OH

## 2022-04-21 NOTE — PROGRESS NOTES
"Chief Complaint  Chief Complaint   Patient presents with    Hypertension       HPI    HPI   Mr. Butch Mcdaniel is a 66 y.o. male with medical problems as listed below. The patient presents to clinic with c/o high blood pressure readings. The patient spoke with the triage nurse this morning. Note below:    Patient has been struggling with his elevated blood pressures and associated tinnitus, as well as neuropathy to his feet/legs.  He is on clonidine, and taking extra doses when he wakes with elevated blood pressures  At 0400 he woke up with severe tinnitus and his bp was 200/111, so he took an extra clonidine, bp is now 167/98, pulse 77.  He states he is in a lot of pain from the neuropathy to his lower legs, currently on Neurontin, and narcotic medication, as well, with no improvement.   I advised he be seen in clinic today, he was already given an appt for 1100.      The patient states that he has been having problems with his blood pressure for a while. He states that most of his elevated BP readings are at night. He states that he his BP can get as high as 200 systolic. He states that it wakes him up and gives him headaches and blurry vision. The patient's wife states that it scares her when it happens. The patient takes the clonidine and it does help to bring down his BP but that it only works for a couple of hours.    The patient was supposed to follow up with cardiology (Dr. Pete) after he had the Holter monitor but due family obligations he had to cancel. The patient has not been back since. The patient is currently taking his verapamil 180 mg BID, lisinopril 40 mg daily, hydralazine 50 mg TID (increased from 25 mg TID).     The patient also states that his pain has not been controlled. The patient states that he has been having his medication changed and decreased and that the medication that he has been taking for the pain "is like drinking water". The patient states that he is at his wits " Chief Complaint: Lázaro Sandhu is here for a 1 year sleep follow up with download    Mallampati airway Class: 4  Neck Circumference: 21.5 inches    [place] sleepiness score 10/1/21:   Sleep Apnea Quality of Life Questionnaire:      Diagnostic Data: PSG 12/19/14 AHI 26  PAP Download:   Recorded compliance dates: 08/30/21-09/28/21  More than 4hour usage compliance was: 100%  Average residual Apnea- Hypoapnea index on current pressue was: 0.7      PAP Type CPAP   Level  12     Average usuage hours per day was: 7 hours 49 minutes    Interface: nose    Provider:  [x][facility]  [][facility]  [][facility]  [][facility]         [][facility] []Other: end and that he just wants to feel better. The patient states that he is ok with the life he lived and that he would be ok if it was over. When asked if the patient had SI/HI, the patient states that no he is not.     PAST MEDICAL HISTORY:  Past Medical History:   Diagnosis Date    Anxiety     Cataract, bilateral 2018    Degenerative disc disease     Depression     Diabetes mellitus type II, controlled     ETOH abuse     Eye injury as a child    stick hit eye ? 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 both eyes 10/08/2019    Personal history of colonic polyps        PAST SURGICAL HISTORY:  Past Surgical History:   Procedure Laterality Date    APPENDECTOMY      COLONOSCOPY N/A 5/10/2017    Procedure: COLONOSCOPY;  Surgeon: Shantanu Marley MD;  Location[facility];  Service: Endoscopy;  Laterality: N/A;    POSTERIOR FUSION OF CERVICAL SPINE WITH LAMINECTOMY N/A 10/3/2021    Procedure: LAMINECTOMY, SPINE, CERVICAL, WITH POSTERIOR FUSION C2-T2;  Surgeon: Ana Rosa Geller MD;  Location: [facility] OR [address] [place] [address];  Service: Neurosurgery;  Laterality: N/A;       SOCIAL HISTORY:  Social History     Socioeconomic History    Marital status:    Occupational History    Occupation: retired -    Tobacco Use    Smoking status: Current Every Day Smoker     Packs/day: 0.50     Years: 32.00     Pack years: 16.00     Types: Cigarettes     Last attempt to quit: 3/4/2013     Years since quittin.1    Smokeless tobacco: Current User    Tobacco comment: weed    Substance and Sexual Activity    Alcohol use: No     Alcohol/week: 0.0 standard drinks    Drug use: No    Sexual activity: Yes     Partners: Female     Comment:  with children, disabled        FAMILY HISTORY:  Family History   Problem Relation Age of Onset    Heart disease  Mother     Heart disease Father     Alcohol abuse Father     Diabetes Son     No Known Problems Sister     No Known Problems Brother     No Known Problems Maternal Aunt     No Known Problems Maternal Uncle     No Known Problems Paternal Aunt     No Known Problems Paternal Uncle     No Known Problems Maternal Grandmother     No Known Problems Maternal Grandfather     No Known Problems Paternal Grandmother     No Known Problems Paternal Grandfather     Amblyopia Neg Hx     Blindness Neg Hx     Cancer Neg Hx     Cataracts Neg Hx     Glaucoma Neg Hx     Hypertension Neg Hx     Macular degeneration Neg Hx     Retinal detachment Neg Hx     Strabismus Neg Hx     Stroke Neg Hx     Thyroid disease Neg Hx        ALLERGIES AND MEDICATIONS: updated and reviewed.  Review of patient's allergies indicates:  No Known Allergies  Current Outpatient Medications   Medication Sig Dispense Refill    ALPRAZolam (XANAX) 1 MG tablet Take 1 tablet (1 mg total) by mouth 2 (two) times daily as needed for Anxiety. 45 tablet 2    atorvastatin (LIPITOR) 40 MG tablet Take 1 tablet (40 mg total) by mouth every evening. 90 tablet 3    busPIRone (BUSPAR) 30 MG Tab Take 1 tablet (30 mg total) by mouth 2 (two) times daily. 180 tablet 3    citalopram (CELEXA) 40 MG tablet TAKE ONE TABLET BY MOUTH ONCE A DAY 90 tablet 3    cloNIDine 0.2 mg/24 hr td ptwk (CATAPRES) 0.2 mg/24 hr Place 1 patch onto the skin every 7 days. 4 patch 11    fluticasone (FLONASE) 50 mcg/actuation nasal spray INHALE 1 SPRAY IN EACH NOSTRIL EVERY DAY 16 mL 0    fluticasone-salmeterol diskus inhaler 100-50 mcg Inhale 1 puff into the lungs.      gabapentin (NEURONTIN) 400 MG capsule Take 1 capsule (400 mg total) by mouth 3 (three) times daily. 90 capsule 11    hydrALAZINE (APRESOLINE) 50 MG tablet Take 1 tablet (50 mg total) by mouth 3 (three) times daily. 90 tablet 11    inhalation spacing device Use as directed for inhalation. 1 each 0     "lisinopriL (PRINIVIL,ZESTRIL) 40 MG tablet Take 1 tablet (40 mg total) by mouth once daily. 90 tablet 3    metFORMIN (GLUCOPHAGE) 500 MG tablet Take 1 tablet (500 mg total) by mouth 2 (two) times daily with meals. 180 tablet 3    needle, disp, 22 G 22 gauge x 1" Ndle Testosterone injection every 2 weeks 6 each 0    nicotine (NICODERM CQ) 21 mg/24 hr Place 1 patch onto the skin once daily. 28 patch 0    oxyCODONE-acetaminophen (PERCOCET)  mg per tablet Take 1 tablet by mouth every 6 (six) hours as needed.      PROAIR HFA 90 mcg/actuation inhaler INHALE TWO PUFFS BY MOUTH EVERY 6 HOURS AS NEEDED FOR WHEEZING (rescue) 8.5 g 0    syringe, disposable, (BD LUER-STARLA SYRINGE) 1 mL Syrg Testosterone injection every 2 weeks 6 Syringe 0    verapamiL (CALAN-SR) 180 MG CR tablet Take 1 tablet (180 mg total) by mouth 2 (two) times daily. 180 tablet 3    naloxone (NARCAN) 4 mg/actuation Spry 4mg by nasal route as needed for opioid overdose; may repeat every 2-3 minutes in alternating nostrils until medical help arrives. Call 911 (Patient not taking: Reported on 4/21/2022) 1 each 11    POLYETHYLENE GLYCOL 3350 (MIRALAX ORAL) Take 1 application by mouth.      senna (SENOKOT) 8.6 mg tablet Take 1 tablet by mouth 2 (two) times a day. (Patient not taking: Reported on 4/21/2022)      testosterone cypionate (DEPOTESTOTERONE CYPIONATE) 200 mg/mL injection Inject 0.75 mLs (150 mg total) into the muscle every 14 (fourteen) days. (Patient not taking: Reported on 4/21/2022) 10 mL 0     No current facility-administered medications for this visit.       Patient Care Team:  Gabriel Evans MD as PCP - General (Internal Medicine)  Vlad Nava MD (Pain Medicine)  Kaila Carrillo OD as Consulting Physician (Optometry)  Yasmin Chen MA as Care Coordinator    ROS  Review of Systems   Constitutional: Negative for chills, fatigue, fever and unexpected weight change.   HENT: Negative for congestion, ear pain, sore throat and voice " "change.    Eyes: Negative for photophobia, pain, discharge and visual disturbance.   Respiratory: Negative for cough, shortness of breath and wheezing.    Cardiovascular: Negative for chest pain, palpitations and leg swelling.   Gastrointestinal: Negative for abdominal pain, blood in stool, constipation, diarrhea, nausea and vomiting.   Genitourinary: Negative for dysuria and frequency.   Musculoskeletal: Positive for arthralgias, back pain and gait problem (due to pain). Negative for joint swelling and neck stiffness.   Skin: Negative for color change and rash.   Neurological: Positive for dizziness. Negative for seizures, weakness and headaches.   Hematological: Negative for adenopathy. Does not bruise/bleed easily.   Psychiatric/Behavioral: Negative for behavioral problems and dysphoric mood. The patient is not nervous/anxious.            Physical Exam  Vitals:    04/21/22 1107   BP: (!) 138/58   Pulse: (!) 54   Temp: 98.8 °F (37.1 °C)   TempSrc: Oral   SpO2: 96%   Weight: 95.7 kg (210 lb 15.7 oz)   Height: 5' 11" (1.803 m)    Body mass index is 29.43 kg/m².  Weight: 95.7 kg (210 lb 15.7 oz)   Height: 5' 11" (180.3 cm)     Physical Exam  Constitutional:       Appearance: He is well-developed.   HENT:      Head: Normocephalic and atraumatic.   Eyes:      Conjunctiva/sclera: Conjunctivae normal.      Pupils: Pupils are equal, round, and reactive to light.   Neck:      Thyroid: No thyromegaly.   Cardiovascular:      Rate and Rhythm: Normal rate and regular rhythm.      Heart sounds: No murmur heard.  Pulmonary:      Effort: Pulmonary effort is normal.      Breath sounds: Normal breath sounds. No wheezing.   Musculoskeletal:         General: Normal range of motion.      Cervical back: Normal range of motion and neck supple.   Lymphadenopathy:      Cervical: No cervical adenopathy.   Skin:     General: Skin is warm and dry.      Findings: No rash.   Neurological:      Mental Status: He is alert and oriented to person, " place, and time.      Cranial Nerves: No cranial nerve deficit.   Psychiatric:         Behavior: Behavior normal.         Thought Content: Thought content normal.         Judgment: Judgment normal.         Health Maintenance       Date Due Completion Date    Pneumococcal Vaccines (Age 65+) (3 - PPSV23 or PCV20) 03/20/2022 10/4/2019    Colorectal Cancer Screening 05/10/2022 5/10/2017    Hemoglobin A1c 08/16/2022 2/16/2022    Lipid Panel 10/01/2022 10/1/2021    Foot Exam 11/24/2022 11/24/2021    Diabetes Urine Screening 02/16/2023 2/16/2022    Low Dose Statin 03/15/2023 3/15/2022    Eye Exam 03/15/2023 3/15/2022    Override on 11/4/2019: Done    TETANUS VACCINE 12/04/2030 12/4/2020      Health maintenance reviewed at this time.    Assessment & Plan  Essential hypertension  The patient will follow up with cardiology.  The currently regimen is not effective according to the patient.  We discussed the role of stress and pain in BP management as well as compliance with medication.  Patient and wife are encouraged to go to the ED if the patients BP gets in the 200s systolic especially if the patient has accompanying CP, SOB and/or headache.     Other chronic pain/DDD (degenerative disc disease), cervical/DDD (degenerative disc disease), lumbar/Facet arthritis of cervical region/Facet arthritis of lumbar region/Cervical stenosis of spinal canal 10/3/2021 LAMINECTOMY, SPINE, CERVICAL, WITH POSTERIOR FUSION C2-T2  The patient is followed by pain management    Benzodiazepine dependence, did not do well with attempt to wean down 2017/Uncomplicated opioid dependence; pain mgmt bone and joint  The current medical regimen is effective;  continue present plan and medications.  Followed by pain management    Anxiety, unable to wean off BZD  The current medical regimen is effective;  continue present plan and medications.    Recurrent major depressive disorder, in partial remission  The current medical regimen is effective;  continue  present plan and medications.  Patient is not currently suicidal  Ed precautions discussed    Hyperlipidemia, mixed  The current medical regimen is effective;  continue present plan and medications.  The patient is asked to make an attempt to improve diet and exercise patterns to aid in medical management of this problem.    Tobacco dependence, patch with irritation; hx of bupropion  The current medical regimen is effective;  continue present plan and medications.  Patch on in clinic          Follow-up: Follow up today (on 4/21/2022) for f/u cardiology.

## 2022-04-21 NOTE — PROGRESS NOTES
Called pt to f/u on his 3 month smoking cessation quit status. Pt stated he remains tobacco free. Congratulated him on his hard work and success. Informed him of benefit period, phone follow ups, and contact information. Will complete smart form for 3 months and will continue to follow up on quit #3 episode.

## 2022-04-21 NOTE — PROGRESS NOTES
CARDIOVASCULAR CONSULTATION    REASON FOR CONSULT:   Butch Mcdaniel is a 66 y.o. male who presents for evaluation.      HISTORY OF PRESENT ILLNESS:     Patient is a pleasant 66-year-old man.  Here for evaluation.  States blood pressure fluctuates a lot at home.  Fluctuates between 110-200 mmHg.  States that he has clonidine at home and whenever his blood pressure is elevated he takes an extra dose of clonidine.  Denies any chest pains at rest on exertion.  Denies orthopnea, PND, swelling of feet.      Notes from February 2022:  Patient here for follow-up.  Denies any chest pains at rest on exertion, orthopnea, PND.  Blood pressure staying at home.  Around 160-170 mmHg.      April 2022:  Patient very concerned about his fluctuating blood pressure.  Today morning his blood pressure was 198/111 mmHg.  Then just prior to coming to clinic it was 135/87 mmHg.  Fluctuates throughout the day.  No anginal chest pains.    · The left ventricle is normal in size with moderate concentric hypertrophy and normal systolic function.  · The estimated ejection fraction is 60%.  · Normal right ventricular size with normal right ventricular systolic function.  · The estimated PA systolic pressure is 33 mmHg.     Heart rates varied between 43 and 109 BPM with an average of 62 BPM.  Rare PVCs and PACs. Five atrial pairs. One three beat atrial run.      PAST MEDICAL HISTORY:     Past Medical History:   Diagnosis Date    Anxiety     Cataract, bilateral 09/11/2018    Degenerative disc disease     Depression     Diabetes mellitus type II, controlled     ETOH abuse     Eye injury as a child    stick hit eye ? 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 both eyes 10/08/2019    Personal history of colonic polyps        PAST SURGICAL HISTORY:     Past Surgical History:   Procedure  Laterality Date    APPENDECTOMY      COLONOSCOPY N/A 5/10/2017    Procedure: COLONOSCOPY;  Surgeon: Shantanu Marley MD;  Location: [facility];  Service: Endoscopy;  Laterality: N/A;    POSTERIOR FUSION OF CERVICAL SPINE WITH LAMINECTOMY N/A 10/3/2021    Procedure: LAMINECTOMY, SPINE, CERVICAL, WITH POSTERIOR FUSION C2-T2;  Surgeon: Ana Rosa Geller MD;  Location: [address];  Service: Neurosurgery;  Laterality: N/A;       ALLERGIES AND MEDICATION:   Review of patient's allergies indicates:  No Known Allergies     Medication List          Accurate as of April 21, 2022  2:52 PM. If you have any questions, ask your nurse or doctor.            CONTINUE taking these medications    ALPRAZolam 1 MG tablet  Commonly known as: XANAX  Take 1 tablet (1 mg total) by mouth 2 (two) times daily as needed for Anxiety.     atorvastatin 40 MG tablet  Commonly known as: LIPITOR  Take 1 tablet (40 mg total) by mouth every evening.     BD LUER-STARLA SYRINGE 1 mL Syrg  Generic drug: syringe (disposable)  Testosterone injection every 2 weeks     busPIRone 30 MG Tab  Commonly known as: BUSPAR  Take 1 tablet (30 mg total) by mouth 2 (two) times daily.     citalopram 40 MG tablet  Commonly known as: CeleXA  TAKE ONE TABLET BY MOUTH ONCE A DAY     cloNIDine 0.2 mg/24 hr td ptwk 0.2 mg/24 hr  Commonly known as: CATAPRES  Place 1 patch onto the skin every 7 days.     fluticasone propionate 50 mcg/actuation nasal spray  Commonly known as: FLONASE  INHALE 1 SPRAY IN EACH NOSTRIL EVERY DAY     fluticasone-salmeterol 100-50 mcg/dose 100-50 mcg/dose diskus inhaler  Commonly known as: ADVAIR     gabapentin 400 MG capsule  Commonly known as: NEURONTIN  Take 1 capsule (400 mg total) by mouth 3 (three) times daily.     hydrALAZINE 50 MG tablet  Commonly known as: APRESOLINE  Take 1 tablet (50 mg total) by mouth 3 (three) times daily.     inhalation spacing device  Use as directed for inhalation.     lisinopriL 40 MG tablet  Commonly known as:  "PRINIVIL,ZESTRIL  Take 1 tablet (40 mg total) by mouth once daily.     metFORMIN 500 MG tablet  Commonly known as: GLUCOPHAGE  Take 1 tablet (500 mg total) by mouth 2 (two) times daily with meals.     MIRALAX ORAL     naloxone 4 mg/actuation Spry  Commonly known as: NARCAN  4mg by nasal route as needed for opioid overdose; may repeat every 2-3 minutes in alternating nostrils until medical help arrives. Call 911     needle (disp) 22 G 22 gauge x 1" Ndle  Testosterone injection every 2 weeks     nicotine 21 mg/24 hr  Commonly known as: NICODERM CQ  Place 1 patch onto the skin once daily.     oxyCODONE-acetaminophen  mg per tablet  Commonly known as: PERCOCET     PROAIR HFA 90 mcg/actuation inhaler  Generic drug: albuterol  INHALE TWO PUFFS BY MOUTH EVERY 6 HOURS AS NEEDED FOR WHEEZING (rescue)     senna 8.6 mg tablet  Commonly known as: SENOKOT  Take 1 tablet by mouth 2 (two) times a day.     testosterone cypionate 200 mg/mL injection  Commonly known as: DEPOTESTOTERONE CYPIONATE  Inject 0.75 mLs (150 mg total) into the muscle every 14 (fourteen) days.     verapamiL 180 MG CR tablet  Commonly known as: CALAN-SR  Take 1 tablet (180 mg total) by mouth 2 (two) times daily.            SOCIAL HISTORY:     Social History     Socioeconomic History    Marital status:    Occupational History    Occupation: retired - tug boat captain   Tobacco Use    Smoking status: Current Every Day Smoker     Packs/day: 0.50     Years: 32.00     Pack years: 16.00     Types: Cigarettes     Last attempt to quit: 3/4/2013     Years since quittin.1    Smokeless tobacco: Current User    Tobacco comment: weed    Substance and Sexual Activity    Alcohol use: No     Alcohol/week: 0.0 standard drinks    Drug use: No    Sexual activity: Yes     Partners: Female     Comment:  with children, disabled        FAMILY HISTORY:     Family History   Problem Relation Age of Onset    Heart disease Mother     " "Heart disease Father     Alcohol abuse Father     Diabetes Son     No Known Problems Sister     No Known Problems Brother     No Known Problems Maternal Aunt     No Known Problems Maternal Uncle     No Known Problems Paternal Aunt     No Known Problems Paternal Uncle     No Known Problems Maternal Grandmother     No Known Problems Maternal Grandfather     No Known Problems Paternal Grandmother     No Known Problems Paternal Grandfather     Amblyopia Neg Hx     Blindness Neg Hx     Cancer Neg Hx     Cataracts Neg Hx     Glaucoma Neg Hx     Hypertension Neg Hx     Macular degeneration Neg Hx     Retinal detachment Neg Hx     Strabismus Neg Hx     Stroke Neg Hx     Thyroid disease Neg Hx        REVIEW OF SYSTEMS:   Review of Systems   Constitutional: Negative.   HENT: Negative.    Eyes: Negative.    Cardiovascular: Negative.    Respiratory: Negative.    Endocrine: Negative.    Hematologic/Lymphatic: Negative.    Skin: Negative.    Musculoskeletal: Negative.    Gastrointestinal: Negative.    Genitourinary: Negative.    Neurological: Negative.    Psychiatric/Behavioral: Negative.    Allergic/Immunologic: Negative.        A 10 point review of systems was performed and all the pertinent positives have been mentioned. Rest of review of systems was negative.        PHYSICAL EXAM:     Vitals:    04/21/22 1424   BP: (!) 107/57   Pulse: 70   Resp: 15    Body mass index is 29.89 kg/m².  Weight: 97.2 kg (214 lb 4.6 oz)   Height: 5' 11" (180.3 cm)     Physical Exam  Vitals reviewed.   Constitutional:       Appearance: He is well-developed.   HENT:      Head: Normocephalic.   Eyes:      Conjunctiva/sclera: Conjunctivae normal.      Pupils: Pupils are equal, round, and reactive to light.   Cardiovascular:      Rate and Rhythm: Normal rate and regular rhythm.      Heart sounds: Normal heart sounds.   Pulmonary:      Effort: Pulmonary effort is normal.      Breath sounds: Normal breath sounds.   Abdominal:      " General: Bowel sounds are normal.      Palpations: Abdomen is soft.   Musculoskeletal:      Cervical back: Normal range of motion and neck supple.   Skin:     General: Skin is warm.   Neurological:      Mental Status: He is alert and oriented to person, place, and time.           DATA:     Laboratory:  CBC:  Recent Labs   Lab 10/08/21  0713 10/09/21  0952 02/16/22  0820   WBC 15.93 H 23.71 H 10.16   Hemoglobin 11.8 L 13.2 L 12.7 L   Hematocrit 33.7 L 38.7 L 40.9   Platelets 185 228 164       CHEMISTRIES:  Recent Labs   Lab 10/04/21  0211 10/05/21  0148 10/06/21  0423 10/07/21  0812 10/08/21  0713 10/09/21  0952 02/16/22  0820   Glucose 169 H 155 H 184 H   < > 143 H 102 97   Sodium 138 134 L 134 L   < > 132 L 130 L 138   Potassium 4.7 5.0 5.3 H   < > 5.0 4.3 5.0   BUN 23 33 H 33 H   < > 26 H 26 H 15   Creatinine 1.3 1.0 1.0   < > 0.8 0.8 1.1   eGFR if African American >60.0 >60.0 >60.0   < > >60.0 >60.0 >60.0   eGFR if non African American 56.5 A >60.0 >60.0   < > >60.0 >60.0 >60.0   Calcium 7.5 L 7.6 L 7.9 L   < > 8.5 L 8.8 8.8   Magnesium 1.9 2.3 2.8 H  --   --   --   --     < > = values in this interval not displayed.       CARDIAC BIOMARKERS:        COAGS:  Recent Labs   Lab 10/01/21  1610   INR 1.0       LIPIDS/LFTS:  Recent Labs   Lab 02/23/21  1040 08/07/21  0839 10/01/21  1610 02/16/22  0820   Cholesterol 102 L 113 L 106 L  --    Triglycerides 165 H 103 124  --    HDL 30 L 35 L 31 L  --    LDL Cholesterol 39.0 L 57.4 L 50.2 L  --    Non-HDL Cholesterol 72 78 75  --    AST 18 17 21 18   ALT 19 19 22 18       Hemoglobin A1C   Date Value Ref Range Status   02/16/2022 6.3 (H) 4.0 - 5.6 % Final     Comment:     ADA Screening Guidelines:  5.7-6.4%  Consistent with prediabetes  >or=6.5%  Consistent with diabetes    High levels of fetal hemoglobin interfere with the HbA1C  assay. Heterozygous hemoglobin variants (HbS, HgC, etc)do  not significantly interfere with this assay.   However, presence of multiple variants  may affect accuracy.     11/24/2021 6.7 (H) 4.0 - 5.6 % Final     Comment:     ADA Screening Guidelines:  5.7-6.4%  Consistent with prediabetes  >or=6.5%  Consistent with diabetes    High levels of fetal hemoglobin interfere with the HbA1C  assay. Heterozygous hemoglobin variants (HbS, HgC, etc)do  not significantly interfere with this assay.   However, presence of multiple variants may affect accuracy.     08/07/2021 6.2 (H) 4.0 - 5.6 % Final     Comment:     ADA Screening Guidelines:  5.7-6.4%  Consistent with prediabetes  >or=6.5%  Consistent with diabetes    High levels of fetal hemoglobin interfere with the HbA1C  assay. Heterozygous hemoglobin variants (HbS, HgC, etc)do  not significantly interfere with this assay.   However, presence of multiple variants may affect accuracy.         TSH  Recent Labs   Lab 01/08/20  1153 10/01/21  1610   TSH 2.552 1.897       The ASCVD Risk score (Juan Carlosshubham GARCIA Jr., et al., 2013) failed to calculate for the following reasons:    The valid total cholesterol range is 130 to 320 mg/dL             ASSESSMENT AND PLAN     Patient Active Problem List   Diagnosis    Essential hypertension    Hyperlipidemia, mixed    Anemia    Chronic pain    Anxiety, unable to wean off BZD    Recurrent major depressive disorder, in partial remission    DDD (degenerative disc disease), cervical    DDD (degenerative disc disease), lumbar    ED (erectile dysfunction)    Facet arthritis of cervical region    Facet arthritis of lumbar region    Benzodiazepine dependence, did not do well with attempt to wean down 2017    Uncomplicated opioid dependence; pain mgmt bone and joint    Type 2 diabetes mellitus without complication, without long-term current use of insulin    Tobacco dependence, patch with irritation; hx of bupropion    Non morbid obesity due to excess calories    Therapeutic opioid-induced constipation (OIC)    Tubular adenoma of colon 5/10/17    Cervical stenosis of spinal canal  10/3/2021 LAMINECTOMY, SPINE, CERVICAL, WITH POSTERIOR FUSION C2-T2    Cataract, bilateral    Open angle with borderline findings and high glaucoma risk in both eyes    Nuclear sclerosis of both eyes    Elevated prolactin level,low testosterone, gynecomastia; MRI pituitary normal 5/2020    Low testosterone in male    Paresthesia of left upper and lower extremity    History of smoking 10-25 pack years    Status post surgery    Impaired mobility and ADLs    Obesity (BMI 30-39.9)    Chronic low back pain    Sacroiliac joint pain    Sacroiliitis    Impaired mobility    Arthrodesis status       Uncontrolled hypertension:  Initiate Norvasc 10 mg daily at night as he states that in the morning his blood pressure is very high.  Reduce hydralazine to 25 mg t.i.d..  Continue other regimen.  Titrate hilar lesion as needed for appropriate blood pressure control.    Follow-up in 1 month    Thank you very much for involving me in the care of your patient.  Please do not hesitate to contact me if there are any questions.      Adriana Pete MD, FACC, [facility]  Interventional Cardiologist, [facility].           This note was dictated with the help of speech recognition software.  There might be un-intended errors and/or substitutions.

## 2022-04-29 ENCOUNTER — NURSE TRIAGE (OUTPATIENT)
Dept: ADMINISTRATIVE | Facility: CLINIC | Age: 67
End: 2022-04-29
Payer: MEDICARE

## 2022-04-29 ENCOUNTER — TELEPHONE (OUTPATIENT)
Dept: FAMILY MEDICINE | Facility: CLINIC | Age: 67
End: 2022-04-29
Payer: MEDICARE

## 2022-04-29 NOTE — TELEPHONE ENCOUNTER
Pt called for c/o high blood pressure and he said that he takes a patch for it along with other BP meds pt said that he on on klonopin and thinks needs an adjustment he said that he had a really bad night. BP was 208/98 at 4am and now 168/74 with MD 64 and care advice to see MD within 3 days. Pt told will sen d a message to mD to see if they need to see him in the office     Reason for Disposition   Systolic BP >= 160 OR Diastolic >= 100    Additional Information   Negative: Sounds like a life-threatening emergency to the triager   Negative: Pregnant 20 or more weeks or postpartum (< 6 weeks after delivery) with new hand or face swelling   Negative: Pregnant 20 or more weeks or postpartum with Systolic BP >= 140 OR Diastolic >= 90   Negative: Systolic BP >= 160 OR Diastolic >= 100, and any cardiac or neurologic symptoms (e.g., chest pain, difficulty breathing, unsteady gait, blurred vision)   Negative: Patient sounds very sick or weak to the triager   Negative: Systolic BP >= 200 OR Diastolic >= 120 and having NO cardiac or neurologic symptoms   Negative: Systolic BP >= 180 OR Diastolic >= 110, and missed most recent dose of blood pressure medication   Negative: Systolic BP >= 180 OR Diastolic >= 110   Negative: Patient wants to be seen   Negative: Ran out of BP medications   Negative: Taking BP medications and feels is having side effects (e.g., impotence, cough, dizziness)    Protocols used: BLOOD PRESSURE - HIGH-A-OH

## 2022-04-29 NOTE — TELEPHONE ENCOUNTER
Patient was informed to BP machine to scheduled appt on BP.   Informed patient from previous message, Per luana Mondragon to increase to 0.3 clonidine.

## 2022-04-29 NOTE — TELEPHONE ENCOUNTER
----- Message from Jalen Jordan sent at 4/29/2022 12:39 PM CDT -----  Regarding: request to have lab orders done today, before appt on 5/2  Type: Patient Call Back    Who called:Butch     What is the request in detail: the patient has a scheduled appt for Dr. Evans on Momday. He would like to know if he can come in today to have labs done. Patient currently has no orders in the system for labs. Please return call at earliest convenience.    Can the clinic reply by BLAINESYOVANY?no    Would the patient rather a call back or a response via [facility]? Call back     Best call back number:[phone]

## 2022-05-01 NOTE — PROGRESS NOTES
This note was created by combination of typed  and M-Modal dictation.  Transcription errors may be present.  If there are any questions, please contact me.    Assessment and Plan:   Anxiety, unable to wean off BZD  Benzodiazepine dependence, did not do well with attempt to wean down 2017  -on BuSpar daytime, Xanax at night, and citalopram.  Not controlled.  Notes constant levels of anxiety and although he has not asked me to increase, his discussion invariably returns back to his anxiety.  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  Referral to Psychiatry for medication management.  -     Ambulatory referral/consult to Psychiatry; Future; Expected date: 05/09/2022    Essential hypertension  -let us see if this can improve with improving his anxiety.  For now no changes.  Has room to move on hydralazine.  Cardiology had decreased his hydralazine and started him on amlodipine, labile blood pressures.    DDD (degenerative disc disease), lumbar  -managed by pain management.  Recent increasing gabapentin to 600 t.i.d. and tizanidine.  Notes some mental confusion but he and wife notes that that preceded the change in dose.  Discussed with him however that he is on a number of medicines that can cause mental confusion including his benzodiazepine, his opioid, tizanidine, and gabapentin.    Need for vaccination for Strep pneumoniae  -     (In Office Administered) Pneumococcal Conjugate Vaccine (20 Valent) (IM)        Medications Discontinued During This Encounter   Medication Reason    gabapentin (NEURONTIN) 400 MG capsule Dose adjustment       meds sent this encounter:       Follow Up: No follow-ups on file. f/u in August with labs.  In the meantime to see Psychiatry    Subjective:     Chief Complaint   Patient presents with    Hypertension       DIPESH Rae is a 66 y.o. male, last appointment with this clinic was  4/21/2022.  Social History     Tobacco Use    Smoking status: Former Smoker     Packs/day: 0.50     Years: 32.00     Pack years: 16.00     Types: Cigarettes    Smokeless tobacco: Current User    Tobacco comment: weed    Substance Use Topics    Alcohol use: No     Alcohol/week: 0.0 standard drinks      Social History     Occupational History    Occupation: retired -       Social History     Social History Narrative    Not on file       Last visit with me in late February  Diabetes pre visit labs were good.  On metformin no changes.  Low testosterone labs were good on current dose 150 mg every 2 weeks  Hypertension fluctuating.  Possible component of pain, sleeping on the left side hurt.  Cardiology had recently increased his hydralazine but had not started the higher dose yet.  Chronic pain with chronic opioid management followed by outside pain management.  Anxiety with chronic benzodiazepine dependence.  Alprazolam at night, BuSpar during the day.  Ordered colonoscopy    Saw nurse practitioner for elevated BP mainly at night.  Takes p.r.n. extra dose of clonidine.    Saw cardiology subsequently.  Variable BP.  Started on Norvasc 10 and cut down hydralazine dose to 25 t.i.d.    He is here accompanied by his wife.  History from patient with interjection from wife.    BP high. High on intake but slowly better on repeat.  Brings in cuff to verify cuff - pretty close.  Has been checking readings repeatedly.  Tells me he is not being obsessive but he will check when he gets up at night to pee and it will be high.  Has number of readings from the same day.  Most of them are greater than 140.     He and wife note that he has had issues with forgetfulness.  And some confusion.  Sometimes is taking names during the course of today's visit.  He had recently followed up with his pain doctor who increased his gabapentin from 400 t.i.d. up to 600 t.i.d.  And started him on tizanidine  But they note that he  has been experiencing symptoms even prior to the medication adjustment.  Feels like it started ever since his neck surgery in October.  He felt funny after his epidural, MRI brain negative for stroke.  Subsequent laminectomy C-spine    He is not requesting for increased dose of Xanax but notes that he had to take an extra dose last night.  Whenever he comes in, anxiety seems to be a big factor with him.  He has not asked me to increase the medication but further discussion it sounds like his anxiety is not being controlled.  He used to see Psychiatry remotely.  We talked about seen Psychiatry again and he is agreeable.  Discussed that possible anxiety could be a factor in his hypertension.    Got the letter for c scope the other day.     Patient Care Team:  Gabriel Evans MD as PCP - General (Internal Medicine)  Vlad Nava MD (Pain Medicine)  Kaila Carrillo OD as Consulting Physician (Optometry)  Yasmin Chen MA as Care Coordinator    Patient Active Problem List    Diagnosis Date Noted    Chronic low back pain 12/27/2021    Sacroiliac joint pain 12/27/2021    Sacroiliitis 12/27/2021    Impaired mobility 10/10/2021    Arthrodesis status 10/10/2021     Formatting of this note might be different from the original.  C2-T2, 10/03/21      Impaired mobility and ADLs 10/07/2021    Obesity (BMI 30-39.9) 10/07/2021    Status post surgery 10/03/2021    History of smoking 10-25 pack years 10/02/2021    Paresthesia of left upper and lower extremity 10/01/2021    Low testosterone in male 09/02/2020 6/2020 Repeat labs prolactin was normal.  Testosterone was low.  Free testosterone was low and sex hormone binding globulin was normal      Elevated prolactin level,low testosterone, gynecomastia; MRI pituitary normal 5/2020 01/13/2020    Nuclear sclerosis of both eyes 11/04/2019    Open angle with borderline findings and high glaucoma risk in both eyes 10/08/2019    Cataract, bilateral 09/11/2018    Cervical  stenosis of spinal canal 10/3/2021 LAMINECTOMY, SPINE, CERVICAL, WITH POSTERIOR FUSION C2-T2 05/07/2018 02/14/2018 MRI C-spine impression:  Slight retrolisthesis of C4 with respect to C5.  Narrowing of the central spinal canal most prominent at the C4-C5, C5-C6, and C6-C7 levels and to a lesser extent at C2-C3 and C3-C4.  Annular disc bulges posteriorly at C2-C3, C3-C4.  Diffuse disc herniation/protrusion posteriorly at C4-C5 level and a disc herniation/extrusion posteriorly at the C5-C6 level with a central disc herniation/protrusion posteriorly at the C6-C7 level.  Narrowing of the neural foramen bilaterally from C3-C4 through C6-C7.  10/1/2021 admitted for L sided numbness after epidural injection, initially CT interpreted at [facility]; however subsequent MRI no hemorrhage but severe spinal canal stenosis    10/1/2021 MRI brain: Negative MRI of the brain for hemorrhage, mass or infarction. Specifically, no evidence of subarachnoid blood or blood products in the extra-axial spaces on SWI or diffusion-weighted images.  In light of the previous CTs and history of epidural injections at outside facility, this raises the question of in ejected contrast in the central spinal canal with migration into the intracranial subarachnoid spaces.  Subarachnoid hemorrhage or exudate are considered less likely.  10/1/2021 MRI C spine: Severe spinal canal stenosis with complete effacement of the CSF and spinal cord compression at the level of C5-C6 due to posterior disc osteophyte complex with superimposed right disc protrusion and congenitally narrow spinal canal.  High T2 signal within the cord secondary to either edema or myelomalacia.    10/3/2021 LAMINECTOMY, SPINE, CERVICAL, WITH POSTERIOR FUSION C2-T2      Tubular adenoma of colon 5/10/17 05/10/2017     5/10/2017 colonoscopy tubular adenoma      Therapeutic opioid-induced constipation (OIC) 04/17/2017    Tobacco dependence, patch with irritation; hx of bupropion 01/12/2016     Non morbid obesity due to excess calories 01/12/2016    Type 2 diabetes mellitus without complication, without long-term current use of insulin 08/07/2015    Facet arthritis of cervical region 10/28/2013    Facet arthritis of lumbar region 10/28/2013    Benzodiazepine dependence, did not do well with attempt to wean down 2017 10/28/2013    Uncomplicated opioid dependence; pain mgmt bone and joint 10/28/2013    ED (erectile dysfunction) 07/26/2013    DDD (degenerative disc disease), cervical 05/23/2013    DDD (degenerative disc disease), lumbar 05/23/2013    Essential hypertension 11/01/2012     2/3/2022 TTE LV normal size with moderate concentric hypertrophy and normal systolic function LVEF 60%.  Normal RV size and systolic function.  PA pressure 33      Hyperlipidemia, mixed 11/01/2012    Anemia 11/01/2012    Chronic pain 11/01/2012    Anxiety, unable to wean off BZD 11/01/2012    Recurrent major depressive disorder, in partial remission 11/01/2012       PAST MEDICAL PROBLEMS, PAST SURGICAL HISTORY: please see relevant portions of the electronic medical record    ALLERGIES AND MEDICATIONS: updated and reviewed.  Review of patient's allergies indicates:  No Known Allergies    Medication List with Changes/Refills   Current Medications    ALPRAZOLAM (XANAX) 1 MG TABLET    Take 1 tablet (1 mg total) by mouth 2 (two) times daily as needed for Anxiety.    AMLODIPINE (NORVASC) 10 MG TABLET    Take 1 tablet (10 mg total) by mouth every evening.    ATORVASTATIN (LIPITOR) 40 MG TABLET    Take 1 tablet (40 mg total) by mouth every evening.    BUSPIRONE (BUSPAR) 30 MG TAB    Take 1 tablet (30 mg total) by mouth 2 (two) times daily.    CITALOPRAM (CELEXA) 40 MG TABLET    TAKE ONE TABLET BY MOUTH ONCE A DAY    CLONIDINE 0.2 MG/24 HR TD PTWK (CATAPRES) 0.2 MG/24 HR    Place 1 patch onto the skin every 7 days.    FLUTICASONE (FLONASE) 50 MCG/ACTUATION NASAL SPRAY    INHALE 1 SPRAY IN EACH NOSTRIL EVERY DAY     "FLUTICASONE-SALMETEROL DISKUS INHALER 100-50 MCG    Inhale 1 puff into the lungs.    GABAPENTIN (NEURONTIN) 600 MG TABLET    Take 600 mg by mouth 3 (three) times daily.    HYDRALAZINE (APRESOLINE) 25 MG TABLET    Take 1 tablet (25 mg total) by mouth 3 (three) times daily.    INHALATION SPACING DEVICE    Use as directed for inhalation.    LISINOPRIL (PRINIVIL,ZESTRIL) 40 MG TABLET    Take 1 tablet (40 mg total) by mouth once daily.    METFORMIN (GLUCOPHAGE) 500 MG TABLET    Take 1 tablet (500 mg total) by mouth 2 (two) times daily with meals.    NALOXONE (NARCAN) 4 MG/ACTUATION SPRY    4mg by nasal route as needed for opioid overdose; may repeat every 2-3 minutes in alternating nostrils until medical help arrives. Call 911    NEEDLE, DISP, 22 G 22 GAUGE X 1" NDLE    Testosterone injection every 2 weeks    NICOTINE (NICODERM CQ) 21 MG/24 HR    Place 1 patch onto the skin once daily.    OXYCODONE-ACETAMINOPHEN (PERCOCET)  MG PER TABLET    Take 1 tablet by mouth every 6 (six) hours as needed.    POLYETHYLENE GLYCOL 3350 (MIRALAX ORAL)    Take 1 application by mouth.    PROAIR HFA 90 MCG/ACTUATION INHALER    INHALE TWO PUFFS BY MOUTH EVERY 6 HOURS AS NEEDED FOR WHEEZING (rescue)    SENNA (SENOKOT) 8.6 MG TABLET    Take 1 tablet by mouth 2 (two) times a day.    SYRINGE, DISPOSABLE, (BD LUER-STARLA SYRINGE) 1 ML SYRG    Testosterone injection every 2 weeks    TESTOSTERONE CYPIONATE (DEPOTESTOTERONE CYPIONATE) 200 MG/ML INJECTION    Inject 0.75 mLs (150 mg total) into the muscle every 14 (fourteen) days.    TIZANIDINE (ZANAFLEX) 4 MG TABLET    Take 4-8 mg by mouth nightly as needed.    VERAPAMIL (CALAN-SR) 180 MG CR TABLET    Take 1 tablet (180 mg total) by mouth 2 (two) times daily.   Discontinued Medications    GABAPENTIN (NEURONTIN) 400 MG CAPSULE    Take 1 capsule (400 mg total) by mouth 3 (three) times daily.        Objective:   Physical Exam   Vitals:    05/02/22 1333 05/02/22 1359   BP: (!) 160/58 (!) 150/60   BP " "Location: Right arm Left arm   Patient Position: Sitting Sitting   BP Method: Large (Manual) Large (Manual)   Pulse: 91    Temp: 98.2 °F (36.8 °C)    TempSrc: Oral    SpO2: 96%    Weight: 95.7 kg (210 lb 13.9 oz)    Height: 5' 10" (1.778 m)     Body mass index is 30.26 kg/m².  Weight: 95.7 kg (210 lb 13.9 oz)   Height: 5' 10" (177.8 cm)     Physical Exam  Constitutional:       General: He is not in acute distress.     Appearance: He is well-developed.   Cardiovascular:      Rate and Rhythm: Normal rate and regular rhythm.      Heart sounds: Normal heart sounds. No murmur heard.  Pulmonary:      Effort: Pulmonary effort is normal.      Breath sounds: Normal breath sounds.   Musculoskeletal:         General: Normal range of motion.      Right lower leg: No edema.      Left lower leg: No edema.   Skin:     General: Skin is warm and dry.   Neurological:      Mental Status: He is alert and oriented to person, place, and time.      Coordination: Coordination normal.   Psychiatric:         Behavior: Behavior normal.         Thought Content: Thought content normal.         "

## 2022-05-02 ENCOUNTER — OFFICE VISIT (OUTPATIENT)
Dept: FAMILY MEDICINE | Facility: CLINIC | Age: 67
End: 2022-05-02
Payer: MEDICARE

## 2022-05-02 VITALS
BODY MASS INDEX: 30.19 KG/M2 | SYSTOLIC BLOOD PRESSURE: 150 MMHG | DIASTOLIC BLOOD PRESSURE: 60 MMHG | HEIGHT: 70 IN | HEART RATE: 91 BPM | WEIGHT: 210.88 LBS | TEMPERATURE: 98 F | OXYGEN SATURATION: 96 %

## 2022-05-02 DIAGNOSIS — F41.9 ANXIETY: Primary | ICD-10-CM

## 2022-05-02 DIAGNOSIS — I10 ESSENTIAL HYPERTENSION: ICD-10-CM

## 2022-05-02 DIAGNOSIS — F13.20 BENZODIAZEPINE DEPENDENCE: ICD-10-CM

## 2022-05-02 DIAGNOSIS — Z23 NEED FOR VACCINATION FOR STREP PNEUMONIAE: ICD-10-CM

## 2022-05-02 DIAGNOSIS — M51.36 DDD (DEGENERATIVE DISC DISEASE), LUMBAR: ICD-10-CM

## 2022-05-02 PROCEDURE — 1160F PR REVIEW ALL MEDS BY PRESCRIBER/CLIN PHARMACIST DOCUMENTED: ICD-10-PCS | Mod: CPTII,S$GLB,, | Performed by: INTERNAL MEDICINE

## 2022-05-02 PROCEDURE — 1157F PR ADVANCE CARE PLAN OR EQUIV PRESENT IN MEDICAL RECORD: ICD-10-PCS | Mod: CPTII,S$GLB,, | Performed by: INTERNAL MEDICINE

## 2022-05-02 PROCEDURE — 3288F FALL RISK ASSESSMENT DOCD: CPT | Mod: CPTII,S$GLB,, | Performed by: INTERNAL MEDICINE

## 2022-05-02 PROCEDURE — 3077F SYST BP >= 140 MM HG: CPT | Mod: CPTII,S$GLB,, | Performed by: INTERNAL MEDICINE

## 2022-05-02 PROCEDURE — 3078F PR MOST RECENT DIASTOLIC BLOOD PRESSURE < 80 MM HG: ICD-10-PCS | Mod: CPTII,S$GLB,, | Performed by: INTERNAL MEDICINE

## 2022-05-02 PROCEDURE — 99214 OFFICE O/P EST MOD 30 MIN: CPT | Mod: S$GLB,,, | Performed by: INTERNAL MEDICINE

## 2022-05-02 PROCEDURE — 3044F HG A1C LEVEL LT 7.0%: CPT | Mod: CPTII,S$GLB,, | Performed by: INTERNAL MEDICINE

## 2022-05-02 PROCEDURE — 90677 PNEUMOCOCCAL CONJUGATE VACCINE 20-VALENT: ICD-10-PCS | Mod: S$GLB,,, | Performed by: INTERNAL MEDICINE

## 2022-05-02 PROCEDURE — 1159F PR MEDICATION LIST DOCUMENTED IN MEDICAL RECORD: ICD-10-PCS | Mod: CPTII,S$GLB,, | Performed by: INTERNAL MEDICINE

## 2022-05-02 PROCEDURE — 99499 UNLISTED E&M SERVICE: CPT | Mod: S$GLB,,, | Performed by: INTERNAL MEDICINE

## 2022-05-02 PROCEDURE — 99499 RISK ADDL DX/OHS AUDIT: ICD-10-PCS | Mod: S$GLB,,, | Performed by: INTERNAL MEDICINE

## 2022-05-02 PROCEDURE — 3008F BODY MASS INDEX DOCD: CPT | Mod: CPTII,S$GLB,, | Performed by: INTERNAL MEDICINE

## 2022-05-02 PROCEDURE — 3077F PR MOST RECENT SYSTOLIC BLOOD PRESSURE >= 140 MM HG: ICD-10-PCS | Mod: CPTII,S$GLB,, | Performed by: INTERNAL MEDICINE

## 2022-05-02 PROCEDURE — 3078F DIAST BP <80 MM HG: CPT | Mod: CPTII,S$GLB,, | Performed by: INTERNAL MEDICINE

## 2022-05-02 PROCEDURE — 3008F PR BODY MASS INDEX (BMI) DOCUMENTED: ICD-10-PCS | Mod: CPTII,S$GLB,, | Performed by: INTERNAL MEDICINE

## 2022-05-02 PROCEDURE — 3288F PR FALLS RISK ASSESSMENT DOCUMENTED: ICD-10-PCS | Mod: CPTII,S$GLB,, | Performed by: INTERNAL MEDICINE

## 2022-05-02 PROCEDURE — 90677 PCV20 VACCINE IM: CPT | Mod: S$GLB,,, | Performed by: INTERNAL MEDICINE

## 2022-05-02 PROCEDURE — 3044F PR MOST RECENT HEMOGLOBIN A1C LEVEL <7.0%: ICD-10-PCS | Mod: CPTII,S$GLB,, | Performed by: INTERNAL MEDICINE

## 2022-05-02 PROCEDURE — 1160F RVW MEDS BY RX/DR IN RCRD: CPT | Mod: CPTII,S$GLB,, | Performed by: INTERNAL MEDICINE

## 2022-05-02 PROCEDURE — G0009 ADMIN PNEUMOCOCCAL VACCINE: HCPCS | Mod: S$GLB,,, | Performed by: INTERNAL MEDICINE

## 2022-05-02 PROCEDURE — 3061F PR NEG MICROALBUMINURIA RESULT DOCUMENTED/REVIEW: ICD-10-PCS | Mod: CPTII,S$GLB,, | Performed by: INTERNAL MEDICINE

## 2022-05-02 PROCEDURE — 1159F MED LIST DOCD IN RCRD: CPT | Mod: CPTII,S$GLB,, | Performed by: INTERNAL MEDICINE

## 2022-05-02 PROCEDURE — 1101F PR PT FALLS ASSESS DOC 0-1 FALLS W/OUT INJ PAST YR: ICD-10-PCS | Mod: CPTII,S$GLB,, | Performed by: INTERNAL MEDICINE

## 2022-05-02 PROCEDURE — 1125F AMNT PAIN NOTED PAIN PRSNT: CPT | Mod: CPTII,S$GLB,, | Performed by: INTERNAL MEDICINE

## 2022-05-02 PROCEDURE — 1125F PR PAIN SEVERITY QUANTIFIED, PAIN PRESENT: ICD-10-PCS | Mod: CPTII,S$GLB,, | Performed by: INTERNAL MEDICINE

## 2022-05-02 PROCEDURE — 1157F ADVNC CARE PLAN IN RCRD: CPT | Mod: CPTII,S$GLB,, | Performed by: INTERNAL MEDICINE

## 2022-05-02 PROCEDURE — 3066F NEPHROPATHY DOC TX: CPT | Mod: CPTII,S$GLB,, | Performed by: INTERNAL MEDICINE

## 2022-05-02 PROCEDURE — 99999 PR PBB SHADOW E&M-EST. PATIENT-LVL V: CPT | Mod: PBBFAC,,, | Performed by: INTERNAL MEDICINE

## 2022-05-02 PROCEDURE — 3061F NEG MICROALBUMINURIA REV: CPT | Mod: CPTII,S$GLB,, | Performed by: INTERNAL MEDICINE

## 2022-05-02 PROCEDURE — 4010F ACE/ARB THERAPY RXD/TAKEN: CPT | Mod: CPTII,S$GLB,, | Performed by: INTERNAL MEDICINE

## 2022-05-02 PROCEDURE — 3066F PR DOCUMENTATION OF TREATMENT FOR NEPHROPATHY: ICD-10-PCS | Mod: CPTII,S$GLB,, | Performed by: INTERNAL MEDICINE

## 2022-05-02 PROCEDURE — 4010F PR ACE/ARB THEARPY RXD/TAKEN: ICD-10-PCS | Mod: CPTII,S$GLB,, | Performed by: INTERNAL MEDICINE

## 2022-05-02 PROCEDURE — 99214 PR OFFICE/OUTPT VISIT, EST, LEVL IV, 30-39 MIN: ICD-10-PCS | Mod: S$GLB,,, | Performed by: INTERNAL MEDICINE

## 2022-05-02 PROCEDURE — 99999 PR PBB SHADOW E&M-EST. PATIENT-LVL V: ICD-10-PCS | Mod: PBBFAC,,, | Performed by: INTERNAL MEDICINE

## 2022-05-02 PROCEDURE — 1101F PT FALLS ASSESS-DOCD LE1/YR: CPT | Mod: CPTII,S$GLB,, | Performed by: INTERNAL MEDICINE

## 2022-05-02 PROCEDURE — G0009 PNEUMOCOCCAL CONJUGATE VACCINE 20-VALENT: ICD-10-PCS | Mod: S$GLB,,, | Performed by: INTERNAL MEDICINE

## 2022-05-02 RX ORDER — GABAPENTIN 600 MG/1
600 TABLET ORAL 3 TIMES DAILY
COMMUNITY
Start: 2022-04-26

## 2022-05-02 RX ORDER — TIZANIDINE 4 MG/1
4-8 TABLET ORAL NIGHTLY PRN
COMMUNITY
Start: 2022-04-26

## 2022-05-02 NOTE — PATIENT INSTRUCTIONS
The [facility] Psych[facility] Department requires that patients call and make their own appointments.  Please see the below phone numbers:    [facility] [place]: [phone]  Providers:   MD Monique Dunn PA-C Cassie Block [facility]    Main [place]:  [facility] [phone]

## 2022-05-03 ENCOUNTER — CLINICAL SUPPORT (OUTPATIENT)
Dept: SMOKING CESSATION | Facility: CLINIC | Age: 67
End: 2022-05-03
Payer: COMMERCIAL

## 2022-05-03 DIAGNOSIS — F17.200 NICOTINE DEPENDENCE: Primary | ICD-10-CM

## 2022-05-03 PROCEDURE — 99999 PR PBB SHADOW E&M-EST. PATIENT-LVL II: CPT | Mod: PBBFAC,,,

## 2022-05-03 PROCEDURE — 99999 PR PBB SHADOW E&M-EST. PATIENT-LVL II: ICD-10-PCS | Mod: PBBFAC,,,

## 2022-05-03 RX ORDER — IBUPROFEN 200 MG
1 TABLET ORAL DAILY
Qty: 28 PATCH | Refills: 0 | Status: SHIPPED | OUTPATIENT
Start: 2022-05-03 | End: 2022-06-02

## 2022-05-03 NOTE — Clinical Note
Comments: PATIENT PRESENT FOR FOLLOW UP SMOKING A PK PER DAY, HE HAS DOING WELL HOWEVER DUE TO STRESS LAPSED BACK INTO SMOKING AND RAN OUT OF HS NICOTINE PATCHES, RECOMMEND HE GET BACK ON TRACK WITH THE 21MG NICOTINE PATCH DAILY AND QUIT ABRUPTLY ONCE STARTING THE PATCHES, STRATEGIES AND SESSION HANDOUT DISCUSSED, WILL FOLLOW.

## 2022-05-03 NOTE — PROGRESS NOTES
Individual Follow-Up Form    5/3/2022    Quit Date: n/a    Clinical Status of Patient: Outpatient    Length of Service: 30 minutes    Continuing Medication: yes  Patches    Other Medications: n/a     Target Symptoms: Withdrawal and medication side effects. The following were  rated moderate (3) to severe (4) on TCRS:  · Moderate (3): 0  · Severe (4): 0    Comments: PATIENT PRESENT FOR FOLLOW UP SMOKING A PK PER DAY, HE HAS DOING WELL HOWEVER DUE TO STRESS LAPSED BACK INTO SMOKING AND RAN OUT OF HS NICOTINE PATCHES, RECOMMEND HE GET BACK ON TRACK WITH THE 21MG NICOTINE PATCH DAILY AND QUIT ABRUPTLY ONCE STARTING THE PATCHES, STRATEGIES AND SESSION HANDOUT DISCUSSED, WILL FOLLOW.    Diagnosis: F17.210    Next Visit: 2 weeks

## 2022-05-06 PROBLEM — M48.061 FORAMINAL STENOSIS OF LUMBAR REGION: Status: ACTIVE | Noted: 2022-05-06

## 2022-05-24 ENCOUNTER — TELEPHONE (OUTPATIENT)
Dept: FAMILY MEDICINE | Facility: CLINIC | Age: 67
End: 2022-05-24
Payer: MEDICARE

## 2022-05-24 NOTE — TELEPHONE ENCOUNTER
Patient was informed of provider's recommendation below.    Patient stated that his legs/ankles/feet are swollen along with edema around his eyes.  Wife stated that he has been having some SOB and wheezing. Patient stated that he use to smoke. Stated that he use to take lasix for his edema. Today his BP was 137/68. appt scheduled with pcp on tomorrow.

## 2022-05-24 NOTE — TELEPHONE ENCOUNTER
----- Message from Milton Valiente MA sent at 5/24/2022 10:17 AM CDT -----  Type: Patient Call Back    Who called:Self    What is the request in detail: pt is asking to be called in reference to something for bloating..    Can the clinic reply by [facility]?no    Would the patient rather a call back or a response via [facility]? yes    Best call back number:[phone]

## 2022-06-22 ENCOUNTER — PATIENT OUTREACH (OUTPATIENT)
Dept: ADMINISTRATIVE | Facility: HOSPITAL | Age: 67
End: 2022-06-22
Payer: MEDICARE

## 2022-07-05 DIAGNOSIS — F17.200 NICOTINE DEPENDENCE: ICD-10-CM

## 2022-07-05 RX ORDER — IBUPROFEN 200 MG
TABLET ORAL
Qty: 14 PATCH | Refills: 0 | Status: SHIPPED | OUTPATIENT
Start: 2022-07-05 | End: 2023-01-05

## 2022-07-06 ENCOUNTER — TELEPHONE (OUTPATIENT)
Dept: SMOKING CESSATION | Facility: CLINIC | Age: 67
End: 2022-07-06
Payer: MEDICARE

## 2022-07-06 ENCOUNTER — CLINICAL SUPPORT (OUTPATIENT)
Dept: SMOKING CESSATION | Facility: CLINIC | Age: 67
End: 2022-07-06
Payer: COMMERCIAL

## 2022-07-06 DIAGNOSIS — F17.200 NICOTINE DEPENDENCE: Primary | ICD-10-CM

## 2022-07-06 PROCEDURE — 99999 PR PBB SHADOW E&M-EST. PATIENT-LVL II: CPT | Mod: PBBFAC,,,

## 2022-07-06 PROCEDURE — 99404 PREV MED CNSL INDIV APPRX 60: CPT | Mod: S$GLB,,,

## 2022-07-06 PROCEDURE — 99404 PR PREVENT COUNSEL,INDIV,60 MIN: ICD-10-PCS | Mod: S$GLB,,,

## 2022-07-06 PROCEDURE — 99999 PR PBB SHADOW E&M-EST. PATIENT-LVL II: ICD-10-PCS | Mod: PBBFAC,,,

## 2022-07-06 RX ORDER — IBUPROFEN 200 MG
1 TABLET ORAL DAILY
Qty: 14 PATCH | Refills: 0 | Status: SHIPPED | OUTPATIENT
Start: 2022-07-06 | End: 2022-08-06

## 2022-07-06 NOTE — Clinical Note
PATIENT PRESENTS FOR INTAKE SMOKING 10 CIGARETTES PER DAY, THIS HAS BEEN A PATIENT OF TTS FOR MANY YEARS, HE HAS CUT BACK ON HIS  SMOKING THROUGHOUT THE YEARS AND IS DOWN FROM OVER TWO PACKS PER DAY, HE WEARS THE NICOTINE PATCH WELL AND HAS BEEN ABLE TO QUIT WITH THE NICOTINE PATCHES HOWEVER EXCEEDED HIS LIMIT PER YEAR WITH THE TOBACCO TRUST AND IS BACK TO SMOKING 10 PER DAY HOPING TO GET THE BENEFITS OF THE NICOTINE PATCH AGAIN, HE PRESENTS WITH HIS WIFE PHU WHO IS ALSO INTERESTED IN QUITTING SMOKING, RECOMMEND THE 21MG NICOTINE PATCH DAILY AND AN ABRUPT QUIT, SESSION HANDOUT AND STRATEGIES DISCUSSED WILL FOLLOW

## 2022-07-08 ENCOUNTER — CLINICAL SUPPORT (OUTPATIENT)
Dept: SMOKING CESSATION | Facility: CLINIC | Age: 67
End: 2022-07-08
Payer: COMMERCIAL

## 2022-07-08 DIAGNOSIS — F17.200 NICOTINE DEPENDENCE: Primary | ICD-10-CM

## 2022-07-08 PROCEDURE — 99999 PR PBB SHADOW E&M-EST. PATIENT-LVL I: ICD-10-PCS | Mod: PBBFAC,,,

## 2022-07-08 PROCEDURE — 99406 BEHAV CHNG SMOKING 3-10 MIN: CPT | Mod: S$GLB,,,

## 2022-07-08 PROCEDURE — 99406 PR TOBACCO USE CESSATION INTERMEDIATE 3-10 MINUTES: ICD-10-PCS | Mod: S$GLB,,,

## 2022-07-08 PROCEDURE — 99999 PR PBB SHADOW E&M-EST. PATIENT-LVL I: CPT | Mod: PBBFAC,,,

## 2022-07-09 NOTE — PROGRESS NOTES
Called pt to f/u on his 6 month smoking cessation quit status. Pt stated he was tobacco free but came back to program for patches. Informed him of benefit period, phone follow ups, and contact information. Will complete smart form for 6 months and will continue to follow up on quit #3 episode.

## 2022-07-11 ENCOUNTER — TELEPHONE (OUTPATIENT)
Dept: NEUROSURGERY | Facility: CLINIC | Age: 67
End: 2022-07-11
Payer: MEDICARE

## 2022-07-11 NOTE — TELEPHONE ENCOUNTER
----- Message from Viry Nolasco sent at 7/11/2022  1:36 PM CDT -----  Regarding: pt advice  Contact: pt @[phone]  Pt calling to speak with someone in  office, Regarding his MRI wanting to confirm that they have been received. Please call

## 2022-07-12 ENCOUNTER — TELEPHONE (OUTPATIENT)
Dept: NEUROSURGERY | Facility: CLINIC | Age: 67
End: 2022-07-12
Payer: MEDICARE

## 2022-07-12 NOTE — TELEPHONE ENCOUNTER
I returned the pt call and informed him that our office has received his report. It would be ideal if could come into the clinic with the imaging on the disc. I offered the pt an appt but he declined at this time until he has MRI of his shoulder and has the imaging on a disc  ----- Message from Gudelia Tomas sent at 7/12/2022  1:15 PM CDT -----  Regarding: Orders  Contact: PT @ [phone]  Pt is calling to speak to someone in Dr. Geller's office to discuss an order for an MRI on shoulder and neck area. Pt's neck is also bothering him. Pt has a lot of questions and concerns and is asking for a return call back. Pt is asking to to have MRI in [place].

## 2022-07-13 DIAGNOSIS — F41.9 ANXIETY: ICD-10-CM

## 2022-07-13 DIAGNOSIS — F13.20 BENZODIAZEPINE DEPENDENCE: ICD-10-CM

## 2022-07-13 RX ORDER — ALPRAZOLAM 1 MG/1
TABLET ORAL
Qty: 45 TABLET | Refills: 1 | Status: SHIPPED | OUTPATIENT
Start: 2022-07-13 | End: 2022-09-09

## 2022-07-13 NOTE — TELEPHONE ENCOUNTER
Care Due:                  Date            Visit Type   Department     Provider  --------------------------------------------------------------------------------                                [facility]                              PRIMARY      MED/ INTERNAL  Last Visit: 05-      CARE ([facility])   MED/ FREDDY Evans                              [facility]                              PRIMARY      MED/ INTERNAL  Next Visit: 08-      CARE ([facility])   MED/ FREDDY Evans                                                            Last  Test          Frequency    Reason                     Performed    Due Date  --------------------------------------------------------------------------------    HBA1C.......  6 months...  metFORMIN................  02-   08-    Lipid Panel.  12 months..  atorvastatin.............  10-   09-    [facility] Embedded Care Gaps. Reference number: [id number]. 7/13/2022   9:04:37 AM CDT

## 2022-07-18 ENCOUNTER — HOSPITAL ENCOUNTER (INPATIENT)
Facility: HOSPITAL | Age: 67
LOS: 3 days | Discharge: HOME | DRG: 682 | End: 2022-07-21
Attending: EMERGENCY MEDICINE | Admitting: HOSPITALIST
Payer: MEDICARE

## 2022-07-18 DIAGNOSIS — I49.8 ACCELERATED JUNCTIONAL RHYTHM: ICD-10-CM

## 2022-07-18 DIAGNOSIS — R00.1 BRADYCARDIA: ICD-10-CM

## 2022-07-18 DIAGNOSIS — E87.5 HYPERKALEMIA: Primary | ICD-10-CM

## 2022-07-18 DIAGNOSIS — N17.9 AKI (ACUTE KIDNEY INJURY): ICD-10-CM

## 2022-07-18 DIAGNOSIS — R00.0 TACHYCARDIA: ICD-10-CM

## 2022-07-18 DIAGNOSIS — R07.9 CHEST PAIN: ICD-10-CM

## 2022-07-18 PROBLEM — E66.9 OBESITY (BMI 30-39.9): Status: RESOLVED | Noted: 2021-10-07 | Resolved: 2022-07-18

## 2022-07-18 PROBLEM — G93.40 ACUTE ENCEPHALOPATHY: Status: ACTIVE | Noted: 2022-07-18

## 2022-07-18 PROBLEM — Z79.899 CHRONIC PRESCRIPTION BENZODIAZEPINE USE: Status: ACTIVE | Noted: 2022-07-18

## 2022-07-18 LAB
ALBUMIN SERPL BCP-MCNC: 4.2 G/DL (ref 3.5–5.2)
ALLENS TEST: ABNORMAL
ALP SERPL-CCNC: 63 U/L (ref 55–135)
ALT SERPL W/O P-5'-P-CCNC: 38 U/L (ref 10–44)
AMMONIA PLAS-SCNC: 24 UMOL/L (ref 10–50)
AMPHET+METHAMPHET UR QL: NEGATIVE
ANION GAP SERPL CALC-SCNC: 10 MMOL/L (ref 8–16)
ANION GAP SERPL CALC-SCNC: 11 MMOL/L (ref 8–16)
ANION GAP SERPL CALC-SCNC: 13 MMOL/L (ref 8–16)
ANION GAP SERPL CALC-SCNC: 14 MMOL/L (ref 8–16)
AORTIC ROOT ANNULUS: 2.84 CM
AORTIC VALVE CUSP SEPERATION: 2.12 CM
ASCENDING AORTA: 2.49 CM
AST SERPL-CCNC: 30 U/L (ref 10–40)
AV INDEX (PROSTH): 0.69
AV MEAN GRADIENT: 25 MMHG
AV PEAK GRADIENT: 36 MMHG
AV VALVE AREA: 2.63 CM2
AV VELOCITY RATIO: 0.58
BACTERIA #/AREA URNS HPF: ABNORMAL /HPF
BARBITURATES UR QL SCN>200 NG/ML: NEGATIVE
BASOPHILS # BLD AUTO: 0.02 K/UL (ref 0–0.2)
BASOPHILS NFR BLD: 0.1 % (ref 0–1.9)
BENZODIAZ UR QL SCN>200 NG/ML: ABNORMAL
BILIRUB SERPL-MCNC: 1.3 MG/DL (ref 0.1–1)
BILIRUB UR QL STRIP: NEGATIVE
BNP SERPL-MCNC: 203 PG/ML (ref 0–99)
BSA FOR ECHO PROCEDURE: 2.3 M2
BUN SERPL-MCNC: 62 MG/DL (ref 8–23)
BUN SERPL-MCNC: 63 MG/DL (ref 6–30)
BUN SERPL-MCNC: 69 MG/DL (ref 8–23)
BUN SERPL-MCNC: 69 MG/DL (ref 8–23)
BZE UR QL SCN: NEGATIVE
CALCIUM SERPL-MCNC: 10.3 MG/DL (ref 8.7–10.5)
CALCIUM SERPL-MCNC: 8.9 MG/DL (ref 8.7–10.5)
CALCIUM SERPL-MCNC: 9.4 MG/DL (ref 8.7–10.5)
CANNABINOIDS UR QL SCN: ABNORMAL
CHLORIDE SERPL-SCNC: 103 MMOL/L (ref 95–110)
CHLORIDE SERPL-SCNC: 105 MMOL/L (ref 95–110)
CK SERPL-CCNC: 129 U/L (ref 20–200)
CLARITY UR: CLEAR
CO2 SERPL-SCNC: 19 MMOL/L (ref 23–29)
CO2 SERPL-SCNC: 23 MMOL/L (ref 23–29)
CO2 SERPL-SCNC: 24 MMOL/L (ref 23–29)
COLOR UR: YELLOW
CORTIS SERPL-MCNC: 29.3 UG/DL
CREAT SERPL-MCNC: 4.5 MG/DL (ref 0.5–1.4)
CREAT SERPL-MCNC: 4.8 MG/DL (ref 0.5–1.4)
CREAT SERPL-MCNC: 4.8 MG/DL (ref 0.5–1.4)
CREAT SERPL-MCNC: 5 MG/DL (ref 0.5–1.4)
CREAT UR-MCNC: 237.6 MG/DL (ref 23–375)
CTP QC/QA: YES
CV ECHO LV RWT: 0.63 CM
DELSYS: ABNORMAL
DIFFERENTIAL METHOD: ABNORMAL
DOP CALC AO PEAK VEL: 3 M/S
DOP CALC AO VTI: 51.74 CM
DOP CALC LVOT AREA: 3.8 CM2
DOP CALC LVOT DIAMETER: 2.21 CM
DOP CALC LVOT PEAK VEL: 1.73 M/S
DOP CALC LVOT STROKE VOLUME: 135.95 CM3
DOP CALCLVOT PEAK VEL VTI: 35.46 CM
ECHO LV POSTERIOR WALL: 1.56 CM (ref 0.6–1.1)
EJECTION FRACTION: 70 %
EOSINOPHIL # BLD AUTO: 0 K/UL (ref 0–0.5)
EOSINOPHIL NFR BLD: 0.1 % (ref 0–8)
ERYTHROCYTE [DISTWIDTH] IN BLOOD BY AUTOMATED COUNT: 12.9 % (ref 11.5–14.5)
EST. GFR  (AFRICAN AMERICAN): 13 ML/MIN/1.73 M^2
EST. GFR  (AFRICAN AMERICAN): 13 ML/MIN/1.73 M^2
EST. GFR  (AFRICAN AMERICAN): 15 ML/MIN/1.73 M^2
EST. GFR  (NON AFRICAN AMERICAN): 11 ML/MIN/1.73 M^2
EST. GFR  (NON AFRICAN AMERICAN): 12 ML/MIN/1.73 M^2
EST. GFR  (NON AFRICAN AMERICAN): 13 ML/MIN/1.73 M^2
ETHANOL SERPL-MCNC: <10 MG/DL
FERRITIN SERPL-MCNC: 434 NG/ML (ref 20–300)
FLOW: 2
FRACTIONAL SHORTENING: 39 % (ref 28–44)
GLUCOSE SERPL-MCNC: 122 MG/DL (ref 70–110)
GLUCOSE SERPL-MCNC: 150 MG/DL (ref 70–110)
GLUCOSE SERPL-MCNC: 173 MG/DL (ref 70–110)
GLUCOSE SERPL-MCNC: 188 MG/DL (ref 70–110)
GLUCOSE UR QL STRIP: NEGATIVE
HCO3 UR-SCNC: 20.3 MMOL/L (ref 24–28)
HCT VFR BLD AUTO: 42.3 % (ref 40–54)
HCT VFR BLD CALC: 42 %PCV (ref 36–54)
HGB BLD-MCNC: 13.8 G/DL (ref 14–18)
HGB UR QL STRIP: NEGATIVE
HYALINE CASTS #/AREA URNS LPF: 9 /LPF
IMM GRANULOCYTES # BLD AUTO: 0.1 K/UL (ref 0–0.04)
IMM GRANULOCYTES NFR BLD AUTO: 0.5 % (ref 0–0.5)
INTERVENTRICULAR SEPTUM: 1.57 CM (ref 0.6–1.1)
IRON SERPL-MCNC: 64 UG/DL (ref 45–160)
IVRT: 76.12 MSEC
KETONES UR QL STRIP: NEGATIVE
LA MAJOR: 5.89 CM
LACTATE SERPL-SCNC: 1.7 MMOL/L (ref 0.5–2.2)
LACTATE SERPL-SCNC: 3.9 MMOL/L (ref 0.5–2.2)
LEFT ATRIUM SIZE: 3.98 CM
LEFT INTERNAL DIMENSION IN SYSTOLE: 3.04 CM (ref 2.1–4)
LEFT VENTRICLE DIASTOLIC VOLUME INDEX: 52.23 ML/M2
LEFT VENTRICLE DIASTOLIC VOLUME: 117.51 ML
LEFT VENTRICLE MASS INDEX: 152 G/M2
LEFT VENTRICLE SYSTOLIC VOLUME INDEX: 16.1 ML/M2
LEFT VENTRICLE SYSTOLIC VOLUME: 36.23 ML
LEFT VENTRICULAR INTERNAL DIMENSION IN DIASTOLE: 4.99 CM (ref 3.5–6)
LEFT VENTRICULAR MASS: 342.67 G
LEUKOCYTE ESTERASE UR QL STRIP: NEGATIVE
LYMPHOCYTES # BLD AUTO: 1.1 K/UL (ref 1–4.8)
LYMPHOCYTES NFR BLD: 5.9 % (ref 18–48)
MCH RBC QN AUTO: 33.5 PG (ref 27–31)
MCHC RBC AUTO-ENTMCNC: 32.6 G/DL (ref 32–36)
MCV RBC AUTO: 103 FL (ref 82–98)
METHADONE UR QL SCN>300 NG/ML: NEGATIVE
MICROSCOPIC COMMENT: ABNORMAL
MODE: ABNORMAL
MONOCYTES # BLD AUTO: 0.7 K/UL (ref 0.3–1)
MONOCYTES NFR BLD: 3.5 % (ref 4–15)
NEUTROPHILS # BLD AUTO: 16.9 K/UL (ref 1.8–7.7)
NEUTROPHILS NFR BLD: 89.9 % (ref 38–73)
NITRITE UR QL STRIP: NEGATIVE
NRBC BLD-RTO: 0 /100 WBC
OPIATES UR QL SCN: ABNORMAL
PCO2 BLDA: 42.1 MMHG (ref 35–45)
PCP UR QL SCN>25 NG/ML: NEGATIVE
PH SMN: 7.29 [PH] (ref 7.35–7.45)
PH UR STRIP: 5 [PH] (ref 5–8)
PHOSPHATE SERPL-MCNC: 5.1 MG/DL (ref 2.7–4.5)
PISA TR MAX VEL: 3.26 M/S
PLATELET # BLD AUTO: 231 K/UL (ref 150–450)
PMV BLD AUTO: 11.7 FL (ref 9.2–12.9)
PO2 BLDA: 39 MMHG (ref 40–60)
POC BE: -6 MMOL/L
POC IONIZED CALCIUM: 1.16 MMOL/L (ref 1.06–1.42)
POC SATURATED O2: 68 % (ref 95–100)
POC TCO2 (MEASURED): 23 MMOL/L (ref 23–29)
POC TCO2: 22 MMOL/L (ref 24–29)
POCT GLUCOSE: 130 MG/DL (ref 70–110)
POCT GLUCOSE: 145 MG/DL (ref 70–110)
POCT GLUCOSE: 184 MG/DL (ref 70–110)
POTASSIUM BLD-SCNC: 7.5 MMOL/L (ref 3.5–5.1)
POTASSIUM SERPL-SCNC: 5.9 MMOL/L (ref 3.5–5.1)
POTASSIUM SERPL-SCNC: 6.2 MMOL/L (ref 3.5–5.1)
POTASSIUM SERPL-SCNC: 7.6 MMOL/L (ref 3.5–5.1)
PROT SERPL-MCNC: 7.7 G/DL (ref 6–8.4)
PROT UR QL STRIP: ABNORMAL
PV PEAK VELOCITY: 1.61 CM/S
RA MAJOR: 5.38 CM
RA PRESSURE: 15 MMHG
RA WIDTH: 3.53 CM
RBC # BLD AUTO: 4.12 M/UL (ref 4.6–6.2)
RBC #/AREA URNS HPF: 0 /HPF (ref 0–4)
RIGHT VENTRICULAR END-DIASTOLIC DIMENSION: 3.92 CM
RV TISSUE DOPPLER FREE WALL SYSTOLIC VELOCITY 1 (APICAL 4 CHAMBER VIEW): 18.32 CM/S
SAMPLE: ABNORMAL
SAMPLE: ABNORMAL
SARS-COV-2 RDRP RESP QL NAA+PROBE: NEGATIVE
SATURATED IRON: 18 % (ref 20–50)
SINUS: 2.8 CM
SITE: ABNORMAL
SODIUM BLD-SCNC: 133 MMOL/L (ref 136–145)
SODIUM SERPL-SCNC: 135 MMOL/L (ref 136–145)
SODIUM SERPL-SCNC: 137 MMOL/L (ref 136–145)
SODIUM SERPL-SCNC: 137 MMOL/L (ref 136–145)
SP GR UR STRIP: 1.02 (ref 1–1.03)
STJ: 2.25 CM
T4 FREE SERPL-MCNC: 0.82 NG/DL (ref 0.71–1.51)
TOTAL IRON BINDING CAPACITY: 355 UG/DL (ref 250–450)
TOXICOLOGY INFORMATION: ABNORMAL
TR MAX PG: 43 MMHG
TRANSFERRIN SERPL-MCNC: 240 MG/DL (ref 200–375)
TRICUSPID ANNULAR PLANE SYSTOLIC EXCURSION: 2.18 CM
TROPONIN I SERPL DL<=0.01 NG/ML-MCNC: 0.02 NG/ML (ref 0–0.03)
TSH SERPL DL<=0.005 MIU/L-ACNC: 5.29 UIU/ML (ref 0.4–4)
TV REST PULMONARY ARTERY PRESSURE: 58 MMHG
URN SPEC COLLECT METH UR: ABNORMAL
UROBILINOGEN UR STRIP-ACNC: NEGATIVE EU/DL
WBC # BLD AUTO: 18.75 K/UL (ref 3.9–12.7)
WBC #/AREA URNS HPF: 1 /HPF (ref 0–5)

## 2022-07-18 PROCEDURE — 96376 TX/PRO/DX INJ SAME DRUG ADON: CPT

## 2022-07-18 PROCEDURE — 25000242 PHARM REV CODE 250 ALT 637 W/ HCPCS: Performed by: EMERGENCY MEDICINE

## 2022-07-18 PROCEDURE — 94761 N-INVAS EAR/PLS OXIMETRY MLT: CPT

## 2022-07-18 PROCEDURE — 63600175 PHARM REV CODE 636 W HCPCS: Performed by: INTERNAL MEDICINE

## 2022-07-18 PROCEDURE — 82077 ASSAY SPEC XCP UR&BREATH IA: CPT | Performed by: EMERGENCY MEDICINE

## 2022-07-18 PROCEDURE — 99900035 HC TECH TIME PER 15 MIN (STAT)

## 2022-07-18 PROCEDURE — 80053 COMPREHEN METABOLIC PANEL: CPT | Performed by: EMERGENCY MEDICINE

## 2022-07-18 PROCEDURE — 25000003 PHARM REV CODE 250: Performed by: EMERGENCY MEDICINE

## 2022-07-18 PROCEDURE — 63600175 PHARM REV CODE 636 W HCPCS: Performed by: HOSPITALIST

## 2022-07-18 PROCEDURE — 82565 ASSAY OF CREATININE: CPT

## 2022-07-18 PROCEDURE — 25000003 PHARM REV CODE 250: Performed by: INTERNAL MEDICINE

## 2022-07-18 PROCEDURE — 82962 GLUCOSE BLOOD TEST: CPT

## 2022-07-18 PROCEDURE — 25000242 PHARM REV CODE 250 ALT 637 W/ HCPCS: Performed by: HOSPITALIST

## 2022-07-18 PROCEDURE — 80048 BASIC METABOLIC PNL TOTAL CA: CPT | Mod: 91,XB | Performed by: HOSPITALIST

## 2022-07-18 PROCEDURE — 51798 US URINE CAPACITY MEASURE: CPT

## 2022-07-18 PROCEDURE — 36415 COLL VENOUS BLD VENIPUNCTURE: CPT | Performed by: INTERNAL MEDICINE

## 2022-07-18 PROCEDURE — 82607 VITAMIN B-12: CPT | Performed by: HOSPITALIST

## 2022-07-18 PROCEDURE — 25000242 PHARM REV CODE 250 ALT 637 W/ HCPCS: Performed by: NURSE PRACTITIONER

## 2022-07-18 PROCEDURE — 96375 TX/PRO/DX INJ NEW DRUG ADDON: CPT

## 2022-07-18 PROCEDURE — 83605 ASSAY OF LACTIC ACID: CPT | Performed by: INTERNAL MEDICINE

## 2022-07-18 PROCEDURE — 84484 ASSAY OF TROPONIN QUANT: CPT | Performed by: EMERGENCY MEDICINE

## 2022-07-18 PROCEDURE — 20000000 HC ICU ROOM

## 2022-07-18 PROCEDURE — 25000003 PHARM REV CODE 250: Performed by: HOSPITALIST

## 2022-07-18 PROCEDURE — 80307 DRUG TEST PRSMV CHEM ANLYZR: CPT | Performed by: EMERGENCY MEDICINE

## 2022-07-18 PROCEDURE — 93010 ELECTROCARDIOGRAM REPORT: CPT | Mod: ,,, | Performed by: INTERNAL MEDICINE

## 2022-07-18 PROCEDURE — 99291 PR CRITICAL CARE, E/M 30-74 MINUTES: ICD-10-PCS | Mod: ,,, | Performed by: INTERNAL MEDICINE

## 2022-07-18 PROCEDURE — 82533 TOTAL CORTISOL: CPT | Performed by: INTERNAL MEDICINE

## 2022-07-18 PROCEDURE — A4216 STERILE WATER/SALINE, 10 ML: HCPCS | Performed by: HOSPITALIST

## 2022-07-18 PROCEDURE — 80047 BASIC METABLC PNL IONIZED CA: CPT

## 2022-07-18 PROCEDURE — 83605 ASSAY OF LACTIC ACID: CPT | Mod: 91 | Performed by: INTERNAL MEDICINE

## 2022-07-18 PROCEDURE — S0166 INJ OLANZAPINE 2.5MG: HCPCS | Performed by: HOSPITALIST

## 2022-07-18 PROCEDURE — 36556 PR INSERT NON-TUNNEL CV CATH 5+ YRS OLD: ICD-10-PCS | Mod: ,,, | Performed by: NURSE PRACTITIONER

## 2022-07-18 PROCEDURE — 84132 ASSAY OF SERUM POTASSIUM: CPT

## 2022-07-18 PROCEDURE — 93010 EKG 12-LEAD: ICD-10-PCS | Mod: 76,,, | Performed by: INTERNAL MEDICINE

## 2022-07-18 PROCEDURE — 63600175 PHARM REV CODE 636 W HCPCS: Performed by: EMERGENCY MEDICINE

## 2022-07-18 PROCEDURE — 25000242 PHARM REV CODE 250 ALT 637 W/ HCPCS

## 2022-07-18 PROCEDURE — 51702 INSERT TEMP BLADDER CATH: CPT

## 2022-07-18 PROCEDURE — 82330 ASSAY OF CALCIUM: CPT

## 2022-07-18 PROCEDURE — 36556 INSERT NON-TUNNEL CV CATH: CPT | Mod: ,,, | Performed by: NURSE PRACTITIONER

## 2022-07-18 PROCEDURE — 82800 BLOOD PH: CPT

## 2022-07-18 PROCEDURE — 99291 CRITICAL CARE FIRST HOUR: CPT | Mod: 25

## 2022-07-18 PROCEDURE — 99291 CRITICAL CARE FIRST HOUR: CPT | Mod: ,,, | Performed by: INTERNAL MEDICINE

## 2022-07-18 PROCEDURE — 82140 ASSAY OF AMMONIA: CPT | Performed by: INTERNAL MEDICINE

## 2022-07-18 PROCEDURE — 80074 ACUTE HEPATITIS PANEL: CPT | Performed by: INTERNAL MEDICINE

## 2022-07-18 PROCEDURE — 82550 ASSAY OF CK (CPK): CPT | Performed by: HOSPITALIST

## 2022-07-18 PROCEDURE — 84295 ASSAY OF SERUM SODIUM: CPT

## 2022-07-18 PROCEDURE — 80048 BASIC METABOLIC PNL TOTAL CA: CPT | Mod: XB | Performed by: INTERNAL MEDICINE

## 2022-07-18 PROCEDURE — 27000221 HC OXYGEN, UP TO 24 HOURS

## 2022-07-18 PROCEDURE — 82803 BLOOD GASES ANY COMBINATION: CPT

## 2022-07-18 PROCEDURE — 82746 ASSAY OF FOLIC ACID SERUM: CPT | Performed by: HOSPITALIST

## 2022-07-18 PROCEDURE — 96374 THER/PROPH/DIAG INJ IV PUSH: CPT

## 2022-07-18 PROCEDURE — 83880 ASSAY OF NATRIURETIC PEPTIDE: CPT | Performed by: EMERGENCY MEDICINE

## 2022-07-18 PROCEDURE — 84466 ASSAY OF TRANSFERRIN: CPT | Performed by: HOSPITALIST

## 2022-07-18 PROCEDURE — 84443 ASSAY THYROID STIM HORMONE: CPT | Performed by: EMERGENCY MEDICINE

## 2022-07-18 PROCEDURE — 84100 ASSAY OF PHOSPHORUS: CPT | Performed by: INTERNAL MEDICINE

## 2022-07-18 PROCEDURE — 63600175 PHARM REV CODE 636 W HCPCS

## 2022-07-18 PROCEDURE — 81000 URINALYSIS NONAUTO W/SCOPE: CPT | Mod: 59 | Performed by: EMERGENCY MEDICINE

## 2022-07-18 PROCEDURE — 85025 COMPLETE CBC W/AUTO DIFF WBC: CPT | Performed by: EMERGENCY MEDICINE

## 2022-07-18 PROCEDURE — 85014 HEMATOCRIT: CPT

## 2022-07-18 PROCEDURE — 80100014 HC HEMODIALYSIS 1:1

## 2022-07-18 PROCEDURE — 93005 ELECTROCARDIOGRAM TRACING: CPT

## 2022-07-18 PROCEDURE — 99292 CRITICAL CARE ADDL 30 MIN: CPT

## 2022-07-18 PROCEDURE — 84439 ASSAY OF FREE THYROXINE: CPT | Performed by: EMERGENCY MEDICINE

## 2022-07-18 PROCEDURE — 82728 ASSAY OF FERRITIN: CPT | Performed by: HOSPITALIST

## 2022-07-18 PROCEDURE — U0002 COVID-19 LAB TEST NON-CDC: HCPCS | Performed by: EMERGENCY MEDICINE

## 2022-07-18 PROCEDURE — 94640 AIRWAY INHALATION TREATMENT: CPT

## 2022-07-18 PROCEDURE — 94644 CONT INHLJ TX 1ST HOUR: CPT

## 2022-07-18 RX ORDER — IPRATROPIUM BROMIDE AND ALBUTEROL SULFATE 2.5; .5 MG/3ML; MG/3ML
SOLUTION RESPIRATORY (INHALATION)
Status: COMPLETED
Start: 2022-07-18 | End: 2022-07-18

## 2022-07-18 RX ORDER — INSULIN ASPART 100 [IU]/ML
0-5 INJECTION, SOLUTION INTRAVENOUS; SUBCUTANEOUS
Status: DISCONTINUED | OUTPATIENT
Start: 2022-07-18 | End: 2022-07-21 | Stop reason: HOSPADM

## 2022-07-18 RX ORDER — SODIUM CHLORIDE 9 MG/ML
INJECTION, SOLUTION INTRAVENOUS ONCE
Status: DISCONTINUED | OUTPATIENT
Start: 2022-07-18 | End: 2022-07-21 | Stop reason: HOSPADM

## 2022-07-18 RX ORDER — CALCIUM CHLORIDE INJECTION 100 MG/ML
1 INJECTION, SOLUTION INTRAVENOUS ONCE
Status: COMPLETED | OUTPATIENT
Start: 2022-07-18 | End: 2022-07-18

## 2022-07-18 RX ORDER — TALC
6 POWDER (GRAM) TOPICAL NIGHTLY PRN
Status: DISCONTINUED | OUTPATIENT
Start: 2022-07-18 | End: 2022-07-21 | Stop reason: HOSPADM

## 2022-07-18 RX ORDER — DOPAMINE HYDROCHLORIDE 160 MG/100ML
0-20 INJECTION, SOLUTION INTRAVENOUS CONTINUOUS
Status: DISCONTINUED | OUTPATIENT
Start: 2022-07-18 | End: 2022-07-18

## 2022-07-18 RX ORDER — IBUPROFEN 200 MG
16 TABLET ORAL
Status: DISCONTINUED | OUTPATIENT
Start: 2022-07-18 | End: 2022-07-21 | Stop reason: HOSPADM

## 2022-07-18 RX ORDER — CALCIUM GLUCONATE 98 MG/ML
1 INJECTION, SOLUTION INTRAVENOUS
Status: COMPLETED | OUTPATIENT
Start: 2022-07-18 | End: 2022-07-18

## 2022-07-18 RX ORDER — DIAZEPAM 10 MG/2ML
5 INJECTION INTRAMUSCULAR
Status: COMPLETED | OUTPATIENT
Start: 2022-07-18 | End: 2022-07-18

## 2022-07-18 RX ORDER — SODIUM CHLORIDE 0.9 % (FLUSH) 0.9 %
10 SYRINGE (ML) INJECTION EVERY 8 HOURS
Status: DISCONTINUED | OUTPATIENT
Start: 2022-07-18 | End: 2022-07-21 | Stop reason: HOSPADM

## 2022-07-18 RX ORDER — NALOXONE HCL 0.4 MG/ML
0.02 VIAL (ML) INJECTION
Status: DISCONTINUED | OUTPATIENT
Start: 2022-07-18 | End: 2022-07-21 | Stop reason: HOSPADM

## 2022-07-18 RX ORDER — GLUCAGON 1 MG
1 KIT INJECTION
Status: DISCONTINUED | OUTPATIENT
Start: 2022-07-18 | End: 2022-07-21 | Stop reason: HOSPADM

## 2022-07-18 RX ORDER — MUPIROCIN 20 MG/G
OINTMENT TOPICAL 2 TIMES DAILY
Status: DISCONTINUED | OUTPATIENT
Start: 2022-07-18 | End: 2022-07-21 | Stop reason: HOSPADM

## 2022-07-18 RX ORDER — INDOMETHACIN 25 MG/1
50 CAPSULE ORAL
Status: COMPLETED | OUTPATIENT
Start: 2022-07-18 | End: 2022-07-18

## 2022-07-18 RX ORDER — LORAZEPAM 2 MG/ML
2 INJECTION INTRAMUSCULAR EVERY 6 HOURS PRN
Status: DISCONTINUED | OUTPATIENT
Start: 2022-07-18 | End: 2022-07-21 | Stop reason: HOSPADM

## 2022-07-18 RX ORDER — HEPARIN SODIUM 1000 [USP'U]/ML
3200 INJECTION, SOLUTION INTRAVENOUS; SUBCUTANEOUS
Status: DISCONTINUED | OUTPATIENT
Start: 2022-07-18 | End: 2022-07-21 | Stop reason: HOSPADM

## 2022-07-18 RX ORDER — FLUTICASONE PROPIONATE 50 MCG
1 SPRAY, SUSPENSION (ML) NASAL DAILY
Status: DISCONTINUED | OUTPATIENT
Start: 2022-07-18 | End: 2022-07-21 | Stop reason: HOSPADM

## 2022-07-18 RX ORDER — OLANZAPINE 10 MG/2ML
10 INJECTION, POWDER, FOR SOLUTION INTRAMUSCULAR ONCE AS NEEDED
Status: COMPLETED | OUTPATIENT
Start: 2022-07-18 | End: 2022-07-18

## 2022-07-18 RX ORDER — HYDRALAZINE HYDROCHLORIDE 25 MG/1
25 TABLET, FILM COATED ORAL 3 TIMES DAILY
Status: DISCONTINUED | OUTPATIENT
Start: 2022-07-18 | End: 2022-07-19

## 2022-07-18 RX ORDER — HEPARIN SODIUM 5000 [USP'U]/ML
5000 INJECTION, SOLUTION INTRAVENOUS; SUBCUTANEOUS
Status: DISCONTINUED | OUTPATIENT
Start: 2022-07-18 | End: 2022-07-18

## 2022-07-18 RX ORDER — POLYETHYLENE GLYCOL 3350 17 G/17G
17 POWDER, FOR SOLUTION ORAL DAILY
Status: DISCONTINUED | OUTPATIENT
Start: 2022-07-18 | End: 2022-07-21 | Stop reason: HOSPADM

## 2022-07-18 RX ORDER — DEXMEDETOMIDINE HYDROCHLORIDE 4 UG/ML
0-1.4 INJECTION, SOLUTION INTRAVENOUS CONTINUOUS
Status: DISCONTINUED | OUTPATIENT
Start: 2022-07-18 | End: 2022-07-19

## 2022-07-18 RX ORDER — HEPARIN SODIUM 1000 [USP'U]/ML
3200 INJECTION, SOLUTION INTRAVENOUS; SUBCUTANEOUS
Status: DISCONTINUED | OUTPATIENT
Start: 2022-07-18 | End: 2022-07-18

## 2022-07-18 RX ORDER — AMLODIPINE BESYLATE 5 MG/1
10 TABLET ORAL NIGHTLY
Status: DISCONTINUED | OUTPATIENT
Start: 2022-07-18 | End: 2022-07-21 | Stop reason: HOSPADM

## 2022-07-18 RX ORDER — ATROPINE SULFATE 0.1 MG/ML
1 INJECTION INTRAVENOUS ONCE
Status: COMPLETED | OUTPATIENT
Start: 2022-07-18 | End: 2022-07-18

## 2022-07-18 RX ORDER — ALBUTEROL SULFATE 2.5 MG/.5ML
15 SOLUTION RESPIRATORY (INHALATION)
Status: COMPLETED | OUTPATIENT
Start: 2022-07-18 | End: 2022-07-18

## 2022-07-18 RX ORDER — IPRATROPIUM BROMIDE AND ALBUTEROL SULFATE 2.5; .5 MG/3ML; MG/3ML
3 SOLUTION RESPIRATORY (INHALATION) 4 TIMES DAILY PRN
Status: DISCONTINUED | OUTPATIENT
Start: 2022-07-18 | End: 2022-07-21 | Stop reason: HOSPADM

## 2022-07-18 RX ORDER — DEXTROSE 50 % IN WATER (D50W) INTRAVENOUS SYRINGE
25
Status: COMPLETED | OUTPATIENT
Start: 2022-07-18 | End: 2022-07-18

## 2022-07-18 RX ORDER — INDOMETHACIN 25 MG/1
50 CAPSULE ORAL ONCE
Status: COMPLETED | OUTPATIENT
Start: 2022-07-18 | End: 2022-07-18

## 2022-07-18 RX ORDER — IBUPROFEN 200 MG
24 TABLET ORAL
Status: DISCONTINUED | OUTPATIENT
Start: 2022-07-18 | End: 2022-07-21 | Stop reason: HOSPADM

## 2022-07-18 RX ORDER — ACETAMINOPHEN 325 MG/1
650 TABLET ORAL EVERY 4 HOURS PRN
Status: DISCONTINUED | OUTPATIENT
Start: 2022-07-18 | End: 2022-07-19

## 2022-07-18 RX ORDER — LORAZEPAM 2 MG/ML
INJECTION INTRAMUSCULAR
Status: COMPLETED
Start: 2022-07-18 | End: 2022-07-18

## 2022-07-18 RX ORDER — SENNOSIDES 8.6 MG/1
1 TABLET ORAL 2 TIMES DAILY
Status: DISCONTINUED | OUTPATIENT
Start: 2022-07-18 | End: 2022-07-21 | Stop reason: HOSPADM

## 2022-07-18 RX ORDER — ATORVASTATIN CALCIUM 40 MG/1
40 TABLET, FILM COATED ORAL NIGHTLY
Status: DISCONTINUED | OUTPATIENT
Start: 2022-07-18 | End: 2022-07-21 | Stop reason: HOSPADM

## 2022-07-18 RX ORDER — SODIUM CHLORIDE 9 MG/ML
INJECTION, SOLUTION INTRAVENOUS CONTINUOUS
Status: DISCONTINUED | OUTPATIENT
Start: 2022-07-18 | End: 2022-07-21 | Stop reason: HOSPADM

## 2022-07-18 RX ORDER — SODIUM CHLORIDE 9 MG/ML
INJECTION, SOLUTION INTRAVENOUS
Status: DISCONTINUED | OUTPATIENT
Start: 2022-07-18 | End: 2022-07-21 | Stop reason: HOSPADM

## 2022-07-18 RX ORDER — ACETAMINOPHEN 325 MG/1
650 TABLET ORAL EVERY 8 HOURS PRN
Status: DISCONTINUED | OUTPATIENT
Start: 2022-07-18 | End: 2022-07-18

## 2022-07-18 RX ADMIN — SODIUM CHLORIDE: 0.9 INJECTION, SOLUTION INTRAVENOUS at 01:07

## 2022-07-18 RX ADMIN — Medication 10 ML: at 03:07

## 2022-07-18 RX ADMIN — INSULIN HUMAN 4 UNITS: 100 INJECTION, SOLUTION PARENTERAL at 04:07

## 2022-07-18 RX ADMIN — ATROPINE SULFATE 1 MG: 0.1 INJECTION, SOLUTION INTRAVENOUS at 02:07

## 2022-07-18 RX ADMIN — SODIUM CHLORIDE: 0.9 INJECTION, SOLUTION INTRAVENOUS at 11:07

## 2022-07-18 RX ADMIN — CALCIUM GLUCONATE 1 G: 98 INJECTION, SOLUTION INTRAVENOUS at 04:07

## 2022-07-18 RX ADMIN — SODIUM ZIRCONIUM CYCLOSILICATE 10 G: 10 POWDER, FOR SUSPENSION ORAL at 03:07

## 2022-07-18 RX ADMIN — DEXTROSE MONOHYDRATE 25 G: 25 INJECTION, SOLUTION INTRAVENOUS at 04:07

## 2022-07-18 RX ADMIN — LORAZEPAM 2 MG: 2 INJECTION INTRAMUSCULAR; INTRAVENOUS at 10:07

## 2022-07-18 RX ADMIN — SODIUM ZIRCONIUM CYCLOSILICATE 10 G: 10 POWDER, FOR SUSPENSION ORAL at 05:07

## 2022-07-18 RX ADMIN — ATROPINE SULFATE 1 MG: 0.1 INJECTION, SOLUTION INTRAVENOUS at 03:07

## 2022-07-18 RX ADMIN — ALBUTEROL SULFATE 15 MG: 2.5 SOLUTION RESPIRATORY (INHALATION) at 05:07

## 2022-07-18 RX ADMIN — IPRATROPIUM BROMIDE AND ALBUTEROL SULFATE 3 ML: .5; 3 SOLUTION RESPIRATORY (INHALATION) at 03:07

## 2022-07-18 RX ADMIN — MUPIROCIN: 20 OINTMENT TOPICAL at 09:07

## 2022-07-18 RX ADMIN — DIAZEPAM 5 MG: 5 INJECTION, SOLUTION INTRAMUSCULAR; INTRAVENOUS at 10:07

## 2022-07-18 RX ADMIN — SODIUM BICARBONATE 50 MEQ: 84 INJECTION, SOLUTION INTRAVENOUS at 05:07

## 2022-07-18 RX ADMIN — LORAZEPAM 2 MG: 2 INJECTION INTRAMUSCULAR; INTRAVENOUS at 04:07

## 2022-07-18 RX ADMIN — IPRATROPIUM BROMIDE AND ALBUTEROL SULFATE 3 ML: 2.5; .5 SOLUTION RESPIRATORY (INHALATION) at 09:07

## 2022-07-18 RX ADMIN — IPRATROPIUM BROMIDE AND ALBUTEROL SULFATE 3 ML: 2.5; .5 SOLUTION RESPIRATORY (INHALATION) at 04:07

## 2022-07-18 RX ADMIN — Medication 10 ML: at 10:07

## 2022-07-18 RX ADMIN — DOPAMINE HYDROCHLORIDE IN DEXTROSE 5 MCG/KG/MIN: 1.6 INJECTION, SOLUTION INTRAVENOUS at 04:07

## 2022-07-18 RX ADMIN — SODIUM BICARBONATE 50 MEQ: 84 INJECTION, SOLUTION INTRAVENOUS at 11:07

## 2022-07-18 RX ADMIN — FLUTICASONE PROPIONATE 50 MCG: 50 SPRAY, METERED NASAL at 01:07

## 2022-07-18 RX ADMIN — HEPARIN SODIUM 3200 UNITS: 1000 INJECTION INTRAVENOUS; SUBCUTANEOUS at 08:07

## 2022-07-18 RX ADMIN — DEXMEDETOMIDINE HYDROCHLORIDE 0.2 MCG/KG/HR: 4 INJECTION, SOLUTION INTRAVENOUS at 09:07

## 2022-07-18 RX ADMIN — HYDRALAZINE HYDROCHLORIDE 25 MG: 25 TABLET, FILM COATED ORAL at 03:07

## 2022-07-18 RX ADMIN — OLANZAPINE 10 MG: 10 INJECTION, POWDER, LYOPHILIZED, FOR SOLUTION INTRAMUSCULAR at 03:07

## 2022-07-18 RX ADMIN — CALCIUM CHLORIDE 1 G: 100 INJECTION INTRAVENOUS; INTRAVENTRICULAR at 11:07

## 2022-07-18 NOTE — ASSESSMENT & PLAN NOTE
K+ 7.6 with creat 5  ?etiology  Neph on board  Suggest repeat K+ measurement and continued rx of hyperkalemia  No immediate need for temp wire  Agree with dopamine  ?needs HD  Check echo

## 2022-07-18 NOTE — ED NOTES
Noticed patient is showing signs of AMS. Although patient is alert, he is periodically confused, asking inappropriate questions, and restless when awake and pulling at monitor leads as well as plucking at his clothing. Easily redirected and does eventually settle.

## 2022-07-18 NOTE — HPI
66-year-old male with a PMHx of hypertension, hyperlipidemia, DM, presents to the ED with dizziness. Patient reports persistent dizziness with associated diplopia, onset 1h ago. He also notes a posterior headache. Patient was found to be bradycardic upon ED arrival. He reports hx of stroke in 10/2021, but denies any new numbness or weakness today. Endorses compliance with all his daily medications. Denies chest pain. No recent falls or injuries. This is the extent of the patient's complaints today in the Emergency Department.     Cardiology consulted for bradycardia.    Follows with Dr. Pete, last seen 4/2022.    The patient is currently altered, but does not appear to be in acute distress.  On examination he is well perfused.  Initial heart rate in the 30s has come up to the 50s with dopamine infusion.  He is noted to have acute kidney injury with a creatinine of 5 and a potassium of greater than 7.  EKG is consistent with hyperkalemia noting peak T-waves when compared with his prior electrocardiogram.

## 2022-07-18 NOTE — SUBJECTIVE & OBJECTIVE
Past Medical History:   Diagnosis Date    Anxiety     Cataract, bilateral 09/11/2018    Degenerative disc disease     Depression     Diabetes mellitus type II, controlled     ETOH abuse     Eye injury as a child    stick hit eye ? 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 both eyes 10/08/2019    Personal history of colonic polyps        Past Surgical History:   Procedure Laterality Date    APPENDECTOMY      COLONOSCOPY N/A 5/10/2017    Procedure: COLONOSCOPY;  Surgeon: Shantanu Marley MD;  Location: [facility];  Service: Endoscopy;  Laterality: N/A;    POSTERIOR FUSION OF CERVICAL SPINE WITH LAMINECTOMY N/A 10/3/2021    Procedure: LAMINECTOMY, SPINE, CERVICAL, WITH POSTERIOR FUSION C2-T2;  Surgeon: Ana Rosa Geller MD;  Location: [facility] OR [address] [place] [address];  Service: Neurosurgery;  Laterality: N/A;       Review of patient's allergies indicates:  No Known Allergies    No current facility-administered medications on file prior to encounter.     Current Outpatient Medications on File Prior to Encounter   Medication Sig    ALPRAZolam (XANAX) 1 MG tablet TAKE ONE TABLET BY MOUTH TWICE DAILY AS NEEDED FOR ANXIETY    amLODIPine (NORVASC) 10 MG tablet Take 1 tablet (10 mg total) by mouth every evening.    atorvastatin (LIPITOR) 40 MG tablet Take 1 tablet (40 mg total) by mouth every evening.    busPIRone (BUSPAR) 30 MG Tab Take 1 tablet (30 mg total) by mouth 2 (two) times daily.    citalopram (CELEXA) 40 MG tablet TAKE ONE TABLET BY MOUTH ONCE A DAY    cloNIDine 0.2 mg/24 hr td ptwk (CATAPRES) 0.2 mg/24 hr Place 1 patch onto the skin every 7 days.    fluticasone (FLONASE) 50 mcg/actuation nasal spray INHALE 1 SPRAY IN EACH NOSTRIL EVERY DAY    fluticasone-salmeterol diskus inhaler 100-50 mcg Inhale 1 puff into the lungs.    gabapentin (NEURONTIN) 600 MG tablet Take 600 mg by mouth 3 (three) times  "daily.    hydrALAZINE (APRESOLINE) 25 MG tablet Take 1 tablet (25 mg total) by mouth 3 (three) times daily.    inhalation spacing device Use as directed for inhalation.    lisinopriL (PRINIVIL,ZESTRIL) 40 MG tablet Take 1 tablet (40 mg total) by mouth once daily.    metFORMIN (GLUCOPHAGE) 500 MG tablet Take 1 tablet (500 mg total) by mouth 2 (two) times daily with meals.    naloxone (NARCAN) 4 mg/actuation Spry 4mg by nasal route as needed for opioid overdose; may repeat every 2-3 minutes in alternating nostrils until medical help arrives. Call 911    needle, disp, 22 G 22 gauge x 1" Ndle Testosterone injection every 2 weeks    nicotine (NICODERM CQ) 14 mg/24 hr PLACE ONE PATCH ONTO SKIN ONCE A DAY    nicotine (NICODERM CQ) 21 mg/24 hr Place 1 patch onto the skin once daily.    oxyCODONE-acetaminophen (PERCOCET)  mg per tablet Take 1 tablet by mouth every 6 (six) hours as needed.    POLYETHYLENE GLYCOL 3350 (MIRALAX ORAL) Take 1 application by mouth.    PROAIR HFA 90 mcg/actuation inhaler INHALE TWO PUFFS BY MOUTH EVERY 6 HOURS AS NEEDED FOR WHEEZING (rescue)    senna (SENOKOT) 8.6 mg tablet Take 1 tablet by mouth 2 (two) times a day.    syringe, disposable, (BD LUER-STARLA SYRINGE) 1 mL Syrg Testosterone injection every 2 weeks    testosterone cypionate (DEPOTESTOTERONE CYPIONATE) 200 mg/mL injection Inject 0.75 mLs (150 mg total) into the muscle every 14 (fourteen) days.    tiZANidine (ZANAFLEX) 4 MG tablet Take 4-8 mg by mouth nightly as needed.    verapamiL (CALAN-SR) 180 MG CR tablet Take 1 tablet (180 mg total) by mouth 2 (two) times daily.     Family History       Problem Relation (Age of Onset)    Alcohol abuse Father    Diabetes Son    Heart disease Mother, Father    No Known Problems Sister, Brother, Maternal Aunt, Maternal Uncle, Paternal Aunt, Paternal Uncle, Maternal Grandmother, Maternal Grandfather, Paternal Grandmother, Paternal Grandfather          Tobacco Use    Smoking status: Former Smoker     " Packs/day: 0.50     Years: 32.00     Pack years: 16.00     Types: Cigarettes    Smokeless tobacco: Current User    Tobacco comment: weed    Substance and Sexual Activity    Alcohol use: No     Alcohol/week: 0.0 standard drinks    Drug use: No    Sexual activity: Yes     Partners: Female     Comment:  with children, disabled      Review of Systems   Unable to perform ROS: Mental status change   Objective:     Vital Signs (Most Recent):  Temp: 97.7 °F (36.5 °C) (07/18/22 0221)  Pulse: (!) 51 (07/18/22 0737)  Resp: (!) 29 (07/18/22 0737)  BP: (!) 146/60 (07/18/22 0737)  SpO2: (!) 73 % (07/18/22 0737)   Vital Signs (24h Range):  Temp:  [97.7 °F (36.5 °C)] 97.7 °F (36.5 °C)  Pulse:  [32-56] 51  Resp:  [16-32] 29  SpO2:  [73 %-96 %] 73 %  BP: (109-163)/(55-70) 146/60     Weight: 96.6 kg (213 lb)  Body mass index is 29.71 kg/m².    Physical Exam  Vitals and nursing note reviewed.   Constitutional:       General: He is not in acute distress.     Appearance: He is ill-appearing. He is not toxic-appearing.      Comments: Constantly moving,    HENT:      Head: Normocephalic and atraumatic.      Nose: Nose normal.      Mouth/Throat:      Mouth: Mucous membranes are dry.   Cardiovascular:      Rate and Rhythm: Bradycardia present. Rhythm irregular.      Comments: On dopamine  Pulmonary:      Effort: Pulmonary effort is normal. No respiratory distress.      Breath sounds: No wheezing or rales.      Comments: O2 by NC  Abdominal:      General: There is distension.      Tenderness: There is no abdominal tenderness. There is no guarding.   Musculoskeletal:      Right lower leg: Edema present.      Left lower leg: Edema present.   Skin:     General: Skin is warm and dry.   Neurological:      Mental Status: He is alert.      Comments: Oriented to person, location, date. However, he is not appropriate, restless, discussing figs and persimmons, eating coban wrap calling it a fig, pulling at lines and BP cuff            Significant Labs: All pertinent labs within the past 24 hours have been reviewed.    Significant Imaging: I have reviewed all pertinent imaging results/findings within the past 24 hours.

## 2022-07-18 NOTE — ED PROVIDER NOTES
"Encounter Date: 7/18/2022    SCRIBE #1 NOTE: I, Meli Kacy, am scribing for, and in the presence of, Temo Christianson MD.       History     Chief Complaint   Patient presents with    Dizziness     Pt BIB EMS. Pt c/o new onset bradycardia and dizziness with diplopia x1 hour. 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 Pt denies any previous incidents of current symptoms and advised symptoms not present during physician assessment. Pt denies Cx pain, SOB, weakness, or NVD.     66-year-old male with a PMHx of hypertension, hyperlipidemia, DM, presents to the ED with dizziness. Patient reports persistent dizziness with associated diplopia, onset 1h ago. He also notes a posterior headache. Patient was found to be bradycardic upon ED arrival. He reports hx of stroke in 10/2021, but denies any new numbness or weakness today. Endorses compliance with all his daily medications. Denies chest pain. No recent falls or injuries. This is the extent of the patient's complaints today in the Emergency Department.     The history is provided by the patient.     Review of patient's allergies indicates:  No Known Allergies  Past Medical History:   Diagnosis Date    Anxiety     Cataract, bilateral 09/11/2018    Degenerative disc disease     Depression     Diabetes mellitus type II, controlled     ETOH abuse     Eye injury as a child    stick hit eye ? 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 both eyes 10/08/2019    Personal history of colonic polyps      Past Surgical History:   Procedure Laterality Date    APPENDECTOMY      COLONOSCOPY N/A 5/10/2017    Procedure: COLONOSCOPY;  Surgeon: Shantanu Marley MD;  Location: [facility];  Service: Endoscopy;  Laterality: N/A;    POSTERIOR FUSION OF CERVICAL SPINE WITH LAMINECTOMY N/A " 10/3/2021    Procedure: LAMINECTOMY, SPINE, CERVICAL, WITH POSTERIOR FUSION C2-T2;  Surgeon: Ana Rosa Geller MD;  Location: [facility] OR [address] [place] [address];  Service: Neurosurgery;  Laterality: N/A;     Family History   Problem Relation Age of Onset    Heart disease Mother     Heart disease Father     Alcohol abuse Father     Diabetes Son     No Known Problems Sister     No Known Problems Brother     No Known Problems Maternal Aunt     No Known Problems Maternal Uncle     No Known Problems Paternal Aunt     No Known Problems Paternal Uncle     No Known Problems Maternal Grandmother     No Known Problems Maternal Grandfather     No Known Problems Paternal Grandmother     No Known Problems Paternal Grandfather     Amblyopia Neg Hx     Blindness Neg Hx     Cancer Neg Hx     Cataracts Neg Hx     Glaucoma Neg Hx     Hypertension Neg Hx     Macular degeneration Neg Hx     Retinal detachment Neg Hx     Strabismus Neg Hx     Stroke Neg Hx     Thyroid disease Neg Hx      Social History     Tobacco Use    Smoking status: Former Smoker     Packs/day: 0.50     Years: 32.00     Pack years: 16.00     Types: Cigarettes    Smokeless tobacco: Current User    Tobacco comment: weed    Substance Use Topics    Alcohol use: No     Alcohol/week: 0.0 standard drinks    Drug use: No     Review of Systems   Constitutional: Negative for chills and fever.   HENT: Negative for congestion, rhinorrhea and sore throat.    Eyes: Positive for visual disturbance.   Respiratory: Negative for cough and shortness of breath.    Cardiovascular: Negative for chest pain.   Gastrointestinal: Negative for abdominal pain, diarrhea, nausea and vomiting.   Genitourinary: Negative for dysuria, frequency and hematuria.   Musculoskeletal: Negative for back pain.   Skin: Negative for rash.   Neurological: Positive for dizziness and headaches. Negative for weakness and numbness.       Physical Exam     Initial Vitals [07/18/22 0221]   BP Pulse Resp Temp  SpO2   (!) 126/55 (!) 34 16 97.7 °F (36.5 °C) 96 %      MAP       --         Physical Exam    Nursing note and vitals reviewed.  Constitutional: He is not diaphoretic. No distress.   HENT:   Head: Normocephalic and atraumatic.   Mouth/Throat: Oropharynx is clear and moist.   Eyes: Conjunctivae and EOM are normal. No scleral icterus.   Neck: Neck supple. No JVD present.   Normal range of motion.  Cardiovascular: Regular rhythm and intact distal pulses.   Bradycardic.   Pulmonary/Chest: Breath sounds normal. No stridor. No respiratory distress.   Abdominal: Abdomen is soft. Bowel sounds are normal. He exhibits no distension. There is no abdominal tenderness.   Musculoskeletal:         General: No tenderness or edema. Normal range of motion.      Cervical back: Normal range of motion and neck supple.     Neurological: He is alert and oriented to person, place, and time. He has normal strength. No cranial nerve deficit or sensory deficit. GCS score is 15. GCS eye subscore is 4. GCS verbal subscore is 5. GCS motor subscore is 6.   Skin: Skin is warm and dry. No rash noted.   Psychiatric: He has a normal mood and affect.         ED Course   Procedures  Labs Reviewed   CBC W/ AUTO DIFFERENTIAL - Abnormal; Notable for the following components:       Result Value    WBC 18.75 (*)     RBC 4.12 (*)     Hemoglobin 13.8 (*)      (*)     MCH 33.5 (*)     Gran # (ANC) 16.9 (*)     Immature Grans (Abs) 0.10 (*)     Gran % 89.9 (*)     Lymph % 5.9 (*)     Mono % 3.5 (*)     All other components within normal limits   COMPREHENSIVE METABOLIC PANEL - Abnormal; Notable for the following components:    Sodium 135 (*)     Potassium 7.6 (*)     CO2 19 (*)     Glucose 188 (*)     BUN 62 (*)     Creatinine 5.0 (*)     Total Bilirubin 1.3 (*)     eGFR if  13 (*)     eGFR if non  11 (*)     All other components within normal limits    Narrative:     K. Critical result called and verbal readback obtained  from Elenita Leija @ 0412 on [id number]. BML by BL1 07/18/2022 04:12   B-TYPE NATRIURETIC PEPTIDE - Abnormal; Notable for the following components:     (*)     All other components within normal limits   TSH - Abnormal; Notable for the following components:    TSH 5.291 (*)     All other components within normal limits   URINALYSIS, REFLEX TO URINE CULTURE - Abnormal; Notable for the following components:    Protein, UA 1+ (*)     All other components within normal limits    Narrative:     Specimen Source->Urine   DRUG SCREEN PANEL, URINE EMERGENCY - Abnormal; Notable for the following components:    Benzodiazepines Presumptive Positive (*)     Opiate Scrn, Ur Presumptive Positive (*)     THC Presumptive Positive (*)     All other components within normal limits    Narrative:     Specimen Source->Urine   URINALYSIS MICROSCOPIC - Abnormal; Notable for the following components:    Hyaline Casts, UA 9 (*)     All other components within normal limits    Narrative:     Specimen Source->Urine   PHOSPHORUS - Abnormal; Notable for the following components:    Phosphorus 5.1 (*)     All other components within normal limits   ISTAT PROCEDURE - Abnormal; Notable for the following components:    POC Glucose 173 (*)     POC BUN 63 (*)     POC Creatinine 4.8 (*)     POC Sodium 133 (*)     POC Potassium 7.5 (*)     All other components within normal limits   POCT GLUCOSE - Abnormal; Notable for the following components:    POCT Glucose 145 (*)     All other components within normal limits   ISTAT PROCEDURE - Abnormal; Notable for the following components:    POC PH 7.292 (*)     POC PO2 39 (*)     POC HCO3 20.3 (*)     POC SATURATED O2 68 (*)     POC TCO2 22 (*)     All other components within normal limits   TROPONIN I   ALCOHOL,MEDICAL (ETHANOL)   T4, FREE   LACTIC ACID, PLASMA   CORTISOL, RANDOM   SARS-COV-2 RDRP GENE   POCT GLUCOSE MONITORING CONTINUOUS   ISTAT CHEM8     EKG Readings: (Independently Interpreted)    Initial Reading: No STEMI.   Junctional Bradycardia with a rate of 34 bpm. Normal ST segments. Normal T waves. Normal axis.        Imaging Results          CT Head Without Contrast (Final result)  Result time 07/18/22 06:57:01    Final result by Jonny Nevarez MD (07/18/22 06:57:01)                 Impression:      No acute intracranial findings.      Electronically signed by: Jonny eNvarez  Date:    07/18/2022  Time:    06:57             Narrative:    EXAMINATION:  CT HEAD WITHOUT CONTRAST    CLINICAL HISTORY:  Diplopia;    TECHNIQUE:  Low dose axial images were obtained through the head.  Coronal and sagittal reformations were also performed. Contrast was not administered.    COMPARISON:  MRI of the brain performed 10/01/2021.  CTA of the head and neck performed 10/01/2021.    FINDINGS:  Blood: No acute intracranial hemorrhage.    Parenchyma: No definite loss of gray-white differentiation to suggest acute or subacute transcortical infarct. Generalized pattern of age-related parenchymal volume loss.  Nonspecific areas of white matter hypoattenuation could relate to sequela of chronic small vessel ischemic disease.    Ventricles/Extra-axial spaces: No abnormal extra-axial fluid collection. Basal cisterns are patent.    Vessels: Atherosclerosis    Orbits: Unremarkable.    Scalp: Unremarkable.    Skull: There are no depressed skull fractures or destructive bone lesions.    Sinuses and mastoids: Scattered relatively minor paranasal sinus mucosal thickening.    Other findings: None                               X-Ray Chest AP Portable (Final result)  Result time 07/18/22 04:34:00    Final result by Todd Hernandez MD (07/18/22 04:34:00)                 Impression:      Diminished depth of inspiration and atelectatic change without additional radiographic evidence for superimposed acute intrathoracic process.      Electronically signed by: Todd Hernandez  Date:    07/18/2022  Time:    04:34              Narrative:    EXAMINATION:  XR CHEST AP PORTABLE    CLINICAL HISTORY:  bradycardia;    TECHNIQUE:  Single frontal view of the chest was performed.    COMPARISON:  Chest radiograph October 13, 2021    FINDINGS:  Single portable chest view is submitted.  There is mild diminished depth of inspiration.  When accounting for position and technique and depth of inspiration, the cardiomediastinal silhouette appears stable.  Mild aortic atherosclerotic change noted.    Accentuation of pulmonary bronchovascular markings consistent with mild diminished depth of inspiration noted.  Mild atelectatic change, particularly at the left lung base noted.  There is no additional evidence for confluent infiltrate or consolidation, significant pleural effusion or pneumothorax.    The osseous structures demonstrate chronic change, postoperative change of the cervicothoracic spine noted.                                 Medications   DOPamine 400 mg in dextrose 5 % 250 mL infusion (premix) (5 mcg/kg/min × 96.6 kg Intravenous New Bag 7/18/22 0451)   sodium zirconium cyclosilicate packet 10 g (10 g Oral Given 7/18/22 0549)   atropine injection 1 mg (1 mg Intravenous Given 7/18/22 0256)   atropine injection 1 mg (1 mg Intravenous Given 7/18/22 0308)   calcium gluconate 100 mg/mL (10%) injection 1 g (1 g Intravenous Given 7/18/22 0408)   insulin regular injection 4 Units 0.04 mL (4 Units Intravenous Given 7/18/22 0407)   dextrose 50 % in water (D50W) injection 25 g (25 g Intravenous Given 7/18/22 0406)   albuterol sulfate nebulizer solution 15 mg (15 mg Nebulization Given 7/18/22 0514)   calcium gluconate 100 mg/mL (10%) injection 1 g (1 g Intravenous Given 7/18/22 0440)   sodium bicarbonate solution 50 mEq (50 mEq Intravenous Given 7/18/22 0549)     Medical Decision Making:   History:   Old Medical Records: I decided to obtain old medical records.  Old Records Summarized: other records.       <> Summary of Records: Patient had an ED visit at  another facility in 11/2021 for dizziness and symptomatic bradycardia. His HR was in the 40's during this visit. Had no missed medications at the time.   Differential Diagnosis:   Differential diagnosis include but are not limited to: CVA, bradycardia, electrolyte abnormality, ACS, medication side effect, thyroid dysfunction.  Independently Interpreted Test(s):   I have ordered and independently interpreted EKG Reading(s) - see prior notes  Clinical Tests:   Lab Tests: Ordered and Reviewed  Radiological Study: Ordered and Reviewed  Medical Tests: Ordered and Reviewed  ED Management:  Patient is significantly bradycardic at triage.  He is mentating well.  He is not hypotensive.  He has no obvious focal neurological deficits.  I-STAT Chem 8 with significantly elevated potassium at 7.6.  Patient given calcium gluconate, insulin, dextrose, albuterol.  Labs with leukocytosis and granulocytosis.  Chest x-ray does not have pneumonia.  Urinalysis is not indicative of UTI.  Patient has a nontender abdominal exam.  Troponin is within normal ranges.  BNP is mildly elevated.  TSH is elevated however free T4 is within normal ranges.  Patient given atropine in the emergency department without improvement in heart rate.  Patient started on dopamine infusion with some improvement in heart rate.    Consult: I have spoken with Dr. Petersen from the nephrology service regarding the patient's presentation and study results.  At this time, the recommendation is blood gas to determine PH, bicarb push if acidotic, lokelma.    Consult: I have spoken with Dr. Anderson from the Cardiology service regarding the patient's presentation and study results.  At this time, the recommendation is dopamine infusion, admission to ICU, cardiology will follow-up.    Patient to be admitted to ICU for further management.    Please put in 90 minutes of critical care due to patient having a high risk of circulatory failure.   Separate from teaching and  exclusive of procedure and ekg time  Includes:  Time at bedside  Time reviewing test results  Time discussing case with staff  Time documenting the medical record  Time spent with family members  Time spent with consults  Management       This chart was completed using dictation software, as a result there may be some transcription errors.           Scribe Attestation:   Scribe #1: I performed the above scribed service and the documentation accurately describes the services I performed. I attest to the accuracy of the note.                 Clinical Impression:   Final diagnoses:  [R00.1] Bradycardia  [E87.5] Hyperkalemia (Primary)          ED Disposition Condition    Admit        I, Temo Christianson , personally performed the services described in this documentation. All medical record entries made by the scribe were at my direction and in my presence. I have reviewed the chart and agree that the record reflects my personal performance and is accurate and complete.          Temo Christianson MD  07/18/22 0728

## 2022-07-18 NOTE — ED NOTES
Dialysis called in regards to pt vascular access. Dialysis nurse made aware that pt does not have a dialysis line and that the provider will be notified. MD Jhon sent a message over secure chat notifying of no vasculcar access despite hemodialysis orders. Primary nurse ELY Bustamante made aware face to face in department and verbalized understanding.

## 2022-07-18 NOTE — H&P
"[facility] Medicine  History & Physical    Patient Name: Butch Mcdaniel  MRN: [id number]  Patient Class: IP- Inpatient  Admission Date: 7/18/2022  Attending Physician: Ami Chen MD   Primary Care Provider: Gabriel Evans MD         Patient information was obtained from patient, past medical records and ER records.     Subjective:     Principal Problem:Hyperkalemia    Chief Complaint:   Chief Complaint   Patient presents with    Dizziness     Pt BIB EMS. Pt c/o new onset bradycardia and dizziness with diplopia x1 hour. 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 Pt denies any previous incidents of current symptoms and advised symptoms not present during physician assessment. Pt denies Cx pain, SOB, weakness, or NVD.        HPI: Mr Butch Mcdaniel is a 66 y.o. man who presents with dizziness. He is currently altered and minimally able to give history. He says that for the last 3-4 days he has felt poorly, more fatigued than usual. He says this started after he received a shot. He then veers off, talking about fig trees. He is chewing on a piece of green coban wrap that he says is a fig. He is restless in bed, pulling at IV lines and BP cuff.     Called his wife Deann for additional history. No answer; left message.     In ED, he was noted to have bradycardia with junctional rhythm. He was also noted to have hyperkalemia with SHAUNA. He was shifted and started on dopamine. He was admitted to ICU with Cardiology and Nephrology consults.     Upon review of chart, he takes benzodiazepines, opioids, gabapentin, tizanidine, and buspar at home.       Past Medical History:   Diagnosis Date    Anxiety     Cataract, bilateral 09/11/2018    Degenerative disc disease     Depression     Diabetes mellitus type II, controlled     ETOH abuse     Eye injury as a child    stick hit eye ? eye, hit in ou due to boxing "    Hyperlipidemia     Hypertension     MRSA (methicillin resistant Staphylococcus aureus)     Open angle with borderline findings and high glaucoma risk in both eyes 10/08/2019    Personal history of colonic polyps        Past Surgical History:   Procedure Laterality Date    APPENDECTOMY      COLONOSCOPY N/A 5/10/2017    Procedure: COLONOSCOPY;  Surgeon: Shantanu Marley MD;  Location: [facility];  Service: Endoscopy;  Laterality: N/A;    POSTERIOR FUSION OF CERVICAL SPINE WITH LAMINECTOMY N/A 10/3/2021    Procedure: LAMINECTOMY, SPINE, CERVICAL, WITH POSTERIOR FUSION C2-T2;  Surgeon: Ana Rosa Geller MD;  Location: [address];  Service: Neurosurgery;  Laterality: N/A;       Review of patient's allergies indicates:  No Known Allergies    No current facility-administered medications on file prior to encounter.     Current Outpatient Medications on File Prior to Encounter   Medication Sig    ALPRAZolam (XANAX) 1 MG tablet TAKE ONE TABLET BY MOUTH TWICE DAILY AS NEEDED FOR ANXIETY    amLODIPine (NORVASC) 10 MG tablet Take 1 tablet (10 mg total) by mouth every evening.    atorvastatin (LIPITOR) 40 MG tablet Take 1 tablet (40 mg total) by mouth every evening.    busPIRone (BUSPAR) 30 MG Tab Take 1 tablet (30 mg total) by mouth 2 (two) times daily.    citalopram (CELEXA) 40 MG tablet TAKE ONE TABLET BY MOUTH ONCE A DAY    cloNIDine 0.2 mg/24 hr td ptwk (CATAPRES) 0.2 mg/24 hr Place 1 patch onto the skin every 7 days.    fluticasone (FLONASE) 50 mcg/actuation nasal spray INHALE 1 SPRAY IN EACH NOSTRIL EVERY DAY    fluticasone-salmeterol diskus inhaler 100-50 mcg Inhale 1 puff into the lungs.    gabapentin (NEURONTIN) 600 MG tablet Take 600 mg by mouth 3 (three) times daily.    hydrALAZINE (APRESOLINE) 25 MG tablet Take 1 tablet (25 mg total) by mouth 3 (three) times daily.    inhalation spacing device Use as directed for inhalation.    lisinopriL (PRINIVIL,ZESTRIL) 40 MG tablet Take 1 tablet (40 mg total)  "by mouth once daily.    metFORMIN (GLUCOPHAGE) 500 MG tablet Take 1 tablet (500 mg total) by mouth 2 (two) times daily with meals.    naloxone (NARCAN) 4 mg/actuation Spry 4mg by nasal route as needed for opioid overdose; may repeat every 2-3 minutes in alternating nostrils until medical help arrives. Call 911    needle, disp, 22 G 22 gauge x 1" Ndle Testosterone injection every 2 weeks    nicotine (NICODERM CQ) 14 mg/24 hr PLACE ONE PATCH ONTO SKIN ONCE A DAY    nicotine (NICODERM CQ) 21 mg/24 hr Place 1 patch onto the skin once daily.    oxyCODONE-acetaminophen (PERCOCET)  mg per tablet Take 1 tablet by mouth every 6 (six) hours as needed.    POLYETHYLENE GLYCOL 3350 (MIRALAX ORAL) Take 1 application by mouth.    PROAIR HFA 90 mcg/actuation inhaler INHALE TWO PUFFS BY MOUTH EVERY 6 HOURS AS NEEDED FOR WHEEZING (rescue)    senna (SENOKOT) 8.6 mg tablet Take 1 tablet by mouth 2 (two) times a day.    syringe, disposable, (BD LUER-STARLA SYRINGE) 1 mL Syrg Testosterone injection every 2 weeks    testosterone cypionate (DEPOTESTOTERONE CYPIONATE) 200 mg/mL injection Inject 0.75 mLs (150 mg total) into the muscle every 14 (fourteen) days.    tiZANidine (ZANAFLEX) 4 MG tablet Take 4-8 mg by mouth nightly as needed.    verapamiL (CALAN-SR) 180 MG CR tablet Take 1 tablet (180 mg total) by mouth 2 (two) times daily.     Family History       Problem Relation (Age of Onset)    Alcohol abuse Father    Diabetes Son    Heart disease Mother, Father    No Known Problems Sister, Brother, Maternal Aunt, Maternal Uncle, Paternal Aunt, Paternal Uncle, Maternal Grandmother, Maternal Grandfather, Paternal Grandmother, Paternal Grandfather          Tobacco Use    Smoking status: Former Smoker     Packs/day: 0.50     Years: 32.00     Pack years: 16.00     Types: Cigarettes    Smokeless tobacco: Current User    Tobacco comment: weed    Substance and Sexual Activity    Alcohol use: No     Alcohol/week: 0.0 standard " drinks    Drug use: No    Sexual activity: Yes     Partners: Female     Comment:  with children, disabled      Review of Systems   Unable to perform ROS: Mental status change   Objective:     Vital Signs (Most Recent):  Temp: 97.7 °F (36.5 °C) (07/18/22 0221)  Pulse: (!) 51 (07/18/22 0737)  Resp: (!) 29 (07/18/22 0737)  BP: (!) 146/60 (07/18/22 0737)  SpO2: (!) 73 % (07/18/22 0737)   Vital Signs (24h Range):  Temp:  [97.7 °F (36.5 °C)] 97.7 °F (36.5 °C)  Pulse:  [32-56] 51  Resp:  [16-32] 29  SpO2:  [73 %-96 %] 73 %  BP: (109-163)/(55-70) 146/60     Weight: 96.6 kg (213 lb)  Body mass index is 29.71 kg/m².    Physical Exam  Vitals and nursing note reviewed.   Constitutional:       General: He is not in acute distress.     Appearance: He is ill-appearing. He is not toxic-appearing.      Comments: Constantly moving,    HENT:      Head: Normocephalic and atraumatic.      Nose: Nose normal.      Mouth/Throat:      Mouth: Mucous membranes are dry.   Cardiovascular:      Rate and Rhythm: Bradycardia present. Rhythm irregular.      Comments: On dopamine  Pulmonary:      Effort: Pulmonary effort is normal. No respiratory distress.      Breath sounds: No wheezing or rales.      Comments: O2 by NC  Abdominal:      General: There is distension.      Tenderness: There is no abdominal tenderness. There is no guarding.   Musculoskeletal:      Right lower leg: Edema present.      Left lower leg: Edema present.   Skin:     General: Skin is warm and dry.   Neurological:      Mental Status: He is alert.      Comments: Oriented to person, location, date. However, he is not appropriate, restless, discussing figs and persimmons, eating coban wrap calling it a fig, pulling at lines and BP cuff           Significant Labs: All pertinent labs within the past 24 hours have been reviewed.    Significant Imaging: I have reviewed all pertinent imaging results/findings within the past 24 hours.    Assessment/Plan:     *  Hyperkalemia  K 7.6 on admit. Acute, symptomatic, with bradycardia requiring dopamine gtt  - shifted, given bicarb, given calcium, sodium zirc  - repeat BMP pending  - Nephrology consulted- emergent dialysis today   - hold lisinopril      Acute encephalopathy  Oriented to self, location but not appropriate. Not oriented to situation. Most likely due to accumulation of gabapentin in setting of SHAUNA.   - emergent HD today  - CTH no acute changes   - monitor       Chronic prescription benzodiazepine use  Holding all home anxiety medications/mind altering substances in setting of acute encephalopathy       Junctional bradycardia  HR slow, due to hyperkalemia from SHAUNA  - Cardiology consulted  - on dopamine gtt  - TTE pending       SHAUNA (acute kidney injury)  Presented with SHAUNA. Cr on admit 5 from baseline 1.1. Unclear cause. He appears hypovolemic- dry mucous membranes but does have lower extremity edema. He is altered most likely from his prescribed gabapentin that has not cleared due to renal failure. He has persistent hyperkalemia that is not improved with shifting. He has bradycardia as a result of his hyperkalemia.   - Nephrology consulted  - plan emergent dialysis today. Critical care consulted for line placement.   - UA is not consistent with infection and only has 1+ protein  - check CK  - Avoid nephrotoxins, renal dose all meds.   - Monitor         Type 2 diabetes mellitus without complication, without long-term current use of insulin  A1c:   Lab Results   Component Value Date    HGBA1C 6.3 (H) 02/16/2022     Hold metformin   Meds: SSI PRN to maintain goal 140-180  ADA diet, accuchecks, hypoglycemic protocol        Chronic, continuous use of opioids  Holding all home pain medications/mind altering substances in setting of acute encephalopathy     Chronic pain  Holding all home pain medications/mind altering substances in the setting of acute encephalopathy       Anemia  Macrocytic anemia. No signs of bleeding  -  check iron panel, B12, folate       Hyperlipidemia, mixed  Continue statin       Essential hypertension  BP currently elevated  - continue amlodipine, hydralazine  - hold home lisinopril, hold clonidine patch        VTE Risk Mitigation (From admission, onward)         Ordered     IP VTE LOW RISK PATIENT  Once         07/18/22 0800     Place sequential compression device  Until discontinued         07/18/22 0800     Place REY hose  Until discontinued         07/18/22 0800               Critical care time spent on the evaluation and treatment of severe organ dysfunction, review of pertinent labs and imaging studies, discussions with consulting providers and discussions with patient/family: 60 minutes     3:41 PM Called patient's wife Deann to update her. She understands the plan for dialysis.     Ami Chen MD  Department of Hospital Medicine   [facility] [place] - Emergency Dept

## 2022-07-18 NOTE — CARE UPDATE
[facility] Intensive Care  ICU Shift Summary  Date: 7/18/2022      Prehospitalization: Home  Admit Date / LOS : 7/18/2022/ 0 days    Diagnosis: Hyperkalemia    Consults:        Active: Cardio and Nephro       Needed: N/A     Code Status: Full Code   Advanced Directive: <no information>    LDA:  Lines/Drains/Airways     Central Venous Catheter Line  Duration           Trialysis (Dialysis) Catheter 07/18/22 1443 right internal jugular <1 day          Drain  Duration                Urethral Catheter 07/18/22 1145 Latex 16 Fr. <1 day          Peripheral Intravenous Line  Duration                Peripheral IV - Single Lumen 07/18/22 0159 20 G Left;Posterior Hand <1 day         Peripheral IV - Single Lumen 07/18/22 0252 20 G Right Antecubital <1 day              Central Lines/Site/Justification:dialysis  Urinary Cath/Order/Justification:Critically Ill in the ICU and requiring intensive monitoring    Vasopressors/Infusions:    sodium chloride 0.9% 50 mL/hr at 07/18/22 1351    DOPamine 5 mcg/kg/min (07/18/22 0451)          GOALS: Volume/ Hemodynamic: N/A                     RASS: N/A    Pain Management: none       Pain Controlled: not applicable     Rhythm: NSR    Respiratory Device: HFNC                  Most Recent SBT/ SAT: N/A       MOVE Screen: PT Consult  ICU Liberation: not applicable    VTE Prophylaxis: Mechanical  Mobility: Bedrest  Stress Ulcer Prophylaxis: No    Isolation: No active isolations    Dietary:   Current Diet Order   Procedures    Diet [facility]; Consistent Carbohydrate     Order Specific Question:   Indicate patient location for additional diet options:     [facility]     Order Specific Question:   Additional Diet Options:     Answer:   Consistent Carbohydrate      Tolerance: yes  Advancement: @ goal    I & O (24h):    Intake/Output Summary (Last 24 hours) at 7/18/2022 1654  Last data filed at 7/18/2022 1600  Gross per 24 hour   Intake 410.15 ml   Output 200 ml   Net  210.15 ml        Restraints: Yes    Significant Dates:  Post Op Date: N/A  Rescue Date: N/A  Imaging/ Diagnostics: N/A    Noteworthy Labs:  See labs     COVID Test: (--)  CBC/Anemia Labs: Coags:    Recent Labs   Lab 07/18/22 0252 07/18/22  0319 07/18/22  1007   WBC 18.75*  --   --    HGB 13.8*  --   --    HCT 42.3 42  --      --   --    *  --   --    RDW 12.9  --   --    IRON  --   --  64   FERRITIN  --   --  434*    No results for input(s): PT, INR, APTT in the last 168 hours.     Chemistries:   Recent Labs   Lab 07/18/22 0252 07/18/22  0615 07/18/22  1007 07/18/22  1409   *  --  137 137   K 7.6*  --  6.2* 5.9*     --  103 103   CO2 19*  --  24 23   BUN 62*  --  69* 69*   CREATININE 5.0*  --  4.8* 4.5*   CALCIUM 8.9  --  9.4 10.3   PROT 7.7  --   --   --    BILITOT 1.3*  --   --   --    ALKPHOS 63  --   --   --    ALT 38  --   --   --    AST 30  --   --   --    PHOS  --  5.1*  --   --         Cardiac Enzymes: Ejection Fractions:    Recent Labs     07/18/22  0252 07/18/22  1007   CPK  --  129   TROPONINI 0.019  --     EF   Date Value Ref Range Status   07/18/2022 70 % Final        POCT Glucose: HbA1c:    Recent Labs   Lab 07/18/22  0405 07/18/22  0852   POCTGLUCOSE 145* 184*    Hemoglobin A1C   Date Value Ref Range Status   02/16/2022 6.3 (H) 4.0 - 5.6 % Final     Comment:     ADA Screening Guidelines:  5.7-6.4%  Consistent with prediabetes  >or=6.5%  Consistent with diabetes    High levels of fetal hemoglobin interfere with the HbA1C  assay. Heterozygous hemoglobin variants (HbS, HgC, etc)do  not significantly interfere with this assay.   However, presence of multiple variants may affect accuracy.             ICU LOS 3h  Level of Care: Critical Care    Chart Check: 12 HR Done  Shift Summary/Plan for the shift: see care plan note

## 2022-07-18 NOTE — PROGRESS NOTES
Seen on hd. Agitated. Ativan 2mg iv x 1.  Trying to spit and get out of bed. Mask and restraints. Withdraw??

## 2022-07-18 NOTE — PROCEDURES
"Butch Mcdaniel is a 66 y.o. male patient.    Temp: 97.8 °F (36.6 °C) (07/18/22 1338)  Pulse: (!) 50 (07/18/22 1338)  Resp: (!) 21 (07/18/22 1338)  BP: (!) 170/73 (07/18/22 1338)  SpO2: (!) 88 % (07/18/22 1338)  Weight: 101.5 kg (223 lb 12.3 oz) (07/18/22 1338)  Height: 5' 11" (180.3 cm) (07/18/22 1338)       Central Line    Date/Time: 7/18/2022 2:59 PM  Performed by: Sheyla Ochoa NP  Authorized by: Sheyla Ochoa NP     Location procedure was performed:  [facility] ICU  Assisting Provider:  Vicki Hunter  Pre-operative diagnosis:  Acute renal failure  Post-operative diagnosis:  Acute renal failure  Consent Done ?:  Yes  Time out complete?: Verified correct patient, procedure, equipment, staff, and site/side    Indications:  Hemodialysis  Anesthesia:  Local infiltration  Local anesthetic:  Lidocaine 1% without epinephrine  Anesthetic total (ml):  3  Preparation:  Skin prepped with ChloraPrep  Skin prep agent dried: Skin prep agent completely dried prior to procedure    Sterile barriers: All five maximal sterile barriers used - gloves, gown, cap, mask and large sterile sheet    Hand hygiene: Hand hygiene performed immediately prior to central venous catheter insertion    Location:  Right internal jugular  Catheter type:  Trialysis  Catheter size:  13 Fr  Inserted Catheter Length (cm):  15  Ultrasound guidance: Yes    Vessel Caliber:  Large   patent  Comprressibility:  Normal  Doppler:  Not done  Needle advanced into vessel with real time ultrasound guidance.    Guidewire confirmed in vessel.    Steril sheath on probe.    Sterile gel used.  Manometry: Yes    Number of attempts:  1  Securement:  Line sutured, chlorhexidine patch, sterile dressing applied and blood return through all ports  Complications: No    Estimated blood loss (mL):  5  XRay:  Placement verified by x-ray, no pneumothorax on x-ray, tip termination and successful placement  Adverse Events:  NoneTermination Site: superior vena " ernie Ochoa NP  Critical Care Medicine  7/18/2022

## 2022-07-18 NOTE — EICU
Tan received for R IJ trialysis placement. Sheyla Ochoa NP at bedside to perform procedure. Time out performed. Trialysis line placed w/ no adverse events noted and sterility maintained throughout procedure.

## 2022-07-18 NOTE — PLAN OF CARE
[place] - Intensive Care  Initial Discharge Assessment     Discharge planning assessment completed with wife's assistance.  Patient from home and was indpendent prior to admit.  No DME or OP services.  Patient will discharge to home when medically stable.  Patient would benefit from PT/OT eval prior to discharge.    Primary Care Provider: Gabriel Evans MD    Admission Diagnosis: Hyperkalemia [E87.5]  Bradycardia [R00.1]  Chest pain [R07.9]    Admission Date: 7/18/2022  Expected Discharge Date:     Discharge Barriers Identified: None    Payor: [employer] MEDICARE / Plan: Omnigy 65 / Product Type: Medicare Advantage /     Extended Emergency Contact Information  Primary Emergency Contact: Deann Mcdaniel  Address: [address] [place] [address] [facility]  Home Phone: [phone]  Mobile Phone: [phone]  Relation: Spouse    Discharge Plan A: Home Health  Discharge Plan B: Home with family      [facility] [place] [address]  [address] [facility] [address]  Phone: [phone] Fax: [phone]    [id number] [address] AT [facility] [address] [address]  Phone: [phone] Fax: [phone]      Initial Assessment (most recent)       Adult Discharge Assessment - 07/18/22 1824          Discharge Assessment    Assessment Type Discharge Planning Assessment     Confirmed/corrected address, phone number and insurance Yes     Confirmed Demographics Correct on Facesheet     Source of Information family     If unable to respond/provide information was family/caregiver contacted? Yes     Contact Name/Number wifeKen Mcdaniel;  [phone]     When was your last doctors appointment? 05/02/22   May 2022    Does patient/caregiver understand observation status --   nA    Communicated LOPEZ with patient/caregiver Date not available/Unable to determine     Reason For Admission  Hyperkalemia     Lives With spouse     Facility Arrived From: home     Do you expect to return to your current living situation? Yes     Do you have help at home or someone to help you manage your care at home? Yes     Who are your caregiver(s) and their phone number(s)? wife;  Deann Mcdaniel;  [phone]     Prior to hospitilization cognitive status: Unable to Assess     Current cognitive status: Unable to Assess   Patient not oriented at time of contact.  Receiving dialysis at the bedside.    Walking or Climbing Stairs Difficulty none     Dressing/Bathing Difficulty none     Home Accessibility other (see comments)   Three stairs to enter home.    Equipment Currently Used at Home none     Readmission within 30 days? No     Patient currently being followed by outpatient case management? No     Do you currently have service(s) that help you manage your care at home? No     Do you take prescription medications? Yes     Do you have prescription coverage? Yes     Coverage [facility]     Do you have any problems affording any of your prescribed medications? No     Is the patient taking medications as prescribed? yes     Who is going to help you get home at discharge? Wife     How do you get to doctors appointments? family or friend will provide     Are you on dialysis? No     Do you take coumadin? No     Discharge Plan A Home Health     Discharge Plan B Home with family     DME Needed Upon Discharge  other (see comments)   TBD    Discharge Plan discussed with: Spouse/sig other     Name(s) and Number(s) Deann Mcdaniel:  wife:  [phone]     Discharge Barriers Identified None        Relationship/Environment    Name(s) of Who Lives With Patient Wife

## 2022-07-18 NOTE — ASSESSMENT & PLAN NOTE
Holding all home pain medications/mind altering substances in the setting of acute encephalopathy

## 2022-07-18 NOTE — CONSULTS
Consult received for emergent dialysis catheter placement. Right IJ trialysis placed, verified by chest xray. Okay to use.     Critical Care will sign off at this time. Please call with any further questions.     Sheyla Ochoa NP  Critical Care Medicine

## 2022-07-18 NOTE — PROGRESS NOTES
Consult received  Case discussed with Dr Christianson  EkG is not classic for hyperk induced bradycardia, but cannot exclude that his high K is a contributing factor.    Cause for his hyperk is likely multifactorial: diet, lisinopril, acidosis, maybe unaccounted OTC meds    At this second the patient seems to be compensated but any worsening of his acidosis could derail our efforts    We could make a case for urgent hd if he does not improve clinically in the next few hours    It is imperative to try to shift his K as well as removing it from his system. Ca, bicarb, albuterol, exchange resins at a minimum plus diuretics    For now strict renal diet (min K) no acei or arbs ivf force urine output     Would do a po4, lactic acid, bladder scan, measure I/o     Full consult to follow    Thanks

## 2022-07-18 NOTE — ASSESSMENT & PLAN NOTE
Oriented to self, location but not appropriate. Not oriented to situation. Most likely due to accumulation of gabapentin in setting of SHAUNA.   - emergent HD today  - CTH no acute changes   - monitor

## 2022-07-18 NOTE — PLAN OF CARE
Received patient from ER on 2L NC.  Pt now on 8L HFNC, expiratory wheezes heard MD aware PRN breathing treatments ordered.  NSR on the monitor.  Dopamine infusing per order.  Pt very agitated and confused trying to remove medical equipment, MD aware wrist restraints ordered.  Pt remained agitated, ativan received.  Mild relief obtained.  Morales in place with good urine output noted.  Dialysis in progress, pt tolerating well.  Plan of care reviewed.  No injuries, falls or skin breakdown occurred this shift.

## 2022-07-18 NOTE — ASSESSMENT & PLAN NOTE
K 7.6 on admit. Acute, symptomatic, with bradycardia requiring dopamine gtt  - shifted, given bicarb, given calcium, sodium zirc  - repeat BMP pending  - Nephrology consulted- emergent dialysis today   - hold lisinopril

## 2022-07-18 NOTE — ED NOTES
Pacing pads placed on pt and Zoll at bedside; 2nd IV started to R AC, 20g and lab work collected and sent; expiratory wheezes noted

## 2022-07-18 NOTE — CONSULTS
Date of Admit: 7/18/2022  Length of Stay: 0   Days  Consulting Staff:MD Smamy    Date of Consult: 7/18/2022    Reason for Consultation     Hyperkalemia, 7.6    Subjective:      History of Present Illness:  Butch Mcdaniel is a 66 y.o. year old male with a past medical history significant for CHENTE, DDD, dm and htn. Pt came in ams and found to have hyperkalemia, efrain, and bradycardia. Nephrology was consulted for elevated k- 7.6. Creatinine was 5 from 1.1.  Pt takes metformin and several pain meds. Pt is on dopamine for bradycardia.    Past Medical History:  Past Medical History:   Diagnosis Date    Anxiety     Cataract, bilateral 09/11/2018    Degenerative disc disease     Depression     Diabetes mellitus type II, controlled     ETOH abuse     Eye injury as a child    stick hit eye ? 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 both eyes 10/08/2019    Personal history of colonic polyps        Past Surgical History:  Past Surgical History:   Procedure Laterality Date    APPENDECTOMY      COLONOSCOPY N/A 5/10/2017    Procedure: COLONOSCOPY;  Surgeon: Shantanu Marley MD;  Location: [facility];  Service: Endoscopy;  Laterality: N/A;    POSTERIOR FUSION OF CERVICAL SPINE WITH LAMINECTOMY N/A 10/3/2021    Procedure: LAMINECTOMY, SPINE, CERVICAL, WITH POSTERIOR FUSION C2-T2;  Surgeon: Ana Rosa Geller MD;  Location: [address];  Service: Neurosurgery;  Laterality: N/A;       Allergies:  Review of patient's allergies indicates:  No Known Allergies    Home Medications:    No current facility-administered medications on file prior to encounter.     Current Outpatient Medications on File Prior to Encounter   Medication Sig Dispense Refill    ALPRAZolam (XANAX) 1 MG tablet TAKE ONE TABLET BY MOUTH TWICE DAILY AS NEEDED FOR ANXIETY 45 tablet 1    amLODIPine  "(NORVASC) 10 MG tablet Take 1 tablet (10 mg total) by mouth every evening. 90 tablet 3    atorvastatin (LIPITOR) 40 MG tablet Take 1 tablet (40 mg total) by mouth every evening. 90 tablet 3    busPIRone (BUSPAR) 30 MG Tab Take 1 tablet (30 mg total) by mouth 2 (two) times daily. 180 tablet 3    citalopram (CELEXA) 40 MG tablet TAKE ONE TABLET BY MOUTH ONCE A DAY 90 tablet 3    cloNIDine 0.2 mg/24 hr td ptwk (CATAPRES) 0.2 mg/24 hr Place 1 patch onto the skin every 7 days. 4 patch 11    fluticasone (FLONASE) 50 mcg/actuation nasal spray INHALE 1 SPRAY IN EACH NOSTRIL EVERY DAY 16 mL 0    fluticasone-salmeterol diskus inhaler 100-50 mcg Inhale 1 puff into the lungs.      gabapentin (NEURONTIN) 600 MG tablet Take 600 mg by mouth 3 (three) times daily.      hydrALAZINE (APRESOLINE) 25 MG tablet Take 1 tablet (25 mg total) by mouth 3 (three) times daily. 90 tablet 11    inhalation spacing device Use as directed for inhalation. 1 each 0    lisinopriL (PRINIVIL,ZESTRIL) 40 MG tablet Take 1 tablet (40 mg total) by mouth once daily. 90 tablet 3    metFORMIN (GLUCOPHAGE) 500 MG tablet Take 1 tablet (500 mg total) by mouth 2 (two) times daily with meals. 180 tablet 3    naloxone (NARCAN) 4 mg/actuation Spry 4mg by nasal route as needed for opioid overdose; may repeat every 2-3 minutes in alternating nostrils until medical help arrives. Call 911 1 each 11    needle, disp, 22 G 22 gauge x 1" Ndle Testosterone injection every 2 weeks 6 each 0    nicotine (NICODERM CQ) 14 mg/24 hr PLACE ONE PATCH ONTO SKIN ONCE A DAY 14 patch 0    nicotine (NICODERM CQ) 21 mg/24 hr Place 1 patch onto the skin once daily. 14 patch 0    oxyCODONE-acetaminophen (PERCOCET)  mg per tablet Take 1 tablet by mouth every 6 (six) hours as needed.      POLYETHYLENE GLYCOL 3350 (MIRALAX ORAL) Take 1 application by mouth.      PROAIR HFA 90 mcg/actuation inhaler INHALE TWO PUFFS BY MOUTH EVERY 6 HOURS AS NEEDED FOR WHEEZING (rescue) 8.5 " g 0    senna (SENOKOT) 8.6 mg tablet Take 1 tablet by mouth 2 (two) times a day.      syringe, disposable, (BD LUER-STARLA SYRINGE) 1 mL Syrg Testosterone injection every 2 weeks 6 Syringe 0    testosterone cypionate (DEPOTESTOTERONE CYPIONATE) 200 mg/mL injection Inject 0.75 mLs (150 mg total) into the muscle every 14 (fourteen) days. 10 mL 0    tiZANidine (ZANAFLEX) 4 MG tablet Take 4-8 mg by mouth nightly as needed.      verapamiL (CALAN-SR) 180 MG CR tablet Take 1 tablet (180 mg total) by mouth 2 (two) times daily. 180 tablet 3       Family History:  Family History   Problem Relation Age of Onset    Heart disease Mother     Heart disease Father     Alcohol abuse Father     Diabetes Son     No Known Problems Sister     No Known Problems Brother     No Known Problems Maternal Aunt     No Known Problems Maternal Uncle     No Known Problems Paternal Aunt     No Known Problems Paternal Uncle     No Known Problems Maternal Grandmother     No Known Problems Maternal Grandfather     No Known Problems Paternal Grandmother     No Known Problems Paternal Grandfather     Amblyopia Neg Hx     Blindness Neg Hx     Cancer Neg Hx     Cataracts Neg Hx     Glaucoma Neg Hx     Hypertension Neg Hx     Macular degeneration Neg Hx     Retinal detachment Neg Hx     Strabismus Neg Hx     Stroke Neg Hx     Thyroid disease Neg Hx        Social History:  Social History     Tobacco Use    Smoking status: Former Smoker     Packs/day: 0.50     Years: 32.00     Pack years: 16.00     Types: Cigarettes    Smokeless tobacco: Current User    Tobacco comment: weed    Substance Use Topics    Alcohol use: No     Alcohol/week: 0.0 standard drinks    Drug use: No       Review of Systems:ams, unable to get       Objective:     Scheduled Meds:   sodium chloride 0.9%   Intravenous Once    amLODIPine  10 mg Oral QHS    atorvastatin  40 mg Oral QHS    fluticasone propionate  1 spray Each Nostril Daily    hydrALAZINE  25  "mg Oral TID    mupirocin   Nasal BID    polyethylene glycol  17 g Oral Daily    senna  1 tablet Oral BID    sodium chloride 0.9%  10 mL Intravenous Q8H    sodium zirconium cyclosilicate  10 g Oral TID     Continuous Infusions:   sodium chloride 0.9% 50 mL/hr at 07/18/22 1351    DOPamine 5 mcg/kg/min (07/18/22 0451)     PRN Meds:.sodium chloride 0.9%, acetaminophen, albuterol-ipratropium, dextrose 10%, dextrose 10%, glucagon (human recombinant), glucose, glucose, insulin aspart U-100, melatonin, naloxone, sodium chloride 0.9%      Physical Examination:    Vitals: BP (!) 170/73 (BP Location: Left arm, Patient Position: Lying)   Pulse (!) 50   Temp 97.8 °F (36.6 °C) (Axillary)   Resp (!) 21   Ht 5' 11" (1.803 m)   Wt 101.5 kg (223 lb 12.3 oz)   SpO2 (!) 88%   BMI 31.21 kg/m²    No intake/output data recorded.  I/O this shift:  In: -   Out: 200 [Urine:200]      General: wd male in nad  HEENT:ncat,eomi,mm  CVS:s1s2 regular  PULM:ctab  ABD:bs,soft  EXT:no leg edema  NEURO:awake, confused    Laboratory:  Recent Results (from the past 24 hour(s))   CBC auto differential    Collection Time: 07/18/22  2:52 AM   Result Value Ref Range    WBC 18.75 (H) 3.90 - 12.70 K/uL    RBC 4.12 (L) 4.60 - 6.20 M/uL    Hemoglobin 13.8 (L) 14.0 - 18.0 g/dL    Hematocrit 42.3 40.0 - 54.0 %     (H) 82 - 98 fL    MCH 33.5 (H) 27.0 - 31.0 pg    MCHC 32.6 32.0 - 36.0 g/dL    RDW 12.9 11.5 - 14.5 %    Platelets 231 150 - 450 K/uL    MPV 11.7 9.2 - 12.9 fL    Immature Granulocytes 0.5 0.0 - 0.5 %    Gran # (ANC) 16.9 (H) 1.8 - 7.7 K/uL    Immature Grans (Abs) 0.10 (H) 0.00 - 0.04 K/uL    Lymph # 1.1 1.0 - 4.8 K/uL    Mono # 0.7 0.3 - 1.0 K/uL    Eos # 0.0 0.0 - 0.5 K/uL    Baso # 0.02 0.00 - 0.20 K/uL    nRBC 0 0 /100 WBC    Gran % 89.9 (H) 38.0 - 73.0 %    Lymph % 5.9 (L) 18.0 - 48.0 %    Mono % 3.5 (L) 4.0 - 15.0 %    Eosinophil % 0.1 0.0 - 8.0 %    Basophil % 0.1 0.0 - 1.9 %    Differential Method Automated    Comprehensive " metabolic panel    Collection Time: 07/18/22  2:52 AM   Result Value Ref Range    Sodium 135 (L) 136 - 145 mmol/L    Potassium 7.6 (HH) 3.5 - 5.1 mmol/L    Chloride 103 95 - 110 mmol/L    CO2 19 (L) 23 - 29 mmol/L    Glucose 188 (H) 70 - 110 mg/dL    BUN 62 (H) 8 - 23 mg/dL    Creatinine 5.0 (H) 0.5 - 1.4 mg/dL    Calcium 8.9 8.7 - 10.5 mg/dL    Total Protein 7.7 6.0 - 8.4 g/dL    Albumin 4.2 3.5 - 5.2 g/dL    Total Bilirubin 1.3 (H) 0.1 - 1.0 mg/dL    Alkaline Phosphatase 63 55 - 135 U/L    AST 30 10 - 40 U/L    ALT 38 10 - 44 U/L    Anion Gap 13 8 - 16 mmol/L    eGFR if African American 13 (A) >60 mL/min/1.73 m^2    eGFR if non African American 11 (A) >60 mL/min/1.73 m^2   Troponin I #1    Collection Time: 07/18/22  2:52 AM   Result Value Ref Range    Troponin I 0.019 0.000 - 0.026 ng/mL   B-Type natriuretic peptide (BNP)    Collection Time: 07/18/22  2:52 AM   Result Value Ref Range     (H) 0 - 99 pg/mL   TSH    Collection Time: 07/18/22  2:52 AM   Result Value Ref Range    TSH 5.291 (H) 0.400 - 4.000 uIU/mL   Ethanol    Collection Time: 07/18/22  2:52 AM   Result Value Ref Range    Alcohol, Serum <10 <10 mg/dL   T4, Free    Collection Time: 07/18/22  2:52 AM   Result Value Ref Range    Free T4 0.82 0.71 - 1.51 ng/dL   ISTAT PROCEDURE    Collection Time: 07/18/22  3:19 AM   Result Value Ref Range    POC Glucose 173 (H) 70 - 110 mg/dL    POC BUN 63 (H) 6 - 30 mg/dL    POC Creatinine 4.8 (H) 0.5 - 1.4 mg/dL    POC Sodium 133 (L) 136 - 145 mmol/L    POC Potassium 7.5 (HH) 3.5 - 5.1 mmol/L    POC Chloride 105 95 - 110 mmol/L    POC TCO2 (MEASURED) 23 23 - 29 mmol/L    POC Anion Gap 14 8 - 16 mmol/L    POC Ionized Calcium 1.16 1.06 - 1.42 mmol/L    POC Hematocrit 42 36 - 54 %PCV    Sample VENOUS    POCT COVID-19 Rapid Screening    Collection Time: 07/18/22  3:48 AM   Result Value Ref Range    POC Rapid COVID Negative Negative     Acceptable Yes    POCT glucose    Collection Time: 07/18/22  4:05  AM   Result Value Ref Range    POCT Glucose 145 (H) 70 - 110 mg/dL   Urinalysis, Reflex to Urine Culture Urine, Clean Catch    Collection Time: 07/18/22  4:27 AM    Specimen: Urine   Result Value Ref Range    Specimen UA Urine, Clean Catch     Color, UA Yellow Yellow, Straw, Georgie    Appearance, UA Clear Clear    pH, UA 5.0 5.0 - 8.0    Specific Gravity, UA 1.020 1.005 - 1.030    Protein, UA 1+ (A) Negative    Glucose, UA Negative Negative    Ketones, UA Negative Negative    Bilirubin (UA) Negative Negative    Occult Blood UA Negative Negative    Nitrite, UA Negative Negative    Urobilinogen, UA Negative <2.0 EU/dL    Leukocytes, UA Negative Negative   Drug screen panel, emergency    Collection Time: 07/18/22  4:27 AM   Result Value Ref Range    Benzodiazepines Presumptive Positive (A) Negative    Methadone metabolites Negative Negative    Cocaine (Metab.) Negative Negative    Opiate Scrn, Ur Presumptive Positive (A) Negative    Barbiturate Screen, Ur Negative Negative    Amphetamine Screen, Ur Negative Negative    THC Presumptive Positive (A) Negative    Phencyclidine Negative Negative    Creatinine, Urine 237.6 23.0 - 375.0 mg/dL    Toxicology Information SEE COMMENT    Urinalysis Microscopic    Collection Time: 07/18/22  4:27 AM   Result Value Ref Range    RBC, UA 0 0 - 4 /hpf    WBC, UA 1 0 - 5 /hpf    Bacteria None None-Occ /hpf    Hyaline Casts, UA 9 (A) 0-1/lpf /lpf    Microscopic Comment SEE COMMENT    ISTAT PROCEDURE    Collection Time: 07/18/22  5:13 AM   Result Value Ref Range    POC PH 7.292 (L) 7.35 - 7.45    POC PCO2 42.1 35 - 45 mmHg    POC PO2 39 (L) 40 - 60 mmHg    POC HCO3 20.3 (L) 24 - 28 mmol/L    POC BE -6 -2 to 2 mmol/L    POC SATURATED O2 68 (L) 95 - 100 %    POC TCO2 22 (L) 24 - 29 mmol/L    Sample VENOUS     Site Other     Allens Test N/A     DelSys Nasal Can     Mode SPONT     Flow 2    Phosphorus    Collection Time: 07/18/22  6:15 AM   Result Value Ref Range    Phosphorus 5.1 (H) 2.7 - 4.5  mg/dL   Lactic Acid, Plasma    Collection Time: 07/18/22  6:15 AM   Result Value Ref Range    Lactate (Lactic Acid) 3.9 (HH) 0.5 - 2.2 mmol/L   Cortisol    Collection Time: 07/18/22  6:15 AM   Result Value Ref Range    Cortisol 29.30 ug/dL   POCT glucose    Collection Time: 07/18/22  8:52 AM   Result Value Ref Range    POCT Glucose 184 (H) 70 - 110 mg/dL   Basic Metabolic Panel    Collection Time: 07/18/22 10:07 AM   Result Value Ref Range    Sodium 137 136 - 145 mmol/L    Potassium 6.2 (H) 3.5 - 5.1 mmol/L    Chloride 103 95 - 110 mmol/L    CO2 24 23 - 29 mmol/L    Glucose 150 (H) 70 - 110 mg/dL    BUN 69 (H) 8 - 23 mg/dL    Creatinine 4.8 (H) 0.5 - 1.4 mg/dL    Calcium 9.4 8.7 - 10.5 mg/dL    Anion Gap 10 8 - 16 mmol/L    eGFR if African American 13 (A) >60 mL/min/1.73 m^2    eGFR if non African American 12 (A) >60 mL/min/1.73 m^2   CK    Collection Time: 07/18/22 10:07 AM   Result Value Ref Range     20 - 200 U/L   Iron and TIBC    Collection Time: 07/18/22 10:07 AM   Result Value Ref Range    Iron 64 45 - 160 ug/dL    Transferrin 240 200 - 375 mg/dL    TIBC 355 250 - 450 ug/dL    Saturated Iron 18 (L) 20 - 50 %   Ferritin    Collection Time: 07/18/22 10:07 AM   Result Value Ref Range    Ferritin 434 (H) 20.0 - 300.0 ng/mL               Diagnostic Tests:     US Retroperitoneal Complete [[id number]] Resulted: 07/18/22 1142   Order Status: Completed Updated: 07/18/22 1144   Narrative:     EXAMINATION:   US RETROPERITONEAL COMPLETE     CLINICAL HISTORY:   efrain, hyperkalemia;     TECHNIQUE:   Ultrasound of the kidneys and urinary bladder was performed including color flow and Doppler evaluation of the kidneys.     COMPARISON:   01/15/2020     FINDINGS:   Right kidney: The right kidney measures 10.1 cm.  Resistive index measures 0.85.  No stones, mass, or hydronephrosis.     Left kidney: The left kidney measures 11.5 cm. No cortical thinning or loss of corticomedullary distinction.  Resistive index measures 0.71.   No stones, mass, or hydronephrosis.     Bilateral simple renal cysts are present.    Impression:       Negative for obstructive uropathy.       Electronically signed by: Carl Klein Jr   Date: 07/18/2022   Time: 11:42   CT Head Without Contrast            Assessment/Plan:     Butch Mcdaniel is a 66 y.o. male admitted with:  1.efrain. plan emergent hd. Shifted pt for now. Bicarb and calcium. NO ace/arb. Cont supportive care. Morales for now. Us noted.  2. Hyperkalemia. 2k bath.   3.proteinuria. ck ratio.  4.dm2. following sugars.  6.bradycardia. cont dopamine. Hd today.  7.lactic acidosis. Vladimir level.  8.htn. no ace/arb.         Tracy Flowers

## 2022-07-18 NOTE — ASSESSMENT & PLAN NOTE
Presented with SHAUNA. Cr on admit 5 from baseline 1.1. Unclear cause. He appears hypovolemic- dry mucous membranes but does have lower extremity edema. He is altered most likely from his prescribed gabapentin that has not cleared due to renal failure. He has persistent hyperkalemia that is not improved with shifting. He has bradycardia as a result of his hyperkalemia.   - Nephrology consulted  - plan emergent dialysis today. Critical care consulted for line placement.   - UA is not consistent with infection and only has 1+ protein  - check CK  - Avoid nephrotoxins, renal dose all meds.   - Monitor

## 2022-07-18 NOTE — ASSESSMENT & PLAN NOTE
HR slow, due to hyperkalemia from SHAUNA  - Cardiology consulted  - on dopamine gtt  - TTE pending

## 2022-07-18 NOTE — ASSESSMENT & PLAN NOTE
A1c:   Lab Results   Component Value Date    HGBA1C 6.3 (H) 02/16/2022     Hold metformin   Meds: SSI PRN to maintain goal 140-180  ADA diet, accuchecks, hypoglycemic protocol

## 2022-07-18 NOTE — RESPIRATORY THERAPY
Latest Reference Range & Units 07/18/22 05:13   POC PH 7.35 - 7.45  7.292 (L)   POC PCO2 35 - 45 mmHg 42.1   POC PO2 40 - 60 mmHg 39 (L)   POC HCO3 24 - 28 mmol/L 20.3 (L)   POC SATURATED O2 95 - 100 % 68 (L)   POC BE -2 to 2 mmol/L -6   POC TCO2 24 - 29 mmol/L 22 (L)   Flow  2   Sample  VENOUS   DelSys  Nasal Can   Allens Test  N/A   Site  Other   Mode  SPONT   (L): Data is abnormally low

## 2022-07-18 NOTE — ED NOTES
Pt resting in bed with eyes closed; pt twitching and when I nudge him and ask him if he's ok he says he was dreaming; pt transported to CT via stretcher with transport monitor and 2 L O2 via NC

## 2022-07-18 NOTE — SUBJECTIVE & OBJECTIVE
Past Medical History:   Diagnosis Date    Anxiety     Cataract, bilateral 09/11/2018    Degenerative disc disease     Depression     Diabetes mellitus type II, controlled     ETOH abuse     Eye injury as a child    stick hit eye ? 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 both eyes 10/08/2019    Personal history of colonic polyps        Past Surgical History:   Procedure Laterality Date    APPENDECTOMY      COLONOSCOPY N/A 5/10/2017    Procedure: COLONOSCOPY;  Surgeon: Shantanu Marley MD;  Location: [facility];  Service: Endoscopy;  Laterality: N/A;    POSTERIOR FUSION OF CERVICAL SPINE WITH LAMINECTOMY N/A 10/3/2021    Procedure: LAMINECTOMY, SPINE, CERVICAL, WITH POSTERIOR FUSION C2-T2;  Surgeon: Ana Rosa Geller MD;  Location: [facility] OR [address] [place] [address];  Service: Neurosurgery;  Laterality: N/A;       Review of patient's allergies indicates:  No Known Allergies    No current facility-administered medications on file prior to encounter.     Current Outpatient Medications on File Prior to Encounter   Medication Sig    ALPRAZolam (XANAX) 1 MG tablet TAKE ONE TABLET BY MOUTH TWICE DAILY AS NEEDED FOR ANXIETY    amLODIPine (NORVASC) 10 MG tablet Take 1 tablet (10 mg total) by mouth every evening.    atorvastatin (LIPITOR) 40 MG tablet Take 1 tablet (40 mg total) by mouth every evening.    busPIRone (BUSPAR) 30 MG Tab Take 1 tablet (30 mg total) by mouth 2 (two) times daily.    citalopram (CELEXA) 40 MG tablet TAKE ONE TABLET BY MOUTH ONCE A DAY    cloNIDine 0.2 mg/24 hr td ptwk (CATAPRES) 0.2 mg/24 hr Place 1 patch onto the skin every 7 days.    fluticasone (FLONASE) 50 mcg/actuation nasal spray INHALE 1 SPRAY IN EACH NOSTRIL EVERY DAY    fluticasone-salmeterol diskus inhaler 100-50 mcg Inhale 1 puff into the lungs.    gabapentin (NEURONTIN) 600 MG tablet Take 600 mg by mouth 3 (three) times  "daily.    hydrALAZINE (APRESOLINE) 25 MG tablet Take 1 tablet (25 mg total) by mouth 3 (three) times daily.    inhalation spacing device Use as directed for inhalation.    lisinopriL (PRINIVIL,ZESTRIL) 40 MG tablet Take 1 tablet (40 mg total) by mouth once daily.    metFORMIN (GLUCOPHAGE) 500 MG tablet Take 1 tablet (500 mg total) by mouth 2 (two) times daily with meals.    naloxone (NARCAN) 4 mg/actuation Spry 4mg by nasal route as needed for opioid overdose; may repeat every 2-3 minutes in alternating nostrils until medical help arrives. Call 911    needle, disp, 22 G 22 gauge x 1" Ndle Testosterone injection every 2 weeks    nicotine (NICODERM CQ) 14 mg/24 hr PLACE ONE PATCH ONTO SKIN ONCE A DAY    nicotine (NICODERM CQ) 21 mg/24 hr Place 1 patch onto the skin once daily.    oxyCODONE-acetaminophen (PERCOCET)  mg per tablet Take 1 tablet by mouth every 6 (six) hours as needed.    POLYETHYLENE GLYCOL 3350 (MIRALAX ORAL) Take 1 application by mouth.    PROAIR HFA 90 mcg/actuation inhaler INHALE TWO PUFFS BY MOUTH EVERY 6 HOURS AS NEEDED FOR WHEEZING (rescue)    senna (SENOKOT) 8.6 mg tablet Take 1 tablet by mouth 2 (two) times a day.    syringe, disposable, (BD LUER-STARLA SYRINGE) 1 mL Syrg Testosterone injection every 2 weeks    testosterone cypionate (DEPOTESTOTERONE CYPIONATE) 200 mg/mL injection Inject 0.75 mLs (150 mg total) into the muscle every 14 (fourteen) days.    tiZANidine (ZANAFLEX) 4 MG tablet Take 4-8 mg by mouth nightly as needed.    verapamiL (CALAN-SR) 180 MG CR tablet Take 1 tablet (180 mg total) by mouth 2 (two) times daily.     Family History       Problem Relation (Age of Onset)    Alcohol abuse Father    Diabetes Son    Heart disease Mother, Father    No Known Problems Sister, Brother, Maternal Aunt, Maternal Uncle, Paternal Aunt, Paternal Uncle, Maternal Grandmother, Maternal Grandfather, Paternal Grandmother, Paternal Grandfather          Tobacco Use    Smoking status: Former Smoker     " Packs/day: 0.50     Years: 32.00     Pack years: 16.00     Types: Cigarettes    Smokeless tobacco: Current User    Tobacco comment: weed    Substance and Sexual Activity    Alcohol use: No     Alcohol/week: 0.0 standard drinks    Drug use: No    Sexual activity: Yes     Partners: Female     Comment:  with children, disabled      Review of Systems   Unable to perform ROS: Mental status change   Objective:     Vital Signs (Most Recent):  Temp: 97.7 °F (36.5 °C) (07/18/22 0221)  Pulse: (!) 51 (07/18/22 0737)  Resp: (!) 29 (07/18/22 0737)  BP: (!) 146/60 (07/18/22 0737)  SpO2: (!) 73 % (07/18/22 0737)   Vital Signs (24h Range):  Temp:  [97.7 °F (36.5 °C)] 97.7 °F (36.5 °C)  Pulse:  [32-56] 51  Resp:  [16-32] 29  SpO2:  [73 %-96 %] 73 %  BP: (109-163)/(55-70) 146/60     Weight: 96.6 kg (213 lb)  Body mass index is 29.71 kg/m².    SpO2: (!) 73 %  O2 Device (Oxygen Therapy): nasal cannula    No intake or output data in the 24 hours ending 07/18/22 0940    Lines/Drains/Airways       Peripheral Intravenous Line  Duration                  Peripheral IV - Single Lumen 07/18/22 0159 20 G Left;Posterior Hand <1 day         Peripheral IV - Single Lumen 07/18/22 0252 20 G Right Antecubital <1 day                    Physical Exam  Constitutional:       General: He is not in acute distress.     Appearance: He is well-developed. He is obese. He is not ill-appearing, toxic-appearing or diaphoretic.   HENT:      Head: Normocephalic and atraumatic.   Eyes:      General: No scleral icterus.     Extraocular Movements: Extraocular movements intact.      Conjunctiva/sclera: Conjunctivae normal.      Pupils: Pupils are equal, round, and reactive to light.   Neck:      Thyroid: No thyromegaly.      Vascular: No JVD.      Trachea: No tracheal deviation.   Cardiovascular:      Rate and Rhythm: Regular rhythm. Bradycardia present. FrequentExtrasystoles are present.     Heart sounds: S1 normal and S2 normal. No murmur  heard.    No friction rub. No gallop.   Pulmonary:      Effort: Pulmonary effort is normal. No respiratory distress.      Breath sounds: Normal breath sounds. No stridor. No wheezing, rhonchi or rales.   Chest:      Chest wall: No tenderness.   Abdominal:      General: There is no distension.      Palpations: Abdomen is soft.   Musculoskeletal:         General: No swelling or tenderness. Normal range of motion.      Cervical back: Normal range of motion and neck supple. No rigidity.      Right lower leg: No edema.      Left lower leg: No edema.   Skin:     General: Skin is warm and dry.      Coloration: Skin is not jaundiced.   Neurological:      General: No focal deficit present.      Mental Status: He is alert.      Cranial Nerves: No cranial nerve deficit.   Psychiatric:      Comments: UTO       Current Medications:   amLODIPine  10 mg Oral QHS    atorvastatin  40 mg Oral QHS    fluticasone propionate  1 spray Each Nostril Daily    hydrALAZINE  25 mg Oral TID    polyethylene glycol  17 g Oral Daily    senna  1 tablet Oral BID    sodium chloride 0.9%  10 mL Intravenous Q8H    sodium zirconium cyclosilicate  10 g Oral TID      DOPamine 5 mcg/kg/min (07/18/22 0451)     acetaminophen, dextrose 10%, dextrose 10%, glucagon (human recombinant), glucose, glucose, insulin aspart U-100, melatonin, naloxone    Laboratory (all labs reviewed):  CBC:  Recent Labs   Lab 10/07/21  0812 10/08/21  0713 10/09/21  0952 02/16/22  0820 07/18/22  0252 07/18/22  0319   WBC 17.25 H 15.93 H 23.71 H 10.16 18.75 H  --    Hemoglobin 11.8 L 11.8 L 13.2 L 12.7 L 13.8 L  --    POC Hematocrit  --   --   --   --   --  42   Hematocrit 35.5 L 33.7 L 38.7 L 40.9 42.3  --    Platelets 208 185 228 164 231  --        CHEMISTRIES:  Recent Labs   Lab 10/04/21  0211 10/05/21  0148 10/06/21  0423 10/07/21  0812 10/08/21  0713 10/09/21  0952 02/16/22  0820 07/18/22  0252   Glucose 169 H 155 H 184 H 193 H 143 H 102 97 188 H   Sodium 138 134 L 134 L 132 L 132  L 130 L 138 135 L   Potassium 4.7 5.0 5.3 H 5.1 5.0 4.3 5.0 7.6 HH   BUN 23 33 H 33 H 30 H 26 H 26 H 15 62 H   Creatinine 1.3 1.0 1.0 1.0 0.8 0.8 1.1 5.0 H   eGFR if  >60.0 >60.0 >60.0 >60.0 >60.0 >60.0 >60.0 13 A   eGFR if non African American 56.5 A >60.0 >60.0 >60.0 >60.0 >60.0 >60.0 11 A   Calcium 7.5 L 7.6 L 7.9 L 8.1 L 8.5 L 8.8 8.8 8.9   Magnesium 1.9 2.3 2.8 H  --   --   --   --   --        CARDIAC BIOMARKERS:  Recent Labs   Lab 07/18/22  0252   Troponin I 0.019       COAGS:  Recent Labs   Lab 10/01/21  1610   INR 1.0       LIPIDS/LFTS:  Recent Labs   Lab 10/03/19  0940 01/06/20  1038 09/02/20  1010 12/03/20  1058 02/23/21  1040 08/07/21  0839 10/01/21  1610 02/16/22  0820 07/18/22  0252   Cholesterol 117 L  --  93 L  --  102 L 113 L 106 L  --   --    Triglycerides 236 H  --  103  --  165 H 103 124  --   --    HDL 30 L  --  30 L  --  30 L 35 L 31 L  --   --    LDL Cholesterol 39.8 L  --  42.4 L  --  39.0 L 57.4 L 50.2 L  --   --    Non-HDL Cholesterol 87  --  63  --  72 78 75  --   --    AST 27   < > 21   < > 18 17 21 18 30   ALT 29   < > 16   < > 19 19 22 18 38    < > = values in this interval not displayed.       BNP:  Recent Labs   Lab 07/18/22  0252    H       TSH:  Recent Labs   Lab 01/08/20  1153 10/01/21  1610 07/18/22  0252   TSH 2.552 1.897 5.291 H       Free T4:  Recent Labs   Lab 01/08/20  1153 07/18/22  0252   Free T4 0.87 0.82       Diagnostic Results:  ECG (personally reviewed and interpreted tracing(s)):  7/18/22 0229 junct 34, peaked t waves    Chest X-Ray (personally reviewed and interpreted image(s)): 7/18/22 NAD, aortic atherosclerosis    Echo: 2/3/22 (repeat ordered)  The left ventricle is normal in size with moderate concentric hypertrophy and normal systolic function.  The estimated ejection fraction is 60%.  Normal right ventricular size with normal right ventricular systolic function.  The estimated PA systolic pressure is 33 mmHg.    Holter 3/10/22  Heart rates  varied between 43 and 109 BPM with an average of 62 BPM.  Rare PVCs and PACs. Five atrial pairs. One three beat atrial run.

## 2022-07-18 NOTE — ASSESSMENT & PLAN NOTE
Holding all home anxiety medications/mind altering substances in setting of acute encephalopathy

## 2022-07-18 NOTE — CONSULTS
[facility] [place] Emergency Dept  Cardiology  Consult Note    Patient Name: Butch Mcdaniel  MRN: [id number]  Admission Date: 7/18/2022  Hospital Length of Stay: 0 days  Code Status: Full Code   Attending Provider: Ami Chen MD   Consulting Provider: Jose Anderson MD  Primary Care Physician: Gabriel Evans MD  Principal Problem:Hyperkalemia    Patient information was obtained from patient and ER records.     Inpatient consult to Cardiology  Consult performed by: Jose Anderson MD  Consult ordered by: Temo Christianson MD  Reason for consult: junct stephania        Subjective:     Chief Complaint:  dizziness     HPI:   66-year-old male with a PMHx of hypertension, hyperlipidemia, DM, presents to the ED with dizziness. Patient reports persistent dizziness with associated diplopia, onset 1h ago. He also notes a posterior headache. Patient was found to be bradycardic upon ED arrival. He reports hx of stroke in 10/2021, but denies any new numbness or weakness today. Endorses compliance with all his daily medications. Denies chest pain. No recent falls or injuries. This is the extent of the patient's complaints today in the Emergency Department.     Cardiology consulted for bradycardia.    Follows with Dr. Pete, last seen 4/2022.    The patient is currently altered, but does not appear to be in acute distress.  On examination he is well perfused.  Initial heart rate in the 30s has come up to the 50s with dopamine infusion.  He is noted to have acute kidney injury with a creatinine of 5 and a potassium of greater than 7.  EKG is consistent with hyperkalemia noting peak T-waves when compared with his prior electrocardiogram.          Past Medical History:   Diagnosis Date    Anxiety     Cataract, bilateral 09/11/2018    Degenerative disc disease     Depression     Diabetes mellitus type II, controlled     ETOH abuse     Eye injury as a child    stick hit eye ? 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/2019    Personal history of colonic polyps        Past Surgical History:   Procedure Laterality Date    APPENDECTOMY      COLONOSCOPY N/A 5/10/2017    Procedure: COLONOSCOPY;  Surgeon: Shantanu Marley MD;  Location: [facility];  Service: Endoscopy;  Laterality: N/A;    POSTERIOR FUSION OF CERVICAL SPINE WITH LAMINECTOMY N/A 10/3/2021    Procedure: LAMINECTOMY, SPINE, CERVICAL, WITH POSTERIOR FUSION C2-T2;  Surgeon: Ana Rosa Geller MD;  Location: [address];  Service: Neurosurgery;  Laterality: N/A;       Review of patient's allergies indicates:  No Known Allergies    No current facility-administered medications on file prior to encounter.     Current Outpatient Medications on File Prior to Encounter   Medication Sig    ALPRAZolam (XANAX) 1 MG tablet TAKE ONE TABLET BY MOUTH TWICE DAILY AS NEEDED FOR ANXIETY    amLODIPine (NORVASC) 10 MG tablet Take 1 tablet (10 mg total) by mouth every evening.    atorvastatin (LIPITOR) 40 MG tablet Take 1 tablet (40 mg total) by mouth every evening.    busPIRone (BUSPAR) 30 MG Tab Take 1 tablet (30 mg total) by mouth 2 (two) times daily.    citalopram (CELEXA) 40 MG tablet TAKE ONE TABLET BY MOUTH ONCE A DAY    cloNIDine 0.2 mg/24 hr td ptwk (CATAPRES) 0.2 mg/24 hr Place 1 patch onto the skin every 7 days.    fluticasone (FLONASE) 50 mcg/actuation nasal spray INHALE 1 SPRAY IN EACH NOSTRIL EVERY DAY    fluticasone-salmeterol diskus inhaler 100-50 mcg Inhale 1 puff into the lungs.    gabapentin (NEURONTIN) 600 MG tablet Take 600 mg by mouth 3 (three) times daily.    hydrALAZINE (APRESOLINE) 25 MG tablet Take 1 tablet (25 mg total) by mouth 3 (three) times daily.    inhalation spacing device Use as directed for inhalation.    lisinopriL (PRINIVIL,ZESTRIL) 40 MG tablet Take 1 tablet (40 mg total) by  "mouth once daily.    metFORMIN (GLUCOPHAGE) 500 MG tablet Take 1 tablet (500 mg total) by mouth 2 (two) times daily with meals.    naloxone (NARCAN) 4 mg/actuation Spry 4mg by nasal route as needed for opioid overdose; may repeat every 2-3 minutes in alternating nostrils until medical help arrives. Call 911    needle, disp, 22 G 22 gauge x 1" Ndle Testosterone injection every 2 weeks    nicotine (NICODERM CQ) 14 mg/24 hr PLACE ONE PATCH ONTO SKIN ONCE A DAY    nicotine (NICODERM CQ) 21 mg/24 hr Place 1 patch onto the skin once daily.    oxyCODONE-acetaminophen (PERCOCET)  mg per tablet Take 1 tablet by mouth every 6 (six) hours as needed.    POLYETHYLENE GLYCOL 3350 (MIRALAX ORAL) Take 1 application by mouth.    PROAIR HFA 90 mcg/actuation inhaler INHALE TWO PUFFS BY MOUTH EVERY 6 HOURS AS NEEDED FOR WHEEZING (rescue)    senna (SENOKOT) 8.6 mg tablet Take 1 tablet by mouth 2 (two) times a day.    syringe, disposable, (BD LUER-STARLA SYRINGE) 1 mL Syrg Testosterone injection every 2 weeks    testosterone cypionate (DEPOTESTOTERONE CYPIONATE) 200 mg/mL injection Inject 0.75 mLs (150 mg total) into the muscle every 14 (fourteen) days.    tiZANidine (ZANAFLEX) 4 MG tablet Take 4-8 mg by mouth nightly as needed.    verapamiL (CALAN-SR) 180 MG CR tablet Take 1 tablet (180 mg total) by mouth 2 (two) times daily.     Family History       Problem Relation (Age of Onset)    Alcohol abuse Father    Diabetes Son    Heart disease Mother, Father    No Known Problems Sister, Brother, Maternal Aunt, Maternal Uncle, Paternal Aunt, Paternal Uncle, Maternal Grandmother, Maternal Grandfather, Paternal Grandmother, Paternal Grandfather          Tobacco Use    Smoking status: Former Smoker     Packs/day: 0.50     Years: 32.00     Pack years: 16.00     Types: Cigarettes    Smokeless tobacco: Current User    Tobacco comment: weed    Substance and Sexual Activity    Alcohol use: No     Alcohol/week: 0.0 standard drinks "    Drug use: No    Sexual activity: Yes     Partners: Female     Comment:  with children, disabled      Review of Systems   Unable to perform ROS: Mental status change   Objective:     Vital Signs (Most Recent):  Temp: 97.7 °F (36.5 °C) (07/18/22 0221)  Pulse: (!) 51 (07/18/22 0737)  Resp: (!) 29 (07/18/22 0737)  BP: (!) 146/60 (07/18/22 0737)  SpO2: (!) 73 % (07/18/22 0737)   Vital Signs (24h Range):  Temp:  [97.7 °F (36.5 °C)] 97.7 °F (36.5 °C)  Pulse:  [32-56] 51  Resp:  [16-32] 29  SpO2:  [73 %-96 %] 73 %  BP: (109-163)/(55-70) 146/60     Weight: 96.6 kg (213 lb)  Body mass index is 29.71 kg/m².    SpO2: (!) 73 %  O2 Device (Oxygen Therapy): nasal cannula    No intake or output data in the 24 hours ending 07/18/22 0940    Lines/Drains/Airways       Peripheral Intravenous Line  Duration                  Peripheral IV - Single Lumen 07/18/22 0159 20 G Left;Posterior Hand <1 day         Peripheral IV - Single Lumen 07/18/22 0252 20 G Right Antecubital <1 day                    Physical Exam  Constitutional:       General: He is not in acute distress.     Appearance: He is well-developed. He is obese. He is not ill-appearing, toxic-appearing or diaphoretic.   HENT:      Head: Normocephalic and atraumatic.   Eyes:      General: No scleral icterus.     Extraocular Movements: Extraocular movements intact.      Conjunctiva/sclera: Conjunctivae normal.      Pupils: Pupils are equal, round, and reactive to light.   Neck:      Thyroid: No thyromegaly.      Vascular: No JVD.      Trachea: No tracheal deviation.   Cardiovascular:      Rate and Rhythm: Regular rhythm. Bradycardia present. FrequentExtrasystoles are present.     Heart sounds: S1 normal and S2 normal. No murmur heard.    No friction rub. No gallop.   Pulmonary:      Effort: Pulmonary effort is normal. No respiratory distress.      Breath sounds: Normal breath sounds. No stridor. No wheezing, rhonchi or rales.   Chest:      Chest wall: No  tenderness.   Abdominal:      General: There is no distension.      Palpations: Abdomen is soft.   Musculoskeletal:         General: No swelling or tenderness. Normal range of motion.      Cervical back: Normal range of motion and neck supple. No rigidity.      Right lower leg: No edema.      Left lower leg: No edema.   Skin:     General: Skin is warm and dry.      Coloration: Skin is not jaundiced.   Neurological:      General: No focal deficit present.      Mental Status: He is alert.      Cranial Nerves: No cranial nerve deficit.   Psychiatric:      Comments: UTO       Current Medications:   amLODIPine  10 mg Oral QHS    atorvastatin  40 mg Oral QHS    fluticasone propionate  1 spray Each Nostril Daily    hydrALAZINE  25 mg Oral TID    polyethylene glycol  17 g Oral Daily    senna  1 tablet Oral BID    sodium chloride 0.9%  10 mL Intravenous Q8H    sodium zirconium cyclosilicate  10 g Oral TID      DOPamine 5 mcg/kg/min (07/18/22 0451)     acetaminophen, dextrose 10%, dextrose 10%, glucagon (human recombinant), glucose, glucose, insulin aspart U-100, melatonin, naloxone    Laboratory (all labs reviewed):  CBC:  Recent Labs   Lab 10/07/21  0812 10/08/21  0713 10/09/21  0952 02/16/22  0820 07/18/22  0252 07/18/22  0319   WBC 17.25 H 15.93 H 23.71 H 10.16 18.75 H  --    Hemoglobin 11.8 L 11.8 L 13.2 L 12.7 L 13.8 L  --    POC Hematocrit  --   --   --   --   --  42   Hematocrit 35.5 L 33.7 L 38.7 L 40.9 42.3  --    Platelets 208 185 228 164 231  --        CHEMISTRIES:  Recent Labs   Lab 10/04/21  0211 10/05/21  0148 10/06/21  0423 10/07/21  0812 10/08/21  0713 10/09/21  0952 02/16/22  0820 07/18/22  0252   Glucose 169 H 155 H 184 H 193 H 143 H 102 97 188 H   Sodium 138 134 L 134 L 132 L 132 L 130 L 138 135 L   Potassium 4.7 5.0 5.3 H 5.1 5.0 4.3 5.0 7.6 HH   BUN 23 33 H 33 H 30 H 26 H 26 H 15 62 H   Creatinine 1.3 1.0 1.0 1.0 0.8 0.8 1.1 5.0 H   eGFR if  >60.0 >60.0 >60.0 >60.0 >60.0 >60.0  >60.0 13 A   eGFR if non African American 56.5 A >60.0 >60.0 >60.0 >60.0 >60.0 >60.0 11 A   Calcium 7.5 L 7.6 L 7.9 L 8.1 L 8.5 L 8.8 8.8 8.9   Magnesium 1.9 2.3 2.8 H  --   --   --   --   --        CARDIAC BIOMARKERS:  Recent Labs   Lab 07/18/22  0252   Troponin I 0.019       COAGS:  Recent Labs   Lab 10/01/21  1610   INR 1.0       LIPIDS/LFTS:  Recent Labs   Lab 10/03/19  0940 01/06/20  1038 09/02/20  1010 12/03/20  1058 02/23/21  1040 08/07/21  0839 10/01/21  1610 02/16/22  0820 07/18/22  0252   Cholesterol 117 L  --  93 L  --  102 L 113 L 106 L  --   --    Triglycerides 236 H  --  103  --  165 H 103 124  --   --    HDL 30 L  --  30 L  --  30 L 35 L 31 L  --   --    LDL Cholesterol 39.8 L  --  42.4 L  --  39.0 L 57.4 L 50.2 L  --   --    Non-HDL Cholesterol 87  --  63  --  72 78 75  --   --    AST 27   < > 21   < > 18 17 21 18 30   ALT 29   < > 16   < > 19 19 22 18 38    < > = values in this interval not displayed.       BNP:  Recent Labs   Lab 07/18/22  0252    H       TSH:  Recent Labs   Lab 01/08/20  1153 10/01/21  1610 07/18/22  0252   TSH 2.552 1.897 5.291 H       Free T4:  Recent Labs   Lab 01/08/20  1153 07/18/22  0252   Free T4 0.87 0.82       Diagnostic Results:  ECG (personally reviewed and interpreted tracing(s)):  7/18/22 0229 junct 34, peaked t waves    Chest X-Ray (personally reviewed and interpreted image(s)): 7/18/22 NAD, aortic atherosclerosis    Echo: 2/3/22 (repeat ordered)  · The left ventricle is normal in size with moderate concentric hypertrophy and normal systolic function.  · The estimated ejection fraction is 60%.  · Normal right ventricular size with normal right ventricular systolic function.  · The estimated PA systolic pressure is 33 mmHg.    Holter 3/10/22  · Heart rates varied between 43 and 109 BPM with an average of 62 BPM.  · Rare PVCs and PACs. Five atrial pairs. One three beat atrial run.        Assessment and Plan:     * Hyperkalemia  K+ 7.6 with creat  5  ?etiology  Neph on board  Suggest repeat K+ measurement and continued rx of hyperkalemia  No immediate need for temp wire  Agree with dopamine  ?needs HD  Check echo    SHAUNA (acute kidney injury)  As above  Per Neph    Junctional bradycardia  Likely a result of hyperkalemia    Essential hypertension  Med rx on hold    Hyperlipidemia, mixed  Cont statin    Type 2 diabetes mellitus without complication, without long-term current use of insulin  Per IM        VTE Risk Mitigation (From admission, onward)         Ordered     IP VTE LOW RISK PATIENT  Once         07/18/22 0800     Place sequential compression device  Until discontinued         07/18/22 0800     Place REY hose  Until discontinued         07/18/22 0800              Critical care time 35min    Thank you for your consult. I will follow-up with patient. Please contact us if you have any additional questions.    Jose Anderson MD  Cardiology   [facility] - Emergency Dept

## 2022-07-18 NOTE — ED NOTES
Pt HR remains 34, IV dopamine started @ 5mcg/hr (18.1ml/hr); pt AAOx4 and answers questions appropriately; cardiac monitor and zoll monitor connected, continuous oximetry and BP being monitored

## 2022-07-18 NOTE — ASSESSMENT & PLAN NOTE
BP currently elevated  - continue amlodipine, hydralazine  - hold home lisinopril, hold clonidine patch

## 2022-07-18 NOTE — CARE UPDATE
[facility] [place]  Nurses Note -- 4 Eyes      7/18/2022       Skin assessed on: Admit      [x] No Pressure Injuries Present    [x]Prevention Measures Documented    [] Yes LDA  for Pressure Injury Previously documented     [] Yes New Pressure Injury Discovered   [] LDA for New Pressure Injury Added      Attending RN:  Pepper Monroy RN     Second RN:  Imelda Gonzalez RN

## 2022-07-18 NOTE — HPI
Mr Butch Mcdaniel is a 66 y.o. man who presents with dizziness. He is currently altered and minimally able to give history. He says that for the last 3-4 days he has felt poorly, more fatigued than usual. He says this started after he received a shot. He then veers off, talking about fig trees. He is chewing on a piece of green coban wrap that he says is a fig. He is restless in bed, pulling at IV lines and BP cuff.     Called his wife Deann for additional history. No answer; left message.     In ED, he was noted to have bradycardia with junctional rhythm. He was also noted to have hyperkalemia with SHAUNA. He was shifted and started on dopamine. He was admitted to ICU with Cardiology and Nephrology consults.     Upon review of chart, he takes benzodiazepines, opioids, gabapentin, tizanidine, and buspar at home.

## 2022-07-19 PROBLEM — I48.91 NEW ONSET A-FIB: Status: ACTIVE | Noted: 2022-07-19

## 2022-07-19 PROBLEM — R50.9 FEVER: Status: ACTIVE | Noted: 2022-07-19

## 2022-07-19 LAB
ALBUMIN SERPL BCP-MCNC: 3.5 G/DL (ref 3.5–5.2)
ALP SERPL-CCNC: 48 U/L (ref 55–135)
ALT SERPL W/O P-5'-P-CCNC: 24 U/L (ref 10–44)
ANION GAP SERPL CALC-SCNC: 9 MMOL/L (ref 8–16)
AST SERPL-CCNC: 26 U/L (ref 10–40)
BASOPHILS # BLD AUTO: 0.03 K/UL (ref 0–0.2)
BASOPHILS NFR BLD: 0.2 % (ref 0–1.9)
BILIRUB SERPL-MCNC: 2.2 MG/DL (ref 0.1–1)
BUN SERPL-MCNC: 32 MG/DL (ref 8–23)
CALCIUM SERPL-MCNC: 8.8 MG/DL (ref 8.7–10.5)
CHLORIDE SERPL-SCNC: 105 MMOL/L (ref 95–110)
CO2 SERPL-SCNC: 25 MMOL/L (ref 23–29)
CREAT SERPL-MCNC: 1.9 MG/DL (ref 0.5–1.4)
CRP SERPL-MCNC: 144.1 MG/L (ref 0–8.2)
DIFFERENTIAL METHOD: ABNORMAL
EOSINOPHIL # BLD AUTO: 0 K/UL (ref 0–0.5)
EOSINOPHIL NFR BLD: 0.1 % (ref 0–8)
ERYTHROCYTE [DISTWIDTH] IN BLOOD BY AUTOMATED COUNT: 12.4 % (ref 11.5–14.5)
EST. GFR  (AFRICAN AMERICAN): 41 ML/MIN/1.73 M^2
EST. GFR  (NON AFRICAN AMERICAN): 36 ML/MIN/1.73 M^2
FOLATE SERPL-MCNC: 5.6 NG/ML (ref 4–24)
GLUCOSE SERPL-MCNC: 141 MG/DL (ref 70–110)
HAV IGM SERPL QL IA: NEGATIVE
HBV CORE IGM SERPL QL IA: NEGATIVE
HBV CORE IGM SERPL QL IA: NEGATIVE
HBV SURFACE AG SERPL QL IA: NEGATIVE
HCT VFR BLD AUTO: 34.7 % (ref 40–54)
HCV AB SERPL QL IA: POSITIVE
HGB BLD-MCNC: 11.6 G/DL (ref 14–18)
IMM GRANULOCYTES # BLD AUTO: 0.06 K/UL (ref 0–0.04)
IMM GRANULOCYTES NFR BLD AUTO: 0.4 % (ref 0–0.5)
LACTATE SERPL-SCNC: 1.9 MMOL/L (ref 0.5–2.2)
LYMPHOCYTES # BLD AUTO: 1.8 K/UL (ref 1–4.8)
LYMPHOCYTES NFR BLD: 12.1 % (ref 18–48)
MAGNESIUM SERPL-MCNC: 1.9 MG/DL (ref 1.6–2.6)
MCH RBC QN AUTO: 32.5 PG (ref 27–31)
MCHC RBC AUTO-ENTMCNC: 33.4 G/DL (ref 32–36)
MCV RBC AUTO: 97 FL (ref 82–98)
MONOCYTES # BLD AUTO: 1 K/UL (ref 0.3–1)
MONOCYTES NFR BLD: 6.9 % (ref 4–15)
NEUTROPHILS # BLD AUTO: 11.6 K/UL (ref 1.8–7.7)
NEUTROPHILS NFR BLD: 80.3 % (ref 38–73)
NRBC BLD-RTO: 0 /100 WBC
PHOSPHATE SERPL-MCNC: 2.8 MG/DL (ref 2.7–4.5)
PLATELET # BLD AUTO: ABNORMAL K/UL (ref 150–450)
PLATELET BLD QL SMEAR: ABNORMAL
PMV BLD AUTO: ABNORMAL FL (ref 9.2–12.9)
POCT GLUCOSE: 105 MG/DL (ref 70–110)
POCT GLUCOSE: 107 MG/DL (ref 70–110)
POCT GLUCOSE: 133 MG/DL (ref 70–110)
POCT GLUCOSE: 145 MG/DL (ref 70–110)
POCT GLUCOSE: 149 MG/DL (ref 70–110)
POTASSIUM SERPL-SCNC: 5.2 MMOL/L (ref 3.5–5.1)
PROCALCITONIN SERPL IA-MCNC: 0.42 NG/ML
PROT SERPL-MCNC: 6.5 G/DL (ref 6–8.4)
RBC # BLD AUTO: 3.57 M/UL (ref 4.6–6.2)
SODIUM SERPL-SCNC: 139 MMOL/L (ref 136–145)
VIT B12 SERPL-MCNC: 512 PG/ML (ref 210–950)
WBC # BLD AUTO: 14.41 K/UL (ref 3.9–12.7)

## 2022-07-19 PROCEDURE — 80053 COMPREHEN METABOLIC PANEL: CPT | Performed by: HOSPITALIST

## 2022-07-19 PROCEDURE — 87040 BLOOD CULTURE FOR BACTERIA: CPT | Mod: 59 | Performed by: STUDENT IN AN ORGANIZED HEALTH CARE EDUCATION/TRAINING PROGRAM

## 2022-07-19 PROCEDURE — A4216 STERILE WATER/SALINE, 10 ML: HCPCS | Performed by: HOSPITALIST

## 2022-07-19 PROCEDURE — 86140 C-REACTIVE PROTEIN: CPT | Performed by: STUDENT IN AN ORGANIZED HEALTH CARE EDUCATION/TRAINING PROGRAM

## 2022-07-19 PROCEDURE — 83735 ASSAY OF MAGNESIUM: CPT | Performed by: HOSPITALIST

## 2022-07-19 PROCEDURE — 25000003 PHARM REV CODE 250: Performed by: HOSPITALIST

## 2022-07-19 PROCEDURE — 87522 HEPATITIS C REVRS TRNSCRPJ: CPT | Performed by: STUDENT IN AN ORGANIZED HEALTH CARE EDUCATION/TRAINING PROGRAM

## 2022-07-19 PROCEDURE — 94640 AIRWAY INHALATION TREATMENT: CPT

## 2022-07-19 PROCEDURE — 25000242 PHARM REV CODE 250 ALT 637 W/ HCPCS: Performed by: NURSE PRACTITIONER

## 2022-07-19 PROCEDURE — 99233 PR SUBSEQUENT HOSPITAL CARE,LEVL III: ICD-10-PCS | Mod: ,,, | Performed by: INTERNAL MEDICINE

## 2022-07-19 PROCEDURE — 93005 ELECTROCARDIOGRAM TRACING: CPT

## 2022-07-19 PROCEDURE — 63600175 PHARM REV CODE 636 W HCPCS: Performed by: STUDENT IN AN ORGANIZED HEALTH CARE EDUCATION/TRAINING PROGRAM

## 2022-07-19 PROCEDURE — 25000003 PHARM REV CODE 250: Performed by: INTERNAL MEDICINE

## 2022-07-19 PROCEDURE — 84100 ASSAY OF PHOSPHORUS: CPT | Performed by: HOSPITALIST

## 2022-07-19 PROCEDURE — 99900035 HC TECH TIME PER 15 MIN (STAT)

## 2022-07-19 PROCEDURE — 11000001 HC ACUTE MED/SURG PRIVATE ROOM

## 2022-07-19 PROCEDURE — 85025 COMPLETE CBC W/AUTO DIFF WBC: CPT | Performed by: HOSPITALIST

## 2022-07-19 PROCEDURE — 25000003 PHARM REV CODE 250: Performed by: STUDENT IN AN ORGANIZED HEALTH CARE EDUCATION/TRAINING PROGRAM

## 2022-07-19 PROCEDURE — 93010 ELECTROCARDIOGRAM REPORT: CPT | Mod: ,,, | Performed by: INTERNAL MEDICINE

## 2022-07-19 PROCEDURE — 27000221 HC OXYGEN, UP TO 24 HOURS

## 2022-07-19 PROCEDURE — 93010 EKG 12-LEAD: ICD-10-PCS | Mod: 76,,, | Performed by: INTERNAL MEDICINE

## 2022-07-19 PROCEDURE — 83605 ASSAY OF LACTIC ACID: CPT | Performed by: STUDENT IN AN ORGANIZED HEALTH CARE EDUCATION/TRAINING PROGRAM

## 2022-07-19 PROCEDURE — 84145 PROCALCITONIN (PCT): CPT | Performed by: STUDENT IN AN ORGANIZED HEALTH CARE EDUCATION/TRAINING PROGRAM

## 2022-07-19 PROCEDURE — 94761 N-INVAS EAR/PLS OXIMETRY MLT: CPT

## 2022-07-19 PROCEDURE — 99233 SBSQ HOSP IP/OBS HIGH 50: CPT | Mod: ,,, | Performed by: INTERNAL MEDICINE

## 2022-07-19 PROCEDURE — 63600175 PHARM REV CODE 636 W HCPCS: Performed by: INTERNAL MEDICINE

## 2022-07-19 PROCEDURE — 93010 ELECTROCARDIOGRAM REPORT: CPT | Mod: 76,,, | Performed by: INTERNAL MEDICINE

## 2022-07-19 PROCEDURE — 36415 COLL VENOUS BLD VENIPUNCTURE: CPT | Performed by: STUDENT IN AN ORGANIZED HEALTH CARE EDUCATION/TRAINING PROGRAM

## 2022-07-19 RX ORDER — HYDRALAZINE HYDROCHLORIDE 25 MG/1
50 TABLET, FILM COATED ORAL 3 TIMES DAILY
Status: DISCONTINUED | OUTPATIENT
Start: 2022-07-19 | End: 2022-07-20

## 2022-07-19 RX ORDER — ENOXAPARIN SODIUM 100 MG/ML
100 INJECTION SUBCUTANEOUS EVERY 12 HOURS
Status: DISCONTINUED | OUTPATIENT
Start: 2022-07-19 | End: 2022-07-20

## 2022-07-19 RX ORDER — CARVEDILOL 12.5 MG/1
12.5 TABLET ORAL 2 TIMES DAILY
Status: DISCONTINUED | OUTPATIENT
Start: 2022-07-19 | End: 2022-07-19

## 2022-07-19 RX ORDER — HYDRALAZINE HYDROCHLORIDE 25 MG/1
50 TABLET, FILM COATED ORAL 3 TIMES DAILY
Status: DISCONTINUED | OUTPATIENT
Start: 2022-07-19 | End: 2022-07-19

## 2022-07-19 RX ORDER — DIAZEPAM 5 MG/1
5 TABLET ORAL EVERY 8 HOURS
Status: DISCONTINUED | OUTPATIENT
Start: 2022-07-19 | End: 2022-07-21 | Stop reason: HOSPADM

## 2022-07-19 RX ORDER — CARVEDILOL 6.25 MG/1
6.25 TABLET ORAL ONCE
Status: COMPLETED | OUTPATIENT
Start: 2022-07-19 | End: 2022-07-19

## 2022-07-19 RX ORDER — ACETAMINOPHEN 500 MG
1000 TABLET ORAL EVERY 8 HOURS PRN
Status: DISCONTINUED | OUTPATIENT
Start: 2022-07-19 | End: 2022-07-21 | Stop reason: HOSPADM

## 2022-07-19 RX ORDER — HYDRALAZINE HYDROCHLORIDE 25 MG/1
100 TABLET, FILM COATED ORAL 3 TIMES DAILY
Status: DISCONTINUED | OUTPATIENT
Start: 2022-07-19 | End: 2022-07-19

## 2022-07-19 RX ORDER — ACETAMINOPHEN 325 MG/1
325 TABLET ORAL ONCE
Status: COMPLETED | OUTPATIENT
Start: 2022-07-19 | End: 2022-07-19

## 2022-07-19 RX ORDER — HYDRALAZINE HYDROCHLORIDE 25 MG/1
50 TABLET, FILM COATED ORAL ONCE
Status: COMPLETED | OUTPATIENT
Start: 2022-07-19 | End: 2022-07-19

## 2022-07-19 RX ORDER — GUAIFENESIN/DEXTROMETHORPHAN 100-10MG/5
5 SYRUP ORAL EVERY 4 HOURS PRN
Status: DISCONTINUED | OUTPATIENT
Start: 2022-07-19 | End: 2022-07-21 | Stop reason: HOSPADM

## 2022-07-19 RX ORDER — CLONIDINE 0.2 MG/24H
1 PATCH, EXTENDED RELEASE TRANSDERMAL
Status: DISCONTINUED | OUTPATIENT
Start: 2022-07-19 | End: 2022-07-19

## 2022-07-19 RX ORDER — CARVEDILOL 12.5 MG/1
25 TABLET ORAL 2 TIMES DAILY
Status: DISCONTINUED | OUTPATIENT
Start: 2022-07-20 | End: 2022-07-21 | Stop reason: HOSPADM

## 2022-07-19 RX ADMIN — Medication 10 ML: at 02:07

## 2022-07-19 RX ADMIN — HYDRALAZINE HYDROCHLORIDE 50 MG: 25 TABLET, FILM COATED ORAL at 04:07

## 2022-07-19 RX ADMIN — ACETAMINOPHEN 325 MG: 325 TABLET ORAL at 07:07

## 2022-07-19 RX ADMIN — HYDRALAZINE HYDROCHLORIDE 50 MG: 25 TABLET, FILM COATED ORAL at 09:07

## 2022-07-19 RX ADMIN — MUPIROCIN: 20 OINTMENT TOPICAL at 09:07

## 2022-07-19 RX ADMIN — AMLODIPINE BESYLATE 10 MG: 5 TABLET ORAL at 09:07

## 2022-07-19 RX ADMIN — SODIUM CHLORIDE: 0.9 INJECTION, SOLUTION INTRAVENOUS at 10:07

## 2022-07-19 RX ADMIN — MUPIROCIN: 20 OINTMENT TOPICAL at 08:07

## 2022-07-19 RX ADMIN — POLYETHYLENE GLYCOL 3350 17 G: 17 POWDER, FOR SOLUTION ORAL at 09:07

## 2022-07-19 RX ADMIN — FLUTICASONE PROPIONATE 50 MCG: 50 SPRAY, METERED NASAL at 08:07

## 2022-07-19 RX ADMIN — ENOXAPARIN SODIUM 100 MG: 100 INJECTION SUBCUTANEOUS at 06:07

## 2022-07-19 RX ADMIN — ACETAMINOPHEN 650 MG: 325 TABLET ORAL at 06:07

## 2022-07-19 RX ADMIN — DEXMEDETOMIDINE HYDROCHLORIDE 0.3 MCG/KG/HR: 4 INJECTION, SOLUTION INTRAVENOUS at 06:07

## 2022-07-19 RX ADMIN — HYDRALAZINE HYDROCHLORIDE 50 MG: 25 TABLET, FILM COATED ORAL at 02:07

## 2022-07-19 RX ADMIN — CARVEDILOL 12.5 MG: 12.5 TABLET, FILM COATED ORAL at 06:07

## 2022-07-19 RX ADMIN — SENNOSIDES 1 TABLET: 8.6 TABLET, FILM COATED ORAL at 09:07

## 2022-07-19 RX ADMIN — Medication 10 ML: at 06:07

## 2022-07-19 RX ADMIN — DIAZEPAM 5 MG: 5 TABLET ORAL at 10:07

## 2022-07-19 RX ADMIN — Medication 10 ML: at 10:07

## 2022-07-19 RX ADMIN — IPRATROPIUM BROMIDE AND ALBUTEROL SULFATE 3 ML: 2.5; .5 SOLUTION RESPIRATORY (INHALATION) at 09:07

## 2022-07-19 RX ADMIN — HYDRALAZINE HYDROCHLORIDE 25 MG: 25 TABLET, FILM COATED ORAL at 09:07

## 2022-07-19 RX ADMIN — ATORVASTATIN CALCIUM 40 MG: 40 TABLET, FILM COATED ORAL at 09:07

## 2022-07-19 RX ADMIN — CARVEDILOL 6.25 MG: 6.25 TABLET, FILM COATED ORAL at 07:07

## 2022-07-19 RX ADMIN — VANCOMYCIN HYDROCHLORIDE 2000 MG: 500 INJECTION, POWDER, LYOPHILIZED, FOR SOLUTION INTRAVENOUS at 07:07

## 2022-07-19 RX ADMIN — CEFTRIAXONE 2 G: 2 INJECTION, SOLUTION INTRAVENOUS at 07:07

## 2022-07-19 RX ADMIN — DIAZEPAM 5 MG: 5 TABLET ORAL at 09:07

## 2022-07-19 NOTE — NURSING
/82 HR 66. Scheduled 50mg po Hydralazine administered. Repeat VS: /86 HR 94. Dr. Jimenez notified. Scheduled Hydralazine modified. Will continue to monitor.   Telephone Encounter by Shazia Cohen at 01/15/18 03:47 PM     Author:  Shazia Cohen Service:  (none) Author Type:  Certified Nursing Assistant     Filed:  01/15/18 03:50 PM Encounter Date:  1/15/2018 Status:  Signed     :  Shazia Cohen (Certified Nursing Assistant)              BETSEYRAPHAEL LEIVA    Patient Age: 59 year old    ACCT STATUS:   MESSAGE:[GS1.1T]   Patient called for a refill on medication PSR submitted the refill request.  When scheduling a follow up appt with Dr Stallworth patient stated he's not happy with that doctor and would like to switch.  Please ask patient if he would still like to keep new appt for 1-30-18  Please advise[GS1.1M]  Electronically Signed by:    Shazia Cohen , 1/15/2018[GS1.2T]     Next and Last Visit with Provider and Department  Next visit with SARAI STALLWORTH is on 01/30/2018 at  8:50 AM in [facility]OLOGY [facility]  Next visit with UROLOGY is on 01/30/2018 at  8:50 AM in [facility]  Last visit with SARAI STALLWORTH was on 01/20/2017 at  9:30 AM in UROLOG[facility]  Last visit with UROLOGY was on 01/20/2017 at  9:30 AM in UROLOGY [facility]     WEIGHT AND HEIGHT: As of 12/26/2017 weight is 349 lbs.(158.305 kg).   BMI is 47.14 kg/(m^2) calculated from:     Height 6' 2.5\" (1.892 m) as of 2/11/17     Weight 372 lb (168.738 kg) as of 2/11/17      No Known Allergies  Current outpatient prescriptions       Medication  Sig Dispense Refill   • omeprazole (PRILOSEC) 20 MG Cap TAKE 2 CAPSULES DAILY 180 Cap 0   • Tamsulosin HCl 0.4 MG CAPS Take 2 Caps by mouth nightly. 180 Cap 1   • Sertraline HCl (ZOLOFT) 25 MG tablet Take 1 Tab by mouth 2 (two) times daily. 180 Tab 1   • lisinopril-hydrochlorothiazide (PRINZIDE,ZESTORETIC) 20-25 MG per tablet Take 1 Tab by mouth daily. 90 Tab 1   • metoprolol (LOPRESSOR) 100 MG tablet TAKE 1 TABLET TWICE A  Tab 1   • XARELTO 20 MG TABS TAKE 1 TABLET DAILY AFTER  DINNER 90 Each 1   • finasteride (PROSCAR) 5 MG tablet Take 1 Tab by mouth  daily. 90 Tab 2   • Multiple Vitamins-Iron (MULTI-VITAMIN/IRON) TABS Take  by mouth.       • aspirin 81 MG tablet Take 81 mg by mouth daily.     • Glucosamine HCl--750 MG OR TABS 1 BID 60 Tab 0      PHARMACY to use:           Pharmacy preference(s) on file:    [facility]  [address])  [facility] [address]    CALL BACK INFO:[GS1.1T] Ok to leave response (including medical information) on answering machine[GS1.1M]  ROUTING:[GS1.1T] Patient's physician/staff[GS1.1M]         PCP: Jaja Foley MD         INS: Payor: [employer] / Plan: *No Plan* / Product Type: *No Product type* / Note: This is the primary coverage, but no account was found for this location or the patient's primary location.   ADDRESS:  [address] [place] [id number][GS1.1T]         Revision History        User Key Date/Time User Provider Type Action    > GS1.2 01/15/18 03:50 PM Shazia Cohen Certified Nursing Assistant Sign     GS1.1 01/15/18 03:47 PM Shazia Cohen Certified Nursing Assistant     M - Manual, T - Template

## 2022-07-19 NOTE — NURSING TRANSFER
Nursing Transfer Note      7/19/2022    1325    Reason patient is being transferred: stepdown    Transfer To: rm [id number]    Transfer via bed    Transfer with   O2, cardiac monitoring    Transported by ICU RN/transporter    Medicines sent: yes    Any special needs or follow-up needed: none    Chart send with patient: Yes    Notified: spouse, son    Patient reassessed at:  07/19/2022  1300          Upon arrival to floor: cardiac monitor applied, patient oriented to room, call bell in reach and bed in lowest position/IVf's to pump and infusing/nurse at bedside

## 2022-07-19 NOTE — SUBJECTIVE & OBJECTIVE
Past Medical History:   Diagnosis Date    Anxiety     Cataract, bilateral 09/11/2018    Degenerative disc disease     Depression     Diabetes mellitus type II, controlled     ETOH abuse     Eye injury as a child    stick hit eye ? 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 both eyes 10/08/2019    Personal history of colonic polyps        Past Surgical History:   Procedure Laterality Date    APPENDECTOMY      COLONOSCOPY N/A 5/10/2017    Procedure: COLONOSCOPY;  Surgeon: Shantanu Marley MD;  Location: [facility];  Service: Endoscopy;  Laterality: N/A;    POSTERIOR FUSION OF CERVICAL SPINE WITH LAMINECTOMY N/A 10/3/2021    Procedure: LAMINECTOMY, SPINE, CERVICAL, WITH POSTERIOR FUSION C2-T2;  Surgeon: Ana Rosa Geller MD;  Location: [facility] OR [address] [place] [address];  Service: Neurosurgery;  Laterality: N/A;       Review of patient's allergies indicates:  No Known Allergies    No current facility-administered medications on file prior to encounter.     Current Outpatient Medications on File Prior to Encounter   Medication Sig    ALPRAZolam (XANAX) 1 MG tablet TAKE ONE TABLET BY MOUTH TWICE DAILY AS NEEDED FOR ANXIETY    amLODIPine (NORVASC) 10 MG tablet Take 1 tablet (10 mg total) by mouth every evening.    atorvastatin (LIPITOR) 40 MG tablet Take 1 tablet (40 mg total) by mouth every evening.    busPIRone (BUSPAR) 30 MG Tab Take 1 tablet (30 mg total) by mouth 2 (two) times daily.    citalopram (CELEXA) 40 MG tablet TAKE ONE TABLET BY MOUTH ONCE A DAY    cloNIDine 0.2 mg/24 hr td ptwk (CATAPRES) 0.2 mg/24 hr Place 1 patch onto the skin every 7 days.    fluticasone (FLONASE) 50 mcg/actuation nasal spray INHALE 1 SPRAY IN EACH NOSTRIL EVERY DAY    fluticasone-salmeterol diskus inhaler 100-50 mcg Inhale 1 puff into the lungs.    gabapentin (NEURONTIN) 600 MG tablet Take 600 mg by mouth 3 (three) times  "daily.    hydrALAZINE (APRESOLINE) 25 MG tablet Take 1 tablet (25 mg total) by mouth 3 (three) times daily.    inhalation spacing device Use as directed for inhalation.    lisinopriL (PRINIVIL,ZESTRIL) 40 MG tablet Take 1 tablet (40 mg total) by mouth once daily.    metFORMIN (GLUCOPHAGE) 500 MG tablet Take 1 tablet (500 mg total) by mouth 2 (two) times daily with meals.    naloxone (NARCAN) 4 mg/actuation Spry 4mg by nasal route as needed for opioid overdose; may repeat every 2-3 minutes in alternating nostrils until medical help arrives. Call 911    needle, disp, 22 G 22 gauge x 1" Ndle Testosterone injection every 2 weeks    nicotine (NICODERM CQ) 14 mg/24 hr PLACE ONE PATCH ONTO SKIN ONCE A DAY    nicotine (NICODERM CQ) 21 mg/24 hr Place 1 patch onto the skin once daily.    oxyCODONE-acetaminophen (PERCOCET)  mg per tablet Take 1 tablet by mouth every 6 (six) hours as needed.    POLYETHYLENE GLYCOL 3350 (MIRALAX ORAL) Take 1 application by mouth.    PROAIR HFA 90 mcg/actuation inhaler INHALE TWO PUFFS BY MOUTH EVERY 6 HOURS AS NEEDED FOR WHEEZING (rescue)    senna (SENOKOT) 8.6 mg tablet Take 1 tablet by mouth 2 (two) times a day.    syringe, disposable, (BD LUER-STARLA SYRINGE) 1 mL Syrg Testosterone injection every 2 weeks    testosterone cypionate (DEPOTESTOTERONE CYPIONATE) 200 mg/mL injection Inject 0.75 mLs (150 mg total) into the muscle every 14 (fourteen) days.    tiZANidine (ZANAFLEX) 4 MG tablet Take 4-8 mg by mouth nightly as needed.    verapamiL (CALAN-SR) 180 MG CR tablet Take 1 tablet (180 mg total) by mouth 2 (two) times daily.     Family History       Problem Relation (Age of Onset)    Alcohol abuse Father    Diabetes Son    Heart disease Mother, Father    No Known Problems Sister, Brother, Maternal Aunt, Maternal Uncle, Paternal Aunt, Paternal Uncle, Maternal Grandmother, Maternal Grandfather, Paternal Grandmother, Paternal Grandfather          Tobacco Use    Smoking status: Former Smoker     " Packs/day: 0.50     Years: 32.00     Pack years: 16.00     Types: Cigarettes    Smokeless tobacco: Current User    Tobacco comment: weed    Substance and Sexual Activity    Alcohol use: No     Alcohol/week: 0.0 standard drinks    Drug use: No    Sexual activity: Yes     Partners: Female     Comment:  with children, disabled      Review of Systems   Gastrointestinal:  Negative for melena.   Genitourinary:  Negative for hematuria.   Objective:     Vital Signs (Most Recent):  Temp: 98.6 °F (37 °C) (07/19/22 1330)  Pulse: 66 (07/19/22 1330)  Resp: 18 (07/19/22 1330)  BP: (!) 187/82 (07/19/22 1330)  SpO2: 95 % (07/19/22 1330)   Vital Signs (24h Range):  Temp:  [97.7 °F (36.5 °C)-98.6 °F (37 °C)] 98.6 °F (37 °C)  Pulse:  [] 66  Resp:  [18-64] 18  SpO2:  [82 %-100 %] 95 %  BP: ()/() 187/82     Weight: 98.5 kg (217 lb 2.5 oz)  Body mass index is 30.29 kg/m².    SpO2: 95 %  O2 Device (Oxygen Therapy): nasal cannula      Intake/Output Summary (Last 24 hours) at 7/19/2022 1502  Last data filed at 7/19/2022 1300  Gross per 24 hour   Intake 1574.74 ml   Output 5100 ml   Net -3525.26 ml       Lines/Drains/Airways       Central Venous Catheter Line  Duration             Trialysis (Dialysis) Catheter 07/18/22 1443 right internal jugular 1 day              Peripheral Intravenous Line  Duration                  Peripheral IV - Single Lumen 07/18/22 0252 20 G Right Antecubital 1 day                    Physical Exam  Constitutional:       General: He is not in acute distress.     Appearance: He is well-developed. He is obese. He is not ill-appearing, toxic-appearing or diaphoretic.   HENT:      Head: Normocephalic and atraumatic.   Eyes:      General: No scleral icterus.     Extraocular Movements: Extraocular movements intact.      Conjunctiva/sclera: Conjunctivae normal.      Pupils: Pupils are equal, round, and reactive to light.   Neck:      Thyroid: No thyromegaly.      Vascular: No JVD.       Trachea: No tracheal deviation.   Cardiovascular:      Rate and Rhythm: Normal rate and regular rhythm.      Heart sounds: S1 normal and S2 normal. No murmur heard.    No friction rub. No gallop.   Pulmonary:      Effort: Pulmonary effort is normal. No respiratory distress.      Breath sounds: Normal breath sounds. No stridor. No wheezing, rhonchi or rales.   Chest:      Chest wall: No tenderness.   Abdominal:      General: There is no distension.      Palpations: Abdomen is soft.   Musculoskeletal:         General: No swelling or tenderness. Normal range of motion.      Cervical back: Normal range of motion and neck supple. No rigidity.      Right lower leg: No edema.      Left lower leg: No edema.   Skin:     General: Skin is warm and dry.      Coloration: Skin is not jaundiced.   Neurological:      General: No focal deficit present.      Mental Status: He is alert and oriented to person, place, and time.      Cranial Nerves: No cranial nerve deficit.   Psychiatric:         Mood and Affect: Mood normal.         Behavior: Behavior normal.       Current Medications:   sodium chloride 0.9%   Intravenous Once    amLODIPine  10 mg Oral QHS    atorvastatin  40 mg Oral QHS    diazePAM  5 mg Oral Q8H    fluticasone propionate  1 spray Each Nostril Daily    hydrALAZINE  50 mg Oral TID    mupirocin   Nasal BID    polyethylene glycol  17 g Oral Daily    senna  1 tablet Oral BID    sodium chloride 0.9%  10 mL Intravenous Q8H      sodium chloride 0.9% 50 mL/hr at 07/19/22 1200     sodium chloride 0.9%, acetaminophen, albuterol-ipratropium, dextrose 10%, dextrose 10%, glucagon (human recombinant), glucose, glucose, heparin (porcine), insulin aspart U-100, lorazepam, melatonin, naloxone, sodium chloride 0.9%    Laboratory (all labs reviewed):  CBC:  Recent Labs   Lab 10/08/21  0713 10/09/21  0952 02/16/22  0820 07/18/22  0252 07/18/22  0319 07/19/22  0518   WBC 15.93 H 23.71 H 10.16 18.75 H  --  14.41 H   Hemoglobin 11.8 L 13.2  L 12.7 L 13.8 L  --  11.6 L   POC Hematocrit  --   --   --   --  42  --    Hematocrit 33.7 L 38.7 L 40.9 42.3  --  34.7 L   Platelets 185 228 164 231  --  SEE COMMENT         CHEMISTRIES:  Recent Labs   Lab 10/04/21  0211 10/05/21  0148 10/06/21  0423 10/07/21  0812 02/16/22  0820 07/18/22  0252 07/18/22  1007 07/18/22  1409 07/19/22  0518   Glucose 169 H 155 H 184 H   < > 97 188 H 150 H 122 H 141 H   Sodium 138 134 L 134 L   < > 138 135 L 137 137 139   Potassium 4.7 5.0 5.3 H   < > 5.0 7.6 HH 6.2 H 5.9 H 5.2 H   BUN 23 33 H 33 H   < > 15 62 H 69 H 69 H 32 H   Creatinine 1.3 1.0 1.0   < > 1.1 5.0 H 4.8 H 4.5 H 1.9 H   eGFR if  >60.0 >60.0 >60.0   < > >60.0 13 A 13 A 15 A 41 A   eGFR if non African American 56.5 A >60.0 >60.0   < > >60.0 11 A 12 A 13 A 36 A   Calcium 7.5 L 7.6 L 7.9 L   < > 8.8 8.9 9.4 10.3 8.8   Magnesium 1.9 2.3 2.8 H  --   --   --   --   --  1.9    < > = values in this interval not displayed.         CARDIAC BIOMARKERS:  Recent Labs   Lab 07/18/22  0252 07/18/22  1007   CPK  --  129   Troponin I 0.019  --          COAGS:  Recent Labs   Lab 10/01/21  1610   INR 1.0         LIPIDS/LFTS:  Recent Labs   Lab 10/03/19  0940 01/06/20  1038 09/02/20  1010 12/03/20  1058 02/23/21  1040 08/07/21  0839 10/01/21  1610 02/16/22  0820 07/18/22  0252 07/19/22  0518   Cholesterol 117 L  --  93 L  --  102 L 113 L 106 L  --   --   --    Triglycerides 236 H  --  103  --  165 H 103 124  --   --   --    HDL 30 L  --  30 L  --  30 L 35 L 31 L  --   --   --    LDL Cholesterol 39.8 L  --  42.4 L  --  39.0 L 57.4 L 50.2 L  --   --   --    Non-HDL Cholesterol 87  --  63  --  72 78 75  --   --   --    AST 27   < > 21   < > 18 17 21 18 30 26   ALT 29   < > 16   < > 19 19 22 18 38 24    < > = values in this interval not displayed.         BNP:  Recent Labs   Lab 07/18/22  0252    H         TSH:  Recent Labs   Lab 01/08/20  1153 10/01/21  1610 07/18/22  0252   TSH 2.552 1.897 5.291 H         Free  T4:  Recent Labs   Lab 01/08/20  1153 07/18/22  0252   Free T4 0.87 0.82         Diagnostic Results:  ECG (personally reviewed and interpreted tracing(s)):  7/18/22 0229 junct 34, peaked t waves    Chest X-Ray (personally reviewed and interpreted image(s)): 7/18/22 NAD, aortic atherosclerosis    Echo: 7/18/22 (images personally reviewed and interpreted)  The left ventricle is normal in size with moderate concentric hypertrophy and normal systolic function.  The estimated ejection fraction is 70%.  Normal right ventricular size with normal right ventricular systolic function.  The estimated PA systolic pressure is 58 mmHg.  There is moderate pulmonary hypertension.    Holter 3/10/22  Heart rates varied between 43 and 109 BPM with an average of 62 BPM.  Rare PVCs and PACs. Five atrial pairs. One three beat atrial run.

## 2022-07-19 NOTE — NURSING
Pt stated he is burning up, perspirating. VS: T 101.5, /94, RR 22, , 96% on 2L NC. Prn Tylenol given. Mariana NP notified. Ordered 12 lead EKG shows Afib with RVR HR 140s.    Dr. Jimenez aware, New orders placed for po Coreg, blood cultures, lactic stat, IV Abx.

## 2022-07-19 NOTE — PROGRESS NOTES
Date of Admission:7/18/2022    SUBJECTIVE:notes more alert now  Wife at bedside    Current Facility-Administered Medications   Medication    0.9%  NaCl infusion    0.9%  NaCl infusion    0.9%  NaCl infusion    acetaminophen tablet 650 mg    albuterol-ipratropium 2.5 mg-0.5 mg/3 mL nebulizer solution 3 mL    amLODIPine tablet 10 mg    atorvastatin tablet 40 mg    dextrose 10% bolus 125 mL    dextrose 10% bolus 250 mL    diazePAM tablet 5 mg    fluticasone propionate 50 mcg/actuation nasal spray 50 mcg    glucagon (human recombinant) injection 1 mg    glucose chewable tablet 16 g    glucose chewable tablet 24 g    heparin (porcine) injection 3,200 Units    hydrALAZINE tablet 50 mg    insulin aspart U-100 pen 0-5 Units    lorazepam injection 2 mg    melatonin tablet 6 mg    mupirocin 2 % ointment    naloxone 0.4 mg/mL injection 0.02 mg    polyethylene glycol packet 17 g    senna tablet 1 tablet    sodium chloride 0.9% bolus 250 mL    sodium chloride 0.9% flush 10 mL       Wt Readings from Last 3 Encounters:   07/19/22 98.5 kg (217 lb 2.5 oz)   05/02/22 95.7 kg (210 lb 13.9 oz)   04/21/22 97.2 kg (214 lb 4.6 oz)     Temp Readings from Last 3 Encounters:   07/19/22 98.6 °F (37 °C) (Oral)   05/02/22 98.2 °F (36.8 °C) (Oral)   04/21/22 98.8 °F (37.1 °C) (Oral)     BP Readings from Last 3 Encounters:   07/19/22 (!) 187/82   05/02/22 (!) 150/60   04/21/22 (!) 107/57     Pulse Readings from Last 3 Encounters:   07/19/22 66   05/02/22 91   04/21/22 70       Intake/Output Summary (Last 24 hours) at 7/19/2022 1549  Last data filed at 7/19/2022 1400  Gross per 24 hour   Intake 1814.74 ml   Output 5100 ml   Net -3285.26 ml       PE:  GEN:wd male in nad  HEENT:ncat,eomi,mm  CVS:s1s2 regular  PULM:ctab  ABD:bs, soft  EXT:no leg edema  NEURO:awake, alert    Recent Labs   Lab 07/19/22  0518   *      K 5.2*      CO2 25   BUN 32*   CREATININE 1.9*   CALCIUM 8.8   MG 1.9       Lab Results    Component Value Date    CALCIUM 8.8 07/19/2022    PHOS 2.8 07/19/2022       Recent Labs   Lab 07/19/22  0518   WBC 14.41*   RBC 3.57*   HGB 11.6*   HCT 34.7*   PLT SEE COMMENT   MCV 97   MCH 32.5*   MCHC 33.4           A/P:  1.efrain. hold hd today. Looks better. Following.  2. Hyperkalemia. Better. Hr better.  3.proteinuria.   4.dm2. following sugars.  6.bradycardia. better.  7.lactic acidosis. Resolved.  8.htn. no ace/arb.

## 2022-07-19 NOTE — PLAN OF CARE
Problem: Adult Inpatient Plan of Care  Goal: Plan of Care Review  Outcome: Ongoing, Progressing  Goal: Patient-Specific Goal (Individualized)  Outcome: Ongoing, Progressing  Goal: Absence of Hospital-Acquired Illness or Injury  Outcome: Ongoing, Progressing  Goal: Optimal Comfort and Wellbeing  Outcome: Ongoing, Progressing  Goal: Readiness for Transition of Care  Outcome: Ongoing, Progressing     Problem: Device-Related Complication Risk (Hemodialysis)  Goal: Safe, Effective Therapy Delivery  Outcome: Met     Problem: Hemodynamic Instability (Hemodialysis)  Goal: Effective Tissue Perfusion  Outcome: Ongoing, Progressing     Problem: Infection (Hemodialysis)  Goal: Absence of Infection Signs and Symptoms  Outcome: Ongoing, Progressing     Problem: Diabetes Comorbidity  Goal: Blood Glucose Level Within Targeted Range  Outcome: Ongoing, Progressing     Problem: Fluid and Electrolyte Imbalance (Acute Kidney Injury/Impairment)  Goal: Fluid and Electrolyte Balance  Outcome: Ongoing, Progressing     Problem: Oral Intake Inadequate (Acute Kidney Injury/Impairment)  Goal: Optimal Nutrition Intake  Outcome: Ongoing, Progressing     Problem: Renal Function Impairment (Acute Kidney Injury/Impairment)  Goal: Effective Renal Function  Outcome: Ongoing, Progressing     Problem: Infection  Goal: Absence of Infection Signs and Symptoms  Outcome: Ongoing, Progressing     Problem: Fall Injury Risk  Goal: Absence of Fall and Fall-Related Injury  Outcome: Ongoing, Progressing     Problem: Restraint, Nonbehavioral (Nonviolent)  Goal: Absence of Harm or Injury  Outcome: Met     Problem: Skin Injury Risk Increased  Goal: Skin Health and Integrity  Outcome: Ongoing, Progressing

## 2022-07-19 NOTE — PROGRESS NOTES
[facility]  Cardiology  Progress Note    Patient Name: Butch Mcdaniel  MRN: [id number]  Admission Date: 7/18/2022  Hospital Length of Stay: 1 days  Code Status: Full Code   Attending Physician: Izaiah Jimenez MD   Primary Care Physician: Gabriel Evans MD  Expected Discharge Date:   Principal Problem:Hyperkalemia    Subjective:     Interval Hx: no cp/sob, sleeping but arousable.  Got HD yesterday with normalization of bradycardia.    Tele: SR 90s (personally reviewed and interpreted)        Past Medical History:   Diagnosis Date    Anxiety     Cataract, bilateral 09/11/2018    Degenerative disc disease     Depression     Diabetes mellitus type II, controlled     ETOH abuse     Eye injury as a child    stick hit eye ? 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 both eyes 10/08/2019    Personal history of colonic polyps        Past Surgical History:   Procedure Laterality Date    APPENDECTOMY      COLONOSCOPY N/A 5/10/2017    Procedure: COLONOSCOPY;  Surgeon: Shantanu Marley MD;  Location[facility];  Service: Endoscopy;  Laterality: N/A;    POSTERIOR FUSION OF CERVICAL SPINE WITH LAMINECTOMY N/A 10/3/2021    Procedure: LAMINECTOMY, SPINE, CERVICAL, WITH POSTERIOR FUSION C2-T2;  Surgeon: Ana Rosa Geller MD;  Location: [facility] OR [address] [place] [address];  Service: Neurosurgery;  Laterality: N/A;       Review of patient's allergies indicates:  No Known Allergies    No current facility-administered medications on file prior to encounter.     Current Outpatient Medications on File Prior to Encounter   Medication Sig    ALPRAZolam (XANAX) 1 MG tablet TAKE ONE TABLET BY MOUTH TWICE DAILY AS NEEDED FOR ANXIETY    amLODIPine (NORVASC) 10 MG tablet Take 1 tablet (10 mg total) by mouth every evening.    atorvastatin (LIPITOR) 40 MG tablet Take 1 tablet (40 mg total) by mouth  "every evening.    busPIRone (BUSPAR) 30 MG Tab Take 1 tablet (30 mg total) by mouth 2 (two) times daily.    citalopram (CELEXA) 40 MG tablet TAKE ONE TABLET BY MOUTH ONCE A DAY    cloNIDine 0.2 mg/24 hr td ptwk (CATAPRES) 0.2 mg/24 hr Place 1 patch onto the skin every 7 days.    fluticasone (FLONASE) 50 mcg/actuation nasal spray INHALE 1 SPRAY IN EACH NOSTRIL EVERY DAY    fluticasone-salmeterol diskus inhaler 100-50 mcg Inhale 1 puff into the lungs.    gabapentin (NEURONTIN) 600 MG tablet Take 600 mg by mouth 3 (three) times daily.    hydrALAZINE (APRESOLINE) 25 MG tablet Take 1 tablet (25 mg total) by mouth 3 (three) times daily.    inhalation spacing device Use as directed for inhalation.    lisinopriL (PRINIVIL,ZESTRIL) 40 MG tablet Take 1 tablet (40 mg total) by mouth once daily.    metFORMIN (GLUCOPHAGE) 500 MG tablet Take 1 tablet (500 mg total) by mouth 2 (two) times daily with meals.    naloxone (NARCAN) 4 mg/actuation Spry 4mg by nasal route as needed for opioid overdose; may repeat every 2-3 minutes in alternating nostrils until medical help arrives. Call 911    needle, disp, 22 G 22 gauge x 1" Ndle Testosterone injection every 2 weeks    nicotine (NICODERM CQ) 14 mg/24 hr PLACE ONE PATCH ONTO SKIN ONCE A DAY    nicotine (NICODERM CQ) 21 mg/24 hr Place 1 patch onto the skin once daily.    oxyCODONE-acetaminophen (PERCOCET)  mg per tablet Take 1 tablet by mouth every 6 (six) hours as needed.    POLYETHYLENE GLYCOL 3350 (MIRALAX ORAL) Take 1 application by mouth.    PROAIR HFA 90 mcg/actuation inhaler INHALE TWO PUFFS BY MOUTH EVERY 6 HOURS AS NEEDED FOR WHEEZING (rescue)    senna (SENOKOT) 8.6 mg tablet Take 1 tablet by mouth 2 (two) times a day.    syringe, disposable, (BD LUER-STARLA SYRINGE) 1 mL Syrg Testosterone injection every 2 weeks    testosterone cypionate (DEPOTESTOTERONE CYPIONATE) 200 mg/mL injection Inject 0.75 mLs (150 mg total) into the muscle every 14 (fourteen) days. "    tiZANidine (ZANAFLEX) 4 MG tablet Take 4-8 mg by mouth nightly as needed.    verapamiL (CALAN-SR) 180 MG CR tablet Take 1 tablet (180 mg total) by mouth 2 (two) times daily.     Family History       Problem Relation (Age of Onset)    Alcohol abuse Father    Diabetes Son    Heart disease Mother, Father    No Known Problems Sister, Brother, Maternal Aunt, Maternal Uncle, Paternal Aunt, Paternal Uncle, Maternal Grandmother, Maternal Grandfather, Paternal Grandmother, Paternal Grandfather          Tobacco Use    Smoking status: Former Smoker     Packs/day: 0.50     Years: 32.00     Pack years: 16.00     Types: Cigarettes    Smokeless tobacco: Current User    Tobacco comment: weed    Substance and Sexual Activity    Alcohol use: No     Alcohol/week: 0.0 standard drinks    Drug use: No    Sexual activity: Yes     Partners: Female     Comment:  with children, disabled      Review of Systems   Gastrointestinal:  Negative for melena.   Genitourinary:  Negative for hematuria.   Objective:     Vital Signs (Most Recent):  Temp: 98.6 °F (37 °C) (07/19/22 1330)  Pulse: 66 (07/19/22 1330)  Resp: 18 (07/19/22 1330)  BP: (!) 187/82 (07/19/22 1330)  SpO2: 95 % (07/19/22 1330)   Vital Signs (24h Range):  Temp:  [97.7 °F (36.5 °C)-98.6 °F (37 °C)] 98.6 °F (37 °C)  Pulse:  [] 66  Resp:  [18-64] 18  SpO2:  [82 %-100 %] 95 %  BP: ()/() 187/82     Weight: 98.5 kg (217 lb 2.5 oz)  Body mass index is 30.29 kg/m².    SpO2: 95 %  O2 Device (Oxygen Therapy): nasal cannula      Intake/Output Summary (Last 24 hours) at 7/19/2022 1502  Last data filed at 7/19/2022 1300  Gross per 24 hour   Intake 1574.74 ml   Output 5100 ml   Net -3525.26 ml       Lines/Drains/Airways       Central Venous Catheter Line  Duration             Trialysis (Dialysis) Catheter 07/18/22 1443 right internal jugular 1 day              Peripheral Intravenous Line  Duration                  Peripheral IV - Single Lumen  07/18/22 0252 20 G Right Antecubital 1 day                    Physical Exam  Constitutional:       General: He is not in acute distress.     Appearance: He is well-developed. He is obese. He is not ill-appearing, toxic-appearing or diaphoretic.   HENT:      Head: Normocephalic and atraumatic.   Eyes:      General: No scleral icterus.     Extraocular Movements: Extraocular movements intact.      Conjunctiva/sclera: Conjunctivae normal.      Pupils: Pupils are equal, round, and reactive to light.   Neck:      Thyroid: No thyromegaly.      Vascular: No JVD.      Trachea: No tracheal deviation.   Cardiovascular:      Rate and Rhythm: Normal rate and regular rhythm.      Heart sounds: S1 normal and S2 normal. No murmur heard.    No friction rub. No gallop.   Pulmonary:      Effort: Pulmonary effort is normal. No respiratory distress.      Breath sounds: Normal breath sounds. No stridor. No wheezing, rhonchi or rales.   Chest:      Chest wall: No tenderness.   Abdominal:      General: There is no distension.      Palpations: Abdomen is soft.   Musculoskeletal:         General: No swelling or tenderness. Normal range of motion.      Cervical back: Normal range of motion and neck supple. No rigidity.      Right lower leg: No edema.      Left lower leg: No edema.   Skin:     General: Skin is warm and dry.      Coloration: Skin is not jaundiced.   Neurological:      General: No focal deficit present.      Mental Status: He is alert and oriented to person, place, and time.      Cranial Nerves: No cranial nerve deficit.   Psychiatric:         Mood and Affect: Mood normal.         Behavior: Behavior normal.       Current Medications:   sodium chloride 0.9%   Intravenous Once    amLODIPine  10 mg Oral QHS    atorvastatin  40 mg Oral QHS    diazePAM  5 mg Oral Q8H    fluticasone propionate  1 spray Each Nostril Daily    hydrALAZINE  50 mg Oral TID    mupirocin   Nasal BID    polyethylene glycol  17 g Oral Daily    senna   1 tablet Oral BID    sodium chloride 0.9%  10 mL Intravenous Q8H      sodium chloride 0.9% 50 mL/hr at 07/19/22 1200     sodium chloride 0.9%, acetaminophen, albuterol-ipratropium, dextrose 10%, dextrose 10%, glucagon (human recombinant), glucose, glucose, heparin (porcine), insulin aspart U-100, lorazepam, melatonin, naloxone, sodium chloride 0.9%    Laboratory (all labs reviewed):  CBC:  Recent Labs   Lab 10/08/21  0713 10/09/21  0952 02/16/22  0820 07/18/22  0252 07/18/22  0319 07/19/22  0518   WBC 15.93 H 23.71 H 10.16 18.75 H  --  14.41 H   Hemoglobin 11.8 L 13.2 L 12.7 L 13.8 L  --  11.6 L   POC Hematocrit  --   --   --   --  42  --    Hematocrit 33.7 L 38.7 L 40.9 42.3  --  34.7 L   Platelets 185 228 164 231  --  SEE COMMENT         CHEMISTRIES:  Recent Labs   Lab 10/04/21  0211 10/05/21  0148 10/06/21  0423 10/07/21  0812 02/16/22  0820 07/18/22  0252 07/18/22  1007 07/18/22  1409 07/19/22  0518   Glucose 169 H 155 H 184 H   < > 97 188 H 150 H 122 H 141 H   Sodium 138 134 L 134 L   < > 138 135 L 137 137 139   Potassium 4.7 5.0 5.3 H   < > 5.0 7.6 HH 6.2 H 5.9 H 5.2 H   BUN 23 33 H 33 H   < > 15 62 H 69 H 69 H 32 H   Creatinine 1.3 1.0 1.0   < > 1.1 5.0 H 4.8 H 4.5 H 1.9 H   eGFR if  >60.0 >60.0 >60.0   < > >60.0 13 A 13 A 15 A 41 A   eGFR if non African American 56.5 A >60.0 >60.0   < > >60.0 11 A 12 A 13 A 36 A   Calcium 7.5 L 7.6 L 7.9 L   < > 8.8 8.9 9.4 10.3 8.8   Magnesium 1.9 2.3 2.8 H  --   --   --   --   --  1.9    < > = values in this interval not displayed.         CARDIAC BIOMARKERS:  Recent Labs   Lab 07/18/22  0252 07/18/22  1007   CPK  --  129   Troponin I 0.019  --          COAGS:  Recent Labs   Lab 10/01/21  1610   INR 1.0         LIPIDS/LFTS:  Recent Labs   Lab 10/03/19  0940 01/06/20  1038 09/02/20  1010 12/03/20  1058 02/23/21  1040 08/07/21  0839 10/01/21  1610 02/16/22  0820 07/18/22  0252 07/19/22  0518   Cholesterol 117 L  --  93 L  --  102 L 113 L 106 L  --   --   --     Triglycerides 236 H  --  103  --  165 H 103 124  --   --   --    HDL 30 L  --  30 L  --  30 L 35 L 31 L  --   --   --    LDL Cholesterol 39.8 L  --  42.4 L  --  39.0 L 57.4 L 50.2 L  --   --   --    Non-HDL Cholesterol 87  --  63  --  72 78 75  --   --   --    AST 27   < > 21   < > 18 17 21 18 30 26   ALT 29   < > 16   < > 19 19 22 18 38 24    < > = values in this interval not displayed.         BNP:  Recent Labs   Lab 07/18/22  0252    H         TSH:  Recent Labs   Lab 01/08/20  1153 10/01/21  1610 07/18/22  0252   TSH 2.552 1.897 5.291 H         Free T4:  Recent Labs   Lab 01/08/20  1153 07/18/22  0252   Free T4 0.87 0.82         Diagnostic Results:  ECG (personally reviewed and interpreted tracing(s)):  7/18/22 0229 junct 34, peaked t waves    Chest X-Ray (personally reviewed and interpreted image(s)): 7/18/22 NAD, aortic atherosclerosis    Echo: 7/18/22 (images personally reviewed and interpreted)  · The left ventricle is normal in size with moderate concentric hypertrophy and normal systolic function.  · The estimated ejection fraction is 70%.  · Normal right ventricular size with normal right ventricular systolic function.  · The estimated PA systolic pressure is 58 mmHg.  · There is moderate pulmonary hypertension.    Holter 3/10/22  · Heart rates varied between 43 and 109 BPM with an average of 62 BPM.  · Rare PVCs and PACs. Five atrial pairs. One three beat atrial run.        Assessment and Plan:     * Hyperkalemia  K+ 7.6 with creat 5  ?etiology  Neph on board, no dialyzed with improvement in creat/K+ and back to NSR    SHAUNA (acute kidney injury)  As above  Per Neph    Junctional bradycardia  Likely a result of hyperkalemia, now back in NSR on tele  Check EKG    Essential hypertension  Resume med rx    Hyperlipidemia, mixed  Cont statin    Type 2 diabetes mellitus without complication, without long-term current use of insulin  Per IM        VTE Risk Mitigation (From admission, onward)          Ordered     heparin (porcine) injection 3,200 Units  As needed (PRN)         07/18/22 1825     IP VTE LOW RISK PATIENT  Once         07/18/22 0800     Place sequential compression device  Until discontinued         07/18/22 0800     Place REY hose  Until discontinued         07/18/22 0800              Case d/w Dr. Flowers.  Cardiology will sign off, pls call with questions.    Jose Anderson MD  Cardiology  [facility]

## 2022-07-19 NOTE — ASSESSMENT & PLAN NOTE
Oriented to self, location but not appropriate. Not oriented to situation.   Most likely due to accumulation of gabapentin in setting of SHAUNA.   - emergent HD today  - CTH no acute changes   - monitor   - improved, able to wean off Precedex

## 2022-07-19 NOTE — NURSING
[facility], [facility]  Nurses Note -- 4 Eyes      7/19/2022       Skin assessed on: Transfer      [x] No Pressure Injuries Present    [x]Prevention Measures Documented    [] Yes LDA  for Pressure Injury Previously documented     [] Yes New Pressure Injury Discovered   [] LDA for New Pressure Injury Added      Attending RN:  Eleonora Lopez RN     Second RN:  Julia Stone RN

## 2022-07-19 NOTE — PLAN OF CARE
Pt rested comfortably on precedex most of the night; however, he is still confused. Updated wife, Deann, on progression and POC X 2. Pt was a little bradycardic, but was asymptomatic.

## 2022-07-19 NOTE — CARE UPDATE
[facility] [place] 4 [place]      7/19/2022       Skin assessed on: Q Shift      [x] No Pressure Injuries Present    [x]Prevention Measures Documented    [] Yes LDA  for Pressure Injury Previously documented     [] Yes New Pressure Injury Discovered   [] LDA for New Pressure Injury Added      Attending RN:  Dottie Goldberg, RN     Second RN:  Kathleen Verdin RN

## 2022-07-19 NOTE — ASSESSMENT & PLAN NOTE
· Likely secondary to catecholamine release from infection  · Heart rate 144 and , will start Coreg 12.5 mg b.i.d.   · Lovenox 100 mg b.i.d.

## 2022-07-19 NOTE — ASSESSMENT & PLAN NOTE
· New concern for infection with new fever, elevated white count, new onset AFib, and patient had a toxicology screen positive for marijuana, opiates, benzos and is hep C antibody positive...    · He has had MRSA infection in the past, will cover with vancomycin and ceftriaxone, after obtaining blood cultures.

## 2022-07-19 NOTE — CLINICAL REVIEW
Sepsis Screen (most recent)     Sepsis Screen (IP) - 07/19/22 5130     Is the patient's history or complaint suggestive of a possible infection? Yes  -    Are there at least two of the following signs and symptoms present? Yes  -    Sepsis signs/symptoms - Hyper or Hypothermia Hyperthermia >100.4 or Hypothermia < 96.8  -    Sepsis signs/symptoms - Tachycardia Tachycardia     >90  -    Sepsis signs/symptoms - Tachypnea Tachypnea     >20  -    Sepsis signs/symptoms - WBC WBC < 4,000 or WBC > 12,000  -    Are any of the following organ dysfunction criteria present and not considered to be due to a chronic condition? Yes  -    Organ Dysfunction Criteria Total Bilirubin > 2.0 Platelet count < 100,000  -    Initiate Sepsis Protocol No  -    Reason sepsis not considered Pt. receiving appropriate management  -          User Key  (r) = Recorded By, (t) = Taken By, (c) = Cosigned By    Initials Name     Linn Baig RN

## 2022-07-19 NOTE — ASSESSMENT & PLAN NOTE
K+ 7.6 with creat 5  ?etiology  Neph on board, no dialyzed with improvement in creat/K+ and back to NSR

## 2022-07-19 NOTE — PROGRESS NOTES
[facility] Medicine  Progress Note    Patient Name: Butch Mcdaniel  MRN: [id number]  Patient Class: IP- Inpatient   Admission Date: 7/18/2022  Length of Stay: 1 days  Attending Physician: Abdirizak Costa MD  Primary Care Provider: Gabriel Evans MD        Subjective:     Principal Problem:Hyperkalemia        HPI:  Mr Butch Mcdaniel is a 66 y.o. man who presents with dizziness. He is currently altered and minimally able to give history. He says that for the last 3-4 days he has felt poorly, more fatigued than usual. He says this started after he received a shot. He then veers off, talking about fig trees. He is chewing on a piece of green coban wrap that he says is a fig. He is restless in bed, pulling at IV lines and BP cuff.     Called his wife Deann for additional history. No answer; left message.     In ED, he was noted to have bradycardia with junctional rhythm. He was also noted to have hyperkalemia with SHAUNA. He was shifted and started on dopamine. He was admitted to ICU with Cardiology and Nephrology consults.     Upon review of chart, he takes benzodiazepines, opioids, gabapentin, tizanidine, and buspar at home.       Overview/Hospital Course:  Patient stepped down from ICU today, renal function improving, less agitated on diazepam and weaned off Precedex.   However, once moved to the floor, new concern for infection with new fever, elevated white count, new onset AFib, and patient had a toxicology screen positive for marijuana, opiates, benzos and is hep C antibody positive...  He has had MRSA infection in the past, will cover with vancomycin and ceftriaxone, after obtaining blood cultures.  AFib with  & , will start Coreg 12.5 mg and Lovenox 100 mg b.i.d. if AFib does not resolve quickly with Coreg and antibiotics, will move back to the ICU.    ROS:    HEENT: Negative for headaches, change in vision, difficulty swallowing.  Cardiac: Negative  for chest pain, palpitations, orthopnea, or PND.  Pulmonary: Negative for dyspnea, cough, hemoptysis, or wheezing.  GI: Negative for abdominal distention, or pain. No N/V/C/D.          Vitals:    07/19/22 1330 07/19/22 1607 07/19/22 1808 07/19/22 1830   BP: (!) 187/82 (!) 188/86 (!) 184/94    BP Location: Left arm  Left arm    Patient Position: Lying Lying Lying    Pulse: 66 94 (!) 144 (!) 146   Resp: 18 19 (!) 22    Temp: 98.6 °F (37 °C) 98.9 °F (37.2 °C) (!) 101.5 °F (38.6 °C) (!) 101.9 °F (38.8 °C)   TempSrc: Oral Oral Oral Oral   SpO2: 95% 95% 96%    Weight:       Height:            Body mass index is 30.29 kg/m².        PHYSICAL EXAM:  GENERAL APPEARANCE: Well developed, well nourished, alert      HEAD: NC/AT     EYES: PERRL, EOMI.  Vision is grossly intact.  NECK: Neck supple, non-tender without LAD, masses or thyromegaly.  CARDIAC: Normal S1 and S2. No S3, S4 or murmurs.  LUNGS: Clear to auscultation and percussion without rales, rhonchi  ABDOMEN: Positive bowel sounds. Soft, nondistended, nontender.   MSK: No joint erythema or tenderness. Normal muscular development. Normal gait.  EXTREMITIES: No significant deformity or joint abnormality. No edema.   LOWER EXTREMITY: Examination of both feet reveals all toes to be normal in size    NEUROLOGICAL: CN II-XII intact. Strength and sensation symmetric and intact throughout.   SKIN: Skin normal color, texture and turgor with no lesions or eruptions.  PSYCHIATRIC: The mental examination revealed the patient was oriented to person, place      Assessment/Plan:      * Hyperkalemia  K 7.6 on admit. Acute, symptomatic, with bradycardia requiring dopamine gtt  - shifted, given bicarb, given calcium, sodium zirc  - repeat BMP pending  - Nephrology consulted- emergent dialysis today   - hold lisinopril      Fever  · New concern for infection with new fever, elevated white count, new onset AFib, and patient had a toxicology screen positive for marijuana, opiates, benzos and  is hep C antibody positive...    · He has had MRSA infection in the past, will cover with vancomycin and ceftriaxone, after obtaining blood cultures.        New onset a-fib  · Likely secondary to catecholamine release from infection  · Heart rate 144 and , will start Coreg 12.5 mg b.i.d.   · Lovenox 100 mg b.i.d.    Acute encephalopathy  Oriented to self, location but not appropriate. Not oriented to situation.   Most likely due to accumulation of gabapentin in setting of SHAUNA.   - emergent HD today  - CTH no acute changes   - monitor   - improved, able to wean off Precedex      Chronic prescription benzodiazepine use  Holding all home anxiety medications/mind altering substances in setting of acute encephalopathy       Junctional bradycardia  HR slow, due to hyperkalemia from SHAUNA  - Cardiology consulted  - on dopamine gtt  - TTE pending       SHAUNA (acute kidney injury)  Presented with SHAUNA. Cr on admit 5 from baseline 1.1. Unclear cause. He appears hypovolemic- dry mucous membranes but does have lower extremity edema. He is altered most likely from his prescribed gabapentin that has not cleared due to renal failure. He has persistent hyperkalemia that is not improved with shifting. He has bradycardia as a result of his hyperkalemia.   - Nephrology consulted  - plan emergent dialysis today. Critical care consulted for line placement.   - UA is not consistent with infection and only has 1+ protein  - check CK  - Avoid nephrotoxins, renal dose all meds.   - Monitor         Type 2 diabetes mellitus without complication, without long-term current use of insulin  A1c:   Lab Results   Component Value Date    HGBA1C 6.3 (H) 02/16/2022     Hold metformin   Meds: SSI PRN to maintain goal 140-180  ADA diet, accuchecks, hypoglycemic protocol        Chronic, continuous use of opioids  Holding all home pain medications/mind altering substances in setting of acute encephalopathy     Chronic pain  Holding all home pain  medications/mind altering substances in the setting of acute encephalopathy       Anemia  Macrocytic anemia. No signs of bleeding  - check iron panel, B12, folate       Hyperlipidemia, mixed  Continue statin       Essential hypertension  BP currently elevated  - continue amlodipine, hydralazine  - hold home lisinopril, hold clonidine patch        VTE Risk Mitigation (From admission, onward)         Ordered     enoxaparin injection 100 mg  Every 12 hours         07/19/22 1831     heparin (porcine) injection 3,200 Units  As needed (PRN)         07/18/22 1825     IP VTE LOW RISK PATIENT  Once         07/18/22 0800     Place sequential compression device  Until discontinued         07/18/22 0800     Place REY hose  Until discontinued         07/18/22 0800                Discharge Planning   LOPEZ:      Code Status: Full Code   Is the patient medically ready for discharge?:     Reason for patient still in hospital (select all that apply): Patient trending condition and Treatment  Discharge Plan A: Home Health                  Izaiah Jimenez MD  Department of Hospital Medicine   [facility] [place] [facility]

## 2022-07-19 NOTE — HOSPITAL COURSE
Interval Hx: no cp/sob, sleeping but arousable.  Got HD yesterday with normalization of bradycardia.    Tele: SR 90s (personally reviewed and interpreted)

## 2022-07-19 NOTE — EICU
EICU FOLLOWUP NOTE:    Called for:  Tachycardia, agitation    Telemetry was reviewed. Medical records including notes, labs and imaging were reviewed.    DISCUSSED with bedside nurse     ASSESSMENT AND PLAN:    Agitation.  Hypertension and tachycardia.  History of benzodiazepine dependence. On when necessary Ativan, not very effective. Patient had junctional bradycardia on admission, evaluated by cardiology, likely hyperkalemia related, resolved.  Currently tachycardic  --Will Start Precedex infusion and monitor telemetry    Thank You for allowing EICU to participate in the care of the patient. Please call as needed    Neil Kim MD  [facility]  [phone]

## 2022-07-19 NOTE — HOSPITAL COURSE
Patient admitted to the hospital with bradycardia likely from renal failure with a K of 7.6. He was admitted to the ICU. He had emergent dialysis and was sent to the floor. There he developed fever. Patient started on Abx. Source not known. Patient's renal failure resolved- unknown cause.  Cards noted arhythmia's  were caused by hyperkalemia with return to NSR and Cards signed off. Patient requested discharge on 7/21/22. Reason for renal failure likely working outside in this heat.  Follow up with PCP in one week. Activity as tolerated. Diet- low NA, ADA 2000 tom diet

## 2022-07-20 LAB
ALBUMIN SERPL BCP-MCNC: 3.3 G/DL (ref 3.5–5.2)
ALP SERPL-CCNC: 51 U/L (ref 55–135)
ALT SERPL W/O P-5'-P-CCNC: 30 U/L (ref 10–44)
ANION GAP SERPL CALC-SCNC: 10 MMOL/L (ref 8–16)
ANION GAP SERPL CALC-SCNC: 8 MMOL/L (ref 8–16)
AST SERPL-CCNC: 37 U/L (ref 10–40)
BASOPHILS # BLD AUTO: 0.04 K/UL (ref 0–0.2)
BASOPHILS NFR BLD: 0.3 % (ref 0–1.9)
BILIRUB SERPL-MCNC: 1.7 MG/DL (ref 0.1–1)
BUN SERPL-MCNC: 28 MG/DL (ref 8–23)
BUN SERPL-MCNC: 29 MG/DL (ref 8–23)
CALCIUM SERPL-MCNC: 9.2 MG/DL (ref 8.7–10.5)
CALCIUM SERPL-MCNC: 9.2 MG/DL (ref 8.7–10.5)
CHLORIDE SERPL-SCNC: 107 MMOL/L (ref 95–110)
CHLORIDE SERPL-SCNC: 108 MMOL/L (ref 95–110)
CO2 SERPL-SCNC: 24 MMOL/L (ref 23–29)
CO2 SERPL-SCNC: 26 MMOL/L (ref 23–29)
CREAT SERPL-MCNC: 1.4 MG/DL (ref 0.5–1.4)
CREAT SERPL-MCNC: 1.4 MG/DL (ref 0.5–1.4)
DIFFERENTIAL METHOD: ABNORMAL
EOSINOPHIL # BLD AUTO: 0 K/UL (ref 0–0.5)
EOSINOPHIL NFR BLD: 0.2 % (ref 0–8)
ERYTHROCYTE [DISTWIDTH] IN BLOOD BY AUTOMATED COUNT: 12.3 % (ref 11.5–14.5)
EST. GFR  (AFRICAN AMERICAN): 60 ML/MIN/1.73 M^2
EST. GFR  (AFRICAN AMERICAN): 60 ML/MIN/1.73 M^2
EST. GFR  (NON AFRICAN AMERICAN): 52 ML/MIN/1.73 M^2
EST. GFR  (NON AFRICAN AMERICAN): 52 ML/MIN/1.73 M^2
GLUCOSE SERPL-MCNC: 113 MG/DL (ref 70–110)
GLUCOSE SERPL-MCNC: 114 MG/DL (ref 70–110)
HCT VFR BLD AUTO: 37 % (ref 40–54)
HGB BLD-MCNC: 12.8 G/DL (ref 14–18)
IMM GRANULOCYTES # BLD AUTO: 0.06 K/UL (ref 0–0.04)
IMM GRANULOCYTES NFR BLD AUTO: 0.5 % (ref 0–0.5)
LYMPHOCYTES # BLD AUTO: 2.1 K/UL (ref 1–4.8)
LYMPHOCYTES NFR BLD: 17.5 % (ref 18–48)
MAGNESIUM SERPL-MCNC: 1.7 MG/DL (ref 1.6–2.6)
MAGNESIUM SERPL-MCNC: 1.8 MG/DL (ref 1.6–2.6)
MCH RBC QN AUTO: 33.2 PG (ref 27–31)
MCHC RBC AUTO-ENTMCNC: 34.6 G/DL (ref 32–36)
MCV RBC AUTO: 96 FL (ref 82–98)
MONOCYTES # BLD AUTO: 0.9 K/UL (ref 0.3–1)
MONOCYTES NFR BLD: 7.5 % (ref 4–15)
NEUTROPHILS # BLD AUTO: 8.9 K/UL (ref 1.8–7.7)
NEUTROPHILS NFR BLD: 74 % (ref 38–73)
NRBC BLD-RTO: 0 /100 WBC
PHOSPHATE SERPL-MCNC: 2.8 MG/DL (ref 2.7–4.5)
PLATELET # BLD AUTO: 126 K/UL (ref 150–450)
PMV BLD AUTO: 12.4 FL (ref 9.2–12.9)
POCT GLUCOSE: 116 MG/DL (ref 70–110)
POCT GLUCOSE: 149 MG/DL (ref 70–110)
POCT GLUCOSE: 178 MG/DL (ref 70–110)
POTASSIUM SERPL-SCNC: 4.1 MMOL/L (ref 3.5–5.1)
POTASSIUM SERPL-SCNC: 4.1 MMOL/L (ref 3.5–5.1)
PROT SERPL-MCNC: 6.4 G/DL (ref 6–8.4)
RBC # BLD AUTO: 3.86 M/UL (ref 4.6–6.2)
SODIUM SERPL-SCNC: 141 MMOL/L (ref 136–145)
SODIUM SERPL-SCNC: 142 MMOL/L (ref 136–145)
TROPONIN I SERPL DL<=0.01 NG/ML-MCNC: 0.12 NG/ML (ref 0–0.03)
VANCOMYCIN SERPL-MCNC: 18.8 UG/ML
WBC # BLD AUTO: 12.07 K/UL (ref 3.9–12.7)

## 2022-07-20 PROCEDURE — 93010 EKG 12-LEAD: ICD-10-PCS | Mod: ,,, | Performed by: INTERNAL MEDICINE

## 2022-07-20 PROCEDURE — 25000003 PHARM REV CODE 250: Performed by: INTERNAL MEDICINE

## 2022-07-20 PROCEDURE — 63600175 PHARM REV CODE 636 W HCPCS: Performed by: INTERNAL MEDICINE

## 2022-07-20 PROCEDURE — 25000003 PHARM REV CODE 250: Performed by: HOSPITALIST

## 2022-07-20 PROCEDURE — 84484 ASSAY OF TROPONIN QUANT: CPT

## 2022-07-20 PROCEDURE — 25000003 PHARM REV CODE 250

## 2022-07-20 PROCEDURE — 80048 BASIC METABOLIC PNL TOTAL CA: CPT | Mod: XB

## 2022-07-20 PROCEDURE — 80202 ASSAY OF VANCOMYCIN: CPT | Performed by: INTERNAL MEDICINE

## 2022-07-20 PROCEDURE — 83735 ASSAY OF MAGNESIUM: CPT | Performed by: HOSPITALIST

## 2022-07-20 PROCEDURE — A4216 STERILE WATER/SALINE, 10 ML: HCPCS | Performed by: HOSPITALIST

## 2022-07-20 PROCEDURE — 25000003 PHARM REV CODE 250: Performed by: STUDENT IN AN ORGANIZED HEALTH CARE EDUCATION/TRAINING PROGRAM

## 2022-07-20 PROCEDURE — 93005 ELECTROCARDIOGRAM TRACING: CPT

## 2022-07-20 PROCEDURE — 85025 COMPLETE CBC W/AUTO DIFF WBC: CPT | Performed by: HOSPITALIST

## 2022-07-20 PROCEDURE — 83735 ASSAY OF MAGNESIUM: CPT | Mod: 91

## 2022-07-20 PROCEDURE — 84100 ASSAY OF PHOSPHORUS: CPT | Performed by: HOSPITALIST

## 2022-07-20 PROCEDURE — 63600175 PHARM REV CODE 636 W HCPCS: Performed by: STUDENT IN AN ORGANIZED HEALTH CARE EDUCATION/TRAINING PROGRAM

## 2022-07-20 PROCEDURE — 93010 ELECTROCARDIOGRAM REPORT: CPT | Mod: ,,, | Performed by: INTERNAL MEDICINE

## 2022-07-20 PROCEDURE — 25000242 PHARM REV CODE 250 ALT 637 W/ HCPCS: Performed by: HOSPITALIST

## 2022-07-20 PROCEDURE — 80053 COMPREHEN METABOLIC PANEL: CPT | Performed by: HOSPITALIST

## 2022-07-20 PROCEDURE — 11000001 HC ACUTE MED/SURG PRIVATE ROOM

## 2022-07-20 RX ORDER — HYDRALAZINE HYDROCHLORIDE 25 MG/1
75 TABLET, FILM COATED ORAL 3 TIMES DAILY
Status: DISCONTINUED | OUTPATIENT
Start: 2022-07-20 | End: 2022-07-21

## 2022-07-20 RX ORDER — METOPROLOL TARTRATE 1 MG/ML
5 INJECTION, SOLUTION INTRAVENOUS ONCE
Status: COMPLETED | OUTPATIENT
Start: 2022-07-20 | End: 2022-07-20

## 2022-07-20 RX ORDER — METOPROLOL TARTRATE 1 MG/ML
5 INJECTION, SOLUTION INTRAVENOUS EVERY 5 MIN PRN
Status: COMPLETED | OUTPATIENT
Start: 2022-07-20 | End: 2022-07-20

## 2022-07-20 RX ADMIN — FLUTICASONE PROPIONATE 50 MCG: 50 SPRAY, METERED NASAL at 08:07

## 2022-07-20 RX ADMIN — ACETAMINOPHEN 1000 MG: 500 TABLET, FILM COATED ORAL at 11:07

## 2022-07-20 RX ADMIN — ENOXAPARIN SODIUM 100 MG: 100 INJECTION SUBCUTANEOUS at 08:07

## 2022-07-20 RX ADMIN — Medication 10 ML: at 05:07

## 2022-07-20 RX ADMIN — METOROPROLOL TARTRATE 5 MG: 5 INJECTION, SOLUTION INTRAVENOUS at 01:07

## 2022-07-20 RX ADMIN — Medication 10 ML: at 02:07

## 2022-07-20 RX ADMIN — POLYETHYLENE GLYCOL 3350 17 G: 17 POWDER, FOR SOLUTION ORAL at 08:07

## 2022-07-20 RX ADMIN — SENNOSIDES 1 TABLET: 8.6 TABLET, FILM COATED ORAL at 08:07

## 2022-07-20 RX ADMIN — CARVEDILOL 25 MG: 12.5 TABLET, FILM COATED ORAL at 09:07

## 2022-07-20 RX ADMIN — HYDRALAZINE HYDROCHLORIDE 75 MG: 25 TABLET, FILM COATED ORAL at 09:07

## 2022-07-20 RX ADMIN — LORAZEPAM 2 MG: 2 INJECTION INTRAMUSCULAR; INTRAVENOUS at 11:07

## 2022-07-20 RX ADMIN — ATORVASTATIN CALCIUM 40 MG: 40 TABLET, FILM COATED ORAL at 09:07

## 2022-07-20 RX ADMIN — DIAZEPAM 5 MG: 5 TABLET ORAL at 09:07

## 2022-07-20 RX ADMIN — Medication 10 ML: at 09:07

## 2022-07-20 RX ADMIN — METOROPROLOL TARTRATE 5 MG: 5 INJECTION, SOLUTION INTRAVENOUS at 03:07

## 2022-07-20 RX ADMIN — DIAZEPAM 5 MG: 5 TABLET ORAL at 02:07

## 2022-07-20 RX ADMIN — HYDRALAZINE HYDROCHLORIDE 50 MG: 25 TABLET, FILM COATED ORAL at 08:07

## 2022-07-20 RX ADMIN — CARVEDILOL 25 MG: 12.5 TABLET, FILM COATED ORAL at 08:07

## 2022-07-20 RX ADMIN — METOROPROLOL TARTRATE 5 MG: 5 INJECTION, SOLUTION INTRAVENOUS at 02:07

## 2022-07-20 RX ADMIN — AMLODIPINE BESYLATE 10 MG: 5 TABLET ORAL at 09:07

## 2022-07-20 RX ADMIN — MUPIROCIN: 20 OINTMENT TOPICAL at 08:07

## 2022-07-20 RX ADMIN — HYDRALAZINE HYDROCHLORIDE 75 MG: 25 TABLET, FILM COATED ORAL at 02:07

## 2022-07-20 RX ADMIN — SODIUM CHLORIDE: 0.9 INJECTION, SOLUTION INTRAVENOUS at 05:07

## 2022-07-20 RX ADMIN — DIAZEPAM 5 MG: 5 TABLET ORAL at 05:07

## 2022-07-20 NOTE — PROGRESS NOTES
Date of Admission:7/18/2022    SUBJECTIVE: very alert today    Current Facility-Administered Medications   Medication    0.9%  NaCl infusion    0.9%  NaCl infusion    0.9%  NaCl infusion    acetaminophen tablet 1,000 mg    albuterol-ipratropium 2.5 mg-0.5 mg/3 mL nebulizer solution 3 mL    amLODIPine tablet 10 mg    atorvastatin tablet 40 mg    carvediloL tablet 25 mg    dextromethorphan-guaiFENesin  mg/5 ml liquid 5 mL    dextrose 10% bolus 125 mL    dextrose 10% bolus 250 mL    diazePAM tablet 5 mg    fluticasone propionate 50 mcg/actuation nasal spray 50 mcg    glucagon (human recombinant) injection 1 mg    glucose chewable tablet 16 g    glucose chewable tablet 24 g    heparin (porcine) injection 3,200 Units    hydrALAZINE tablet 75 mg    insulin aspart U-100 pen 0-5 Units    lorazepam injection 2 mg    melatonin tablet 6 mg    mupirocin 2 % ointment    naloxone 0.4 mg/mL injection 0.02 mg    polyethylene glycol packet 17 g    senna tablet 1 tablet    sodium chloride 0.9% bolus 250 mL    sodium chloride 0.9% flush 10 mL    vancomycin - pharmacy to dose       Wt Readings from Last 3 Encounters:   07/19/22 98.5 kg (217 lb 2.5 oz)   05/02/22 95.7 kg (210 lb 13.9 oz)   04/21/22 97.2 kg (214 lb 4.6 oz)     Temp Readings from Last 3 Encounters:   07/20/22 99.8 °F (37.7 °C) (Oral)   05/02/22 98.2 °F (36.8 °C) (Oral)   04/21/22 98.8 °F (37.1 °C) (Oral)     BP Readings from Last 3 Encounters:   07/20/22 (!) 162/72   05/02/22 (!) 150/60   04/21/22 (!) 107/57     Pulse Readings from Last 3 Encounters:   07/20/22 74   05/02/22 91   04/21/22 70       Intake/Output Summary (Last 24 hours) at 7/20/2022 1121  Last data filed at 7/20/2022 0434  Gross per 24 hour   Intake 1039.16 ml   Output 2550 ml   Net -1510.84 ml       PE:  GEN:wd male in nad  HEENT:ncat,eomi,mm  CVS:s1s2 regular  PULM:ctab  ABD:bs, soft  EXT:no leg edema  NEURO:awake, alert    Recent Labs   Lab 07/20/22  0126   *   113*     142   K 4.1  4.1     108   CO2 26  24   BUN 28*  29*   CREATININE 1.4  1.4   CALCIUM 9.2  9.2   MG 1.7  1.8       Lab Results   Component Value Date    CALCIUM 9.2 07/20/2022    CALCIUM 9.2 07/20/2022    PHOS 2.8 07/20/2022       Recent Labs   Lab 07/20/22  0126   WBC 12.07   RBC 3.86*   HGB 12.8*   HCT 37.0*   *   MCV 96   MCH 33.2*   MCHC 34.6           A/P:  1.efrain.resolved. stop hd. Remove dialysis catheter.  2. Hyperkalemia. Resolved.  3.proteinuria. valentin with pcp after discharge.  4.dm2. following sugars.  6.bradycardia. better.  7.lactic acidosis. Resolved.  8.htn. no ace/arb.  Renal wise better. Will sign off. Thanks.

## 2022-07-20 NOTE — ASSESSMENT & PLAN NOTE
Oriented to self, location but not appropriate. Not oriented to situation.   Most likely due to accumulation of gabapentin in setting of SHAUNA.   - emergent HD today  - CTH no acute changes   - monitor   - improved, able to wean off Precedex    Resolved. May have been uremic encephalopathy

## 2022-07-20 NOTE — NURSING
0153  HR >120, afib rvr,second dose of Lopressor 5mg IVP administered    0211  HR >120, still in afibe rvr, 3rd dose of lopressor 5mg IVP administered per order.     0229  Patient converted back to NSR, HR 78.

## 2022-07-20 NOTE — PROVIDER TRANSFER
Patient was progressing in ICU, doing well on step-down. However, after stepping down, patient suddenly spiked a fever, and went into new onset AFib RVR, suspicious for infection with an elevated white count.  Perhaps explaining his initial symptoms.    · Fever was controlled with Tylenol and antibiotics started late yesterday  · New AFib was controlled with Coreg, and anticoagulation with Lovenox  · Unclear source of infection, consider 7 day course abx at discharge  · Would discuss with Cardiology, with new onset AFib, patient may be a fall risk        Izaiah Jimenez MD  Internal Medicine Staff

## 2022-07-20 NOTE — SUBJECTIVE & OBJECTIVE
Interval History:  doing much better. Fully alert and oriented       Review of Systems   Constitutional:  Negative for activity change, appetite change, chills, diaphoresis and fatigue.   HENT:  Negative for congestion and dental problem.    Eyes:  Negative for discharge and itching.   Respiratory:  Negative for apnea and chest tightness.    Cardiovascular:  Negative for chest pain and leg swelling.   Gastrointestinal:  Negative for abdominal distention and abdominal pain.   Endocrine: Negative for cold intolerance and heat intolerance.   Genitourinary:  Negative for difficulty urinating and dysuria.   Musculoskeletal:  Negative for arthralgias and back pain.   Skin:  Negative for color change and pallor.   Neurological:  Negative for light-headedness and numbness.   Psychiatric/Behavioral:  Negative for agitation and behavioral problems.    Objective:     Vital Signs (Most Recent):  Temp: 99.7 °F (37.6 °C) (07/20/22 0719)  Pulse: 73 (07/20/22 0719)  Resp: 20 (07/20/22 0719)  BP: (!) 166/79 (07/20/22 0719)  SpO2: (!) 94 % (07/20/22 0719)   Vital Signs (24h Range):  Temp:  [98.1 °F (36.7 °C)-102.4 °F (39.1 °C)] 99.7 °F (37.6 °C)  Pulse:  [] 73  Resp:  [18-27] 20  SpO2:  [94 %-98 %] 94 %  BP: (125-188)/(61-94) 166/79     Weight: 98.5 kg (217 lb 2.5 oz)  Body mass index is 30.29 kg/m².    Intake/Output Summary (Last 24 hours) at 7/20/2022 1008  Last data filed at 7/20/2022 0434  Gross per 24 hour   Intake 1091.31 ml   Output 2550 ml   Net -1458.69 ml      Physical Exam  Vitals and nursing note reviewed.   Constitutional:       General: He is not in acute distress.     Appearance: Normal appearance. He is obese. He is ill-appearing. He is not toxic-appearing or diaphoretic.   HENT:      Head: Normocephalic and atraumatic.   Eyes:      Conjunctiva/sclera: Conjunctivae normal.   Cardiovascular:      Rate and Rhythm: Normal rate and regular rhythm.   Pulmonary:      Effort: Pulmonary effort is normal. No respiratory  distress.      Breath sounds: No wheezing.   Skin:     General: Skin is warm and dry.   Neurological:      Mental Status: He is alert and oriented to person, place, and time.   Psychiatric:         Mood and Affect: Mood normal.         Behavior: Behavior normal.         Thought Content: Thought content normal.       Significant Labs: All pertinent labs within the past 24 hours have been reviewed.  BMP:   Recent Labs   Lab 07/20/22  0126   *  113*     142   K 4.1  4.1     108   CO2 26  24   BUN 28*  29*   CREATININE 1.4  1.4   CALCIUM 9.2  9.2   MG 1.7  1.8     CBC:   Recent Labs   Lab 07/19/22  0518 07/20/22  0126   WBC 14.41* 12.07   HGB 11.6* 12.8*   HCT 34.7* 37.0*   PLT SEE COMMENT 126*       Significant Imaging:

## 2022-07-20 NOTE — PLAN OF CARE
Problem: Adult Inpatient Plan of Care  Goal: Plan of Care Review  Outcome: Ongoing, Progressing  Goal: Patient-Specific Goal (Individualized)  Outcome: Ongoing, Progressing  Goal: Absence of Hospital-Acquired Illness or Injury  Outcome: Ongoing, Progressing  Goal: Optimal Comfort and Wellbeing  Outcome: Ongoing, Progressing  Goal: Readiness for Transition of Care  Outcome: Ongoing, Progressing     Pt resting comfortably in bed w/call light in hand.

## 2022-07-20 NOTE — PROGRESS NOTES
Pharmacokinetic Assessment Follow Up: IV Vancomycin    Vancomycin serum concentration assessment(s):    The random level was drawn correctly and can be used to guide therapy at this time. The measurement is within the desired definitive target range of 10 to 20 mcg/mL.    Vancomycin Regimen Plan:    Give 500 mg after dialysis if patient has dialysis today.  Re-dose when the random level is less than 20 mcg/mL, next level to be drawn at 0400 on 7/21/2022    Drug levels (last 3 results):  Recent Labs   Lab Result Units 07/20/22  0220   Vancomycin, Random ug/mL 18.8       Pharmacy will continue to follow and monitor vancomycin.    Please contact pharmacy at extension [phone] for questions regarding this assessment.    Thank you for the consult,   Sravan Cabrera Jr       Patient brief summary:  Butch Mcdaniel is a 66 y.o. male initiated on antimicrobial therapy with IV Vancomycin for treatment of bacteremia    Drug Allergies:   Review of patient's allergies indicates:  No Known Allergies    Actual Body Weight:   98.5 kg    Renal Function:   Estimated Creatinine Clearance: 61.3 mL/min (based on SCr of 1.4 mg/dL).,     Dialysis Method (if applicable):  intermittent HD    CBC (last 72 hours):  Recent Labs   Lab Result Units 07/18/22  0252 07/19/22  0518 07/20/22  0126   WBC K/uL 18.75* 14.41* 12.07   Hemoglobin g/dL 13.8* 11.6* 12.8*   Hematocrit % 42.3 34.7* 37.0*   Platelets K/uL 231 SEE COMMENT 126*   Gran % % 89.9* 80.3* 74.0*   Lymph % % 5.9* 12.1* 17.5*   Mono % % 3.5* 6.9 7.5   Eosinophil % % 0.1 0.1 0.2   Basophil % % 0.1 0.2 0.3   Differential Method  Automated Automated Automated       Metabolic Panel (last 72 hours):  Recent Labs   Lab Result Units 07/18/22  0252 07/18/22  0427 07/18/22  0615 07/18/22  1007 07/18/22  1409 07/19/22  0518 07/20/22  0126   Sodium mmol/L 135*  --   --  137 137 139 141  142   Potassium mmol/L 7.6*  --   --  6.2* 5.9* 5.2* 4.1  4.1   Chloride mmol/L 103  --   --  103  103 105 107  108   CO2 mmol/L 19*  --   --  24 23 25 26  24   Glucose mg/dL 188*  --   --  150* 122* 141* 114*  113*   Glucose, UA   --  Negative  --   --   --   --   --    BUN mg/dL 62*  --   --  69* 69* 32* 28*  29*   Creatinine mg/dL 5.0*  --   --  4.8* 4.5* 1.9* 1.4  1.4   Creatinine, Urine mg/dL  --  237.6  --   --   --   --   --    Albumin g/dL 4.2  --   --   --   --  3.5 3.3*   Total Bilirubin mg/dL 1.3*  --   --   --   --  2.2* 1.7*   Alkaline Phosphatase U/L 63  --   --   --   --  48* 51*   AST U/L 30  --   --   --   --  26 37   ALT U/L 38  --   --   --   --  24 30   Magnesium mg/dL  --   --   --   --   --  1.9 1.7  1.8   Phosphorus mg/dL  --   --  5.1*  --   --  2.8 2.8       Vancomycin Administrations:  vancomycin given in the last 96 hours                     vancomycin (VANCOCIN) 2,000 mg in dextrose 5 % 500 mL IVPB (mg) 2,000 mg New Bag 07/19/22 1938                    Microbiologic Results:  Microbiology Results (last 7 days)       Procedure Component Value Units Date/Time    Blood culture [[id number]] Collected: 07/19/22 1850    Order Status: Completed Specimen: Blood from Antecubital, Left Arm Updated: 07/20/22 0312     Blood Culture, Routine No Growth to date    Blood culture [[id number]] Collected: 07/19/22 1850    Order Status: Completed Specimen: Blood from Antecubital, Left Arm Updated: 07/20/22 0312     Blood Culture, Routine No Growth to date    Culture, Respiratory with Gram Stain [[id number]]     Order Status: No result Specimen: Respiratory

## 2022-07-20 NOTE — ASSESSMENT & PLAN NOTE
Presented with SHAUNA. Cr on admit 5 from baseline 1.1. Unclear cause. He appears hypovolemic- dry mucous membranes but does have lower extremity edema. He is altered most likely from his prescribed gabapentin that has not cleared due to renal failure. He has persistent hyperkalemia that is not improved with shifting. He has bradycardia as a result of his hyperkalemia.   - Nephrology consulted  - plan emergent dialysis today. Critical care consulted for line placement.   - UA is not consistent with infection and only has 1+ protein  - check CK  - Avoid nephrotoxins, renal dose all meds.   - Monitor     Resolved. Unclear cause.  Renal ultrasound normal.

## 2022-07-20 NOTE — PLAN OF CARE
Spoke with patient and pt's spouse at bedside regarding discharge planning. Pt transferred from ICU. Per physician, pt/ot consulted, pending recommendations. TN to continue to follow for discharge needs.    07/20/22 1421   Discharge Reassessment   Assessment Type Discharge Planning Reassessment   Did the patient's condition or plan change since previous assessment? Yes   Discharge Plan discussed with: Spouse/sig other;Patient   Name(s) and Number(s) Deann Mcdaniel (wife) [phone]   Communicated LOPEZ with patient/caregiver Date not available/Unable to determine   Discharge Plan A Home Health   Discharge Plan B Home with family  (TBD)   DME Needed Upon Discharge  other (see comments)  (TBD)   Discharge Barriers Identified None   Why the patient remains in the hospital Requires continued medical care   Post-Acute Status   Coverage PHN   Discharge Delays None known at this time

## 2022-07-20 NOTE — NURSING
Pt had 6 beat run of v-tach & converted to a-fib w/RVR. HR varying from 90s-150s. Dr. Escudero notified; 5mg IV lopressor ordered & given. Pt denies chest pain.

## 2022-07-20 NOTE — NURSING
Imelda Gates NP notified of HR. New orders placed for EKG, Mag, Troponin, and CMP.     EKG-A-fib RVR with PVCs or aberrantly conducted complexes   T wave abnormality, consider inferior ischemia       Lopressor 5mg IVP administered per order

## 2022-07-20 NOTE — ASSESSMENT & PLAN NOTE
K 7.6 on admit. Acute, symptomatic, with bradycardia requiring dopamine gtt  - shifted, given bicarb, given calcium, sodium zirc  - repeat BMP pending  - Nephrology consulted- emergent dialysis today   - hold lisinopril    Resolved

## 2022-07-20 NOTE — PROGRESS NOTES
[facility]  Progress Note    Patient Name: Butch Mcdaniel  MRN: [id number]  Patient Class: IP- Inpatient   Admission Date: 7/18/2022  Length of Stay: 2 days  Attending Physician: Abdirizak Costa MD  Primary Care Provider: Gabriel Evans MD        Subjective:     Principal Problem:Hyperkalemia        HPI:  Mr Butch Mcdaniel is a 66 y.o. man who presents with dizziness. He is currently altered and minimally able to give history. He says that for the last 3-4 days he has felt poorly, more fatigued than usual. He says this started after he received a shot. He then veers off, talking about fig trees. He is chewing on a piece of green coban wrap that he says is a fig. He is restless in bed, pulling at IV lines and BP cuff.     Called his wife Deann for additional history. No answer; left message.     In ED, he was noted to have bradycardia with junctional rhythm. He was also noted to have hyperkalemia with SHAUNA. He was shifted and started on dopamine. He was admitted to ICU with Cardiology and Nephrology consults.     Upon review of chart, he takes benzodiazepines, opioids, gabapentin, tizanidine, and buspar at home.       Overview/Hospital Course:  Patient admitted to the hospital with bradycardia likely from renal failure with a K of 7.6. He was admitted to the ICU. He had emergent dialysis and was sent to the floor. There he developed fever. Patient started on Abx. Source not known. Patient's renal failure resolved- unknown cause.  Cards noted arhythmia's  were caused by hyperkalemia with return to NSR and Cards signed off. PT/OT were consulted.        Interval History:  doing much better. Fully alert and oriented       Review of Systems   Constitutional:  Negative for activity change, appetite change, chills, diaphoresis and fatigue.   HENT:  Negative for congestion and dental problem.    Eyes:  Negative for discharge and itching.   Respiratory:  Negative for  apnea and chest tightness.    Cardiovascular:  Negative for chest pain and leg swelling.   Gastrointestinal:  Negative for abdominal distention and abdominal pain.   Endocrine: Negative for cold intolerance and heat intolerance.   Genitourinary:  Negative for difficulty urinating and dysuria.   Musculoskeletal:  Negative for arthralgias and back pain.   Skin:  Negative for color change and pallor.   Neurological:  Negative for light-headedness and numbness.   Psychiatric/Behavioral:  Negative for agitation and behavioral problems.    Objective:     Vital Signs (Most Recent):  Temp: 99.7 °F (37.6 °C) (07/20/22 0719)  Pulse: 73 (07/20/22 0719)  Resp: 20 (07/20/22 0719)  BP: (!) 166/79 (07/20/22 0719)  SpO2: (!) 94 % (07/20/22 0719)   Vital Signs (24h Range):  Temp:  [98.1 °F (36.7 °C)-102.4 °F (39.1 °C)] 99.7 °F (37.6 °C)  Pulse:  [] 73  Resp:  [18-27] 20  SpO2:  [94 %-98 %] 94 %  BP: (125-188)/(61-94) 166/79     Weight: 98.5 kg (217 lb 2.5 oz)  Body mass index is 30.29 kg/m².    Intake/Output Summary (Last 24 hours) at 7/20/2022 1008  Last data filed at 7/20/2022 0434  Gross per 24 hour   Intake 1091.31 ml   Output 2550 ml   Net -1458.69 ml      Physical Exam  Vitals and nursing note reviewed.   Constitutional:       General: He is not in acute distress.     Appearance: Normal appearance. He is obese. He is ill-appearing. He is not toxic-appearing or diaphoretic.   HENT:      Head: Normocephalic and atraumatic.   Eyes:      Conjunctiva/sclera: Conjunctivae normal.   Cardiovascular:      Rate and Rhythm: Normal rate and regular rhythm.   Pulmonary:      Effort: Pulmonary effort is normal. No respiratory distress.      Breath sounds: No wheezing.   Skin:     General: Skin is warm and dry.   Neurological:      Mental Status: He is alert and oriented to person, place, and time.   Psychiatric:         Mood and Affect: Mood normal.         Behavior: Behavior normal.         Thought Content: Thought content normal.        Significant Labs: All pertinent labs within the past 24 hours have been reviewed.  BMP:   Recent Labs   Lab 07/20/22  0126   *  113*     142   K 4.1  4.1     108   CO2 26  24   BUN 28*  29*   CREATININE 1.4  1.4   CALCIUM 9.2  9.2   MG 1.7  1.8     CBC:   Recent Labs   Lab 07/19/22  0518 07/20/22  0126   WBC 14.41* 12.07   HGB 11.6* 12.8*   HCT 34.7* 37.0*   PLT SEE COMMENT 126*       Significant Imaging:       Assessment/Plan:      * Hyperkalemia  K 7.6 on admit. Acute, symptomatic, with bradycardia requiring dopamine gtt  - shifted, given bicarb, given calcium, sodium zirc  - repeat BMP pending  - Nephrology consulted- emergent dialysis today   - hold lisinopril    Resolved         Fever  · New concern for infection with new fever, elevated white count, new onset AFib, and patient had a toxicology screen positive for marijuana, opiates, benzos and is hep C antibody positive...    · He has had MRSA infection in the past, will cover with vancomycin and ceftriaxone, after obtaining blood cultures.        New onset a-fib  · Likely secondary to catecholamine release from infection  · Heart rate 144 and , will start Coreg 12.5 mg b.i.d.   · Lovenox 100 mg b.i.d.    Acute encephalopathy  Oriented to self, location but not appropriate. Not oriented to situation.   Most likely due to accumulation of gabapentin in setting of SHAUNA.   - emergent HD today  - CTH no acute changes   - monitor   - improved, able to wean off Precedex    Resolved. May have been uremic encephalopathy       Chronic prescription benzodiazepine use  Holding all home anxiety medications/mind altering substances in setting of acute encephalopathy       Junctional bradycardia  HR slow, due to hyperkalemia from SHAUNA  - Cardiology consulted  - on dopamine gtt  - TTE pending       SHAUNA (acute kidney injury)  Presented with SHAUNA. Cr on admit 5 from baseline 1.1. Unclear cause. He appears hypovolemic- dry mucous membranes  but does have lower extremity edema. He is altered most likely from his prescribed gabapentin that has not cleared due to renal failure. He has persistent hyperkalemia that is not improved with shifting. He has bradycardia as a result of his hyperkalemia.   - Nephrology consulted  - plan emergent dialysis today. Critical care consulted for line placement.   - UA is not consistent with infection and only has 1+ protein  - check CK  - Avoid nephrotoxins, renal dose all meds.   - Monitor     Resolved. Unclear cause.  Renal ultrasound normal.       Type 2 diabetes mellitus without complication, without long-term current use of insulin  A1c:   Lab Results   Component Value Date    HGBA1C 6.3 (H) 02/16/2022     Hold metformin   Meds: SSI PRN to maintain goal 140-180  ADA diet, accuchecks, hypoglycemic protocol        Chronic, continuous use of opioids  Holding all home pain medications/mind altering substances in setting of acute encephalopathy     Chronic pain  Holding all home pain medications/mind altering substances in the setting of acute encephalopathy       Anemia  Macrocytic anemia. No signs of bleeding  - check iron panel, B12, folate       Hyperlipidemia, mixed  Continue statin       Essential hypertension  BP currently elevated  - continue amlodipine, hydralazine  - hold home lisinopril, hold clonidine patch      Obesity Body mass index is 30.29 kg/m². weight loss as out patient.     Debility- PT/OT consulted     VTE Risk Mitigation (From admission, onward)         Ordered     enoxaparin injection 100 mg  Every 12 hours         07/19/22 1831     heparin (porcine) injection 3,200 Units  As needed (PRN)         07/18/22 1825     IP VTE LOW RISK PATIENT  Once         07/18/22 0800     Place sequential compression device  Until discontinued         07/18/22 0800     Place REY hose  Until discontinued         07/18/22 0800                Discharge Planning   LOPEZ:      Code Status: Full Code   Is the patient  medically ready for discharge?:     Reason for patient still in hospital (select all that apply): Patient unstable  Discharge Plan A: Home Health                  Abdirizak Duvall MD  Department of Hospital Medicine   [facility]

## 2022-07-20 NOTE — PROGRESS NOTES
Pharmacokinetic Initial Assessment: IV Vancomycin    Assessment/Plan:    Initiate intravenous vancomycin with loading dose of 2000 mg once with subsequent doses when random concentrations are less than 20 mcg/mL  Desired empiric serum trough concentration is 10 to 20 mcg/mL  Draw vancomycin random level on 7/20/22 at 0400.  Pharmacy will continue to follow and monitor vancomycin.      Please contact pharmacy at extension [phone] with any questions regarding this assessment.     Thank you for the consult,   Renay Cornejo       Patient brief summary:  Butch Mcdaniel is a 66 y.o. male initiated on antimicrobial therapy with IV Vancomycin for treatment of suspected bacteremia    Drug Allergies:   Review of patient's allergies indicates:  No Known Allergies    Actual Body Weight:   98.5 kg     Renal Function:   Estimated Creatinine Clearance: 45.1 mL/min (A) (based on SCr of 1.9 mg/dL (H)).,     Dialysis Method (if applicable):  intermittent HD    CBC (last 72 hours):  Recent Labs   Lab Result Units 07/18/22  0252 07/19/22  0518   WBC K/uL 18.75* 14.41*   Hemoglobin g/dL 13.8* 11.6*   Hematocrit % 42.3 34.7*   Platelets K/uL 231 SEE COMMENT   Gran % % 89.9* 80.3*   Lymph % % 5.9* 12.1*   Mono % % 3.5* 6.9   Eosinophil % % 0.1 0.1   Basophil % % 0.1 0.2   Differential Method  Automated Automated       Metabolic Panel (last 72 hours):  Recent Labs   Lab Result Units 07/18/22  0252 07/18/22  0427 07/18/22  0615 07/18/22  1007 07/18/22  1409 07/19/22  0518   Sodium mmol/L 135*  --   --  137 137 139   Potassium mmol/L 7.6*  --   --  6.2* 5.9* 5.2*   Chloride mmol/L 103  --   --  103 103 105   CO2 mmol/L 19*  --   --  24 23 25   Glucose mg/dL 188*  --   --  150* 122* 141*   Glucose, UA   --  Negative  --   --   --   --    BUN mg/dL 62*  --   --  69* 69* 32*   Creatinine mg/dL 5.0*  --   --  4.8* 4.5* 1.9*   Creatinine, Urine mg/dL  --  237.6  --   --   --   --    Albumin g/dL 4.2  --   --   --   --  3.5   Total  Bilirubin mg/dL 1.3*  --   --   --   --  2.2*   Alkaline Phosphatase U/L 63  --   --   --   --  48*   AST U/L 30  --   --   --   --  26   ALT U/L 38  --   --   --   --  24   Magnesium mg/dL  --   --   --   --   --  1.9   Phosphorus mg/dL  --   --  5.1*  --   --  2.8       Drug levels (last 3 results):  No results for input(s): VANCOMYCINRA, VANCORANDOM, VANCOMYCINPE, VANCOPEAK, VANCOMYCINTR, VANCOTROUGH in the last 72 hours.    Microbiologic Results:  Microbiology Results (last 7 days)     Procedure Component Value Units Date/Time    Blood culture [[id number]] Collected: 07/19/22 1850    Order Status: Sent Specimen: Blood from Antecubital, Left Arm Updated: 07/19/22 1859    Blood culture [[id number]] Collected: 07/19/22 1850    Order Status: Sent Specimen: Blood from Antecubital, Left Arm Updated: 07/19/22 1859

## 2022-07-21 VITALS
SYSTOLIC BLOOD PRESSURE: 185 MMHG | TEMPERATURE: 98 F | RESPIRATION RATE: 20 BRPM | BODY MASS INDEX: 30.4 KG/M2 | DIASTOLIC BLOOD PRESSURE: 87 MMHG | WEIGHT: 217.13 LBS | HEIGHT: 71 IN | OXYGEN SATURATION: 96 % | HEART RATE: 71 BPM

## 2022-07-21 PROBLEM — E87.5 HYPERKALEMIA: Status: RESOLVED | Noted: 2022-07-18 | Resolved: 2022-07-21

## 2022-07-21 PROBLEM — G93.40 ACUTE ENCEPHALOPATHY: Status: RESOLVED | Noted: 2022-07-18 | Resolved: 2022-07-21

## 2022-07-21 PROBLEM — N17.9 AKI (ACUTE KIDNEY INJURY): Status: RESOLVED | Noted: 2022-07-18 | Resolved: 2022-07-21

## 2022-07-21 PROBLEM — R50.9 FEVER: Status: RESOLVED | Noted: 2022-07-19 | Resolved: 2022-07-21

## 2022-07-21 LAB
ALBUMIN SERPL BCP-MCNC: 3.8 G/DL (ref 3.5–5.2)
ALP SERPL-CCNC: 48 U/L (ref 55–135)
ALT SERPL W/O P-5'-P-CCNC: 29 U/L (ref 10–44)
ANION GAP SERPL CALC-SCNC: 12 MMOL/L (ref 8–16)
AST SERPL-CCNC: 29 U/L (ref 10–40)
BASOPHILS # BLD AUTO: 0.05 K/UL (ref 0–0.2)
BASOPHILS NFR BLD: 0.4 % (ref 0–1.9)
BILIRUB SERPL-MCNC: 2.1 MG/DL (ref 0.1–1)
BUN SERPL-MCNC: 24 MG/DL (ref 8–23)
CALCIUM SERPL-MCNC: 9.2 MG/DL (ref 8.7–10.5)
CHLORIDE SERPL-SCNC: 108 MMOL/L (ref 95–110)
CO2 SERPL-SCNC: 23 MMOL/L (ref 23–29)
CREAT SERPL-MCNC: 1.3 MG/DL (ref 0.5–1.4)
DIFFERENTIAL METHOD: ABNORMAL
EOSINOPHIL # BLD AUTO: 0 K/UL (ref 0–0.5)
EOSINOPHIL NFR BLD: 0.3 % (ref 0–8)
ERYTHROCYTE [DISTWIDTH] IN BLOOD BY AUTOMATED COUNT: 12.4 % (ref 11.5–14.5)
EST. GFR  (AFRICAN AMERICAN): >60 ML/MIN/1.73 M^2
EST. GFR  (NON AFRICAN AMERICAN): 56 ML/MIN/1.73 M^2
GLUCOSE SERPL-MCNC: 146 MG/DL (ref 70–110)
HCT VFR BLD AUTO: 45 % (ref 40–54)
HCV RNA SERPL NAA+PROBE-ACNC: NORMAL IU/ML
HGB BLD-MCNC: 15 G/DL (ref 14–18)
IMM GRANULOCYTES # BLD AUTO: 0.07 K/UL (ref 0–0.04)
IMM GRANULOCYTES NFR BLD AUTO: 0.5 % (ref 0–0.5)
LYMPHOCYTES # BLD AUTO: 2.3 K/UL (ref 1–4.8)
LYMPHOCYTES NFR BLD: 17.4 % (ref 18–48)
MAGNESIUM SERPL-MCNC: 1.8 MG/DL (ref 1.6–2.6)
MCH RBC QN AUTO: 32.3 PG (ref 27–31)
MCHC RBC AUTO-ENTMCNC: 33.3 G/DL (ref 32–36)
MCV RBC AUTO: 97 FL (ref 82–98)
MONOCYTES # BLD AUTO: 0.9 K/UL (ref 0.3–1)
MONOCYTES NFR BLD: 6.9 % (ref 4–15)
NEUTROPHILS # BLD AUTO: 9.6 K/UL (ref 1.8–7.7)
NEUTROPHILS NFR BLD: 74.5 % (ref 38–73)
NRBC BLD-RTO: 0 /100 WBC
PHOSPHATE SERPL-MCNC: 3.5 MG/DL (ref 2.7–4.5)
PLATELET # BLD AUTO: 155 K/UL (ref 150–450)
PMV BLD AUTO: 11.5 FL (ref 9.2–12.9)
POCT GLUCOSE: 126 MG/DL (ref 70–110)
POCT GLUCOSE: 135 MG/DL (ref 70–110)
POCT GLUCOSE: 139 MG/DL (ref 70–110)
POCT GLUCOSE: 151 MG/DL (ref 70–110)
POTASSIUM SERPL-SCNC: 3.9 MMOL/L (ref 3.5–5.1)
PROT SERPL-MCNC: 7.5 G/DL (ref 6–8.4)
RBC # BLD AUTO: 4.65 M/UL (ref 4.6–6.2)
SODIUM SERPL-SCNC: 143 MMOL/L (ref 136–145)
VANCOMYCIN SERPL-MCNC: 5 UG/ML
WBC # BLD AUTO: 12.9 K/UL (ref 3.9–12.7)

## 2022-07-21 PROCEDURE — 83735 ASSAY OF MAGNESIUM: CPT | Performed by: HOSPITALIST

## 2022-07-21 PROCEDURE — 80053 COMPREHEN METABOLIC PANEL: CPT | Performed by: HOSPITALIST

## 2022-07-21 PROCEDURE — 63600175 PHARM REV CODE 636 W HCPCS: Performed by: INTERNAL MEDICINE

## 2022-07-21 PROCEDURE — 84100 ASSAY OF PHOSPHORUS: CPT | Performed by: HOSPITALIST

## 2022-07-21 PROCEDURE — 25000003 PHARM REV CODE 250: Performed by: STUDENT IN AN ORGANIZED HEALTH CARE EDUCATION/TRAINING PROGRAM

## 2022-07-21 PROCEDURE — 36415 COLL VENOUS BLD VENIPUNCTURE: CPT | Performed by: INTERNAL MEDICINE

## 2022-07-21 PROCEDURE — 97161 PT EVAL LOW COMPLEX 20 MIN: CPT

## 2022-07-21 PROCEDURE — 97165 OT EVAL LOW COMPLEX 30 MIN: CPT

## 2022-07-21 PROCEDURE — 80202 ASSAY OF VANCOMYCIN: CPT | Performed by: INTERNAL MEDICINE

## 2022-07-21 PROCEDURE — 85025 COMPLETE CBC W/AUTO DIFF WBC: CPT | Performed by: HOSPITALIST

## 2022-07-21 PROCEDURE — 25000003 PHARM REV CODE 250: Performed by: INTERNAL MEDICINE

## 2022-07-21 PROCEDURE — 94761 N-INVAS EAR/PLS OXIMETRY MLT: CPT

## 2022-07-21 RX ORDER — CARVEDILOL 25 MG/1
25 TABLET ORAL 2 TIMES DAILY
Qty: 60 TABLET | Refills: 5 | Status: SHIPPED | OUTPATIENT
Start: 2022-07-21 | End: 2023-05-06

## 2022-07-21 RX ORDER — LOPERAMIDE HYDROCHLORIDE 2 MG/1
2 CAPSULE ORAL 4 TIMES DAILY PRN
Status: DISCONTINUED | OUTPATIENT
Start: 2022-07-21 | End: 2022-07-21 | Stop reason: HOSPADM

## 2022-07-21 RX ORDER — HYDRALAZINE HYDROCHLORIDE 25 MG/1
100 TABLET, FILM COATED ORAL 3 TIMES DAILY
Status: DISCONTINUED | OUTPATIENT
Start: 2022-07-21 | End: 2022-07-21 | Stop reason: HOSPADM

## 2022-07-21 RX ADMIN — HYDRALAZINE HYDROCHLORIDE 75 MG: 25 TABLET, FILM COATED ORAL at 08:07

## 2022-07-21 RX ADMIN — LOPERAMIDE HYDROCHLORIDE 2 MG: 2 CAPSULE ORAL at 12:07

## 2022-07-21 RX ADMIN — CARVEDILOL 25 MG: 12.5 TABLET, FILM COATED ORAL at 08:07

## 2022-07-21 RX ADMIN — DIAZEPAM 5 MG: 5 TABLET ORAL at 05:07

## 2022-07-21 RX ADMIN — VANCOMYCIN HYDROCHLORIDE 1500 MG: 1.5 INJECTION, POWDER, LYOPHILIZED, FOR SOLUTION INTRAVENOUS at 08:07

## 2022-07-21 NOTE — PLAN OF CARE
07/21/22 1220   Medicare Message   Important Message from Medicare regarding Discharge Appeal Rights Given to patient/caregiver;Explained to patient/caregiver;Signed/date by patient/caregiver   Date IMM was signed 07/21/22   Time IMM was signed 1220

## 2022-07-21 NOTE — PLAN OF CARE
Problem: Diabetes Comorbidity  Goal: Blood Glucose Level Within Targeted Range  Outcome: Ongoing, Progressing     Problem: Fluid and Electrolyte Imbalance (Acute Kidney Injury/Impairment)  Goal: Fluid and Electrolyte Balance  Outcome: Ongoing, Progressing     Problem: Renal Function Impairment (Acute Kidney Injury/Impairment)  Goal: Effective Renal Function  Outcome: Ongoing, Progressing     Problem: Infection  Goal: Absence of Infection Signs and Symptoms  Outcome: Ongoing, Progressing     Problem: Fall Injury Risk  Goal: Absence of Fall and Fall-Related Injury  Outcome: Ongoing, Progressing

## 2022-07-21 NOTE — PROGRESS NOTES
[facility] Medicine  Progress Note    Patient Name: Butch Mcdaniel  MRN: [id number]  Patient Class: IP- Inpatient   Admission Date: 7/18/2022  Length of Stay: 3 days  Attending Physician: Abdirizak Costa MD  Primary Care Provider: Gabriel Evans MD        Subjective:     Principal Problem:Hyperkalemia        HPI:  Mr Butch Mcdaniel is a 66 y.o. man who presents with dizziness. He is currently altered and minimally able to give history. He says that for the last 3-4 days he has felt poorly, more fatigued than usual. He says this started after he received a shot. He then veers off, talking about fig trees. He is chewing on a piece of green coban wrap that he says is a fig. He is restless in bed, pulling at IV lines and BP cuff.     Called his wife Deann for additional history. No answer; left message.     In ED, he was noted to have bradycardia with junctional rhythm. He was also noted to have hyperkalemia with SHAUNA. He was shifted and started on dopamine. He was admitted to ICU with Cardiology and Nephrology consults.     Upon review of chart, he takes benzodiazepines, opioids, gabapentin, tizanidine, and buspar at home.       Overview/Hospital Course:  Patient admitted to the hospital with bradycardia likely from renal failure with a K of 7.6. He was admitted to the ICU. He had emergent dialysis and was sent to the floor. There he developed fever. Patient started on Abx. Source not known. Patient's renal failure resolved- unknown cause.  Cards noted arhythmia's  were caused by hyperkalemia with return to NSR and Cards signed off. PT/OT were consulted.        Interval History:No new issues     Review of Systems   Constitutional:  Negative for activity change, appetite change, chills, diaphoresis and fatigue.   HENT:  Negative for congestion and dental problem.    Eyes:  Negative for discharge and itching.   Respiratory:  Negative for apnea and chest tightness.     Cardiovascular:  Negative for chest pain and leg swelling.   Gastrointestinal:  Negative for abdominal distention and abdominal pain.   Endocrine: Negative for cold intolerance and heat intolerance.   Genitourinary:  Negative for difficulty urinating and dysuria.   Musculoskeletal:  Negative for arthralgias and back pain.   Skin:  Negative for color change and pallor.   Neurological:  Negative for light-headedness and numbness.   Psychiatric/Behavioral:  Negative for agitation and behavioral problems.    Objective:     Vital Signs (Most Recent):  Temp: 98.3 °F (36.8 °C) (07/21/22 0650)  Pulse: 67 (07/21/22 0821)  Resp: 20 (07/21/22 0650)  BP: (!) 181/88 (07/21/22 0650)  SpO2: 96 % (07/21/22 0821)   Vital Signs (24h Range):  Temp:  [98.2 °F (36.8 °C)-100.1 °F (37.8 °C)] 98.3 °F (36.8 °C)  Pulse:  [] 67  Resp:  [18-20] 20  SpO2:  [93 %-97 %] 96 %  BP: (162-189)/(72-90) 181/88     Weight: 98.5 kg (217 lb 2.5 oz)  Body mass index is 30.29 kg/m².    Intake/Output Summary (Last 24 hours) at 7/21/2022 0915  Last data filed at 7/21/2022 0500  Gross per 24 hour   Intake 840 ml   Output 500 ml   Net 340 ml      Physical Exam  Vitals and nursing note reviewed.   Constitutional:       General: He is not in acute distress.     Appearance: Normal appearance. He is obese. He is ill-appearing. He is not toxic-appearing or diaphoretic.   HENT:      Head: Normocephalic and atraumatic.   Eyes:      Conjunctiva/sclera: Conjunctivae normal.   Cardiovascular:      Rate and Rhythm: Normal rate and regular rhythm.   Pulmonary:      Effort: Pulmonary effort is normal. No respiratory distress.      Breath sounds: No wheezing.   Skin:     General: Skin is warm and dry.   Neurological:      Mental Status: He is alert and oriented to person, place, and time.   Psychiatric:         Mood and Affect: Mood normal.         Behavior: Behavior normal.         Thought Content: Thought content normal.       Significant Labs: All pertinent labs  within the past 24 hours have been reviewed.  BMP:   Recent Labs   Lab 07/21/22  0531   *      K 3.9      CO2 23   BUN 24*   CREATININE 1.3   CALCIUM 9.2   MG 1.8     CBC:   Recent Labs   Lab 07/20/22  0126 07/21/22  0531   WBC 12.07 12.90*   HGB 12.8* 15.0   HCT 37.0* 45.0   * 155       Significant Imaging:       Assessment/Plan:      * Hyperkalemia  K 7.6 on admit. Acute, symptomatic, with bradycardia requiring dopamine gtt  - shifted, given bicarb, given calcium, sodium zirc  - repeat BMP pending  - Nephrology consulted- emergent dialysis today   - hold lisinopril    Resolved         Fever  · New concern for infection with new fever, elevated white count, new onset AFib, and patient had a toxicology screen positive for marijuana, opiates, benzos and is hep C antibody positive...    · He has had MRSA infection in the past, will cover with vancomycin and ceftriaxone, after obtaining blood cultures.        New onset a-fib  · Likely secondary to catecholamine release from infection  · Heart rate 144 and , will start Coreg 12.5 mg b.i.d.   · Lovenox 100 mg b.i.d.    Acute encephalopathy  Oriented to self, location but not appropriate. Not oriented to situation.   Most likely due to accumulation of gabapentin in setting of SHAUNA.   - emergent HD today  - CTH no acute changes   - monitor   - improved, able to wean off Precedex    Resolved. May have been uremic encephalopathy       Chronic prescription benzodiazepine use  Holding all home anxiety medications/mind altering substances in setting of acute encephalopathy       Junctional bradycardia  HR slow, due to hyperkalemia from SHAUNA  - Cardiology consulted  - on dopamine gtt  - TTE pending       SHAUNA (acute kidney injury)  Presented with SHAUNA. Cr on admit 5 from baseline 1.1. Unclear cause. He appears hypovolemic- dry mucous membranes but does have lower extremity edema. He is altered most likely from his prescribed gabapentin that has not  cleared due to renal failure. He has persistent hyperkalemia that is not improved with shifting. He has bradycardia as a result of his hyperkalemia.   - Nephrology consulted  - plan emergent dialysis today. Critical care consulted for line placement.   - UA is not consistent with infection and only has 1+ protein  - check CK  - Avoid nephrotoxins, renal dose all meds.   - Monitor     Resolved. Unclear cause.  Renal ultrasound normal.       Type 2 diabetes mellitus without complication, without long-term current use of insulin  A1c:   Lab Results   Component Value Date    HGBA1C 6.3 (H) 02/16/2022     Hold metformin   Meds: SSI PRN to maintain goal 140-180  ADA diet, accuchecks, hypoglycemic protocol        Chronic, continuous use of opioids  Holding all home pain medications/mind altering substances in setting of acute encephalopathy     Chronic pain  Holding all home pain medications/mind altering substances in the setting of acute encephalopathy       Anemia  Macrocytic anemia. No signs of bleeding  - check iron panel, B12, folate       Hyperlipidemia, mixed  Continue statin       Essential hypertension  BP currently elevated  - continue amlodipine, hydralazine  - hold home lisinopril, hold clonidine patch      Debility- PT/OT consulted    Obesity Body mass index is 30.29 kg/m².  Weight loss as out patient     VTE Risk Mitigation (From admission, onward)         Ordered     heparin (porcine) injection 3,200 Units  As needed (PRN)         07/18/22 1825     IP VTE LOW RISK PATIENT  Once         07/18/22 0800     Place sequential compression device  Until discontinued         07/18/22 0800     Place REY hose  Until discontinued         07/18/22 0800                Discharge Planning   LOPEZ:      Code Status: Full Code   Is the patient medically ready for discharge?:     Reason for patient still in hospital (select all that apply): Patient unstable  Discharge Plan A: Home Health   Discharge Delays: None known at this  time              Abdirizak Duvall MD  Department of Hospital Medicine   [facility] [place] [facility]

## 2022-07-21 NOTE — SUBJECTIVE & OBJECTIVE
Interval History:No new issues     Review of Systems   Constitutional:  Negative for activity change, appetite change, chills, diaphoresis and fatigue.   HENT:  Negative for congestion and dental problem.    Eyes:  Negative for discharge and itching.   Respiratory:  Negative for apnea and chest tightness.    Cardiovascular:  Negative for chest pain and leg swelling.   Gastrointestinal:  Negative for abdominal distention and abdominal pain.   Endocrine: Negative for cold intolerance and heat intolerance.   Genitourinary:  Negative for difficulty urinating and dysuria.   Musculoskeletal:  Negative for arthralgias and back pain.   Skin:  Negative for color change and pallor.   Neurological:  Negative for light-headedness and numbness.   Psychiatric/Behavioral:  Negative for agitation and behavioral problems.    Objective:     Vital Signs (Most Recent):  Temp: 98.3 °F (36.8 °C) (07/21/22 0650)  Pulse: 67 (07/21/22 0821)  Resp: 20 (07/21/22 0650)  BP: (!) 181/88 (07/21/22 0650)  SpO2: 96 % (07/21/22 0821)   Vital Signs (24h Range):  Temp:  [98.2 °F (36.8 °C)-100.1 °F (37.8 °C)] 98.3 °F (36.8 °C)  Pulse:  [] 67  Resp:  [18-20] 20  SpO2:  [93 %-97 %] 96 %  BP: (162-189)/(72-90) 181/88     Weight: 98.5 kg (217 lb 2.5 oz)  Body mass index is 30.29 kg/m².    Intake/Output Summary (Last 24 hours) at 7/21/2022 0915  Last data filed at 7/21/2022 0500  Gross per 24 hour   Intake 840 ml   Output 500 ml   Net 340 ml      Physical Exam  Vitals and nursing note reviewed.   Constitutional:       General: He is not in acute distress.     Appearance: Normal appearance. He is obese. He is ill-appearing. He is not toxic-appearing or diaphoretic.   HENT:      Head: Normocephalic and atraumatic.   Eyes:      Conjunctiva/sclera: Conjunctivae normal.   Cardiovascular:      Rate and Rhythm: Normal rate and regular rhythm.   Pulmonary:      Effort: Pulmonary effort is normal. No respiratory distress.      Breath sounds: No wheezing.    Skin:     General: Skin is warm and dry.   Neurological:      Mental Status: He is alert and oriented to person, place, and time.   Psychiatric:         Mood and Affect: Mood normal.         Behavior: Behavior normal.         Thought Content: Thought content normal.       Significant Labs: All pertinent labs within the past 24 hours have been reviewed.  BMP:   Recent Labs   Lab 07/21/22  0531   *      K 3.9      CO2 23   BUN 24*   CREATININE 1.3   CALCIUM 9.2   MG 1.8     CBC:   Recent Labs   Lab 07/20/22  0126 07/21/22  0531   WBC 12.07 12.90*   HGB 12.8* 15.0   HCT 37.0* 45.0   * 155       Significant Imaging:

## 2022-07-21 NOTE — PLAN OF CARE
[facility] - Med Surg  Discharge Final Note    Patient clear to discharge from case management stand point. Reviewed follow up appointment for pcp with pt at bedside, listed on avs, pt verbalized understanding. Pt stated his wife will helping him home at discharge.     Primary Care Provider: Gabriel Evans MD    Expected Discharge Date: 7/21/2022    Final Discharge Note (most recent)     Final Note - 07/21/22 1243        Final Note    Assessment Type Final Discharge Note     Anticipated Discharge Disposition Home or Self Care     What phone number can be called within the next 1-3 days to see how you are doing after discharge? [phone]     Hospital Resources/Appts/Education Provided Provided patient/caregiver with written discharge plan information;Appointments scheduled and added to AVS        Post-Acute Status    Coverage PHN     Discharge Delays None known at this time                 Important Message from Medicare  Important Message from Medicare regarding Discharge Appeal Rights: Given to patient/caregiver, Explained to patient/caregiver, Signed/date by patient/caregiver     Date IMM was signed: 07/21/22  Time IMM was signed: 1220    Contact Info     Gabriel Evans MD   Specialty: Internal Medicine   Relationship: PCP - General    [facility] [place] [id number]   Phone: [phone]       Next Steps: Follow up in 1 week(s)

## 2022-07-21 NOTE — PT/OT/SLP EVAL
"Occupational Therapy   Evaluation and Discharge Note    Name: Butch Mcdaniel  MRN: [id number]  Admitting Diagnosis:  Hyperkalemia   Recent Surgery: * No surgery found *      Recommendations:     Discharge Recommendations: home  Discharge Equipment Recommendations:  none  Barriers to discharge:  None    Assessment:     Butch Mcdaniel is a 66 y.o. male with a medical diagnosis of Hyperkalemia. Pt functioning at MOD I with ADLs and in-room functional mobility without AD.    Plan:     During this hospitalization, patient does not require further acute OT services.  Please re-consult if situation changes.    · Plan of Care Reviewed with: patient    Subjective     Chief Complaint: trying to plug in the IV pole but it is twisted   Patient/Family Comments/goals: agreeable to end of POC, wants to go home     Occupational Profile:  Living Environment: pt lives with "a bunch of guys" in a [facility] with 0 TINA.   Previous level of function: independent   Roles and Routines: drives   Equipment Used at home:  none  Assistance upon Discharge: friends     Pain/Comfort:  · Pain Rating 1: 0/10    Patients cultural, spiritual, Jainism conflicts given the current situation: no    Objective:     Patient found standing in the room trying to manage IV pole  with peripheral IV upon OT entry to room.    General Precautions: Standard, fall   Orthopedic Precautions:N/A   Braces: N/A  Respiratory Status: Room air     Occupational Performance:    Bed Mobility:    · Patient completed Sit to Supine with independence    Functional Mobility/Transfers:  · Patient completed Sit <> Stand Transfer with modified independence  with  no assistive device   · Patient completed Bed <> Chair Transfer using Step Transfer technique with modified independence with no assistive device  · Functional Mobility: pt completed in-room functional mobility MOD I without AD.     Activities of Daily Living:  · Toileting: pt reports just getting back " from the bathroom      · Pt declined need to complete ADLs at this time     Cognitive/Visual Perceptual:  Cognitive/Psychosocial Skills:     -       Follows Commands/attention:Follows multistep  commands  -       Communication: clear/fluent  -       Memory: No Deficits noted  -       Safety awareness/insight to disability: intact   -       Mood/Affect/Coping skills/emotional control: Appropriate to situation    Physical Exam:  Balance:    -       seated: MOD I; standing: MOD I  Postural examination/scapula alignment:    -       Rounded shoulders  -       Forward head  Skin integrity: Visible skin intact  Edema:  no BUE edema noted  Upper Extremity Range of Motion:     -       Right Upper Extremity: WFL  -       Left Upper Extremity: WFL  Upper Extremity Strength:    -       Right Upper Extremity: WFL  -       Left Upper Extremity: WFL   Strength:    -       Right Upper Extremity: WFL  -       Left Upper Extremity: WFL  Fine Motor Coordination:    -       Intact  Gross motor coordination:   WFL    AMPAC 6 Click ADL:  AMPAC Total Score: 24    Treatment & Education:  · Pt educated on OT role/POC.   · Safety during functional mobility and importance of locking the recliner chair   · All questions/concerns answered within OT scope of practice     Education:    Patient left HOB elevated with all lines intact, call button in reach, nurse, Gisselle, notified and all needs met/within reach    GOALS:   Multidisciplinary Problems     Occupational Therapy Goals        Problem: Occupational Therapy    Goal Priority Disciplines Outcome Interventions   Occupational Therapy Goal     OT, PT/OT Adequate for Care Transition                    History:     Past Medical History:   Diagnosis Date    Anxiety     Cataract, bilateral 09/11/2018    Degenerative disc disease     Depression     Diabetes mellitus type II, controlled     ETOH abuse     Eye injury as a child    stick hit eye ? 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/2019    Personal history of colonic polyps        Past Surgical History:   Procedure Laterality Date    APPENDECTOMY      COLONOSCOPY N/A 5/10/2017    Procedure: COLONOSCOPY;  Surgeon: Shantanu Marley MD;  Location[facility];  Service: Endoscopy;  Laterality: N/A;    POSTERIOR FUSION OF CERVICAL SPINE WITH LAMINECTOMY N/A 10/3/2021    Procedure: LAMINECTOMY, SPINE, CERVICAL, WITH POSTERIOR FUSION C2-T2;  Surgeon: Ana Rosa Geller MD;  Location: [address];  Service: Neurosurgery;  Laterality: N/A;       Time Tracking:     OT Date of Treatment: 07/21/22  OT Start Time: 0943  OT Stop Time: 0951  OT Total Time (min): 8 min    Billable Minutes:Evaluation 8 min (co-eval with PT)    7/21/2022

## 2022-07-21 NOTE — PT/OT/SLP EVAL
Physical Therapy Evaluation and Discharge Note     Patient Name:  Butch Mcdaniel  MRN:  [id number]     Recommendations:      Discharge Recommendations:  home   Discharge Equipment Recommendations: none   Barriers to discharge: None     Assessment:      Keith Blackburn is a 51 y.o. male admitted with a medical diagnosis of Sepsis. .  At this time, patient is functioning at their prior level of function and does not require further acute PT services.      Recent Surgery: Procedure(s) (LRB):  Transesophageal echo (PAULETTE) intra-procedure log documentation (N/A) 2 Days Post-Op     Plan:      During this hospitalization, patient does not require further acute PT services.  Please re-consult if situation changes.       Subjective      Chief Complaint: none  Patient/Family Comments/goals: waiting for DC home  Pain/Comfort:  · Pain Rating 1: 0/10     Patients cultural, spiritual, Uatsdin conflicts given the current situation: no     Living Environment:  Home with wife. Drives. Reports h/o falls due to cervical surgery/leg weakness.  Prior to admission, patients level of function was ind with all.  Equipment used at home: none.  DME owned (not currently used): none.  Upon discharge, patient will have assistance from n/a.     Objective:       Patient found ambulatory in room/diez with peripheral IV upon PT entry to room.     General Precautions: Standard,     Orthopedic Precautions:    Braces:     Respiratory Status: Room air     Exams:  · Cognitive Exam:  Patient is oriented to Person, Place, Time, Situation and anxious  · Gross Motor Coordination:  WFL  · RLE ROM: WFL  · RLE Strength: WFL  · LLE ROM: WFL  · LLE Strength: WFL     Functional Mobility:  · Bed Mobility:     · Sit to Supine: independence  · Transfers:     · Sit to Stand:  modified independence with no AD  · Gait: mod Ind in room with IV pole twisted  · Balance: good        AM-PAC 6 CLICK MOBILITY  Total Score:24                Patient left  HOB elevated with call button in reach.     GOALS:       Multidisciplinary Problems           Physical Therapy Goals           Not on file                     History:           Past Medical History:   Diagnosis Date    Hypertension           History reviewed. No pertinent surgical history.     Time Tracking:      PT Received On: 07/21/22  PT Start Time: 0943     PT Stop Time: 0951  PT Total Time (min): 8 min      Billable Minutes: Evaluation 8        07/21/2022

## 2022-07-21 NOTE — PLAN OF CARE
Problem: Occupational Therapy  Goal: Occupational Therapy Goal  Outcome: Adequate for Care Transition     Initial OT eval completed. Pt functioning at MOD I with ADLs and in-room functional mobility without AD. No further OT needs. OT to sign off. Thank you.

## 2022-07-21 NOTE — NURSING
Mr. Wyman was safe and free of falls during the shift. He did have an episode of A-flutter with RVR and converted back to normal sinus after a few minutes. The provider was notified to place a one time PRN dose of Metoprolol in case the patient had another episode that sustained. Was told that the patient is on coreg and to continue to monitor.

## 2022-07-21 NOTE — PROGRESS NOTES
Pharmacokinetic Assessment Follow Up: IV Vancomycin    Vancomycin serum concentration assessment(s):    The random level was drawn correctly and can be used to guide therapy at this time. The measurement is below the desired definitive target range of 10 to 20 mcg/mL.    Vancomycin Regimen Plan:    Change regimen to Vancomycin 1500 mg IV every 24 hours with next serum trough concentration measured at 0730 prior to 3rd dose on 7/23/2022    Drug levels (last 3 results):  Recent Labs   Lab Result Units 07/20/22  0220 07/21/22  0530   Vancomycin, Random ug/mL 18.8 5.0       Pharmacy will continue to follow and monitor vancomycin.    Please contact pharmacy at extension [phone] for questions regarding this assessment.    Thank you for the consult,   Sravan Cabrera Jr       Patient brief summary:  Butch Mcdaniel is a 66 y.o. male initiated on antimicrobial therapy with IV Vancomycin for treatment of bacteremia    Drug Allergies:   Review of patient's allergies indicates:  No Known Allergies    Actual Body Weight:   98.5 kg    Renal Function:   Estimated Creatinine Clearance: 66 mL/min (based on SCr of 1.3 mg/dL).,     Dialysis Method (if applicable):  N/A    CBC (last 72 hours):  Recent Labs   Lab Result Units 07/19/22  0518 07/20/22  0126 07/21/22  0531   WBC K/uL 14.41* 12.07 12.90*   Hemoglobin g/dL 11.6* 12.8* 15.0   Hematocrit % 34.7* 37.0* 45.0   Platelets K/uL SEE COMMENT 126* 155   Gran % % 80.3* 74.0* 74.5*   Lymph % % 12.1* 17.5* 17.4*   Mono % % 6.9 7.5 6.9   Eosinophil % % 0.1 0.2 0.3   Basophil % % 0.2 0.3 0.4   Differential Method  Automated Automated Automated       Metabolic Panel (last 72 hours):  Recent Labs   Lab Result Units 07/18/22  1007 07/18/22  1409 07/19/22  0518 07/20/22  0126 07/21/22  0531   Sodium mmol/L 137 137 139 141  142 143   Potassium mmol/L 6.2* 5.9* 5.2* 4.1  4.1 3.9   Chloride mmol/L 103 103 105 107  108 108   CO2 mmol/L 24 23 25 26  24 23   Glucose mg/dL 150*  122* 141* 114*  113* 146*   BUN mg/dL 69* 69* 32* 28*  29* 24*   Creatinine mg/dL 4.8* 4.5* 1.9* 1.4  1.4 1.3   Albumin g/dL  --   --  3.5 3.3* 3.8   Total Bilirubin mg/dL  --   --  2.2* 1.7* 2.1*   Alkaline Phosphatase U/L  --   --  48* 51* 48*   AST U/L  --   --  26 37 29   ALT U/L  --   --  24 30 29   Magnesium mg/dL  --   --  1.9 1.7  1.8 1.8   Phosphorus mg/dL  --   --  2.8 2.8 3.5       Vancomycin Administrations:  vancomycin given in the last 96 hours                     vancomycin (VANCOCIN) 2,000 mg in dextrose 5 % 500 mL IVPB (mg) 2,000 mg New Bag 07/19/22 1938                    Microbiologic Results:  Microbiology Results (last 7 days)       Procedure Component Value Units Date/Time    Blood culture [[id number]] Collected: 07/19/22 1850    Order Status: Completed Specimen: Blood from Antecubital, Left Arm Updated: 07/20/22 2103     Blood Culture, Routine No Growth to date      No Growth to date    Blood culture [[id number]] Collected: 07/19/22 1850    Order Status: Completed Specimen: Blood from Antecubital, Left Arm Updated: 07/20/22 2103     Blood Culture, Routine No Growth to date      No Growth to date    Culture, Respiratory with Gram Stain [[id number]]     Order Status: No result Specimen: Respiratory

## 2022-07-21 NOTE — NURSING
Pt ready for discharge per order. PIV removed & AVS reviewed w/patient & wife. Medications to be picked up from designated pharmacy. Pt leaving in stable condition w/belongings in hand. No further needs at this time.

## 2022-07-21 NOTE — DISCHARGE SUMMARY
[facility] Medicine  Discharge Summary      Patient Name: Butch Mcdaniel  MRN: [id number]  Patient Class: IP- Inpatient  Admission Date: 7/18/2022  Hospital Length of Stay: 3 days  Discharge Date and Time:  07/21/2022 12:19 PM  Attending Physician: Abdirizak Costa MD   Discharging Provider: Abdirizak Costa MD  Primary Care Provider: Gabriel Evans MD      HPI:   Mr Butch Mcdaniel is a 66 y.o. man who presents with dizziness. He is currently altered and minimally able to give history. He says that for the last 3-4 days he has felt poorly, more fatigued than usual. He says this started after he received a shot. He then veers off, talking about fig trees. He is chewing on a piece of green coban wrap that he says is a fig. He is restless in bed, pulling at IV lines and BP cuff.     Called his wife Deann for additional history. No answer; left message.     In ED, he was noted to have bradycardia with junctional rhythm. He was also noted to have hyperkalemia with SHAUNA. He was shifted and started on dopamine. He was admitted to ICU with Cardiology and Nephrology consults.     Upon review of chart, he takes benzodiazepines, opioids, gabapentin, tizanidine, and buspar at home.       * No surgery found *      Hospital Course:   Patient admitted to the hospital with bradycardia likely from renal failure with a K of 7.6. He was admitted to the ICU. He had emergent dialysis and was sent to the floor. There he developed fever. Patient started on Abx. Source not known. Patient's renal failure resolved- unknown cause.  Cards noted arhythmia's  were caused by hyperkalemia with return to NSR and Cards signed off. Patient requested discharge on 7/21/22. Reason for renal failure likely working outside in this heat.  Follow up with PCP in one week. Activity as tolerated. Diet- low NA, ADA 2000 tom diet          Goals of Care Treatment Preferences:  Code Status: Full  "Code      Consults:   Consults (From admission, onward)        Status Ordering Provider     Pharmacy to dose Vancomycin consult  Once        Provider:  (Not yet assigned)   "And" Linked Group Details    Acknowledged KHALIL MICHELLE A     Inpatient consult to Critical Care Medicine  Once        Provider:  Sheyla Ochoa NP    Completed RONNIE RUELAS     Inpatient consult to Cardiology  Once        Provider:  Jose Anderson MD    Completed PATTIE LOZANO     Inpatient consult to Nephrology  Once        Provider:  Tanvir Saxena MD    Completed PATTIE LOZANO          No new Assessment & Plan notes have been filed under this hospital service since the last note was generated.  Service: Hospital Medicine    Final Active Diagnoses:    Diagnosis Date Noted POA    Junctional bradycardia [R00.1] 07/18/2022 Yes    Chronic prescription benzodiazepine use [Z79.899] 07/18/2022 Not Applicable    Type 2 diabetes mellitus without complication, without long-term current use of insulin [E11.9] 08/07/2015 Yes    Chronic, continuous use of opioids [F11.90] 10/28/2013 Yes    Essential hypertension [I10] 11/01/2012 Yes    Anemia [D64.9] 11/01/2012 Yes      Problems Resolved During this Admission:    Diagnosis Date Noted Date Resolved POA    PRINCIPAL PROBLEM:  Hyperkalemia [E87.5] 07/18/2022 07/21/2022 Yes    Fever [R50.9] 07/19/2022 07/21/2022 Yes    SHAUNA (acute kidney injury) [N17.9] 07/18/2022 07/21/2022 Yes    Acute encephalopathy [G93.40] 07/18/2022 07/21/2022 Yes    Hyperlipidemia, mixed [E78.2] 11/01/2012 07/21/2022 Yes    Chronic pain [G89.29] 11/01/2012 07/21/2022 Yes     Chronic       Discharged Condition: good    Disposition: Home or Self Care    Follow Up:   Follow-up Information     Gabriel Evans MD Follow up in 1 week(s).    Specialty: Internal Medicine  Contact information:  [address] [place], [place] [address]LOUIE EUBANKS [address] [phone]                       Patient Instructions:   No discharge " procedures on file.      Pending Diagnostic Studies:     Procedure Component Value Units Date/Time    Hepatitis C RNA, quantitative, PCR [[id number]] Collected: 07/19/22 1851    Order Status: Sent Lab Status: In process Updated: 07/19/22 1859    Specimen: Blood          Medications:  Reconciled Home Medications:      Medication List      START taking these medications    carvediloL 25 MG tablet  Commonly known as: COREG  Take 1 tablet (25 mg total) by mouth 2 (two) times daily.        CONTINUE taking these medications    ALPRAZolam 1 MG tablet  Commonly known as: XANAX  TAKE ONE TABLET BY MOUTH TWICE DAILY AS NEEDED FOR ANXIETY     amLODIPine 10 MG tablet  Commonly known as: NORVASC  Take 1 tablet (10 mg total) by mouth every evening.     atorvastatin 40 MG tablet  Commonly known as: LIPITOR  Take 1 tablet (40 mg total) by mouth every evening.     BD LUER-STARLA SYRINGE 1 mL Syrg  Generic drug: syringe (disposable)  Testosterone injection every 2 weeks     busPIRone 30 MG Tab  Commonly known as: BUSPAR  Take 1 tablet (30 mg total) by mouth 2 (two) times daily.     citalopram 40 MG tablet  Commonly known as: CeleXA  TAKE ONE TABLET BY MOUTH ONCE A DAY     cloNIDine 0.2 mg/24 hr td ptwk 0.2 mg/24 hr  Commonly known as: CATAPRES  Place 1 patch onto the skin every 7 days.     fluticasone propionate 50 mcg/actuation nasal spray  Commonly known as: FLONASE  INHALE 1 SPRAY IN EACH NOSTRIL EVERY DAY     fluticasone-salmeterol 100-50 mcg/dose 100-50 mcg/dose diskus inhaler  Commonly known as: ADVAIR  Inhale 1 puff into the lungs.     gabapentin 600 MG tablet  Commonly known as: NEURONTIN  Take 600 mg by mouth 3 (three) times daily.     hydrALAZINE 25 MG tablet  Commonly known as: APRESOLINE  Take 1 tablet (25 mg total) by mouth 3 (three) times daily.     inhalation spacing device  Use as directed for inhalation.     lisinopriL 40 MG tablet  Commonly known as: PRINIVIL,ZESTRIL  Take 1 tablet (40 mg total) by mouth once daily.    "  metFORMIN 500 MG tablet  Commonly known as: GLUCOPHAGE  Take 1 tablet (500 mg total) by mouth 2 (two) times daily with meals.     MIRALAX ORAL  Take 1 application by mouth.     naloxone 4 mg/actuation Spry  Commonly known as: NARCAN  4mg by nasal route as needed for opioid overdose; may repeat every 2-3 minutes in alternating nostrils until medical help arrives. Call 911     needle (disp) 22 G 22 gauge x 1" Ndle  Testosterone injection every 2 weeks     * nicotine 14 mg/24 hr  Commonly known as: NICODERM CQ  PLACE ONE PATCH ONTO SKIN ONCE A DAY     * nicotine 21 mg/24 hr  Commonly known as: NICODERM CQ  Place 1 patch onto the skin once daily.     oxyCODONE-acetaminophen  mg per tablet  Commonly known as: PERCOCET  Take 1 tablet by mouth every 6 (six) hours as needed.     PROAIR HFA 90 mcg/actuation inhaler  Generic drug: albuterol  INHALE TWO PUFFS BY MOUTH EVERY 6 HOURS AS NEEDED FOR WHEEZING (rescue)     senna 8.6 mg tablet  Commonly known as: SENOKOT  Take 1 tablet by mouth 2 (two) times a day.     testosterone cypionate 200 mg/mL injection  Commonly known as: DEPOTESTOTERONE CYPIONATE  Inject 0.75 mLs (150 mg total) into the muscle every 14 (fourteen) days.     tiZANidine 4 MG tablet  Commonly known as: ZANAFLEX  Take 4-8 mg by mouth nightly as needed.     verapamiL 180 MG CR tablet  Commonly known as: CALAN-SR  Take 1 tablet (180 mg total) by mouth 2 (two) times daily.         * This list has 2 medication(s) that are the same as other medications prescribed for you. Read the directions carefully, and ask your doctor or other care provider to review them with you.                Indwelling Lines/Drains at time of discharge:   Lines/Drains/Airways     Drain  Duration           Male External Urinary Catheter 07/20/22 0142 1 day                Time spent on the discharge of patient: > 35  minutes         Abdirizak Duvall MD  Department of Hospital Medicine  [facility] S[facility]  "

## 2022-07-22 ENCOUNTER — PATIENT OUTREACH (OUTPATIENT)
Dept: ADMINISTRATIVE | Facility: CLINIC | Age: 67
End: 2022-07-22
Payer: MEDICARE

## 2022-07-22 NOTE — PROGRESS NOTES
C3 nurse spoke with Butch Wyman Jonas and his spouse, Deann Mcdaniel, for a TCC post hospital discharge follow up call. The patient has a scheduled HOSFU appointment with Leida Hensley NP on 7/25/2022 @ 1000.

## 2022-07-23 LAB
BACTERIA BLD CULT: NORMAL
BACTERIA BLD CULT: NORMAL

## 2022-07-25 ENCOUNTER — OFFICE VISIT (OUTPATIENT)
Dept: FAMILY MEDICINE | Facility: CLINIC | Age: 67
End: 2022-07-25
Payer: MEDICARE

## 2022-07-25 VITALS
HEART RATE: 46 BPM | SYSTOLIC BLOOD PRESSURE: 122 MMHG | HEIGHT: 71 IN | DIASTOLIC BLOOD PRESSURE: 40 MMHG | TEMPERATURE: 98 F | WEIGHT: 212.88 LBS | OXYGEN SATURATION: 97 % | BODY MASS INDEX: 29.8 KG/M2

## 2022-07-25 DIAGNOSIS — R00.1 JUNCTIONAL BRADYCARDIA: ICD-10-CM

## 2022-07-25 DIAGNOSIS — I10 ESSENTIAL HYPERTENSION: ICD-10-CM

## 2022-07-25 DIAGNOSIS — Z09 HOSPITAL DISCHARGE FOLLOW-UP: Primary | ICD-10-CM

## 2022-07-25 DIAGNOSIS — Z79.899 CHRONIC PRESCRIPTION BENZODIAZEPINE USE: ICD-10-CM

## 2022-07-25 DIAGNOSIS — E11.9 TYPE 2 DIABETES MELLITUS WITHOUT COMPLICATION, WITHOUT LONG-TERM CURRENT USE OF INSULIN: ICD-10-CM

## 2022-07-25 DIAGNOSIS — G89.29 OTHER CHRONIC PAIN: ICD-10-CM

## 2022-07-25 DIAGNOSIS — G93.40 ACUTE ENCEPHALOPATHY: ICD-10-CM

## 2022-07-25 DIAGNOSIS — E87.5 HYPERKALEMIA: ICD-10-CM

## 2022-07-25 DIAGNOSIS — F11.90 CHRONIC, CONTINUOUS USE OF OPIOIDS: ICD-10-CM

## 2022-07-25 DIAGNOSIS — E78.2 HYPERLIPIDEMIA, MIXED: ICD-10-CM

## 2022-07-25 DIAGNOSIS — N17.9 AKI (ACUTE KIDNEY INJURY): ICD-10-CM

## 2022-07-25 DIAGNOSIS — R00.1 BRADYCARDIA: ICD-10-CM

## 2022-07-25 PROCEDURE — 93005 ELECTROCARDIOGRAM TRACING: CPT | Mod: S$GLB,,, | Performed by: NURSE PRACTITIONER

## 2022-07-25 PROCEDURE — 1100F PR PT FALLS ASSESS DOC 2+ FALLS/FALL W/INJURY/YR: ICD-10-PCS | Mod: CPTII,S$GLB,, | Performed by: NURSE PRACTITIONER

## 2022-07-25 PROCEDURE — 3044F HG A1C LEVEL LT 7.0%: CPT | Mod: CPTII,S$GLB,, | Performed by: NURSE PRACTITIONER

## 2022-07-25 PROCEDURE — 99495 TRANSJ CARE MGMT MOD F2F 14D: CPT | Mod: S$GLB,,, | Performed by: NURSE PRACTITIONER

## 2022-07-25 PROCEDURE — 3066F NEPHROPATHY DOC TX: CPT | Mod: CPTII,S$GLB,, | Performed by: NURSE PRACTITIONER

## 2022-07-25 PROCEDURE — 3078F PR MOST RECENT DIASTOLIC BLOOD PRESSURE < 80 MM HG: ICD-10-PCS | Mod: CPTII,S$GLB,, | Performed by: NURSE PRACTITIONER

## 2022-07-25 PROCEDURE — 3008F BODY MASS INDEX DOCD: CPT | Mod: CPTII,S$GLB,, | Performed by: NURSE PRACTITIONER

## 2022-07-25 PROCEDURE — 1160F PR REVIEW ALL MEDS BY PRESCRIBER/CLIN PHARMACIST DOCUMENTED: ICD-10-PCS | Mod: CPTII,S$GLB,, | Performed by: NURSE PRACTITIONER

## 2022-07-25 PROCEDURE — 3288F PR FALLS RISK ASSESSMENT DOCUMENTED: ICD-10-PCS | Mod: CPTII,S$GLB,, | Performed by: NURSE PRACTITIONER

## 2022-07-25 PROCEDURE — 3044F PR MOST RECENT HEMOGLOBIN A1C LEVEL <7.0%: ICD-10-PCS | Mod: CPTII,S$GLB,, | Performed by: NURSE PRACTITIONER

## 2022-07-25 PROCEDURE — 1125F PR PAIN SEVERITY QUANTIFIED, PAIN PRESENT: ICD-10-PCS | Mod: CPTII,S$GLB,, | Performed by: NURSE PRACTITIONER

## 2022-07-25 PROCEDURE — 99495 TCM SERVICES (MODERATE COMPLEXITY): ICD-10-PCS | Mod: S$GLB,,, | Performed by: NURSE PRACTITIONER

## 2022-07-25 PROCEDURE — 93010 EKG 12-LEAD: ICD-10-PCS | Mod: S$GLB,,, | Performed by: INTERNAL MEDICINE

## 2022-07-25 PROCEDURE — 99999 PR PBB SHADOW E&M-EST. PATIENT-LVL V: ICD-10-PCS | Mod: PBBFAC,,, | Performed by: NURSE PRACTITIONER

## 2022-07-25 PROCEDURE — 99499 UNLISTED E&M SERVICE: CPT | Mod: S$GLB,,, | Performed by: NURSE PRACTITIONER

## 2022-07-25 PROCEDURE — 1160F RVW MEDS BY RX/DR IN RCRD: CPT | Mod: CPTII,S$GLB,, | Performed by: NURSE PRACTITIONER

## 2022-07-25 PROCEDURE — 3008F PR BODY MASS INDEX (BMI) DOCUMENTED: ICD-10-PCS | Mod: CPTII,S$GLB,, | Performed by: NURSE PRACTITIONER

## 2022-07-25 PROCEDURE — 1159F MED LIST DOCD IN RCRD: CPT | Mod: CPTII,S$GLB,, | Performed by: NURSE PRACTITIONER

## 2022-07-25 PROCEDURE — 1157F ADVNC CARE PLAN IN RCRD: CPT | Mod: CPTII,S$GLB,, | Performed by: NURSE PRACTITIONER

## 2022-07-25 PROCEDURE — 99499 RISK ADDL DX/OHS AUDIT: ICD-10-PCS | Mod: S$GLB,,, | Performed by: NURSE PRACTITIONER

## 2022-07-25 PROCEDURE — 4010F PR ACE/ARB THEARPY RXD/TAKEN: ICD-10-PCS | Mod: CPTII,S$GLB,, | Performed by: NURSE PRACTITIONER

## 2022-07-25 PROCEDURE — 3074F SYST BP LT 130 MM HG: CPT | Mod: CPTII,S$GLB,, | Performed by: NURSE PRACTITIONER

## 2022-07-25 PROCEDURE — 3288F FALL RISK ASSESSMENT DOCD: CPT | Mod: CPTII,S$GLB,, | Performed by: NURSE PRACTITIONER

## 2022-07-25 PROCEDURE — 3074F PR MOST RECENT SYSTOLIC BLOOD PRESSURE < 130 MM HG: ICD-10-PCS | Mod: CPTII,S$GLB,, | Performed by: NURSE PRACTITIONER

## 2022-07-25 PROCEDURE — 1100F PTFALLS ASSESS-DOCD GE2>/YR: CPT | Mod: CPTII,S$GLB,, | Performed by: NURSE PRACTITIONER

## 2022-07-25 PROCEDURE — 93010 ELECTROCARDIOGRAM REPORT: CPT | Mod: S$GLB,,, | Performed by: INTERNAL MEDICINE

## 2022-07-25 PROCEDURE — 99999 PR PBB SHADOW E&M-EST. PATIENT-LVL V: CPT | Mod: PBBFAC,,, | Performed by: NURSE PRACTITIONER

## 2022-07-25 PROCEDURE — 1125F AMNT PAIN NOTED PAIN PRSNT: CPT | Mod: CPTII,S$GLB,, | Performed by: NURSE PRACTITIONER

## 2022-07-25 PROCEDURE — 3066F PR DOCUMENTATION OF TREATMENT FOR NEPHROPATHY: ICD-10-PCS | Mod: CPTII,S$GLB,, | Performed by: NURSE PRACTITIONER

## 2022-07-25 PROCEDURE — 1159F PR MEDICATION LIST DOCUMENTED IN MEDICAL RECORD: ICD-10-PCS | Mod: CPTII,S$GLB,, | Performed by: NURSE PRACTITIONER

## 2022-07-25 PROCEDURE — 4010F ACE/ARB THERAPY RXD/TAKEN: CPT | Mod: CPTII,S$GLB,, | Performed by: NURSE PRACTITIONER

## 2022-07-25 PROCEDURE — 93005 EKG 12-LEAD: ICD-10-PCS | Mod: S$GLB,,, | Performed by: NURSE PRACTITIONER

## 2022-07-25 PROCEDURE — 3078F DIAST BP <80 MM HG: CPT | Mod: CPTII,S$GLB,, | Performed by: NURSE PRACTITIONER

## 2022-07-25 PROCEDURE — 3061F PR NEG MICROALBUMINURIA RESULT DOCUMENTED/REVIEW: ICD-10-PCS | Mod: CPTII,S$GLB,, | Performed by: NURSE PRACTITIONER

## 2022-07-25 PROCEDURE — 1157F PR ADVANCE CARE PLAN OR EQUIV PRESENT IN MEDICAL RECORD: ICD-10-PCS | Mod: CPTII,S$GLB,, | Performed by: NURSE PRACTITIONER

## 2022-07-25 PROCEDURE — 3061F NEG MICROALBUMINURIA REV: CPT | Mod: CPTII,S$GLB,, | Performed by: NURSE PRACTITIONER

## 2022-07-25 NOTE — PROGRESS NOTES
Chief Complaint  Chief Complaint   Patient presents with    Hospital Follow Up       HPI    HPI   Mr. Butch Mcdaniel is a 66 y.o. male with medical problems as listed below. The patient presents to clinic for hospital f/u.     Hospital course as follows:    HPI:   Mr Butch Mcdaniel is a 66 y.o. man who presents with dizziness. He is currently altered and minimally able to give history. He says that for the last 3-4 days he has felt poorly, more fatigued than usual. He says this started after he received a shot. He then veers off, talking about fig trees. He is chewing on a piece of green coban wrap that he says is a fig. He is restless in bed, pulling at IV lines and BP cuff.      Called his wife Deann for additional history. No answer; left message.      In ED, he was noted to have bradycardia with junctional rhythm. He was also noted to have hyperkalemia with SHAUNA. He was shifted and started on dopamine. He was admitted to ICU with Cardiology and Nephrology consults.      Upon review of chart, he takes benzodiazepines, opioids, gabapentin, tizanidine, and buspar at home.         * No surgery found *       Hospital Course:   Patient admitted to the hospital with bradycardia likely from renal failure with a K of 7.6. He was admitted to the ICU. He had emergent dialysis and was sent to the floor. There he developed fever. Patient started on Abx. Source not known. Patient's renal failure resolved- unknown cause.  Cards noted arhythmia's  were caused by hyperkalemia with return to NSR and Cards signed off. Patient requested discharge on 7/21/22. Reason for renal failure likely working outside in this heat.  Follow up with PCP in one week. Activity as tolerated. Diet- low NA, ADA 2000 tom diet     The patient was started on coreg    The patient since discharge:  The patient has been taking his medication as prescribed. He started and has been taking his COREG. Today in clinic HR is 46. EKG  was done and showed bradycardia with possible left atrial enlargement. The patient states that he has been weak and that he and a little out of it. Denies any CP or SOB.     Transitional Care Note    Family and/or Caretaker present at visit?  Yes.  Diagnostic tests reviewed/disposition: No diagnosic tests pending after this hospitalization.  Disease/illness education: hyperkalemia/SHAUNA  Home health/community services discussion/referrals: Patient does not have home health established from hospital visit.  They do not need home health.  If needed, we will set up home health for the patient.   Establishment or re-establishment of referral orders for community resources: No other necessary community resources.   Discussion with other health care providers: PCP Dr. Evans.           PAST MEDICAL HISTORY:  Past Medical History:   Diagnosis Date    Anxiety     Cataract, bilateral 09/11/2018    Degenerative disc disease     Depression     Diabetes mellitus type II, controlled     ETOH abuse     Eye injury as a child    stick hit eye ? 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 both eyes 10/08/2019    Personal history of colonic polyps        PAST SURGICAL HISTORY:  Past Surgical History:   Procedure Laterality Date    APPENDECTOMY      COLONOSCOPY N/A 5/10/2017    Procedure: COLONOSCOPY;  Surgeon: Shantanu Marley MD;  Location: [facility];  Service: Endoscopy;  Laterality: N/A;    POSTERIOR FUSION OF CERVICAL SPINE WITH LAMINECTOMY N/A 10/3/2021    Procedure: LAMINECTOMY, SPINE, CERVICAL, WITH POSTERIOR FUSION C2-T2;  Surgeon: Ana Rosa Geller MD;  Location: [address];  Service: Neurosurgery;  Laterality: N/A;       SOCIAL HISTORY:  Social History     Socioeconomic History    Marital status:    Occupational History    Occupation: retired -     Tobacco Use    Smoking status: Former Smoker     Packs/day: 0.50     Years: 32.00     Pack years: 16.00     Types: Cigarettes    Smokeless tobacco: Never Used    Tobacco comment: weed    Substance and Sexual Activity    Alcohol use: No     Alcohol/week: 0.0 standard drinks    Drug use: No    Sexual activity: Yes     Partners: Female     Comment:  with children, disabled        FAMILY HISTORY:  Family History   Problem Relation Age of Onset    Heart disease Mother     Heart disease Father     Alcohol abuse Father     Diabetes Son     No Known Problems Sister     No Known Problems Brother     No Known Problems Maternal Aunt     No Known Problems Maternal Uncle     No Known Problems Paternal Aunt     No Known Problems Paternal Uncle     No Known Problems Maternal Grandmother     No Known Problems Maternal Grandfather     No Known Problems Paternal Grandmother     No Known Problems Paternal Grandfather     Amblyopia Neg Hx     Blindness Neg Hx     Cancer Neg Hx     Cataracts Neg Hx     Glaucoma Neg Hx     Hypertension Neg Hx     Macular degeneration Neg Hx     Retinal detachment Neg Hx     Strabismus Neg Hx     Stroke Neg Hx     Thyroid disease Neg Hx        ALLERGIES AND MEDICATIONS: updated and reviewed.  Review of patient's allergies indicates:  No Known Allergies  Current Outpatient Medications   Medication Sig Dispense Refill    ALPRAZolam (XANAX) 1 MG tablet TAKE ONE TABLET BY MOUTH TWICE DAILY AS NEEDED FOR ANXIETY 45 tablet 1    amLODIPine (NORVASC) 10 MG tablet Take 1 tablet (10 mg total) by mouth every evening. 90 tablet 3    atorvastatin (LIPITOR) 40 MG tablet Take 1 tablet (40 mg total) by mouth every evening. 90 tablet 3    busPIRone (BUSPAR) 30 MG Tab Take 1 tablet (30 mg total) by mouth 2 (two) times daily. 180 tablet 3    carvediloL (COREG) 25 MG tablet Take 1 tablet (25 mg total) by mouth 2 (two) times daily. 60 tablet 5    citalopram  "(CELEXA) 40 MG tablet TAKE ONE TABLET BY MOUTH ONCE A DAY 90 tablet 3    fluticasone-salmeterol diskus inhaler 100-50 mcg Inhale 1 puff into the lungs.      gabapentin (NEURONTIN) 600 MG tablet Take 600 mg by mouth 3 (three) times daily.      hydrALAZINE (APRESOLINE) 25 MG tablet Take 1 tablet (25 mg total) by mouth 3 (three) times daily. 90 tablet 11    inhalation spacing device Use as directed for inhalation. 1 each 0    lisinopriL (PRINIVIL,ZESTRIL) 40 MG tablet Take 1 tablet (40 mg total) by mouth once daily. 90 tablet 3    metFORMIN (GLUCOPHAGE) 500 MG tablet Take 1 tablet (500 mg total) by mouth 2 (two) times daily with meals. 180 tablet 3    needle, disp, 22 G 22 gauge x 1" Ndle Testosterone injection every 2 weeks 6 each 0    oxyCODONE-acetaminophen (PERCOCET)  mg per tablet Take 1 tablet by mouth every 6 (six) hours as needed.      POLYETHYLENE GLYCOL 3350 (MIRALAX ORAL) Take 1 application by mouth.      PROAIR HFA 90 mcg/actuation inhaler INHALE TWO PUFFS BY MOUTH EVERY 6 HOURS AS NEEDED FOR WHEEZING (rescue) 8.5 g 0    senna (SENOKOT) 8.6 mg tablet Take 1 tablet by mouth 2 (two) times a day.      syringe, disposable, (BD LUER-SATRLA SYRINGE) 1 mL Syrg Testosterone injection every 2 weeks 6 Syringe 0    tiZANidine (ZANAFLEX) 4 MG tablet Take 4-8 mg by mouth nightly as needed.      verapamiL (CALAN-SR) 180 MG CR tablet Take 1 tablet (180 mg total) by mouth 2 (two) times daily. 180 tablet 3    cloNIDine 0.2 mg/24 hr td ptwk (CATAPRES) 0.2 mg/24 hr Place 1 patch onto the skin every 7 days. (Patient not taking: No sig reported) 4 patch 11    fluticasone (FLONASE) 50 mcg/actuation nasal spray INHALE 1 SPRAY IN EACH NOSTRIL EVERY DAY (Patient not taking: No sig reported) 16 mL 0    naloxone (NARCAN) 4 mg/actuation Spry 4mg by nasal route as needed for opioid overdose; may repeat every 2-3 minutes in alternating nostrils until medical help arrives. Call 911 (Patient not taking: No sig " "reported) 1 each 11    nicotine (NICODERM CQ) 14 mg/24 hr PLACE ONE PATCH ONTO SKIN ONCE A DAY (Patient not taking: No sig reported) 14 patch 0    nicotine (NICODERM CQ) 21 mg/24 hr Place 1 patch onto the skin once daily. (Patient not taking: No sig reported) 14 patch 0    testosterone cypionate (DEPOTESTOTERONE CYPIONATE) 200 mg/mL injection Inject 0.75 mLs (150 mg total) into the muscle every 14 (fourteen) days. 10 mL 0     No current facility-administered medications for this visit.       Patient Care Team:  Gabriel Evans MD as PCP - General (Internal Medicine)  Vlad Nava MD (Pain Medicine)  Kaila Carrillo OD as Consulting Physician (Optometry)  Wyatt Ojeda MA as Care Coordinator    ROS  Review of Systems   Constitutional: Positive for fatigue. Negative for chills, fever and unexpected weight change.   HENT: Negative for congestion, ear pain, sore throat and voice change.    Eyes: Negative for photophobia, pain, discharge and visual disturbance.   Respiratory: Negative for cough, shortness of breath and wheezing.    Cardiovascular: Positive for leg swelling. Negative for chest pain and palpitations.   Gastrointestinal: Negative for abdominal pain, blood in stool, constipation, diarrhea, nausea and vomiting.   Genitourinary: Negative for dysuria and frequency.   Musculoskeletal: Positive for arthralgias, neck pain and neck stiffness. Negative for gait problem and joint swelling.        Chronic   Skin: Negative for color change and rash.   Neurological: Positive for weakness. Negative for seizures and headaches.   Hematological: Negative for adenopathy. Does not bruise/bleed easily.   Psychiatric/Behavioral: Negative for behavioral problems and dysphoric mood. The patient is not nervous/anxious.            Physical Exam  Vitals:    07/25/22 1404   BP: (!) 122/40   Pulse: (!) 46   Temp: 98.4 °F (36.9 °C)   TempSrc: Oral   SpO2: 97%   Weight: 96.6 kg (212 lb 13.7 oz)   Height: 5' 11" (1.803 m)    Body mass " "index is 29.69 kg/m².  Weight: 96.6 kg (212 lb 13.7 oz)   Height: 5' 11" (180.3 cm)     Physical Exam  Constitutional:       Appearance: He is well-developed.   HENT:      Head: Normocephalic and atraumatic.   Eyes:      Conjunctiva/sclera: Conjunctivae normal.      Pupils: Pupils are equal, round, and reactive to light.   Neck:      Thyroid: No thyromegaly.   Cardiovascular:      Rate and Rhythm: Regular rhythm. Bradycardia present.      Heart sounds: No murmur heard.  Pulmonary:      Effort: Pulmonary effort is normal.      Breath sounds: Normal breath sounds. No wheezing.   Musculoskeletal:         General: Normal range of motion.      Cervical back: Normal range of motion and neck supple.      Right lower le+ Edema present.      Left lower le+ Edema present.   Lymphadenopathy:      Cervical: No cervical adenopathy.   Skin:     General: Skin is warm and dry.      Findings: No rash.   Neurological:      Mental Status: He is alert and oriented to person, place, and time.      Cranial Nerves: No cranial nerve deficit.   Psychiatric:         Behavior: Behavior normal.         Thought Content: Thought content normal.         Judgment: Judgment normal.         Health Maintenance       Date Due Completion Date    Colorectal Cancer Screening 05/10/2022 5/10/2017    Hemoglobin A1c 2022    Influenza Vaccine (1) 2022 10/25/2021    Lipid Panel 10/01/2022 10/1/2021    Foot Exam 2022    Diabetes Urine Screening 2023    Eye Exam 03/15/2023 3/15/2022    Override on 2019: Done    Low Dose Statin 2023    TETANUS VACCINE 2030      Health maintenance reviewed at this time.     Assessment & Plan  Hospital discharge follow-up  -     IN OFFICE EKG 12-LEAD (to [place])  The patient needs to f/u with cardiology.    Bradycardia/Junctional bradycardia  -     IN OFFICE EKG 12-LEAD (to [place])  The patient is to stop COREG and will f/u up with " cardiology. Will have the patient monitor BP and HR. Strict ED precautions discussed with the patient and wife. They both verbalized understanding.    Hyperkalemia  Resolved. Last potasium was 3.9.    Other chronic pain/Chronic, continuous use of opioids  The current medical regimen is effective;  continue present plan and medications.  Followed by pain medicine.    Essential hypertension  The current medical regimen is effective;  continue present plan and medications.    Type 2 diabetes mellitus without complication, without long-term current use of insulin  Labs ordered the patient will have his labs done prior to visit with PCP.    Chronic prescription benzodiazepine use  Followed by PCP.  The current medical regimen is effective;  continue present plan and medications.    Hyperlipidemia, mixed  The current medical regimen is effective;  continue present plan and medications.    SHAUNA (acute kidney injury)  Resolved. Last BUN 24. Last GFR was 56.  Increase fluids.     Acute encephalopathy  Resolved. Monitor.         Follow-up: Follow up in about 1 week (around 8/1/2022) for cardiology f/u. ED precautions discussed..

## 2022-07-26 NOTE — PHYSICIAN QUERY
PT Name: Butch Mcdaniel  MR #: [id number]    DOCUMENTATION CLARIFICATION     CDS/: Reina TAVERAS, RN-BC  Contact information: [email or web address]  This form is a permanent document in the medical record.     Query Date: July 26, 2022    By submitting this query, we are merely seeking further clarification of documentation. Please utilize your independent clinical judgment when addressing the question(s) below.    The Medical Record contains the following:   Indicators   Supporting Clinical Findings Location in Medical Record   X AMS, Confusion, LOC, etc.  Acute encephalopathy- Oriented to self, location but not appropriate. Not oriented to situation. Most likely due to accumulation of gabapentin in setting of SHAUNA   Hospital Medicine, H&P 7/18   X Acute/Chronic Illness Hyperkalemia, SHAUNA, junctional bradycardia, chronic prescription benzodiazepine, T DM, chronic continuous use of opioids, anemia, HLD, HTN    Hospital Medicine, H&P 7/18   X Radiology Findings No acute intracranial findings.   CT Scan 7/18   X Electrolyte Imbalance Potassium 7.6, 6.2, 5.9, 5.2, 4.1  Bun 62, 69, 32, 26  Glucose 188, 150, 122, 141, 114  Phosphorus 5.1   7/18-7/20 Labs      7/18    Medication     X Treatment         emergent HD today  CTH no acute changes   monitor    Hospital Medicine, H&P 7/18    Other     The noted clinical guidelines are only system guidelines and do not replace the providers clinical judgment.    The National [place] of Neurologic Disorders and Stroke (NINDS) of the NIH describes encephalopathy as any diffuse disease of the brain that alters brain function or structure.    Provider, please further specify the Encephalopathy diagnosis associated with above clinical findings.  [  x ] Metabolic Encephalopathy - Due to electrolyte imbalance, metabolic derangements, or infectious processes, includes Septic Encephalopathy, Uremic Encephalopathy   [   ] Encephalopathy, unspecified      [   ] Other  Encephalopathy (please specify): ____________________   [   ] Other neurological condition- Includes Post-ictal altered mental status (please specify condition): __________   [   ]  Clinically Undetermined     Please document in your progress notes daily for the duration of treatment until resolved, and include in your discharge summary.    References:  ROSE MARIE Pinzon RN, CCDS. (2018, June 9). Notes from the Instructor: Encephalopathy tips. Retrieved October 22, 2020, from [email or web address]    ICD-9-CM Coding Clinic First Quarter 2013, Effective with discharges: October 21, 2013 [facility] § Seizure with encephalopathy due to postictal state (2013).    ICD-10-CM/[facility] Integrated Codebook (Version V.20.8.10.0) [Computer software]. (2020). Retrieved October 21, 2020.    National [place] of Neurological Disorders and Stroke. (2019, March 27). Retrieved October 22, 2020, from https://www.ninds.nih.gov/Disorders/All-Disorders/Sfkufliwaoumrx-Rvmfdtquand-Ncjl    Form No. 29179

## 2022-08-01 ENCOUNTER — OFFICE VISIT (OUTPATIENT)
Dept: CARDIOLOGY | Facility: CLINIC | Age: 67
End: 2022-08-01
Payer: MEDICARE

## 2022-08-01 VITALS
WEIGHT: 215.94 LBS | BODY MASS INDEX: 30.23 KG/M2 | OXYGEN SATURATION: 95 % | RESPIRATION RATE: 18 BRPM | SYSTOLIC BLOOD PRESSURE: 110 MMHG | HEIGHT: 71 IN | HEART RATE: 46 BPM | DIASTOLIC BLOOD PRESSURE: 66 MMHG

## 2022-08-01 DIAGNOSIS — I10 ESSENTIAL HYPERTENSION: ICD-10-CM

## 2022-08-01 DIAGNOSIS — I48.91 NEW ONSET A-FIB: Primary | ICD-10-CM

## 2022-08-01 DIAGNOSIS — R00.1 JUNCTIONAL BRADYCARDIA: ICD-10-CM

## 2022-08-01 PROCEDURE — 1157F ADVNC CARE PLAN IN RCRD: CPT | Mod: CPTII,S$GLB,, | Performed by: INTERNAL MEDICINE

## 2022-08-01 PROCEDURE — 99999 PR PBB SHADOW E&M-EST. PATIENT-LVL IV: CPT | Mod: PBBFAC,,, | Performed by: INTERNAL MEDICINE

## 2022-08-01 PROCEDURE — 3008F BODY MASS INDEX DOCD: CPT | Mod: CPTII,S$GLB,, | Performed by: INTERNAL MEDICINE

## 2022-08-01 PROCEDURE — 3066F PR DOCUMENTATION OF TREATMENT FOR NEPHROPATHY: ICD-10-PCS | Mod: CPTII,S$GLB,, | Performed by: INTERNAL MEDICINE

## 2022-08-01 PROCEDURE — 4010F PR ACE/ARB THEARPY RXD/TAKEN: ICD-10-PCS | Mod: CPTII,S$GLB,, | Performed by: INTERNAL MEDICINE

## 2022-08-01 PROCEDURE — 3074F PR MOST RECENT SYSTOLIC BLOOD PRESSURE < 130 MM HG: ICD-10-PCS | Mod: CPTII,S$GLB,, | Performed by: INTERNAL MEDICINE

## 2022-08-01 PROCEDURE — 1100F PR PT FALLS ASSESS DOC 2+ FALLS/FALL W/INJURY/YR: ICD-10-PCS | Mod: CPTII,S$GLB,, | Performed by: INTERNAL MEDICINE

## 2022-08-01 PROCEDURE — 3008F PR BODY MASS INDEX (BMI) DOCUMENTED: ICD-10-PCS | Mod: CPTII,S$GLB,, | Performed by: INTERNAL MEDICINE

## 2022-08-01 PROCEDURE — 1100F PTFALLS ASSESS-DOCD GE2>/YR: CPT | Mod: CPTII,S$GLB,, | Performed by: INTERNAL MEDICINE

## 2022-08-01 PROCEDURE — 93000 ELECTROCARDIOGRAM COMPLETE: CPT | Mod: S$GLB,,, | Performed by: INTERNAL MEDICINE

## 2022-08-01 PROCEDURE — 3078F PR MOST RECENT DIASTOLIC BLOOD PRESSURE < 80 MM HG: ICD-10-PCS | Mod: CPTII,S$GLB,, | Performed by: INTERNAL MEDICINE

## 2022-08-01 PROCEDURE — 3044F PR MOST RECENT HEMOGLOBIN A1C LEVEL <7.0%: ICD-10-PCS | Mod: CPTII,S$GLB,, | Performed by: INTERNAL MEDICINE

## 2022-08-01 PROCEDURE — 3061F NEG MICROALBUMINURIA REV: CPT | Mod: CPTII,S$GLB,, | Performed by: INTERNAL MEDICINE

## 2022-08-01 PROCEDURE — 3078F DIAST BP <80 MM HG: CPT | Mod: CPTII,S$GLB,, | Performed by: INTERNAL MEDICINE

## 2022-08-01 PROCEDURE — 99499 UNLISTED E&M SERVICE: CPT | Mod: S$GLB,,, | Performed by: INTERNAL MEDICINE

## 2022-08-01 PROCEDURE — 99499 RISK ADDL DX/OHS AUDIT: ICD-10-PCS | Mod: S$GLB,,, | Performed by: INTERNAL MEDICINE

## 2022-08-01 PROCEDURE — 99214 PR OFFICE/OUTPT VISIT, EST, LEVL IV, 30-39 MIN: ICD-10-PCS | Mod: S$GLB,,, | Performed by: INTERNAL MEDICINE

## 2022-08-01 PROCEDURE — 1111F DSCHRG MED/CURRENT MED MERGE: CPT | Mod: CPTII,S$GLB,, | Performed by: INTERNAL MEDICINE

## 2022-08-01 PROCEDURE — 1126F PR PAIN SEVERITY QUANTIFIED, NO PAIN PRESENT: ICD-10-PCS | Mod: CPTII,S$GLB,, | Performed by: INTERNAL MEDICINE

## 2022-08-01 PROCEDURE — 3288F FALL RISK ASSESSMENT DOCD: CPT | Mod: CPTII,S$GLB,, | Performed by: INTERNAL MEDICINE

## 2022-08-01 PROCEDURE — 1159F MED LIST DOCD IN RCRD: CPT | Mod: CPTII,S$GLB,, | Performed by: INTERNAL MEDICINE

## 2022-08-01 PROCEDURE — 1159F PR MEDICATION LIST DOCUMENTED IN MEDICAL RECORD: ICD-10-PCS | Mod: CPTII,S$GLB,, | Performed by: INTERNAL MEDICINE

## 2022-08-01 PROCEDURE — 1157F PR ADVANCE CARE PLAN OR EQUIV PRESENT IN MEDICAL RECORD: ICD-10-PCS | Mod: CPTII,S$GLB,, | Performed by: INTERNAL MEDICINE

## 2022-08-01 PROCEDURE — 3288F PR FALLS RISK ASSESSMENT DOCUMENTED: ICD-10-PCS | Mod: CPTII,S$GLB,, | Performed by: INTERNAL MEDICINE

## 2022-08-01 PROCEDURE — 1126F AMNT PAIN NOTED NONE PRSNT: CPT | Mod: CPTII,S$GLB,, | Performed by: INTERNAL MEDICINE

## 2022-08-01 PROCEDURE — 3074F SYST BP LT 130 MM HG: CPT | Mod: CPTII,S$GLB,, | Performed by: INTERNAL MEDICINE

## 2022-08-01 PROCEDURE — 93000 EKG 12-LEAD: ICD-10-PCS | Mod: S$GLB,,, | Performed by: INTERNAL MEDICINE

## 2022-08-01 PROCEDURE — 3044F HG A1C LEVEL LT 7.0%: CPT | Mod: CPTII,S$GLB,, | Performed by: INTERNAL MEDICINE

## 2022-08-01 PROCEDURE — 99214 OFFICE O/P EST MOD 30 MIN: CPT | Mod: S$GLB,,, | Performed by: INTERNAL MEDICINE

## 2022-08-01 PROCEDURE — 99999 PR PBB SHADOW E&M-EST. PATIENT-LVL IV: ICD-10-PCS | Mod: PBBFAC,,, | Performed by: INTERNAL MEDICINE

## 2022-08-01 PROCEDURE — 3061F PR NEG MICROALBUMINURIA RESULT DOCUMENTED/REVIEW: ICD-10-PCS | Mod: CPTII,S$GLB,, | Performed by: INTERNAL MEDICINE

## 2022-08-01 PROCEDURE — 4010F ACE/ARB THERAPY RXD/TAKEN: CPT | Mod: CPTII,S$GLB,, | Performed by: INTERNAL MEDICINE

## 2022-08-01 PROCEDURE — 3066F NEPHROPATHY DOC TX: CPT | Mod: CPTII,S$GLB,, | Performed by: INTERNAL MEDICINE

## 2022-08-01 PROCEDURE — 1111F PR DISCHARGE MEDS RECONCILED W/ CURRENT OUTPATIENT MED LIST: ICD-10-PCS | Mod: CPTII,S$GLB,, | Performed by: INTERNAL MEDICINE

## 2022-08-01 NOTE — PROGRESS NOTES
Subjective:    Patient ID:  Butch Mcdaniel is a 66 y.o. male who presents for follow-up of No chief complaint on file.      HPI     Admitted 7/18/22  Patient admitted to the hospital with bradycardia likely from renal failure with a K of 7.6. He was admitted to the ICU. He had emergent dialysis and was sent to the floor. There he developed fever. Patient started on Abx. Source not known. Patient's renal failure resolved- unknown cause.  Cards noted arhythmia's  were caused by hyperkalemia with return to NSR and Cards signed off. Patient requested discharge on 7/21/22. Reason for renal failure likely working outside in this heat.  Follow up with PCP in one week. Activity as tolerated. Diet- low NA, ADA 2000 tom diet     Echo 7/18/22  · The left ventricle is normal in size with moderate concentric hypertrophy and normal systolic function.  · The estimated ejection fraction is 70%.  · Normal right ventricular size with normal right ventricular systolic function.  · The estimated PA systolic pressure is 58 mmHg.  · There is moderate pulmonary hypertension.        Followed by Dr Pete  Uncontrolled hypertension:  Initiate Norvasc 10 mg daily at night as he states that in the morning his blood pressure is very high.  Reduce hydralazine to 25 mg t.i.d..  Continue other regimen.  Titrate hilar lesion as needed for appropriate blood pressure control.       April 2022:  Patient very concerned about his fluctuating blood pressure.  Today morning his blood pressure was 198/111 mmHg.  Then just prior to coming to clinic it was 135/87 mmHg.  Fluctuates throughout the day.  No anginal chest pains.     · The left ventricle is normal in size with moderate concentric hypertrophy and normal systolic function.  · The estimated ejection fraction is 60%.  · Normal right ventricular size with normal right ventricular systolic function.  · The estimated PA systolic pressure is 33 mmHg.     Heart rates varied between 43 and  109 BPM with an average of 62 BPM.  Rare PVCs and PACs. Five atrial pairs. One three beat atrial run.     8/1/22 Denies CP or SOB. HR/BP still run low. Medications ghave been changed multiple times recently by different MD and patient confused. He reports coreg was recently stopped but has both verapamil and norvasc on meds list. BP runs low in the beginning of the week when he places his clonidine patch  EKG sinus bradycardia 45    Review of Systems   Constitutional: Negative for decreased appetite.   HENT: Negative for ear discharge.    Eyes: Negative for blurred vision.   Endocrine: Negative for polyphagia.   Skin: Negative for nail changes.   Genitourinary: Negative for bladder incontinence.   Neurological: Negative for aphonia.   Psychiatric/Behavioral: Negative for hallucinations.   Allergic/Immunologic: Negative for hives.        Objective:    Physical Exam  Constitutional:       Appearance: He is well-developed.   HENT:      Head: Normocephalic and atraumatic.   Eyes:      Conjunctiva/sclera: Conjunctivae normal.      Pupils: Pupils are equal, round, and reactive to light.   Cardiovascular:      Rate and Rhythm: Normal rate.      Pulses: Intact distal pulses.      Heart sounds: Normal heart sounds.   Pulmonary:      Effort: Pulmonary effort is normal.      Breath sounds: Normal breath sounds.   Abdominal:      General: Bowel sounds are normal.      Palpations: Abdomen is soft.   Musculoskeletal:         General: Normal range of motion.      Cervical back: Normal range of motion and neck supple.   Skin:     General: Skin is warm and dry.   Neurological:      Mental Status: He is alert and oriented to person, place, and time.           Assessment:       1. New onset a-fib during hospitalization 7/2022 from infection? started on coreg during hospitalization    2. Junctional bradycardia    3. Essential hypertension         Plan:       HR 45 with sinus bradycardia which is well tolerated. Stop verapamil with  bradycardia  Continue Rx for HTN, HLD, CVA  OV with Dr Pete 3 months

## 2022-08-23 DIAGNOSIS — F32.A DEPRESSION, UNSPECIFIED DEPRESSION TYPE: Chronic | ICD-10-CM

## 2022-08-23 DIAGNOSIS — F41.9 ANXIETY: ICD-10-CM

## 2022-08-23 RX ORDER — CITALOPRAM 40 MG/1
TABLET, FILM COATED ORAL
Qty: 90 TABLET | Refills: 3 | Status: SHIPPED | OUTPATIENT
Start: 2022-08-23 | End: 2022-11-25

## 2022-08-23 NOTE — TELEPHONE ENCOUNTER
No new care gaps identified.  [facility] Embedded Care Gaps. Reference number: [id number]. 8/23/2022   9:28:39 AM KENISHAT

## 2022-08-24 ENCOUNTER — LAB VISIT (OUTPATIENT)
Dept: LAB | Facility: HOSPITAL | Age: 67
End: 2022-08-24
Attending: INTERNAL MEDICINE
Payer: MEDICARE

## 2022-08-24 DIAGNOSIS — R79.89 LOW TESTOSTERONE IN MALE: ICD-10-CM

## 2022-08-24 DIAGNOSIS — E11.9 TYPE 2 DIABETES MELLITUS WITHOUT COMPLICATION, WITHOUT LONG-TERM CURRENT USE OF INSULIN: ICD-10-CM

## 2022-08-24 LAB
ALBUMIN SERPL BCP-MCNC: 4 G/DL (ref 3.5–5.2)
ALP SERPL-CCNC: 43 U/L (ref 55–135)
ALT SERPL W/O P-5'-P-CCNC: 19 U/L (ref 10–44)
ANION GAP SERPL CALC-SCNC: 7 MMOL/L (ref 8–16)
AST SERPL-CCNC: 23 U/L (ref 10–40)
BASOPHILS # BLD AUTO: 0.06 K/UL (ref 0–0.2)
BASOPHILS NFR BLD: 0.8 % (ref 0–1.9)
BILIRUB SERPL-MCNC: 0.6 MG/DL (ref 0.1–1)
BUN SERPL-MCNC: 17 MG/DL (ref 8–23)
CALCIUM SERPL-MCNC: 8.8 MG/DL (ref 8.7–10.5)
CHLORIDE SERPL-SCNC: 104 MMOL/L (ref 95–110)
CHOLEST SERPL-MCNC: 118 MG/DL (ref 120–199)
CHOLEST/HDLC SERPL: 3.5 {RATIO} (ref 2–5)
CO2 SERPL-SCNC: 26 MMOL/L (ref 23–29)
CREAT SERPL-MCNC: 1.2 MG/DL (ref 0.5–1.4)
DIFFERENTIAL METHOD: ABNORMAL
EOSINOPHIL # BLD AUTO: 0.1 K/UL (ref 0–0.5)
EOSINOPHIL NFR BLD: 1.8 % (ref 0–8)
ERYTHROCYTE [DISTWIDTH] IN BLOOD BY AUTOMATED COUNT: 12.2 % (ref 11.5–14.5)
EST. GFR  (NO RACE VARIABLE): >60 ML/MIN/1.73 M^2
ESTIMATED AVG GLUCOSE: 131 MG/DL (ref 68–131)
GLUCOSE SERPL-MCNC: 128 MG/DL (ref 70–110)
HBA1C MFR BLD: 6.2 % (ref 4–5.6)
HCT VFR BLD AUTO: 39.2 % (ref 40–54)
HDLC SERPL-MCNC: 34 MG/DL (ref 40–75)
HDLC SERPL: 28.8 % (ref 20–50)
HGB BLD-MCNC: 13.3 G/DL (ref 14–18)
IMM GRANULOCYTES # BLD AUTO: 0.04 K/UL (ref 0–0.04)
IMM GRANULOCYTES NFR BLD AUTO: 0.5 % (ref 0–0.5)
LDLC SERPL CALC-MCNC: 62.2 MG/DL (ref 63–159)
LYMPHOCYTES # BLD AUTO: 2.2 K/UL (ref 1–4.8)
LYMPHOCYTES NFR BLD: 30 % (ref 18–48)
MCH RBC QN AUTO: 33 PG (ref 27–31)
MCHC RBC AUTO-ENTMCNC: 33.9 G/DL (ref 32–36)
MCV RBC AUTO: 97 FL (ref 82–98)
MONOCYTES # BLD AUTO: 0.5 K/UL (ref 0.3–1)
MONOCYTES NFR BLD: 7.4 % (ref 4–15)
NEUTROPHILS # BLD AUTO: 4.3 K/UL (ref 1.8–7.7)
NEUTROPHILS NFR BLD: 59.5 % (ref 38–73)
NONHDLC SERPL-MCNC: 84 MG/DL
NRBC BLD-RTO: 0 /100 WBC
PLATELET # BLD AUTO: 63 K/UL (ref 150–450)
PMV BLD AUTO: 12.6 FL (ref 9.2–12.9)
POTASSIUM SERPL-SCNC: 4.8 MMOL/L (ref 3.5–5.1)
PROT SERPL-MCNC: 6.9 G/DL (ref 6–8.4)
RBC # BLD AUTO: 4.03 M/UL (ref 4.6–6.2)
SODIUM SERPL-SCNC: 137 MMOL/L (ref 136–145)
TESTOST SERPL-MCNC: 860 NG/DL (ref 304–1227)
TRIGL SERPL-MCNC: 109 MG/DL (ref 30–150)
WBC # BLD AUTO: 7.3 K/UL (ref 3.9–12.7)

## 2022-08-24 PROCEDURE — 36415 COLL VENOUS BLD VENIPUNCTURE: CPT | Mod: PO | Performed by: INTERNAL MEDICINE

## 2022-08-24 PROCEDURE — 85025 COMPLETE CBC W/AUTO DIFF WBC: CPT | Performed by: INTERNAL MEDICINE

## 2022-08-24 PROCEDURE — 80053 COMPREHEN METABOLIC PANEL: CPT | Performed by: INTERNAL MEDICINE

## 2022-08-24 PROCEDURE — 84403 ASSAY OF TOTAL TESTOSTERONE: CPT | Performed by: INTERNAL MEDICINE

## 2022-08-24 PROCEDURE — 83036 HEMOGLOBIN GLYCOSYLATED A1C: CPT | Performed by: INTERNAL MEDICINE

## 2022-08-24 PROCEDURE — 80061 LIPID PANEL: CPT | Performed by: INTERNAL MEDICINE
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
## 2022-08-29 ENCOUNTER — OFFICE VISIT (OUTPATIENT)
Dept: FAMILY MEDICINE | Facility: CLINIC | Age: 67
End: 2022-08-29
Payer: MEDICARE

## 2022-08-29 VITALS
WEIGHT: 217.69 LBS | TEMPERATURE: 99 F | HEIGHT: 71 IN | HEART RATE: 74 BPM | BODY MASS INDEX: 30.48 KG/M2 | DIASTOLIC BLOOD PRESSURE: 72 MMHG | OXYGEN SATURATION: 96 % | SYSTOLIC BLOOD PRESSURE: 116 MMHG

## 2022-08-29 DIAGNOSIS — I10 ESSENTIAL HYPERTENSION: ICD-10-CM

## 2022-08-29 DIAGNOSIS — F11.90 CHRONIC, CONTINUOUS USE OF OPIOIDS: ICD-10-CM

## 2022-08-29 DIAGNOSIS — D12.6 TUBULAR ADENOMA OF COLON: ICD-10-CM

## 2022-08-29 DIAGNOSIS — F17.200 TOBACCO DEPENDENCE: ICD-10-CM

## 2022-08-29 DIAGNOSIS — R79.89 LOW TESTOSTERONE IN MALE: ICD-10-CM

## 2022-08-29 DIAGNOSIS — F13.20 BENZODIAZEPINE DEPENDENCE: ICD-10-CM

## 2022-08-29 DIAGNOSIS — E11.9 TYPE 2 DIABETES MELLITUS WITHOUT COMPLICATION, WITHOUT LONG-TERM CURRENT USE OF INSULIN: ICD-10-CM

## 2022-08-29 DIAGNOSIS — I48.91 NEW ONSET A-FIB: ICD-10-CM

## 2022-08-29 DIAGNOSIS — D69.6 THROMBOCYTOPENIA: Primary | ICD-10-CM

## 2022-08-29 PROCEDURE — 99499 RISK ADDL DX/OHS AUDIT: ICD-10-PCS | Mod: S$GLB,,, | Performed by: INTERNAL MEDICINE

## 2022-08-29 PROCEDURE — 1157F PR ADVANCE CARE PLAN OR EQUIV PRESENT IN MEDICAL RECORD: ICD-10-PCS | Mod: CPTII,S$GLB,, | Performed by: INTERNAL MEDICINE

## 2022-08-29 PROCEDURE — 99999 PR PBB SHADOW E&M-EST. PATIENT-LVL V: CPT | Mod: PBBFAC,,, | Performed by: INTERNAL MEDICINE

## 2022-08-29 PROCEDURE — 1125F AMNT PAIN NOTED PAIN PRSNT: CPT | Mod: CPTII,S$GLB,, | Performed by: INTERNAL MEDICINE

## 2022-08-29 PROCEDURE — 3078F PR MOST RECENT DIASTOLIC BLOOD PRESSURE < 80 MM HG: ICD-10-PCS | Mod: CPTII,S$GLB,, | Performed by: INTERNAL MEDICINE

## 2022-08-29 PROCEDURE — 3074F SYST BP LT 130 MM HG: CPT | Mod: CPTII,S$GLB,, | Performed by: INTERNAL MEDICINE

## 2022-08-29 PROCEDURE — 1101F PT FALLS ASSESS-DOCD LE1/YR: CPT | Mod: CPTII,S$GLB,, | Performed by: INTERNAL MEDICINE

## 2022-08-29 PROCEDURE — 3066F PR DOCUMENTATION OF TREATMENT FOR NEPHROPATHY: ICD-10-PCS | Mod: CPTII,S$GLB,, | Performed by: INTERNAL MEDICINE

## 2022-08-29 PROCEDURE — 3044F HG A1C LEVEL LT 7.0%: CPT | Mod: CPTII,S$GLB,, | Performed by: INTERNAL MEDICINE

## 2022-08-29 PROCEDURE — 1159F MED LIST DOCD IN RCRD: CPT | Mod: CPTII,S$GLB,, | Performed by: INTERNAL MEDICINE

## 2022-08-29 PROCEDURE — 1160F PR REVIEW ALL MEDS BY PRESCRIBER/CLIN PHARMACIST DOCUMENTED: ICD-10-PCS | Mod: CPTII,S$GLB,, | Performed by: INTERNAL MEDICINE

## 2022-08-29 PROCEDURE — 4010F PR ACE/ARB THEARPY RXD/TAKEN: ICD-10-PCS | Mod: CPTII,S$GLB,, | Performed by: INTERNAL MEDICINE

## 2022-08-29 PROCEDURE — 3288F FALL RISK ASSESSMENT DOCD: CPT | Mod: CPTII,S$GLB,, | Performed by: INTERNAL MEDICINE

## 2022-08-29 PROCEDURE — 3008F BODY MASS INDEX DOCD: CPT | Mod: CPTII,S$GLB,, | Performed by: INTERNAL MEDICINE

## 2022-08-29 PROCEDURE — 3060F PR POS MICROALBUMINURIA RESULT DOCUMENTED/REVIEW: ICD-10-PCS | Mod: CPTII,S$GLB,, | Performed by: INTERNAL MEDICINE

## 2022-08-29 PROCEDURE — 3078F DIAST BP <80 MM HG: CPT | Mod: CPTII,S$GLB,, | Performed by: INTERNAL MEDICINE

## 2022-08-29 PROCEDURE — 1160F RVW MEDS BY RX/DR IN RCRD: CPT | Mod: CPTII,S$GLB,, | Performed by: INTERNAL MEDICINE

## 2022-08-29 PROCEDURE — 3066F NEPHROPATHY DOC TX: CPT | Mod: CPTII,S$GLB,, | Performed by: INTERNAL MEDICINE

## 2022-08-29 PROCEDURE — 1101F PR PT FALLS ASSESS DOC 0-1 FALLS W/OUT INJ PAST YR: ICD-10-PCS | Mod: CPTII,S$GLB,, | Performed by: INTERNAL MEDICINE

## 2022-08-29 PROCEDURE — 1159F PR MEDICATION LIST DOCUMENTED IN MEDICAL RECORD: ICD-10-PCS | Mod: CPTII,S$GLB,, | Performed by: INTERNAL MEDICINE

## 2022-08-29 PROCEDURE — 3060F POS MICROALBUMINURIA REV: CPT | Mod: CPTII,S$GLB,, | Performed by: INTERNAL MEDICINE

## 2022-08-29 PROCEDURE — 3074F PR MOST RECENT SYSTOLIC BLOOD PRESSURE < 130 MM HG: ICD-10-PCS | Mod: CPTII,S$GLB,, | Performed by: INTERNAL MEDICINE

## 2022-08-29 PROCEDURE — 99214 PR OFFICE/OUTPT VISIT, EST, LEVL IV, 30-39 MIN: ICD-10-PCS | Mod: S$GLB,,, | Performed by: INTERNAL MEDICINE

## 2022-08-29 PROCEDURE — 1157F ADVNC CARE PLAN IN RCRD: CPT | Mod: CPTII,S$GLB,, | Performed by: INTERNAL MEDICINE

## 2022-08-29 PROCEDURE — 1125F PR PAIN SEVERITY QUANTIFIED, PAIN PRESENT: ICD-10-PCS | Mod: CPTII,S$GLB,, | Performed by: INTERNAL MEDICINE

## 2022-08-29 PROCEDURE — 99499 UNLISTED E&M SERVICE: CPT | Mod: S$GLB,,, | Performed by: INTERNAL MEDICINE

## 2022-08-29 PROCEDURE — 3288F PR FALLS RISK ASSESSMENT DOCUMENTED: ICD-10-PCS | Mod: CPTII,S$GLB,, | Performed by: INTERNAL MEDICINE

## 2022-08-29 PROCEDURE — 3044F PR MOST RECENT HEMOGLOBIN A1C LEVEL <7.0%: ICD-10-PCS | Mod: CPTII,S$GLB,, | Performed by: INTERNAL MEDICINE

## 2022-08-29 PROCEDURE — 99999 PR PBB SHADOW E&M-EST. PATIENT-LVL V: ICD-10-PCS | Mod: PBBFAC,,, | Performed by: INTERNAL MEDICINE

## 2022-08-29 PROCEDURE — 99214 OFFICE O/P EST MOD 30 MIN: CPT | Mod: S$GLB,,, | Performed by: INTERNAL MEDICINE

## 2022-08-29 PROCEDURE — 3008F PR BODY MASS INDEX (BMI) DOCUMENTED: ICD-10-PCS | Mod: CPTII,S$GLB,, | Performed by: INTERNAL MEDICINE

## 2022-08-29 PROCEDURE — 4010F ACE/ARB THERAPY RXD/TAKEN: CPT | Mod: CPTII,S$GLB,, | Performed by: INTERNAL MEDICINE

## 2022-08-29 RX ORDER — CLONIDINE HYDROCHLORIDE 0.2 MG/1
0.2 TABLET ORAL 2 TIMES DAILY
Qty: 60 TABLET | Refills: 11
Start: 2022-08-29 | End: 2023-02-17 | Stop reason: ALTCHOICE

## 2022-08-29 NOTE — PATIENT INSTRUCTIONS
The [facility] Department requires that patients call and make their own appointments.  Please see the below phone numbers:    [facility] - [place]: [phone]  Providers:   MD Monique Dunn PA-C Cassie Block, GERA Olmos, [facility]      [facility] [place]:  Critical access hospital - [phone]

## 2022-09-29 ENCOUNTER — LAB VISIT (OUTPATIENT)
Dept: LAB | Facility: HOSPITAL | Age: 67
End: 2022-09-29
Attending: INTERNAL MEDICINE
Payer: MEDICARE

## 2022-09-29 DIAGNOSIS — D69.6 THROMBOCYTOPENIA: ICD-10-CM

## 2022-09-29 LAB
ALBUMIN SERPL BCP-MCNC: 4.2 G/DL (ref 3.5–5.2)
ALP SERPL-CCNC: 53 U/L (ref 55–135)
ALT SERPL W/O P-5'-P-CCNC: 23 U/L (ref 10–44)
ANION GAP SERPL CALC-SCNC: 8 MMOL/L (ref 8–16)
ANISOCYTOSIS BLD QL SMEAR: SLIGHT
AST SERPL-CCNC: 21 U/L (ref 10–40)
BASOPHILS # BLD AUTO: 0.05 K/UL (ref 0–0.2)
BASOPHILS NFR BLD: 0.6 % (ref 0–1.9)
BILIRUB SERPL-MCNC: 0.7 MG/DL (ref 0.1–1)
BUN SERPL-MCNC: 24 MG/DL (ref 8–23)
CALCIUM SERPL-MCNC: 9.2 MG/DL (ref 8.7–10.5)
CHLORIDE SERPL-SCNC: 103 MMOL/L (ref 95–110)
CO2 SERPL-SCNC: 23 MMOL/L (ref 23–29)
CREAT SERPL-MCNC: 1.2 MG/DL (ref 0.5–1.4)
DIFFERENTIAL METHOD: ABNORMAL
EOSINOPHIL # BLD AUTO: 0.2 K/UL (ref 0–0.5)
EOSINOPHIL NFR BLD: 2.3 % (ref 0–8)
ERYTHROCYTE [DISTWIDTH] IN BLOOD BY AUTOMATED COUNT: 13.1 % (ref 11.5–14.5)
EST. GFR  (NO RACE VARIABLE): >60 ML/MIN/1.73 M^2
GIANT PLATELETS BLD QL SMEAR: PRESENT
GLUCOSE SERPL-MCNC: 120 MG/DL (ref 70–110)
HCT VFR BLD AUTO: 40.4 % (ref 40–54)
HGB BLD-MCNC: 13.6 G/DL (ref 14–18)
IMM GRANULOCYTES # BLD AUTO: 0.03 K/UL (ref 0–0.04)
IMM GRANULOCYTES NFR BLD AUTO: 0.4 % (ref 0–0.5)
LYMPHOCYTES # BLD AUTO: 2.6 K/UL (ref 1–4.8)
LYMPHOCYTES NFR BLD: 32.8 % (ref 18–48)
MCH RBC QN AUTO: 33 PG (ref 27–31)
MCHC RBC AUTO-ENTMCNC: 33.7 G/DL (ref 32–36)
MCV RBC AUTO: 98 FL (ref 82–98)
MONOCYTES # BLD AUTO: 0.7 K/UL (ref 0.3–1)
MONOCYTES NFR BLD: 8.6 % (ref 4–15)
NEUTROPHILS # BLD AUTO: 4.4 K/UL (ref 1.8–7.7)
NEUTROPHILS NFR BLD: 55.3 % (ref 38–73)
NRBC BLD-RTO: 0 /100 WBC
PLATELET # BLD AUTO: ABNORMAL K/UL (ref 150–450)
PLATELET BLD QL SMEAR: ABNORMAL
PMV BLD AUTO: ABNORMAL FL (ref 9.2–12.9)
POTASSIUM SERPL-SCNC: 4.4 MMOL/L (ref 3.5–5.1)
PROT SERPL-MCNC: 7.2 G/DL (ref 6–8.4)
RBC # BLD AUTO: 4.12 M/UL (ref 4.6–6.2)
SODIUM SERPL-SCNC: 134 MMOL/L (ref 136–145)
WBC # BLD AUTO: 7.89 K/UL (ref 3.9–12.7)

## 2022-09-29 PROCEDURE — 85025 COMPLETE CBC W/AUTO DIFF WBC: CPT | Performed by: INTERNAL MEDICINE

## 2022-09-29 PROCEDURE — 80053 COMPREHEN METABOLIC PANEL: CPT | Performed by: INTERNAL MEDICINE

## 2022-09-29 PROCEDURE — 36415 COLL VENOUS BLD VENIPUNCTURE: CPT | Mod: PO | Performed by: INTERNAL MEDICINE

## 2022-10-01 NOTE — PROGRESS NOTES
CBC mild anemia stable  Platelet clumped  CMP mild hypoNa; Cr stable    Hold on referral to heme. Would repeat CBC in the future but appears platelets clumped not true thrombocytopenia    Appointment with me NOvember with labs.

## 2022-10-04 ENCOUNTER — TELEPHONE (OUTPATIENT)
Dept: PSYCHIATRY | Facility: CLINIC | Age: 67
End: 2022-10-04
Payer: MEDICARE

## 2022-10-05 ENCOUNTER — PES CALL (OUTPATIENT)
Dept: ADMINISTRATIVE | Facility: CLINIC | Age: 67
End: 2022-10-05
Payer: MEDICARE

## 2022-10-12 ENCOUNTER — TELEPHONE (OUTPATIENT)
Dept: ADMINISTRATIVE | Facility: CLINIC | Age: 67
End: 2022-10-12
Payer: MEDICARE

## 2022-10-12 NOTE — TELEPHONE ENCOUNTER
Called pt, no answer; left message informing pt I was calling to remind pt of his in office EAWV on 10/14/22 and to return my call if he had any questions; left my name and number

## 2022-10-14 ENCOUNTER — OFFICE VISIT (OUTPATIENT)
Dept: FAMILY MEDICINE | Facility: CLINIC | Age: 67
End: 2022-10-14
Payer: MEDICARE

## 2022-10-14 VITALS
BODY MASS INDEX: 30.43 KG/M2 | DIASTOLIC BLOOD PRESSURE: 78 MMHG | HEIGHT: 71 IN | HEART RATE: 58 BPM | OXYGEN SATURATION: 98 % | SYSTOLIC BLOOD PRESSURE: 132 MMHG | WEIGHT: 217.38 LBS

## 2022-10-14 DIAGNOSIS — E11.65 TYPE 2 DIABETES MELLITUS WITH HYPERGLYCEMIA, WITHOUT LONG-TERM CURRENT USE OF INSULIN: ICD-10-CM

## 2022-10-14 DIAGNOSIS — F13.20 BENZODIAZEPINE DEPENDENCE: Chronic | ICD-10-CM

## 2022-10-14 DIAGNOSIS — D69.6 THROMBOCYTOPENIA: ICD-10-CM

## 2022-10-14 DIAGNOSIS — E11.36 TYPE 2 DIABETES MELLITUS WITH DIABETIC CATARACT, WITHOUT LONG-TERM CURRENT USE OF INSULIN: ICD-10-CM

## 2022-10-14 DIAGNOSIS — F33.41 RECURRENT MAJOR DEPRESSIVE DISORDER, IN PARTIAL REMISSION: ICD-10-CM

## 2022-10-14 DIAGNOSIS — I10 ESSENTIAL HYPERTENSION: ICD-10-CM

## 2022-10-14 DIAGNOSIS — M46.1 SACROILIITIS: ICD-10-CM

## 2022-10-14 DIAGNOSIS — M46.92 UNSPECIFIED INFLAMMATORY SPONDYLOPATHY, CERVICAL REGION: ICD-10-CM

## 2022-10-14 DIAGNOSIS — F17.200 TOBACCO DEPENDENCE: Chronic | ICD-10-CM

## 2022-10-14 DIAGNOSIS — E11.9 TYPE 2 DIABETES MELLITUS WITHOUT COMPLICATION, WITHOUT LONG-TERM CURRENT USE OF INSULIN: ICD-10-CM

## 2022-10-14 DIAGNOSIS — F41.9 ANXIETY: ICD-10-CM

## 2022-10-14 DIAGNOSIS — Z00.00 ENCOUNTER FOR PREVENTIVE HEALTH EXAMINATION: Primary | ICD-10-CM

## 2022-10-14 PROCEDURE — 4010F ACE/ARB THERAPY RXD/TAKEN: CPT | Mod: CPTII,S$GLB,, | Performed by: NURSE PRACTITIONER

## 2022-10-14 PROCEDURE — 3288F FALL RISK ASSESSMENT DOCD: CPT | Mod: CPTII,S$GLB,, | Performed by: NURSE PRACTITIONER

## 2022-10-14 PROCEDURE — 1101F PT FALLS ASSESS-DOCD LE1/YR: CPT | Mod: CPTII,S$GLB,, | Performed by: NURSE PRACTITIONER

## 2022-10-14 PROCEDURE — 3075F PR MOST RECENT SYSTOLIC BLOOD PRESS GE 130-139MM HG: ICD-10-PCS | Mod: CPTII,S$GLB,, | Performed by: NURSE PRACTITIONER

## 2022-10-14 PROCEDURE — 1157F PR ADVANCE CARE PLAN OR EQUIV PRESENT IN MEDICAL RECORD: ICD-10-PCS | Mod: CPTII,S$GLB,, | Performed by: NURSE PRACTITIONER

## 2022-10-14 PROCEDURE — 1159F PR MEDICATION LIST DOCUMENTED IN MEDICAL RECORD: ICD-10-PCS | Mod: CPTII,S$GLB,, | Performed by: NURSE PRACTITIONER

## 2022-10-14 PROCEDURE — G0008 ADMIN INFLUENZA VIRUS VAC: HCPCS | Mod: S$GLB,,, | Performed by: NURSE PRACTITIONER

## 2022-10-14 PROCEDURE — 3044F HG A1C LEVEL LT 7.0%: CPT | Mod: CPTII,S$GLB,, | Performed by: NURSE PRACTITIONER

## 2022-10-14 PROCEDURE — 3060F POS MICROALBUMINURIA REV: CPT | Mod: CPTII,S$GLB,, | Performed by: NURSE PRACTITIONER

## 2022-10-14 PROCEDURE — 3044F PR MOST RECENT HEMOGLOBIN A1C LEVEL <7.0%: ICD-10-PCS | Mod: CPTII,S$GLB,, | Performed by: NURSE PRACTITIONER

## 2022-10-14 PROCEDURE — 1159F MED LIST DOCD IN RCRD: CPT | Mod: CPTII,S$GLB,, | Performed by: NURSE PRACTITIONER

## 2022-10-14 PROCEDURE — G0439 PPPS, SUBSEQ VISIT: HCPCS | Mod: S$GLB,,, | Performed by: NURSE PRACTITIONER

## 2022-10-14 PROCEDURE — 4010F PR ACE/ARB THEARPY RXD/TAKEN: ICD-10-PCS | Mod: CPTII,S$GLB,, | Performed by: NURSE PRACTITIONER

## 2022-10-14 PROCEDURE — 99499 UNLISTED E&M SERVICE: CPT | Mod: S$GLB,,, | Performed by: NURSE PRACTITIONER

## 2022-10-14 PROCEDURE — 1157F ADVNC CARE PLAN IN RCRD: CPT | Mod: CPTII,S$GLB,, | Performed by: NURSE PRACTITIONER

## 2022-10-14 PROCEDURE — 3075F SYST BP GE 130 - 139MM HG: CPT | Mod: CPTII,S$GLB,, | Performed by: NURSE PRACTITIONER

## 2022-10-14 PROCEDURE — 99999 PR PBB SHADOW E&M-EST. PATIENT-LVL V: ICD-10-PCS | Mod: PBBFAC,,, | Performed by: NURSE PRACTITIONER

## 2022-10-14 PROCEDURE — 3066F PR DOCUMENTATION OF TREATMENT FOR NEPHROPATHY: ICD-10-PCS | Mod: CPTII,S$GLB,, | Performed by: NURSE PRACTITIONER

## 2022-10-14 PROCEDURE — 3288F PR FALLS RISK ASSESSMENT DOCUMENTED: ICD-10-PCS | Mod: CPTII,S$GLB,, | Performed by: NURSE PRACTITIONER

## 2022-10-14 PROCEDURE — 3066F NEPHROPATHY DOC TX: CPT | Mod: CPTII,S$GLB,, | Performed by: NURSE PRACTITIONER

## 2022-10-14 PROCEDURE — G0008 FLU VACCINE - QUADRIVALENT - ADJUVANTED: ICD-10-PCS | Mod: S$GLB,,, | Performed by: NURSE PRACTITIONER

## 2022-10-14 PROCEDURE — 1101F PR PT FALLS ASSESS DOC 0-1 FALLS W/OUT INJ PAST YR: ICD-10-PCS | Mod: CPTII,S$GLB,, | Performed by: NURSE PRACTITIONER

## 2022-10-14 PROCEDURE — 3078F DIAST BP <80 MM HG: CPT | Mod: CPTII,S$GLB,, | Performed by: NURSE PRACTITIONER

## 2022-10-14 PROCEDURE — 90694 VACC AIIV4 NO PRSRV 0.5ML IM: CPT | Mod: S$GLB,,, | Performed by: NURSE PRACTITIONER

## 2022-10-14 PROCEDURE — 1170F PR FUNCTIONAL STATUS ASSESSED: ICD-10-PCS | Mod: CPTII,S$GLB,, | Performed by: NURSE PRACTITIONER

## 2022-10-14 PROCEDURE — 1125F AMNT PAIN NOTED PAIN PRSNT: CPT | Mod: CPTII,S$GLB,, | Performed by: NURSE PRACTITIONER

## 2022-10-14 PROCEDURE — 1170F FXNL STATUS ASSESSED: CPT | Mod: CPTII,S$GLB,, | Performed by: NURSE PRACTITIONER

## 2022-10-14 PROCEDURE — 99999 PR PBB SHADOW E&M-EST. PATIENT-LVL V: CPT | Mod: PBBFAC,,, | Performed by: NURSE PRACTITIONER

## 2022-10-14 PROCEDURE — 1125F PR PAIN SEVERITY QUANTIFIED, PAIN PRESENT: ICD-10-PCS | Mod: CPTII,S$GLB,, | Performed by: NURSE PRACTITIONER

## 2022-10-14 PROCEDURE — 3060F PR POS MICROALBUMINURIA RESULT DOCUMENTED/REVIEW: ICD-10-PCS | Mod: CPTII,S$GLB,, | Performed by: NURSE PRACTITIONER

## 2022-10-14 PROCEDURE — G0439 PR MEDICARE ANNUAL WELLNESS SUBSEQUENT VISIT: ICD-10-PCS | Mod: S$GLB,,, | Performed by: NURSE PRACTITIONER

## 2022-10-14 PROCEDURE — 90694 FLU VACCINE - QUADRIVALENT - ADJUVANTED: ICD-10-PCS | Mod: S$GLB,,, | Performed by: NURSE PRACTITIONER

## 2022-10-14 PROCEDURE — 3078F PR MOST RECENT DIASTOLIC BLOOD PRESSURE < 80 MM HG: ICD-10-PCS | Mod: CPTII,S$GLB,, | Performed by: NURSE PRACTITIONER

## 2022-10-14 NOTE — PATIENT INSTRUCTIONS
Counseling and Referral of Other Preventative  (Italic type indicates deductible and co-insurance are waived)    Patient Name: Butch Mcdaniel  Today's Date: 10/14/2022    Health Maintenance         Date Due Completion Date    Colorectal Cancer Screening 05/10/2022 5/10/2017    COVID-19 Vaccine (5 - Booster) 08/11/2022 6/16/2022    Influenza Vaccine (1) 09/01/2022 10/25/2021    Foot Exam 11/24/2022 11/24/2021    Hemoglobin A1c 02/24/2023 8/24/2022    Eye Exam 03/15/2023 3/15/2022    Override on 11/4/2019: Done    Diabetes Urine Screening 08/24/2023 8/24/2022    Lipid Panel 08/24/2023 8/24/2022    Low Dose Statin 08/29/2023 8/29/2022    TETANUS VACCINE 12/04/2030 12/4/2020          Orders Placed This Encounter   Procedures    Influenza (FLUAD) - Quadrivalent (Adjuvanted) *Preferred* (65+) (PF)       The following information is provided to all patients.  This information is to help you find resources for any of the problems found today that may be affecting your health:                Living healthy guide: www.[facility].[place].gov      Understanding Diabetes: www.diabetes.org      Eating healthy: www.cdc.gov/healthyweight      CDC home safety checklist: www.cdc.gov/steadi/patient.html      Agency on Aging: www.goea.[place].[facility]      Alcoholics anonymous (AA): www.aa.org      Physical Activity: www.mikhail.nih.gov/qz5lfdv      Tobacco use: www.quitwithusla.org       Please call [employer] at [phone] to receive a rewards card for completing your Annual Wellness Visit. Thanks

## 2022-10-14 NOTE — PROGRESS NOTES
"  Butch Mcdaniel presented for a  Medicare AWV and comprehensive Health Risk Assessment today. The following components were reviewed and updated:    Medical history  Family History  Social history  Allergies and Current Medications  Health Risk Assessment  Health Maintenance  Care Team       ** See Completed Assessments for Annual Wellness Visit within the encounter summary.**       The following assessments were completed:  Living Situation  CAGE  Depression Screening  Timed Get Up and Go  Whisper Test  Cognitive Function Screening  Nutrition Screening  ADL Screening  PAQ Screening        Vitals:    10/14/22 0835   BP: 132/78   BP Location: Left arm   Patient Position: Sitting   Pulse: (!) 58   SpO2: 98%   Weight: 98.6 kg (217 lb 6 oz)   Height: 5' 11" (1.803 m)     Body mass index is 30.32 kg/m².  Physical Exam  Vitals and nursing note reviewed.   Constitutional:       Appearance: Normal appearance.   Cardiovascular:      Rate and Rhythm: Normal rate.      Pulses: Normal pulses.      Heart sounds: Normal heart sounds.   Pulmonary:      Effort: Pulmonary effort is normal.      Breath sounds: Normal breath sounds.   Abdominal:      General: Bowel sounds are normal.      Palpations: Abdomen is soft.   Musculoskeletal:         General: Normal range of motion.   Neurological:      Mental Status: He is alert and oriented to person, place, and time.   Psychiatric:         Mood and Affect: Mood normal.         Behavior: Behavior normal.           Diagnoses and health risks identified today and associated recommendations/orders:    1. Encounter for preventive health examination  Pt was seen today for an Annual Wellness visit. Healthcare maintenance and screening recommendations were discussed and updated as indicated. Return in one year for AWV.    Review current opioid prescriptions: yes  Screen for potential Substance Use Disorders:yes    2. Type 2 diabetes mellitus with hyperglycemia, without long-term current use of " insulin  3. Type 2 diabetes mellitus with diabetic cataract, without long-term current use of insulin  4. Type 2 diabetes mellitus without complication, without long-term current use of insulin  At goal. Hgb A1C 6.2 on 8/24/22. The current medical regimen is effective;  continue present plan and medications.    5. Unspecified inflammatory spondylopathy, cervical region  The current medical regimen is effective;  continue present plan and medications.    6. Sacroiliitis  The current medical regimen is effective;  continue present plan and medications.    7. Benzodiazepine dependence, did not do well with attempt to wean down 2017  The current medical regimen is effective;  continue present plan and medications.    8. Thrombocytopenia  The current medical regimen is effective;  continue present plan and medications.    9. Recurrent major depressive disorder, in partial remission  PHQ-2 total score of zero. The current medical regimen is effective;  continue present plan and medications.    10. Anxiety, unable to wean off BZD  The current medical regimen is effective;  continue present plan and medications.    11. Essential hypertension  The current medical regimen is effective;  continue present plan and medications.    12. Tobacco dependence, patch with irritation; hx of bupropion  I counseled the patient approximately 3 minutes on smoking cessation, risks associated with smoking, having a quit date, nicotine replacement, medications that can aid in cessation, and having a plan for future cravings.  The patient stated that ne is not ready to quit. Declined smoking cessation at this time.        Provided Butch with a 5-10 year written screening schedule and personal prevention plan. Recommendations were developed using the USPSTF age appropriate recommendations. Education, counseling, and referrals were provided as needed. After Visit Summary printed and given to patient which includes a list of additional  screenings\tests needed.    Follow up in about 1 month (around 11/17/2022) with provider.    KURTIS Card  I offered to discuss advanced care planning, including how to pick a person who would make decisions for you if you were unable to make them for yourself, called a health care power of , and what kind of decisions you might make such as use of life sustaining treatments such as ventilators and tube feeding when faced with a life limiting illness recorded on a living will that they will need to know. (How you want to be cared for as you near the end of your natural life)     X  Patient has advanced directives on file, which we reviewed, and they do not wish to make changes.

## 2022-11-01 ENCOUNTER — TELEPHONE (OUTPATIENT)
Dept: NEUROSURGERY | Facility: CLINIC | Age: 67
End: 2022-11-01
Payer: MEDICARE

## 2022-11-16 ENCOUNTER — LAB VISIT (OUTPATIENT)
Dept: LAB | Facility: HOSPITAL | Age: 67
End: 2022-11-16
Attending: INTERNAL MEDICINE
Payer: MEDICARE

## 2022-11-16 DIAGNOSIS — R79.89 LOW TESTOSTERONE IN MALE: ICD-10-CM

## 2022-11-16 DIAGNOSIS — E11.9 TYPE 2 DIABETES MELLITUS WITHOUT COMPLICATION, WITHOUT LONG-TERM CURRENT USE OF INSULIN: ICD-10-CM

## 2022-11-16 LAB
ESTIMATED AVG GLUCOSE: 137 MG/DL (ref 68–131)
HBA1C MFR BLD: 6.4 % (ref 4–5.6)
TESTOST SERPL-MCNC: 635 NG/DL (ref 304–1227)

## 2022-11-16 PROCEDURE — 83036 HEMOGLOBIN GLYCOSYLATED A1C: CPT | Performed by: INTERNAL MEDICINE

## 2022-11-16 PROCEDURE — 84403 ASSAY OF TOTAL TESTOSTERONE: CPT | Performed by: INTERNAL MEDICINE

## 2022-11-16 PROCEDURE — 36415 COLL VENOUS BLD VENIPUNCTURE: CPT | Mod: PO | Performed by: INTERNAL MEDICINE

## 2022-11-17 ENCOUNTER — OFFICE VISIT (OUTPATIENT)
Dept: FAMILY MEDICINE | Facility: CLINIC | Age: 67
End: 2022-11-17
Payer: MEDICARE

## 2022-11-17 ENCOUNTER — TELEPHONE (OUTPATIENT)
Dept: FAMILY MEDICINE | Facility: CLINIC | Age: 67
End: 2022-11-17

## 2022-11-17 VITALS
HEIGHT: 71 IN | DIASTOLIC BLOOD PRESSURE: 88 MMHG | OXYGEN SATURATION: 96 % | BODY MASS INDEX: 30.2 KG/M2 | SYSTOLIC BLOOD PRESSURE: 154 MMHG | HEART RATE: 72 BPM | TEMPERATURE: 99 F | WEIGHT: 215.75 LBS

## 2022-11-17 DIAGNOSIS — F13.20 BENZODIAZEPINE DEPENDENCE: Chronic | ICD-10-CM

## 2022-11-17 DIAGNOSIS — D69.6 THROMBOCYTOPENIA: ICD-10-CM

## 2022-11-17 DIAGNOSIS — E11.36 TYPE 2 DIABETES MELLITUS WITH DIABETIC CATARACT, WITHOUT LONG-TERM CURRENT USE OF INSULIN: Primary | ICD-10-CM

## 2022-11-17 DIAGNOSIS — F41.9 ANXIETY: ICD-10-CM

## 2022-11-17 DIAGNOSIS — D12.6 TUBULAR ADENOMA OF COLON: Chronic | ICD-10-CM

## 2022-11-17 DIAGNOSIS — M51.36 DDD (DEGENERATIVE DISC DISEASE), LUMBAR: Chronic | ICD-10-CM

## 2022-11-17 DIAGNOSIS — I10 ESSENTIAL HYPERTENSION: ICD-10-CM

## 2022-11-17 DIAGNOSIS — E11.9 TYPE 2 DIABETES MELLITUS WITHOUT COMPLICATION, WITHOUT LONG-TERM CURRENT USE OF INSULIN: ICD-10-CM

## 2022-11-17 DIAGNOSIS — F33.41 RECURRENT MAJOR DEPRESSIVE DISORDER, IN PARTIAL REMISSION: ICD-10-CM

## 2022-11-17 DIAGNOSIS — I48.91 NEW ONSET A-FIB: ICD-10-CM

## 2022-11-17 DIAGNOSIS — Z12.5 SCREENING FOR PROSTATE CANCER: ICD-10-CM

## 2022-11-17 DIAGNOSIS — R79.89 LOW TESTOSTERONE IN MALE: ICD-10-CM

## 2022-11-17 DIAGNOSIS — F11.90 CHRONIC, CONTINUOUS USE OF OPIOIDS: ICD-10-CM

## 2022-11-17 PROCEDURE — 3066F PR DOCUMENTATION OF TREATMENT FOR NEPHROPATHY: ICD-10-PCS | Mod: CPTII,S$GLB,, | Performed by: INTERNAL MEDICINE

## 2022-11-17 PROCEDURE — 3079F PR MOST RECENT DIASTOLIC BLOOD PRESSURE 80-89 MM HG: ICD-10-PCS | Mod: CPTII,S$GLB,, | Performed by: INTERNAL MEDICINE

## 2022-11-17 PROCEDURE — 3044F PR MOST RECENT HEMOGLOBIN A1C LEVEL <7.0%: ICD-10-PCS | Mod: CPTII,S$GLB,, | Performed by: INTERNAL MEDICINE

## 2022-11-17 PROCEDURE — 1160F PR REVIEW ALL MEDS BY PRESCRIBER/CLIN PHARMACIST DOCUMENTED: ICD-10-PCS | Mod: CPTII,S$GLB,, | Performed by: INTERNAL MEDICINE

## 2022-11-17 PROCEDURE — 4010F ACE/ARB THERAPY RXD/TAKEN: CPT | Mod: CPTII,S$GLB,, | Performed by: INTERNAL MEDICINE

## 2022-11-17 PROCEDURE — 3044F HG A1C LEVEL LT 7.0%: CPT | Mod: CPTII,S$GLB,, | Performed by: INTERNAL MEDICINE

## 2022-11-17 PROCEDURE — 99214 PR OFFICE/OUTPT VISIT, EST, LEVL IV, 30-39 MIN: ICD-10-PCS | Mod: S$GLB,,, | Performed by: INTERNAL MEDICINE

## 2022-11-17 PROCEDURE — 1126F PR PAIN SEVERITY QUANTIFIED, NO PAIN PRESENT: ICD-10-PCS | Mod: CPTII,S$GLB,, | Performed by: INTERNAL MEDICINE

## 2022-11-17 PROCEDURE — 3008F BODY MASS INDEX DOCD: CPT | Mod: CPTII,S$GLB,, | Performed by: INTERNAL MEDICINE

## 2022-11-17 PROCEDURE — 99499 UNLISTED E&M SERVICE: CPT | Mod: S$GLB,,, | Performed by: INTERNAL MEDICINE

## 2022-11-17 PROCEDURE — 3079F DIAST BP 80-89 MM HG: CPT | Mod: CPTII,S$GLB,, | Performed by: INTERNAL MEDICINE

## 2022-11-17 PROCEDURE — 3008F PR BODY MASS INDEX (BMI) DOCUMENTED: ICD-10-PCS | Mod: CPTII,S$GLB,, | Performed by: INTERNAL MEDICINE

## 2022-11-17 PROCEDURE — 3060F PR POS MICROALBUMINURIA RESULT DOCUMENTED/REVIEW: ICD-10-PCS | Mod: CPTII,S$GLB,, | Performed by: INTERNAL MEDICINE

## 2022-11-17 PROCEDURE — 4010F PR ACE/ARB THEARPY RXD/TAKEN: ICD-10-PCS | Mod: CPTII,S$GLB,, | Performed by: INTERNAL MEDICINE

## 2022-11-17 PROCEDURE — 99999 PR PBB SHADOW E&M-EST. PATIENT-LVL V: ICD-10-PCS | Mod: PBBFAC,,, | Performed by: INTERNAL MEDICINE

## 2022-11-17 PROCEDURE — 1157F PR ADVANCE CARE PLAN OR EQUIV PRESENT IN MEDICAL RECORD: ICD-10-PCS | Mod: CPTII,S$GLB,, | Performed by: INTERNAL MEDICINE

## 2022-11-17 PROCEDURE — 3077F PR MOST RECENT SYSTOLIC BLOOD PRESSURE >= 140 MM HG: ICD-10-PCS | Mod: CPTII,S$GLB,, | Performed by: INTERNAL MEDICINE

## 2022-11-17 PROCEDURE — 1157F ADVNC CARE PLAN IN RCRD: CPT | Mod: CPTII,S$GLB,, | Performed by: INTERNAL MEDICINE

## 2022-11-17 PROCEDURE — 99214 OFFICE O/P EST MOD 30 MIN: CPT | Mod: S$GLB,,, | Performed by: INTERNAL MEDICINE

## 2022-11-17 PROCEDURE — 3288F FALL RISK ASSESSMENT DOCD: CPT | Mod: CPTII,S$GLB,, | Performed by: INTERNAL MEDICINE

## 2022-11-17 PROCEDURE — 1101F PR PT FALLS ASSESS DOC 0-1 FALLS W/OUT INJ PAST YR: ICD-10-PCS | Mod: CPTII,S$GLB,, | Performed by: INTERNAL MEDICINE

## 2022-11-17 PROCEDURE — 1159F PR MEDICATION LIST DOCUMENTED IN MEDICAL RECORD: ICD-10-PCS | Mod: CPTII,S$GLB,, | Performed by: INTERNAL MEDICINE

## 2022-11-17 PROCEDURE — 1159F MED LIST DOCD IN RCRD: CPT | Mod: CPTII,S$GLB,, | Performed by: INTERNAL MEDICINE

## 2022-11-17 PROCEDURE — 1160F RVW MEDS BY RX/DR IN RCRD: CPT | Mod: CPTII,S$GLB,, | Performed by: INTERNAL MEDICINE

## 2022-11-17 PROCEDURE — 3066F NEPHROPATHY DOC TX: CPT | Mod: CPTII,S$GLB,, | Performed by: INTERNAL MEDICINE

## 2022-11-17 PROCEDURE — 99499 RISK ADDL DX/OHS AUDIT: ICD-10-PCS | Mod: S$GLB,,, | Performed by: INTERNAL MEDICINE

## 2022-11-17 PROCEDURE — 3288F PR FALLS RISK ASSESSMENT DOCUMENTED: ICD-10-PCS | Mod: CPTII,S$GLB,, | Performed by: INTERNAL MEDICINE

## 2022-11-17 PROCEDURE — 1126F AMNT PAIN NOTED NONE PRSNT: CPT | Mod: CPTII,S$GLB,, | Performed by: INTERNAL MEDICINE

## 2022-11-17 PROCEDURE — 3060F POS MICROALBUMINURIA REV: CPT | Mod: CPTII,S$GLB,, | Performed by: INTERNAL MEDICINE

## 2022-11-17 PROCEDURE — 3077F SYST BP >= 140 MM HG: CPT | Mod: CPTII,S$GLB,, | Performed by: INTERNAL MEDICINE

## 2022-11-17 PROCEDURE — 99999 PR PBB SHADOW E&M-EST. PATIENT-LVL V: CPT | Mod: PBBFAC,,, | Performed by: INTERNAL MEDICINE

## 2022-11-17 PROCEDURE — 1101F PT FALLS ASSESS-DOCD LE1/YR: CPT | Mod: CPTII,S$GLB,, | Performed by: INTERNAL MEDICINE

## 2022-11-17 RX ORDER — OXYCODONE AND ACETAMINOPHEN 10; 325 MG/1; MG/1
1 TABLET ORAL EVERY 4 HOURS PRN
COMMUNITY

## 2022-11-17 NOTE — TELEPHONE ENCOUNTER
Please call pt Friday 11/18  What is BP?  If still high will need to call in higher dose hydralazine  If BP good, then no change  Please send back to me with reply

## 2022-11-17 NOTE — PROGRESS NOTES
This note was created by combination of typed  and M-Modal dictation.  Transcription errors may be present.  If there are any questions, please contact me.    Assessment and Plan:   Type 2 diabetes mellitus with diabetic cataract, without long-term current use of insulin  Type 2 diabetes mellitus without complication, without long-term current use of insulin  -pre visit labs A1c stable.  Check future labs.  On metformin.  -     Comprehensive Metabolic Panel; Future; Expected date: 02/17/2023  -     CBC Auto Differential; Future; Expected date: 02/17/2023  -     Hemoglobin A1C; Future; Expected date: 02/17/2023    Essential hypertension  -not to goal.  Has not taken clonidine for the past couple of weeks.  Reports that blood pressures have been good except for today.  I have asked my staff to contact him tomorrow and see what his blood pressure is.  If it is good, no change.  If it is high, increase hydralazine.  Do not restart clonidine.  He wants me to keep it on the med list just in case.    Low testosterone in male  Screening for prostate cancer  -pre visit labs okay.  Labs were done 5 days after steroid injection.  For now no change, taking 150 mg every 2 weeks.  Check future labs  Last PSA 11/2021 was stable  -     Testosterone; Future; Expected date: 02/17/2023  -     PSA, Screening; Future; Expected date: 02/17/2023    Benzodiazepine dependence, did not do well with attempt to wean down 2017  Recurrent major depressive disorder, in partial remission  Anxiety, unable to wean off BZD  -anxiety, heightened by his grandchildren living with him.  Has been very resistant/difficult weaning down off of benzodiazepine.    He now understands a bit more insight regarding interaction between benzodiazepines and chronic opioids   Has upcoming consult with Psychiatry    New onset a-fib during hospitalization 7/2022 from infection? started on coreg during hospitalization  -last visit with Cardiology, focus was on  bradycardia was on dual calcium channel blockers and verapamil was stopped.    Reports he is no longer taking aspirin.    Sinus rhythm today.    Need him to follow-up with Cardiology to review the episode of AFib and whether he needs anticoagulant going forward.    Thrombocytopenia  -last CBC, platelets clumped.  Check future CBC  -     CBC Auto Differential; Future; Expected date: 02/17/2023    Tubular adenoma of colon 5/10/17  -ordered colonoscopy  -     Ambulatory referral/consult to Endo Procedure ; Future; Expected date: 11/18/2022    Chronic, continuous use of opioids  -managed by outside pain management.        Medications Discontinued During This Encounter   Medication Reason    fluticasone (FLONASE) 50 mcg/actuation nasal spray     PROAIR HFA 90 mcg/actuation inhaler     inhalation spacing device     oxyCODONE-acetaminophen (PERCOCET)  mg per tablet        meds sent this encounter:       Follow Up: No follow-ups on file. OV 3 months with labs.    Subjective:     Chief Complaint   Patient presents with    Follow-up     3 months        HPI  Butch is a 67 y.o. male, last appointment with this clinic was 10/14/2022.  Social History     Tobacco Use    Smoking status: Former     Packs/day: 0.50     Years: 32.00     Pack years: 16.00     Types: Cigarettes    Smokeless tobacco: Never    Tobacco comments:     weed    Substance Use Topics    Alcohol use: No     Alcohol/week: 0.0 standard drinks      Social History     Occupational History    Occupation: retired -       Social History     Social History Narrative    Not on file        last visit with me in late August   Hospitalization July 2022 bradycardia from renal failure with hyperkalemia.  Underwent emergent dialysis.  Thrombocytopenia new on hospitalization and ever since.  Previous CBC had shown platelet clumping.  Possible clumping again?  Plan was repeat CBC and if persisting to Hematology.  Subsequent labs CBC showing  platelet clumps.  Repeat in the future.  Anxiety and benzodiazepine dependence.  On Celexa, BuSpar, Xanax.  BuSpar during the day Xanax at night.  I previously referred him to Psychiatry.  Gave him contact information for Psychiatry.  Would not increase the benzodiazepine given chronic opioid therapy.  Stressors-grandchildren in the household.    Diabetes A1c at that time stable.  On metformin.  ACE-inhibitor.    Hypertension labile blood pressures.  Clonidine patch with side effects of sedation, he changed himself to clonidine pills.  And stop verapamil.  On lisinopril, amlodipine, carvedilol.  Previously hydralazine which was cut down and started on amlodipine by Cardiology.  New onset atrial fibrillation during hospitalization possibly from infection.  Per cardiology.    Low testosterone labs at that time good on 150 every 2 weeks  Chronic narcotic dependence for chronic LBP  Tubular adenoma 2017   Smoker  Memory issues. ever since his neck surgery he thinks.  After his epidural he had an MRI of his brain which is negative for stroke.    Pre visit labs A1c 6.4 from previous 6.2  Testosterone WNL    Upcoming appointment with Psychiatry 11/25    Blood pressure high on intake remains high on repeat.  Has not been taking clonidine pills b/c of low blood pressure at home.  Has not been taking his clonidine pills for the past couple of weeks.  Did try marijuana use which he thinks lower his blood pressure.  But he stop using marijuana and his blood pressures remain low off of clonidine.  Taking hydralazine, amlodipine, lisinopril, and coreg  By recall blood pressure yesterday was normal but today at home blood pressure was high and remains high here on repeat.  Reports he is taking his medicines today.    Reports he is taking his hydralazine t.i.d..  Discussed that if blood pressure remains high tomorrow would increase the hydralazine and try to stay off of the clonidine.    Injected testosterone on Fri  And labs were  on Tue  Needs update PSA    Has upcoming consult with Psychiatry.    It sounds like he talked to his pain specialists and they clarified with him that benzodiazepines can have severe interaction with chronic narcotics with increased risk of unintentional overdose/death.  So he seems like he has a bit more insight into my concerns about his chronic benzodiazepine use.  Grandchildren still living with him, increased source of stress.      Patient Care Team:  Gabriel Evasn MD as PCP - General (Internal Medicine)  Vlad Nava MD (Pain Medicine)  Kaila Carrillo OD as Consulting Physician (Optometry)  Wyatt Ojeda MA as Care Coordinator    Patient Active Problem List    Diagnosis Date Noted    Type 2 diabetes mellitus with diabetic cataract, without long-term current use of insulin 10/14/2022    Unspecified inflammatory spondylopathy, cervical region 10/14/2022    Thrombocytopenia 10/14/2022    New onset a-fib during hospitalization 7/2022 from infection? started on coreg during hospitalization 07/19/2022    Junctional bradycardia 07/18/2022    Chronic prescription benzodiazepine use 07/18/2022    Foraminal stenosis of lumbar region 05/06/2022 5/5/2022 MRI L spine:   *   Multilevel lumbar spondylosis with up to severe right neural foraminal narrowing at L5-S1 with impingement of exiting right L5 nerve root.   *   Moderate thecal sac narrowing at L3-L4.   *   Chronic spondylolysis of L5 with grade 1 anterolisthesis of L5 on S1        Chronic low back pain 12/27/2021    Sacroiliac joint pain 12/27/2021    Sacroiliitis 12/27/2021    Impaired mobility 10/10/2021    Arthrodesis status 10/10/2021     Formatting of this note might be different from the original.  C2-T2, 10/03/21      Impaired mobility and ADLs 10/07/2021    Status post surgery 10/03/2021    History of smoking 10-25 pack years 10/02/2021    Paresthesia of left upper and lower extremity 10/01/2021    Low testosterone in male 09/02/2020 6/2020 Repeat  labs prolactin was normal.  Testosterone was low.  Free testosterone was low and sex hormone binding globulin was normal      Elevated prolactin level,low testosterone, gynecomastia; MRI pituitary normal 5/2020 01/13/2020    Nuclear sclerosis of both eyes 11/04/2019    Open angle with borderline findings and high glaucoma risk in both eyes 10/08/2019    Cataract, bilateral 09/11/2018    Cervical stenosis of spinal canal 10/3/2021 LAMINECTOMY, SPINE, CERVICAL, WITH POSTERIOR FUSION C2-T2 05/07/2018 02/14/2018 MRI C-spine impression:  Slight retrolisthesis of C4 with respect to C5.  Narrowing of the central spinal canal most prominent at the C4-C5, C5-C6, and C6-C7 levels and to a lesser extent at C2-C3 and C3-C4.  Annular disc bulges posteriorly at C2-C3, C3-C4.  Diffuse disc herniation/protrusion posteriorly at C4-C5 level and a disc herniation/extrusion posteriorly at the C5-C6 level with a central disc herniation/protrusion posteriorly at the C6-C7 level.  Narrowing of the neural foramen bilaterally from C3-C4 through C6-C7.  10/1/2021 admitted for L sided numbness after epidural injection, initially CT interpreted at [facility]; however subsequent MRI no hemorrhage but severe spinal canal stenosis    10/1/2021 MRI brain: Negative MRI of the brain for hemorrhage, mass or infarction. Specifically, no evidence of subarachnoid blood or blood products in the extra-axial spaces on SWI or diffusion-weighted images.  In light of the previous CTs and history of epidural injections at outside facility, this raises the question of in ejected contrast in the central spinal canal with migration into the intracranial subarachnoid spaces.  Subarachnoid hemorrhage or exudate are considered less likely.  10/1/2021 MRI C spine: Severe spinal canal stenosis with complete effacement of the CSF and spinal cord compression at the level of C5-C6 due to posterior disc osteophyte complex with superimposed right disc protrusion and  congenitally narrow spinal canal.  High T2 signal within the cord secondary to either edema or myelomalacia.    10/3/2021 LAMINECTOMY, SPINE, CERVICAL, WITH POSTERIOR FUSION C2-T2      Tubular adenoma of colon 5/10/17 05/10/2017     5/10/2017 colonoscopy tubular adenoma      Therapeutic opioid-induced constipation (OIC) 04/17/2017    Tobacco dependence, patch with irritation; hx of bupropion 01/12/2016    Type 2 diabetes mellitus without complication, without long-term current use of insulin 08/07/2015    Facet arthritis of cervical region 10/28/2013    Facet arthritis of lumbar region 10/28/2013    Benzodiazepine dependence, did not do well with attempt to wean down 2017 10/28/2013    Chronic, continuous use of opioids 10/28/2013    ED (erectile dysfunction) 07/26/2013    DDD (degenerative disc disease), cervical 05/23/2013    DDD (degenerative disc disease), lumbar 05/23/2013    Essential hypertension 11/01/2012     2/3/2022 TTE LV normal size with moderate concentric hypertrophy and normal systolic function LVEF 60%.  Normal RV size and systolic function.  PA pressure 33  7/18/22 TTE LV normal size with mod concentric hypertrophy; normal systolic function. LVEF 70%. Normal RV size and systolic function. PA pressure 58. Mod pHTN      Anemia 11/01/2012    Anxiety, unable to wean off BZD 11/01/2012    Recurrent major depressive disorder, in partial remission 11/01/2012       PAST MEDICAL PROBLEMS, PAST SURGICAL HISTORY: please see relevant portions of the electronic medical record    ALLERGIES AND MEDICATIONS: updated and reviewed.  Review of patient's allergies indicates:  No Known Allergies    Medication List with Changes/Refills   Current Medications    ALPRAZOLAM (XANAX) 1 MG TABLET    TAKE ONE TABLET BY MOUTH TWICE DAILY AS NEEDED FOR ANXIETY    AMLODIPINE (NORVASC) 10 MG TABLET    Take 1 tablet (10 mg total) by mouth every evening.    ATORVASTATIN (LIPITOR) 40 MG TABLET    Take 1 tablet (40 mg total) by mouth  "every evening.    BUSPIRONE (BUSPAR) 30 MG TAB    Take 1 tablet (30 mg total) by mouth 2 (two) times daily.    CARVEDILOL (COREG) 25 MG TABLET    Take 1 tablet (25 mg total) by mouth 2 (two) times daily.    CITALOPRAM (CELEXA) 40 MG TABLET    TAKE ONE TABLET BY MOUTH ONCE A DAY    CLONIDINE (CATAPRES) 0.2 MG TABLET    Take 1 tablet (0.2 mg total) by mouth 2 (two) times daily.    FLUTICASONE (FLONASE) 50 MCG/ACTUATION NASAL SPRAY    INHALE 1 SPRAY IN EACH NOSTRIL EVERY DAY    FLUTICASONE-SALMETEROL DISKUS INHALER 100-50 MCG    Inhale 1 puff into the lungs.    GABAPENTIN (NEURONTIN) 600 MG TABLET    Take 600 mg by mouth 3 (three) times daily.    HYDRALAZINE (APRESOLINE) 25 MG TABLET    Take 1 tablet (25 mg total) by mouth 3 (three) times daily.    INHALATION SPACING DEVICE    Use as directed for inhalation.    LISINOPRIL (PRINIVIL,ZESTRIL) 40 MG TABLET    Take 1 tablet (40 mg total) by mouth once daily.    METFORMIN (GLUCOPHAGE) 500 MG TABLET    Take 1 tablet (500 mg total) by mouth 2 (two) times daily with meals.    NALOXONE (NARCAN) 4 MG/ACTUATION SPRY    4mg by nasal route as needed for opioid overdose; may repeat every 2-3 minutes in alternating nostrils until medical help arrives. Call 911    NEEDLE, DISP, 22 G 22 GAUGE X 1" NDLE    Testosterone injection every 2 weeks    NICOTINE (NICODERM CQ) 14 MG/24 HR    PLACE ONE PATCH ONTO SKIN ONCE A DAY    OXYCODONE-ACETAMINOPHEN (PERCOCET)  MG PER TABLET    Take 1 tablet by mouth every 6 (six) hours as needed.    POLYETHYLENE GLYCOL 3350 (MIRALAX ORAL)    Take 1 application by mouth.    PROAIR HFA 90 MCG/ACTUATION INHALER    INHALE TWO PUFFS BY MOUTH EVERY 6 HOURS AS NEEDED FOR WHEEZING (rescue)    SENNA (SENOKOT) 8.6 MG TABLET    Take 1 tablet by mouth 2 (two) times a day.    SYRINGE, DISPOSABLE, (BD LUER-STARLA SYRINGE) 1 ML SYRG    Testosterone injection every 2 weeks    TESTOSTERONE CYPIONATE (DEPOTESTOTERONE CYPIONATE) 200 MG/ML INJECTION    INJECT 0.75 " "MILLILITERS (150 MG) INTRAMUSCULARLY EVERY 14 DAYS.    TIZANIDINE (ZANAFLEX) 4 MG TABLET    Take 4-8 mg by mouth nightly as needed.        Objective:   Physical Exam   Vitals:    11/17/22 1047   BP: (!) 154/88   BP Location: Left arm   Patient Position: Sitting   BP Method: Medium (Manual)   Pulse: 72   Temp: 98.7 °F (37.1 °C)   TempSrc: Oral   SpO2: 96%   Weight: 97.8 kg (215 lb 11.5 oz)   Height: 5' 11" (1.803 m)    Body mass index is 30.09 kg/m².  Weight: 97.8 kg (215 lb 11.5 oz)   Height: 5' 11" (180.3 cm)     Physical Exam  Constitutional:       General: He is not in acute distress.     Appearance: He is well-developed.   Eyes:      Extraocular Movements: Extraocular movements intact.   Cardiovascular:      Rate and Rhythm: Normal rate and regular rhythm.      Heart sounds: Normal heart sounds. No murmur heard.  Pulmonary:      Effort: Pulmonary effort is normal.      Breath sounds: Normal breath sounds.   Musculoskeletal:         General: Normal range of motion.   Skin:     General: Skin is warm and dry.   Neurological:      Mental Status: He is alert and oriented to person, place, and time.      Coordination: Coordination normal.   Psychiatric:         Behavior: Behavior normal.         Thought Content: Thought content normal.       Component      Latest Ref Rng & Units 11/16/2022 9/29/2022 8/24/2022   WBC      3.90 - 12.70 K/uL  7.89 7.30   RBC      4.60 - 6.20 M/uL  4.12 (L) 4.03 (L)   Hemoglobin      14.0 - 18.0 g/dL  13.6 (L) 13.3 (L)   Hematocrit      40.0 - 54.0 %  40.4 39.2 (L)   MCV      82 - 98 fL  98 97   MCH      27.0 - 31.0 pg  33.0 (H) 33.0 (H)   MCHC      32.0 - 36.0 g/dL  33.7 33.9   RDW      11.5 - 14.5 %  13.1 12.2   Platelets      150 - 450 K/uL  SEE COMMENT 63 (L)   MPV      9.2 - 12.9 fL  SEE COMMENT 12.6   Immature Granulocytes      0.0 - 0.5 %  0.4 0.5   Gran # (ANC)      1.8 - 7.7 K/uL  4.4 4.3   Immature Grans (Abs)      0.00 - 0.04 K/uL  0.03 0.04   Lymph #      1.0 - 4.8 K/uL  2.6 " 2.2   Mono #      0.3 - 1.0 K/uL  0.7 0.5   Eos #      0.0 - 0.5 K/uL  0.2 0.1   Baso #      0.00 - 0.20 K/uL  0.05 0.06   nRBC      0 /100 WBC  0 0   Gran %      38.0 - 73.0 %  55.3 59.5   Lymph %      18.0 - 48.0 %  32.8 30.0   Mono %      4.0 - 15.0 %  8.6 7.4   Eosinophil %      0.0 - 8.0 %  2.3 1.8   Basophil %      0.0 - 1.9 %  0.6 0.8   Platelet Estimate        Clumped (A)    Aniso        Slight    Giant Platelets        Present    Differential Method        Automated Automated   Sodium      136 - 145 mmol/L  134 (L) 137   Potassium      3.5 - 5.1 mmol/L  4.4 4.8   Chloride      95 - 110 mmol/L  103 104   CO2      23 - 29 mmol/L  23 26   Glucose      70 - 110 mg/dL  120 (H) 128 (H)   BUN      8 - 23 mg/dL  24 (H) 17   Creatinine      0.5 - 1.4 mg/dL  1.2 1.2   Calcium      8.7 - 10.5 mg/dL  9.2 8.8   PROTEIN TOTAL      6.0 - 8.4 g/dL  7.2 6.9   Albumin      3.5 - 5.2 g/dL  4.2 4.0   BILIRUBIN TOTAL      0.1 - 1.0 mg/dL  0.7 0.6   Alkaline Phosphatase      55 - 135 U/L  53 (L) 43 (L)   AST      10 - 40 U/L  21 23   ALT      10 - 44 U/L  23 19   Anion Gap      8 - 16 mmol/L  8 7 (L)   eGFR      >60 mL/min/1.73 m:2  >60.0 >60.0   Cholesterol      120 - 199 mg/dL   118 (L)   Triglycerides      30 - 150 mg/dL   109   HDL      40 - 75 mg/dL   34 (L)   LDL Cholesterol External      63.0 - 159.0 mg/dL   62.2 (L)   HDL/Cholesterol Ratio      20.0 - 50.0 %   28.8   Total Cholesterol/HDL Ratio      2.0 - 5.0   3.5   Non-HDL Cholesterol      mg/dL   84   Microalbumin, Urine      ug/mL   66.0   Creatinine, Urine      23.0 - 375.0 mg/dL   213.0   MICROALB/CREAT RATIO      0.0 - 30.0 ug/mg   31.0 (H)   Hemoglobin A1C External      4.0 - 5.6 % 6.4 (H)  6.2 (H)   Estimated Avg Glucose      68 - 131 mg/dL 137 (H)  131   Testosterone, Total      304 - 1227 ng/dL 635  860

## 2022-11-18 VITALS — DIASTOLIC BLOOD PRESSURE: 72 MMHG | SYSTOLIC BLOOD PRESSURE: 117 MMHG

## 2022-11-25 ENCOUNTER — OFFICE VISIT (OUTPATIENT)
Dept: PSYCHIATRY | Facility: CLINIC | Age: 67
End: 2022-11-25
Payer: COMMERCIAL

## 2022-11-25 VITALS
HEIGHT: 71 IN | DIASTOLIC BLOOD PRESSURE: 92 MMHG | HEART RATE: 78 BPM | WEIGHT: 216.19 LBS | SYSTOLIC BLOOD PRESSURE: 188 MMHG | BODY MASS INDEX: 30.27 KG/M2 | OXYGEN SATURATION: 97 %

## 2022-11-25 DIAGNOSIS — F41.1 GENERALIZED ANXIETY DISORDER: Primary | ICD-10-CM

## 2022-11-25 DIAGNOSIS — F13.20 SEDATIVE HYPNOTIC OR ANXIOLYTIC DEPENDENCE: ICD-10-CM

## 2022-11-25 DIAGNOSIS — F12.20 CANNABIS USE DISORDER, SEVERE, DEPENDENCE: ICD-10-CM

## 2022-11-25 DIAGNOSIS — F43.23 ADJUSTMENT DISORDER WITH MIXED ANXIETY AND DEPRESSED MOOD: ICD-10-CM

## 2022-11-25 PROCEDURE — 3044F HG A1C LEVEL LT 7.0%: CPT | Mod: CPTII,S$GLB,, | Performed by: PSYCHIATRY & NEUROLOGY

## 2022-11-25 PROCEDURE — 1157F ADVNC CARE PLAN IN RCRD: CPT | Mod: CPTII,S$GLB,, | Performed by: PSYCHIATRY & NEUROLOGY

## 2022-11-25 PROCEDURE — 99999 PR PBB SHADOW E&M-EST. PATIENT-LVL V: ICD-10-PCS | Mod: PBBFAC,,, | Performed by: PSYCHIATRY & NEUROLOGY

## 2022-11-25 PROCEDURE — 1160F RVW MEDS BY RX/DR IN RCRD: CPT | Mod: CPTII,S$GLB,, | Performed by: PSYCHIATRY & NEUROLOGY

## 2022-11-25 PROCEDURE — 4010F PR ACE/ARB THEARPY RXD/TAKEN: ICD-10-PCS | Mod: CPTII,S$GLB,, | Performed by: PSYCHIATRY & NEUROLOGY

## 2022-11-25 PROCEDURE — 3080F DIAST BP >= 90 MM HG: CPT | Mod: CPTII,S$GLB,, | Performed by: PSYCHIATRY & NEUROLOGY

## 2022-11-25 PROCEDURE — 3077F PR MOST RECENT SYSTOLIC BLOOD PRESSURE >= 140 MM HG: ICD-10-PCS | Mod: CPTII,S$GLB,, | Performed by: PSYCHIATRY & NEUROLOGY

## 2022-11-25 PROCEDURE — 99205 OFFICE O/P NEW HI 60 MIN: CPT | Mod: S$GLB,,, | Performed by: PSYCHIATRY & NEUROLOGY

## 2022-11-25 PROCEDURE — 99205 PR OFFICE/OUTPT VISIT, NEW, LEVL V, 60-74 MIN: ICD-10-PCS | Mod: S$GLB,,, | Performed by: PSYCHIATRY & NEUROLOGY

## 2022-11-25 PROCEDURE — 3008F BODY MASS INDEX DOCD: CPT | Mod: CPTII,S$GLB,, | Performed by: PSYCHIATRY & NEUROLOGY

## 2022-11-25 PROCEDURE — 1160F PR REVIEW ALL MEDS BY PRESCRIBER/CLIN PHARMACIST DOCUMENTED: ICD-10-PCS | Mod: CPTII,S$GLB,, | Performed by: PSYCHIATRY & NEUROLOGY

## 2022-11-25 PROCEDURE — 1159F MED LIST DOCD IN RCRD: CPT | Mod: CPTII,S$GLB,, | Performed by: PSYCHIATRY & NEUROLOGY

## 2022-11-25 PROCEDURE — 3060F POS MICROALBUMINURIA REV: CPT | Mod: CPTII,S$GLB,, | Performed by: PSYCHIATRY & NEUROLOGY

## 2022-11-25 PROCEDURE — 3008F PR BODY MASS INDEX (BMI) DOCUMENTED: ICD-10-PCS | Mod: CPTII,S$GLB,, | Performed by: PSYCHIATRY & NEUROLOGY

## 2022-11-25 PROCEDURE — 4010F ACE/ARB THERAPY RXD/TAKEN: CPT | Mod: CPTII,S$GLB,, | Performed by: PSYCHIATRY & NEUROLOGY

## 2022-11-25 PROCEDURE — 1159F PR MEDICATION LIST DOCUMENTED IN MEDICAL RECORD: ICD-10-PCS | Mod: CPTII,S$GLB,, | Performed by: PSYCHIATRY & NEUROLOGY

## 2022-11-25 PROCEDURE — 1157F PR ADVANCE CARE PLAN OR EQUIV PRESENT IN MEDICAL RECORD: ICD-10-PCS | Mod: CPTII,S$GLB,, | Performed by: PSYCHIATRY & NEUROLOGY

## 2022-11-25 PROCEDURE — 3066F NEPHROPATHY DOC TX: CPT | Mod: CPTII,S$GLB,, | Performed by: PSYCHIATRY & NEUROLOGY

## 2022-11-25 PROCEDURE — 3044F PR MOST RECENT HEMOGLOBIN A1C LEVEL <7.0%: ICD-10-PCS | Mod: CPTII,S$GLB,, | Performed by: PSYCHIATRY & NEUROLOGY

## 2022-11-25 PROCEDURE — 3080F PR MOST RECENT DIASTOLIC BLOOD PRESSURE >= 90 MM HG: ICD-10-PCS | Mod: CPTII,S$GLB,, | Performed by: PSYCHIATRY & NEUROLOGY

## 2022-11-25 PROCEDURE — 3066F PR DOCUMENTATION OF TREATMENT FOR NEPHROPATHY: ICD-10-PCS | Mod: CPTII,S$GLB,, | Performed by: PSYCHIATRY & NEUROLOGY

## 2022-11-25 PROCEDURE — 99999 PR PBB SHADOW E&M-EST. PATIENT-LVL V: CPT | Mod: PBBFAC,,, | Performed by: PSYCHIATRY & NEUROLOGY

## 2022-11-25 PROCEDURE — 3077F SYST BP >= 140 MM HG: CPT | Mod: CPTII,S$GLB,, | Performed by: PSYCHIATRY & NEUROLOGY

## 2022-11-25 PROCEDURE — 3060F PR POS MICROALBUMINURIA RESULT DOCUMENTED/REVIEW: ICD-10-PCS | Mod: CPTII,S$GLB,, | Performed by: PSYCHIATRY & NEUROLOGY

## 2022-11-25 RX ORDER — CITALOPRAM 20 MG/1
TABLET, FILM COATED ORAL
Qty: 15 TABLET | Refills: 0 | Status: SHIPPED | OUTPATIENT
Start: 2022-11-25 | End: 2023-02-17

## 2022-11-25 RX ORDER — DULOXETIN HYDROCHLORIDE 60 MG/1
60 CAPSULE, DELAYED RELEASE ORAL DAILY
Qty: 30 CAPSULE | Refills: 1 | Status: SHIPPED | OUTPATIENT
Start: 2022-11-25 | End: 2023-02-17 | Stop reason: SINTOL

## 2022-11-25 NOTE — Clinical Note
Sorry but he is not willing to make changes in his Xanax.  I made some recommendations for you to do with his care.

## 2022-11-25 NOTE — PROGRESS NOTES
"Outpatient Psychiatry Initial Visit (MD/NP)    11/25/2022    Butch Mcdaniel, a 67 y.o. male, presenting for initial evaluation visit. Met with patient.    Reason for Encounter: self-referral. Patient complains of No chief complaint on file.  .    History of Present Illness: Patient Butch Mcdaniel presents to clinic for his initial psychiatric evaluation.  He is very irritable and difficult to interact with at first.  Then, he opens up and participates better in conversation.  States that he is here because he can "indulge" in extra pain medications and that his PCP sent him.  Says that he is chronically hurting and "nobody will help".  Says that his pain medications fix everything, including his high blood pressure.  Very difficult to engage but denies any depression.  Says that he is depressed because his grandsons won't leave him alone.  Says that he is "not as bad as you think" when it comes to being depressed.  Says that he suffered a stroke and needed to have neck surgery immediately afterwards.  No history of aries or psychosis.  Denies being a worrisome person or having panic attacks.  Says that he has taken Xanax for years and will not stop taking it.  He is not sure of what else he has taken or is taking for depression and/or anxiety.  Reports that prior attempts to wean off of Xanax unsuccessful because of anxiety.  Patient is open to options of changing any other medications but not the Xanax.  He also says that he will continue to smoke his marijuana because he likes the way it makes him feel.  He is not motivated for any changes.    Stressors in life of 2 daughters in/out of [employer].  Also has to raise his 4 grandsons.  Stress of being in chronic pain.    Review Of Systems:     GENERAL:  No weight gain or loss  HEAD:  No headaches  CHEST:  No shortness of breath, hyperventilation or cough  CARDIOVASCULAR:  No tachycardia or chest pain  ABDOMEN:  No nausea, vomiting, pain, " "constipation or diarrhea  MUSCULOSKELETAL:  Chronic pain or stiffness of the joints  NEUROLOGIC:  Chronic weakness  Pertinent items are noted in HPI.    Current Evaluation:     Nutritional Screening: Considering the patient's height and weight, medications, medical history and preferences, should a referral be made to the dietitian? no    Constitutional  Vitals:  Most recent vital signs, dated less than 90 days prior to this appointment, were reviewed.    Vitals:    11/25/22 1013   BP: (!) 188/92   Pulse: 78   SpO2: 97%   Weight: 98.1 kg (216 lb 2.6 oz)   Height: 5' 11" (1.803 m)        General:  unremarkable, age appropriate     Musculoskeletal  Muscle Strength/Tone:  no tremor, no tic   Gait & Station:  slow     Psychiatric  Speech:  no latency; no press   Mood & Affect:  steady  irritable, anxious   Thought Process:  normal and logical   Associations:  intact   Thought Content:  normal, no suicidality, no homicidality, delusions, or paranoia   Insight:  poor awareness of illness   Judgement: limited   Orientation:  person, place, situation, time/date   Memory: grossly intact   Language: able to name, able to repeat   Attention Span & Concentration:  able to focus   Fund of Knowledge:  intact and appropriate to age and level of education       Relevant Elements of Neurological Exam: normal gait    Functioning in Relationships:  Spouse/partner: limited  Peers: limited  Employers: N/A    Laboratory Data  Lab Visit on 11/16/2022   Component Date Value Ref Range Status    Testosterone, Total 11/16/2022 635  304 - 1227 ng/dL Final    Hemoglobin A1C 11/16/2022 6.4 (H)  4.0 - 5.6 % Final    Estimated Avg Glucose 11/16/2022 137 (H)  68 - 131 mg/dL Final         Medications  Outpatient Encounter Medications as of 11/25/2022   Medication Sig Dispense Refill    ALPRAZolam (XANAX) 1 MG tablet TAKE ONE TABLET BY MOUTH TWICE DAILY AS NEEDED FOR ANXIETY 45 tablet 0    amLODIPine (NORVASC) 10 MG tablet Take 1 tablet (10 mg total) " "by mouth every evening. 90 tablet 3    atorvastatin (LIPITOR) 40 MG tablet Take 1 tablet (40 mg total) by mouth every evening. 90 tablet 3    busPIRone (BUSPAR) 30 MG Tab Take 1 tablet (30 mg total) by mouth 2 (two) times daily. 180 tablet 3    carvediloL (COREG) 25 MG tablet Take 1 tablet (25 mg total) by mouth 2 (two) times daily. 60 tablet 5    citalopram (CELEXA) 20 MG tablet Take 1 tablet (20 mg total) by mouth once daily for 10 days, THEN 0.5 tablets (10 mg total) once daily for 10 days. 15 tablet 0    cloNIDine (CATAPRES) 0.2 MG tablet Take 1 tablet (0.2 mg total) by mouth 2 (two) times daily. 60 tablet 11    DULoxetine (CYMBALTA) 60 MG capsule Take 1 capsule (60 mg total) by mouth once daily. 30 capsule 1    fluticasone-salmeterol diskus inhaler 100-50 mcg Inhale 1 puff into the lungs.      gabapentin (NEURONTIN) 600 MG tablet Take 600 mg by mouth 3 (three) times daily.      hydrALAZINE (APRESOLINE) 25 MG tablet Take 1 tablet (25 mg total) by mouth 3 (three) times daily. 90 tablet 11    lisinopriL (PRINIVIL,ZESTRIL) 40 MG tablet Take 1 tablet (40 mg total) by mouth once daily. 90 tablet 3    metFORMIN (GLUCOPHAGE) 500 MG tablet Take 1 tablet (500 mg total) by mouth 2 (two) times daily with meals. 180 tablet 3    naloxone (NARCAN) 4 mg/actuation Spry 4mg by nasal route as needed for opioid overdose; may repeat every 2-3 minutes in alternating nostrils until medical help arrives. Call 911 1 each 11    needle, disp, 22 G 22 gauge x 1" Ndle Testosterone injection every 2 weeks 6 each 0    nicotine (NICODERM CQ) 14 mg/24 hr PLACE ONE PATCH ONTO SKIN ONCE A DAY 14 patch 0    oxyCODONE-acetaminophen (PERCOCET)  mg per tablet Take 1 tablet by mouth every 4 (four) hours as needed.      POLYETHYLENE GLYCOL 3350 (MIRALAX ORAL) Take 1 application by mouth.      senna (SENOKOT) 8.6 mg tablet Take 1 tablet by mouth 2 (two) times a day.      syringe, disposable, (BD LUER-STARLA SYRINGE) 1 mL Syrg Testosterone injection " every 2 weeks 6 Syringe 0    testosterone cypionate (DEPOTESTOTERONE CYPIONATE) 200 mg/mL injection INJECT 0.75 MILLILITERS (150 MG) INTRAMUSCULARLY EVERY 14 DAYS. 10 mL 0    tiZANidine (ZANAFLEX) 4 MG tablet Take 4-8 mg by mouth nightly as needed.      [DISCONTINUED] citalopram (CELEXA) 40 MG tablet TAKE ONE TABLET BY MOUTH ONCE A DAY 90 tablet 3    [DISCONTINUED] fluticasone (FLONASE) 50 mcg/actuation nasal spray INHALE 1 SPRAY IN EACH NOSTRIL EVERY DAY 16 mL 0    [DISCONTINUED] inhalation spacing device Use as directed for inhalation. 1 each 0    [DISCONTINUED] oxyCODONE-acetaminophen (PERCOCET)  mg per tablet Take 1 tablet by mouth every 6 (six) hours as needed.      [DISCONTINUED] PROAIR HFA 90 mcg/actuation inhaler INHALE TWO PUFFS BY MOUTH EVERY 6 HOURS AS NEEDED FOR WHEEZING (rescue) 8.5 g 0     No facility-administered encounter medications on file as of 11/25/2022.           Assessment - Diagnosis - Goals:     Impression: We will continue pharmacological intervention and adjunctive therapy.       ICD-10-CM ICD-9-CM   1. Generalized anxiety disorder  F41.1 300.02   2. Cannabis use disorder, severe, dependence  F12.20 304.30   3. Sedative hypnotic or anxiolytic dependence  F13.20 304.10   4. Adjustment disorder with mixed anxiety and depressed mood  F43.23 309.28       Strengths and Liabilities: Liability: Patient is defensive., Liability: Patient is dependent., Liability: Patient lacks social skills., Liability: Patient has no suport network., Liability: Patient has poor health., Liability: Patient lacks coping skills.    Treatment Goals:  Specify outcomes written in observable, behavioral terms:   Anxiety: reducing negative automatic thoughts and reducing physical symptoms of anxiety  Depression: increasing energy, increasing interest in usual activities, increasing motivation, reducing excessive guilt, and reducing negative automatic thoughts  Drug & Alcohol: avoid marijuana and overuse of his  medications    Treatment Plan/Recommendations:   Medication Management: Continue current medications. The risks and benefits of medication were discussed with the patient.  AA/NA/CA/ACOA/Abstinence  The treatment plan and follow up plan were reviewed with the patient.  Patient has psychological and physical dependence on benzos and pain meds.  Will require inpatient detox to fully get off these meds, of which patient is not willing to do.  Therefore, I am not able to take patient into clinic to help with his problems.  His long term benzo use has cause AMY upregulation, which is going to cause increased anxiety and insomnia.  He will need to stop the Xanax and be off for a period of time to allow his receptors to reset to a normal level.  Without this, no other medications will help with his anxiety.  He can follow up in the future if he gets off the benzos and marijuana.  Would recommend changing the citalopram to duloxetine, which would help with depression, anxiety, as well as his chronic neuropathy and pains.  Provided citalopram taper over 15 days and to start duloxetine 60 mg daily.    Patient does not wish to stop marijuana, which is contributing to his lack of motivation.  Difficult patient.    Return to Clinic: as needed    Counseling time: 40 minutes  Total time: 70 minutes  Consulting clinician was informed of the encounter and consult note.

## 2022-11-25 NOTE — PATIENT INSTRUCTIONS
[facility] - DEPARTMENT OF PSYCHIATRY   NEW PATIENT ORIENTATION INFORMATION  OUTPATIENT SERVICES COUNSELING CONTRACT    We appreciate the opportunity to participate in your medical care and hope the following protocols will make it easier for you to receive quality treatment in our department.    PUNCTUALITY: Your appointment is scheduled for a fixed amount of time reserved especially for you.  To get the benefit of your appointment, please arrive early enough to allow time for parking and registration.  If you are late for your appointment, your clinician is not able to offer additional time.  Please make every effort to be on time.  You may be asked to reschedule.    PAYMENT FOR SERVICES:   Payments are expected at the time of service.  Please contact [phone] if you need to resolve issues involving your account at [facility] or to set up a payment plan.    CANCELLATION / MISSED APPOINTMENTS:   In order to receive quality care, all appointments must be kept.  Appointment may be cancelled, ONLY by talking with an  at phone number [phone], between 8:00 a.m. and 5:00 p.m., Monday through Friday, at least 24 hours before your appointment time.  Your clinician reserves this time specifically for you, and if you will be unable to use it, it is necessary that you cancel in a timely manner.  If you do not give at least 24-hour notice of cancellation a fee may be assessed.  Please note that insurance does not cover no-show charges, so you will be billed directly.  If you are consistently late, cancel, or do not show for your appointments, our department reserves the right to terminate treatment.    CALLING THE DEPARTMENT:  MESSAGES, SCHEDULE OR CANCEL APPOINTMENTS- In general you can reach the department by calling [phone], between 8:00 a.m. and 5:00 p.m., Monday through Friday, to schedule or cancel appointments or leave a message for your clinician.  It is advisable to  schedule your visits far in advance to obtain the most convenient times for your appointments.  AFTER HOURS, WEEKEND OR HOLIDAYS- For urgent questions after hours, weekends and holidays, calling the department number [phone] will connect you to the [facility] On [facility] nursing staff or the Psychiatry Inpatient Unit.  The [facility] On [facility] nursing staff will speak with you and direct your call/care as necessary.  EMERGENCY-  In case of a crisis when there is a concern of harm to self or others, call 911 or the office [phone] between 8:00 a.m. and 5:00 p.m., Monday through Friday.  After hours, weekends or holidays, please call 911 or go to the Emergency Department where you can be thoroughly evaluated by a physician.    TEAM APPROACH:  Most patients receive therapy through our team system.  In the team system, your primary therapist will be a , psychologist, psychiatry resident or psychiatrist.  If your therapist is a , psychologist or psychiatry resident, please contact your primary therapist first in matters other than medications or acute medical problems.    PRESCRIPTION REFILLS:  Prescription refills must be done at your physician office visit.  You will be given a sufficient number of refills to last one extra month beyond your next appointment.  No additional refills will be approved beyond the original treatment plan.  After hours, sufficient medication may be approved to last until the next scheduled appointment. After hours requests for refills on controlled substances will be declined by the psychiatrist on call, as he or she may not be familiar with your case.  Please work closely with your doctor so that you have sufficient medication until your next appointment.  Again, please note that no additional prescriptions will be approved per patient request over the phone.   No additional refills will be approved beyond the original treatment plan.   In certain exceptional  situations, a phone consult appointment may be arranged, with appropriate charge, for the review and approval of prescription refills to last until the next scheduled appointment.    FOLLOW UP APPOINTMENTS:  Follow-up appointments can be made in person at the [facility] Psychiatry office, or by calling [phone], from 8am to 5pm, between Monday and Friday.  It is advisable to schedule your office visits far enough in advance to obtain the most convenient times for your appointments.    Revised Feb. 23, 2010 - F/OA1/Newpatient              You have been provided with a certain amount of medication with a specified number of refills.  Please follow up within an adequate time before you run out of medications.    REFILLS FOR CONTROLLED SUBSTANCES WILL NOT BE GIVEN WITHOUT AN APPOINTMENT.  I will not honor or fill automated refill requests from pharmacies.  You must come in for an appointment to get refills.    Please book an appointment to have any paperwork (FMLA, other special accommodations, etc.) completed.  If paperwork is requested to be done without an appointment, it may take a while (days or weeks), depending on the complexity of the paperwork.    Please utilize the [facility] Information [facility] department at your local [facility] to obtain any medical records.  Copies cannot be printed and provided within our clinic.      Please book your next appointment for myself or therapist by phone by calling our office at [phone].        Note that follow up appointments are 10-20 minutes long.  It is important that we focus on medication management.  Should you need therapy, please get set up with our therapist or call your insurance company to find out which therapists are available in your area.      PLEASE BE AT LEAST 15 MINUTES EARLY FOR YOUR NEXT APPOINTMENT.  Late arrivals WILL BE TURNED AWAY AND ASKED TO RESCHEDULE.  YOU MUST COME EARLY TO ALLOW TIME FOR CHECK-IN AS WELL AS GET YOUR VITAL  "SIGNS AND GO OVER YOUR MEDICATIONS.  Tardiness is not fair to the patients who present after you and would be on time for their appointments.  It causes a delay in the appointments for patients and staff.  YOU MAY ALSO BE DISCHARGED FROM CLINIC with multiple late arrivals, late cancellations, or "No Show" appointments.         -----------------------------------------------------------------------------------------------------------------  IF YOU FEEL SUICIDAL OR HAVING THOUGHTS OR PLANS TO HURT YOURSELF OR OTHERS, CALL 911 OR REPORT TO THE NEAREST EMERGENCY ROOM.  YOU CAN ALSO ACCESS THE FOLLOWING HOTLINE:    National Suicide Prevention Hotline Number 6-395-267-TALK (9014)                  "

## 2022-12-01 ENCOUNTER — OFFICE VISIT (OUTPATIENT)
Dept: NEUROSURGERY | Facility: CLINIC | Age: 67
End: 2022-12-01
Payer: MEDICARE

## 2022-12-01 ENCOUNTER — HOSPITAL ENCOUNTER (OUTPATIENT)
Dept: RADIOLOGY | Facility: HOSPITAL | Age: 67
Discharge: HOME | End: 2022-12-01
Attending: NEUROLOGICAL SURGERY
Payer: MEDICARE

## 2022-12-01 DIAGNOSIS — M54.16 LUMBAR RADICULOPATHY: ICD-10-CM

## 2022-12-01 DIAGNOSIS — M48.02 CERVICAL STENOSIS OF SPINAL CANAL: ICD-10-CM

## 2022-12-01 DIAGNOSIS — M43.16 SPONDYLOLISTHESIS OF LUMBAR REGION: Primary | ICD-10-CM

## 2022-12-01 PROCEDURE — 3044F PR MOST RECENT HEMOGLOBIN A1C LEVEL <7.0%: ICD-10-PCS | Mod: CPTII,S$GLB,, | Performed by: PHYSICIAN ASSISTANT

## 2022-12-01 PROCEDURE — 3060F POS MICROALBUMINURIA REV: CPT | Mod: CPTII,S$GLB,, | Performed by: PHYSICIAN ASSISTANT

## 2022-12-01 PROCEDURE — 99214 PR OFFICE/OUTPT VISIT, EST, LEVL IV, 30-39 MIN: ICD-10-PCS | Mod: S$GLB,,, | Performed by: PHYSICIAN ASSISTANT

## 2022-12-01 PROCEDURE — 99214 OFFICE O/P EST MOD 30 MIN: CPT | Mod: S$GLB,,, | Performed by: PHYSICIAN ASSISTANT

## 2022-12-01 PROCEDURE — 3066F PR DOCUMENTATION OF TREATMENT FOR NEPHROPATHY: ICD-10-PCS | Mod: CPTII,S$GLB,, | Performed by: PHYSICIAN ASSISTANT

## 2022-12-01 PROCEDURE — 72125 CT NECK SPINE W/O DYE: CPT | Mod: TC

## 2022-12-01 PROCEDURE — 3044F HG A1C LEVEL LT 7.0%: CPT | Mod: CPTII,S$GLB,, | Performed by: PHYSICIAN ASSISTANT

## 2022-12-01 PROCEDURE — 99999 PR PBB SHADOW E&M-EST. PATIENT-LVL III: ICD-10-PCS | Mod: PBBFAC,,, | Performed by: PHYSICIAN ASSISTANT

## 2022-12-01 PROCEDURE — 4010F ACE/ARB THERAPY RXD/TAKEN: CPT | Mod: CPTII,S$GLB,, | Performed by: PHYSICIAN ASSISTANT

## 2022-12-01 PROCEDURE — 3060F PR POS MICROALBUMINURIA RESULT DOCUMENTED/REVIEW: ICD-10-PCS | Mod: CPTII,S$GLB,, | Performed by: PHYSICIAN ASSISTANT

## 2022-12-01 PROCEDURE — 1125F PR PAIN SEVERITY QUANTIFIED, PAIN PRESENT: ICD-10-PCS | Mod: CPTII,S$GLB,, | Performed by: PHYSICIAN ASSISTANT

## 2022-12-01 PROCEDURE — 1125F AMNT PAIN NOTED PAIN PRSNT: CPT | Mod: CPTII,S$GLB,, | Performed by: PHYSICIAN ASSISTANT

## 2022-12-01 PROCEDURE — 1157F PR ADVANCE CARE PLAN OR EQUIV PRESENT IN MEDICAL RECORD: ICD-10-PCS | Mod: CPTII,S$GLB,, | Performed by: PHYSICIAN ASSISTANT

## 2022-12-01 PROCEDURE — 3288F PR FALLS RISK ASSESSMENT DOCUMENTED: ICD-10-PCS | Mod: CPTII,S$GLB,, | Performed by: PHYSICIAN ASSISTANT

## 2022-12-01 PROCEDURE — 3288F FALL RISK ASSESSMENT DOCD: CPT | Mod: CPTII,S$GLB,, | Performed by: PHYSICIAN ASSISTANT

## 2022-12-01 PROCEDURE — 99999 PR PBB SHADOW E&M-EST. PATIENT-LVL III: CPT | Mod: PBBFAC,,, | Performed by: PHYSICIAN ASSISTANT

## 2022-12-01 PROCEDURE — 4010F PR ACE/ARB THEARPY RXD/TAKEN: ICD-10-PCS | Mod: CPTII,S$GLB,, | Performed by: PHYSICIAN ASSISTANT

## 2022-12-01 PROCEDURE — 1100F PTFALLS ASSESS-DOCD GE2>/YR: CPT | Mod: CPTII,S$GLB,, | Performed by: PHYSICIAN ASSISTANT

## 2022-12-01 PROCEDURE — 1100F PR PT FALLS ASSESS DOC 2+ FALLS/FALL W/INJURY/YR: ICD-10-PCS | Mod: CPTII,S$GLB,, | Performed by: PHYSICIAN ASSISTANT

## 2022-12-01 PROCEDURE — 3066F NEPHROPATHY DOC TX: CPT | Mod: CPTII,S$GLB,, | Performed by: PHYSICIAN ASSISTANT

## 2022-12-01 PROCEDURE — 72125 CT CERVICAL SPINE WITHOUT CONTRAST: ICD-10-PCS | Mod: 26,,, | Performed by: RADIOLOGY

## 2022-12-01 PROCEDURE — 72125 CT NECK SPINE W/O DYE: CPT | Mod: 26,,, | Performed by: RADIOLOGY

## 2022-12-01 PROCEDURE — 1157F ADVNC CARE PLAN IN RCRD: CPT | Mod: CPTII,S$GLB,, | Performed by: PHYSICIAN ASSISTANT

## 2022-12-01 NOTE — PROGRESS NOTES
Neurosurgery  Established Patient    SUBJECTIVE:     History of Present Illness: Butch Mcdaniel is a 66 y.o. male who presented with profound sensory changes after receiving an epidural steroid injection at an outside facility. CT and MRI demonstrated significant cervical stenosis and myelomalacia with segmental OPLL. Thus, we offered him posterior cervical decompression and fusion. He underwent C2-T2 fusion with C2-C7 laminectomy on 10/3/21.      As of 11/18/21, the patient reports his hand function has improved significantly since surgery. He does have paresthesias, worst in the left foot.      He has completed HHPT. He is ready to do outpatient therapy. He still has home health nurse services until December 15.      He is smoking about a quarter to a half a pack a day. He is also smoking marijuana to help with his pain. He had a difference of opinions with his last pain doctor, and he is also reluctant to return to the doctor who injected him initially.      As of 1/13/22, the patient reports that he has been doing well. He is not interested in doing PT. He is now able to button his shirt. He has quit smoking tobacco. He is still experiencing low back pain.     12/01/22: Patient presents for 1 year follow up of cervical fusion. He reports chronic neck pain but denies any new or worsening neck pain. He denies any new or worsening upper extremity weakness. He continues to complain of low back pain and has brought an MRI from an outside facility. Back pain has been present for many years. His back pain is worse with sitting, and improves with ambulating. He experiences persistent left leg numbness, worst in the left foot. This has been present prior to his cervical surgery. He denies focal weakness or bowel/bladder issues.       Review of patient's allergies indicates:  No Known Allergies    Current Outpatient Medications   Medication Sig Dispense Refill    amLODIPine (NORVASC) 10 MG tablet Take 1  "tablet (10 mg total) by mouth every evening. 90 tablet 3    atorvastatin (LIPITOR) 40 MG tablet Take 1 tablet (40 mg total) by mouth every evening. 90 tablet 3    busPIRone (BUSPAR) 30 MG Tab Take 1 tablet (30 mg total) by mouth 2 (two) times daily. 180 tablet 3    carvediloL (COREG) 25 MG tablet Take 1 tablet (25 mg total) by mouth 2 (two) times daily. 60 tablet 5    citalopram (CELEXA) 20 MG tablet Take 1 tablet (20 mg total) by mouth once daily for 10 days, THEN 0.5 tablets (10 mg total) once daily for 10 days. 15 tablet 0    DULoxetine (CYMBALTA) 60 MG capsule Take 1 capsule (60 mg total) by mouth once daily. 30 capsule 1    fluticasone-salmeterol diskus inhaler 100-50 mcg Inhale 1 puff into the lungs.      gabapentin (NEURONTIN) 600 MG tablet Take 600 mg by mouth 3 (three) times daily.      hydrALAZINE (APRESOLINE) 25 MG tablet Take 1 tablet (25 mg total) by mouth 3 (three) times daily. 90 tablet 11    lisinopriL (PRINIVIL,ZESTRIL) 40 MG tablet Take 1 tablet (40 mg total) by mouth once daily. 90 tablet 3    metFORMIN (GLUCOPHAGE) 500 MG tablet Take 1 tablet (500 mg total) by mouth 2 (two) times daily with meals. 180 tablet 3    needle, disp, 22 G 22 gauge x 1" Ndle Testosterone injection every 2 weeks 6 each 0    oxyCODONE-acetaminophen (PERCOCET)  mg per tablet Take 1 tablet by mouth every 4 (four) hours as needed.      senna (SENOKOT) 8.6 mg tablet Take 1 tablet by mouth 2 (two) times a day.      syringe, disposable, (BD LUER-STARLA SYRINGE) 1 mL Syrg Testosterone injection every 2 weeks 6 Syringe 0    testosterone cypionate (DEPOTESTOTERONE CYPIONATE) 200 mg/mL injection INJECT 0.75 MILLILITERS (150 MG) INTRAMUSCULARLY EVERY 14 DAYS. 10 mL 0    tiZANidine (ZANAFLEX) 4 MG tablet Take 4-8 mg by mouth nightly as needed.      ALPRAZolam (XANAX) 1 MG tablet TAKE ONE TABLET BY MOUTH TWICE DAILY AS NEEDED FOR ANXIETY 45 tablet 0    cloNIDine (CATAPRES) 0.2 MG tablet Take 1 tablet (0.2 mg total) by mouth 2 (two) " times daily. (Patient not taking: Reported on 12/1/2022) 60 tablet 11    naloxone (NARCAN) 4 mg/actuation Spry 4mg by nasal route as needed for opioid overdose; may repeat every 2-3 minutes in alternating nostrils until medical help arrives. Call 911 (Patient not taking: Reported on 12/1/2022) 1 each 11    nicotine (NICODERM CQ) 14 mg/24 hr PLACE ONE PATCH ONTO SKIN ONCE A DAY (Patient not taking: Reported on 12/1/2022) 14 patch 0    POLYETHYLENE GLYCOL 3350 (MIRALAX ORAL) Take 1 application by mouth.       No current facility-administered medications for this visit.       Past Medical History:   Diagnosis Date    Cataract, bilateral 09/11/2018    Degenerative disc disease     Diabetes mellitus type II, controlled     Eye injury as a child    stick hit eye ? 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 both eyes 10/08/2019    Personal history of colonic polyps     Psychiatric problem     Therapy     [facility] many years ago     Past Surgical History:   Procedure Laterality Date    APPENDECTOMY      COLONOSCOPY N/A 5/10/2017    Procedure: COLONOSCOPY;  Surgeon: Shantanu Marley MD;  Location: [facility];  Service: Endoscopy;  Laterality: N/A;    POSTERIOR FUSION OF CERVICAL SPINE WITH LAMINECTOMY N/A 10/3/2021    Procedure: LAMINECTOMY, SPINE, CERVICAL, WITH POSTERIOR FUSION C2-T2;  Surgeon: Ana Rosa Geller MD;  Location: [address];  Service: Neurosurgery;  Laterality: N/A;     Family History       Problem Relation (Age of Onset)    Alcohol abuse Father    Diabetes Son    Heart disease Mother, Father    No Known Problems Sister, Brother, Maternal Aunt, Paternal Aunt, Maternal Uncle, Paternal Uncle, Maternal Grandfather, Maternal Grandmother, Paternal Grandfather, Paternal Grandmother          Social History     Socioeconomic History    Marital status:      Number of children: 3   Occupational History    Occupation: retired -    Tobacco Use    Smoking status: Every Day     Packs/day: 0.50     Years: 32.00     Pack years: 16.00     Types: Cigarettes    Smokeless tobacco: Never   Substance and Sexual Activity    Alcohol use: No     Alcohol/week: 0.0 standard drinks    Drug use: Yes     Types: Marijuana     Comment: 4 oxycodones daily; few marijuana cigarettes daily    Sexual activity: Yes     Partners: Female     Comment:  with children, disabled        Review of Systems    OBJECTIVE:     Vital Signs  Pain Score:   6  There is no height or weight on file to calculate BMI.    Neurosurgery Physical Exam  General: well developed, well nourished, no distress.   Head: normocephalic, atraumatic  Neurologic: Alert and oriented. Thought content appropriate.  GCS: Motor: 6/Verbal: 5/Eyes: 4 GCS Total: 15  Mental Status: Awake, Alert, Oriented x 4  Language: No aphasia  Speech: No dysarthria  Cranial nerves: face symmetric, tongue midline, CN II-XII grossly intact.   Eyes: pupils equal, round, reactive to light with accommodation, EOMI.   Pulmonary: normal respirations, no signs of respiratory distress  Abdomen: soft, non-distended, not tender to palpation  Skin: Skin is warm, dry and intact.  Sensory: intact to light touch throughout    Motor Strength:Moves all extremities spontaneously with good tone.  Full strength upper and lower extremities. No abnormal movements seen.     Strength  Deltoids Triceps Biceps Wrist Extension Wrist Flexion Hand    Upper: R 5/5 5/5 5/5 5/5 5/5 5/5    L 5/5 5/5 5/5 5/5 5/5 5/5     Iliopsoas Quadriceps Knee  Flexion Tibialis  anterior Gastro- cnemius EHL   Lower: R 5/5 5/5 5/5 5/5 5/5 5/5    L 5/5 5/5 5/5 5/5 5/5 5/5       Gait slightly stooped forward, fluid.       Diagnostic Results:  CT of the cervical spine which I have personally reviewed shows pseudoarthrosis of the left C2 screw     MRI of  the lumbar spine which I have personally reviewed shows multilevel spondylosis with chronic bilateral spondylolysis of L5 with spondylolisthesis of L4 on L5. There is neural foraminal narrowing present on the right at the L5-S1 level. No significant central canal stenosis.     ASSESSMENT/PLAN:     Butch Mcdaniel is a 67 y.o. male with history of cervical myelopathy s/p C2-T2 posterior cervical fusion on 10/3/21 by Dr. Geller and chronic lower back pain with chronic appearing L5 bilateral pars defect. His neck pain appears to be stable without any worsening or new symptoms. He has some fusion seen on the CT scan however it does show pseudoarthrosis of the left C2 screw. There appears to be hallowing around the left C2 screw on XR from 1/13/22. Mr. Mcdaniel continues to smoke cigarettes and was educated on the importance of smoking cessation and the effects smoking can have on fusion and bone quality. He voiced understanding.     In regards to his lumbar spine and chronic left leg paresthesias, I would like to obtain a flex/ex of the lumbar spine to evaluate for dynamic instability. We will also obtain an EMG/NCS of his bilateral lower extremities to assess the chronic left leg paresthesias. We will see him back in follow up once these studies are complete.

## 2022-12-06 ENCOUNTER — TELEPHONE (OUTPATIENT)
Dept: NEUROLOGY | Facility: CLINIC | Age: 67
End: 2022-12-06
Payer: MEDICARE

## 2022-12-06 NOTE — TELEPHONE ENCOUNTER
----- Message from Ignacia Waters RN sent at 12/6/2022  9:52 AM CST -----  Regarding: EMG  Pt currently has EMG scheduled at [facility]. Would like it scheduled on [facility] if possible. Can you put him on the schedule?     Thanks,  Grace GRAVES Navigator  [facility]       Patient stated the wrong Dr ordered the emg. He is calling Dr Kim to order emg    [facility] lab called r/t blood cultures drawn 4/21 at 1827 positive for MRSA. MD notified, contact precautions placed.

## 2022-12-15 ENCOUNTER — HOSPITAL ENCOUNTER (OUTPATIENT)
Dept: RADIOLOGY | Facility: HOSPITAL | Age: 67
Discharge: HOME | End: 2022-12-15
Attending: PHYSICIAN ASSISTANT
Payer: MEDICARE

## 2022-12-15 DIAGNOSIS — M43.16 SPONDYLOLISTHESIS OF LUMBAR REGION: ICD-10-CM

## 2022-12-15 PROCEDURE — 72114 XR LUMBAR SPINE 5 VIEW WITH FLEX AND EXT: ICD-10-PCS | Mod: 26,,, | Performed by: RADIOLOGY

## 2022-12-15 PROCEDURE — 72114 X-RAY EXAM L-S SPINE BENDING: CPT | Mod: 26,,, | Performed by: RADIOLOGY

## 2022-12-15 PROCEDURE — 72114 X-RAY EXAM L-S SPINE BENDING: CPT | Mod: TC,FY,PO

## 2022-12-16 ENCOUNTER — OFFICE VISIT (OUTPATIENT)
Dept: CARDIOLOGY | Facility: CLINIC | Age: 67
End: 2022-12-16
Payer: MEDICARE

## 2022-12-16 VITALS
HEIGHT: 71 IN | BODY MASS INDEX: 30.17 KG/M2 | DIASTOLIC BLOOD PRESSURE: 64 MMHG | HEART RATE: 68 BPM | SYSTOLIC BLOOD PRESSURE: 134 MMHG | WEIGHT: 215.5 LBS | OXYGEN SATURATION: 94 % | RESPIRATION RATE: 18 BRPM

## 2022-12-16 DIAGNOSIS — M51.36 DDD (DEGENERATIVE DISC DISEASE), LUMBAR: Chronic | ICD-10-CM

## 2022-12-16 DIAGNOSIS — F11.90 CHRONIC, CONTINUOUS USE OF OPIOIDS: ICD-10-CM

## 2022-12-16 DIAGNOSIS — N52.9 ERECTILE DYSFUNCTION, UNSPECIFIED ERECTILE DYSFUNCTION TYPE: ICD-10-CM

## 2022-12-16 DIAGNOSIS — F17.200 TOBACCO DEPENDENCE: Chronic | ICD-10-CM

## 2022-12-16 DIAGNOSIS — I10 ESSENTIAL HYPERTENSION: ICD-10-CM

## 2022-12-16 DIAGNOSIS — F33.41 RECURRENT MAJOR DEPRESSIVE DISORDER, IN PARTIAL REMISSION: ICD-10-CM

## 2022-12-16 DIAGNOSIS — E11.9 TYPE 2 DIABETES MELLITUS WITHOUT COMPLICATION, WITHOUT LONG-TERM CURRENT USE OF INSULIN: ICD-10-CM

## 2022-12-16 DIAGNOSIS — M50.30 DDD (DEGENERATIVE DISC DISEASE), CERVICAL: Chronic | ICD-10-CM

## 2022-12-16 DIAGNOSIS — I10 HYPERTENSION, UNSPECIFIED TYPE: Primary | ICD-10-CM

## 2022-12-16 DIAGNOSIS — R06.09 DOE (DYSPNEA ON EXERTION): ICD-10-CM

## 2022-12-16 PROCEDURE — 1126F PR PAIN SEVERITY QUANTIFIED, NO PAIN PRESENT: ICD-10-PCS | Mod: CPTII,S$GLB,, | Performed by: INTERNAL MEDICINE

## 2022-12-16 PROCEDURE — 3060F POS MICROALBUMINURIA REV: CPT | Mod: CPTII,S$GLB,, | Performed by: INTERNAL MEDICINE

## 2022-12-16 PROCEDURE — 3066F PR DOCUMENTATION OF TREATMENT FOR NEPHROPATHY: ICD-10-PCS | Mod: CPTII,S$GLB,, | Performed by: INTERNAL MEDICINE

## 2022-12-16 PROCEDURE — 3288F FALL RISK ASSESSMENT DOCD: CPT | Mod: CPTII,S$GLB,, | Performed by: INTERNAL MEDICINE

## 2022-12-16 PROCEDURE — 1157F PR ADVANCE CARE PLAN OR EQUIV PRESENT IN MEDICAL RECORD: ICD-10-PCS | Mod: CPTII,S$GLB,, | Performed by: INTERNAL MEDICINE

## 2022-12-16 PROCEDURE — 3044F PR MOST RECENT HEMOGLOBIN A1C LEVEL <7.0%: ICD-10-PCS | Mod: CPTII,S$GLB,, | Performed by: INTERNAL MEDICINE

## 2022-12-16 PROCEDURE — 3078F PR MOST RECENT DIASTOLIC BLOOD PRESSURE < 80 MM HG: ICD-10-PCS | Mod: CPTII,S$GLB,, | Performed by: INTERNAL MEDICINE

## 2022-12-16 PROCEDURE — 1126F AMNT PAIN NOTED NONE PRSNT: CPT | Mod: CPTII,S$GLB,, | Performed by: INTERNAL MEDICINE

## 2022-12-16 PROCEDURE — 3078F DIAST BP <80 MM HG: CPT | Mod: CPTII,S$GLB,, | Performed by: INTERNAL MEDICINE

## 2022-12-16 PROCEDURE — 3008F BODY MASS INDEX DOCD: CPT | Mod: CPTII,S$GLB,, | Performed by: INTERNAL MEDICINE

## 2022-12-16 PROCEDURE — 4010F ACE/ARB THERAPY RXD/TAKEN: CPT | Mod: CPTII,S$GLB,, | Performed by: INTERNAL MEDICINE

## 2022-12-16 PROCEDURE — 1100F PR PT FALLS ASSESS DOC 2+ FALLS/FALL W/INJURY/YR: ICD-10-PCS | Mod: CPTII,S$GLB,, | Performed by: INTERNAL MEDICINE

## 2022-12-16 PROCEDURE — 99214 OFFICE O/P EST MOD 30 MIN: CPT | Mod: S$GLB,,, | Performed by: INTERNAL MEDICINE

## 2022-12-16 PROCEDURE — 3060F PR POS MICROALBUMINURIA RESULT DOCUMENTED/REVIEW: ICD-10-PCS | Mod: CPTII,S$GLB,, | Performed by: INTERNAL MEDICINE

## 2022-12-16 PROCEDURE — 93000 ELECTROCARDIOGRAM COMPLETE: CPT | Mod: S$GLB,,, | Performed by: INTERNAL MEDICINE

## 2022-12-16 PROCEDURE — 1159F MED LIST DOCD IN RCRD: CPT | Mod: CPTII,S$GLB,, | Performed by: INTERNAL MEDICINE

## 2022-12-16 PROCEDURE — 4010F PR ACE/ARB THEARPY RXD/TAKEN: ICD-10-PCS | Mod: CPTII,S$GLB,, | Performed by: INTERNAL MEDICINE

## 2022-12-16 PROCEDURE — 1157F ADVNC CARE PLAN IN RCRD: CPT | Mod: CPTII,S$GLB,, | Performed by: INTERNAL MEDICINE

## 2022-12-16 PROCEDURE — 93000 EKG 12-LEAD: ICD-10-PCS | Mod: S$GLB,,, | Performed by: INTERNAL MEDICINE

## 2022-12-16 PROCEDURE — 3008F PR BODY MASS INDEX (BMI) DOCUMENTED: ICD-10-PCS | Mod: CPTII,S$GLB,, | Performed by: INTERNAL MEDICINE

## 2022-12-16 PROCEDURE — 99999 PR PBB SHADOW E&M-EST. PATIENT-LVL V: CPT | Mod: PBBFAC,,, | Performed by: INTERNAL MEDICINE

## 2022-12-16 PROCEDURE — 99214 PR OFFICE/OUTPT VISIT, EST, LEVL IV, 30-39 MIN: ICD-10-PCS | Mod: S$GLB,,, | Performed by: INTERNAL MEDICINE

## 2022-12-16 PROCEDURE — 3044F HG A1C LEVEL LT 7.0%: CPT | Mod: CPTII,S$GLB,, | Performed by: INTERNAL MEDICINE

## 2022-12-16 PROCEDURE — 3075F PR MOST RECENT SYSTOLIC BLOOD PRESS GE 130-139MM HG: ICD-10-PCS | Mod: CPTII,S$GLB,, | Performed by: INTERNAL MEDICINE

## 2022-12-16 PROCEDURE — 1160F RVW MEDS BY RX/DR IN RCRD: CPT | Mod: CPTII,S$GLB,, | Performed by: INTERNAL MEDICINE

## 2022-12-16 PROCEDURE — 1160F PR REVIEW ALL MEDS BY PRESCRIBER/CLIN PHARMACIST DOCUMENTED: ICD-10-PCS | Mod: CPTII,S$GLB,, | Performed by: INTERNAL MEDICINE

## 2022-12-16 PROCEDURE — 1100F PTFALLS ASSESS-DOCD GE2>/YR: CPT | Mod: CPTII,S$GLB,, | Performed by: INTERNAL MEDICINE

## 2022-12-16 PROCEDURE — 3288F PR FALLS RISK ASSESSMENT DOCUMENTED: ICD-10-PCS | Mod: CPTII,S$GLB,, | Performed by: INTERNAL MEDICINE

## 2022-12-16 PROCEDURE — 99999 PR PBB SHADOW E&M-EST. PATIENT-LVL V: ICD-10-PCS | Mod: PBBFAC,,, | Performed by: INTERNAL MEDICINE

## 2022-12-16 PROCEDURE — 3066F NEPHROPATHY DOC TX: CPT | Mod: CPTII,S$GLB,, | Performed by: INTERNAL MEDICINE

## 2022-12-16 PROCEDURE — 1159F PR MEDICATION LIST DOCUMENTED IN MEDICAL RECORD: ICD-10-PCS | Mod: CPTII,S$GLB,, | Performed by: INTERNAL MEDICINE

## 2022-12-16 PROCEDURE — 3075F SYST BP GE 130 - 139MM HG: CPT | Mod: CPTII,S$GLB,, | Performed by: INTERNAL MEDICINE

## 2022-12-16 NOTE — PROGRESS NOTES
CARDIOVASCULAR CONSULTATION    REASON FOR CONSULT:   Butch Mcdaniel is a 67 y.o. male who presents for evaluation.      HISTORY OF PRESENT ILLNESS:     Patient is a pleasant 66-year-old man.  Here for evaluation.  States blood pressure fluctuates a lot at home.  Fluctuates between 110-200 mmHg.  States that he has clonidine at home and whenever his blood pressure is elevated he takes an extra dose of clonidine.  Denies any chest pains at rest on exertion.  Denies orthopnea, PND, swelling of feet.      Notes from February 2022:  Patient here for follow-up.  Denies any chest pains at rest on exertion, orthopnea, PND.  Blood pressure staying at home.  Around 160-170 mmHg.      April 2022:  Patient very concerned about his fluctuating blood pressure.  Today morning his blood pressure was 198/111 mmHg.  Then just prior to coming to clinic it was 135/87 mmHg.  Fluctuates throughout the day.  No anginal chest pains.    The left ventricle is normal in size with moderate concentric hypertrophy and normal systolic function.  The estimated ejection fraction is 60%.  Normal right ventricular size with normal right ventricular systolic function.  The estimated PA systolic pressure is 33 mmHg.     Heart rates varied between 43 and 109 BPM with an average of 62 BPM.  Rare PVCs and PACs. Five atrial pairs. One three beat atrial run.      Notes from December 2022: Patient here for follow-up.  Denies any chest pains at rest on exertion, orthopnea, PND.  However has been experiencing significant dyspnea on exertion lately.  Unfortunately has started smoking again.      PAST MEDICAL HISTORY:     Past Medical History:   Diagnosis Date    Cataract, bilateral 09/11/2018    Degenerative disc disease     Diabetes mellitus type II, controlled     Eye injury as a child    stick hit eye ? eye, hit in [place] due to boxing    Hx of psychiatric care     Hyperlipidemia     Hypertension     MRSA (methicillin resistant Staphylococcus  aureus)     Open angle with borderline findings and high glaucoma risk in both eyes 10/08/2019    Personal history of colonic polyps     Psychiatric problem     Therapy     [facility] many years ago       PAST SURGICAL HISTORY:     Past Surgical History:   Procedure Laterality Date    APPENDECTOMY      COLONOSCOPY N/A 5/10/2017    Procedure: COLONOSCOPY;  Surgeon: Sahntanu Marley MD;  Location: [facility];  Service: Endoscopy;  Laterality: N/A;    POSTERIOR FUSION OF CERVICAL SPINE WITH LAMINECTOMY N/A 10/3/2021    Procedure: LAMINECTOMY, SPINE, CERVICAL, WITH POSTERIOR FUSION C2-T2;  Surgeon: Ana Rosa Geller MD;  Location: [address];  Service: Neurosurgery;  Laterality: N/A;       ALLERGIES AND MEDICATION:   Review of patient's allergies indicates:  No Known Allergies     Medication List            Accurate as of December 16, 2022  3:36 PM. If you have any questions, ask your nurse or doctor.                CONTINUE taking these medications      ALPRAZolam 1 MG tablet  Commonly known as: XANAX  TAKE ONE TABLET BY MOUTH TWICE DAILY AS NEEDED FOR ANXIETY     amLODIPine 10 MG tablet  Commonly known as: NORVASC  Take 1 tablet (10 mg total) by mouth every evening.     atorvastatin 40 MG tablet  Commonly known as: LIPITOR  Take 1 tablet (40 mg total) by mouth every evening.     BD LUER-STARLA SYRINGE 1 mL Syrg  Generic drug: syringe (disposable)  Testosterone injection every 2 weeks     busPIRone 30 MG Tab  Commonly known as: BUSPAR  Take 1 tablet (30 mg total) by mouth 2 (two) times daily.     carvediloL 25 MG tablet  Commonly known as: COREG  Take 1 tablet (25 mg total) by mouth 2 (two) times daily.     citalopram 20 MG tablet  Commonly known as: CeleXA  Take 1 tablet (20 mg total) by mouth once daily for 10 days, THEN 0.5 tablets (10 mg total) once daily for 10 days.  Start taking on: November 25, 2022     cloNIDine 0.2 MG tablet  Commonly known as: CATAPRES  Take 1 tablet (0.2 mg total) by mouth 2 (two) times daily.    "  DULoxetine 60 MG capsule  Commonly known as: CYMBALTA  Take 1 capsule (60 mg total) by mouth once daily.     fluticasone-salmeterol 100-50 mcg/dose 100-50 mcg/dose diskus inhaler  Commonly known as: ADVAIR     gabapentin 600 MG tablet  Commonly known as: NEURONTIN     hydrALAZINE 25 MG tablet  Commonly known as: APRESOLINE  Take 1 tablet (25 mg total) by mouth 3 (three) times daily.     lisinopriL 40 MG tablet  Commonly known as: PRINIVIL,ZESTRIL  Take 1 tablet (40 mg total) by mouth once daily.     metFORMIN 500 MG tablet  Commonly known as: GLUCOPHAGE  Take 1 tablet (500 mg total) by mouth 2 (two) times daily with meals.     MIRALAX ORAL     naloxone 4 mg/actuation Spry  Commonly known as: NARCAN  4mg by nasal route as needed for opioid overdose; may repeat every 2-3 minutes in alternating nostrils until medical help arrives. Call 911     needle (disp) 22 G 22 gauge x 1" Ndle  Testosterone injection every 2 weeks     nicotine 14 mg/24 hr  Commonly known as: NICODERM CQ  PLACE ONE PATCH ONTO SKIN ONCE A DAY     oxyCODONE-acetaminophen  mg per tablet  Commonly known as: PERCOCET     senna 8.6 mg tablet  Commonly known as: SENOKOT  Take 1 tablet by mouth 2 (two) times a day.     testosterone cypionate 200 mg/mL injection  Commonly known as: DEPOTESTOTERONE CYPIONATE  INJECT 0.75 MILLILITERS (150 MG) INTRAMUSCULARLY EVERY 14 DAYS.     tiZANidine 4 MG tablet  Commonly known as: ZANAFLEX              SOCIAL HISTORY:     Social History     Socioeconomic History    Marital status:     Number of children: 3   Occupational History    Occupation: retired - tug boat captain   Tobacco Use    Smoking status: Every Day     Packs/day: 0.50     Years: 32.00     Pack years: 16.00     Types: Cigarettes    Smokeless tobacco: Never   Substance and Sexual Activity    Alcohol use: No     Alcohol/week: 0.0 standard drinks    Drug use: Yes     Types: Marijuana     Comment: 4 oxycodones daily; few marijuana cigarettes daily " "   Sexual activity: Yes     Partners: Female     Comment:  with children, disabled        FAMILY HISTORY:     Family History   Problem Relation Age of Onset    Heart disease Mother     Heart disease Father     Alcohol abuse Father     No Known Problems Sister     No Known Problems Brother     No Known Problems Maternal Aunt     No Known Problems Paternal Aunt     No Known Problems Maternal Uncle     No Known Problems Paternal Uncle     No Known Problems Maternal Grandfather     No Known Problems Maternal Grandmother     No Known Problems Paternal Grandfather     No Known Problems Paternal Grandmother     Diabetes Son     Amblyopia Neg Hx     Blindness Neg Hx     Cancer Neg Hx     Cataracts Neg Hx     Glaucoma Neg Hx     Hypertension Neg Hx     Macular degeneration Neg Hx     Retinal detachment Neg Hx     Strabismus Neg Hx     Stroke Neg Hx     Thyroid disease Neg Hx     Anxiety disorder Neg Hx     Dementia Neg Hx     Depression Neg Hx        REVIEW OF SYSTEMS:   Review of Systems   Constitutional: Negative.   HENT: Negative.     Eyes: Negative.    Cardiovascular:  Positive for dyspnea on exertion.   Respiratory: Negative.     Endocrine: Negative.    Hematologic/Lymphatic: Negative.    Skin: Negative.    Musculoskeletal: Negative.    Gastrointestinal: Negative.    Genitourinary: Negative.    Neurological: Negative.    Psychiatric/Behavioral: Negative.     Allergic/Immunologic: Negative.      A 10 point review of systems was performed and all the pertinent positives have been mentioned. Rest of review of systems was negative.        PHYSICAL EXAM:     Vitals:    12/16/22 1530   BP: 134/64   Pulse: 68   Resp: 18    Body mass index is 30.06 kg/m².  Weight: 97.7 kg (215 lb 8 oz)   Height: 5' 11" (180.3 cm)     Physical Exam  Vitals reviewed.   Constitutional:       Appearance: He is well-developed.   HENT:      Head: Normocephalic.   Eyes:      Conjunctiva/sclera: Conjunctivae normal.      Pupils: " Pupils are equal, round, and reactive to light.   Cardiovascular:      Rate and Rhythm: Normal rate and regular rhythm.      Heart sounds: Normal heart sounds.   Pulmonary:      Effort: Pulmonary effort is normal.      Breath sounds: Wheezing present.   Abdominal:      General: Bowel sounds are normal.      Palpations: Abdomen is soft.   Musculoskeletal:      Cervical back: Normal range of motion and neck supple.   Skin:     General: Skin is warm.   Neurological:      Mental Status: He is alert and oriented to person, place, and time.         DATA:     Laboratory:  CBC:  Recent Labs   Lab 07/21/22  0531 08/24/22  0905 09/29/22  0849   WBC 12.90 H 7.30 7.89   Hemoglobin 15.0 13.3 L 13.6 L   Hematocrit 45.0 39.2 L 40.4   Platelets 155 63 L SEE COMMENT         CHEMISTRIES:  Recent Labs   Lab 07/19/22  0518 07/20/22  0126 07/21/22  0531 08/24/22  0905 09/29/22  0849   Glucose 141 H 114 H  113 H 146 H 128 H 120 H   Sodium 139 141  142 143 137 134 L   Potassium 5.2 H 4.1  4.1 3.9 4.8 4.4   BUN 32 H 28 H  29 H 24 H 17 24 H   Creatinine 1.9 H 1.4  1.4 1.3 1.2 1.2   eGFR if  41 A 60  60 >60  --   --    eGFR if non  36 A 52 A  52 A 56 A  --   --    Calcium 8.8 9.2  9.2 9.2 8.8 9.2   Magnesium 1.9 1.7  1.8 1.8  --   --          CARDIAC BIOMARKERS:  Recent Labs   Lab 07/18/22  0252 07/18/22  1007 07/20/22  0126   CPK  --  129  --    Troponin I 0.019  --  0.119 H         COAGS:  Recent Labs   Lab 10/01/21  1610   INR 1.0         LIPIDS/LFTS:  Recent Labs   Lab 08/07/21  0839 10/01/21  1610 02/16/22  0820 07/21/22  0531 08/24/22  0905 09/29/22  0849   Cholesterol 113 L 106 L  --   --  118 L  --    Triglycerides 103 124  --   --  109  --    HDL 35 L 31 L  --   --  34 L  --    LDL Cholesterol 57.4 L 50.2 L  --   --  62.2 L  --    Non-HDL Cholesterol 78 75  --   --  84  --    AST 17 21   < > 29 23 21   ALT 19 22   < > 29 19 23    < > = values in this interval not displayed.         Hemoglobin  A1C   Date Value Ref Range Status   11/16/2022 6.4 (H) 4.0 - 5.6 % Final     Comment:     ADA Screening Guidelines:  5.7-6.4%  Consistent with prediabetes  >or=6.5%  Consistent with diabetes    High levels of fetal hemoglobin interfere with the HbA1C  assay. Heterozygous hemoglobin variants (HbS, HgC, etc)do  not significantly interfere with this assay.   However, presence of multiple variants may affect accuracy.     08/24/2022 6.2 (H) 4.0 - 5.6 % Final     Comment:     ADA Screening Guidelines:  5.7-6.4%  Consistent with prediabetes  >or=6.5%  Consistent with diabetes    High levels of fetal hemoglobin interfere with the HbA1C  assay. Heterozygous hemoglobin variants (HbS, HgC, etc)do  not significantly interfere with this assay.   However, presence of multiple variants may affect accuracy.     02/16/2022 6.3 (H) 4.0 - 5.6 % Final     Comment:     ADA Screening Guidelines:  5.7-6.4%  Consistent with prediabetes  >or=6.5%  Consistent with diabetes    High levels of fetal hemoglobin interfere with the HbA1C  assay. Heterozygous hemoglobin variants (HbS, HgC, etc)do  not significantly interfere with this assay.   However, presence of multiple variants may affect accuracy.         TSH  Recent Labs   Lab 01/08/20  1153 10/01/21  1610 07/18/22  0252   TSH 2.552 1.897 5.291 H         The ASCVD Risk score (Dale JOHNSON, et al., 2019) failed to calculate for the following reasons:    The valid total cholesterol range is 130 to 320 mg/dL             ASSESSMENT AND PLAN     Patient Active Problem List   Diagnosis    Essential hypertension    Anemia    Anxiety, unable to wean off BZD    Recurrent major depressive disorder, in partial remission    DDD (degenerative disc disease), cervical    DDD (degenerative disc disease), lumbar    ED (erectile dysfunction)    Facet arthritis of cervical region    Facet arthritis of lumbar region    Benzodiazepine dependence, did not do well with attempt to wean down 2017    Chronic, continuous  use of opioids    Type 2 diabetes mellitus without complication, without long-term current use of insulin    Tobacco dependence, patch with irritation; hx of bupropion    Therapeutic opioid-induced constipation (OIC)    Tubular adenoma of colon 5/10/17    Cervical stenosis of spinal canal 10/3/2021 LAMINECTOMY, SPINE, CERVICAL, WITH POSTERIOR FUSION C2-T2    Cataract, bilateral    Open angle with borderline findings and high glaucoma risk in both eyes    Nuclear sclerosis of both eyes    Elevated prolactin level,low testosterone, gynecomastia; MRI pituitary normal 5/2020    Low testosterone in male    Paresthesia of left upper and lower extremity    History of smoking 10-25 pack years    Status post surgery    Impaired mobility and ADLs    Chronic low back pain    Sacroiliac joint pain    Sacroiliitis    Impaired mobility    Arthrodesis status    Foraminal stenosis of lumbar region    Junctional bradycardia    Chronic prescription benzodiazepine use    New onset a-fib during hospitalization 7/2022 from infection? started on coreg during hospitalization    Type 2 diabetes mellitus with diabetic cataract, without long-term current use of insulin    Unspecified inflammatory spondylopathy, cervical region    Thrombocytopenia       Dyspnea on exertion.  Further evaluation with the help of a stress test.  Patient will not be able to run on a treadmill because of significant neck issues and back issues.  Pharmacological stress testing.    Smoking cessation     Hypertension: Well controlled at current therapy.    Thank you very much for involving me in the care of your patient.  Please do not hesitate to contact me if there are any questions.      Adriana Pete MD, FAC, [facility]  Interventional Cardiologist, [facility].           This note was dictated with the help of speech recognition software.  There might be un-intended errors and/or substitutions.

## 2022-12-22 ENCOUNTER — CLINICAL SUPPORT (OUTPATIENT)
Dept: SMOKING CESSATION | Facility: CLINIC | Age: 67
End: 2022-12-22
Payer: COMMERCIAL

## 2022-12-22 DIAGNOSIS — F17.200 NICOTINE DEPENDENCE: Primary | ICD-10-CM

## 2022-12-22 PROCEDURE — 99407 PR TOBACCO USE CESSATION INTENSIVE >10 MINUTES: ICD-10-PCS | Mod: S$GLB,,,

## 2022-12-22 PROCEDURE — 99999 PR PBB SHADOW E&M-EST. PATIENT-LVL I: CPT | Mod: PBBFAC,,,

## 2022-12-22 PROCEDURE — 99407 BEHAV CHNG SMOKING > 10 MIN: CPT | Mod: S$GLB,,,

## 2022-12-22 PROCEDURE — 99999 PR PBB SHADOW E&M-EST. PATIENT-LVL I: ICD-10-PCS | Mod: PBBFAC,,,

## 2022-12-22 NOTE — PROGRESS NOTES
Spoke with patient today in regard to smoking cessation progress for 12/6 month phone follow up on quit 3 and 4. Patient not tobacco free at this time. Patient has scheduled an appointment to return to the program for Quit attempt #5. Informed patient of benefit period, future follow ups, and contact information if any further help or support is needed. Will complete smart form and resolve Quit attempt #3.  Quit 4 created and competed per protocol.

## 2022-12-27 ENCOUNTER — OFFICE VISIT (OUTPATIENT)
Dept: NEUROSURGERY | Facility: CLINIC | Age: 67
End: 2022-12-27
Payer: MEDICARE

## 2022-12-27 DIAGNOSIS — M48.02 CERVICAL STENOSIS OF SPINAL CANAL: Primary | ICD-10-CM

## 2022-12-27 PROCEDURE — 1157F ADVNC CARE PLAN IN RCRD: CPT | Mod: CPTII,S$GLB,, | Performed by: NEUROLOGICAL SURGERY

## 2022-12-27 PROCEDURE — 1125F PR PAIN SEVERITY QUANTIFIED, PAIN PRESENT: ICD-10-PCS | Mod: CPTII,S$GLB,, | Performed by: NEUROLOGICAL SURGERY

## 2022-12-27 PROCEDURE — 1157F PR ADVANCE CARE PLAN OR EQUIV PRESENT IN MEDICAL RECORD: ICD-10-PCS | Mod: CPTII,S$GLB,, | Performed by: NEUROLOGICAL SURGERY

## 2022-12-27 PROCEDURE — 4010F ACE/ARB THERAPY RXD/TAKEN: CPT | Mod: CPTII,S$GLB,, | Performed by: NEUROLOGICAL SURGERY

## 2022-12-27 PROCEDURE — 1159F PR MEDICATION LIST DOCUMENTED IN MEDICAL RECORD: ICD-10-PCS | Mod: CPTII,S$GLB,, | Performed by: NEUROLOGICAL SURGERY

## 2022-12-27 PROCEDURE — 3044F PR MOST RECENT HEMOGLOBIN A1C LEVEL <7.0%: ICD-10-PCS | Mod: CPTII,S$GLB,, | Performed by: NEUROLOGICAL SURGERY

## 2022-12-27 PROCEDURE — 4010F PR ACE/ARB THEARPY RXD/TAKEN: ICD-10-PCS | Mod: CPTII,S$GLB,, | Performed by: NEUROLOGICAL SURGERY

## 2022-12-27 PROCEDURE — 1100F PTFALLS ASSESS-DOCD GE2>/YR: CPT | Mod: CPTII,S$GLB,, | Performed by: NEUROLOGICAL SURGERY

## 2022-12-27 PROCEDURE — 3066F PR DOCUMENTATION OF TREATMENT FOR NEPHROPATHY: ICD-10-PCS | Mod: CPTII,S$GLB,, | Performed by: NEUROLOGICAL SURGERY

## 2022-12-27 PROCEDURE — 3060F PR POS MICROALBUMINURIA RESULT DOCUMENTED/REVIEW: ICD-10-PCS | Mod: CPTII,S$GLB,, | Performed by: NEUROLOGICAL SURGERY

## 2022-12-27 PROCEDURE — 1125F AMNT PAIN NOTED PAIN PRSNT: CPT | Mod: CPTII,S$GLB,, | Performed by: NEUROLOGICAL SURGERY

## 2022-12-27 PROCEDURE — 99214 PR OFFICE/OUTPT VISIT, EST, LEVL IV, 30-39 MIN: ICD-10-PCS | Mod: S$GLB,,, | Performed by: NEUROLOGICAL SURGERY

## 2022-12-27 PROCEDURE — 3044F HG A1C LEVEL LT 7.0%: CPT | Mod: CPTII,S$GLB,, | Performed by: NEUROLOGICAL SURGERY

## 2022-12-27 PROCEDURE — 99999 PR PBB SHADOW E&M-EST. PATIENT-LVL III: CPT | Mod: PBBFAC,,, | Performed by: NEUROLOGICAL SURGERY

## 2022-12-27 PROCEDURE — 1100F PR PT FALLS ASSESS DOC 2+ FALLS/FALL W/INJURY/YR: ICD-10-PCS | Mod: CPTII,S$GLB,, | Performed by: NEUROLOGICAL SURGERY

## 2022-12-27 PROCEDURE — 3060F POS MICROALBUMINURIA REV: CPT | Mod: CPTII,S$GLB,, | Performed by: NEUROLOGICAL SURGERY

## 2022-12-27 PROCEDURE — 3066F NEPHROPATHY DOC TX: CPT | Mod: CPTII,S$GLB,, | Performed by: NEUROLOGICAL SURGERY

## 2022-12-27 PROCEDURE — 3288F PR FALLS RISK ASSESSMENT DOCUMENTED: ICD-10-PCS | Mod: CPTII,S$GLB,, | Performed by: NEUROLOGICAL SURGERY

## 2022-12-27 PROCEDURE — 1159F MED LIST DOCD IN RCRD: CPT | Mod: CPTII,S$GLB,, | Performed by: NEUROLOGICAL SURGERY

## 2022-12-27 PROCEDURE — 99999 PR PBB SHADOW E&M-EST. PATIENT-LVL III: ICD-10-PCS | Mod: PBBFAC,,, | Performed by: NEUROLOGICAL SURGERY

## 2022-12-27 PROCEDURE — 99214 OFFICE O/P EST MOD 30 MIN: CPT | Mod: S$GLB,,, | Performed by: NEUROLOGICAL SURGERY

## 2022-12-27 PROCEDURE — 3288F FALL RISK ASSESSMENT DOCD: CPT | Mod: CPTII,S$GLB,, | Performed by: NEUROLOGICAL SURGERY

## 2022-12-27 NOTE — PROGRESS NOTES
Neurosurgery  Established Patient    SUBJECTIVE:     History of Present Illness:  Butch Mcdaniel is a 66 y.o. male who presented with profound sensory changes after receiving an epidural steroid injection at an outside facility. CT and MRI demonstrated significant cervical stenosis and myelomalacia with segmental OPLL. Thus, we offered him posterior cervical decompression and fusion. He underwent C2-T2 fusion with C2-C7 laminectomy on 10/3/21.     As of 11/18/21, the patient reports his hand function has improved significantly since surgery. He does have paresthesias, worst in the left foot.     He has completed HHPT. He is ready to do outpatient therapy. He still has home health nurse services until December 15.     He is smoking about a quarter to a half a pack a day. He is also smoking marijuana to help with his pain. He had a difference of opinions with his last pain doctor, and he is also reluctant to return to the doctor who injected him initially.     As of 1/13/22, the patient reports that he has been doing well. He is not interested in doing PT. He is now able to button his shirt. He has quit smoking tobacco. He is still experiencing low back pain.     As of 12/27/22, the patient saw Ana a couple of weeks ago. 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 He has had no adverse change. He reports that he never received a bone growth stimulator. He has resumed smoking but is hoping to quit again now that he's getting patches again.     He and his wife report that he has been falling about once a week. He is also having headache.     He had MRI of the lumbar spine that was reviewed by Ana and is currently awaiting upload to the PACS system.     Review of patient's allergies indicates:  No Known Allergies    Current Outpatient Medications   Medication Sig Dispense Refill    ALPRAZolam (XANAX) 1 MG tablet TAKE ONE TABLET BY MOUTH TWICE DAILY AS NEEDED FOR ANXIETY 45  "tablet 0    amLODIPine (NORVASC) 10 MG tablet Take 1 tablet (10 mg total) by mouth every evening. 90 tablet 3    atorvastatin (LIPITOR) 40 MG tablet Take 1 tablet (40 mg total) by mouth every evening. 90 tablet 3    busPIRone (BUSPAR) 30 MG Tab Take 1 tablet (30 mg total) by mouth 2 (two) times daily. 180 tablet 3    carvediloL (COREG) 25 MG tablet Take 1 tablet (25 mg total) by mouth 2 (two) times daily. 60 tablet 5    citalopram (CELEXA) 20 MG tablet Take 1 tablet (20 mg total) by mouth once daily for 10 days, THEN 0.5 tablets (10 mg total) once daily for 10 days. 15 tablet 0    cloNIDine (CATAPRES) 0.2 MG tablet Take 1 tablet (0.2 mg total) by mouth 2 (two) times daily. (Patient not taking: Reported on 12/1/2022) 60 tablet 11    DULoxetine (CYMBALTA) 60 MG capsule Take 1 capsule (60 mg total) by mouth once daily. 30 capsule 1    fluticasone-salmeterol diskus inhaler 100-50 mcg Inhale 1 puff into the lungs.      gabapentin (NEURONTIN) 600 MG tablet Take 600 mg by mouth 3 (three) times daily.      hydrALAZINE (APRESOLINE) 25 MG tablet Take 1 tablet (25 mg total) by mouth 3 (three) times daily. 90 tablet 11    lisinopriL (PRINIVIL,ZESTRIL) 40 MG tablet Take 1 tablet (40 mg total) by mouth once daily. 90 tablet 3    metFORMIN (GLUCOPHAGE) 500 MG tablet Take 1 tablet (500 mg total) by mouth 2 (two) times daily with meals. 180 tablet 3    naloxone (NARCAN) 4 mg/actuation Spry 4mg by nasal route as needed for opioid overdose; may repeat every 2-3 minutes in alternating nostrils until medical help arrives. Call 911 (Patient not taking: Reported on 12/1/2022) 1 each 11    needle, disp, 22 G 22 gauge x 1" Ndle Testosterone injection every 2 weeks 6 each 0    nicotine (NICODERM CQ) 14 mg/24 hr PLACE ONE PATCH ONTO SKIN ONCE A DAY (Patient not taking: Reported on 12/1/2022) 14 patch 0    oxyCODONE-acetaminophen (PERCOCET)  mg per tablet Take 1 tablet by mouth every 4 (four) hours as needed.      POLYETHYLENE GLYCOL " 3350 (MIRALAX ORAL) Take 1 application by mouth.      senna (SENOKOT) 8.6 mg tablet Take 1 tablet by mouth 2 (two) times a day.      syringe, disposable, (BD LUER-STARLA SYRINGE) 1 mL Syrg Testosterone injection every 2 weeks 6 Syringe 0    testosterone cypionate (DEPOTESTOTERONE CYPIONATE) 200 mg/mL injection INJECT 0.75 MILLILITERS (150 MG) INTRAMUSCULARLY EVERY 14 DAYS. 10 mL 0    tiZANidine (ZANAFLEX) 4 MG tablet Take 4-8 mg by mouth nightly as needed.       No current facility-administered medications for this visit.       Past Medical History:   Diagnosis Date    Cataract, bilateral 09/11/2018    Degenerative disc disease     Diabetes mellitus type II, controlled     Eye injury as a child    stick hit eye ? 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 both eyes 10/08/2019    Personal history of colonic polyps     Psychiatric problem     Therapy     [facility] many years ago     Past Surgical History:   Procedure Laterality Date    APPENDECTOMY      COLONOSCOPY N/A 5/10/2017    Procedure: COLONOSCOPY;  Surgeon: Shantanu Marley MD;  Location: [facility];  Service: Endoscopy;  Laterality: N/A;    POSTERIOR FUSION OF CERVICAL SPINE WITH LAMINECTOMY N/A 10/3/2021    Procedure: LAMINECTOMY, SPINE, CERVICAL, WITH POSTERIOR FUSION C2-T2;  Surgeon: Ana Rosa Geller MD;  Location: [facility] OR [address] [place] [address];  Service: Neurosurgery;  Laterality: N/A;     Family History       Problem Relation (Age of Onset)    Alcohol abuse Father    Diabetes Son    Heart disease Mother, Father    No Known Problems Sister, Brother, Maternal Aunt, Paternal Aunt, Maternal Uncle, Paternal Uncle, Maternal Grandfather, Maternal Grandmother, Paternal Grandfather, Paternal Grandmother          Social History     Socioeconomic History    Marital status:     Number of children: 3   Occupational  History    Occupation: retired - tug boat captain   Tobacco Use    Smoking status: Every Day     Packs/day: 0.50     Years: 32.00     Pack years: 16.00     Types: Cigarettes    Smokeless tobacco: Never   Substance and Sexual Activity    Alcohol use: No     Alcohol/week: 0.0 standard drinks    Drug use: Yes     Types: Marijuana     Comment: 4 oxycodones daily; few marijuana cigarettes daily    Sexual activity: Yes     Partners: Female     Comment:  with children, disabled        Review of Systems   Constitutional:  Negative for fever.   HENT:  Negative for nosebleeds.    Eyes:  Negative for visual disturbance.   Respiratory:  Negative for shortness of breath.    Cardiovascular:  Negative for chest pain.   Gastrointestinal:  Negative for vomiting.   Endocrine: Negative for cold intolerance.   Genitourinary:  Negative for difficulty urinating.   Musculoskeletal:  Positive for neck pain.   Skin:  Negative for color change.   Neurological:  Positive for numbness.   Hematological:  Bruises/bleeds easily.   Psychiatric/Behavioral:  Positive for dysphoric mood.      OBJECTIVE:     Vital Signs  Pain Score:   6  There is no height or weight on file to calculate BMI.    Physical Exam:    Constitutional: He appears well-developed and well-nourished. No distress.     Eyes: EOM are normal.     Abdominal: Soft.     Skin:   Incision very well healed      Psych/Behavior: He is alert. He is oriented to person, place, and time.     Musculoskeletal:      Comments: No focal weakness   Pulmonary: nonlabored respirations     Hematologic: no bruising noted     Diagnostic Results:  CT shows pseudoarthrosis at left C2 screw   Flex/ex shows no dynamic instability    ASSESSMENT/PLAN:     Butch Mcdaniel is a 67 y.o. male who presented with cervical spinal cord injury, now s/p C2-T2 fusion with laminectomy on 10/3/21. He had considerable improvement in strength and myelopathic symptoms postoperatively.  Unfortunately, he has continued to smoke. CT reveals pseudoarthrosis at the left C2 screw.     He reports falls and headache, so I will obtain CTA to ensure no vascular compromise from the current screw positioning.     He brought MRI lumbar that Ana has; it is in the process of being uploaded to PACS.     We discussed that prior to any further surgical procedure (cervical or lumbar), it will be imperative for him to quit smoking and remain off cigarettes. He would likely benefit from healthy back PT for his lumbar spine. I would like him to participate with this once we have evaluated the CTA.     He wants to see Dr. Nava for low back injection. We discussed that I do not typically do low back surgery, but would be happy to refer him to one of my partners who does routinely.      I would like to see him back after the CTA is completed.     I have encouraged him to contact the clinic in interim with any questions, concerns, or adverse clinical change.

## 2023-01-03 ENCOUNTER — HOSPITAL ENCOUNTER (OUTPATIENT)
Dept: RADIOLOGY | Facility: HOSPITAL | Age: 68
Discharge: HOME | End: 2023-01-03
Attending: NEUROLOGICAL SURGERY
Payer: MEDICARE

## 2023-01-03 DIAGNOSIS — M48.02 CERVICAL STENOSIS OF SPINAL CANAL: ICD-10-CM

## 2023-01-03 PROCEDURE — 70498 CT ANGIOGRAPHY NECK: CPT | Mod: 26,,, | Performed by: RADIOLOGY

## 2023-01-03 PROCEDURE — 70498 CT ANGIOGRAPHY NECK: CPT | Mod: TC

## 2023-01-03 PROCEDURE — 70498 CTA NECK: ICD-10-PCS | Mod: 26,,, | Performed by: RADIOLOGY

## 2023-01-03 PROCEDURE — 25500020 PHARM REV CODE 255: Performed by: NEUROLOGICAL SURGERY

## 2023-01-03 RX ADMIN — IOHEXOL 75 ML: 350 INJECTION, SOLUTION INTRAVENOUS at 11:01

## 2023-01-04 NOTE — MR AVS SNAPSHOT
Chief Complaint   Patient presents with   • Well Child       Lauren Frazier is a one month old  female   who is brought in for this Well Child Visit.      History was provided by the mother.    @ name @ is a @ age @ @ sex @ who presents for a Well Child check. She is accompanied by her mother.    The patient's mother states that the patient is doing well. She states that the patient smiles when she passes gas. The patient's mother states that she is strictly breastfeeding. The patient's mother states that her belly has been bugging her this last week. The patient's mother states that it has increased to every 1.5 hours. The patient's mother states that she is sleeping well at night. The patient's mother states that she is backing down on her bowel movements. The patient's mother states that she has a rash on her face when she eats. The patient's mother states that she burps well. The patient's mother states that she tried the bottle for the first time yesterday and she went right to it. The patient's mother states that she took 2.5 ounces. The patient's mother states that she turns her head towards her voice. The patient's mother states that she is turning her head both ways.      No birth history on file.    The following portions of the patient's history were reviewed and updated as appropriate: current medications, past family history, past medical history, past social history, past surgical history and problem list.    Current Issues:  Current concerns include none.    Review of Nutrition:  Current diet: breast milk  Current feeding pattern:  Every 3 hours  Difficulties with feeding? no  Current stooling frequency: 3-4 times a day    Social Screening:  Current child-care arrangements: in home: primary caregiver is mother  Sibling relations: brothers: 1, sister 1  Secondhand smoke exposure? no   Guns in home discussed   Car Seat (backwards, back seat) yes  Sleeps on back:  yes  Smoke Detectors : yes    No      [facility]  [address]  [place] [address]  Phone: [phone]  Fax: [phone]                  Butch Mcdaniel   2017 9:40 AM   Office Visit    Description:  Male : 1955   Provider:  Gabriel Evans MD   Department:  [facility]           Reason for Visit     lab work follow up           Diagnoses this Visit        Comments    Depression, unspecified depression type    -  Primary     Anxiety         Therapeutic opioid-induced constipation (OIC)         Controlled type 2 diabetes mellitus without complication, without long-term current use of insulin         Screening for colon cancer                To Do List           Future Appointments        Provider Department Dept Phone    2017 10:40 AM Gabriel Evans MD [facility] [phone]      Goals (5 Years of Data)     None       These Medications        Disp Refills Start End    sertraline (ZOLOFT) 50 MG tablet 90 tablet 11 2017    Take 3 tablets (150 mg total) by mouth once daily. Increased dose - Oral    Pharmacy: [facility] - [address] Ph #: [phone]       alprazolam (XANAX) 1 MG tablet 90 tablet 0 2017     Take 1 tablet (1 mg total) by mouth 3 (three) times daily as needed for Anxiety. - Oral    Pharm[facility]: [facility] Ph #: [phone]         PURCHASE these Medications (No prescription required)        Start End    aspirin (ECOTRIN) 81 MG EC tablet 2017    Sig - Route: Take 1 tablet (81 mg total) by mouth once daily. - Oral    Class: OTC      [facility]     [facility] On Call Nurse Care Line - 24/ Assistance  Unless otherwise directed by your provider, please contact [facility], our nurse care line that is available for  assistance.     Registered nurses in the [facility] On Call Center provide: appointment scheduling,  clinical advisement, health education, and other advisory services.  Call: [phone] (toll free)               Medications           Message regarding Medications     Verify the changes and/or additions to your medication regime listed below are the same as discussed with your clinician today.  If any of these changes or additions are incorrect, please notify your healthcare provider.        START taking these NEW medications        Refills    aspirin (ECOTRIN) 81 MG EC tablet 0    Sig: Take 1 tablet (81 mg total) by mouth once daily.    Class: OTC    Route: Oral      CHANGE how you are taking these medications     Start Taking Instead of    sertraline (ZOLOFT) 50 MG tablet sertraline (ZOLOFT) 50 MG tablet    Dosage:  Take 3 tablets (150 mg total) by mouth once daily. Increased dose Dosage:  Take 2 tablets (100 mg total) by mouth once daily. STOP THE CITALOPRAM    Reason for Change:  Reorder            Verify that the below list of medications is an accurate representation of the medications you are currently taking.  If none reported, the list may be blank. If incorrect, please contact your healthcare provider. Carry this list with you in case of emergency.           Current Medications     albuterol (PROAIR HFA) 90 mcg/actuation inhaler Inhale 2 puffs into the lungs every 6 (six) hours as needed for Wheezing.    alprazolam (XANAX) 1 MG tablet Take 1 tablet (1 mg total) by mouth 3 (three) times daily as needed for Anxiety.    aspirin (ECOTRIN) 81 MG EC tablet Take 1 tablet (81 mg total) by mouth once daily.    atorvastatin (LIPITOR) 40 MG tablet Take 1 tablet (40 mg total) by mouth once daily. For cholesterol.  Stop the fenofibrate    cloNIDine (CATAPRES) 0.2 MG tablet Take 1 tablet (0.2 mg total) by mouth 2 (two) times daily.    cyclobenzaprine (FLEXERIL) 10 MG tablet Take 10 mg by mouth as needed.    diclofenac sodium 1.5 % Drop Apply 40 Units topically 4 (four) times daily as needed.    fentaNYL (DURAGESIC)  current outpatient medications on file.     No current facility-administered medications for this visit.       No Known Allergies    History reviewed. No pertinent past medical history.         Growth parameters are noted and are appropriate for age.  Birth Weight:   7-12    Physical Exam:    Pulse (!) 208   Temp (!) 97.5 °F (36.4 °C)   Resp 48   Ht 54.6 cm (21.5\")   Wt 4706 g (10 lb 6 oz)   HC 36.8 cm (14.5\")   BMI 15.78 kg/m²     Physical Exam  Vitals and nursing note reviewed.   Constitutional:       General: She is active.      Appearance: Normal appearance. She is well-developed.   HENT:      Head: Normocephalic and atraumatic. No cranial deformity. Anterior fontanelle is flat.      Right Ear: Tympanic membrane normal.      Left Ear: Tympanic membrane normal.      Mouth/Throat:      Mouth: Mucous membranes are moist.      Pharynx: Oropharynx is clear.   Eyes:      General: Red reflex is present bilaterally.      Pupils: Pupils are equal, round, and reactive to light.   Cardiovascular:      Rate and Rhythm: Normal rate and regular rhythm.      Pulses: Normal pulses.      Heart sounds: Normal heart sounds, S1 normal and S2 normal. No murmur heard.  Pulmonary:      Effort: Pulmonary effort is normal. No respiratory distress.      Breath sounds: Normal breath sounds.   Abdominal:      General: Abdomen is scaphoid. Bowel sounds are normal. There is no distension.      Palpations: Abdomen is soft.      Tenderness: There is no abdominal tenderness.   Musculoskeletal:         General: Normal range of motion.      Cervical back: Normal range of motion and neck supple.   Lymphadenopathy:      Cervical: No cervical adenopathy.   Skin:     General: Skin is warm.      Capillary Refill: Capillary refill takes less than 2 seconds.      Turgor: Normal.      Comments: Mild dermatitis on skin frontal sensitivity   Neurological:      Mental Status: She is alert.      Motor: No abnormal muscle tone.      Primitive Reflexes:  "25 mcg/hr     fish oil-omega-3 fatty acids 300-1,000 mg capsule Take 2 capsules (2 g total) by mouth once daily.    fluticasone (FLONASE) 50 mcg/actuation nasal spray 1 spray by Each Nare route once daily.    gabapentin (NEURONTIN) 300 MG capsule TAKE 2 CAPSULES BY MOUTH 3 TIMES A DAY    hydrocodone-acetaminophen 10-325mg (NORCO)  mg Tab     lisinopril (PRINIVIL,ZESTRIL) 40 MG tablet Take 1 tablet (40 mg total) by mouth once daily.    metformin (GLUCOPHAGE) 500 MG tablet Take 1 tablet (500 mg total) by mouth daily with breakfast.    sertraline (ZOLOFT) 50 MG tablet Take 3 tablets (150 mg total) by mouth once daily. Increased dose    tadalafil (CIALIS) 20 MG Tab Take 1 tablet (20 mg total) by mouth once daily.    verapamil (CALAN-SR) 180 MG CR tablet Take 1 tablet (180 mg total) by mouth 2 (two) times daily.           Clinical Reference Information           Your Vitals Were     BP Pulse Temp Height Weight SpO2    126/74 (BP Location: Left arm, Patient Position: Sitting, BP Method: Manual) 82 98.1 °F (36.7 °C) (Oral) 5' 11" (1.803 m) 103 kg (226 lb 15.4 oz) 97%    BMI                31.66 kg/m2          Blood Pressure          Most Recent Value    BP  126/74      Allergies as of 4/17/2017     No Known Allergies      Immunizations Administered on Date of Encounter - 4/17/2017     None      Orders Placed During Today's Visit      Normal Orders This Visit    Case request GI: COLONOSCOPY       [facility] Sign-Up     Activating your [facility] account is as easy as 1-2-3!     1) Visit [email or web address], select Sign Up Now, enter this activation code and your date of birth, then select Next.  Activation code not generated  Current Patient Portal Status: Account disabled      2) Create a username and password to use when you visit [facility] in the future and select a security question in case you lose your password and select Next.    3) Enter your e-mail address and click Sign Up!    Additional Information  If you have " Suck normal.                    Healthy one month old  well baby.      1. Anticipatory guidance discussed.  Gave handout on well-child issues at this age.    Parents were informed that the child needs to be in a rear facing car seat, in the back seat of the car, never in the front seat with an air bag, until 2 years of age or until the child outgrows height and weight requirements of the car seat.  They were instructed to put the baby down to sleep on the back,  on a firm mattress, to decrease the incidence of SIDS.  No cosleeping.  They were instructed not to leave the baby unattended when on elevated surfaces.  Burn safety, importance of smoke detectors, firearm safety, and water safety were discussed.  Encouraged tummy time when baby is awake and supervised.  Parents were instructed in the importance of proper handwashing and  hand  use prior to holding the infant.  They were instructed to avoid the baby coming in contact with ill people.  They were instructed in the importance of proper immunizations of all care givers including influenza and pertussis vaccine.      2. Development: appropriate for age    Diagnoses and all orders for this visit:    1. Encounter for routine child health examination without abnormal findings (Primary)    1. Well Child check  - The patient is doing well.  - She will follow up in 2 months.    No orders of the defined types were placed in this encounter.    .       Return in about 4 weeks (around 2/1/2023), or if symptoms worsen or fail to improve.     Transcribed from ambient dictation for Aretha Magana MD by Julieth Trejo.  01/04/23   12:59 EST    Patient or patient representative verbalized consent to the visit recording.  I have personally performed the services described in this document as transcribed by the above individual, and it is both accurate and complete.  Aretha Magana MD  1/4/2023  22:20 EST       questions, please e-mail [email or web address] or call [phone] to talk to our [facility] staff. Remember, [facility] is NOT to be used for urgent needs. For medical emergencies, dial 911.         Instructions      Aspirin, ASA oral tablets  What is this medicine?  ASPIRIN (AS pir in) is a pain reliever. It is used to treat mild pain and fever. This medicine is also used as directed by a doctor to prevent and to treat heart attacks, to prevent strokes, and to treat arthritis or inflammation.  How should I use this medicine?  Take this medicine by mouth with a glass of water. Follow the directions on the package or prescription label. You can take this medicine with or without food. If it upsets your stomach, take it with food. Do not take your medicine more often than directed.  Talk to your pediatrician regarding the use of this medicine in children. While this drug may be prescribed for children as young as 12 years of age for selected conditions, precautions do apply. Children and teenagers should not use this medicine to treat chicken pox or flu symptoms unless directed by a doctor.  Patients over 65 years old may have a stronger reaction and need a smaller dose.  What side effects may I notice from receiving this medicine?  Side effects that you should report to your doctor or health care professional as soon as possible:  · allergic reactions like skin rash, itching or hives, swelling of the face, lips, or tongue  · breathing problems  · changes in hearing, ringing in the ears  · confusion  · general ill feeling or flu-like symptoms  · pain on swallowing  · redness, blistering, peeling or loosening of the skin, including inside the mouth or nose  · signs and symptoms of bleeding such as bloody or black, tarry stools; red or dark-brown urine; spitting up blood or brown material that looks like coffee grounds; red spots on the skin; unusual bruising or bleeding from the eye, gums, or nose  · trouble passing  urine or change in the amount of urine  · unusually weak or tired  · yellowing of the eyes or skin  Side effects that usually do not require medical attention (report to your doctor or health care professional if they continue or are bothersome):  · diarrhea or constipation  · headache  · nausea, vomiting  · stomach gas, heartburn  What may interact with this medicine?  Do not take this medicine with any of the following medications:  · cidofovir  · ketorolac  · probenecid  This medicine may also interact with the following medications:  · alcohol  · alendronate  · bismuth subsalicylate  · flavocoxid  · herbal supplements like feverfew, garlic, lesvia, ginkgo biloba, horse chestnut  · medicines for diabetes or glaucoma like acetazolamide, methazolamide  · medicines for gout  · medicines that treat or prevent blood clots like enoxaparin, heparin, ticlopidine, warfarin  · other aspirin and aspirin-like medicines  · NSAIDs, medicines for pain and inflammation, like ibuprofen or naproxen  · pemetrexed  · sulfinpyrazone  · varicella live vaccine  What if I miss a dose?  If you are taking this medicine on a regular schedule and miss a dose, take it as soon as you can. If it is almost time for your next dose, take only that dose. Do not take double or extra doses.  Where should I keep my medicine?  Keep out of the reach of children.  Store at room temperature between 15 and 30 degrees C (59 and 86 degrees F). Protect from heat and moisture. Do not use this medicine if it has a strong vinegar smell. Throw away any unused medicine after the expiration date.  What should I tell my health care provider before I take this medicine?  They need to know if you have any of these conditions:  · anemia  · asthma  · bleeding problems  · child with chickenpox, the flu, or other viral infection  · diabetes  · gout  · if you frequently drink alcohol containing drinks  · kidney disease  · liver disease  · low level of vitamin  K  · lupus  · smoke tobacco  · stomach ulcers or other problems  · an unusual or allergic reaction to aspirin, tartrazine dye, other medicines, dyes, or preservatives  · pregnant or trying to get pregnant  · breast-feeding  What should I watch for while using this medicine?  If you are treating yourself for pain, tell your doctor or health care professional if the pain lasts more than 10 days, if it gets worse, or if there is a new or different kind of pain. Tell your doctor if you see redness or swelling. Also, check with your doctor if you have a fever that lasts for more than 3 days. Only take this medicine to prevent heart attacks or blood clotting if prescribed by your doctor or health care professional.  Do not take aspirin or aspirin-like medicines with this medicine. Too much aspirin can be dangerous. Always read the labels carefully.  This medicine can irritate your stomach or cause bleeding problems. Do not smoke cigarettes or drink alcohol while taking this medicine. Do not lie down for 30 minutes after taking this medicine to prevent irritation to your throat.  If you are scheduled for any medical or dental procedure, tell your healthcare provider that you are taking this medicine. You may need to stop taking this medicine before the procedure.  This medicine may be used to treat migraines. If you take migraine medicines for 10 or more days a month, your migraines may get worse. Keep a diary of headache days and medicine use. Contact your healthcare professional if your migraine attacks occur more frequently.  Date Last Reviewed:   NOTE:This sheet is a summary. It may not cover all possible information. If you have questions about this medicine, talk to your doctor, pharmacist, or health care provider. Copyright© 2016 [employer]        Buspirone tablets  What is this medicine?  BUSPIRONE (byfracisco mobley) is used to treat anxiety disorders.  How should I use this medicine?  Take this medicine by mouth  with a glass of water. Follow the directions on the prescription label. You may take this medicine with or without food. To ensure that this medicine always works the same way for you, you should take it either always with or always without food. Take your doses at regular intervals. Do not take your medicine more often than directed. Do not stop taking except on the advice of your doctor or health care professional.  Talk to your pediatrician regarding the use of this medicine in children. Special care may be needed.  What side effects may I notice from receiving this medicine?  Side effects that you should report to your doctor or health care professional as soon as possible:  · blurred vision or other vision changes  · chest pain  · confusion  · difficulty breathing  · feelings of hostility or anger  · muscle aches and pains  · numbness or tingling in hands or feet  · ringing in the ears  · skin rash and itching  · vomiting  · weakness  Side effects that usually do not require medical attention (report to your doctor or health care professional if they continue or are bothersome):  · disturbed dreams, nightmares  · headache  · nausea  · restlessness or nervousness  · sore throat and nasal congestion  · stomach upset  What may interact with this medicine?  Do not take this medicine with any of the following medications:  · linezolid  · MAOIs like Carbex, Eldepryl, Marplan, Nardil, and Parnate  · methylene blue  · procarbazine  This medicine may also interact with the following medications:  · diazepam  · digoxin  · diltiazem  · erythromycin  · grapefruit juice  · haloperidol  · medicines for mental depression or mood problems  · medicines for seizures like carbamazepine, phenobarbital and phenytoin  · nefazodone  · other medications for anxiety  · rifampin  · ritonavir  · some antifungal medicines like itraconazole, ketoconazole, and voriconazole  · verapamil  · warfarin  What if I miss a dose?  If you miss a  dose, take it as soon as you can. If it is almost time for your next dose, take only that dose. Do not take double or extra doses.  Where should I keep my medicine?  Keep out of the reach of children.  Store at room temperature below 30 degrees C (86 degrees F). Protect from light. Keep container tightly closed. Throw away any unused medicine after the expiration date.  What should I tell my health care provider before I take this medicine?  They need to know if you have any of these conditions:  · kidney or liver disease  · an unusual or allergic reaction to buspirone, other medicines, foods, dyes, or preservatives  · pregnant or trying to get pregnant  · breast-feeding  What should I watch for while using this medicine?  Visit your doctor or health care professional for regular checks on your progress. It may take 1 to 2 weeks before your anxiety gets better.  You may get drowsy or dizzy. Do not drive, use machinery, or do anything that needs mental alertness until you know how this drug affects you. Do not stand or sit up quickly, especially if you are an older patient. This reduces the risk of dizzy or fainting spells. Alcohol can make you more drowsy and dizzy. Avoid alcoholic drinks.  Date Last Reviewed:   NOTE:This sheet is a summary. It may not cover all possible information. If you have questions about this medicine, talk to your doctor, pharmacist, or health care provider. Copyright© 2016 [employer]             Smoking Cessation     If you would like to quit smoking:   You may be eligible for free services if you are a [place] resident and started smoking cigarettes before September 1, 1988.  Call the Smoking Cessation Trust (SCT) toll free at (773) 069-4531 or (464) 099-6479.   Call 1-800-QUIT-NOW if you do not meet the above criteria.   Contact us via email: [email or web address]   View our website for more information: [email or web address]        Language Assistance Services      ATTENTION: Language assistance services are available, free of charge. Please call [phone].      ATENCIÓN: Si habla ajay, tiene a olivares disposición servicios gratuitos de asistencia lingüística. Llame al [phone].     CHÚ Ý: N?u b?n nói Ti?ng Vi?t, có các d?ch v? h? tr? ngôn ng? mi?n phí dành cho b?n. G?i s? [phone].         [facility] complies with applicable Federal civil rights laws and does not discriminate on the basis of race, color, national origin, age, disability, or sex.

## 2023-01-05 ENCOUNTER — CLINICAL SUPPORT (OUTPATIENT)
Dept: SMOKING CESSATION | Facility: CLINIC | Age: 68
End: 2023-01-05
Payer: COMMERCIAL

## 2023-01-05 DIAGNOSIS — F17.200 NICOTINE DEPENDENCE: Primary | ICD-10-CM

## 2023-01-05 PROCEDURE — 99404 PR PREVENT COUNSEL,INDIV,60 MIN: ICD-10-PCS | Mod: S$GLB,,,

## 2023-01-05 PROCEDURE — 99404 PREV MED CNSL INDIV APPRX 60: CPT | Mod: S$GLB,,,

## 2023-01-05 PROCEDURE — 99999 PR PBB SHADOW E&M-EST. PATIENT-LVL II: CPT | Mod: PBBFAC,,,

## 2023-01-05 PROCEDURE — 99999 PR PBB SHADOW E&M-EST. PATIENT-LVL II: ICD-10-PCS | Mod: PBBFAC,,,

## 2023-01-05 RX ORDER — IBUPROFEN 200 MG
1 TABLET ORAL DAILY
Qty: 14 PATCH | Refills: 0 | Status: SHIPPED | OUTPATIENT
Start: 2023-01-05 | End: 2023-01-19 | Stop reason: SDUPTHER

## 2023-01-05 NOTE — Clinical Note
PATIENT PRESENTS FOR INTAKE SMOKING 10 CIGARETTES PER AY, HE WAS QUIT FOR 4 MONTHS, HE HAS BEEN A PATIENT OF TTS FOR MANY YEARS NOW AND PRIOR TO STARTING HIS QUIT JOURNEY WAS SMOKING 2PKS, HE HAS HAD A HARD TIME DEALING WITH CHRONIC PAIN WHICH HAS CAUSED ISSUES WITH HIS PROGRESSION TO A COMPLETE QUIT, AFTER ASSESSMENT AND DISCUSSION RECOMMEND THE 21MG NICOTINE PATCH AND AN ABRUPT QUIT, SESSION INTAKE HANDOUT PROVIDED AND DISCUSSED, WILL FOLLOW

## 2023-01-09 ENCOUNTER — CLINICAL SUPPORT (OUTPATIENT)
Dept: SMOKING CESSATION | Facility: CLINIC | Age: 68
End: 2023-01-09
Payer: COMMERCIAL

## 2023-01-09 DIAGNOSIS — F17.200 NICOTINE DEPENDENCE: Primary | ICD-10-CM

## 2023-01-09 PROCEDURE — 99407 PR TOBACCO USE CESSATION INTENSIVE >10 MINUTES: ICD-10-PCS | Mod: S$GLB,,, | Performed by: FAMILY MEDICINE

## 2023-01-09 PROCEDURE — 99407 BEHAV CHNG SMOKING > 10 MIN: CPT | Mod: S$GLB,,, | Performed by: FAMILY MEDICINE

## 2023-01-09 NOTE — PROGRESS NOTES
Spoke with patient today in regard to smoking cessation progress, he states still in the program at this time and is doing well with his quit. Informed patient of benefit period and contact information if any further help or support is needed. Will continue to follow up with pt for Quit attempt #5.

## 2023-01-17 ENCOUNTER — TELEPHONE (OUTPATIENT)
Dept: NEUROSURGERY | Facility: CLINIC | Age: 68
End: 2023-01-17
Payer: MEDICARE

## 2023-01-17 NOTE — TELEPHONE ENCOUNTER
Spoke with pt, I notified him that disc will be mailed out today.      Disc picked up from import office .

## 2023-01-17 NOTE — TELEPHONE ENCOUNTER
----- Message from Ana Sanabria PA-C sent at 1/17/2023  3:34 PM CST -----  Regarding: RE: pt advice  No it was sent to be uploaded  ----- Message -----  From: Mackenzie Gannon MA  Sent: 1/17/2023   3:25 PM CST  To: Ana Sanabria PA-C  Subject: FW: pt advice                                    Do you still have patients disc?  ----- Message -----  From: Tremontana Chevalier  Sent: 1/17/2023  11:10 AM CST  To: [facility] Staff  Subject: pt advice                                         Pt wanting to spk with Dr. Sanabria's staff to retrieve his CD from Dr. Sanabria's office. Pls call pt to confirm that CD can be mailed to his address on file. Pls call pt @ [phone] and also confirm that information from CD has been entered into his chart.

## 2023-01-19 ENCOUNTER — CLINICAL SUPPORT (OUTPATIENT)
Dept: SMOKING CESSATION | Facility: CLINIC | Age: 68
End: 2023-01-19
Payer: COMMERCIAL

## 2023-01-19 DIAGNOSIS — F17.200 NICOTINE DEPENDENCE: Primary | ICD-10-CM

## 2023-01-19 PROCEDURE — 99402 PR PREVENT COUNSEL,INDIV,30 MIN: ICD-10-PCS | Mod: S$GLB,,,

## 2023-01-19 PROCEDURE — 99999 PR PBB SHADOW E&M-EST. PATIENT-LVL I: ICD-10-PCS | Mod: PBBFAC,,,

## 2023-01-19 PROCEDURE — 99402 PREV MED CNSL INDIV APPRX 30: CPT | Mod: S$GLB,,,

## 2023-01-19 PROCEDURE — 99999 PR PBB SHADOW E&M-EST. PATIENT-LVL I: CPT | Mod: PBBFAC,,,

## 2023-01-19 RX ORDER — IBUPROFEN 200 MG
1 TABLET ORAL DAILY
Qty: 14 PATCH | Refills: 0 | Status: SHIPPED | OUTPATIENT
Start: 2023-01-19 | End: 2023-02-15

## 2023-01-19 NOTE — PROGRESS NOTES
Individual Follow-Up Form    1/19/2023    Quit Date: N/A    Clinical Status of Patient: Outpatient    Length of Service: 30 minutes    Continuing Medication: yes  Patches    Other Medications: N/A     Target Symptoms: Withdrawal and medication side effects. The following were  rated moderate (3) to severe (4) on TCRS:  Moderate (3): 0  Severe (4): 0    Comments: PATIENT PRESENTS FOR FOLLOW UP SMOKING MUCH MUCH LESS AND HARDLY AT ALL ON SOME DAYS, HE IS CURRENTLY ON THE 21MG NICOTINE PATCH DAILY AND DOING WELL, HE ALWAYS DOES WELL ON THE NICOTINE PATCHES BUT RUNS OUT OF PATCH BENEFITS FOR THE YEAR AND SLIPS UP, ENCOURAGEMENT PROVIDED, RECOMMEND CONTINUING THE SAME DOSE OF HE NICOTINE PATCH AT THI TIME    Diagnosis: F17.200    Next Visit: 2 weeks

## 2023-01-20 DIAGNOSIS — E78.2 MIXED HYPERLIPIDEMIA: ICD-10-CM

## 2023-01-20 RX ORDER — ATORVASTATIN CALCIUM 40 MG/1
40 TABLET, FILM COATED ORAL NIGHTLY
Qty: 90 TABLET | Refills: 1 | Status: SHIPPED | OUTPATIENT
Start: 2023-01-20 | End: 2023-07-16

## 2023-01-20 NOTE — TELEPHONE ENCOUNTER
No new care gaps identified.  [facility] Embedded Care Gaps. Reference number: [id number]. 1/20/2023   9:53:04 AM CST

## 2023-02-02 ENCOUNTER — CLINICAL SUPPORT (OUTPATIENT)
Dept: SMOKING CESSATION | Facility: CLINIC | Age: 68
End: 2023-02-02
Payer: COMMERCIAL

## 2023-02-02 DIAGNOSIS — F17.200 NICOTINE DEPENDENCE: Primary | ICD-10-CM

## 2023-02-02 PROCEDURE — 99402 PR PREVENT COUNSEL,INDIV,30 MIN: ICD-10-PCS | Mod: S$GLB,,,

## 2023-02-02 PROCEDURE — 99999 PR PBB SHADOW E&M-EST. PATIENT-LVL II: CPT | Mod: PBBFAC,,,

## 2023-02-02 PROCEDURE — 99999 PR PBB SHADOW E&M-EST. PATIENT-LVL II: ICD-10-PCS | Mod: PBBFAC,,,

## 2023-02-02 PROCEDURE — 99402 PREV MED CNSL INDIV APPRX 30: CPT | Mod: S$GLB,,,

## 2023-02-02 NOTE — PROGRESS NOTES
Individual Follow-Up Form    2/2/2023    Quit Date: N/A    Clinical Status of Patient: Outpatient    Length of Service: 30 minutes    Continuing Medication: yes  Patches    Other Medications: N/A     Target Symptoms: Withdrawal and medication side effects. The following were  rated moderate (3) to severe (4) on TCRS:  Moderate (3): 0  Severe (4): 0    Comments: SMOKING MUCH LESS AND WAERING THE NICOTINE PATCH DAILY, HE IS WEARING 21MG NICOTINE DOES NOT NEED REFILS, DOING WELL AND ALWAYS DOES WELL ON THE NICOTINE PATCHES, RECOMMEND REMAIN ON SAME DOSE WILL FOLLOW     Diagnosis: F17.200    Next Visit: 2 weeks

## 2023-02-10 ENCOUNTER — LAB VISIT (OUTPATIENT)
Dept: LAB | Facility: HOSPITAL | Age: 68
End: 2023-02-10
Attending: INTERNAL MEDICINE
Payer: MEDICARE

## 2023-02-10 DIAGNOSIS — D69.6 THROMBOCYTOPENIA: ICD-10-CM

## 2023-02-10 DIAGNOSIS — Z12.5 SCREENING FOR PROSTATE CANCER: ICD-10-CM

## 2023-02-10 DIAGNOSIS — R79.89 LOW TESTOSTERONE IN MALE: ICD-10-CM

## 2023-02-10 DIAGNOSIS — E11.36 TYPE 2 DIABETES MELLITUS WITH DIABETIC CATARACT, WITHOUT LONG-TERM CURRENT USE OF INSULIN: ICD-10-CM

## 2023-02-10 LAB
ALBUMIN SERPL BCP-MCNC: 4 G/DL (ref 3.5–5.2)
ALP SERPL-CCNC: 41 U/L (ref 55–135)
ALT SERPL W/O P-5'-P-CCNC: 14 U/L (ref 10–44)
ANION GAP SERPL CALC-SCNC: 10 MMOL/L (ref 8–16)
AST SERPL-CCNC: 18 U/L (ref 10–40)
BASOPHILS # BLD AUTO: 0.06 K/UL (ref 0–0.2)
BASOPHILS NFR BLD: 0.6 % (ref 0–1.9)
BILIRUB SERPL-MCNC: 1.4 MG/DL (ref 0.1–1)
BUN SERPL-MCNC: 20 MG/DL (ref 8–23)
CALCIUM SERPL-MCNC: 9.5 MG/DL (ref 8.7–10.5)
CHLORIDE SERPL-SCNC: 106 MMOL/L (ref 95–110)
CO2 SERPL-SCNC: 23 MMOL/L (ref 23–29)
COMPLEXED PSA SERPL-MCNC: 0.4 NG/ML (ref 0–4)
CREAT SERPL-MCNC: 1.2 MG/DL (ref 0.5–1.4)
DIFFERENTIAL METHOD: ABNORMAL
EOSINOPHIL # BLD AUTO: 0.2 K/UL (ref 0–0.5)
EOSINOPHIL NFR BLD: 1.5 % (ref 0–8)
ERYTHROCYTE [DISTWIDTH] IN BLOOD BY AUTOMATED COUNT: 11.8 % (ref 11.5–14.5)
EST. GFR  (NO RACE VARIABLE): >60 ML/MIN/1.73 M^2
ESTIMATED AVG GLUCOSE: 131 MG/DL (ref 68–131)
GLUCOSE SERPL-MCNC: 160 MG/DL (ref 70–110)
HBA1C MFR BLD: 6.2 % (ref 4–5.6)
HCT VFR BLD AUTO: 42.6 % (ref 40–54)
HGB BLD-MCNC: 13.8 G/DL (ref 14–18)
IMM GRANULOCYTES # BLD AUTO: 0.03 K/UL (ref 0–0.04)
IMM GRANULOCYTES NFR BLD AUTO: 0.3 % (ref 0–0.5)
LYMPHOCYTES # BLD AUTO: 2.2 K/UL (ref 1–4.8)
LYMPHOCYTES NFR BLD: 20.1 % (ref 18–48)
MCH RBC QN AUTO: 32.7 PG (ref 27–31)
MCHC RBC AUTO-ENTMCNC: 32.4 G/DL (ref 32–36)
MCV RBC AUTO: 101 FL (ref 82–98)
MONOCYTES # BLD AUTO: 0.9 K/UL (ref 0.3–1)
MONOCYTES NFR BLD: 8.1 % (ref 4–15)
NEUTROPHILS # BLD AUTO: 7.5 K/UL (ref 1.8–7.7)
NEUTROPHILS NFR BLD: 69.4 % (ref 38–73)
NRBC BLD-RTO: 0 /100 WBC
PLATELET # BLD AUTO: 179 K/UL (ref 150–450)
PMV BLD AUTO: 12.1 FL (ref 9.2–12.9)
POTASSIUM SERPL-SCNC: 5.1 MMOL/L (ref 3.5–5.1)
PROT SERPL-MCNC: 6.8 G/DL (ref 6–8.4)
RBC # BLD AUTO: 4.22 M/UL (ref 4.6–6.2)
SODIUM SERPL-SCNC: 139 MMOL/L (ref 136–145)
TESTOST SERPL-MCNC: 184 NG/DL (ref 304–1227)
WBC # BLD AUTO: 10.77 K/UL (ref 3.9–12.7)

## 2023-02-10 PROCEDURE — 80053 COMPREHEN METABOLIC PANEL: CPT | Performed by: INTERNAL MEDICINE

## 2023-02-10 PROCEDURE — 84403 ASSAY OF TOTAL TESTOSTERONE: CPT | Performed by: INTERNAL MEDICINE

## 2023-02-10 PROCEDURE — 36415 COLL VENOUS BLD VENIPUNCTURE: CPT | Mod: PO | Performed by: INTERNAL MEDICINE

## 2023-02-10 PROCEDURE — 83036 HEMOGLOBIN GLYCOSYLATED A1C: CPT | Performed by: INTERNAL MEDICINE

## 2023-02-10 PROCEDURE — 84153 ASSAY OF PSA TOTAL: CPT | Performed by: INTERNAL MEDICINE

## 2023-02-10 PROCEDURE — 85025 COMPLETE CBC W/AUTO DIFF WBC: CPT | Performed by: INTERNAL MEDICINE

## 2023-02-15 ENCOUNTER — CLINICAL SUPPORT (OUTPATIENT)
Dept: SMOKING CESSATION | Facility: CLINIC | Age: 68
End: 2023-02-15
Payer: COMMERCIAL

## 2023-02-15 DIAGNOSIS — F17.200 NICOTINE DEPENDENCE: Primary | ICD-10-CM

## 2023-02-15 PROCEDURE — 99407 BEHAV CHNG SMOKING > 10 MIN: CPT | Mod: S$GLB,,,

## 2023-02-15 PROCEDURE — 99999 PR PBB SHADOW E&M-EST. PATIENT-LVL I: CPT | Mod: PBBFAC,,,

## 2023-02-15 PROCEDURE — 99999 PR PBB SHADOW E&M-EST. PATIENT-LVL I: ICD-10-PCS | Mod: PBBFAC,,,

## 2023-02-15 PROCEDURE — 99407 PR TOBACCO USE CESSATION INTENSIVE >10 MINUTES: ICD-10-PCS | Mod: S$GLB,,,

## 2023-02-16 PROBLEM — I70.0 AORTIC ATHEROSCLEROSIS: Status: ACTIVE | Noted: 2023-02-16

## 2023-02-17 ENCOUNTER — OFFICE VISIT (OUTPATIENT)
Dept: FAMILY MEDICINE | Facility: CLINIC | Age: 68
End: 2023-02-17
Payer: MEDICARE

## 2023-02-17 VITALS
DIASTOLIC BLOOD PRESSURE: 62 MMHG | TEMPERATURE: 98 F | HEIGHT: 71 IN | OXYGEN SATURATION: 95 % | WEIGHT: 209.75 LBS | SYSTOLIC BLOOD PRESSURE: 120 MMHG | BODY MASS INDEX: 29.36 KG/M2 | HEART RATE: 61 BPM

## 2023-02-17 DIAGNOSIS — D69.6 THROMBOCYTOPENIA: ICD-10-CM

## 2023-02-17 DIAGNOSIS — R79.89 LOW TESTOSTERONE IN MALE: ICD-10-CM

## 2023-02-17 DIAGNOSIS — J44.9 CHRONIC OBSTRUCTIVE PULMONARY DISEASE, UNSPECIFIED COPD TYPE: ICD-10-CM

## 2023-02-17 DIAGNOSIS — I48.91 NEW ONSET A-FIB: ICD-10-CM

## 2023-02-17 DIAGNOSIS — F41.9 ANXIETY: ICD-10-CM

## 2023-02-17 DIAGNOSIS — F13.20 BENZODIAZEPINE DEPENDENCE: ICD-10-CM

## 2023-02-17 DIAGNOSIS — E11.9 TYPE 2 DIABETES MELLITUS WITHOUT COMPLICATION, WITHOUT LONG-TERM CURRENT USE OF INSULIN: Primary | ICD-10-CM

## 2023-02-17 DIAGNOSIS — E78.2 HYPERLIPIDEMIA, MIXED: ICD-10-CM

## 2023-02-17 DIAGNOSIS — F33.41 RECURRENT MAJOR DEPRESSIVE DISORDER, IN PARTIAL REMISSION: ICD-10-CM

## 2023-02-17 DIAGNOSIS — I10 ESSENTIAL HYPERTENSION: ICD-10-CM

## 2023-02-17 DIAGNOSIS — D12.6 TUBULAR ADENOMA OF COLON: Chronic | ICD-10-CM

## 2023-02-17 DIAGNOSIS — I70.0 AORTIC ATHEROSCLEROSIS: ICD-10-CM

## 2023-02-17 DIAGNOSIS — E11.36 TYPE 2 DIABETES MELLITUS WITH DIABETIC CATARACT, WITHOUT LONG-TERM CURRENT USE OF INSULIN: ICD-10-CM

## 2023-02-17 DIAGNOSIS — M46.1 SACROILIITIS: ICD-10-CM

## 2023-02-17 PROCEDURE — 1125F PR PAIN SEVERITY QUANTIFIED, PAIN PRESENT: ICD-10-PCS | Mod: CPTII,S$GLB,, | Performed by: INTERNAL MEDICINE

## 2023-02-17 PROCEDURE — 99499 RISK ADDL DX/OHS AUDIT: ICD-10-PCS | Mod: S$GLB,,, | Performed by: INTERNAL MEDICINE

## 2023-02-17 PROCEDURE — 3078F PR MOST RECENT DIASTOLIC BLOOD PRESSURE < 80 MM HG: ICD-10-PCS | Mod: CPTII,S$GLB,, | Performed by: INTERNAL MEDICINE

## 2023-02-17 PROCEDURE — 3288F FALL RISK ASSESSMENT DOCD: CPT | Mod: CPTII,S$GLB,, | Performed by: INTERNAL MEDICINE

## 2023-02-17 PROCEDURE — 1101F PT FALLS ASSESS-DOCD LE1/YR: CPT | Mod: CPTII,S$GLB,, | Performed by: INTERNAL MEDICINE

## 2023-02-17 PROCEDURE — 1157F ADVNC CARE PLAN IN RCRD: CPT | Mod: CPTII,S$GLB,, | Performed by: INTERNAL MEDICINE

## 2023-02-17 PROCEDURE — 1157F PR ADVANCE CARE PLAN OR EQUIV PRESENT IN MEDICAL RECORD: ICD-10-PCS | Mod: CPTII,S$GLB,, | Performed by: INTERNAL MEDICINE

## 2023-02-17 PROCEDURE — 1101F PR PT FALLS ASSESS DOC 0-1 FALLS W/OUT INJ PAST YR: ICD-10-PCS | Mod: CPTII,S$GLB,, | Performed by: INTERNAL MEDICINE

## 2023-02-17 PROCEDURE — 1159F PR MEDICATION LIST DOCUMENTED IN MEDICAL RECORD: ICD-10-PCS | Mod: CPTII,S$GLB,, | Performed by: INTERNAL MEDICINE

## 2023-02-17 PROCEDURE — 3008F BODY MASS INDEX DOCD: CPT | Mod: CPTII,S$GLB,, | Performed by: INTERNAL MEDICINE

## 2023-02-17 PROCEDURE — 99214 PR OFFICE/OUTPT VISIT, EST, LEVL IV, 30-39 MIN: ICD-10-PCS | Mod: S$GLB,,, | Performed by: INTERNAL MEDICINE

## 2023-02-17 PROCEDURE — 3044F PR MOST RECENT HEMOGLOBIN A1C LEVEL <7.0%: ICD-10-PCS | Mod: CPTII,S$GLB,, | Performed by: INTERNAL MEDICINE

## 2023-02-17 PROCEDURE — 3078F DIAST BP <80 MM HG: CPT | Mod: CPTII,S$GLB,, | Performed by: INTERNAL MEDICINE

## 2023-02-17 PROCEDURE — 3044F HG A1C LEVEL LT 7.0%: CPT | Mod: CPTII,S$GLB,, | Performed by: INTERNAL MEDICINE

## 2023-02-17 PROCEDURE — 3008F PR BODY MASS INDEX (BMI) DOCUMENTED: ICD-10-PCS | Mod: CPTII,S$GLB,, | Performed by: INTERNAL MEDICINE

## 2023-02-17 PROCEDURE — 3074F PR MOST RECENT SYSTOLIC BLOOD PRESSURE < 130 MM HG: ICD-10-PCS | Mod: CPTII,S$GLB,, | Performed by: INTERNAL MEDICINE

## 2023-02-17 PROCEDURE — 3288F PR FALLS RISK ASSESSMENT DOCUMENTED: ICD-10-PCS | Mod: CPTII,S$GLB,, | Performed by: INTERNAL MEDICINE

## 2023-02-17 PROCEDURE — 1125F AMNT PAIN NOTED PAIN PRSNT: CPT | Mod: CPTII,S$GLB,, | Performed by: INTERNAL MEDICINE

## 2023-02-17 PROCEDURE — 4010F PR ACE/ARB THEARPY RXD/TAKEN: ICD-10-PCS | Mod: CPTII,S$GLB,, | Performed by: INTERNAL MEDICINE

## 2023-02-17 PROCEDURE — 99999 PR PBB SHADOW E&M-EST. PATIENT-LVL V: CPT | Mod: PBBFAC,,, | Performed by: INTERNAL MEDICINE

## 2023-02-17 PROCEDURE — 1160F PR REVIEW ALL MEDS BY PRESCRIBER/CLIN PHARMACIST DOCUMENTED: ICD-10-PCS | Mod: CPTII,S$GLB,, | Performed by: INTERNAL MEDICINE

## 2023-02-17 PROCEDURE — 3074F SYST BP LT 130 MM HG: CPT | Mod: CPTII,S$GLB,, | Performed by: INTERNAL MEDICINE

## 2023-02-17 PROCEDURE — 99499 UNLISTED E&M SERVICE: CPT | Mod: S$GLB,,, | Performed by: INTERNAL MEDICINE

## 2023-02-17 PROCEDURE — 99214 OFFICE O/P EST MOD 30 MIN: CPT | Mod: S$GLB,,, | Performed by: INTERNAL MEDICINE

## 2023-02-17 PROCEDURE — 4010F ACE/ARB THERAPY RXD/TAKEN: CPT | Mod: CPTII,S$GLB,, | Performed by: INTERNAL MEDICINE

## 2023-02-17 PROCEDURE — 1160F RVW MEDS BY RX/DR IN RCRD: CPT | Mod: CPTII,S$GLB,, | Performed by: INTERNAL MEDICINE

## 2023-02-17 PROCEDURE — 99999 PR PBB SHADOW E&M-EST. PATIENT-LVL V: ICD-10-PCS | Mod: PBBFAC,,, | Performed by: INTERNAL MEDICINE

## 2023-02-17 PROCEDURE — 1159F MED LIST DOCD IN RCRD: CPT | Mod: CPTII,S$GLB,, | Performed by: INTERNAL MEDICINE

## 2023-02-17 RX ORDER — ALPRAZOLAM 1 MG/1
TABLET ORAL
Qty: 45 TABLET | Refills: 2 | Status: SHIPPED | OUTPATIENT
Start: 2023-03-14 | End: 2023-05-08

## 2023-02-17 RX ORDER — CITALOPRAM 40 MG/1
40 TABLET, FILM COATED ORAL DAILY
Qty: 90 TABLET | Refills: 3 | Status: SHIPPED | OUTPATIENT
Start: 2023-02-17 | End: 2023-11-16

## 2023-02-17 NOTE — PROGRESS NOTES
This note was created by combination of typed  and M-Modal dictation.  Transcription errors may be present.  If there are any questions, please contact me.    Assessment and Plan:   Type 2 diabetes mellitus without complication, without long-term current use of insulin  Type 2 diabetes mellitus with diabetic cataract, without long-term current use of insulin  -pre visit labs good on metformin    Low testosterone in male  -pre visit labs testosterone level low.  He is supposed to be taking 175 mg/0.75 mL but notes that he can not really see the measurement lines on the syringe that well.  So he typically draws up the testosterone from a single use vial and leaves a little bit in the vial.  So it is unclear if he has been dosing himself with less than 150 mg.  Or possibly more.  Back in November he had normal levels presumably on the same dose, 150 mg.    Typically the patient draws it up and measures it and has grandson administered to the patient.  I have asked him to have his grandson measure before he administers to the patient.  For now no change repeat 3 months  PSA done pre visit normal.  -     Comprehensive Metabolic Panel; Future; Expected date: 05/17/2023  -     CBC Auto Differential; Future; Expected date: 05/17/2023  -     Testosterone; Future; Expected date: 05/17/2023    Hyperlipidemia, mixed  Aortic atherosclerosis  -labs done in august on statin normal.    Essential hypertension  -BP stable. Coreg, amlodipine, hydralazine, can miss the mid day dose of hydralazine, encouraged to take regularly. No longer taking clonidine    New onset a-fib  -defer to cards.  On beta blocker.     Benzodiazepine dependence, did not do well with attempt to wean down 2017  Anxiety, unable to wean off BZD  Recurrent major depressive disorder, in partial remission  -did not tolerate trial of Cymbalta.  Took it for maybe a week.  He was given Celexa to taper off of.  Has been out of it and has not taken it in a few  months.  With resultant worsening anxiety.  Taking alprazolam.  Currently have him taking 1.5 tablets in total during the day.    Restart Celexa 40   Stay on alprazolam, 1.5 tablets over the course of the day.  Has been to see Psychiatry, Psychiatry recommended inpatient detox and the patient was not interested.  -     ALPRAZolam (XANAX) 1 MG tablet; 1 and a half pills a day.  Dispense: 45 tablet; Refill: 2  -     citalopram (CELEXA) 40 MG tablet; Take 1 tablet (40 mg total) by mouth once daily.  Dispense: 90 tablet; Refill: 3    Sacroiliitis  Chronic back pain chronic narcotic dependence followed by pain management.  Narcotic, p.r.n. use gabapentin    Thrombocytopenia  -most recent platelet count was normal.  Had thrombocytopenia with some clumping ever since his hospitalization back in the summer.    Chronic obstructive pulmonary disease, unspecified COPD type  -stable    Tubular adenoma of colon 5/10/17  -due for colonoscopy, apparently had been contacted for colonoscopy but no date availability to schedule.  They didn't apparently call him back.  Re referral  -     Ambulatory referral/consult to Endo Procedure ; Future; Expected date: 2023    Needs stress test done, reminded pt to schedule.      Medications Discontinued During This Encounter   Medication Reason    DULoxetine (CYMBALTA) 60 MG capsule Side effects    cloNIDine (CATAPRES) 0.2 MG tablet Therapy completed    citalopram (CELEXA) 20 MG tablet     ALPRAZolam (XANAX) 1 MG tablet        meds sent this encounter:  Medications Ordered This Encounter   Medications    ALPRAZolam (XANAX) 1 MG tablet     Si and a half pills a day.     Dispense:  45 tablet     Refill:  2     This prescription was filled on 2023. Any refills authorized will be placed on file.    citalopram (CELEXA) 40 MG tablet     Sig: Take 1 tablet (40 mg total) by mouth once daily.     Dispense:  90 tablet     Refill:  3       Follow Up: No follow-ups on file. OV 3  months with labs.     Subjective:     Chief Complaint   Patient presents with    Diabetes     Follow up        HPI  Butch is a 67 y.o. male.     Social History     Tobacco Use    Smoking status: Every Day     Packs/day: 0.50     Years: 32.00     Pack years: 16.00     Types: Cigarettes    Smokeless tobacco: Never   Substance Use Topics    Alcohol use: No     Alcohol/week: 0.0 standard drinks      Social History     Occupational History    Occupation: retired -       Social History     Social History Narrative    Not on file       Last appointment with this clinic was 11/17/2022. Last visit with me 11/17/2022   To summarize last visit and events leading up to today:  Hospitalization July 2022 bradycardia from renal failure with hyperkalemia.  Underwent emergent dialysis.  Thrombocytopenia new on hospitalization and ever since.  Previous CBC had shown platelet clumping.  Possible clumping again?  Plan was repeat CBC and if persisting to Hematology.  Subsequent labs CBC showing platelet clumps.  Repeat in the future.  Anxiety and benzodiazepine dependence.  On Celexa, BuSpar, Xanax.  BuSpar during the day Xanax at night.  I previously referred him to Psychiatry.  Gave him contact information for Psychiatry.  Would not increase the benzodiazepine given chronic opioid therapy.  Stressors-grandchildren in the household.    Diabetes A1c at that time stable.  On metformin.  ACE-inhibitor.    Hypertension labile blood pressures.  Clonidine patch with side effects of sedation, he changed himself to clonidine pills.  And stop verapamil.  On lisinopril, amlodipine, carvedilol.  Previously hydralazine which was cut down and started on amlodipine by Cardiology.  New onset atrial fibrillation during hospitalization possibly from infection.  needs to see cardiology re: need for chronic anticoag   Low testosterone labs at that time good on 150 every 2 weeks  Chronic narcotic dependence for chronic LBP  Tubular  adenoma 2017   Smoker  Memory issues. ever since his neck surgery he thinks.  After his epidural he had an MRI of his brain which is negative for stroke.    Saw psych.  Physical and psychological dependence on BZDs and pain meds which would require inpt detox. Pt unwilling.   Recommended changing citalopram to duloxetine    Saw neurosurg in f/u. Rec's EMG/NCS of LE for paresthesias.    Saw cards in mid December. [place]. Stress test    Saw neurosurg in f/u. Had a fall.  CTA neck was normal (concern about vascular compromise with previous surgery hardware)    Pre visit labs  CBC mild anemia macrocytic.   Plt 179  CMP WNL  A1c 6.2  PSA WNL  Testosterone low.      Today's visit:  Please note: Chronic medical problems that are stable but are being addressed at this visit may not be listed/documented here, but may be addressed in more detail in the Assessment and Plan section.   Below are salient features of chronic medical conditions, or new/acute conditions and their details.    Had what sounds like gastroenteritis.  Thinks he put too much vinegar in his salad.  Subsequent nausea and vomiting.  Slept a lot to recover.      Cymbalta - took for 3 days and felt stiff, alternating hot and cold, did not like it in so he stopped it.    Was given Celexa for tapering, and he has run out.  Has not had any for a few months he estimates.    Xanax he is prescribed 45 pills in a month.  He has started to taking a whole pill in the morning and half at night.  Normally he does the reverse.  But because he has been out of his Celexa he finds that the [place] seems to calm him during the day.  I will restart the Celexa.    Reviewed his labs, testosterone level is a little low.  Last time it was normal.  He is supposed to be taking 150 mg.  He notes that it is difficult for him to read the markings on the syringe and so he pulls most of the testosterone from the vial, it is a single use vial, and so he may not be pulling up the correct  amount.  His grandson has to administer it for him and I have asked him to have his grandson measure out before administering    D3 at bedtime else cramping      Patient Care Team:  Gabriel Evans MD as PCP - General (Internal Medicine)  Vlad Nava MD (Pain Medicine)  Kaila Carrillo OD as Consulting Physician (Optometry)  Wyatt Ojeda MA as Care Coordinator    Patient Active Problem List    Diagnosis Date Noted    Aortic atherosclerosis 02/16/2023    Type 2 diabetes mellitus with diabetic cataract, without long-term current use of insulin 10/14/2022    Unspecified inflammatory spondylopathy, cervical region 10/14/2022    Thrombocytopenia 10/14/2022    New onset a-fib during hospitalization 7/2022 from infection? started on coreg during hospitalization 07/19/2022    Junctional bradycardia 07/18/2022    Chronic prescription benzodiazepine use 07/18/2022    Foraminal stenosis of lumbar region 05/06/2022 5/5/2022 MRI L spine:   *   Multilevel lumbar spondylosis with up to severe right neural foraminal narrowing at L5-S1 with impingement of exiting right L5 nerve root.   *   Moderate thecal sac narrowing at L3-L4.   *   Chronic spondylolysis of L5 with grade 1 anterolisthesis of L5 on S1        Chronic low back pain 12/27/2021    Sacroiliac joint pain 12/27/2021    Sacroiliitis 12/27/2021    Impaired mobility 10/10/2021    Arthrodesis status 10/10/2021     Formatting of this note might be different from the original.  C2-T2, 10/03/21      Impaired mobility and ADLs 10/07/2021    Status post surgery 10/03/2021    History of smoking 10-25 pack years 10/02/2021    Paresthesia of left upper and lower extremity 10/01/2021    Low testosterone in male 09/02/2020 6/2020 Repeat labs prolactin was normal.  Testosterone was low.  Free testosterone was low and sex hormone binding globulin was normal      Elevated prolactin level,low testosterone, gynecomastia; MRI pituitary normal 5/2020 01/13/2020    Nuclear sclerosis  of both eyes 11/04/2019    Open angle with borderline findings and high glaucoma risk in both eyes 10/08/2019    Cataract, bilateral 09/11/2018    Cervical stenosis of spinal canal 10/3/2021 LAMINECTOMY, SPINE, CERVICAL, WITH POSTERIOR FUSION C2-T2 05/07/2018 02/14/2018 MRI C-spine impression:  Slight retrolisthesis of C4 with respect to C5.  Narrowing of the central spinal canal most prominent at the C4-C5, C5-C6, and C6-C7 levels and to a lesser extent at C2-C3 and C3-C4.  Annular disc bulges posteriorly at C2-C3, C3-C4.  Diffuse disc herniation/protrusion posteriorly at C4-C5 level and a disc herniation/extrusion posteriorly at the C5-C6 level with a central disc herniation/protrusion posteriorly at the C6-C7 level.  Narrowing of the neural foramen bilaterally from C3-C4 through C6-C7.  10/1/2021 admitted for L sided numbness after epidural injection, initially CT interpreted at [facility]; however subsequent MRI no hemorrhage but severe spinal canal stenosis    10/1/2021 MRI brain: Negative MRI of the brain for hemorrhage, mass or infarction. Specifically, no evidence of subarachnoid blood or blood products in the extra-axial spaces on SWI or diffusion-weighted images.  In light of the previous CTs and history of epidural injections at outside facility, this raises the question of in ejected contrast in the central spinal canal with migration into the intracranial subarachnoid spaces.  Subarachnoid hemorrhage or exudate are considered less likely.  10/1/2021 MRI C spine: Severe spinal canal stenosis with complete effacement of the CSF and spinal cord compression at the level of C5-C6 due to posterior disc osteophyte complex with superimposed right disc protrusion and congenitally narrow spinal canal.  High T2 signal within the cord secondary to either edema or myelomalacia.    10/3/2021 LAMINECTOMY, SPINE, CERVICAL, WITH POSTERIOR FUSION C2-T2      Tubular adenoma of colon 5/10/17 05/10/2017     5/10/2017  colonoscopy tubular adenoma      Therapeutic opioid-induced constipation (OIC) 04/17/2017    Tobacco dependence, patch with irritation; hx of bupropion 01/12/2016    Type 2 diabetes mellitus without complication, without long-term current use of insulin 08/07/2015    Facet arthritis of cervical region 10/28/2013    Facet arthritis of lumbar region 10/28/2013    Benzodiazepine dependence, did not do well with attempt to wean down 2017 10/28/2013    Chronic, continuous use of opioids 10/28/2013    ED (erectile dysfunction) 07/26/2013    DDD (degenerative disc disease), cervical 05/23/2013    DDD (degenerative disc disease), lumbar 05/23/2013    Essential hypertension 11/01/2012     2/3/2022 TTE LV normal size with moderate concentric hypertrophy and normal systolic function LVEF 60%.  Normal RV size and systolic function.  PA pressure 33  7/18/22 TTE LV normal size with mod concentric hypertrophy; normal systolic function. LVEF 70%. Normal RV size and systolic function. PA pressure 58. Mod pHTN      Anemia 11/01/2012    Anxiety, unable to wean off BZD 11/01/2012    Recurrent major depressive disorder, in partial remission 11/01/2012       PAST MEDICAL PROBLEMS, PAST SURGICAL HISTORY: please see relevant portions of the electronic medical record    ALLERGIES AND MEDICATIONS: updated and reviewed.  Review of patient's allergies indicates:  No Known Allergies    Medication List with Changes/Refills   Current Medications    ALPRAZOLAM (XANAX) 1 MG TABLET    TAKE ONE TABLET BY MOUTH TWICE DAILY AS NEEDED FOR ANXIETY    AMLODIPINE (NORVASC) 10 MG TABLET    TAKE ONE TABLET BY MOUTH EVERY EVENING    ATORVASTATIN (LIPITOR) 40 MG TABLET    Take 1 tablet (40 mg total) by mouth every evening.    BUSPIRONE (BUSPAR) 30 MG TAB    TAKE ONE TABLET BY MOUTH TWICE DAILY    CARVEDILOL (COREG) 25 MG TABLET    Take 1 tablet (25 mg total) by mouth 2 (two) times daily.    CITALOPRAM (CELEXA) 20 MG TABLET    Take 1 tablet (20 mg total) by  "mouth once daily for 10 days, THEN 0.5 tablets (10 mg total) once daily for 10 days.    CLONIDINE (CATAPRES) 0.2 MG TABLET    Take 1 tablet (0.2 mg total) by mouth 2 (two) times daily.    DULOXETINE (CYMBALTA) 60 MG CAPSULE    Take 1 capsule (60 mg total) by mouth once daily.    FLUTICASONE-SALMETEROL DISKUS INHALER 100-50 MCG    Inhale 1 puff into the lungs.    GABAPENTIN (NEURONTIN) 600 MG TABLET    Take 600 mg by mouth 3 (three) times daily.    HYDRALAZINE (APRESOLINE) 25 MG TABLET    Take 1 tablet (25 mg total) by mouth 3 (three) times daily.    LISINOPRIL (PRINIVIL,ZESTRIL) 40 MG TABLET    Take 1 tablet (40 mg total) by mouth once daily.    METFORMIN (GLUCOPHAGE) 500 MG TABLET    Take 1 tablet (500 mg total) by mouth 2 (two) times daily with meals.    NALOXONE (NARCAN) 4 MG/ACTUATION SPRY    4mg by nasal route as needed for opioid overdose; may repeat every 2-3 minutes in alternating nostrils until medical help arrives. Call 911    NEEDLE, DISP, 22 G 22 GAUGE X 1" NDLE    Testosterone injection every 2 weeks    NICOTINE (NICODERM CQ) 21 MG/24 HR    PLACE ONE PATCH onto SKIN ONCE A DAY    OXYCODONE-ACETAMINOPHEN (PERCOCET)  MG PER TABLET    Take 1 tablet by mouth every 4 (four) hours as needed.    POLYETHYLENE GLYCOL 3350 (MIRALAX ORAL)    Take 1 application by mouth.    SENNA (SENOKOT) 8.6 MG TABLET    Take 1 tablet by mouth 2 (two) times a day.    SYRINGE, DISPOSABLE, (BD LUER-STARLA SYRINGE) 1 ML SYRG    Testosterone injection every 2 weeks    TESTOSTERONE CYPIONATE (DEPOTESTOTERONE CYPIONATE) 200 MG/ML INJECTION    INJECT 0.75 MILLILITERS (150 MG) INTRAMUSCULARLY EVERY 14 DAYS.    TIZANIDINE (ZANAFLEX) 4 MG TABLET    Take 4-8 mg by mouth nightly as needed.        Objective:   Physical Exam   Vitals:    02/17/23 1444   BP: 120/62   BP Location: Left arm   Patient Position: Sitting   BP Method: Large (Manual)   Pulse: 61   Temp: 97.7 °F (36.5 °C)   TempSrc: Other (see comments)   SpO2: 95%   Weight: 95.1 kg " "(209 lb 12.3 oz)   Height: 5' 11" (1.803 m)    Body mass index is 29.26 kg/m².  Weight: 95.1 kg (209 lb 12.3 oz)   Height: 5' 11" (180.3 cm)     Physical Exam  Constitutional:       General: He is not in acute distress.     Appearance: He is well-developed.   HENT:      Head: Normocephalic and atraumatic.   Eyes:      General: No scleral icterus.  Cardiovascular:      Pulses:           Dorsalis pedis pulses are 3+ on the right side and 3+ on the left side.        Posterior tibial pulses are 3+ on the right side and 3+ on the left side.   Pulmonary:      Effort: Pulmonary effort is normal.   Musculoskeletal:      Right lower leg: No edema.      Left lower leg: No edema.      Right foot: No deformity.      Left foot: No deformity.   Feet:      Right foot:      Protective Sensation: 5 sites tested.  5 sites sensed.      Skin integrity: No ulcer, blister, skin breakdown, erythema or warmth.      Left foot:      Protective Sensation: 5 sites tested.  5 sites sensed.      Skin integrity: No ulcer, blister, skin breakdown, erythema or warmth.   Skin:     General: Skin is warm and dry.   Neurological:      Mental Status: He is alert and oriented to person, place, and time.   Psychiatric:         Behavior: Behavior normal.         Thought Content: Thought content normal.       Component      Latest Ref Rng & Units 2/10/2023 11/16/2022 9/29/2022   WBC      3.90 - 12.70 K/uL 10.77  7.89   RBC      4.60 - 6.20 M/uL 4.22 (L)  4.12 (L)   Hemoglobin      14.0 - 18.0 g/dL 13.8 (L)  13.6 (L)   Hematocrit      40.0 - 54.0 % 42.6  40.4   MCV      82 - 98 fL 101 (H)  98   MCH      27.0 - 31.0 pg 32.7 (H)  33.0 (H)   MCHC      32.0 - 36.0 g/dL 32.4  33.7   RDW      11.5 - 14.5 % 11.8  13.1   Platelets      150 - 450 K/uL 179  SEE COMMENT   MPV      9.2 - 12.9 fL 12.1  SEE COMMENT   Immature Granulocytes      0.0 - 0.5 % 0.3  0.4   Gran # (ANC)      1.8 - 7.7 K/uL 7.5  4.4   Immature Grans (Abs)      0.00 - 0.04 K/uL 0.03  0.03   Lymph " #      1.0 - 4.8 K/uL 2.2  2.6   Mono #      0.3 - 1.0 K/uL 0.9  0.7   Eos #      0.0 - 0.5 K/uL 0.2  0.2   Baso #      0.00 - 0.20 K/uL 0.06  0.05   nRBC      0 /100 WBC 0  0   Gran %      38.0 - 73.0 % 69.4  55.3   Lymph %      18.0 - 48.0 % 20.1  32.8   Mono %      4.0 - 15.0 % 8.1  8.6   Eosinophil %      0.0 - 8.0 % 1.5  2.3   Basophil %      0.0 - 1.9 % 0.6  0.6   Platelet Estimate         Clumped (A)   Aniso         Slight   Giant Platelets         Present   Differential Method       Automated  Automated   Sodium      136 - 145 mmol/L 139  134 (L)   Potassium      3.5 - 5.1 mmol/L 5.1  4.4   Chloride      95 - 110 mmol/L 106  103   CO2      23 - 29 mmol/L 23  23   Glucose      70 - 110 mg/dL 160 (H)  120 (H)   BUN      8 - 23 mg/dL 20  24 (H)   Creatinine      0.5 - 1.4 mg/dL 1.2  1.2   Calcium      8.7 - 10.5 mg/dL 9.5  9.2   PROTEIN TOTAL      6.0 - 8.4 g/dL 6.8  7.2   Albumin      3.5 - 5.2 g/dL 4.0  4.2   BILIRUBIN TOTAL      0.1 - 1.0 mg/dL 1.4 (H)  0.7   Alkaline Phosphatase      55 - 135 U/L 41 (L)  53 (L)   AST      10 - 40 U/L 18  21   ALT      10 - 44 U/L 14  23   Anion Gap      8 - 16 mmol/L 10  8   eGFR      >60 mL/min/1.73 m:2 >60.0  >60.0   Hemoglobin A1C External      4.0 - 5.6 % 6.2 (H) 6.4 (H)    Estimated Avg Glucose      68 - 131 mg/dL 131 137 (H)    Testosterone, Total      304 - 1227 ng/dL 184 (L) 635    PSA, Screen      0.00 - 4.00 ng/mL 0.40

## 2023-02-22 ENCOUNTER — TELEPHONE (OUTPATIENT)
Dept: CARDIOLOGY | Facility: CLINIC | Age: 68
End: 2023-02-22
Payer: MEDICARE

## 2023-02-23 NOTE — TELEPHONE ENCOUNTER
I spoke to this patient in regards to scheduling his stress testing as well as his follow up with Dr. Pete.

## 2023-03-02 ENCOUNTER — CLINICAL SUPPORT (OUTPATIENT)
Dept: SMOKING CESSATION | Facility: CLINIC | Age: 68
End: 2023-03-02
Payer: COMMERCIAL

## 2023-03-02 DIAGNOSIS — F17.200 NICOTINE DEPENDENCE: ICD-10-CM

## 2023-03-02 DIAGNOSIS — F17.200 NICOTINE DEPENDENCE: Primary | ICD-10-CM

## 2023-03-02 PROCEDURE — 99402 PREV MED CNSL INDIV APPRX 30: CPT | Mod: S$GLB,,,

## 2023-03-02 PROCEDURE — 99402 PR PREVENT COUNSEL,INDIV,30 MIN: ICD-10-PCS | Mod: S$GLB,,,

## 2023-03-02 PROCEDURE — 99999 PR PBB SHADOW E&M-EST. PATIENT-LVL I: ICD-10-PCS | Mod: PBBFAC,,,

## 2023-03-02 PROCEDURE — 99999 PR PBB SHADOW E&M-EST. PATIENT-LVL I: CPT | Mod: PBBFAC,,,

## 2023-03-02 RX ORDER — IBUPROFEN 200 MG
1 TABLET ORAL DAILY
Qty: 14 PATCH | Refills: 0 | Status: SHIPPED | OUTPATIENT
Start: 2023-03-02 | End: 2023-03-16 | Stop reason: SDUPTHER

## 2023-03-02 NOTE — PROGRESS NOTES
Individual Follow-Up Form    3/2/2023    Quit Date: n/a    Clinical Status of Patient: Outpatient    Length of Service: 30 minutes    Continuing Medication: yes  Patches    Other Medications: N/A     Target Symptoms: Withdrawal and medication side effects. The following were  rated moderate (3) to severe (4) on TCRS:  Moderate (3): 0  Severe (4): 0    Comments: PATIENT PRESENTS FOR FOLLOW UP , HE IS CURRENTLY WEARING THE 21MG NICOTINE PATCH AND SMOKING MAYBE 10 CIGS PER DAY, HE HAS CUT BACK A LITTLE BUT IS NOT WHERE HE NEEDS TO BE. HE IS STILL HAVING STRONG THOUGHTS AND URGES TO SMOKE THEREFORE RECOMMEND ADDING THE 14MG NICOTINE PATCH DAILY TO THE REGIMEN AND WEARING THE 21MG AND THE 14MG NICOTINE PATCH IN CONJUNCTION, HIS WIFE MS MURRAY IS PRESENT AND WOULD LIKE TO REJOIN THE PROGRAM AND ET STARTED ON THE NICOTINE PATCHES AS WELL. WILL GET HE SET UP, ENCOURAGEMENT PROVIDED, SESSION HANDOUT AND STRATEGIES SUGGESTED. WILL FOLLOW     Diagnosis: F17.210    Next Visit: 2 weeks

## 2023-03-16 ENCOUNTER — HOSPITAL ENCOUNTER (OUTPATIENT)
Dept: RADIOLOGY | Facility: HOSPITAL | Age: 68
Discharge: HOME | End: 2023-03-16
Attending: INTERNAL MEDICINE
Payer: MEDICARE

## 2023-03-16 ENCOUNTER — CLINICAL SUPPORT (OUTPATIENT)
Dept: SMOKING CESSATION | Facility: CLINIC | Age: 68
End: 2023-03-16
Payer: COMMERCIAL

## 2023-03-16 ENCOUNTER — HOSPITAL ENCOUNTER (OUTPATIENT)
Dept: CARDIOLOGY | Facility: HOSPITAL | Age: 68
Discharge: HOME | End: 2023-03-16
Attending: INTERNAL MEDICINE
Payer: MEDICARE

## 2023-03-16 DIAGNOSIS — R06.09 DOE (DYSPNEA ON EXERTION): ICD-10-CM

## 2023-03-16 DIAGNOSIS — I10 HYPERTENSION, UNSPECIFIED TYPE: ICD-10-CM

## 2023-03-16 DIAGNOSIS — F17.200 NICOTINE DEPENDENCE: ICD-10-CM

## 2023-03-16 LAB
CV STRESS BASE HR: 47 BPM
DIASTOLIC BLOOD PRESSURE: 82 MMHG
NUC STRESS DIASTOLIC VOLUME INDEX: 97
NUC STRESS EJECTION FRACTION: 56 %
NUC STRESS SYSTOLIC VOLUME INDEX: 43
OHS CV CPX 85 PERCENT MAX PREDICTED HEART RATE MALE: 129
OHS CV CPX MAX PREDICTED HEART RATE: 152
OHS CV CPX PATIENT IS FEMALE: 0
OHS CV CPX PATIENT IS MALE: 1
OHS CV CPX PEAK DIASTOLIC BLOOD PRESSURE: 62 MMHG
OHS CV CPX PEAK HEAR RATE: 69 BPM
OHS CV CPX PEAK RATE PRESSURE PRODUCT: NORMAL
OHS CV CPX PEAK SYSTOLIC BLOOD PRESSURE: 152 MMHG
OHS CV CPX PERCENT MAX PREDICTED HEART RATE ACHIEVED: 45
OHS CV CPX RATE PRESSURE PRODUCT PRESENTING: 6674
SYSTOLIC BLOOD PRESSURE: 142 MMHG

## 2023-03-16 PROCEDURE — 99999 PR PBB SHADOW E&M-EST. PATIENT-LVL II: ICD-10-PCS | Mod: PBBFAC,,,

## 2023-03-16 PROCEDURE — 93018 NUCLEAR STRESS - CARDIOLOGY INTERPRETED (CUPID ONLY): ICD-10-PCS | Mod: ,,, | Performed by: INTERNAL MEDICINE

## 2023-03-16 PROCEDURE — 78452 NUCLEAR STRESS - CARDIOLOGY INTERPRETED (CUPID ONLY): ICD-10-PCS | Mod: 26,,, | Performed by: INTERNAL MEDICINE

## 2023-03-16 PROCEDURE — 93017 CV STRESS TEST TRACING ONLY: CPT

## 2023-03-16 PROCEDURE — A9502 TC99M TETROFOSMIN: HCPCS

## 2023-03-16 PROCEDURE — 99402 PR PREVENT COUNSEL,INDIV,30 MIN: ICD-10-PCS | Mod: S$GLB,,,

## 2023-03-16 PROCEDURE — 99999 PR PBB SHADOW E&M-EST. PATIENT-LVL II: CPT | Mod: PBBFAC,,,

## 2023-03-16 PROCEDURE — 93016 CV STRESS TEST SUPVJ ONLY: CPT | Mod: ,,, | Performed by: INTERNAL MEDICINE

## 2023-03-16 PROCEDURE — 93016 NUCLEAR STRESS - CARDIOLOGY INTERPRETED (CUPID ONLY): ICD-10-PCS | Mod: ,,, | Performed by: INTERNAL MEDICINE

## 2023-03-16 PROCEDURE — 63600175 PHARM REV CODE 636 W HCPCS: Performed by: INTERNAL MEDICINE

## 2023-03-16 PROCEDURE — 99402 PREV MED CNSL INDIV APPRX 30: CPT | Mod: S$GLB,,,

## 2023-03-16 PROCEDURE — 93018 CV STRESS TEST I&R ONLY: CPT | Mod: ,,, | Performed by: INTERNAL MEDICINE

## 2023-03-16 PROCEDURE — 78452 HT MUSCLE IMAGE SPECT MULT: CPT | Mod: 26,,, | Performed by: INTERNAL MEDICINE

## 2023-03-16 PROCEDURE — 78452 HT MUSCLE IMAGE SPECT MULT: CPT

## 2023-03-16 RX ORDER — IBUPROFEN 200 MG
1 TABLET ORAL DAILY
Qty: 14 PATCH | Refills: 0 | Status: SHIPPED | OUTPATIENT
Start: 2023-03-16 | End: 2023-04-12

## 2023-03-16 RX ORDER — REGADENOSON 0.08 MG/ML
0.4 INJECTION, SOLUTION INTRAVENOUS ONCE
Status: COMPLETED | OUTPATIENT
Start: 2023-03-16 | End: 2023-03-16

## 2023-03-16 RX ORDER — IBUPROFEN 200 MG
1 TABLET ORAL DAILY
Qty: 14 PATCH | Refills: 0 | Status: SHIPPED | OUTPATIENT
Start: 2023-03-16 | End: 2023-04-15

## 2023-03-16 RX ADMIN — REGADENOSON 0.4 MG: 0.08 INJECTION, SOLUTION INTRAVENOUS at 11:03

## 2023-03-16 NOTE — PROGRESS NOTES
Individual Follow-Up Form    3/16/2023    Quit Date: 3/16    Clinical Status of Patient: Outpatient    Length of Service: 30 minutes    Continuing Medication: yes  Patches    Other Medications: N/A     Target Symptoms: Withdrawal and medication side effects. The following were  rated moderate (3) to severe (4) on TCRS:  Moderate (3): 0  Severe (4): 0    Comments: PATIENT PRESENTS FOR FOLLOW UP TELEPHONICALLY, HE IS SMOKING MUCH LESS ROUGHLY 8 TIMES PER DAY, HE RELIGHTS A CIG X3 AND IS COUNTING EVERY LIGHT AS ONE CIGARETTE, HE IS CURRENTLY ON THE 21MG NICOTINE PATCH DAILY, RECOMMEND THAT HE WEAR THE 14MG IN CONJUNCTION, HAS HAD SOME ISSUES WITH PHARMACY THEREFORE WILL SEND FOR [facility] AS A . STRATEGIES AND SESSION HANDOUT DISCUSSED, WILL FOLLOW     Diagnosis: F17.210    Next Visit: 2 weeks

## 2023-03-22 ENCOUNTER — TELEPHONE (OUTPATIENT)
Dept: ADMINISTRATIVE | Facility: CLINIC | Age: 68
End: 2023-03-22
Payer: MEDICARE

## 2023-03-30 ENCOUNTER — TELEPHONE (OUTPATIENT)
Dept: FAMILY MEDICINE | Facility: CLINIC | Age: 68
End: 2023-03-30
Payer: MEDICARE

## 2023-03-30 NOTE — TELEPHONE ENCOUNTER
----- Message from Laron Mathew sent at 3/30/2023  3:42 PM CDT -----  Type: Patient Call Back    Who called:    What is the request in detail:returning a call     Can the clinic reply by [facility]?    Would the patient rather a call back or a response via [facility]?    Best call back number: [phone]      Additional Information:

## 2023-03-31 ENCOUNTER — OFFICE VISIT (OUTPATIENT)
Dept: CARDIOLOGY | Facility: CLINIC | Age: 68
End: 2023-03-31
Payer: MEDICARE

## 2023-03-31 VITALS
SYSTOLIC BLOOD PRESSURE: 130 MMHG | DIASTOLIC BLOOD PRESSURE: 62 MMHG | BODY MASS INDEX: 30.5 KG/M2 | HEART RATE: 52 BPM | OXYGEN SATURATION: 96 % | RESPIRATION RATE: 16 BRPM | WEIGHT: 218.69 LBS

## 2023-03-31 DIAGNOSIS — N52.9 ERECTILE DYSFUNCTION, UNSPECIFIED ERECTILE DYSFUNCTION TYPE: ICD-10-CM

## 2023-03-31 DIAGNOSIS — F13.20 BENZODIAZEPINE DEPENDENCE: Primary | Chronic | ICD-10-CM

## 2023-03-31 DIAGNOSIS — I70.0 AORTIC ATHEROSCLEROSIS: ICD-10-CM

## 2023-03-31 DIAGNOSIS — R06.02 SOB (SHORTNESS OF BREATH): ICD-10-CM

## 2023-03-31 DIAGNOSIS — M50.30 DDD (DEGENERATIVE DISC DISEASE), CERVICAL: Chronic | ICD-10-CM

## 2023-03-31 DIAGNOSIS — I10 ESSENTIAL HYPERTENSION: ICD-10-CM

## 2023-03-31 PROCEDURE — 99999 PR PBB SHADOW E&M-EST. PATIENT-LVL V: ICD-10-PCS | Mod: PBBFAC,,, | Performed by: INTERNAL MEDICINE

## 2023-03-31 PROCEDURE — 3288F FALL RISK ASSESSMENT DOCD: CPT | Mod: CPTII,S$GLB,, | Performed by: INTERNAL MEDICINE

## 2023-03-31 PROCEDURE — 99999 PR PBB SHADOW E&M-EST. PATIENT-LVL V: CPT | Mod: PBBFAC,,, | Performed by: INTERNAL MEDICINE

## 2023-03-31 PROCEDURE — 4010F PR ACE/ARB THEARPY RXD/TAKEN: ICD-10-PCS | Mod: CPTII,S$GLB,, | Performed by: INTERNAL MEDICINE

## 2023-03-31 PROCEDURE — 1101F PT FALLS ASSESS-DOCD LE1/YR: CPT | Mod: CPTII,S$GLB,, | Performed by: INTERNAL MEDICINE

## 2023-03-31 PROCEDURE — 3044F HG A1C LEVEL LT 7.0%: CPT | Mod: CPTII,S$GLB,, | Performed by: INTERNAL MEDICINE

## 2023-03-31 PROCEDURE — 3075F SYST BP GE 130 - 139MM HG: CPT | Mod: CPTII,S$GLB,, | Performed by: INTERNAL MEDICINE

## 2023-03-31 PROCEDURE — 1159F MED LIST DOCD IN RCRD: CPT | Mod: CPTII,S$GLB,, | Performed by: INTERNAL MEDICINE

## 2023-03-31 PROCEDURE — 3008F PR BODY MASS INDEX (BMI) DOCUMENTED: ICD-10-PCS | Mod: CPTII,S$GLB,, | Performed by: INTERNAL MEDICINE

## 2023-03-31 PROCEDURE — 3044F PR MOST RECENT HEMOGLOBIN A1C LEVEL <7.0%: ICD-10-PCS | Mod: CPTII,S$GLB,, | Performed by: INTERNAL MEDICINE

## 2023-03-31 PROCEDURE — 1101F PR PT FALLS ASSESS DOC 0-1 FALLS W/OUT INJ PAST YR: ICD-10-PCS | Mod: CPTII,S$GLB,, | Performed by: INTERNAL MEDICINE

## 2023-03-31 PROCEDURE — 1125F AMNT PAIN NOTED PAIN PRSNT: CPT | Mod: CPTII,S$GLB,, | Performed by: INTERNAL MEDICINE

## 2023-03-31 PROCEDURE — 1160F PR REVIEW ALL MEDS BY PRESCRIBER/CLIN PHARMACIST DOCUMENTED: ICD-10-PCS | Mod: CPTII,S$GLB,, | Performed by: INTERNAL MEDICINE

## 2023-03-31 PROCEDURE — 1125F PR PAIN SEVERITY QUANTIFIED, PAIN PRESENT: ICD-10-PCS | Mod: CPTII,S$GLB,, | Performed by: INTERNAL MEDICINE

## 2023-03-31 PROCEDURE — 1157F PR ADVANCE CARE PLAN OR EQUIV PRESENT IN MEDICAL RECORD: ICD-10-PCS | Mod: CPTII,S$GLB,, | Performed by: INTERNAL MEDICINE

## 2023-03-31 PROCEDURE — 99214 PR OFFICE/OUTPT VISIT, EST, LEVL IV, 30-39 MIN: ICD-10-PCS | Mod: S$GLB,,, | Performed by: INTERNAL MEDICINE

## 2023-03-31 PROCEDURE — 4010F ACE/ARB THERAPY RXD/TAKEN: CPT | Mod: CPTII,S$GLB,, | Performed by: INTERNAL MEDICINE

## 2023-03-31 PROCEDURE — 99214 OFFICE O/P EST MOD 30 MIN: CPT | Mod: S$GLB,,, | Performed by: INTERNAL MEDICINE

## 2023-03-31 PROCEDURE — 3075F PR MOST RECENT SYSTOLIC BLOOD PRESS GE 130-139MM HG: ICD-10-PCS | Mod: CPTII,S$GLB,, | Performed by: INTERNAL MEDICINE

## 2023-03-31 PROCEDURE — 1157F ADVNC CARE PLAN IN RCRD: CPT | Mod: CPTII,S$GLB,, | Performed by: INTERNAL MEDICINE

## 2023-03-31 PROCEDURE — 3078F DIAST BP <80 MM HG: CPT | Mod: CPTII,S$GLB,, | Performed by: INTERNAL MEDICINE

## 2023-03-31 PROCEDURE — 1159F PR MEDICATION LIST DOCUMENTED IN MEDICAL RECORD: ICD-10-PCS | Mod: CPTII,S$GLB,, | Performed by: INTERNAL MEDICINE

## 2023-03-31 PROCEDURE — 3288F PR FALLS RISK ASSESSMENT DOCUMENTED: ICD-10-PCS | Mod: CPTII,S$GLB,, | Performed by: INTERNAL MEDICINE

## 2023-03-31 PROCEDURE — 3078F PR MOST RECENT DIASTOLIC BLOOD PRESSURE < 80 MM HG: ICD-10-PCS | Mod: CPTII,S$GLB,, | Performed by: INTERNAL MEDICINE

## 2023-03-31 PROCEDURE — 3008F BODY MASS INDEX DOCD: CPT | Mod: CPTII,S$GLB,, | Performed by: INTERNAL MEDICINE

## 2023-03-31 PROCEDURE — 1160F RVW MEDS BY RX/DR IN RCRD: CPT | Mod: CPTII,S$GLB,, | Performed by: INTERNAL MEDICINE

## 2023-03-31 NOTE — PROGRESS NOTES
CARDIOVASCULAR CONSULTATION    REASON FOR CONSULT:   Butch Mcdaniel is a 67 y.o. male who presents for evaluation.      HISTORY OF PRESENT ILLNESS:     Patient is a pleasant 66-year-old man.  Here for evaluation.  States blood pressure fluctuates a lot at home.  Fluctuates between 110-200 mmHg.  States that he has clonidine at home and whenever his blood pressure is elevated he takes an extra dose of clonidine.  Denies any chest pains at rest on exertion.  Denies orthopnea, PND, swelling of feet.      Notes from February 2022:  Patient here for follow-up.  Denies any chest pains at rest on exertion, orthopnea, PND.  Blood pressure staying at home.  Around 160-170 mmHg.      April 2022:  Patient very concerned about his fluctuating blood pressure.  Today morning his blood pressure was 198/111 mmHg.  Then just prior to coming to clinic it was 135/87 mmHg.  Fluctuates throughout the day.  No anginal chest pains.    The left ventricle is normal in size with moderate concentric hypertrophy and normal systolic function.  The estimated ejection fraction is 60%.  Normal right ventricular size with normal right ventricular systolic function.  The estimated PA systolic pressure is 33 mmHg.     Heart rates varied between 43 and 109 BPM with an average of 62 BPM.  Rare PVCs and PACs. Five atrial pairs. One three beat atrial run.      Notes from December 2022: Patient here for follow-up.  Denies any chest pains at rest on exertion, orthopnea, PND.  However has been experiencing significant dyspnea on exertion lately.  Unfortunately has started smoking again.    Notes from March 23:  Patient here for follow-up.  Stress test did not show any significant ischemia.  Trying to quit smoking.        Normal myocardial perfusion scan. There is no evidence of myocardial ischemia or infarction.    There is mild to moderate apical thinning which is a normal variant.    The gated perfusion images showed an ejection fraction of  56% post stress.    There is normal wall motion post stress.    The ECG portion of the study is negative for ischemia.    The patient reported no chest pain during the stress test.    There were no arrhythmias during stress.    PAST MEDICAL HISTORY:     Past Medical History:   Diagnosis Date    Cataract, bilateral 09/11/2018    Degenerative disc disease     Diabetes mellitus type II, controlled     Eye injury as a child    stick hit eye ? 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 both eyes 10/08/2019    Personal history of colonic polyps     Psychiatric problem     Therapy     [facility] many years ago       PAST SURGICAL HISTORY:     Past Surgical History:   Procedure Laterality Date    APPENDECTOMY      COLONOSCOPY N/A 5/10/2017    Procedure: COLONOSCOPY;  Surgeon: Shantanu Marley MD;  Location: [facility];  Service: Endoscopy;  Laterality: N/A;    POSTERIOR FUSION OF CERVICAL SPINE WITH LAMINECTOMY N/A 10/3/2021    Procedure: LAMINECTOMY, SPINE, CERVICAL, WITH POSTERIOR FUSION C2-T2;  Surgeon: Ana Rosa Geller MD;  Location: [facility] OR [address] [place] [address];  Service: Neurosurgery;  Laterality: N/A;       ALLERGIES AND MEDICATION:   Review of patient's allergies indicates:  No Known Allergies     Medication List            Accurate as of March 31, 2023 10:58 AM. If you have any questions, ask your nurse or doctor.                CONTINUE taking these medications      ALPRAZolam 1 MG tablet  Commonly known as: XANAX  1 and a half pills a day.     amLODIPine 10 MG tablet  Commonly known as: NORVASC  TAKE ONE TABLET BY MOUTH EVERY EVENING     atorvastatin 40 MG tablet  Commonly known as: LIPITOR  Take 1 tablet (40 mg total) by mouth every evening.     BD LUER-STARLA SYRINGE 1 mL Syrg  Generic drug: syringe (disposable)  Testosterone injection every 2 weeks     busPIRone 30 MG  "Tab  Commonly known as: BUSPAR  TAKE ONE TABLET BY MOUTH TWICE DAILY     carvediloL 25 MG tablet  Commonly known as: COREG  Take 1 tablet (25 mg total) by mouth 2 (two) times daily.     citalopram 40 MG tablet  Commonly known as: CeleXA  Take 1 tablet (40 mg total) by mouth once daily.     fluticasone-salmeterol 100-50 mcg/dose 100-50 mcg/dose diskus inhaler  Commonly known as: ADVAIR     gabapentin 600 MG tablet  Commonly known as: NEURONTIN     hydrALAZINE 25 MG tablet  Commonly known as: APRESOLINE  Take 1 tablet (25 mg total) by mouth 3 (three) times daily.     lisinopriL 40 MG tablet  Commonly known as: PRINIVIL,ZESTRIL  TAKE ONE TABLET BY MOUTH ONCE A DAY     metFORMIN 500 MG tablet  Commonly known as: GLUCOPHAGE  Take 1 tablet (500 mg total) by mouth 2 (two) times daily with meals.     MIRALAX ORAL     naloxone 4 mg/actuation Spry  Commonly known as: NARCAN  4mg by nasal route as needed for opioid overdose; may repeat every 2-3 minutes in alternating nostrils until medical help arrives. Call 911     needle (disp) 22 G 22 gauge x 1" Ndle  Testosterone injection every 2 weeks     * nicotine 14 mg/24 hr  Commonly known as: NICODERM CQ  Place 1 patch onto the skin once daily. WEAR DAILY IN CONJUNCTION WITH THE 21MG NICOTINE PATCH, PATIENT WILL      * nicotine 21 mg/24 hr  Commonly known as: NICODERM CQ  Place 1 patch onto the skin once daily. WEAR DAILY IN CONJUNCTION WITH THE 14MG NICOTINE PATCH, PATIENT WILL      oxyCODONE-acetaminophen  mg per tablet  Commonly known as: PERCOCET     senna 8.6 mg tablet  Commonly known as: SENOKOT  Take 1 tablet by mouth 2 (two) times a day.     testosterone cypionate 200 mg/mL injection  Commonly known as: DEPOTESTOTERONE CYPIONATE  INJECT 0.75 MILLILITERS (150 MG) INTRAMUSCULARLY EVERY 14 DAYS.     tiZANidine 4 MG tablet  Commonly known as: ZANAFLEX           * This list has 2 medication(s) that are the same as other medications prescribed for you. Read " the directions carefully, and ask your doctor or other care provider to review them with you.                  SOCIAL HISTORY:     Social History     Socioeconomic History    Marital status:     Number of children: 3   Occupational History    Occupation: retired - tug boat captain   Tobacco Use    Smoking status: Every Day     Packs/day: 0.50     Years: 32.00     Pack years: 16.00     Types: Cigarettes    Smokeless tobacco: Never   Substance and Sexual Activity    Alcohol use: No     Alcohol/week: 0.0 standard drinks    Drug use: Yes     Types: Marijuana     Comment: 4 oxycodones daily; few marijuana cigarettes daily    Sexual activity: Yes     Partners: Female     Comment:  with children, disabled tug boat captain       FAMILY HISTORY:     Family History   Problem Relation Age of Onset    Heart disease Mother     Heart disease Father     Alcohol abuse Father     No Known Problems Sister     No Known Problems Brother     No Known Problems Maternal Aunt     No Known Problems Paternal Aunt     No Known Problems Maternal Uncle     No Known Problems Paternal Uncle     No Known Problems Maternal Grandfather     No Known Problems Maternal Grandmother     No Known Problems Paternal Grandfather     No Known Problems Paternal Grandmother     Diabetes Son     Amblyopia Neg Hx     Blindness Neg Hx     Cancer Neg Hx     Cataracts Neg Hx     Glaucoma Neg Hx     Hypertension Neg Hx     Macular degeneration Neg Hx     Retinal detachment Neg Hx     Strabismus Neg Hx     Stroke Neg Hx     Thyroid disease Neg Hx     Anxiety disorder Neg Hx     Dementia Neg Hx     Depression Neg Hx        REVIEW OF SYSTEMS:   Review of Systems   Constitutional: Negative.   HENT: Negative.     Eyes: Negative.    Cardiovascular:  Positive for dyspnea on exertion.   Respiratory: Negative.     Endocrine: Negative.    Hematologic/Lymphatic: Negative.    Skin: Negative.    Musculoskeletal: Negative.    Gastrointestinal: Negative.     Genitourinary: Negative.    Neurological: Negative.    Psychiatric/Behavioral: Negative.     Allergic/Immunologic: Negative.      A 10 point review of systems was performed and all the pertinent positives have been mentioned. Rest of review of systems was negative.        PHYSICAL EXAM:     Vitals:    03/31/23 1047   BP: 130/62   Pulse: (!) 52   Resp: 16    Body mass index is 30.5 kg/m².  Weight: 99.2 kg (218 lb 11.1 oz)         Physical Exam  Vitals reviewed.   Constitutional:       Appearance: He is well-developed.   HENT:      Head: Normocephalic.   Eyes:      Conjunctiva/sclera: Conjunctivae normal.      Pupils: Pupils are equal, round, and reactive to light.   Cardiovascular:      Rate and Rhythm: Normal rate and regular rhythm.      Heart sounds: Normal heart sounds.   Pulmonary:      Effort: Pulmonary effort is normal.      Breath sounds: Wheezing present.   Abdominal:      General: Bowel sounds are normal.      Palpations: Abdomen is soft.   Musculoskeletal:      Cervical back: Normal range of motion and neck supple.   Skin:     General: Skin is warm.   Neurological:      Mental Status: He is alert and oriented to person, place, and time.         DATA:     Laboratory:  CBC:  Recent Labs   Lab 08/24/22  0905 09/29/22  0849 02/10/23  0902   WBC 7.30 7.89 10.77   Hemoglobin 13.3 L 13.6 L 13.8 L   Hematocrit 39.2 L 40.4 42.6   Platelets 63 L SEE COMMENT 179         CHEMISTRIES:  Recent Labs   Lab 07/19/22  0518 07/20/22  0126 07/21/22  0531 08/24/22  0905 09/29/22  0849 01/03/23  1112 02/10/23  0902   Glucose 141 H 114 H  113 H 146 H 128 H 120 H  --  160 H   Sodium 139 141  142 143 137 134 L  --  139   Potassium 5.2 H 4.1  4.1 3.9 4.8 4.4  --  5.1   BUN 32 H 28 H  29 H 24 H 17 24 H  --  20   Creatinine 1.9 H 1.4  1.4 1.3 1.2 1.2 1.4 1.2   eGFR if  41 A 60  60 >60  --   --   --   --    eGFR if non  36 A 52 A  52 A 56 A  --   --   --   --    Calcium 8.8 9.2  9.2 9.2 8.8  9.2  --  9.5   Magnesium 1.9 1.7  1.8 1.8  --   --   --   --          CARDIAC BIOMARKERS:  Recent Labs   Lab 07/18/22  0252 07/18/22  1007 07/20/22  0126   CPK  --  129  --    Troponin I 0.019  --  0.119 H         COAGS:  Recent Labs   Lab 10/01/21  1610   INR 1.0         LIPIDS/LFTS:  Recent Labs   Lab 08/07/21  0839 10/01/21  1610 02/16/22  0820 08/24/22  0905 09/29/22  0849 02/10/23  0902   Cholesterol 113 L 106 L  --  118 L  --   --    Triglycerides 103 124  --  109  --   --    HDL 35 L 31 L  --  34 L  --   --    LDL Cholesterol 57.4 L 50.2 L  --  62.2 L  --   --    Non-HDL Cholesterol 78 75  --  84  --   --    AST 17 21   < > 23 21 18   ALT 19 22   < > 19 23 14    < > = values in this interval not displayed.         Hemoglobin A1C   Date Value Ref Range Status   02/10/2023 6.2 (H) 4.0 - 5.6 % Final     Comment:     ADA Screening Guidelines:  5.7-6.4%  Consistent with prediabetes  >or=6.5%  Consistent with diabetes    High levels of fetal hemoglobin interfere with the HbA1C  assay. Heterozygous hemoglobin variants (HbS, HgC, etc)do  not significantly interfere with this assay.   However, presence of multiple variants may affect accuracy.     11/16/2022 6.4 (H) 4.0 - 5.6 % Final     Comment:     ADA Screening Guidelines:  5.7-6.4%  Consistent with prediabetes  >or=6.5%  Consistent with diabetes    High levels of fetal hemoglobin interfere with the HbA1C  assay. Heterozygous hemoglobin variants (HbS, HgC, etc)do  not significantly interfere with this assay.   However, presence of multiple variants may affect accuracy.     08/24/2022 6.2 (H) 4.0 - 5.6 % Final     Comment:     ADA Screening Guidelines:  5.7-6.4%  Consistent with prediabetes  >or=6.5%  Consistent with diabetes    High levels of fetal hemoglobin interfere with the HbA1C  assay. Heterozygous hemoglobin variants (HbS, HgC, etc)do  not significantly interfere with this assay.   However, presence of multiple variants may affect accuracy.          TSH  Recent Labs   Lab 10/01/21  1610 07/18/22  0252   TSH 1.897 5.291 H         The ASCVD Risk score (Dale DK, et al., 2019) failed to calculate for the following reasons:    The valid total cholesterol range is 130 to 320 mg/dL             ASSESSMENT AND PLAN     Patient Active Problem List   Diagnosis    Essential hypertension    Anemia    Anxiety, unable to wean off BZD    Recurrent major depressive disorder, in partial remission    DDD (degenerative disc disease), cervical    DDD (degenerative disc disease), lumbar    ED (erectile dysfunction)    Facet arthritis of cervical region    Facet arthritis of lumbar region    Benzodiazepine dependence, did not do well with attempt to wean down 2017    Chronic, continuous use of opioids    Type 2 diabetes mellitus without complication, without long-term current use of insulin    Tobacco dependence, patch with irritation; hx of bupropion    Therapeutic opioid-induced constipation (OIC)    Tubular adenoma of colon 5/10/17    Cervical stenosis of spinal canal 10/3/2021 LAMINECTOMY, SPINE, CERVICAL, WITH POSTERIOR FUSION C2-T2    Cataract, bilateral    Open angle with borderline findings and high glaucoma risk in both eyes    Nuclear sclerosis of both eyes    Elevated prolactin level,low testosterone, gynecomastia; MRI pituitary normal 5/2020    Low testosterone in male    Paresthesia of left upper and lower extremity    History of smoking 10-25 pack years    Status post surgery    Impaired mobility and ADLs    Chronic low back pain    Sacroiliac joint pain    Sacroiliitis    Impaired mobility    Arthrodesis status    Foraminal stenosis of lumbar region    Junctional bradycardia    Chronic prescription benzodiazepine use    New onset a-fib during hospitalization 7/2022 from infection? started on coreg during hospitalization    Type 2 diabetes mellitus with diabetic cataract, without long-term current use of insulin    Unspecified inflammatory spondylopathy,  cervical region    Thrombocytopenia    Aortic atherosclerosis       Dyspnea on exertion.  Further evaluation with the help of a stress test.  Stress test did not show any significant ischemia.  Likely related to his pulmonary issues and smoking.  Smoking cessation has been highly recommended P    Smoking cessation     Hypertension: Well controlled at current therapy.    Follow-up in 4 months.    Thank you very much for involving me in the care of your patient.  Please do not hesitate to contact me if there are any questions.      Adriana Pete MD, FACC, [facility]  Interventional Cardiologist, [facility].           This note was dictated with the help of speech recognition software.  There might be un-intended errors and/or substitutions.

## 2023-04-04 ENCOUNTER — CLINICAL SUPPORT (OUTPATIENT)
Dept: SMOKING CESSATION | Facility: CLINIC | Age: 68
End: 2023-04-04
Payer: COMMERCIAL

## 2023-04-04 DIAGNOSIS — F13.20 BENZODIAZEPINE DEPENDENCE: Chronic | ICD-10-CM

## 2023-04-04 DIAGNOSIS — F17.200 NICOTINE DEPENDENCE: Primary | ICD-10-CM

## 2023-04-04 PROCEDURE — 99999 PR PBB SHADOW E&M-EST. PATIENT-LVL II: ICD-10-PCS | Mod: PBBFAC,,,

## 2023-04-04 PROCEDURE — 99402 PREV MED CNSL INDIV APPRX 30: CPT | Mod: S$GLB,,,

## 2023-04-04 PROCEDURE — 99999 PR PBB SHADOW E&M-EST. PATIENT-LVL II: CPT | Mod: PBBFAC,,,

## 2023-04-04 PROCEDURE — 99402 PR PREVENT COUNSEL,INDIV,30 MIN: ICD-10-PCS | Mod: S$GLB,,,

## 2023-04-04 NOTE — PROGRESS NOTES
Individual Follow-Up Form    4/4/2023    Quit Date: n/a    Clinical Status of Patient: Outpatient    Length of Service: 30 minutes    Continuing Medication: yes  Patches    Other Medications: n/a     Target Symptoms: Withdrawal and medication side effects. The following were  rated moderate (3) to severe (4) on TCRS:  Moderate (3): 0  Severe (4): 0    Comments: PATIENT PRESENTS FOR FOLLOW UP TELEPHONICALLY SMOKING LESS, ROUGHLY 8 TIMES PER DAY, HE IS WEARIN THE 21MG NICOTINE PATCH,  WANTS TO CONTINUE THIS DOSE, HE IS SMOKING LESS AND DOES WELL ON THE NICOTINE PATCH, VERY FAMILAR WITH THE PATIENT ,HE HAS BEEN A PATIENT OF TTS ANDD IN THE PROGRAM FOR SMOKING CESSATIO NFOR A WHILE NOW, ENCOURAGEMENT PROVIDED, WILL FOLLOW     Diagnosis: F17.210    Next Visit: 2 weeks

## 2023-04-05 ENCOUNTER — TELEPHONE (OUTPATIENT)
Dept: SMOKING CESSATION | Facility: CLINIC | Age: 68
End: 2023-04-05
Payer: MEDICARE

## 2023-04-12 ENCOUNTER — CLINICAL SUPPORT (OUTPATIENT)
Dept: SMOKING CESSATION | Facility: CLINIC | Age: 68
End: 2023-04-12
Payer: COMMERCIAL

## 2023-04-12 DIAGNOSIS — F17.200 NICOTINE DEPENDENCE: Primary | ICD-10-CM

## 2023-04-12 PROCEDURE — 99403 PR PREVENT COUNSEL,INDIV,45 MIN: ICD-10-PCS | Mod: S$GLB,,,

## 2023-04-12 PROCEDURE — 99403 PREV MED CNSL INDIV APPRX 45: CPT | Mod: S$GLB,,,

## 2023-04-12 PROCEDURE — 99999 PR PBB SHADOW E&M-EST. PATIENT-LVL I: ICD-10-PCS | Mod: PBBFAC,,,

## 2023-04-12 PROCEDURE — 99999 PR PBB SHADOW E&M-EST. PATIENT-LVL I: CPT | Mod: PBBFAC,,,

## 2023-04-12 NOTE — PROGRESS NOTES
Individual Follow-Up Form    4/12/2023    Quit Date: N/A    Clinical Status of Patient: Outpatient    Length of Service: 30 minutes    Continuing Medication: yes  Patches    Other Medications: N/A     Target Symptoms: Withdrawal and medication side effects. The following were  rated moderate (3) to severe (4) on TCRS:  Moderate (3): 0  Severe (4): 0    Comments: PATIENT PRESENTS FOR FOLLOW UP TELEPHONICALLY, HE IS SMOKING A FEW PER DAY DOWN FROM 1 PK, UPSET REGARDING HIS PROGRESS AND WAS HOPING TO BE QUIT BY THIS TIME, HE STATES HIS WIFE IS STILL NOT COMMITTED AND THIS IS A CHALLENGE FOR HIM BECAUSE THE CIGARETTES ARE ALWAYS AVAILABLE. HE IS WEARING THE 21MG NICOTINE PATCH DAILY, HE WAS SUPPOSE TO GET THE 14MG AND THE 21MG IN CONJUNCTION TO WEAR TOGETHER HOWEVER PHARMACY REFUSED TO FILL TOGETHER AND ONLY PROVIDED ONE PATCH, THE 21MG. PATIENT ADMITS THAT EVEN THOUGHT THE TWO PATCH DOSES WERE NOT FILLED HE IS DOING JUST AS GOOD ON THE 21MG NICOTINE PATCH, RECOMMEND THAT HE CONTINUE THIS DOSE, HE JUST PICKED UP A SCRIPT, INFORMED HIM THAT HIS BENEFITS END ON 4/21 THEREFORE WILL FOLLOW UP WITH HIM IN A WEEK TO CHECK HIS PROGRESS AND CHECK HIS BENEFITS TO SEE IF ELIGIBLE FOR BENEFIT UPDATE. STRATEGIES AND SESSION HANDOUT AS WELL AS FULL ENCOURAGEMENT PROVIDED, WILL FOLLOW     Diagnosis: F17.210    Next Visit: 2 weeks

## 2023-04-18 ENCOUNTER — TELEPHONE (OUTPATIENT)
Dept: SMOKING CESSATION | Facility: CLINIC | Age: 68
End: 2023-04-18
Payer: MEDICARE

## 2023-04-19 ENCOUNTER — CLINICAL SUPPORT (OUTPATIENT)
Dept: SMOKING CESSATION | Facility: CLINIC | Age: 68
End: 2023-04-19
Payer: COMMERCIAL

## 2023-04-19 DIAGNOSIS — F17.200 NICOTINE DEPENDENCE: Primary | ICD-10-CM

## 2023-04-19 PROCEDURE — 99407 PR TOBACCO USE CESSATION INTENSIVE >10 MINUTES: ICD-10-PCS | Mod: S$GLB,,,

## 2023-04-19 PROCEDURE — 99407 BEHAV CHNG SMOKING > 10 MIN: CPT | Mod: S$GLB,,,

## 2023-04-20 ENCOUNTER — CLINICAL SUPPORT (OUTPATIENT)
Dept: SMOKING CESSATION | Facility: CLINIC | Age: 68
End: 2023-04-20
Payer: COMMERCIAL

## 2023-04-20 DIAGNOSIS — F17.200 NICOTINE DEPENDENCE: Primary | ICD-10-CM

## 2023-04-20 PROCEDURE — 99999 PR PBB SHADOW E&M-EST. PATIENT-LVL I: ICD-10-PCS | Mod: PBBFAC,,,

## 2023-04-20 PROCEDURE — 99407 BEHAV CHNG SMOKING > 10 MIN: CPT | Mod: S$GLB,,,

## 2023-04-20 PROCEDURE — 99999 PR PBB SHADOW E&M-EST. PATIENT-LVL I: CPT | Mod: PBBFAC,,,

## 2023-04-20 PROCEDURE — 99407 PR TOBACCO USE CESSATION INTENSIVE >10 MINUTES: ICD-10-PCS | Mod: S$GLB,,,

## 2023-05-04 ENCOUNTER — TELEPHONE (OUTPATIENT)
Dept: SMOKING CESSATION | Facility: CLINIC | Age: 68
End: 2023-05-04
Payer: MEDICARE

## 2023-05-04 ENCOUNTER — CLINICAL SUPPORT (OUTPATIENT)
Dept: SMOKING CESSATION | Facility: CLINIC | Age: 68
End: 2023-05-04
Payer: COMMERCIAL

## 2023-05-04 DIAGNOSIS — F17.200 NICOTINE DEPENDENCE: Primary | ICD-10-CM

## 2023-05-04 PROCEDURE — 99999 PR PBB SHADOW E&M-EST. PATIENT-LVL II: CPT | Mod: PBBFAC,,,

## 2023-05-04 PROCEDURE — 99403 PR PREVENT COUNSEL,INDIV,45 MIN: ICD-10-PCS | Mod: S$GLB,,,

## 2023-05-04 PROCEDURE — 99999 PR PBB SHADOW E&M-EST. PATIENT-LVL II: ICD-10-PCS | Mod: PBBFAC,,,

## 2023-05-04 PROCEDURE — 99403 PREV MED CNSL INDIV APPRX 45: CPT | Mod: S$GLB,,,

## 2023-05-04 NOTE — PROGRESS NOTES
Individual Follow-Up Form    5/4/2023    Quit Date: N/A    Clinical Status of Patient: Outpatient    Length of Service: 30 minutes    Continuing Medication: yes  Patches    Other Medications: N/A     Target Symptoms: Withdrawal and medication side effects. The following were  rated moderate (3) to severe (4) on TCRS:  Moderate (3): 0  Severe (4): 0    Comments: PATIENT PRESENTS FOR FOLLOW UP SMOKING SOME DAYS 6 OTHER DAYS 8 OR SO BUT FEELS HE IS DOING BETTER WITH THE DOUBLE NICOTINE PATCHING, HE IS DOWN FROM A PACK PER DAY, HE IS WEARING THE 21MG AND THE 14MG NICOTINE PATCH DAILY, RECOMMEND HE CONTINUE THIS REGIMEN, HE JUST PICKED UP NICOTINE PATCHES THEREFORE DOES NOT NEED A REFILL, ENCOURAGEMENT PROVIDED, HE STATES HE NEEDS TO BE COMPLETLEY DONE, REMINDED HIM THAT THAT IS WHAT THE 21MG AND THE 14MG NICOTINE PATCHES CAN DO FOR THEM, THEY ARE FOR JUST THAT FOR HIM TO DO AN ABRUPT QUIT, THIS IS HIS DECISION, ENCOURAGEMENT PROVIDED, HE DENIES NEGATIVE S/E, SESSION HANDOUT DISCUSSED WILL FOLLOW     Diagnosis: F17.210    Next Visit: 2 weeks

## 2023-05-06 DIAGNOSIS — F41.9 ANXIETY: ICD-10-CM

## 2023-05-06 DIAGNOSIS — F13.20 BENZODIAZEPINE DEPENDENCE: ICD-10-CM

## 2023-05-06 RX ORDER — CARVEDILOL 25 MG/1
TABLET ORAL
Qty: 60 TABLET | Refills: 5 | Status: SHIPPED | OUTPATIENT
Start: 2023-05-06 | End: 2023-05-11 | Stop reason: DRUGHIGH

## 2023-05-06 NOTE — TELEPHONE ENCOUNTER
Refill Routing Note   Medication(s) are not appropriate for processing by [facility] for the following reason(s):      No active prescription written by PCP    [facility] action(s):  Defer              Appointments  past 12m or future 3m with PCP    Date Provider   Last Visit   2/17/2023 Gabriel Evans MD   Next Visit   5/17/2023 Gabriel Evans MD   ED visits in past 90 days: 0        Note composed:3:45 AM 05/06/2023

## 2023-05-06 NOTE — TELEPHONE ENCOUNTER
No care due was identified.  Health [facility] Embedded Care Due Messages. Reference number: [id number].   5/06/2023 9:53:15 AM CDT

## 2023-05-06 NOTE — TELEPHONE ENCOUNTER
No care due was identified.  Health [facility] Embedded Care Due Messages. Reference number: [id number].   5/06/2023 3:45:42 AM CDT

## 2023-05-08 ENCOUNTER — TELEPHONE (OUTPATIENT)
Dept: ADMINISTRATIVE | Facility: CLINIC | Age: 68
End: 2023-05-08
Payer: MEDICARE

## 2023-05-08 RX ORDER — ALPRAZOLAM 1 MG/1
TABLET ORAL
Qty: 45 TABLET | Refills: 2 | Status: SHIPPED | OUTPATIENT
Start: 2023-05-08 | End: 2023-08-03

## 2023-05-10 ENCOUNTER — LAB VISIT (OUTPATIENT)
Dept: LAB | Facility: HOSPITAL | Age: 68
End: 2023-05-10
Attending: INTERNAL MEDICINE
Payer: MEDICARE

## 2023-05-10 ENCOUNTER — OFFICE VISIT (OUTPATIENT)
Dept: FAMILY MEDICINE | Facility: CLINIC | Age: 68
End: 2023-05-10
Payer: MEDICARE

## 2023-05-10 VITALS
BODY MASS INDEX: 30.39 KG/M2 | HEART RATE: 42 BPM | WEIGHT: 217.06 LBS | OXYGEN SATURATION: 95 % | HEIGHT: 71 IN | TEMPERATURE: 98 F | DIASTOLIC BLOOD PRESSURE: 76 MMHG | SYSTOLIC BLOOD PRESSURE: 138 MMHG

## 2023-05-10 DIAGNOSIS — F11.90 CHRONIC, CONTINUOUS USE OF OPIOIDS: ICD-10-CM

## 2023-05-10 DIAGNOSIS — D69.6 THROMBOCYTOPENIA: ICD-10-CM

## 2023-05-10 DIAGNOSIS — H91.93 DIMINISHED HEARING, BILATERAL: ICD-10-CM

## 2023-05-10 DIAGNOSIS — E11.9 TYPE 2 DIABETES MELLITUS WITHOUT COMPLICATION, WITHOUT LONG-TERM CURRENT USE OF INSULIN: ICD-10-CM

## 2023-05-10 DIAGNOSIS — J44.9 CHRONIC OBSTRUCTIVE PULMONARY DISEASE, UNSPECIFIED COPD TYPE: ICD-10-CM

## 2023-05-10 DIAGNOSIS — R00.1 BRADYCARDIA: ICD-10-CM

## 2023-05-10 DIAGNOSIS — F33.41 RECURRENT MAJOR DEPRESSIVE DISORDER, IN PARTIAL REMISSION: ICD-10-CM

## 2023-05-10 DIAGNOSIS — F17.200 TOBACCO DEPENDENCE: Chronic | ICD-10-CM

## 2023-05-10 DIAGNOSIS — Z00.00 ENCOUNTER FOR PREVENTIVE HEALTH EXAMINATION: Primary | ICD-10-CM

## 2023-05-10 DIAGNOSIS — I10 ESSENTIAL HYPERTENSION: ICD-10-CM

## 2023-05-10 DIAGNOSIS — M46.92 UNSPECIFIED INFLAMMATORY SPONDYLOPATHY, CERVICAL REGION: ICD-10-CM

## 2023-05-10 DIAGNOSIS — I48.91 NEW ONSET A-FIB: ICD-10-CM

## 2023-05-10 DIAGNOSIS — E11.36 TYPE 2 DIABETES MELLITUS WITH DIABETIC CATARACT, WITHOUT LONG-TERM CURRENT USE OF INSULIN: ICD-10-CM

## 2023-05-10 DIAGNOSIS — M46.1 SACROILIITIS: ICD-10-CM

## 2023-05-10 DIAGNOSIS — F13.20 BENZODIAZEPINE DEPENDENCE: Chronic | ICD-10-CM

## 2023-05-10 DIAGNOSIS — I70.0 AORTIC ATHEROSCLEROSIS: ICD-10-CM

## 2023-05-10 DIAGNOSIS — R79.89 LOW TESTOSTERONE IN MALE: ICD-10-CM

## 2023-05-10 LAB
ALBUMIN SERPL BCP-MCNC: 4.2 G/DL (ref 3.5–5.2)
ALP SERPL-CCNC: 49 U/L (ref 55–135)
ALT SERPL W/O P-5'-P-CCNC: 15 U/L (ref 10–44)
ANION GAP SERPL CALC-SCNC: 11 MMOL/L (ref 8–16)
AST SERPL-CCNC: 17 U/L (ref 10–40)
BASOPHILS # BLD AUTO: 0.08 K/UL (ref 0–0.2)
BASOPHILS NFR BLD: 0.8 % (ref 0–1.9)
BILIRUB SERPL-MCNC: 0.7 MG/DL (ref 0.1–1)
BUN SERPL-MCNC: 26 MG/DL (ref 8–23)
CALCIUM SERPL-MCNC: 9.5 MG/DL (ref 8.7–10.5)
CHLORIDE SERPL-SCNC: 104 MMOL/L (ref 95–110)
CO2 SERPL-SCNC: 25 MMOL/L (ref 23–29)
CREAT SERPL-MCNC: 1.9 MG/DL (ref 0.5–1.4)
DIFFERENTIAL METHOD: ABNORMAL
EOSINOPHIL # BLD AUTO: 0.3 K/UL (ref 0–0.5)
EOSINOPHIL NFR BLD: 2.6 % (ref 0–8)
ERYTHROCYTE [DISTWIDTH] IN BLOOD BY AUTOMATED COUNT: 13.8 % (ref 11.5–14.5)
EST. GFR  (NO RACE VARIABLE): 37.9 ML/MIN/1.73 M^2
GLUCOSE SERPL-MCNC: 94 MG/DL (ref 70–110)
HCT VFR BLD AUTO: 38.5 % (ref 40–54)
HGB BLD-MCNC: 12.3 G/DL (ref 14–18)
IMM GRANULOCYTES # BLD AUTO: 0.03 K/UL (ref 0–0.04)
IMM GRANULOCYTES NFR BLD AUTO: 0.3 % (ref 0–0.5)
LYMPHOCYTES # BLD AUTO: 3 K/UL (ref 1–4.8)
LYMPHOCYTES NFR BLD: 29.8 % (ref 18–48)
MCH RBC QN AUTO: 32.6 PG (ref 27–31)
MCHC RBC AUTO-ENTMCNC: 31.9 G/DL (ref 32–36)
MCV RBC AUTO: 102 FL (ref 82–98)
MONOCYTES # BLD AUTO: 1 K/UL (ref 0.3–1)
MONOCYTES NFR BLD: 10 % (ref 4–15)
NEUTROPHILS # BLD AUTO: 5.6 K/UL (ref 1.8–7.7)
NEUTROPHILS NFR BLD: 56.5 % (ref 38–73)
NRBC BLD-RTO: 0 /100 WBC
PLATELET # BLD AUTO: 168 K/UL (ref 150–450)
PMV BLD AUTO: 12.5 FL (ref 9.2–12.9)
POTASSIUM SERPL-SCNC: 5.7 MMOL/L (ref 3.5–5.1)
PROT SERPL-MCNC: 6.9 G/DL (ref 6–8.4)
RBC # BLD AUTO: 3.77 M/UL (ref 4.6–6.2)
SODIUM SERPL-SCNC: 140 MMOL/L (ref 136–145)
TESTOST SERPL-MCNC: 112 NG/DL (ref 304–1227)
WBC # BLD AUTO: 9.99 K/UL (ref 3.9–12.7)

## 2023-05-10 PROCEDURE — 4010F ACE/ARB THERAPY RXD/TAKEN: CPT | Mod: CPTII,S$GLB,, | Performed by: NURSE PRACTITIONER

## 2023-05-10 PROCEDURE — 1159F PR MEDICATION LIST DOCUMENTED IN MEDICAL RECORD: ICD-10-PCS | Mod: CPTII,S$GLB,, | Performed by: NURSE PRACTITIONER

## 2023-05-10 PROCEDURE — 1170F PR FUNCTIONAL STATUS ASSESSED: ICD-10-PCS | Mod: CPTII,S$GLB,, | Performed by: NURSE PRACTITIONER

## 2023-05-10 PROCEDURE — 36415 COLL VENOUS BLD VENIPUNCTURE: CPT | Mod: PO | Performed by: INTERNAL MEDICINE

## 2023-05-10 PROCEDURE — 93010 ELECTROCARDIOGRAM REPORT: CPT | Mod: S$GLB,,, | Performed by: INTERNAL MEDICINE

## 2023-05-10 PROCEDURE — 1160F RVW MEDS BY RX/DR IN RCRD: CPT | Mod: CPTII,S$GLB,, | Performed by: NURSE PRACTITIONER

## 2023-05-10 PROCEDURE — 1157F ADVNC CARE PLAN IN RCRD: CPT | Mod: CPTII,S$GLB,, | Performed by: NURSE PRACTITIONER

## 2023-05-10 PROCEDURE — G0439 PR MEDICARE ANNUAL WELLNESS SUBSEQUENT VISIT: ICD-10-PCS | Mod: S$GLB,,, | Performed by: NURSE PRACTITIONER

## 2023-05-10 PROCEDURE — 3044F PR MOST RECENT HEMOGLOBIN A1C LEVEL <7.0%: ICD-10-PCS | Mod: CPTII,S$GLB,, | Performed by: NURSE PRACTITIONER

## 2023-05-10 PROCEDURE — 3008F PR BODY MASS INDEX (BMI) DOCUMENTED: ICD-10-PCS | Mod: CPTII,S$GLB,, | Performed by: NURSE PRACTITIONER

## 2023-05-10 PROCEDURE — 1170F FXNL STATUS ASSESSED: CPT | Mod: CPTII,S$GLB,, | Performed by: NURSE PRACTITIONER

## 2023-05-10 PROCEDURE — 3075F SYST BP GE 130 - 139MM HG: CPT | Mod: CPTII,S$GLB,, | Performed by: NURSE PRACTITIONER

## 2023-05-10 PROCEDURE — 80053 COMPREHEN METABOLIC PANEL: CPT | Performed by: INTERNAL MEDICINE

## 2023-05-10 PROCEDURE — 4010F PR ACE/ARB THEARPY RXD/TAKEN: ICD-10-PCS | Mod: CPTII,S$GLB,, | Performed by: NURSE PRACTITIONER

## 2023-05-10 PROCEDURE — 99999 PR PBB SHADOW E&M-EST. PATIENT-LVL V: ICD-10-PCS | Mod: PBBFAC,,, | Performed by: NURSE PRACTITIONER

## 2023-05-10 PROCEDURE — 3075F PR MOST RECENT SYSTOLIC BLOOD PRESS GE 130-139MM HG: ICD-10-PCS | Mod: CPTII,S$GLB,, | Performed by: NURSE PRACTITIONER

## 2023-05-10 PROCEDURE — 1159F MED LIST DOCD IN RCRD: CPT | Mod: CPTII,S$GLB,, | Performed by: NURSE PRACTITIONER

## 2023-05-10 PROCEDURE — 3078F PR MOST RECENT DIASTOLIC BLOOD PRESSURE < 80 MM HG: ICD-10-PCS | Mod: CPTII,S$GLB,, | Performed by: NURSE PRACTITIONER

## 2023-05-10 PROCEDURE — 1157F PR ADVANCE CARE PLAN OR EQUIV PRESENT IN MEDICAL RECORD: ICD-10-PCS | Mod: CPTII,S$GLB,, | Performed by: NURSE PRACTITIONER

## 2023-05-10 PROCEDURE — 3078F DIAST BP <80 MM HG: CPT | Mod: CPTII,S$GLB,, | Performed by: NURSE PRACTITIONER

## 2023-05-10 PROCEDURE — 3008F BODY MASS INDEX DOCD: CPT | Mod: CPTII,S$GLB,, | Performed by: NURSE PRACTITIONER

## 2023-05-10 PROCEDURE — 3044F HG A1C LEVEL LT 7.0%: CPT | Mod: CPTII,S$GLB,, | Performed by: NURSE PRACTITIONER

## 2023-05-10 PROCEDURE — 1160F PR REVIEW ALL MEDS BY PRESCRIBER/CLIN PHARMACIST DOCUMENTED: ICD-10-PCS | Mod: CPTII,S$GLB,, | Performed by: NURSE PRACTITIONER

## 2023-05-10 PROCEDURE — 93005 ELECTROCARDIOGRAM TRACING: CPT | Mod: S$GLB,,, | Performed by: NURSE PRACTITIONER

## 2023-05-10 PROCEDURE — 99999 PR PBB SHADOW E&M-EST. PATIENT-LVL V: CPT | Mod: PBBFAC,,, | Performed by: NURSE PRACTITIONER

## 2023-05-10 PROCEDURE — 93010 EKG 12-LEAD: ICD-10-PCS | Mod: S$GLB,,, | Performed by: INTERNAL MEDICINE

## 2023-05-10 PROCEDURE — 84403 ASSAY OF TOTAL TESTOSTERONE: CPT | Performed by: INTERNAL MEDICINE

## 2023-05-10 PROCEDURE — 93005 EKG 12-LEAD: ICD-10-PCS | Mod: S$GLB,,, | Performed by: NURSE PRACTITIONER

## 2023-05-10 PROCEDURE — 99499 UNLISTED E&M SERVICE: CPT | Mod: S$GLB,,, | Performed by: NURSE PRACTITIONER

## 2023-05-10 PROCEDURE — 85025 COMPLETE CBC W/AUTO DIFF WBC: CPT | Performed by: INTERNAL MEDICINE

## 2023-05-10 PROCEDURE — G0439 PPPS, SUBSEQ VISIT: HCPCS | Mod: S$GLB,,, | Performed by: NURSE PRACTITIONER

## 2023-05-10 NOTE — PROGRESS NOTES
"  Butch Mcdaniel presented for a  Medicare AWV and comprehensive Health Risk Assessment today. The following components were reviewed and updated:    Medical history  Family History  Social history  Allergies and Current Medications  Health Risk Assessment  Health Maintenance  Care Team       ** See Completed Assessments for Annual Wellness Visit within the encounter summary.**       The following assessments were completed:  Living Situation  CAGE  Depression Screening  Timed Get Up and Go  Whisper Test  Cognitive Function Screening  Nutrition Screening  ADL Screening  PAQ Screening              Vitals:    05/10/23 1011 05/10/23 1032   BP: 138/76    BP Location: Right arm    Patient Position: Sitting    BP Method: Large (Manual)    Pulse: (!) 41 (!) 42   Temp: 98.2 °F (36.8 °C)    TempSrc: Oral    SpO2: 95%    Weight: 98.5 kg (217 lb 0.7 oz)    Height: 5' 11" (1.803 m)      Body mass index is 30.27 kg/m².  Physical Exam  Vitals and nursing note reviewed.   Constitutional:       Appearance: Normal appearance.   Cardiovascular:      Rate and Rhythm: Bradycardia present.      Pulses: Normal pulses.      Heart sounds: Normal heart sounds.   Pulmonary:      Effort: Pulmonary effort is normal.      Breath sounds: Normal breath sounds.   Musculoskeletal:         General: Normal range of motion.   Neurological:      Mental Status: He is alert and oriented to person, place, and time.   Psychiatric:         Mood and Affect: Mood normal.         Behavior: Behavior normal.           Diagnoses and health risks identified today and associated recommendations/orders:    1. Encounter for preventive health examination  Pt was seen today for an Annual Wellness visit. Healthcare maintenance and screening recommendations were discussed and updated as indicated. Return in one year for AWV.    Review current opioid prescriptions:yes  Screen for potential Substance Use Disorders:yes  Patient is aware of non-narcotic pain " options  Followed by prescribing provider    2. Aortic atherosclerosis  The current medical regimen is effective;  continue present plan and medications.    3. New onset a-fib during hospitalization 7/2022 from infection? started on coreg during hospitalization  The current medical regimen is effective;  continue present plan and medications.  Followed by Cardiology, PCP.     4. Chronic obstructive pulmonary disease, unspecified COPD type  Asymptomatic. The current medical regimen is effective;  continue present plan and medications.    5. Type 2 diabetes mellitus with diabetic cataract, without long-term current use of insulin  The current medical regimen is effective;  continue present plan and medications.  Hemoglobin A1C   Date Value Ref Range Status   02/10/2023 6.2 (H) 4.0 - 5.6 % Final             11/16/2022 6.4 (H) 4.0 - 5.6 % Final             08/24/2022 6.2 (H) 4.0 - 5.6 % Final               - Ambulatory referral/consult to Optometry; Future    6. Type 2 diabetes mellitus without complication, without long-term current use of insulin  The current medical regimen is effective;  continue present plan and medications.    7. Unspecified inflammatory spondylopathy, cervical region  The current medical regimen is effective;  continue present plan and medications.    8. Sacroiliitis  .The current medical regimen is effective;  continue present plan and medications.    9. Recurrent major depressive disorder, in partial remission  The current medical regimen is effective;  continue present plan and medications.    10. Benzodiazepine dependence, did not do well with attempt to wean down 2017  The current medical regimen is effective;  continue present plan and medications.    11. Chronic, continuous use of opioids  The current medical regimen is effective;  continue present plan and medications.    12. Thrombocytopenia  The current medical regimen is effective;  continue present plan and medications.    13.  Essential hypertension  The current medical regimen is effective;  continue present plan and medications.    14. Bradycardia  In office heart rate lower today than usual (previously documented in Epic). Pt on beta blocker. Asymptomatic at present. Medication education done. Consider medication adjustments. Followed by Cardiology.    - IN OFFICE EKG 12-LEAD (to Muse)    15. Diminished hearing, bilateral  Failed whisper test.    - Ambulatory referral/consult to ENT; Future  - Ambulatory referral/consult to Audiology; Future    16. Tobacco dependence, patch with irritation; hx of bupropion  I counseled the patient on smoking cessation, risks associated with smoking, having a quit date, nicotine replacement, medications that can aid in cessation, and having a plan for future cravings. The patient stated that he is currently participating in [facility]'s smoking cessation program with the goal to totally stop. Continues to smoke at this time, but reports not smoking as many cigarettes as before. Attempting to quit. Encouraged to continue smoking cessation program.        Provided Butch with a 5-10 year written screening schedule and personal prevention plan. Recommendations were developed using the USPSTF age appropriate recommendations. Education, counseling, and referrals were provided as needed. After Visit Summary printed and given to patient which includes a list of additional screenings\tests needed.    Follow up in about 1 year (around 5/10/2024).    KURT[facility] Card  I offered to discuss advanced care planning, including how to pick a person who would make decisions for you if you were unable to make them for yourself, called a health care power of , and what kind of decisions you might make such as use of life sustaining treatments such as ventilators and tube feeding when faced with a life limiting illness recorded on a living will that they will need to know. (How you want to be cared for as you near  the end of your natural life)     X  Patient has advanced directives on file, which we reviewed, and they do not wish to make changes.

## 2023-05-10 NOTE — PATIENT INSTRUCTIONS
Counseling and Referral of Other Preventative  (Italic type indicates deductible and co-insurance are waived)    Patient Name: Butch Mcdaniel  Today's Date: 5/10/2023    Health Maintenance         Date Due Completion Date    Colorectal Cancer Screening 05/10/2022 5/10/2017    COVID-19 Vaccine (5 - Booster) 08/11/2022 6/16/2022    Eye Exam 03/15/2023 3/15/2022    Override on 11/4/2019: Done    Hemoglobin A1c 08/10/2023 2/10/2023    Diabetes Urine Screening 08/24/2023 8/24/2022    Lipid Panel 08/24/2023 8/24/2022    Foot Exam 02/17/2024 2/17/2023    High Dose Statin 03/31/2024 3/31/2023    TETANUS VACCINE 12/04/2030 12/4/2020          Orders Placed This Encounter   Procedures    Ambulatory referral/consult to Optometry    Ambulatory referral/consult to ENT    Ambulatory referral/consult to Audiology       The following information is provided to all patients.  This information is to help you find resources for any of the problems found today that may be affecting your health:                Living healthy guide: www.[facility].[place].[facility]      Understanding Diabetes: www.diabetes.org      Eating healthy: www.cdc.gov/healthyweight      CDC home safety checklist: www.cdc.gov/steadi/patient.html      Agency on Aging: www.goea.[place].gov      Alcoholics anonymous (AA): www.aa.org      Physical Activity: www.mikhail.nih.gov/us6ozdp      Tobacco use: www.quitwithusla.org       Please call [employer] at [phone] to receive a rewards card for completing your Annual Wellness Visit. Thanks

## 2023-05-11 ENCOUNTER — TELEPHONE (OUTPATIENT)
Dept: FAMILY MEDICINE | Facility: CLINIC | Age: 68
End: 2023-05-11
Payer: MEDICARE

## 2023-05-11 ENCOUNTER — CLINICAL SUPPORT (OUTPATIENT)
Dept: SMOKING CESSATION | Facility: CLINIC | Age: 68
End: 2023-05-11
Payer: COMMERCIAL

## 2023-05-11 DIAGNOSIS — I48.91 NEW ONSET A-FIB: Primary | ICD-10-CM

## 2023-05-11 DIAGNOSIS — F17.200 NICOTINE DEPENDENCE: Primary | ICD-10-CM

## 2023-05-11 PROCEDURE — 99999 PR PBB SHADOW E&M-EST. PATIENT-LVL I: ICD-10-PCS | Mod: PBBFAC,,,

## 2023-05-11 PROCEDURE — 99403 PREV MED CNSL INDIV APPRX 45: CPT | Mod: S$GLB,,,

## 2023-05-11 PROCEDURE — 99403 PR PREVENT COUNSEL,INDIV,45 MIN: ICD-10-PCS | Mod: S$GLB,,,

## 2023-05-11 PROCEDURE — 99999 PR PBB SHADOW E&M-EST. PATIENT-LVL I: CPT | Mod: PBBFAC,,,

## 2023-05-11 RX ORDER — IBUPROFEN 200 MG
1 TABLET ORAL DAILY
Qty: 30 PATCH | Refills: 0 | Status: SHIPPED | OUTPATIENT
Start: 2023-05-11 | End: 2023-05-25 | Stop reason: SDUPTHER

## 2023-05-11 RX ORDER — CARVEDILOL 12.5 MG/1
12.5 TABLET ORAL 2 TIMES DAILY WITH MEALS
Qty: 60 TABLET | Refills: 11 | Status: SHIPPED | OUTPATIENT
Start: 2023-05-11 | End: 2023-09-06

## 2023-05-11 RX ORDER — IBUPROFEN 200 MG
1 TABLET ORAL DAILY
Qty: 14 PATCH | Refills: 0 | Status: SHIPPED | OUTPATIENT
Start: 2023-05-11 | End: 2023-05-25

## 2023-05-11 NOTE — PROGRESS NOTES
Individual Follow-Up Form    5/11/2023    Quit Date: n/a    Clinical Status of Patient: Outpatient    Length of Service: 30 minutes    Continuing Medication: yes  Patches    Other Medications: N/A     Target Symptoms: Withdrawal and medication side effects. The following were  rated moderate (3) to severe (4) on TCRS:  Moderate (3): 0  Severe (4): 0    Comments: PATIENT PRESENTS FOR FOLLOW UP SMOKING SOME DAYS A FEW CIGARETTES AND OTHER DAYS 1/2 BUT THIS IS BECAUSE HE IS OUT OF THE NICOTINE PATCH 21MG AND THE 14MG, HE WAS WEARING THEM BOTH IN CONJUNCTION AND DOING WELL. THEREFORE RECOMMEND THAT HE CONTINUE THIS SAME REGIMEN, he is relighting and trying to break this behavior, it is hard on him because his wife still smokes and he FEELS SHE WILL NEVER QUIT, INFORMED HIM TO KEEP ENCOURAGING HER TO TRY AGAIN. STRATEGIES AND SESSION HANDOUT DISCUSSED, WILL FOLLOW     Diagnosis: F17.210    Next Visit: 2 weeks

## 2023-05-11 NOTE — TELEPHONE ENCOUNTER
Spoke with pt this morning regarding Carvedilol dose adjustment (decreasing from 25 mg bid with meals, to 12.5 mg bid with meals) and reason why. Understanding verbalized per pt.

## 2023-05-15 ENCOUNTER — TELEPHONE (OUTPATIENT)
Dept: FAMILY MEDICINE | Facility: CLINIC | Age: 68
End: 2023-05-15
Payer: MEDICARE

## 2023-05-15 DIAGNOSIS — R79.89 ABNORMAL BLOOD CREATININE LEVEL: Primary | ICD-10-CM

## 2023-05-15 PROBLEM — Z86.79 HISTORY OF ATRIAL FIBRILLATION: Status: ACTIVE | Noted: 2022-07-19

## 2023-05-16 RX ORDER — HYDRALAZINE HYDROCHLORIDE 25 MG/1
TABLET, FILM COATED ORAL
Qty: 90 TABLET | Refills: 11 | Status: SHIPPED | OUTPATIENT
Start: 2023-05-16

## 2023-05-16 NOTE — TELEPHONE ENCOUNTER
Please call pt - has appt with me on Wednesday, but his labs were abnormal. Can he come in for repeat labs prior to visit? BMP

## 2023-05-16 NOTE — PROGRESS NOTES
This note was created by combination of typed  and M-Modal dictation.  Transcription errors may be present.  If there are any questions, please contact me.    Assessment and Plan:   Assessment and Plan   Elevated serum creatinine  -repeat creatinine. Trying to stay active. Hx of SHAUNA requiring HD due to dehydration.    Low testosterone in male  -pre visit labs low on testo 150 every 2 weeks. Increase to 200 every 2 weeks.  -     testosterone cypionate (DEPOTESTOTERONE CYPIONATE) 200 mg/mL injection; Inject 1 mL (200 mg total) into the muscle every 14 (fourteen) days.  Dispense: 10 mL; Refill: 0  -     Comprehensive Metabolic Panel; Future; Expected date: 08/17/2023  -     CBC Without Differential; Future; Expected date: 08/17/2023  -     Testosterone; Future; Expected date: 08/17/2023    Anxiety, unable to wean off BZD  Benzodiazepine dependence, did not do well with attempt to wean down 2017  -stable on current regimen. Was seen by psych who recommended inpt detox in order to wean off of BZDs, pt not interested.    Essential hypertension  -BP stable. Monitor.    Type 2 diabetes mellitus with diabetic cataract, without long-term current use of insulin  -check future a1c with next labs. Last a1c was < 7.0  Upcoming optometry  -     Hemoglobin A1C; Future; Expected date: 08/17/2023    Upcoming phone intake call for colonoscopy        Medications Discontinued During This Encounter   Medication Reason    testosterone cypionate (DEPOTESTOTERONE CYPIONATE) 200 mg/mL injection        meds sent this encounter:  Medications Ordered This Encounter   Medications    testosterone cypionate (DEPOTESTOTERONE CYPIONATE) 200 mg/mL injection     Sig: Inject 1 mL (200 mg total) into the muscle every 14 (fourteen) days.     Dispense:  10 mL     Refill:  0         Follow Up: OV 3 months with labs.  Future Appointments   Date Time Provider Department Center   6/1/2023 11:30 AM PRE-ADMIT, ENDO -[facility] ENDOPP4  [facility]   7/27/2023  1:15 PM ECHO, [facility]   7/31/2023 11:20 AM Adriana Pete MD [facility] CARDIO [facility]   8/18/2023 10:40 AM Gabriel Evans MD [facility] FAM MED [place]   8/25/2023  8:30 AM JAYLA Hoover [facility] AUDIO [facility]   8/25/2023  9:30 AM Jarvis Arana MD [facility] ENT [facility]   9/14/2023  9:45 AM Kaila Carrillo OD [facility] OPTOMTY [place]          Subjective:   Subjective   Chief Complaint   Patient presents with    Diabetes     Follow up        HPI  Butch is a 67 y.o. male.     Social History     Tobacco Use    Smoking status: Every Day     Packs/day: 0.25     Years: 32.00     Pack years: 8.00     Types: Cigarettes    Smokeless tobacco: Never    Tobacco comments:     Approximately 3 cigarettes a day   Substance Use Topics    Alcohol use: No     Alcohol/week: 0.0 standard drinks      Social History     Occupational History    Occupation: retired -       Social History     Social History Narrative    Not on file       Last appointment with this clinic was 5/10/2023. Last visit with me 2/17/2023   To summarize last visit and events leading up to today:  DM2 last visit stable  Low testosterone pre visit labs at that time low. Unclear if he is dosing 150 mg possibly less; 11/2022 levels were good. More stringent measurement. Repeat 3 months  HTN, lipid stable  Hx of SHAUNA due to dehydration, severe enough to require HD for hyperK, 7/2022.  Hx of AFib during hospitalization 7/2022 in the setting of hyperK. On beta blocker.  Anxiety, BZD dependence. did not tolerate trial of Cymbalta.  Took it for maybe a week.  He was given Celexa to taper off of.  Has been out of it and has not taken it in a few months.  With resultant worsening anxiety.  Taking alprazolam.  Currently have him taking 1.5 tablets in total during the day.    Restart Celexa 40   Stay on alprazolam, 1.5 tablets over the course of the day.  Has been to see Psychiatry, Psychiatry recommended inpatient detox and  the patient was not interested.  Chronic back pain followed by pain mgmt. Narcotic and bill  Thrombocytopenia hx of clumping on microscopy  COPD stable.   Hx of TA referred for c scope    3/16/23 nu stress test neg for ischemia; no arrhythmias    Pre visit lbs  CBC mild anemia  CMP SHAUNA Cr 1.9; K 5.7  Testosterone low     Today's visit:  Please note: Chronic medical problems that are stable but are being addressed at this visit may not be listed/documented here, but may be addressed in more detail in the Assessment and Plan section.   Below are salient features of chronic medical conditions, or new/acute conditions and their details.    Last testosterone injection 5/2  Labs done 5/10 was low.   Was supposed to inject now but wanted to wait for guidance. Will increase to 200.    Day of labs - had cup of coffee  Nonfasting  No dysuria.  Notes and nocturia.   Trying to stay hydrated; sweats a lot.    This morning drank a root beer  Did not eat all day  It's 4 PM  Just did not feel like eating or drinking anything.      Hydralazine taking BID. Forgets mid day dose.  Otherwise taking meds.   No nsaids.   Trying to avoid watery foods like watermelon due to nocturia    Still trying to quit smoking  Using nicotine replacement still.       Patient Care Team:  Gabriel Evans MD as PCP - General (Internal Medicine)  Vlad Nava MD (Pain Medicine)  Kaila Carrillo OD as Consulting Physician (Optometry)  Wyatt Ojeda MA as Care Coordinator    Patient Active Problem List    Diagnosis Date Noted    Bradycardia 05/10/2023    Aortic atherosclerosis 02/16/2023    Type 2 diabetes mellitus with diabetic cataract, without long-term current use of insulin 10/14/2022    Unspecified inflammatory spondylopathy, cervical region 10/14/2022    Thrombocytopenia 10/14/2022    New onset a-fib during hospitalization 7/2022 from infection? started on coreg during hospitalization 07/19/2022    Junctional bradycardia 07/18/2022    Chronic  prescription benzodiazepine use 07/18/2022    Foraminal stenosis of lumbar region 05/06/2022 5/5/2022 MRI L spine:   *   Multilevel lumbar spondylosis with up to severe right neural foraminal narrowing at L5-S1 with impingement of exiting right L5 nerve root.   *   Moderate thecal sac narrowing at L3-L4.   *   Chronic spondylolysis of L5 with grade 1 anterolisthesis of L5 on S1        Chronic low back pain 12/27/2021    Sacroiliac joint pain 12/27/2021    Sacroiliitis 12/27/2021    Impaired mobility 10/10/2021    Arthrodesis status 10/10/2021     Formatting of this note might be different from the original.  C2-T2, 10/03/21        Impaired mobility and ADLs 10/07/2021    Status post surgery 10/03/2021    History of smoking 10-25 pack years 10/02/2021    Paresthesia of left upper and lower extremity 10/01/2021    Low testosterone in male 09/02/2020 6/2020 Repeat labs prolactin was normal.  Testosterone was low.  Free testosterone was low and sex hormone binding globulin was normal        Elevated prolactin level,low testosterone, gynecomastia; MRI pituitary normal 5/2020 01/13/2020    Nuclear sclerosis of both eyes 11/04/2019    Open angle with borderline findings and high glaucoma risk in both eyes 10/08/2019    Cataract, bilateral 09/11/2018    Cervical stenosis of spinal canal 10/3/2021 LAMINECTOMY, SPINE, CERVICAL, WITH POSTERIOR FUSION C2-T2 05/07/2018 02/14/2018 MRI C-spine impression:  Slight retrolisthesis of C4 with respect to C5.  Narrowing of the central spinal canal most prominent at the C4-C5, C5-C6, and C6-C7 levels and to a lesser extent at C2-C3 and C3-C4.  Annular disc bulges posteriorly at C2-C3, C3-C4.  Diffuse disc herniation/protrusion posteriorly at C4-C5 level and a disc herniation/extrusion posteriorly at the C5-C6 level with a central disc herniation/protrusion posteriorly at the C6-C7 level.  Narrowing of the neural foramen bilaterally from C3-C4 through C6-C7.  10/1/2021  admitted for L sided numbness after epidural injection, initially CT interpreted at [facility]; however subsequent MRI no hemorrhage but severe spinal canal stenosis    10/1/2021 MRI brain: Negative MRI of the brain for hemorrhage, mass or infarction. Specifically, no evidence of subarachnoid blood or blood products in the extra-axial spaces on SWI or diffusion-weighted images.  In light of the previous CTs and history of epidural injections at outside facility, this raises the question of in ejected contrast in the central spinal canal with migration into the intracranial subarachnoid spaces.  Subarachnoid hemorrhage or exudate are considered less likely.  10/1/2021 MRI C spine: Severe spinal canal stenosis with complete effacement of the CSF and spinal cord compression at the level of C5-C6 due to posterior disc osteophyte complex with superimposed right disc protrusion and congenitally narrow spinal canal.  High T2 signal within the cord secondary to either edema or myelomalacia.    10/3/2021 LAMINECTOMY, SPINE, CERVICAL, WITH POSTERIOR FUSION C2-T2      Tubular adenoma of colon 5/10/17 05/10/2017     5/10/2017 colonoscopy tubular adenoma        Therapeutic opioid-induced constipation (OIC) 04/17/2017    Tobacco dependence, patch with irritation; hx of bupropion 01/12/2016    Type 2 diabetes mellitus without complication, without long-term current use of insulin 08/07/2015    Facet arthritis of cervical region 10/28/2013    Facet arthritis of lumbar region 10/28/2013    Benzodiazepine dependence, did not do well with attempt to wean down 2017 10/28/2013    Chronic, continuous use of opioids 10/28/2013    ED (erectile dysfunction) 07/26/2013    DDD (degenerative disc disease), cervical 05/23/2013    DDD (degenerative disc disease), lumbar 05/23/2013    Essential hypertension 11/01/2012     2/3/2022 TTE LV normal size with moderate concentric hypertrophy and normal systolic function LVEF 60%.  Normal RV size and systolic  "function.  PA pressure 33  7/18/22 TTE LV normal size with mod concentric hypertrophy; normal systolic function. LVEF 70%. Normal RV size and systolic function. PA pressure 58. Mod pHTN      Anemia 11/01/2012    Anxiety, unable to wean off BZD 11/01/2012    Recurrent major depressive disorder, in partial remission 11/01/2012       PAST MEDICAL PROBLEMS, PAST SURGICAL HISTORY: please see relevant portions of the electronic medical record    ALLERGIES AND MEDICATIONS: updated and reviewed.  Medication List with Changes/Refills   Current Medications    ALPRAZOLAM (XANAX) 1 MG TABLET    TAKE ONE & ONE-HALF TABLET BY MOUTH ONCE A DAY    AMLODIPINE (NORVASC) 10 MG TABLET    TAKE ONE TABLET BY MOUTH EVERY EVENING    ATORVASTATIN (LIPITOR) 40 MG TABLET    Take 1 tablet (40 mg total) by mouth every evening.    BUSPIRONE (BUSPAR) 30 MG TAB    TAKE ONE TABLET BY MOUTH TWICE DAILY    CARVEDILOL (COREG) 12.5 MG TABLET    Take 1 tablet (12.5 mg total) by mouth 2 (two) times daily with meals.    CITALOPRAM (CELEXA) 40 MG TABLET    Take 1 tablet (40 mg total) by mouth once daily.    FLUTICASONE-SALMETEROL DISKUS INHALER 100-50 MCG    Inhale 1 puff into the lungs.    GABAPENTIN (NEURONTIN) 600 MG TABLET    Take 600 mg by mouth 3 (three) times daily.    HYDRALAZINE (APRESOLINE) 25 MG TABLET    Take 1 tablet (25 mg total) by mouth 3 (three) times daily.    LISINOPRIL (PRINIVIL,ZESTRIL) 40 MG TABLET    TAKE ONE TABLET BY MOUTH ONCE A DAY    METFORMIN (GLUCOPHAGE) 500 MG TABLET    Take 1 tablet (500 mg total) by mouth 2 (two) times daily with meals.    NALOXONE (NARCAN) 4 MG/ACTUATION SPRY    4mg by nasal route as needed for opioid overdose; may repeat every 2-3 minutes in alternating nostrils until medical help arrives. Call 911    NEEDLE, DISP, 22 G 22 GAUGE X 1" NDLE    Testosterone injection every 2 weeks    NICOTINE (NICODERM CQ) 14 MG/24 HR    Place 1 patch onto the skin once daily. WEAR DAILY IN CONJUNCTION WITH THE 21MG NICOTINE " "PATCH    NICOTINE (NICODERM CQ) 21 MG/24 HR    Place 1 patch onto the skin once daily. WEAR DAILY IN CONJUNCTION WITH THE 14MG NICOTINE PATCH    OXYCODONE-ACETAMINOPHEN (PERCOCET)  MG PER TABLET    Take 1 tablet by mouth every 4 (four) hours as needed.    POLYETHYLENE GLYCOL 3350 (MIRALAX ORAL)    Take 1 application by mouth.    SENNA (SENOKOT) 8.6 MG TABLET    Take 1 tablet by mouth 2 (two) times a day.    SYRINGE, DISPOSABLE, (BD LUER-STARLA SYRINGE) 1 ML SYRG    Testosterone injection every 2 weeks    TESTOSTERONE CYPIONATE (DEPOTESTOTERONE CYPIONATE) 200 MG/ML INJECTION    INJECT 0.75 MILLILITERS (150 MG) INTRAMUSCULARLY EVERY 14 DAYS.    TIZANIDINE (ZANAFLEX) 4 MG TABLET    Take 4-8 mg by mouth nightly as needed.         Objective:   Objective   Physical Exam   Vitals:    05/17/23 1541   BP: (!) 114/58   Pulse: (!) 54   Temp: 98 °F (36.7 °C)   TempSrc: Oral   SpO2: (!) 94%   Weight: 95.4 kg (210 lb 5.1 oz)   Height: 5' 11" (1.803 m)    Body mass index is 29.33 kg/m².            Physical Exam  Constitutional:       General: He is not in acute distress.     Appearance: He is well-developed.   Eyes:      Extraocular Movements: Extraocular movements intact.   Cardiovascular:      Rate and Rhythm: Normal rate and regular rhythm.      Heart sounds: Normal heart sounds. No murmur heard.  Pulmonary:      Effort: Pulmonary effort is normal.      Breath sounds: Normal breath sounds.   Musculoskeletal:         General: Normal range of motion.      Right lower leg: No edema.      Left lower leg: No edema.   Skin:     General: Skin is warm and dry.   Neurological:      Mental Status: He is alert and oriented to person, place, and time.      Coordination: Coordination normal.   Psychiatric:         Behavior: Behavior normal.         Thought Content: Thought content normal.       Component      Latest Ref Rng & Units 5/10/2023 2/10/2023   WBC      3.90 - 12.70 K/uL 9.99 10.77   RBC      4.60 - 6.20 M/uL 3.77 (L) 4.22 (L) "   Hemoglobin      14.0 - 18.0 g/dL 12.3 (L) 13.8 (L)   Hematocrit      40.0 - 54.0 % 38.5 (L) 42.6   MCV      82 - 98 fL 102 (H) 101 (H)   MCH      27.0 - 31.0 pg 32.6 (H) 32.7 (H)   MCHC      32.0 - 36.0 g/dL 31.9 (L) 32.4   RDW      11.5 - 14.5 % 13.8 11.8   Platelets      150 - 450 K/uL 168 179   MPV      9.2 - 12.9 fL 12.5 12.1   Immature Granulocytes      0.0 - 0.5 % 0.3 0.3   Gran # (ANC)      1.8 - 7.7 K/uL 5.6 7.5   Immature Grans (Abs)      0.00 - 0.04 K/uL 0.03 0.03   Lymph #      1.0 - 4.8 K/uL 3.0 2.2   Mono #      0.3 - 1.0 K/uL 1.0 0.9   Eos #      0.0 - 0.5 K/uL 0.3 0.2   Baso #      0.00 - 0.20 K/uL 0.08 0.06   nRBC      0 /100 WBC 0 0   Gran %      38.0 - 73.0 % 56.5 69.4   Lymph %      18.0 - 48.0 % 29.8 20.1   Mono %      4.0 - 15.0 % 10.0 8.1   Eosinophil %      0.0 - 8.0 % 2.6 1.5   Basophil %      0.0 - 1.9 % 0.8 0.6   Differential Method       Automated Automated   Sodium      136 - 145 mmol/L 140 139   Potassium      3.5 - 5.1 mmol/L 5.7 (H) 5.1   Chloride      95 - 110 mmol/L 104 106   CO2      23 - 29 mmol/L 25 23   Glucose      70 - 110 mg/dL 94 160 (H)   BUN      8 - 23 mg/dL 26 (H) 20   Creatinine      0.5 - 1.4 mg/dL 1.9 (H) 1.2   Calcium      8.7 - 10.5 mg/dL 9.5 9.5   PROTEIN TOTAL      6.0 - 8.4 g/dL 6.9 6.8   Albumin      3.5 - 5.2 g/dL 4.2 4.0   BILIRUBIN TOTAL      0.1 - 1.0 mg/dL 0.7 1.4 (H)   Alkaline Phosphatase      55 - 135 U/L 49 (L) 41 (L)   AST      10 - 40 U/L 17 18   ALT      10 - 44 U/L 15 14   Anion Gap      8 - 16 mmol/L 11 10   eGFR      >60 mL/min/1.73 m:2 37.9 (A) >60.0   Hemoglobin A1C External      4.0 - 5.6 %  6.2 (H)   Estimated Avg Glucose      68 - 131 mg/dL  131   Testosterone, Total      304 - 1227 ng/dL 112 (L) 184 (L)   PSA, Screen      0.00 - 4.00 ng/mL  0.40

## 2023-05-17 ENCOUNTER — OFFICE VISIT (OUTPATIENT)
Dept: FAMILY MEDICINE | Facility: CLINIC | Age: 68
End: 2023-05-17
Payer: MEDICARE

## 2023-05-17 ENCOUNTER — LAB VISIT (OUTPATIENT)
Dept: LAB | Facility: HOSPITAL | Age: 68
End: 2023-05-17
Attending: INTERNAL MEDICINE
Payer: MEDICARE

## 2023-05-17 VITALS
SYSTOLIC BLOOD PRESSURE: 114 MMHG | HEART RATE: 54 BPM | BODY MASS INDEX: 29.44 KG/M2 | HEIGHT: 71 IN | WEIGHT: 210.31 LBS | DIASTOLIC BLOOD PRESSURE: 58 MMHG | OXYGEN SATURATION: 94 % | TEMPERATURE: 98 F

## 2023-05-17 DIAGNOSIS — F41.9 ANXIETY: ICD-10-CM

## 2023-05-17 DIAGNOSIS — E11.36 TYPE 2 DIABETES MELLITUS WITH DIABETIC CATARACT, WITHOUT LONG-TERM CURRENT USE OF INSULIN: ICD-10-CM

## 2023-05-17 DIAGNOSIS — I10 ESSENTIAL HYPERTENSION: ICD-10-CM

## 2023-05-17 DIAGNOSIS — F13.20 BENZODIAZEPINE DEPENDENCE: Chronic | ICD-10-CM

## 2023-05-17 DIAGNOSIS — R79.89 LOW TESTOSTERONE IN MALE: ICD-10-CM

## 2023-05-17 DIAGNOSIS — R79.89 ELEVATED SERUM CREATININE: Primary | ICD-10-CM

## 2023-05-17 DIAGNOSIS — R79.89 ABNORMAL BLOOD CREATININE LEVEL: ICD-10-CM

## 2023-05-17 LAB
ANION GAP SERPL CALC-SCNC: 12 MMOL/L (ref 8–16)
BUN SERPL-MCNC: 24 MG/DL (ref 8–23)
CALCIUM SERPL-MCNC: 9.6 MG/DL (ref 8.7–10.5)
CHLORIDE SERPL-SCNC: 106 MMOL/L (ref 95–110)
CO2 SERPL-SCNC: 23 MMOL/L (ref 23–29)
CREAT SERPL-MCNC: 1.8 MG/DL (ref 0.5–1.4)
EST. GFR  (NO RACE VARIABLE): 40.5 ML/MIN/1.73 M^2
GLUCOSE SERPL-MCNC: 99 MG/DL (ref 70–110)
POTASSIUM SERPL-SCNC: 5.6 MMOL/L (ref 3.5–5.1)
SODIUM SERPL-SCNC: 141 MMOL/L (ref 136–145)

## 2023-05-17 PROCEDURE — 99999 PR PBB SHADOW E&M-EST. PATIENT-LVL V: ICD-10-PCS | Mod: PBBFAC,,, | Performed by: INTERNAL MEDICINE

## 2023-05-17 PROCEDURE — 1160F RVW MEDS BY RX/DR IN RCRD: CPT | Mod: CPTII,,, | Performed by: INTERNAL MEDICINE

## 2023-05-17 PROCEDURE — 99499 RISK ADDL DX/OHS AUDIT: ICD-10-PCS | Mod: S$GLB,,, | Performed by: INTERNAL MEDICINE

## 2023-05-17 PROCEDURE — 3078F DIAST BP <80 MM HG: CPT | Mod: CPTII,,, | Performed by: INTERNAL MEDICINE

## 2023-05-17 PROCEDURE — 3008F BODY MASS INDEX DOCD: CPT | Mod: CPTII,,, | Performed by: INTERNAL MEDICINE

## 2023-05-17 PROCEDURE — 36415 COLL VENOUS BLD VENIPUNCTURE: CPT | Mod: PO | Performed by: INTERNAL MEDICINE

## 2023-05-17 PROCEDURE — 99214 OFFICE O/P EST MOD 30 MIN: CPT | Mod: ,,, | Performed by: INTERNAL MEDICINE

## 2023-05-17 PROCEDURE — 1157F ADVNC CARE PLAN IN RCRD: CPT | Mod: CPTII,,, | Performed by: INTERNAL MEDICINE

## 2023-05-17 PROCEDURE — 4010F PR ACE/ARB THEARPY RXD/TAKEN: ICD-10-PCS | Mod: CPTII,,, | Performed by: INTERNAL MEDICINE

## 2023-05-17 PROCEDURE — 3008F PR BODY MASS INDEX (BMI) DOCUMENTED: ICD-10-PCS | Mod: CPTII,,, | Performed by: INTERNAL MEDICINE

## 2023-05-17 PROCEDURE — 3044F HG A1C LEVEL LT 7.0%: CPT | Mod: CPTII,,, | Performed by: INTERNAL MEDICINE

## 2023-05-17 PROCEDURE — 1125F PR PAIN SEVERITY QUANTIFIED, PAIN PRESENT: ICD-10-PCS | Mod: CPTII,,, | Performed by: INTERNAL MEDICINE

## 2023-05-17 PROCEDURE — 1157F PR ADVANCE CARE PLAN OR EQUIV PRESENT IN MEDICAL RECORD: ICD-10-PCS | Mod: CPTII,,, | Performed by: INTERNAL MEDICINE

## 2023-05-17 PROCEDURE — 1160F PR REVIEW ALL MEDS BY PRESCRIBER/CLIN PHARMACIST DOCUMENTED: ICD-10-PCS | Mod: CPTII,,, | Performed by: INTERNAL MEDICINE

## 2023-05-17 PROCEDURE — 1159F MED LIST DOCD IN RCRD: CPT | Mod: CPTII,,, | Performed by: INTERNAL MEDICINE

## 2023-05-17 PROCEDURE — 3078F PR MOST RECENT DIASTOLIC BLOOD PRESSURE < 80 MM HG: ICD-10-PCS | Mod: CPTII,,, | Performed by: INTERNAL MEDICINE

## 2023-05-17 PROCEDURE — 3288F PR FALLS RISK ASSESSMENT DOCUMENTED: ICD-10-PCS | Mod: CPTII,,, | Performed by: INTERNAL MEDICINE

## 2023-05-17 PROCEDURE — 99999 PR PBB SHADOW E&M-EST. PATIENT-LVL V: CPT | Mod: PBBFAC,,, | Performed by: INTERNAL MEDICINE

## 2023-05-17 PROCEDURE — 1125F AMNT PAIN NOTED PAIN PRSNT: CPT | Mod: CPTII,,, | Performed by: INTERNAL MEDICINE

## 2023-05-17 PROCEDURE — 1159F PR MEDICATION LIST DOCUMENTED IN MEDICAL RECORD: ICD-10-PCS | Mod: CPTII,,, | Performed by: INTERNAL MEDICINE

## 2023-05-17 PROCEDURE — 3288F FALL RISK ASSESSMENT DOCD: CPT | Mod: CPTII,,, | Performed by: INTERNAL MEDICINE

## 2023-05-17 PROCEDURE — 80048 BASIC METABOLIC PNL TOTAL CA: CPT | Performed by: INTERNAL MEDICINE

## 2023-05-17 PROCEDURE — 4010F ACE/ARB THERAPY RXD/TAKEN: CPT | Mod: CPTII,,, | Performed by: INTERNAL MEDICINE

## 2023-05-17 PROCEDURE — 99214 PR OFFICE/OUTPT VISIT, EST, LEVL IV, 30-39 MIN: ICD-10-PCS | Mod: ,,, | Performed by: INTERNAL MEDICINE

## 2023-05-17 PROCEDURE — 1100F PR PT FALLS ASSESS DOC 2+ FALLS/FALL W/INJURY/YR: ICD-10-PCS | Mod: CPTII,,, | Performed by: INTERNAL MEDICINE

## 2023-05-17 PROCEDURE — 3074F PR MOST RECENT SYSTOLIC BLOOD PRESSURE < 130 MM HG: ICD-10-PCS | Mod: CPTII,,, | Performed by: INTERNAL MEDICINE

## 2023-05-17 PROCEDURE — 99215 OFFICE O/P EST HI 40 MIN: CPT | Mod: PO | Performed by: INTERNAL MEDICINE

## 2023-05-17 PROCEDURE — 3074F SYST BP LT 130 MM HG: CPT | Mod: CPTII,,, | Performed by: INTERNAL MEDICINE

## 2023-05-17 PROCEDURE — 99499 UNLISTED E&M SERVICE: CPT | Mod: S$GLB,,, | Performed by: INTERNAL MEDICINE

## 2023-05-17 PROCEDURE — 3044F PR MOST RECENT HEMOGLOBIN A1C LEVEL <7.0%: ICD-10-PCS | Mod: CPTII,,, | Performed by: INTERNAL MEDICINE

## 2023-05-17 PROCEDURE — 1100F PTFALLS ASSESS-DOCD GE2>/YR: CPT | Mod: CPTII,,, | Performed by: INTERNAL MEDICINE

## 2023-05-17 RX ORDER — TESTOSTERONE CYPIONATE 200 MG/ML
200 INJECTION, SOLUTION INTRAMUSCULAR
Qty: 10 ML | Refills: 0 | Status: SHIPPED | OUTPATIENT
Start: 2023-05-17 | End: 2023-10-27

## 2023-05-24 ENCOUNTER — TELEPHONE (OUTPATIENT)
Dept: FAMILY MEDICINE | Facility: CLINIC | Age: 68
End: 2023-05-24
Payer: MEDICARE

## 2023-05-24 DIAGNOSIS — I10 ESSENTIAL HYPERTENSION: ICD-10-CM

## 2023-05-24 DIAGNOSIS — R79.89 ELEVATED SERUM CREATININE: Primary | ICD-10-CM

## 2023-05-24 RX ORDER — LISINOPRIL 20 MG/1
20 TABLET ORAL DAILY
Qty: 90 TABLET | Refills: 3 | Status: SHIPPED | OUTPATIENT
Start: 2023-05-24 | End: 2024-02-22

## 2023-05-24 NOTE — PROGRESS NOTES
Creatinine remains high 1.8 from 1.9.  Previous baseline 1.2, 1.3  Potassium borderline elevated  Has been on lisinopril 40 mg longstanding.  Would cut that in half.    07/2022 renal ultrasound negative for obstructive uropathy.    Repeat US  And repeat BMP in about 2 weeks on lower dose lisinopril

## 2023-05-24 NOTE — TELEPHONE ENCOUNTER
Please call pt - sorry for the delay in getting his results to him.  His kidney function test - creatinine - remains high.  Not sure why it went up.    Cut the lisinopril in half to 20 mg.  I will send in a new prescription for the 20 mg dose.  I would repeat a kidney ultrasound to make sure nothing new has occurred - no blockages etc.    Repeat blood test in about 2 weeks. BMP - set up nonfasting lab visit please

## 2023-05-25 ENCOUNTER — TELEPHONE (OUTPATIENT)
Dept: FAMILY MEDICINE | Facility: CLINIC | Age: 68
End: 2023-05-25
Payer: MEDICARE

## 2023-05-25 ENCOUNTER — CLINICAL SUPPORT (OUTPATIENT)
Dept: SMOKING CESSATION | Facility: CLINIC | Age: 68
End: 2023-05-25
Payer: COMMERCIAL

## 2023-05-25 DIAGNOSIS — F17.200 NICOTINE DEPENDENCE: Primary | ICD-10-CM

## 2023-05-25 PROCEDURE — 99403 PREV MED CNSL INDIV APPRX 45: CPT | Mod: S$GLB,,,

## 2023-05-25 PROCEDURE — 99403 PR PREVENT COUNSEL,INDIV,45 MIN: ICD-10-PCS | Mod: S$GLB,,,

## 2023-05-25 PROCEDURE — 99999 PR PBB SHADOW E&M-EST. PATIENT-LVL II: ICD-10-PCS | Mod: PBBFAC,,,

## 2023-05-25 PROCEDURE — 99999 PR PBB SHADOW E&M-EST. PATIENT-LVL II: CPT | Mod: PBBFAC,,,

## 2023-05-25 RX ORDER — IBUPROFEN 200 MG
1 TABLET ORAL DAILY
Qty: 30 PATCH | Refills: 0 | Status: SHIPPED | OUTPATIENT
Start: 2023-05-25 | End: 2023-06-09 | Stop reason: SDUPTHER

## 2023-05-25 NOTE — TELEPHONE ENCOUNTER
Spoke with patient. Patient understood the decrease for lisinopril and will pick it up. Patient  wants to know when provider would like him to do a kidney ultrasound. Patient scheduled for non-fasting lab 6/8/2023 for 8:15 am. Patient was notified someone will contact him to schedule  kidney ultrasound.

## 2023-05-25 NOTE — PROGRESS NOTES
Individual Follow-Up Form    5/25/2023    Quit Date: N/A    Clinical Status of Patient: Outpatient    Length of Service: 45 minutes    Continuing Medication: yes  Patches    Other Medications: N/A     Target Symptoms: Withdrawal and medication side effects. The following were  rated moderate (3) to severe (4) on TCRS:  Moderate (3): 0  Severe (4): 0    Comments: PATIENT PRESENTS FOR FOLLOW UP SMOKING 10 CIGARETTES PER DAY, HE IS WEARING THE 14MG NICOTINE PATCH DAILY, HE STATES THAT THE PHARMACY IS NOT GIVING HIM THE 21MG AND THE 14MG AND THIS IS THE PHARMACY HE CHOOSES TO USE AND IS OK WITH THE SINGLE PATCH BUT WOULD LIKE THE 21MG NICOTINE PATCH INSTEAD. STRATEGIES AND ENCOURAGEMENT PROVIDED WILL FOLLOW     Diagnosis: F17.210    Next Visit: 2 weeks

## 2023-05-25 NOTE — TELEPHONE ENCOUNTER
----- Message from Miladys Cordero sent at 5/25/2023 10:15 AM CDT -----  Regarding: [id number]  Type:  Patient Returning Call    Who Called: self    Who Left Message for Patient: CARTER    Does the patient know what this is regarding?:no     Would the patient rather a call back or a response via [facility]? Call back     Best Call Back Number:[phone]      Additional Information:

## 2023-05-31 DIAGNOSIS — E11.9 TYPE 2 DIABETES MELLITUS WITHOUT COMPLICATION, WITHOUT LONG-TERM CURRENT USE OF INSULIN: ICD-10-CM

## 2023-05-31 RX ORDER — METFORMIN HYDROCHLORIDE 500 MG/1
TABLET ORAL
Qty: 180 TABLET | Refills: 1 | Status: SHIPPED | OUTPATIENT
Start: 2023-05-31 | End: 2023-08-31

## 2023-05-31 NOTE — TELEPHONE ENCOUNTER
Refill Routing Note   Medication(s) are not appropriate for processing by [facility] for the following reason(s):      Required labs abnormal       [facility] action(s):  Defer Care Due:  Labs due (Lipid panel due 8/19/23)   Medication Therapy Plan: Cr fluctuating since last prescribed.     Pharmacist review requested: Yes     Appointments  past 12m or future 3m with PCP    Date Provider   Last Visit   5/17/2023 Gabriel Evans MD   Next Visit   8/18/2023 Gabriel Evans MD   ED visits in past 90 days: 0        Note composed:1:13 PM 05/31/2023

## 2023-05-31 NOTE — TELEPHONE ENCOUNTER
Care Due:                  Date            Visit Type   Department     Provider  --------------------------------------------------------------------------------                                [facility]                              PRIMARY      MED/ INTERNAL  Last Visit: 05-      CARE ([facility])   MED/ FREDDY Evans                              [facility]                              PRIMARY      MED/ INTERNAL  Next Visit: 08-      CARE ([facility])   MED/ PEDS      Gabriel Evans                                                            Last  Test          Frequency    Reason                     Performed    Due Date  --------------------------------------------------------------------------------    HBA1C.......  6 months...  metFORMIN................  02-   08-    Lipid Panel.  12 months..  atorvastatin.............  08- 08-    [facility] Embedded Care Due Messages. Reference number: [id number].   5/31/2023 11:28:52 AM CDT

## 2023-05-31 NOTE — TELEPHONE ENCOUNTER
Refill Routing Note   Medication(s) are not appropriate for processing by [facility] for the following reason(s):      Required labs abnormal  CREATININE 1.8 (H) 05/17/2023     EGFRNORACEVR 40.5 (A) 05/17/2023        [facility] action(s):  Defer Care Due:  Labs due   Medication Therapy Plan: LABS: LIPID    Pharmacist review requested: Yes     Appointments  past 12m or future 3m with PCP    Date Provider   Last Visit   5/17/2023 Gabriel Evans MD   Next Visit   8/18/2023 Gabriel Evans MD   ED visits in past 90 days: 0        Note composed:1:00 PM 05/31/2023

## 2023-06-01 ENCOUNTER — CLINICAL SUPPORT (OUTPATIENT)
Dept: ENDOSCOPY | Facility: HOSPITAL | Age: 68
End: 2023-06-01
Attending: INTERNAL MEDICINE
Payer: MEDICARE

## 2023-06-01 VITALS — BODY MASS INDEX: 29.4 KG/M2 | HEIGHT: 71 IN | WEIGHT: 210 LBS

## 2023-06-01 DIAGNOSIS — D12.6 TUBULAR ADENOMA OF COLON: Chronic | ICD-10-CM

## 2023-06-01 NOTE — PLAN OF CARE
Patient is requesting to schedule colonoscopy on 8/4/23 10:00am [facility]. Colonoscopy pending ECHO scheduled on 7/27/23. New PAT appointment scheduled.

## 2023-06-08 ENCOUNTER — LAB VISIT (OUTPATIENT)
Dept: LAB | Facility: HOSPITAL | Age: 68
End: 2023-06-08
Attending: INTERNAL MEDICINE
Payer: MEDICARE

## 2023-06-08 DIAGNOSIS — R79.89 LOW TESTOSTERONE IN MALE: ICD-10-CM

## 2023-06-08 DIAGNOSIS — R79.89 ELEVATED SERUM CREATININE: ICD-10-CM

## 2023-06-08 DIAGNOSIS — E11.36 TYPE 2 DIABETES MELLITUS WITH DIABETIC CATARACT, WITHOUT LONG-TERM CURRENT USE OF INSULIN: ICD-10-CM

## 2023-06-08 LAB
ALBUMIN SERPL BCP-MCNC: 4.1 G/DL (ref 3.5–5.2)
ALP SERPL-CCNC: 46 U/L (ref 55–135)
ALT SERPL W/O P-5'-P-CCNC: 12 U/L (ref 10–44)
ANION GAP SERPL CALC-SCNC: 8 MMOL/L (ref 8–16)
ANION GAP SERPL CALC-SCNC: 8 MMOL/L (ref 8–16)
AST SERPL-CCNC: 15 U/L (ref 10–40)
BILIRUB SERPL-MCNC: 0.7 MG/DL (ref 0.1–1)
BUN SERPL-MCNC: 11 MG/DL (ref 8–23)
BUN SERPL-MCNC: 11 MG/DL (ref 8–23)
CALCIUM SERPL-MCNC: 9.3 MG/DL (ref 8.7–10.5)
CALCIUM SERPL-MCNC: 9.3 MG/DL (ref 8.7–10.5)
CHLORIDE SERPL-SCNC: 104 MMOL/L (ref 95–110)
CHLORIDE SERPL-SCNC: 104 MMOL/L (ref 95–110)
CO2 SERPL-SCNC: 26 MMOL/L (ref 23–29)
CO2 SERPL-SCNC: 26 MMOL/L (ref 23–29)
CREAT SERPL-MCNC: 1.5 MG/DL (ref 0.5–1.4)
CREAT SERPL-MCNC: 1.5 MG/DL (ref 0.5–1.4)
ERYTHROCYTE [DISTWIDTH] IN BLOOD BY AUTOMATED COUNT: 13.6 % (ref 11.5–14.5)
EST. GFR  (NO RACE VARIABLE): 50.4 ML/MIN/1.73 M^2
EST. GFR  (NO RACE VARIABLE): 50.4 ML/MIN/1.73 M^2
ESTIMATED AVG GLUCOSE: 117 MG/DL (ref 68–131)
GLUCOSE SERPL-MCNC: 130 MG/DL (ref 70–110)
GLUCOSE SERPL-MCNC: 130 MG/DL (ref 70–110)
HBA1C MFR BLD: 5.7 % (ref 4–5.6)
HCT VFR BLD AUTO: 40.8 % (ref 40–54)
HGB BLD-MCNC: 13.4 G/DL (ref 14–18)
MCH RBC QN AUTO: 33.3 PG (ref 27–31)
MCHC RBC AUTO-ENTMCNC: 32.8 G/DL (ref 32–36)
MCV RBC AUTO: 101 FL (ref 82–98)
PLATELET # BLD AUTO: 171 K/UL (ref 150–450)
PMV BLD AUTO: 11.8 FL (ref 9.2–12.9)
POTASSIUM SERPL-SCNC: 4.6 MMOL/L (ref 3.5–5.1)
POTASSIUM SERPL-SCNC: 4.6 MMOL/L (ref 3.5–5.1)
PROT SERPL-MCNC: 6.9 G/DL (ref 6–8.4)
RBC # BLD AUTO: 4.03 M/UL (ref 4.6–6.2)
SODIUM SERPL-SCNC: 138 MMOL/L (ref 136–145)
SODIUM SERPL-SCNC: 138 MMOL/L (ref 136–145)
TESTOST SERPL-MCNC: 858 NG/DL (ref 304–1227)
WBC # BLD AUTO: 9.96 K/UL (ref 3.9–12.7)

## 2023-06-08 PROCEDURE — 84403 ASSAY OF TOTAL TESTOSTERONE: CPT | Performed by: INTERNAL MEDICINE

## 2023-06-08 PROCEDURE — 83036 HEMOGLOBIN GLYCOSYLATED A1C: CPT | Performed by: INTERNAL MEDICINE

## 2023-06-08 PROCEDURE — 36415 COLL VENOUS BLD VENIPUNCTURE: CPT | Mod: PO | Performed by: INTERNAL MEDICINE

## 2023-06-08 PROCEDURE — 85027 COMPLETE CBC AUTOMATED: CPT | Performed by: INTERNAL MEDICINE

## 2023-06-08 PROCEDURE — 80053 COMPREHEN METABOLIC PANEL: CPT | Performed by: INTERNAL MEDICINE

## 2023-06-09 ENCOUNTER — CLINICAL SUPPORT (OUTPATIENT)
Dept: SMOKING CESSATION | Facility: CLINIC | Age: 68
End: 2023-06-09
Payer: COMMERCIAL

## 2023-06-09 DIAGNOSIS — F17.200 NICOTINE DEPENDENCE: Primary | ICD-10-CM

## 2023-06-09 PROCEDURE — 99402 PREV MED CNSL INDIV APPRX 30: CPT | Mod: S$GLB,,,

## 2023-06-09 PROCEDURE — 99999 PR PBB SHADOW E&M-EST. PATIENT-LVL II: ICD-10-PCS | Mod: PBBFAC,,,

## 2023-06-09 PROCEDURE — 99402 PR PREVENT COUNSEL,INDIV,30 MIN: ICD-10-PCS | Mod: S$GLB,,,

## 2023-06-09 PROCEDURE — 99999 PR PBB SHADOW E&M-EST. PATIENT-LVL II: CPT | Mod: PBBFAC,,,

## 2023-06-09 RX ORDER — IBUPROFEN 200 MG
1 TABLET ORAL DAILY
Qty: 14 PATCH | Refills: 0 | Status: SHIPPED | OUTPATIENT
Start: 2023-06-09 | End: 2023-06-23 | Stop reason: SDUPTHER

## 2023-06-09 NOTE — PROGRESS NOTES
Individual Follow-Up Form    6/9/2023    Quit Date: N/A    Clinical Status of Patient: Outpatient    Length of Service: 30 minutes    Continuing Medication: yes  Patches    Other Medications: N/A     Target Symptoms: Withdrawal and medication side effects. The following were  rated moderate (3) to severe (4) on TCRS:  Moderate (3): 0  Severe (4): 0    Comments: PATIENT PRESENTS FOR FOLLOW UP TELEPHONICALLY, DISCUSSED THE PATIENTS PROGRESS, HE IS SMOKING 1/2 PK PER DAY DOWN FROM 1 PK AND PRIOR TO THE START OF SMOKING CESSATION SOME YEARS AGO WAS AT 2 PKS PER DAY, HE IS CURRENTLY ON THE 21MG NICOTINE PATCH DAILY, RECOMMEND HE CONTINUE THIS DOSE AND REMINDED HIM THAT HE NEEDS TO WORK ON RATE REDUCTION AND STRATEGIES TO ELIMINATE TOBACCO, HIS WIFE SMOKES ALL THROUGHOUT THE DAY AND THIS PRESENTS A MAJOR CHALLENGE FOR HIM AND SHE IS NOT INTERESTED IN QUITTING, TTS HAS TRIED TO GET HER TO QUIT BEFORE WITH NO PROGRESS AND LACK OF COMPLIANCE WITH THE PATCH, ENCOURAGED PATIENT TO GET BACK INTO THE CLINIC FOR A FOLLOW UP IN PERSON, EXPLAINED AARON END DATE, WOULD LIEK TO ASSESS PATIENTS OPENNESS TO TRYING CHANTIX AND TO ASSESS FOR CONTRAINDICATIONS, APPT SCHEDULED IN CLINIC FOR 6/24 @ 10, WILL FOLLOW AND REFILL PATCHES     Diagnosis: F17.210    Next Visit: 2 weeks

## 2023-06-22 ENCOUNTER — PATIENT OUTREACH (OUTPATIENT)
Dept: ADMINISTRATIVE | Facility: HOSPITAL | Age: 68
End: 2023-06-22
Payer: MEDICARE

## 2023-06-22 DIAGNOSIS — E11.9 TYPE 2 DIABETES MELLITUS WITHOUT COMPLICATION, WITHOUT LONG-TERM CURRENT USE OF INSULIN: Primary | ICD-10-CM

## 2023-06-23 ENCOUNTER — CLINICAL SUPPORT (OUTPATIENT)
Dept: SMOKING CESSATION | Facility: CLINIC | Age: 68
End: 2023-06-23
Payer: COMMERCIAL

## 2023-06-23 DIAGNOSIS — F17.200 NICOTINE DEPENDENCE: ICD-10-CM

## 2023-06-23 PROCEDURE — 99999 PR PBB SHADOW E&M-EST. PATIENT-LVL II: ICD-10-PCS | Mod: PBBFAC,,,

## 2023-06-23 PROCEDURE — 99999 PR PBB SHADOW E&M-EST. PATIENT-LVL II: CPT | Mod: PBBFAC,,,

## 2023-06-23 PROCEDURE — 99403 PREV MED CNSL INDIV APPRX 45: CPT | Mod: S$GLB,,,

## 2023-06-23 PROCEDURE — 99403 PR PREVENT COUNSEL,INDIV,45 MIN: ICD-10-PCS | Mod: S$GLB,,,

## 2023-06-23 RX ORDER — IBUPROFEN 200 MG
1 TABLET ORAL DAILY
Qty: 14 PATCH | Refills: 0 | Status: SHIPPED | OUTPATIENT
Start: 2023-06-23 | End: 2023-07-23

## 2023-06-23 NOTE — PROGRESS NOTES
Cr still with CKD, modest improved compared to 5/2023.   Hx of severe SHAUNA requiring temporary HD  Unclear what triggered most recently  On ACEI longstanding  No changes.

## 2023-06-23 NOTE — PROGRESS NOTES
Individual Follow-Up Form    6/23/2023    Quit Date: 6/20    Clinical Status of Patient: Outpatient    Length of Service: 45 minutes    Continuing Medication: yes  Patches    Other Medications: n/a     Target Symptoms: Withdrawal and medication side effects. The following were  rated moderate (3) to severe (4) on TCRS:  Moderate (3): 0  Severe (4): 0    Comments: PATIENT PRESENTS TO THE CLINIC FOR FOLLOW UP AS A NONSMOKER, HE QUIT 3 DAYS AGO, HE IS CURRENTLY WEARING THE 21MG NICOTINE PATCH DAILY, HE DENIES NEGATIVE S/E, THIS DOSE IS APPROPRIATE RIGHT NOW TO HELP MAINTAIN HIS QUIT, DISCUSSED DROPPING THE DOSE OF THE PATCH AT THE NEXT FOLLOW UP ONLY IF NOT SMOKING, STRATEGIES AND SESSION HANDOUT DISCUSSED, WILL FOLLOW, PROVIDED PATIENT WITH NEW [employer] CARD WITH UPDATED NUMBERS.     Diagnosis: F17.210    Next Visit: 2 weeks

## 2023-07-05 ENCOUNTER — CLINICAL SUPPORT (OUTPATIENT)
Dept: SMOKING CESSATION | Facility: CLINIC | Age: 68
End: 2023-07-05
Payer: COMMERCIAL

## 2023-07-05 DIAGNOSIS — F17.200 NICOTINE DEPENDENCE: Primary | ICD-10-CM

## 2023-07-05 PROCEDURE — 99407 BEHAV CHNG SMOKING > 10 MIN: CPT | Mod: S$GLB,,,

## 2023-07-05 PROCEDURE — 99407 PR TOBACCO USE CESSATION INTENSIVE >10 MINUTES: ICD-10-PCS | Mod: S$GLB,,,

## 2023-07-10 ENCOUNTER — CLINICAL SUPPORT (OUTPATIENT)
Dept: SMOKING CESSATION | Facility: CLINIC | Age: 68
End: 2023-07-10
Payer: COMMERCIAL

## 2023-07-10 DIAGNOSIS — F17.200 NICOTINE DEPENDENCE: Primary | ICD-10-CM

## 2023-07-10 PROCEDURE — 99407 PR TOBACCO USE CESSATION INTENSIVE >10 MINUTES: ICD-10-PCS | Mod: S$GLB,,,

## 2023-07-10 PROCEDURE — 99407 BEHAV CHNG SMOKING > 10 MIN: CPT | Mod: S$GLB,,,

## 2023-07-10 NOTE — PROGRESS NOTES
Spoke with patient today in regard to smoking cessation progress for 6 month phone follow up on Quit 5.  Patient is tobacco free at this time but stated that he is still working on his Quit. Commended patient on their success and progress thus far.  Patient recently used all available NRT patches through the [facility].  Patient states he has been having strong urges and cravings.  We discussed GoodRx coupons for NRT.  Patient currently enrolled in program for Quit 5 and has an appointment scheduled with his CTTS.   Informed patient of benefit period and contact information if any further help or support is needed.  Will complete smart form for 6 month follow up on Quit attempt # 5.

## 2023-07-11 ENCOUNTER — CLINICAL SUPPORT (OUTPATIENT)
Dept: SMOKING CESSATION | Facility: CLINIC | Age: 68
End: 2023-07-11
Payer: COMMERCIAL

## 2023-07-11 DIAGNOSIS — F17.200 NICOTINE DEPENDENCE: Primary | ICD-10-CM

## 2023-07-11 PROCEDURE — 99999 PR PBB SHADOW E&M-EST. PATIENT-LVL II: CPT | Mod: PBBFAC,,,

## 2023-07-11 PROCEDURE — 99999 PR PBB SHADOW E&M-EST. PATIENT-LVL II: ICD-10-PCS | Mod: PBBFAC,,,

## 2023-07-11 PROCEDURE — 99403 PREV MED CNSL INDIV APPRX 45: CPT | Mod: S$GLB,,,

## 2023-07-11 PROCEDURE — 99403 PR PREVENT COUNSEL,INDIV,45 MIN: ICD-10-PCS | Mod: S$GLB,,,

## 2023-07-11 RX ORDER — DM/P-EPHED/ACETAMINOPH/DOXYLAM 30-7.5/3
2 LIQUID (ML) ORAL
Qty: 100 LOZENGE | Refills: 0 | Status: SHIPPED | OUTPATIENT
Start: 2023-07-11 | End: 2023-07-26 | Stop reason: SDUPTHER

## 2023-07-11 NOTE — PROGRESS NOTES
Individual Follow-Up Form    7/11/2023    Quit Date: 6/20    Clinical Status of Patient: Outpatient    Length of Service: 45 minutes    Continuing Medication: yes  Nicotine Lozenges    Other Medications: n/a     Target Symptoms: Withdrawal and medication side effects. The following were  rated moderate (3) to severe (4) on TCRS:  Moderate (3): 0  Severe (4): 0    Comments: patient presents for follow up as a continued non smoker, he has not smoked in 3 weeks, you can tell in his breathing alone that he is not smoking and getting the benefits already, struggling with habit times and urges, he has ran out of nicotine patch benefits with the program for the year, he is still remaining strong but to prevent the hardship with the urges from habit times recommend adding the 2mg nicotine lozenge for strong thoughts and urges to smoke, strategies and session handout discussed, encouragement provided, will follow for one more follow up     Diagnosis: F17.210    Next Visit: 2 weeks

## 2023-07-15 DIAGNOSIS — E78.2 MIXED HYPERLIPIDEMIA: ICD-10-CM

## 2023-07-15 NOTE — TELEPHONE ENCOUNTER
No care due was identified.  [facility] Embedded Care Due Messages. Reference number: [id number].   7/15/2023 10:12:59 AM CDT

## 2023-07-16 RX ORDER — ATORVASTATIN CALCIUM 40 MG/1
TABLET, FILM COATED ORAL
Qty: 90 TABLET | Refills: 0 | Status: SHIPPED | OUTPATIENT
Start: 2023-07-16 | End: 2024-01-08

## 2023-07-16 NOTE — TELEPHONE ENCOUNTER
Refill Decision Note   Butch Mcdaniel  is requesting a refill authorization.  Brief Assessment and Rationale for Refill:  Approve     Medication Therapy Plan:       Medication Reconciliation Completed: No   Comments:     No Care Gaps recommended.     Note composed:2:36 PM 07/16/2023

## 2023-07-26 ENCOUNTER — CLINICAL SUPPORT (OUTPATIENT)
Dept: SMOKING CESSATION | Facility: CLINIC | Age: 68
End: 2023-07-26
Payer: COMMERCIAL

## 2023-07-26 ENCOUNTER — TELEPHONE (OUTPATIENT)
Dept: OTOLARYNGOLOGY | Facility: CLINIC | Age: 68
End: 2023-07-26
Payer: MEDICARE

## 2023-07-26 DIAGNOSIS — F17.200 NICOTINE DEPENDENCE: Primary | ICD-10-CM

## 2023-07-26 PROCEDURE — 99403 PR PREVENT COUNSEL,INDIV,45 MIN: ICD-10-PCS | Mod: S$GLB,,,

## 2023-07-26 PROCEDURE — 99403 PREV MED CNSL INDIV APPRX 45: CPT | Mod: S$GLB,,,

## 2023-07-26 PROCEDURE — 99999 PR PBB SHADOW E&M-EST. PATIENT-LVL II: CPT | Mod: PBBFAC,,,

## 2023-07-26 PROCEDURE — 99999 PR PBB SHADOW E&M-EST. PATIENT-LVL II: ICD-10-PCS | Mod: PBBFAC,,,

## 2023-07-26 RX ORDER — DM/P-EPHED/ACETAMINOPH/DOXYLAM 30-7.5/3
2 LIQUID (ML) ORAL
Qty: 100 LOZENGE | Refills: 0 | Status: SHIPPED | OUTPATIENT
Start: 2023-07-26 | End: 2023-08-09 | Stop reason: ALTCHOICE

## 2023-07-26 NOTE — PROGRESS NOTES
"Individual Follow-Up Form    7/26/2023    Quit Date: N/A    Clinical Status of Patient: Outpatient    Length of Service: 45 minutes    Continuing Medication: yes  Patches or Nicotine Lozenges    Other Medications: N/A     Target Symptoms: Withdrawal and medication side effects. The following were  rated moderate (3) to severe (4) on TCRS:  Moderate (3): 0  Severe (4): 0    Comments: PATIENT PRESENTS FOR FOLLOW UP IN CLINIC, HE STATES HE HAS EXHAUSTED HIS BENEFITS FOR PATCHES FOR THE YEAR BECAUSE HE CAN NOT GET THEM, ORDERED THE NICOTINE LOZENGE TWO WEEKS AND HAS NOT BEEN FILLED THEREFORE PREFILLED TODAY, HE IS "PUFFING" ON 1 IG PER DAY, DISCUSSED THE BEHAVIOR OF SMOKING AND HOW JUST PUFFING ON THE CIGARETTE WILL HAVE HAVE POSSIBLE LAPSING BACK INTO SMOKING, RECOMMEND HE STOP IN AT [facility] TO CHECK THE STATUS OF THE LOZENGE AND WORK ON STRATEGIES TO PREVENT EVEN PICKING UP THE CIGARETTE. WILL FOLLOW     Diagnosis: F17.210    Next Visit: 2 weeks    "

## 2023-07-26 NOTE — TELEPHONE ENCOUNTER
----- Message from Fannie Aleman MA sent at 7/26/2023  1:44 PM CDT -----  Regarding: FW: change location  Contact: @ [phone]    ----- Message -----  From: Rosita Coronel  Sent: 7/26/2023   1:36 PM CDT  To: JAYLA Hoover, [employer] [facility] Staff  Subject: change location                                  Pt called in regards to seeing if his appointment can be moved to the [facility] from referral for H91.93 (ICD-10-CM) - Diminished hearing, bilateral .....Please call and adv @ [phone]

## 2023-07-27 ENCOUNTER — HOSPITAL ENCOUNTER (OUTPATIENT)
Dept: CARDIOLOGY | Facility: HOSPITAL | Age: 68
Discharge: HOME | End: 2023-07-27
Attending: INTERNAL MEDICINE
Payer: MEDICARE

## 2023-07-27 VITALS — WEIGHT: 210 LBS | BODY MASS INDEX: 29.4 KG/M2 | HEIGHT: 71 IN

## 2023-07-27 DIAGNOSIS — R06.02 SOB (SHORTNESS OF BREATH): ICD-10-CM

## 2023-07-27 LAB
ASCENDING AORTA: 2.78 CM
AV INDEX (PROSTH): 0.51
AV MEAN GRADIENT: 9 MMHG
AV PEAK GRADIENT: 16 MMHG
AV VALVE AREA: 1.55 CM2
AV VELOCITY RATIO: 0.49
BSA FOR ECHO PROCEDURE: 2.18 M2
CV ECHO LV RWT: 0.41 CM
DOP CALC AO PEAK VEL: 1.98 M/S
DOP CALC AO VTI: 43.3 CM
DOP CALC LVOT AREA: 3 CM2
DOP CALC LVOT DIAMETER: 1.97 CM
DOP CALC LVOT PEAK VEL: 0.97 M/S
DOP CALC LVOT STROKE VOLUME: 67.33 CM3
DOP CALC MV VTI: 43.7 CM
DOP CALCLVOT PEAK VEL VTI: 22.1 CM
E WAVE DECELERATION TIME: 258.29 MSEC
E/A RATIO: 1.47
E/E' RATIO: 12 M/S
ECHO LV POSTERIOR WALL: 1.03 CM (ref 0.6–1.1)
EJECTION FRACTION: 65 %
FRACTIONAL SHORTENING: 31 % (ref 28–44)
INTERVENTRICULAR SEPTUM: 1.03 CM (ref 0.6–1.1)
IVC DIAMETER: 2.39 CM
LA MAJOR: 4.65 CM
LA MINOR: 4.81 CM
LA WIDTH: 4 CM
LEFT ATRIUM SIZE: 4.2 CM
LEFT ATRIUM VOLUME INDEX: 31.4 ML/M2
LEFT ATRIUM VOLUME: 67.53 CM3
LEFT INTERNAL DIMENSION IN SYSTOLE: 3.49 CM (ref 2.1–4)
LEFT VENTRICLE DIASTOLIC VOLUME INDEX: 55.8 ML/M2
LEFT VENTRICLE DIASTOLIC VOLUME: 119.97 ML
LEFT VENTRICLE MASS INDEX: 89 G/M2
LEFT VENTRICLE SYSTOLIC VOLUME INDEX: 23.5 ML/M2
LEFT VENTRICLE SYSTOLIC VOLUME: 50.54 ML
LEFT VENTRICULAR INTERNAL DIMENSION IN DIASTOLE: 5.03 CM (ref 3.5–6)
LEFT VENTRICULAR MASS: 191.24 G
LV LATERAL E/E' RATIO: 12 M/S
LV SEPTAL E/E' RATIO: 12 M/S
LVOT MG: 2.16 MMHG
LVOT MV: 0.7 CM/S
MV MEAN GRADIENT: 2 MMHG
MV PEAK A VEL: 0.9 M/S
MV PEAK E VEL: 1.32 M/S
MV PEAK GRADIENT: 7 MMHG
MV STENOSIS PRESSURE HALF TIME: 74.9 MS
MV VALVE AREA BY CONTINUITY EQUATION: 1.54 CM2
MV VALVE AREA P 1/2 METHOD: 2.94 CM2
PISA TR MAX VEL: 2.09 M/S
PV PEAK VELOCITY: 1.37 CM/S
RA MAJOR: 3.99 CM
RA PRESSURE: 15 MMHG
RA WIDTH: 4.1 CM
RIGHT VENTRICULAR END-DIASTOLIC DIMENSION: 4.57 CM
RV TISSUE DOPPLER FREE WALL SYSTOLIC VELOCITY 1 (APICAL 4 CHAMBER VIEW): 17.72 CM/S
SINUS: 2.61 CM
STJ: 2.07 CM
TDI LATERAL: 0.11 M/S
TDI SEPTAL: 0.11 M/S
TDI: 0.11 M/S
TR MAX PG: 17 MMHG
TRICUSPID ANNULAR PLANE SYSTOLIC EXCURSION: 2.7 CM
TV REST PULMONARY ARTERY PRESSURE: 32 MMHG

## 2023-07-27 PROCEDURE — 93306 ECHO (CUPID ONLY): ICD-10-PCS | Mod: 26,,, | Performed by: INTERNAL MEDICINE

## 2023-07-27 PROCEDURE — 93306 TTE W/DOPPLER COMPLETE: CPT | Mod: 26,,, | Performed by: INTERNAL MEDICINE

## 2023-07-27 PROCEDURE — 93306 TTE W/DOPPLER COMPLETE: CPT

## 2023-07-28 ENCOUNTER — OFFICE VISIT (OUTPATIENT)
Dept: OTOLARYNGOLOGY | Facility: CLINIC | Age: 68
End: 2023-07-28
Payer: MEDICARE

## 2023-07-28 ENCOUNTER — CLINICAL SUPPORT (OUTPATIENT)
Dept: AUDIOLOGY | Facility: CLINIC | Age: 68
End: 2023-07-28
Payer: MEDICARE

## 2023-07-28 VITALS
HEIGHT: 71 IN | SYSTOLIC BLOOD PRESSURE: 136 MMHG | WEIGHT: 210.13 LBS | DIASTOLIC BLOOD PRESSURE: 69 MMHG | BODY MASS INDEX: 29.42 KG/M2

## 2023-07-28 DIAGNOSIS — H91.93 DIMINISHED HEARING, BILATERAL: ICD-10-CM

## 2023-07-28 DIAGNOSIS — H90.3 SENSORINEURAL HEARING LOSS (SNHL) OF BOTH EARS: Primary | ICD-10-CM

## 2023-07-28 DIAGNOSIS — H93.13 TINNITUS OF BOTH EARS: ICD-10-CM

## 2023-07-28 DIAGNOSIS — H90.3 SENSORINEURAL HEARING LOSS (SNHL), BILATERAL: Primary | ICD-10-CM

## 2023-07-28 PROCEDURE — 3075F SYST BP GE 130 - 139MM HG: CPT | Mod: CPTII,S$GLB,, | Performed by: NURSE PRACTITIONER

## 2023-07-28 PROCEDURE — 1159F PR MEDICATION LIST DOCUMENTED IN MEDICAL RECORD: ICD-10-PCS | Mod: CPTII,S$GLB,, | Performed by: NURSE PRACTITIONER

## 2023-07-28 PROCEDURE — 3008F PR BODY MASS INDEX (BMI) DOCUMENTED: ICD-10-PCS | Mod: CPTII,S$GLB,, | Performed by: NURSE PRACTITIONER

## 2023-07-28 PROCEDURE — 4010F ACE/ARB THERAPY RXD/TAKEN: CPT | Mod: CPTII,S$GLB,, | Performed by: NURSE PRACTITIONER

## 2023-07-28 PROCEDURE — 1159F MED LIST DOCD IN RCRD: CPT | Mod: CPTII,S$GLB,, | Performed by: NURSE PRACTITIONER

## 2023-07-28 PROCEDURE — 1157F PR ADVANCE CARE PLAN OR EQUIV PRESENT IN MEDICAL RECORD: ICD-10-PCS | Mod: CPTII,S$GLB,, | Performed by: NURSE PRACTITIONER

## 2023-07-28 PROCEDURE — 92557 COMPREHENSIVE HEARING TEST: CPT | Mod: S$GLB,,,

## 2023-07-28 PROCEDURE — 3075F PR MOST RECENT SYSTOLIC BLOOD PRESS GE 130-139MM HG: ICD-10-PCS | Mod: CPTII,S$GLB,, | Performed by: NURSE PRACTITIONER

## 2023-07-28 PROCEDURE — 99203 PR OFFICE/OUTPT VISIT, NEW, LEVL III, 30-44 MIN: ICD-10-PCS | Mod: S$GLB,,, | Performed by: NURSE PRACTITIONER

## 2023-07-28 PROCEDURE — 3044F PR MOST RECENT HEMOGLOBIN A1C LEVEL <7.0%: ICD-10-PCS | Mod: CPTII,S$GLB,, | Performed by: NURSE PRACTITIONER

## 2023-07-28 PROCEDURE — 3044F HG A1C LEVEL LT 7.0%: CPT | Mod: CPTII,S$GLB,, | Performed by: NURSE PRACTITIONER

## 2023-07-28 PROCEDURE — 92557 PR COMPREHENSIVE HEARING TEST: ICD-10-PCS | Mod: S$GLB,,,

## 2023-07-28 PROCEDURE — 4010F PR ACE/ARB THEARPY RXD/TAKEN: ICD-10-PCS | Mod: CPTII,S$GLB,, | Performed by: NURSE PRACTITIONER

## 2023-07-28 PROCEDURE — 1125F PR PAIN SEVERITY QUANTIFIED, PAIN PRESENT: ICD-10-PCS | Mod: CPTII,S$GLB,, | Performed by: NURSE PRACTITIONER

## 2023-07-28 PROCEDURE — 1101F PT FALLS ASSESS-DOCD LE1/YR: CPT | Mod: CPTII,S$GLB,, | Performed by: NURSE PRACTITIONER

## 2023-07-28 PROCEDURE — 3288F PR FALLS RISK ASSESSMENT DOCUMENTED: ICD-10-PCS | Mod: CPTII,S$GLB,, | Performed by: NURSE PRACTITIONER

## 2023-07-28 PROCEDURE — 3008F BODY MASS INDEX DOCD: CPT | Mod: CPTII,S$GLB,, | Performed by: NURSE PRACTITIONER

## 2023-07-28 PROCEDURE — 3078F PR MOST RECENT DIASTOLIC BLOOD PRESSURE < 80 MM HG: ICD-10-PCS | Mod: CPTII,S$GLB,, | Performed by: NURSE PRACTITIONER

## 2023-07-28 PROCEDURE — 92550 TYMPANOMETRY & REFLEX THRESH: CPT | Mod: S$GLB,,,

## 2023-07-28 PROCEDURE — 3288F FALL RISK ASSESSMENT DOCD: CPT | Mod: CPTII,S$GLB,, | Performed by: NURSE PRACTITIONER

## 2023-07-28 PROCEDURE — 92550 PR TYMPANOMETRY AND REFLEX THRESHOLD MEASUREMENTS: ICD-10-PCS | Mod: S$GLB,,,

## 2023-07-28 PROCEDURE — 1101F PR PT FALLS ASSESS DOC 0-1 FALLS W/OUT INJ PAST YR: ICD-10-PCS | Mod: CPTII,S$GLB,, | Performed by: NURSE PRACTITIONER

## 2023-07-28 PROCEDURE — 1125F AMNT PAIN NOTED PAIN PRSNT: CPT | Mod: CPTII,S$GLB,, | Performed by: NURSE PRACTITIONER

## 2023-07-28 PROCEDURE — 99203 OFFICE O/P NEW LOW 30 MIN: CPT | Mod: S$GLB,,, | Performed by: NURSE PRACTITIONER

## 2023-07-28 PROCEDURE — 3078F DIAST BP <80 MM HG: CPT | Mod: CPTII,S$GLB,, | Performed by: NURSE PRACTITIONER

## 2023-07-28 PROCEDURE — 1157F ADVNC CARE PLAN IN RCRD: CPT | Mod: CPTII,S$GLB,, | Performed by: NURSE PRACTITIONER

## 2023-07-28 RX ORDER — IBUPROFEN 600 MG/1
600 TABLET ORAL
COMMUNITY
Start: 2023-06-20

## 2023-07-28 NOTE — PROGRESS NOTES
Mr. Butch Mcdaniel, a 67 y.o. male, was seen in the clinic today for an audiological evaluation. Mr. Mcdaniel's main complaint was bilateral hearing loss and bilateral tinnitus. Patient also reported history of occupational noise exposure working on [employer].     Otoscopy clear bilaterally. Tympanometry testing revealed a Type As tympanogram for the right ear and a Type A tympanogram for the left ear. Ipsilateral acoustic reflexes were present at 4000 Hz for the right ear and were absent at all tested frequencies for the left ear.    Audiological testing revealed a mild to moderately severe sensorineural hearing loss (SNHL) at 1503-6716 Hz for the right ear and a moderately severe to severe SNHL at 3200-4150 Hz for the left ear. A speech reception threshold was obtained at 30 dBHL for the right ear and at 30 dBHL for the left ear. Speech discrimination was 92% for the right ear and 84% for the left ear.      Today's results were discussed with the patient. Patient expressed understanding of hearing test results and recommendations.    Recommendations:  1. Otologic evaluation  2. Annual audiological evaluation or sooner if hearing changes  3. Hearing protection when in noise   4. Hearing aid consultation following medical clearance if Mr. Mcdaniel feels hearing loss negatively impacts quality of life   5. Utilize ReSSwipeToSpin Relief nelia and other tinnitus management strategies discussed during appointment (lower salt/caffeine intake, monitor stress/anxiety levels, soft background noise in quiet)    Please click on link to view Audiogram:  Document on 7/28/2023 11:40 AM by JAYLA Mckenzie: Audiogram

## 2023-07-31 DIAGNOSIS — Z12.11 SCREEN FOR COLON CANCER: Primary | ICD-10-CM

## 2023-07-31 DIAGNOSIS — D12.6 TUBULAR ADENOMA OF COLON: Chronic | ICD-10-CM

## 2023-08-01 ENCOUNTER — TELEPHONE (OUTPATIENT)
Dept: ENDOSCOPY | Facility: HOSPITAL | Age: 68
End: 2023-08-01

## 2023-08-01 ENCOUNTER — PATIENT MESSAGE (OUTPATIENT)
Dept: ENDOSCOPY | Facility: HOSPITAL | Age: 68
End: 2023-08-01

## 2023-08-01 ENCOUNTER — CLINICAL SUPPORT (OUTPATIENT)
Dept: ENDOSCOPY | Facility: HOSPITAL | Age: 68
End: 2023-08-01
Attending: INTERNAL MEDICINE
Payer: MEDICARE

## 2023-08-01 DIAGNOSIS — D12.6 TUBULAR ADENOMA OF COLON: Chronic | ICD-10-CM

## 2023-08-01 NOTE — PROGRESS NOTES
OTOLARYNGOLOGY CLINIC NOTE  Date:  08/01/2023     Chief complaint:  Chief Complaint   Patient presents with    Hearing Loss     Bilateral, slowly losing hearing overtime, constantly wears ear plugs       History of Present Illness  Butch Mcdaniel is a 67 y.o. male  presenting today for a new evaluation and treatment of hearing loss.  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  Pt reports there was noise all around him most of the time.  Pt reports he had to listen to radio calls and signals at all times while on duty.  Pt was not wearing any hearing protection.  Pt reports hearing ringing in ears every night and he wears ear plugs.  Pt denies any ear pain, runny nose, congestion, sore throat or swallowing problems.  Pt snores but denies that he has stop breathing, morning headaches, or fatigue.      Review of medical records and prior documentation  Past medical records were reviewed with data pertinent to the chief complaint summarized in the HPI. Information obtained from review of medical records is attributed to respective sources in the HPI with reference to sources of information at their mention. Records reviewed included all recent notes from referring provider, primary care, and related subspecialty evaluations as available. This review of records was performed and additional data obtained to supplement history obtained from the patient and further inform medical decision making involved in formulating a plan of care accounting for all history and treatment relevant to the issues addressed.    Past Medical History  Past Medical History:   Diagnosis Date    Cataract, bilateral 09/11/2018    Degenerative disc disease     Diabetes mellitus type II, controlled     Eye injury as a child    stick hit eye ? eye, hit in [place] due to boxing    Hx of psychiatric care     Hyperlipidemia     Hypertension     MRSA (methicillin resistant  Staphylococcus aureus)     Open angle with borderline findings and high glaucoma risk in both eyes 10/08/2019    Personal history of colonic polyps     Psychiatric problem     Therapy     [facility] many years ago        Past Surgical History  Past Surgical History:   Procedure Laterality Date    APPENDECTOMY      COLONOSCOPY N/A 5/10/2017    Procedure: COLONOSCOPY;  Surgeon: Shantanu Marley MD;  Location: [facility];  Service: Endoscopy;  Laterality: N/A;    POSTERIOR FUSION OF CERVICAL SPINE WITH LAMINECTOMY N/A 10/3/2021    Procedure: LAMINECTOMY, SPINE, CERVICAL, WITH POSTERIOR FUSION C2-T2;  Surgeon: Ana Rosa Geller MD;  Location: [address];  Service: Neurosurgery;  Laterality: N/A;        Medications  Current Outpatient Medications on File Prior to Visit   Medication Sig Dispense Refill    ALPRAZolam (XANAX) 1 MG tablet TAKE ONE & ONE-HALF TABLET BY MOUTH ONCE A DAY 45 tablet 2    amLODIPine (NORVASC) 10 MG tablet TAKE ONE TABLET BY MOUTH EVERY EVENING 90 tablet 3    atorvastatin (LIPITOR) 40 MG tablet TAKE ONE TABLET BY MOUTH EVERY EVENING 90 tablet 0    busPIRone (BUSPAR) 30 MG Tab TAKE ONE TABLET BY MOUTH TWICE DAILY 180 tablet 3    carvediloL (COREG) 12.5 MG tablet Take 1 tablet (12.5 mg total) by mouth 2 (two) times daily with meals. 60 tablet 11    citalopram (CELEXA) 40 MG tablet Take 1 tablet (40 mg total) by mouth once daily. 90 tablet 3    fluticasone-salmeterol diskus inhaler 100-50 mcg Inhale 1 puff into the lungs.      hydrALAZINE (APRESOLINE) 25 MG tablet TAKE ONE TABLET BY MOUTH THREE TIMES DAILY 90 tablet 11    ibuprofen (ADVIL,MOTRIN) 600 MG tablet Take 600 mg by mouth.      lisinopriL (PRINIVIL,ZESTRIL) 20 MG tablet Take 1 tablet (20 mg total) by mouth once daily. 90 tablet 3    metFORMIN (GLUCOPHAGE) 500 MG tablet TAKE ONE TABLET BY MOUTH TWICE DAILY WITH MEALS 180 tablet 1    naloxone (NARCAN) 4 mg/actuation Spry 4mg by nasal route as needed for opioid overdose; may repeat every 2-3 minutes in  "alternating nostrils until medical help arrives. Call 911 1 each 11    needle, disp, 22 G 22 gauge x 1" Ndle Testosterone injection every 2 weeks 6 each 0    nicotine polacrilex 2 MG Lozg Take 1 lozenge (2 mg total) by mouth as needed (1 per hour as needed in place of a cigarette, max of 15 per day, mini please). 100 lozenge 0    oxyCODONE-acetaminophen (PERCOCET)  mg per tablet Take 1 tablet by mouth every 4 (four) hours as needed.      POLYETHYLENE GLYCOL 3350 (MIRALAX ORAL) Take 1 application by mouth.      senna (SENOKOT) 8.6 mg tablet Take 1 tablet by mouth 2 (two) times a day.      syringe, disposable, (BD LUER-STARLA SYRINGE) 1 mL Syrg Testosterone injection every 2 weeks 6 Syringe 0    testosterone cypionate (DEPOTESTOTERONE CYPIONATE) 200 mg/mL injection Inject 1 mL (200 mg total) into the muscle every 14 (fourteen) days. 10 mL 0    tiZANidine (ZANAFLEX) 4 MG tablet Take 4-8 mg by mouth nightly as needed.      gabapentin (NEURONTIN) 600 MG tablet Take 600 mg by mouth 3 (three) times daily.       No current facility-administered medications on file prior to visit.       Review of Systems  Review of Systems   Constitutional: Negative.    HENT:  Positive for hearing loss and tinnitus. Negative for ear discharge and ear pain.    Eyes: Negative.    Respiratory: Negative.     Cardiovascular: Negative.    Gastrointestinal: Negative.    Genitourinary: Negative.    Musculoskeletal: Negative.    Skin: Negative.    Neurological: Negative.    Psychiatric/Behavioral: Negative.          Social History   reports that he quit smoking about 6 weeks ago. His smoking use included cigarettes. He started smoking about 32 years ago. He has a 8.0 pack-year smoking history. He has never used smokeless tobacco. He reports current drug use. Drugs: Marijuana and Oxycodone. He reports that he does not drink alcohol.     Family History  Family History   Problem Relation Age of Onset    Heart disease Mother     Heart disease Father  " "   Alcohol abuse Father     No Known Problems Sister     No Known Problems Brother     Diabetes Son     No Known Problems Maternal Aunt     No Known Problems Maternal Uncle     No Known Problems Paternal Aunt     No Known Problems Paternal Uncle     No Known Problems Maternal Grandmother     No Known Problems Maternal Grandfather     No Known Problems Paternal Grandmother     No Known Problems Paternal Grandfather     Amblyopia Neg Hx     Blindness Neg Hx     Cancer Neg Hx     Cataracts Neg Hx     Glaucoma Neg Hx     Hypertension Neg Hx     Macular degeneration Neg Hx     Retinal detachment Neg Hx     Strabismus Neg Hx     Stroke Neg Hx     Thyroid disease Neg Hx     Anxiety disorder Neg Hx     Dementia Neg Hx     Depression Neg Hx         Physical Exam   Vitals:    07/28/23 1142   BP: 136/69    Body mass index is 29.3 kg/m².  Weight: 95.3 kg (210 lb 1.6 oz)   Height: 5' 11" (180.3 cm)     GENERAL: no acute distress.  HEAD: normocephalic.   EYES: lids and lashes normal. No scleral icterus  EARS: external ear without lesion, normal pinna shape and position.  External auditory canal with normal cerumen, tympanic membrane fully visible, no perforation , no retraction. No middle ear effusion. Ossicles intact.   NOSE: external nose without significant bony abnormality  ORAL CAVITY/OROPHARYNX: tongue midline and mobile. Symmetric palate rise. Uvula midline.   NECK: trachea midline.   LYMPH NODES:No cervical lymphadenopathy.  RESPIRATORY: no stridor, no stertor. Voice normal. Respirations nonlabored.  NEURO: alert, responds to questions appropriately.   Cranial nerve exam as indicated in above sections and additionally showed facial movement symmetric with good eye closure and symmetric smile.   PSYCH:mood appropriate          AUDIOLOGY RESULTS  Audiometric evaluation including audiogram, tympanometry, acoustic reflexes, and speech discrimination which was performed  was personally reviewed and interpreted.  Notable " findings on the audiogram were mid to moderately severe sensorineural hearing loss (SNHL) in right ear and moderately severe to severe in left ear.  Tympanometry revealed Type A tympanogram on the left and Type A tympanogram on the right. Speech discrimination was 84%  on the left, and 92% on the right.   Report of the audiologist performing this audiometric testing was also reviewed      Labs:  CBC  Recent Labs   Lab 02/10/23  0902 05/10/23  0949 06/08/23  0910   WBC 10.77 9.99 9.96   Hemoglobin 13.8 L 12.3 L 13.4 L   Hematocrit 42.6 38.5 L 40.8    H 102 H 101 H   Platelets 179 168 171     BMP  Recent Labs   Lab 07/19/22  0518 07/20/22  0126 07/21/22  0531 08/24/22  0905 05/10/23  0949 05/17/23  1618 06/08/23  0910   Glucose 141 H 114 H  113 H 146 H   < > 94 99 130 H  130 H   Sodium 139 141  142 143   < > 140 141 138  138   Potassium 5.2 H 4.1  4.1 3.9   < > 5.7 H 5.6 H 4.6  4.6   Chloride 105 107  108 108   < > 104 106 104  104   CO2 25 26  24 23   < > 25 23 26  26   BUN 32 H 28 H  29 H 24 H   < > 26 H 24 H 11  11   Creatinine 1.9 H 1.4  1.4 1.3   < > 1.9 H 1.8 H 1.5 H  1.5 H   Calcium 8.8 9.2  9.2 9.2   < > 9.5 9.6 9.3  9.3   Phosphorus 2.8 2.8 3.5  --   --   --   --    Magnesium 1.9 1.7  1.8 1.8  --   --   --   --     < > = values in this interval not displayed.     COAGS  Recent Labs   Lab 10/01/21  1610   INR 1.0       Assessment  1. Diminished hearing, bilateral  - Ambulatory referral/consult to ENT    2. Sensorineural hearing loss (SNHL), bilateral       Plan:  Pt advised of hearing loss and he qualifies for hearing aids.  Pt will check with his insurance and make a decision.  Pt advised to wear hearing protection when around loud noise to prevent any additional damage to his hearing.  Pt advised to f/u in 1 year for repeat hearing exam or sooner if any changes in his hearing.  Discussed plan of care with patient in detail and all questions answered. Patient reported understanding of  plan of care.

## 2023-08-01 NOTE — PLAN OF CARE
Patient scheduled for Colonoscopy on 8/4/23. Reviewed instructions with patient. Patient verbalized understanding.

## 2023-08-01 NOTE — TELEPHONE ENCOUNTER
Spoke to patient to schedule procedure(s) Colonoscopy       Physician to perform procedure(s) Dr. MATTEO Rand  Date of Procedure (s) 8/4/23  Arrival Time 9:00 AM  Time of Procedure(s) 10:00 AM   Location of Procedure(s) [address]  Type of Rx Prep sent to patient: PEG  Instructions provided to patient via [facility]     Patient was informed on the following information and verbalized understanding. Screening questionnaire reviewed with patient and complete. If procedure requires anesthesia, a responsible adult needs to be present to accompany the patient home, patient cannot drive after receiving anesthesia. Appointment details are tentative, especially check-in time. Patient will receive a prep-op call 4 days prior to confirm check-in time for procedure. If applicable the patient should contact their pharmacy to verify Rx for procedure prep is ready for pick-up. Patient was advised to call the scheduling department at [phone] if pharmacy states no Rx is available. Patient was advised to call the endoscopy scheduling department if any questions or concerns arise.      [facility] Scheduling Department

## 2023-08-02 ENCOUNTER — ANESTHESIA EVENT (OUTPATIENT)
Dept: ENDOSCOPY | Facility: HOSPITAL | Age: 68
End: 2023-08-02
Payer: MEDICARE

## 2023-08-02 DIAGNOSIS — F13.20 BENZODIAZEPINE DEPENDENCE: ICD-10-CM

## 2023-08-02 DIAGNOSIS — F41.9 ANXIETY: ICD-10-CM

## 2023-08-02 NOTE — TELEPHONE ENCOUNTER
Care Due:                  Date            Visit Type   Department     Provider  --------------------------------------------------------------------------------                                [facility]                              PRIMARY      MED/ INTERNAL  Last Visit: 05-      CARE ([facility])   MED/ FREDDY Evans                              [facility]                              PRIMARY      MED/ INTERNAL  Next Visit: 08-      CARE ([facility])   MED/ FREDDY Evans                                                            Last  Test          Frequency    Reason                     Performed    Due Date  --------------------------------------------------------------------------------    Lipid Panel.  12 months..  atorvastatin.............  08- 08-    [facility] Embedded Care Due Messages. Reference number: [id number].   8/02/2023 1:32:59 PM CDT

## 2023-08-03 RX ORDER — ALPRAZOLAM 1 MG/1
TABLET ORAL
Qty: 45 TABLET | Refills: 2 | Status: SHIPPED | OUTPATIENT
Start: 2023-08-03 | End: 2023-10-27

## 2023-08-04 ENCOUNTER — HOSPITAL ENCOUNTER (OUTPATIENT)
Facility: HOSPITAL | Age: 68
Discharge: HOME | End: 2023-08-04
Attending: INTERNAL MEDICINE | Admitting: INTERNAL MEDICINE
Payer: MEDICARE

## 2023-08-04 ENCOUNTER — ANESTHESIA (OUTPATIENT)
Dept: ENDOSCOPY | Facility: HOSPITAL | Age: 68
End: 2023-08-04
Payer: MEDICARE

## 2023-08-04 VITALS
SYSTOLIC BLOOD PRESSURE: 132 MMHG | HEART RATE: 67 BPM | DIASTOLIC BLOOD PRESSURE: 68 MMHG | OXYGEN SATURATION: 95 % | TEMPERATURE: 98 F | RESPIRATION RATE: 19 BRPM

## 2023-08-04 DIAGNOSIS — Z12.11 COLON CANCER SCREENING: ICD-10-CM

## 2023-08-04 LAB
GLUCOSE SERPL-MCNC: 121 MG/DL (ref 70–110)
POCT GLUCOSE: 121 MG/DL (ref 70–110)

## 2023-08-04 PROCEDURE — 25000003 PHARM REV CODE 250: Performed by: ANESTHESIOLOGY

## 2023-08-04 PROCEDURE — 88305 TISSUE EXAM BY PATHOLOGIST: CPT | Performed by: PATHOLOGY

## 2023-08-04 PROCEDURE — 88305 TISSUE EXAM BY PATHOLOGIST: ICD-10-PCS | Mod: 26,,, | Performed by: PATHOLOGY

## 2023-08-04 PROCEDURE — D9220A PRA ANESTHESIA: Mod: PT,ANES,, | Performed by: ANESTHESIOLOGY

## 2023-08-04 PROCEDURE — 37000008 HC ANESTHESIA 1ST 15 MINUTES: Performed by: INTERNAL MEDICINE

## 2023-08-04 PROCEDURE — 25000003 PHARM REV CODE 250: Performed by: NURSE ANESTHETIST, CERTIFIED REGISTERED

## 2023-08-04 PROCEDURE — 45385 PR COLONOSCOPY,REMV LESN,SNARE: ICD-10-PCS | Mod: PT,,, | Performed by: INTERNAL MEDICINE

## 2023-08-04 PROCEDURE — 88305 TISSUE EXAM BY PATHOLOGIST: CPT | Mod: 26,,, | Performed by: PATHOLOGY

## 2023-08-04 PROCEDURE — 45385 COLONOSCOPY W/LESION REMOVAL: CPT | Mod: PT | Performed by: INTERNAL MEDICINE

## 2023-08-04 PROCEDURE — D9220A PRA ANESTHESIA: Mod: PT,CRNA,, | Performed by: NURSE ANESTHETIST, CERTIFIED REGISTERED

## 2023-08-04 PROCEDURE — 63600175 PHARM REV CODE 636 W HCPCS: Performed by: NURSE ANESTHETIST, CERTIFIED REGISTERED

## 2023-08-04 PROCEDURE — 27201089 HC SNARE, DISP (ANY): Performed by: INTERNAL MEDICINE

## 2023-08-04 PROCEDURE — D9220A PRA ANESTHESIA: ICD-10-PCS | Mod: PT,CRNA,, | Performed by: NURSE ANESTHETIST, CERTIFIED REGISTERED

## 2023-08-04 PROCEDURE — 37000009 HC ANESTHESIA EA ADD 15 MINS: Performed by: INTERNAL MEDICINE

## 2023-08-04 PROCEDURE — 82962 GLUCOSE BLOOD TEST: CPT | Performed by: INTERNAL MEDICINE

## 2023-08-04 PROCEDURE — 45385 COLONOSCOPY W/LESION REMOVAL: CPT | Mod: PT,,, | Performed by: INTERNAL MEDICINE

## 2023-08-04 PROCEDURE — D9220A PRA ANESTHESIA: ICD-10-PCS | Mod: PT,ANES,, | Performed by: ANESTHESIOLOGY

## 2023-08-04 RX ORDER — LIDOCAINE HYDROCHLORIDE 20 MG/ML
INJECTION INTRAVENOUS
Status: DISCONTINUED | OUTPATIENT
Start: 2023-08-04 | End: 2023-08-04

## 2023-08-04 RX ORDER — SODIUM CHLORIDE 9 MG/ML
INJECTION, SOLUTION INTRAVENOUS CONTINUOUS
Status: DISCONTINUED | OUTPATIENT
Start: 2023-08-04 | End: 2023-08-04 | Stop reason: HOSPADM

## 2023-08-04 RX ORDER — PROPOFOL 10 MG/ML
INJECTION, EMULSION INTRAVENOUS
Status: DISCONTINUED
Start: 2023-08-04 | End: 2023-08-04 | Stop reason: HOSPADM

## 2023-08-04 RX ORDER — LIDOCAINE HYDROCHLORIDE 20 MG/ML
INJECTION, SOLUTION EPIDURAL; INFILTRATION; INTRACAUDAL; PERINEURAL
Status: DISCONTINUED
Start: 2023-08-04 | End: 2023-08-04 | Stop reason: HOSPADM

## 2023-08-04 RX ORDER — LIDOCAINE HYDROCHLORIDE 10 MG/ML
1 INJECTION, SOLUTION EPIDURAL; INFILTRATION; INTRACAUDAL; PERINEURAL ONCE
Status: DISCONTINUED | OUTPATIENT
Start: 2023-08-04 | End: 2023-08-04 | Stop reason: HOSPADM

## 2023-08-04 RX ORDER — PROPOFOL 10 MG/ML
VIAL (ML) INTRAVENOUS
Status: DISCONTINUED | OUTPATIENT
Start: 2023-08-04 | End: 2023-08-04

## 2023-08-04 RX ADMIN — PROPOFOL 30 MG: 10 INJECTION, EMULSION INTRAVENOUS at 09:08

## 2023-08-04 RX ADMIN — SODIUM CHLORIDE: 0.9 INJECTION, SOLUTION INTRAVENOUS at 09:08

## 2023-08-04 RX ADMIN — PROPOFOL 50 MG: 10 INJECTION, EMULSION INTRAVENOUS at 09:08

## 2023-08-04 RX ADMIN — PROPOFOL 20 MG: 10 INJECTION, EMULSION INTRAVENOUS at 09:08

## 2023-08-04 RX ADMIN — LIDOCAINE HYDROCHLORIDE 60 MG: 20 INJECTION, SOLUTION INTRAVENOUS at 09:08

## 2023-08-04 RX ADMIN — PROPOFOL 100 MG: 10 INJECTION, EMULSION INTRAVENOUS at 09:08

## 2023-08-04 NOTE — TRANSFER OF CARE
Anesthesia Transfer of Care Note    Patient: Butch Mcdaniel    Procedure(s) Performed: Procedure(s) (LRB):  COLONOSCOPY (N/A)    Patient location: GI    Anesthesia Type: general    Transport from OR: Transported from OR on 2-3 L/min O2 by NC with adequate spontaneous ventilation    Post pain: adequate analgesia    Post assessment: no apparent anesthetic complications and tolerated procedure well    Post vital signs: stable    Level of consciousness: sedated    Nausea/Vomiting: no nausea/vomiting    Complications: none    Transfer of care protocol was followed      Last vitals:   Visit Vitals  BP (!) 118/59 (BP Location: Left arm, Patient Position: Lying)   Pulse 61   Temp 36.7 °C (98.1 °F) (Oral)   Resp 17   SpO2 95%

## 2023-08-04 NOTE — PROVATION PATIENT INSTRUCTIONS
Discharge Summary/Instructions after an Endoscopic Procedure  Patient Name: Butch Mcdaniel  Patient MRN: [id number]  Patient YOB: 1955 Friday, August 4, 2023  Gael Rand MD  Dear patient,  As a result of recent federal legislation (The Federal Cures Act), you may   receive lab or pathology results from your procedure in your [facility]   account before your physician is able to contact you. Your physician or   their representative will relay the results to you with their   recommendations at their soonest availability.  Thank you,  RESTRICTIONS:  During your procedure today, you received medications for sedation.  These   medications may affect your judgment, balance and coordination.  Therefore,   for 24 hours, you have the following restrictions:   - DO NOT drive a car, operate machinery, make legal/financial decisions,   sign important papers or drink alcohol.    ACTIVITY:  Today: no heavy lifting, straining or running due to procedural   sedation/anesthesia.  The following day: return to full activity including work.  DIET:  Eat and drink normally unless instructed otherwise.     TREATMENT FOR COMMON SIDE EFFECTS:  - Mild abdominal pain, nausea, belching, bloating or excessive gas:  rest,   eat lightly and use a heating pad.  - Sore Throat: treat with throat lozenges and/or gargle with warm salt   water.  - Because air was used during the procedure, expelling large amounts of air   from your rectum or belching is normal.  - If a bowel prep was taken, you may not have a bowel movement for 1-3 days.    This is normal.  SYMPTOMS TO WATCH FOR AND REPORT TO YOUR PHYSICIAN:  1. Abdominal pain or bloating, other than gas cramps.  2. Chest pain.  3. Back pain.  4. Signs of infection such as: chills or fever occurring within 24 hours   after the procedure.  5. Rectal bleeding, which would show as bright red, maroon, or black stools.   (A tablespoon of blood from the rectum is not serious, especially if    hemorrhoids are present.)  6. Vomiting.  7. Weakness or dizziness.  GO DIRECTLY TO THE NEAREST EMERGENCY ROOM IF YOU HAVE ANY OF THE FOLLOWING:      Difficulty breathing              Chills and/or fever over 101 F   Persistent vomiting and/or vomiting blood   Severe abdominal pain   Severe chest pain   Black, tarry stools   Bleeding- more than one tablespoon   Any other symptom or condition that you feel may need urgent attention  Your doctor recommends these additional instructions:  If any biopsies were taken, your doctors clinic will contact you in 1 to 2   weeks with any results.  - Discharge patient to home (ambulatory).   - Patient has a contact number available for emergencies.  The signs and   symptoms of potential delayed complications were discussed with the   patient.  Return to normal activities tomorrow.  Written discharge   instructions were provided to the patient.   - Resume previous diet.   - Continue present medications.   - Return to primary care physician as previously scheduled.   - Repeat colonoscopy in 3 years for surveillance.  For questions, problems or results please call your physician - Gael Rand MD at Work:  [phone].  [facility] can be reached at [phone]     IF A COMPLICATION OR EMERGENCY SITUATION ARISES AND YOU ARE UNABLE TO REACH   YOUR PHYSICIAN - GO DIRECTLY TO THE EMERGENCY ROOM.  Gael Rand MD  8/4/2023 10:02:43 AM  This report has been verified and signed electronically.  Dear patient,  As a result of recent federal legislation (The Federal Cures Act), you may   receive lab or pathology results from your procedure in your [facility]   account before your physician is able to contact you. Your physician or   their representative will relay the results to you with their   recommendations at their soonest availability.  Thank you,  PROVATION

## 2023-08-04 NOTE — ANESTHESIA POSTPROCEDURE EVALUATION
Anesthesia Post Evaluation    Patient: Butch Mcdaniel    Procedure(s) Performed: Procedure(s) (LRB):  COLONOSCOPY (N/A)    Final Anesthesia Type: general      Patient location during evaluation: GI PACU  Patient participation: Yes- Able to Participate  Level of consciousness: awake and alert  Post-procedure vital signs: reviewed and stable  Pain management: adequate  Airway patency: patent    PONV status at discharge: No PONV  Anesthetic complications: no      Cardiovascular status: hemodynamically stable  Respiratory status: unassisted and spontaneous ventilation  Hydration status: euvolemic  Follow-up not needed.          Vitals Value Taken Time   /68 08/04/23 1047   Temp 36.7 °C (98.1 °F) 08/04/23 1002   Pulse 67 08/04/23 1047   Resp 19 08/04/23 1047   SpO2 95 % 08/04/23 1047         Event Time   Out of Recovery 11:01:03         Pain/Chaparro Score: Chaparro Score: 10 (8/4/2023 10:47 AM)

## 2023-08-04 NOTE — PLAN OF CARE
Procedure and recovery complete. Resp. Even and unlabored. Wife at bedside. Discharge instruction given. Verbalized understanding. No acute distress noted.

## 2023-08-04 NOTE — H&P
Short Stay Endoscopy History and Physical    PCP - Gabriel Evans MD    Procedure - Colonoscopy  ASA - per anesthesia  Mallampati - per anesthesia  History of Anesthesia problems - no  Family history Anesthesia problems - no   Plan of anesthesia - General    HPI:  This is a 67 y.o. male here for evaluation of : personal history of colon polyps      ROS:  Constitutional: No fevers, chills, No weight loss  CV: No chest pain  Pulm: No cough, No shortness of breath  GI: see HPI  Derm: No rash    Medical History:  has a past medical history of Cataract, bilateral (09/11/2018), Degenerative disc disease, Diabetes mellitus type II, controlled, Eye injury (as a child), psychiatric care, Hyperlipidemia, Hypertension, MRSA (methicillin resistant Staphylococcus aureus), Open angle with borderline findings and high glaucoma risk in both eyes (10/08/2019), Personal history of colonic polyps, Psychiatric problem, and Therapy.    Surgical History:  has a past surgical history that includes Appendectomy; Colonoscopy (N/A, 5/10/2017); and Posterior fusion of cervical spine with laminectomy (N/A, 10/3/2021).    Family History: family history includes Alcohol abuse in his father; Diabetes in his son; Heart disease in his father and mother; No Known Problems in his brother, maternal aunt, maternal grandfather, maternal grandmother, maternal uncle, paternal aunt, paternal grandfather, paternal grandmother, paternal uncle, and sister.. Otherwise no colon cancer, inflammatory bowel disease, or GI malignancies.    Social History:  reports that he quit smoking about 6 weeks ago. His smoking use included cigarettes. He started smoking about 32 years ago. He has a 8.0 pack-year smoking history. He has never used smokeless tobacco. He reports current drug use. Drugs: Marijuana and Oxycodone. He reports that he does not drink alcohol.    Review of patient's allergies indicates:  No Known Allergies    Medications:   Medications Prior to  "Admission   Medication Sig Dispense Refill Last Dose    ALPRAZolam (XANAX) 1 MG tablet TAKE 1 AND 1/2 TABLETS BY MOUTH ONCE A DAY 45 tablet 2 8/3/2023    amLODIPine (NORVASC) 10 MG tablet TAKE ONE TABLET BY MOUTH EVERY EVENING 90 tablet 3 8/4/2023    atorvastatin (LIPITOR) 40 MG tablet TAKE ONE TABLET BY MOUTH EVERY EVENING 90 tablet 0 8/3/2023    busPIRone (BUSPAR) 30 MG Tab TAKE ONE TABLET BY MOUTH TWICE DAILY 180 tablet 3 8/3/2023    carvediloL (COREG) 12.5 MG tablet Take 1 tablet (12.5 mg total) by mouth 2 (two) times daily with meals. 60 tablet 11 8/4/2023    citalopram (CELEXA) 40 MG tablet Take 1 tablet (40 mg total) by mouth once daily. 90 tablet 3 8/3/2023    hydrALAZINE (APRESOLINE) 25 MG tablet TAKE ONE TABLET BY MOUTH THREE TIMES DAILY 90 tablet 11 8/4/2023    lisinopriL (PRINIVIL,ZESTRIL) 20 MG tablet Take 1 tablet (20 mg total) by mouth once daily. 90 tablet 3 8/4/2023    metFORMIN (GLUCOPHAGE) 500 MG tablet TAKE ONE TABLET BY MOUTH TWICE DAILY WITH MEALS 180 tablet 1 8/3/2023    testosterone cypionate (DEPOTESTOTERONE CYPIONATE) 200 mg/mL injection Inject 1 mL (200 mg total) into the muscle every 14 (fourteen) days. 10 mL 0 8/3/2023    tiZANidine (ZANAFLEX) 4 MG tablet Take 4-8 mg by mouth nightly as needed.   8/3/2023    fluticasone-salmeterol diskus inhaler 100-50 mcg Inhale 1 puff into the lungs.       gabapentin (NEURONTIN) 600 MG tablet Take 600 mg by mouth 3 (three) times daily.       ibuprofen (ADVIL,MOTRIN) 600 MG tablet Take 600 mg by mouth.       naloxone (NARCAN) 4 mg/actuation Spry 4mg by nasal route as needed for opioid overdose; may repeat every 2-3 minutes in alternating nostrils until medical help arrives. Call 911 1 each 11     needle, disp, 22 G 22 gauge x 1" Ndle Testosterone injection every 2 weeks 6 each 0     nicotine polacrilex 2 MG Lozg Take 1 lozenge (2 mg total) by mouth as needed (1 per hour as needed in place of a cigarette, max of 15 per day, mini please). 100 lozenge 0  "    oxyCODONE-acetaminophen (PERCOCET)  mg per tablet Take 1 tablet by mouth every 4 (four) hours as needed.       POLYETHYLENE GLYCOL 3350 (MIRALAX ORAL) Take 1 application by mouth.       senna (SENOKOT) 8.6 mg tablet Take 1 tablet by mouth 2 (two) times a day.       syringe, disposable, (BD LUER-STARLA SYRINGE) 1 mL Syrg Testosterone injection every 2 weeks 6 Syringe 0          Physical Exam:    Vital Signs: There were no vitals filed for this visit.    General Appearance: Well appearing in no acute distress  Eyes:    No scleral icterus  ENT: Neck supple, Lips, mucosa, and tongue normal; teeth and gums normal  Abdomen: Soft, non tender, non distended with positive bowel sounds. No hepatosplenomegaly, ascites, or mass.  Extremities: 2+ pulses, no clubbing, cyanosis or edema  Skin: No rash      Labs:  Lab Results   Component Value Date    WBC 9.96 06/08/2023    HGB 13.4 (L) 06/08/2023    HCT 40.8 06/08/2023     06/08/2023    CHOL 118 (L) 08/24/2022    TRIG 109 08/24/2022    HDL 34 (L) 08/24/2022    ALT 12 06/08/2023    AST 15 06/08/2023     06/08/2023     06/08/2023    K 4.6 06/08/2023    K 4.6 06/08/2023     06/08/2023     06/08/2023    CREATININE 1.5 (H) 06/08/2023    CREATININE 1.5 (H) 06/08/2023    BUN 11 06/08/2023    BUN 11 06/08/2023    CO2 26 06/08/2023    CO2 26 06/08/2023    TSH 5.291 (H) 07/18/2022    PSA 0.40 02/10/2023    INR 1.0 10/01/2021    HGBA1C 5.7 (H) 06/08/2023       I have explained the risks and benefits of endoscopy procedures to the patient including but not limited to bleeding, perforation, infection, and death.  The patient was asked if they understand and allowed to ask any further questions to their satisfaction.    Gael Rand MD

## 2023-08-04 NOTE — ANESTHESIA PREPROCEDURE EVALUATION
08/04/2023  Butch Mcdaniel is a 67 y.o., male.      Pre-op Assessment    I have reviewed the Patient Summary Reports.     I have reviewed the Nursing Notes.       Review of Systems  Anesthesia Hx:  No problems with previous Anesthesia  Denies Family Hx of Anesthesia complications.   Denies Personal Hx of Anesthesia complications.   Social:  Former Smoker, No Alcohol Use Chronic benzo use   Cardiovascular:   Hypertension Denies MI.   hyperlipidemia 07/27/23 Echo: EF 65%; grade 1 diastolic dysfunction; PAP 32    03/16/2023 stress: negative for ischemia   Pulmonary:  Pulmonary Normal    Renal/:   Chronic Renal Disease, CKD    Hepatic/GI:  Hepatic/GI Normal    Musculoskeletal:   Arthritis  H/o cervical fusion   Neurological:   CVA Left arm pain   Endocrine:   Diabetes    Psych:   Psychiatric History          Physical Exam  General: Well nourished, Cooperative, Oriented and Alert    Airway:  Mallampati: II   Mouth Opening: Normal  TM Distance: 4 - 6 cm  Tongue: Normal  Neck ROM: Extension Decreased, Extension Painful    Dental:  Edentulous    Chest/Lungs:  Clear to auscultation, Normal Respiratory Rate    Heart:  Rate: Normal  Rhythm: Regular Rhythm      Wt Readings from Last 3 Encounters:   07/28/23 95.3 kg (210 lb 1.6 oz)   07/27/23 95.3 kg (210 lb)   06/01/23 95.3 kg (210 lb)     Temp Readings from Last 3 Encounters:   05/17/23 36.7 °C (98 °F) (Oral)   05/10/23 36.8 °C (98.2 °F) (Oral)   02/17/23 36.5 °C (97.7 °F) (Other (see comments))     BP Readings from Last 3 Encounters:   07/28/23 136/69   05/17/23 (!) 114/58   05/10/23 138/76     Pulse Readings from Last 3 Encounters:   05/17/23 (!) 54   05/10/23 (!) 42   03/31/23 (!) 52         Anesthesia Plan  Type of Anesthesia, risks & benefits discussed:    Anesthesia Type: Gen Natural Airway, MAC  Intra-op Monitoring Plan: Standard ASA  Monitors  Post Op Pain Control Plan: multimodal analgesia  Induction:  IV  Informed Consent: Informed consent signed with the Patient and all parties understand the risks and agree with anesthesia plan.  All questions answered.   ASA Score: 3  Day of Surgery Review of History & Physical: H&P Update referred to the surgeon/provider.    Ready For Surgery From Anesthesia Perspective.     .

## 2023-08-08 LAB
FINAL PATHOLOGIC DIAGNOSIS: NORMAL
GROSS: NORMAL
Lab: NORMAL

## 2023-08-09 ENCOUNTER — CLINICAL SUPPORT (OUTPATIENT)
Dept: SMOKING CESSATION | Facility: CLINIC | Age: 68
End: 2023-08-09
Payer: COMMERCIAL

## 2023-08-09 DIAGNOSIS — F17.200 NICOTINE DEPENDENCE: ICD-10-CM

## 2023-08-09 PROCEDURE — 99407 BEHAV CHNG SMOKING > 10 MIN: CPT | Mod: S$GLB,,, | Performed by: DIETITIAN, REGISTERED

## 2023-08-09 PROCEDURE — 99999 PR PBB SHADOW E&M-EST. PATIENT-LVL III: CPT | Mod: PBBFAC,,,

## 2023-08-09 PROCEDURE — 99407 PR TOBACCO USE CESSATION INTENSIVE >10 MINUTES: ICD-10-PCS | Mod: S$GLB,,, | Performed by: DIETITIAN, REGISTERED

## 2023-08-09 PROCEDURE — 99999 PR PBB SHADOW E&M-EST. PATIENT-LVL III: ICD-10-PCS | Mod: PBBFAC,,,

## 2023-08-09 RX ORDER — MICONAZOLE NITRATE 2 %
2 CREAM (GRAM) TOPICAL
Qty: 100 EACH | Refills: 0 | Status: SHIPPED | OUTPATIENT
Start: 2023-08-09 | End: 2024-02-27

## 2023-08-14 ENCOUNTER — TELEPHONE (OUTPATIENT)
Dept: SMOKING CESSATION | Facility: CLINIC | Age: 68
End: 2023-08-14
Payer: MEDICARE

## 2023-08-14 NOTE — TELEPHONE ENCOUNTER
"Returned a call from [facility]. Pt shares he started vaping again and is not proud of himself. He also shares he wishes he could get more free patches, but understands he has to wait for a new benefit period. Pt picked up cinnamon flavored gum from pharmacy, but he is not using it as he states he "can't do gum". Pt was appreciative of the return call on his birthday and will try to quit on his own until next benefit period in Dec 2023.  "

## 2023-08-17 NOTE — PROGRESS NOTES
This note was created by combination of typed  and M-Modal dictation.  Transcription errors may be present.  If there are any questions, please contact me.    Assessment and Plan:   Assessment and Plan   Benzodiazepine dependence, did not do well with attempt to wean down 2017  Chronic prescription benzodiazepine use  Anxiety, unable to wean off BZD  -anxious individual.  On citalopram currently.  Was previously seen by Psychiatry who has since left the system.  When he saw him in the fall, his recommendation was inpatient detox given his longstanding dependence on alprazolam which the patient is not interested in pursuing.    He was tried on Cymbalta and did not tolerate in so he has been kept on Celexa.  He again is not interested in inpatient detox.  Offered him referral back to Psychiatry to see a new psychiatrist to establish to discuss alternatives.  He continues low meant about the benzodiazepines.  Reiterated with him again that it is a high-risk medication especially in combination with his narcotic and that I would not revert back to previous dosing.    Wheeze  -has resumed smoking.  Looks like he is got an upcoming follow-up with smoking cessation.  No fevers on intake.  Check chest x-ray.  -     X-Ray Chest PA And Lateral; Future; Expected date: 08/18/2023    Essential hypertension  -blood pressure today is okay on low-dose lisinopril though he notes that that is probably due to him taking Xanax prior to his visit here.  For now no changes.  Creatinine bumped and has stayed elevated.  Check with future labs    Type 2 diabetes mellitus without complication, without long-term current use of insulin  Type 2 diabetes mellitus with diabetic cataract, without long-term current use of insulin  -last A1c was good.  On metformin.  Check future labs  -     Comprehensive Metabolic Panel; Future; Expected date: 09/07/2023  -     Hemoglobin A1C; Future; Expected date: 09/07/2023  -     Lipid Panel; Future;  Expected date: 2023  -     CBC Without Differential; Future; Expected date: 2023      Low testosterone in male  -he usually injects in the  and  of every month.  He skip the  this time because he was not sure what labs were going to be drawn.  Go ahead and inject today and then inject again on the  and then repeat labs around .  On 200  -     Testosterone; Future; Expected date: 2023    Tubular adenoma of colon 5/10/17; 2023 colonoscopy 10 mm cecal TA.  10 mm descending polypoid mucosa.  Repeat 3 years             There are no discontinued medications.    meds sent this encounter:         Follow Up:   Future Appointments   Date Time Provider Department Center   2023  1:45 PM [facility] XR1 300 LB LIMIT [facility] XRAY [facility]   2023  9:30 AM Pauline Welch RN [facility] SMOKE [facility]   2023  2:40 PM Adriana Pete MD [facility] CARDIO [facility]   2023  9:45 AM Kaila Carrillo OD [facility] OPTOMTY [facility]   2023  8:45 AM LAB, [facility]   2023 11:00 AM Gabriel Evans MD [facility] FAM MED [facility]          Subjective:   Subjective   Chief Complaint   Patient presents with    Diabetes     Follow up        HPI  Butch is a 68 y.o. male.     Social History     Tobacco Use    Smoking status: Former     Current packs/day: 0.00     Average packs/day: 0.3 packs/day for 32.0 years (8.0 ttl pk-yrs)     Types: Cigarettes     Start date: 1991     Quit date: 2023     Years since quittin.1    Smokeless tobacco: Never    Tobacco comments:     Approximately 3 cigarettes a day   Substance Use Topics    Alcohol use: No     Alcohol/week: 0.0 standard drinks of alcohol      Social History     Occupational History    Occupation: retired -       Social History     Social History Narrative    Not on file       Last appointment with this clinic was 2023. Last visit with me 2023   To summarize last visit and events leading up to  today:  DM2   Low testosterone on testosterone.  HTN, lipid   Hx of SHAUNA due to dehydration, severe enough to require HD for hyperK, 7/2022.  Hx of AFib during hospitalization 7/2022 in the setting of hyperK. On beta blocker.  Anxiety, BZD dependence. did not tolerate trial of Cymbalta.  Took it for maybe a week.  He was given Celexa to taper off of.  Has been out of it and has not taken it in a few months.  With resultant worsening anxiety.  Taking alprazolam.  Currently have him taking 1.5 tablets in total during the day.    Restart Celexa 40   Stay on alprazolam, 1.5 tablets over the course of the day.  Has been to see Psychiatry, Psychiatry recommended inpatient detox and the patient was not interested.  Chronic back pain followed by pain mgmt. Narcotic and bill  Thrombocytopenia hx of clumping on microscopy  COPD stable.   Hx of TA referred for c scope  3/16/23 nu stress test neg for ischemia; no arrhythmias    Last visit elevated creatinine, repeat  Testosterone increased dose to 200 mg from previous 150    On repeat creatinine remains high.  Cut the lisinopril from 40 down to 20  On repeat creatinine remain unchanged.    07/27/2023 TTE normal systolic function.  LVEF 65%.  Grade 1 diastolic dysfunction.    Saw ENT 7/28.  Sensorineural hearing loss.  Qualifies for hearing aids.    08/04/2023 colonoscopy 10 mm cecal TA.  10 mm descending polypoid mucosa.  Repeat 3 years    Today's visit:  Please note: Chronic medical problems that are stable but are being addressed at this visit may not be listed/documented here, but may be addressed in more detail in the Assessment and Plan section.   Below are salient features of chronic medical conditions, or new/acute conditions and their details.    Patient arrived late passed the appointment time.  Was able to fit him in but discussed with him that this was going to be a focused visit.    Remains high stressed and anxious.  Limits that he can not revert back to previous  doses of Xanax.  Has several grandchildren in the house which are significant cause of stress for him.  He takes Xanax 1.5 tablets in 24 hours.  Reviewed the psychiatry recommendations from back in the fall, about inpatient detox to transition off of benzodiazepine and he is not interested.    Had previously tried Cymbalta but did not tolerate and so has been kept on Celexa.    Discussed with him that with continued anxiety and stress, I think this is beyond the scope of my ability to adjust his psychiatry medication.  Offered him referral to new psychiatrist and he declines.  But I discussed with him that I will not raise his Xanax prescription.    Having a lot of pain.  Gabapentin he thinks cause swelling in his legs.  This is managed by his pain Dr..  Having a lot of neuropathy and burning and numbness of the feet.    Thinks variable BP especially when he gets mad at the grandchildren  BP good today    Missed the dose of testosterone 8/15  B/c he was not sure what tests we were going to do.    Restarted smoking  Initially vaping  And now cigarettes  Wheeze and cough.       Patient Care Team:  aGbriel Evans MD as PCP - General (Internal Medicine)  Vlad Nava MD (Pain Medicine)  Kaila Carrillo OD as Consulting Physician (Optometry)  Wyatt Ojeda MA as Care Coordinator    Patient Active Problem List    Diagnosis Date Noted    Bradycardia 05/10/2023    Aortic atherosclerosis 02/16/2023    Type 2 diabetes mellitus with diabetic cataract, without long-term current use of insulin 10/14/2022    Unspecified inflammatory spondylopathy, cervical region 10/14/2022    Thrombocytopenia 10/14/2022    History of atrial fibrillation 7/2022 hospitalization i n the setting of SHAUNA and hyperK. started on carvedilol during admission 07/19/2022    Junctional bradycardia 07/18/2022    Chronic prescription benzodiazepine use 07/18/2022    Foraminal stenosis of lumbar region 05/06/2022 5/5/2022 MRI L spine:   *   Multilevel  lumbar spondylosis with up to severe right neural foraminal narrowing at L5-S1 with impingement of exiting right L5 nerve root.   *   Moderate thecal sac narrowing at L3-L4.   *   Chronic spondylolysis of L5 with grade 1 anterolisthesis of L5 on S1        Chronic low back pain 12/27/2021    Sacroiliac joint pain 12/27/2021    Sacroiliitis 12/27/2021    Impaired mobility 10/10/2021    Arthrodesis status 10/10/2021     Formatting of this note might be different from the original.  C2-T2, 10/03/21      Impaired mobility and ADLs 10/07/2021    Status post surgery 10/03/2021    History of smoking 10-25 pack years 10/02/2021    Paresthesia of left upper and lower extremity 10/01/2021    Low testosterone in male 09/02/2020 6/2020 Repeat labs prolactin was normal.  Testosterone was low.  Free testosterone was low and sex hormone binding globulin was normal      Elevated prolactin level,low testosterone, gynecomastia; MRI pituitary normal 5/2020 01/13/2020    Nuclear sclerosis of both eyes 11/04/2019    Open angle with borderline findings and high glaucoma risk in both eyes 10/08/2019    Cataract, bilateral 09/11/2018    Cervical stenosis of spinal canal 10/3/2021 LAMINECTOMY, SPINE, CERVICAL, WITH POSTERIOR FUSION C2-T2 05/07/2018 02/14/2018 MRI C-spine impression:  Slight retrolisthesis of C4 with respect to C5.  Narrowing of the central spinal canal most prominent at the C4-C5, C5-C6, and C6-C7 levels and to a lesser extent at C2-C3 and C3-C4.  Annular disc bulges posteriorly at C2-C3, C3-C4.  Diffuse disc herniation/protrusion posteriorly at C4-C5 level and a disc herniation/extrusion posteriorly at the C5-C6 level with a central disc herniation/protrusion posteriorly at the C6-C7 level.  Narrowing of the neural foramen bilaterally from C3-C4 through C6-C7.  10/1/2021 admitted for L sided numbness after epidural injection, initially CT interpreted at [facility]; however subsequent MRI no hemorrhage but severe spinal  canal stenosis    10/1/2021 MRI brain: Negative MRI of the brain for hemorrhage, mass or infarction. Specifically, no evidence of subarachnoid blood or blood products in the extra-axial spaces on SWI or diffusion-weighted images.  In light of the previous CTs and history of epidural injections at outside facility, this raises the question of in ejected contrast in the central spinal canal with migration into the intracranial subarachnoid spaces.  Subarachnoid hemorrhage or exudate are considered less likely.  10/1/2021 MRI C spine: Severe spinal canal stenosis with complete effacement of the CSF and spinal cord compression at the level of C5-C6 due to posterior disc osteophyte complex with superimposed right disc protrusion and congenitally narrow spinal canal.  High T2 signal within the cord secondary to either edema or myelomalacia.    10/3/2021 LAMINECTOMY, SPINE, CERVICAL, WITH POSTERIOR FUSION C2-T2      Tubular adenoma of colon 5/10/17 05/10/2017     5/10/2017 colonoscopy tubular adenoma      Therapeutic opioid-induced constipation (OIC) 04/17/2017    Tobacco dependence, patch with irritation; hx of bupropion 01/12/2016    Type 2 diabetes mellitus without complication, without long-term current use of insulin 08/07/2015    Facet arthritis of cervical region 10/28/2013    Facet arthritis of lumbar region 10/28/2013    Benzodiazepine dependence, did not do well with attempt to wean down 2017 10/28/2013    Chronic, continuous use of opioids 10/28/2013    ED (erectile dysfunction) 07/26/2013    DDD (degenerative disc disease), cervical 05/23/2013    DDD (degenerative disc disease), lumbar 05/23/2013    Essential hypertension 11/01/2012     2/3/2022 TTE LV normal size with moderate concentric hypertrophy and normal systolic function LVEF 60%.  Normal RV size and systolic function.  PA pressure 33  7/18/22 TTE LV normal size with mod concentric hypertrophy; normal systolic function. LVEF 70%. Normal RV size and  "systolic function. PA pressure 58. Mod pHTN      Anemia 11/01/2012    Anxiety, unable to wean off BZD 11/01/2012    Recurrent major depressive disorder, in partial remission 11/01/2012       PAST MEDICAL PROBLEMS, PAST SURGICAL HISTORY: please see relevant portions of the electronic medical record    ALLERGIES AND MEDICATIONS: updated and reviewed.  Medication List with Changes/Refills   Current Medications    ALPRAZOLAM (XANAX) 1 MG TABLET    TAKE 1 AND 1/2 TABLETS BY MOUTH ONCE A DAY    AMLODIPINE (NORVASC) 10 MG TABLET    TAKE ONE TABLET BY MOUTH EVERY EVENING    ATORVASTATIN (LIPITOR) 40 MG TABLET    TAKE ONE TABLET BY MOUTH EVERY EVENING    BUSPIRONE (BUSPAR) 30 MG TAB    TAKE ONE TABLET BY MOUTH TWICE DAILY    CARVEDILOL (COREG) 12.5 MG TABLET    Take 1 tablet (12.5 mg total) by mouth 2 (two) times daily with meals.    CITALOPRAM (CELEXA) 40 MG TABLET    Take 1 tablet (40 mg total) by mouth once daily.    FLUTICASONE-SALMETEROL DISKUS INHALER 100-50 MCG    Inhale 1 puff into the lungs.    GABAPENTIN (NEURONTIN) 600 MG TABLET    Take 600 mg by mouth 3 (three) times daily.    HYDRALAZINE (APRESOLINE) 25 MG TABLET    TAKE ONE TABLET BY MOUTH THREE TIMES DAILY    IBUPROFEN (ADVIL,MOTRIN) 600 MG TABLET    Take 600 mg by mouth.    LISINOPRIL (PRINIVIL,ZESTRIL) 20 MG TABLET    Take 1 tablet (20 mg total) by mouth once daily.    METFORMIN (GLUCOPHAGE) 500 MG TABLET    TAKE ONE TABLET BY MOUTH TWICE DAILY WITH MEALS    NALOXONE (NARCAN) 4 MG/ACTUATION SPRY    4mg by nasal route as needed for opioid overdose; may repeat every 2-3 minutes in alternating nostrils until medical help arrives. Call 911    NEEDLE, DISP, 22 G 22 GAUGE X 1" NDLE    Testosterone injection every 2 weeks    NICOTINE, POLACRILEX, (NICORETTE) 2 MG GUM    Take 1 each (2 mg total) by mouth as needed (1-2 per hour in place of cigarette. Not to exceed 5 per day.).    OXYCODONE-ACETAMINOPHEN (PERCOCET)  MG PER TABLET    Take 1 tablet by mouth every " "4 (four) hours as needed.    POLYETHYLENE GLYCOL 3350 (MIRALAX ORAL)    Take 1 application by mouth.    SENNA (SENOKOT) 8.6 MG TABLET    Take 1 tablet by mouth 2 (two) times a day.    SYRINGE, DISPOSABLE, (BD LUER-STARLA SYRINGE) 1 ML SYRG    Testosterone injection every 2 weeks    TESTOSTERONE CYPIONATE (DEPOTESTOTERONE CYPIONATE) 200 MG/ML INJECTION    Inject 1 mL (200 mg total) into the muscle every 14 (fourteen) days.    TIZANIDINE (ZANAFLEX) 4 MG TABLET    Take 4-8 mg by mouth nightly as needed.         Objective:   Objective   Physical Exam   Vitals:    08/18/23 1127   BP: 134/68   Pulse: (!) 50   Temp: 97.9 °F (36.6 °C)   TempSrc: Oral   SpO2: 95%   Weight: 96.9 kg (213 lb 10 oz)   Height: 5' 11" (1.803 m)    Body mass index is 29.79 kg/m².            Physical Exam  Constitutional:       General: He is not in acute distress.     Appearance: He is well-developed.   Eyes:      Extraocular Movements: Extraocular movements intact.   Cardiovascular:      Rate and Rhythm: Normal rate and regular rhythm.      Heart sounds: Normal heart sounds. No murmur heard.  Pulmonary:      Effort: Pulmonary effort is normal.      Breath sounds: Wheezing present.      Comments: Expiratory wheeze in the left lower lobe  Musculoskeletal:         General: Normal range of motion.      Right lower leg: Edema present.      Left lower leg: Edema present.   Skin:     General: Skin is warm and dry.   Neurological:      Mental Status: He is alert and oriented to person, place, and time.      Coordination: Coordination normal.   Psychiatric:         Behavior: Behavior normal.         Thought Content: Thought content normal.                "

## 2023-08-18 ENCOUNTER — OFFICE VISIT (OUTPATIENT)
Dept: FAMILY MEDICINE | Facility: CLINIC | Age: 68
End: 2023-08-18
Payer: MEDICARE

## 2023-08-18 ENCOUNTER — HOSPITAL ENCOUNTER (OUTPATIENT)
Dept: RADIOLOGY | Facility: HOSPITAL | Age: 68
Discharge: HOME | End: 2023-08-18
Attending: INTERNAL MEDICINE
Payer: MEDICARE

## 2023-08-18 ENCOUNTER — TELEPHONE (OUTPATIENT)
Dept: FAMILY MEDICINE | Facility: CLINIC | Age: 68
End: 2023-08-18

## 2023-08-18 VITALS
HEIGHT: 71 IN | OXYGEN SATURATION: 95 % | SYSTOLIC BLOOD PRESSURE: 134 MMHG | WEIGHT: 213.63 LBS | HEART RATE: 50 BPM | TEMPERATURE: 98 F | DIASTOLIC BLOOD PRESSURE: 68 MMHG | BODY MASS INDEX: 29.91 KG/M2

## 2023-08-18 DIAGNOSIS — D12.6 TUBULAR ADENOMA OF COLON: Chronic | ICD-10-CM

## 2023-08-18 DIAGNOSIS — E11.36 TYPE 2 DIABETES MELLITUS WITH DIABETIC CATARACT, WITHOUT LONG-TERM CURRENT USE OF INSULIN: ICD-10-CM

## 2023-08-18 DIAGNOSIS — I10 ESSENTIAL HYPERTENSION: ICD-10-CM

## 2023-08-18 DIAGNOSIS — F41.9 ANXIETY: ICD-10-CM

## 2023-08-18 DIAGNOSIS — R06.2 WHEEZE: ICD-10-CM

## 2023-08-18 DIAGNOSIS — F13.20 BENZODIAZEPINE DEPENDENCE: Primary | ICD-10-CM

## 2023-08-18 DIAGNOSIS — E11.9 TYPE 2 DIABETES MELLITUS WITHOUT COMPLICATION, WITHOUT LONG-TERM CURRENT USE OF INSULIN: ICD-10-CM

## 2023-08-18 DIAGNOSIS — R79.89 LOW TESTOSTERONE IN MALE: ICD-10-CM

## 2023-08-18 DIAGNOSIS — Z79.899 CHRONIC PRESCRIPTION BENZODIAZEPINE USE: ICD-10-CM

## 2023-08-18 PROCEDURE — 4010F ACE/ARB THERAPY RXD/TAKEN: CPT | Mod: CPTII,S$GLB,, | Performed by: INTERNAL MEDICINE

## 2023-08-18 PROCEDURE — 3075F PR MOST RECENT SYSTOLIC BLOOD PRESS GE 130-139MM HG: ICD-10-PCS | Mod: CPTII,S$GLB,, | Performed by: INTERNAL MEDICINE

## 2023-08-18 PROCEDURE — 3288F FALL RISK ASSESSMENT DOCD: CPT | Mod: CPTII,S$GLB,, | Performed by: INTERNAL MEDICINE

## 2023-08-18 PROCEDURE — 3078F DIAST BP <80 MM HG: CPT | Mod: CPTII,S$GLB,, | Performed by: INTERNAL MEDICINE

## 2023-08-18 PROCEDURE — 1157F ADVNC CARE PLAN IN RCRD: CPT | Mod: CPTII,S$GLB,, | Performed by: INTERNAL MEDICINE

## 2023-08-18 PROCEDURE — 99999 PR PBB SHADOW E&M-EST. PATIENT-LVL IV: ICD-10-PCS | Mod: PBBFAC,,, | Performed by: INTERNAL MEDICINE

## 2023-08-18 PROCEDURE — 1159F PR MEDICATION LIST DOCUMENTED IN MEDICAL RECORD: ICD-10-PCS | Mod: CPTII,S$GLB,, | Performed by: INTERNAL MEDICINE

## 2023-08-18 PROCEDURE — 99214 OFFICE O/P EST MOD 30 MIN: CPT | Mod: S$GLB,,, | Performed by: INTERNAL MEDICINE

## 2023-08-18 PROCEDURE — 1101F PT FALLS ASSESS-DOCD LE1/YR: CPT | Mod: CPTII,S$GLB,, | Performed by: INTERNAL MEDICINE

## 2023-08-18 PROCEDURE — 3008F BODY MASS INDEX DOCD: CPT | Mod: CPTII,S$GLB,, | Performed by: INTERNAL MEDICINE

## 2023-08-18 PROCEDURE — 71046 X-RAY EXAM CHEST 2 VIEWS: CPT | Mod: TC,FY,PO

## 2023-08-18 PROCEDURE — 71046 XR CHEST PA AND LATERAL: ICD-10-PCS | Mod: 26,,, | Performed by: STUDENT IN AN ORGANIZED HEALTH CARE EDUCATION/TRAINING PROGRAM

## 2023-08-18 PROCEDURE — 1159F MED LIST DOCD IN RCRD: CPT | Mod: CPTII,S$GLB,, | Performed by: INTERNAL MEDICINE

## 2023-08-18 PROCEDURE — 3044F HG A1C LEVEL LT 7.0%: CPT | Mod: CPTII,S$GLB,, | Performed by: INTERNAL MEDICINE

## 2023-08-18 PROCEDURE — 3075F SYST BP GE 130 - 139MM HG: CPT | Mod: CPTII,S$GLB,, | Performed by: INTERNAL MEDICINE

## 2023-08-18 PROCEDURE — 1160F RVW MEDS BY RX/DR IN RCRD: CPT | Mod: CPTII,S$GLB,, | Performed by: INTERNAL MEDICINE

## 2023-08-18 PROCEDURE — 1160F PR REVIEW ALL MEDS BY PRESCRIBER/CLIN PHARMACIST DOCUMENTED: ICD-10-PCS | Mod: CPTII,S$GLB,, | Performed by: INTERNAL MEDICINE

## 2023-08-18 PROCEDURE — 3044F PR MOST RECENT HEMOGLOBIN A1C LEVEL <7.0%: ICD-10-PCS | Mod: CPTII,S$GLB,, | Performed by: INTERNAL MEDICINE

## 2023-08-18 PROCEDURE — 4010F PR ACE/ARB THEARPY RXD/TAKEN: ICD-10-PCS | Mod: CPTII,S$GLB,, | Performed by: INTERNAL MEDICINE

## 2023-08-18 PROCEDURE — 1125F AMNT PAIN NOTED PAIN PRSNT: CPT | Mod: CPTII,S$GLB,, | Performed by: INTERNAL MEDICINE

## 2023-08-18 PROCEDURE — 99214 PR OFFICE/OUTPT VISIT, EST, LEVL IV, 30-39 MIN: ICD-10-PCS | Mod: S$GLB,,, | Performed by: INTERNAL MEDICINE

## 2023-08-18 PROCEDURE — 3078F PR MOST RECENT DIASTOLIC BLOOD PRESSURE < 80 MM HG: ICD-10-PCS | Mod: CPTII,S$GLB,, | Performed by: INTERNAL MEDICINE

## 2023-08-18 PROCEDURE — 1125F PR PAIN SEVERITY QUANTIFIED, PAIN PRESENT: ICD-10-PCS | Mod: CPTII,S$GLB,, | Performed by: INTERNAL MEDICINE

## 2023-08-18 PROCEDURE — 3008F PR BODY MASS INDEX (BMI) DOCUMENTED: ICD-10-PCS | Mod: CPTII,S$GLB,, | Performed by: INTERNAL MEDICINE

## 2023-08-18 PROCEDURE — 3288F PR FALLS RISK ASSESSMENT DOCUMENTED: ICD-10-PCS | Mod: CPTII,S$GLB,, | Performed by: INTERNAL MEDICINE

## 2023-08-18 PROCEDURE — 1157F PR ADVANCE CARE PLAN OR EQUIV PRESENT IN MEDICAL RECORD: ICD-10-PCS | Mod: CPTII,S$GLB,, | Performed by: INTERNAL MEDICINE

## 2023-08-18 PROCEDURE — 71046 X-RAY EXAM CHEST 2 VIEWS: CPT | Mod: 26,,, | Performed by: STUDENT IN AN ORGANIZED HEALTH CARE EDUCATION/TRAINING PROGRAM

## 2023-08-18 PROCEDURE — 99999 PR PBB SHADOW E&M-EST. PATIENT-LVL IV: CPT | Mod: PBBFAC,,, | Performed by: INTERNAL MEDICINE

## 2023-08-18 PROCEDURE — 1101F PR PT FALLS ASSESS DOC 0-1 FALLS W/OUT INJ PAST YR: ICD-10-PCS | Mod: CPTII,S$GLB,, | Performed by: INTERNAL MEDICINE

## 2023-08-23 ENCOUNTER — CLINICAL SUPPORT (OUTPATIENT)
Dept: SMOKING CESSATION | Facility: CLINIC | Age: 68
End: 2023-08-23
Payer: COMMERCIAL

## 2023-08-23 DIAGNOSIS — F17.200 NICOTINE DEPENDENCE: Primary | ICD-10-CM

## 2023-08-23 PROCEDURE — 99999 PR PBB SHADOW E&M-EST. PATIENT-LVL III: ICD-10-PCS | Mod: PBBFAC,,,

## 2023-08-23 PROCEDURE — 99407 PR TOBACCO USE CESSATION INTENSIVE >10 MINUTES: ICD-10-PCS | Mod: S$GLB,,, | Performed by: DIETITIAN, REGISTERED

## 2023-08-23 PROCEDURE — 99407 BEHAV CHNG SMOKING > 10 MIN: CPT | Mod: S$GLB,,, | Performed by: DIETITIAN, REGISTERED

## 2023-08-23 PROCEDURE — 99999 PR PBB SHADOW E&M-EST. PATIENT-LVL III: CPT | Mod: PBBFAC,,,

## 2023-08-30 DIAGNOSIS — E11.9 TYPE 2 DIABETES MELLITUS WITHOUT COMPLICATION, WITHOUT LONG-TERM CURRENT USE OF INSULIN: ICD-10-CM

## 2023-08-30 NOTE — TELEPHONE ENCOUNTER
No care due was identified.  [facility] Embedded Care Due Messages. Reference number: [id number].   8/30/2023 2:10:12 PM CDT

## 2023-08-31 RX ORDER — METFORMIN HYDROCHLORIDE 500 MG/1
TABLET ORAL
Qty: 180 TABLET | Refills: 1 | Status: SHIPPED | OUTPATIENT
Start: 2023-08-31 | End: 2023-11-20 | Stop reason: SDUPTHER

## 2023-08-31 NOTE — TELEPHONE ENCOUNTER
Refill Decision Note   Butch Mcdaniel  is requesting a refill authorization.  Brief Assessment and Rationale for Refill:  Approve     Medication Therapy Plan:  eGFR>45 no adj needed      Pharmacist review requested: Yes   Comments:     Note composed:8:38 AM 08/31/2023

## 2023-08-31 NOTE — TELEPHONE ENCOUNTER
Refill Routing Note   Medication(s) are not appropriate for processing by [facility] for the following reason(s):      Required labs abnormal    [facility] action(s):  Defer Care Due:  None identified          Pharmacist review requested: Yes     Appointments  past 12m or future 3m with PCP    Date Provider   Last Visit   8/18/2023 Gabriel Evans MD   Next Visit   11/20/2023 Gabriel Evans MD   ED visits in past 90 days: 0        Note composed:9:30 PM 08/30/2023

## 2023-09-05 ENCOUNTER — PATIENT OUTREACH (OUTPATIENT)
Dept: ADMINISTRATIVE | Facility: HOSPITAL | Age: 68
End: 2023-09-05
Payer: MEDICARE

## 2023-09-06 ENCOUNTER — OFFICE VISIT (OUTPATIENT)
Dept: CARDIOLOGY | Facility: CLINIC | Age: 68
End: 2023-09-06
Payer: MEDICARE

## 2023-09-06 VITALS
RESPIRATION RATE: 18 BRPM | HEART RATE: 48 BPM | HEIGHT: 71 IN | OXYGEN SATURATION: 98 % | DIASTOLIC BLOOD PRESSURE: 64 MMHG | SYSTOLIC BLOOD PRESSURE: 128 MMHG | BODY MASS INDEX: 29.62 KG/M2 | WEIGHT: 211.56 LBS

## 2023-09-06 DIAGNOSIS — I70.0 AORTIC ATHEROSCLEROSIS: ICD-10-CM

## 2023-09-06 DIAGNOSIS — M51.36 DDD (DEGENERATIVE DISC DISEASE), LUMBAR: Chronic | ICD-10-CM

## 2023-09-06 DIAGNOSIS — M50.30 DDD (DEGENERATIVE DISC DISEASE), CERVICAL: Chronic | ICD-10-CM

## 2023-09-06 DIAGNOSIS — I10 ESSENTIAL HYPERTENSION: Primary | ICD-10-CM

## 2023-09-06 DIAGNOSIS — E11.9 TYPE 2 DIABETES MELLITUS WITHOUT COMPLICATION, WITHOUT LONG-TERM CURRENT USE OF INSULIN: ICD-10-CM

## 2023-09-06 DIAGNOSIS — R00.1 JUNCTIONAL BRADYCARDIA: ICD-10-CM

## 2023-09-06 DIAGNOSIS — Z87.891 HISTORY OF SMOKING 10-25 PACK YEARS: ICD-10-CM

## 2023-09-06 PROCEDURE — 3044F PR MOST RECENT HEMOGLOBIN A1C LEVEL <7.0%: ICD-10-PCS | Mod: CPTII,S$GLB,, | Performed by: INTERNAL MEDICINE

## 2023-09-06 PROCEDURE — 99999 PR PBB SHADOW E&M-EST. PATIENT-LVL V: ICD-10-PCS | Mod: PBBFAC,,, | Performed by: INTERNAL MEDICINE

## 2023-09-06 PROCEDURE — 3061F NEG MICROALBUMINURIA REV: CPT | Mod: CPTII,S$GLB,, | Performed by: INTERNAL MEDICINE

## 2023-09-06 PROCEDURE — 93000 EKG 12-LEAD: ICD-10-PCS | Mod: S$GLB,,, | Performed by: INTERNAL MEDICINE

## 2023-09-06 PROCEDURE — 1100F PR PT FALLS ASSESS DOC 2+ FALLS/FALL W/INJURY/YR: ICD-10-PCS | Mod: CPTII,S$GLB,, | Performed by: INTERNAL MEDICINE

## 2023-09-06 PROCEDURE — 4010F PR ACE/ARB THEARPY RXD/TAKEN: ICD-10-PCS | Mod: CPTII,S$GLB,, | Performed by: INTERNAL MEDICINE

## 2023-09-06 PROCEDURE — 3078F DIAST BP <80 MM HG: CPT | Mod: CPTII,S$GLB,, | Performed by: INTERNAL MEDICINE

## 2023-09-06 PROCEDURE — 1157F ADVNC CARE PLAN IN RCRD: CPT | Mod: CPTII,S$GLB,, | Performed by: INTERNAL MEDICINE

## 2023-09-06 PROCEDURE — 1125F PR PAIN SEVERITY QUANTIFIED, PAIN PRESENT: ICD-10-PCS | Mod: CPTII,S$GLB,, | Performed by: INTERNAL MEDICINE

## 2023-09-06 PROCEDURE — 1160F PR REVIEW ALL MEDS BY PRESCRIBER/CLIN PHARMACIST DOCUMENTED: ICD-10-PCS | Mod: CPTII,S$GLB,, | Performed by: INTERNAL MEDICINE

## 2023-09-06 PROCEDURE — 3044F HG A1C LEVEL LT 7.0%: CPT | Mod: CPTII,S$GLB,, | Performed by: INTERNAL MEDICINE

## 2023-09-06 PROCEDURE — 1157F PR ADVANCE CARE PLAN OR EQUIV PRESENT IN MEDICAL RECORD: ICD-10-PCS | Mod: CPTII,S$GLB,, | Performed by: INTERNAL MEDICINE

## 2023-09-06 PROCEDURE — 3074F SYST BP LT 130 MM HG: CPT | Mod: CPTII,S$GLB,, | Performed by: INTERNAL MEDICINE

## 2023-09-06 PROCEDURE — 99999 PR PBB SHADOW E&M-EST. PATIENT-LVL V: CPT | Mod: PBBFAC,,, | Performed by: INTERNAL MEDICINE

## 2023-09-06 PROCEDURE — 1125F AMNT PAIN NOTED PAIN PRSNT: CPT | Mod: CPTII,S$GLB,, | Performed by: INTERNAL MEDICINE

## 2023-09-06 PROCEDURE — 4010F ACE/ARB THERAPY RXD/TAKEN: CPT | Mod: CPTII,S$GLB,, | Performed by: INTERNAL MEDICINE

## 2023-09-06 PROCEDURE — 1159F PR MEDICATION LIST DOCUMENTED IN MEDICAL RECORD: ICD-10-PCS | Mod: CPTII,S$GLB,, | Performed by: INTERNAL MEDICINE

## 2023-09-06 PROCEDURE — 3288F PR FALLS RISK ASSESSMENT DOCUMENTED: ICD-10-PCS | Mod: CPTII,S$GLB,, | Performed by: INTERNAL MEDICINE

## 2023-09-06 PROCEDURE — 93000 ELECTROCARDIOGRAM COMPLETE: CPT | Mod: S$GLB,,, | Performed by: INTERNAL MEDICINE

## 2023-09-06 PROCEDURE — 3008F PR BODY MASS INDEX (BMI) DOCUMENTED: ICD-10-PCS | Mod: CPTII,S$GLB,, | Performed by: INTERNAL MEDICINE

## 2023-09-06 PROCEDURE — 1160F RVW MEDS BY RX/DR IN RCRD: CPT | Mod: CPTII,S$GLB,, | Performed by: INTERNAL MEDICINE

## 2023-09-06 PROCEDURE — 3078F PR MOST RECENT DIASTOLIC BLOOD PRESSURE < 80 MM HG: ICD-10-PCS | Mod: CPTII,S$GLB,, | Performed by: INTERNAL MEDICINE

## 2023-09-06 PROCEDURE — 3288F FALL RISK ASSESSMENT DOCD: CPT | Mod: CPTII,S$GLB,, | Performed by: INTERNAL MEDICINE

## 2023-09-06 PROCEDURE — 3074F PR MOST RECENT SYSTOLIC BLOOD PRESSURE < 130 MM HG: ICD-10-PCS | Mod: CPTII,S$GLB,, | Performed by: INTERNAL MEDICINE

## 2023-09-06 PROCEDURE — 3066F PR DOCUMENTATION OF TREATMENT FOR NEPHROPATHY: ICD-10-PCS | Mod: CPTII,S$GLB,, | Performed by: INTERNAL MEDICINE

## 2023-09-06 PROCEDURE — 3061F PR NEG MICROALBUMINURIA RESULT DOCUMENTED/REVIEW: ICD-10-PCS | Mod: CPTII,S$GLB,, | Performed by: INTERNAL MEDICINE

## 2023-09-06 PROCEDURE — 99214 OFFICE O/P EST MOD 30 MIN: CPT | Mod: S$GLB,,, | Performed by: INTERNAL MEDICINE

## 2023-09-06 PROCEDURE — 1159F MED LIST DOCD IN RCRD: CPT | Mod: CPTII,S$GLB,, | Performed by: INTERNAL MEDICINE

## 2023-09-06 PROCEDURE — 3008F BODY MASS INDEX DOCD: CPT | Mod: CPTII,S$GLB,, | Performed by: INTERNAL MEDICINE

## 2023-09-06 PROCEDURE — 3066F NEPHROPATHY DOC TX: CPT | Mod: CPTII,S$GLB,, | Performed by: INTERNAL MEDICINE

## 2023-09-06 PROCEDURE — 1100F PTFALLS ASSESS-DOCD GE2>/YR: CPT | Mod: CPTII,S$GLB,, | Performed by: INTERNAL MEDICINE

## 2023-09-06 PROCEDURE — 99214 PR OFFICE/OUTPT VISIT, EST, LEVL IV, 30-39 MIN: ICD-10-PCS | Mod: S$GLB,,, | Performed by: INTERNAL MEDICINE

## 2023-09-06 RX ORDER — CARVEDILOL 6.25 MG/1
6.25 TABLET ORAL 2 TIMES DAILY
Qty: 180 TABLET | Refills: 3 | Status: SHIPPED | OUTPATIENT
Start: 2023-09-06

## 2023-09-06 NOTE — PROGRESS NOTES
CARDIOVASCULAR CONSULTATION    REASON FOR CONSULT:   Butch Mcdaniel is a 68 y.o. male who presents for evaluation.      HISTORY OF PRESENT ILLNESS:     Patient is a pleasant 66-year-old man.  Here for evaluation.  States blood pressure fluctuates a lot at home.  Fluctuates between 110-200 mmHg.  States that he has clonidine at home and whenever his blood pressure is elevated he takes an extra dose of clonidine.  Denies any chest pains at rest on exertion.  Denies orthopnea, PND, swelling of feet.      Notes from February 2022:  Patient here for follow-up.  Denies any chest pains at rest on exertion, orthopnea, PND.  Blood pressure staying at home.  Around 160-170 mmHg.      April 2022:  Patient very concerned about his fluctuating blood pressure.  Today morning his blood pressure was 198/111 mmHg.  Then just prior to coming to clinic it was 135/87 mmHg.  Fluctuates throughout the day.  No anginal chest pains.    The left ventricle is normal in size with moderate concentric hypertrophy and normal systolic function.  The estimated ejection fraction is 60%.  Normal right ventricular size with normal right ventricular systolic function.  The estimated PA systolic pressure is 33 mmHg.     Heart rates varied between 43 and 109 BPM with an average of 62 BPM.  Rare PVCs and PACs. Five atrial pairs. One three beat atrial run.      Notes from December 2022: Patient here for follow-up.  Denies any chest pains at rest on exertion, orthopnea, PND.  However has been experiencing significant dyspnea on exertion lately.  Unfortunately has started smoking again.    Notes from March 23:  Patient here for follow-up.  Stress test did not show any significant ischemia.  Trying to quit smoking.        Normal myocardial perfusion scan. There is no evidence of myocardial ischemia or infarction.    There is mild to moderate apical thinning which is a normal variant.    The gated perfusion images showed an ejection fraction of  56% post stress.    There is normal wall motion post stress.    The ECG portion of the study is negative for ischemia.    The patient reported no chest pain during the stress test.    There were no arrhythmias during stress.      Notes from September 23:  Patient here for follow-up.  Echo showed normal left ventricle systolic function with grade 1 diastolic dysfunction.  Patient denies any chest pains      PAST MEDICAL HISTORY:     Past Medical History:   Diagnosis Date    Cataract, bilateral 09/11/2018    Degenerative disc disease     Diabetes mellitus type II, controlled     Eye injury as a child    stick hit eye ? 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 both eyes 10/08/2019    Personal history of colonic polyps     Psychiatric problem     Therapy     [facility] many years ago       PAST SURGICAL HISTORY:     Past Surgical History:   Procedure Laterality Date    APPENDECTOMY      COLONOSCOPY N/A 5/10/2017    Procedure: COLONOSCOPY;  Surgeon: Shantanu Marley MD;  Location: [facility];  Service: Endoscopy;  Laterality: N/A;    COLONOSCOPY N/A 8/4/2023    Procedure: COLONOSCOPY;  Surgeon: Gael Rand MD;  Location: [facility];  Service: Endoscopy;  Laterality: N/A;  Referred by: Dr. Gabriel Evans  Prep: [email or web address]  Route instructions sent: [email or web address] and reviewed via phone, pt verbalized [id number]    POSTERIOR FUSION OF CERVICAL SPINE WITH LAMINECTOMY N/A 10/3/2021    Procedure: LAMINECTOMY, SPINE, CERVICAL, WITH POSTERIOR FUSION C2-T2;  Surgeon: Ana Rosa Geller MD;  Location: [facility] OR [address] [place] [address];  Service: Neurosurgery;  Laterality: N/A;       ALLERGIES AND MEDICATION:   Review of patient's allergies indicates:  No Known Allergies     Medication List            Accurate as of September 6, 2023  2:22 PM. If you have any questions, ask your nurse or doctor.  "               CONTINUE taking these medications      ALPRAZolam 1 MG tablet  Commonly known as: XANAX  TAKE 1 AND 1/2 TABLETS BY MOUTH ONCE A DAY     amLODIPine 10 MG tablet  Commonly known as: NORVASC  TAKE ONE TABLET BY MOUTH EVERY EVENING     atorvastatin 40 MG tablet  Commonly known as: LIPITOR  TAKE ONE TABLET BY MOUTH EVERY EVENING     BD LUER-STARLA SYRINGE 1 mL Syrg  Generic drug: syringe (disposable)  Testosterone injection every 2 weeks     busPIRone 30 MG Tab  Commonly known as: BUSPAR  TAKE ONE TABLET BY MOUTH TWICE DAILY     carvediloL 12.5 MG tablet  Commonly known as: COREG  Take 1 tablet (12.5 mg total) by mouth 2 (two) times daily with meals.     citalopram 40 MG tablet  Commonly known as: CeleXA  Take 1 tablet (40 mg total) by mouth once daily.     fluticasone-salmeterol 100-50 mcg/dose 100-50 mcg/dose diskus inhaler  Commonly known as: ADVAIR     gabapentin 600 MG tablet  Commonly known as: NEURONTIN     hydrALAZINE 25 MG tablet  Commonly known as: APRESOLINE  TAKE ONE TABLET BY MOUTH THREE TIMES DAILY     ibuprofen 600 MG tablet  Commonly known as: ADVIL,MOTRIN     lisinopriL 20 MG tablet  Commonly known as: PRINIVIL,ZESTRIL  Take 1 tablet (20 mg total) by mouth once daily.     metFORMIN 500 MG tablet  Commonly known as: GLUCOPHAGE  TAKE ONE TABLET BY MOUTH TWICE DAILY WITH MEALS     MIRALAX ORAL     naloxone 4 mg/actuation Spry  Commonly known as: NARCAN  4mg by nasal route as needed for opioid overdose; may repeat every 2-3 minutes in alternating nostrils until medical help arrives. Call 911     needle (disp) 22 G 22 gauge x 1" Ndle  Testosterone injection every 2 weeks     nicotine (polacrilex) 2 mg Gum  Commonly known as: NICORETTE  Take 1 each (2 mg total) by mouth as needed (1-2 per hour in place of cigarette. Not to exceed 5 per day.).     oxyCODONE-acetaminophen  mg per tablet  Commonly known as: PERCOCET     senna 8.6 mg tablet  Commonly known as: SENOKOT  Take 1 tablet by mouth 2 " (two) times a day.     testosterone cypionate 200 mg/mL injection  Commonly known as: DEPOTESTOTERONE CYPIONATE  Inject 1 mL (200 mg total) into the muscle every 14 (fourteen) days.     tiZANidine 4 MG tablet  Commonly known as: ZANAFLEX              SOCIAL HISTORY:     Social History     Socioeconomic History    Marital status:     Number of children: 3   Occupational History    Occupation: retired - tug boat captain   Tobacco Use    Smoking status: Some Days     Current packs/day: 0.20     Average packs/day: 0.2 packs/day for 32.0 years (8.0 ttl pk-yrs)     Types: Cigarettes, Vaping with nicotine     Start date: 6/20/1991     Last attempt to quit: 6/20/2023    Smokeless tobacco: Never    Tobacco comments:     Approximately 3 cigarettes a day   Substance and Sexual Activity    Alcohol use: No     Alcohol/week: 0.0 standard drinks of alcohol    Drug use: Yes     Types: Marijuana, Oxycodone, Benzodiazepines     Comment: 4 oxycodones daily; one marijuana cigarette daily    Sexual activity: Yes     Partners: Female     Comment:  with children, disabled tug boat captain     Social Determinants of Health     Financial Resource Strain: Low Risk  (5/10/2023)    Overall Financial Resource Strain (CARDIA)     Difficulty of Paying Living Expenses: Not hard at all   Food Insecurity: No Food Insecurity (5/10/2023)    Hunger Vital Sign     Worried About Running Out of Food in the Last Year: Never true     Ran Out of Food in the Last Year: Never true   Transportation Needs: No Transportation Needs (5/10/2023)    PRAPARE - Transportation     Lack of Transportation (Medical): No     Lack of Transportation (Non-Medical): No   Physical Activity: Sufficiently Active (5/10/2023)    Exercise Vital Sign     Days of Exercise per Week: 4 days     Minutes of Exercise per Session: 50 min   Stress: Stress Concern Present (4/3/2019)    [employer] [place] [employer] - Occupational Stress Questionnaire     Feeling of  Stress : Very much   Social Connections: Socially Isolated (5/10/2023)    Social Connection and Isolation Panel [NHANES]     Frequency of Communication with Friends and Family: Never     Frequency of Social Gatherings with Friends and Family: Never     Attends [employer] Services: Never     Active Member of Clubs or Organizations: No     Attends Club or Organization Meetings: Never     Marital Status:    Housing Stability: Unknown (5/10/2023)    Housing Stability Vital Sign     Unable to Pay for Housing in the Last Year: No     Unstable Housing in the Last Year: No       FAMILY HISTORY:     Family History   Problem Relation Age of Onset    Heart disease Mother     Heart disease Father     Alcohol abuse Father     No Known Problems Sister     No Known Problems Brother     Diabetes Son     No Known Problems Maternal Aunt     No Known Problems Maternal Uncle     No Known Problems Paternal Aunt     No Known Problems Paternal Uncle     No Known Problems Maternal Grandmother     No Known Problems Maternal Grandfather     No Known Problems Paternal Grandmother     No Known Problems Paternal Grandfather     Amblyopia Neg Hx     Blindness Neg Hx     Cancer Neg Hx     Cataracts Neg Hx     Glaucoma Neg Hx     Hypertension Neg Hx     Macular degeneration Neg Hx     Retinal detachment Neg Hx     Strabismus Neg Hx     Stroke Neg Hx     Thyroid disease Neg Hx     Anxiety disorder Neg Hx     Dementia Neg Hx     Depression Neg Hx        REVIEW OF SYSTEMS:   Review of Systems   Constitutional: Negative.   HENT: Negative.     Eyes: Negative.    Cardiovascular:  Positive for dyspnea on exertion.   Respiratory: Negative.     Endocrine: Negative.    Hematologic/Lymphatic: Negative.    Skin: Negative.    Musculoskeletal: Negative.    Gastrointestinal: Negative.    Genitourinary: Negative.    Neurological: Negative.    Psychiatric/Behavioral: Negative.     Allergic/Immunologic: Negative.        A 10 point review of systems was  "performed and all the pertinent positives have been mentioned. Rest of review of systems was negative.        PHYSICAL EXAM:     Vitals:    09/06/23 1409   BP: 128/64   Pulse: (!) 48   Resp: 18    Body mass index is 29.5 kg/m².  Weight: 95.9 kg (211 lb 8.5 oz)   Height: 5' 11" (180.3 cm)     Physical Exam  Vitals reviewed.   Constitutional:       Appearance: He is well-developed.   HENT:      Head: Normocephalic.   Eyes:      Conjunctiva/sclera: Conjunctivae normal.      Pupils: Pupils are equal, round, and reactive to light.   Cardiovascular:      Rate and Rhythm: Normal rate and regular rhythm.      Heart sounds: Normal heart sounds.   Pulmonary:      Effort: Pulmonary effort is normal.      Breath sounds: Wheezing present.   Abdominal:      General: Bowel sounds are normal.      Palpations: Abdomen is soft.   Musculoskeletal:      Cervical back: Normal range of motion and neck supple.   Skin:     General: Skin is warm.   Neurological:      Mental Status: He is alert and oriented to person, place, and time.           DATA:     Laboratory:  CBC:  Recent Labs   Lab 02/10/23  0902 05/10/23  0949 06/08/23  0910   WBC 10.77 9.99 9.96   Hemoglobin 13.8 L 12.3 L 13.4 L   Hematocrit 42.6 38.5 L 40.8   Platelets 179 168 171         CHEMISTRIES:  Recent Labs   Lab 07/19/22  0518 07/20/22  0126 07/21/22  0531 08/24/22  0905 05/10/23  0949 05/17/23  1618 06/08/23  0910   Glucose 141 H 114 H  113 H 146 H   < > 94 99 130 H  130 H   Sodium 139 141  142 143   < > 140 141 138  138   Potassium 5.2 H 4.1  4.1 3.9   < > 5.7 H 5.6 H 4.6  4.6   BUN 32 H 28 H  29 H 24 H   < > 26 H 24 H 11  11   Creatinine 1.9 H 1.4  1.4 1.3   < > 1.9 H 1.8 H 1.5 H  1.5 H   eGFR if  41 A 60  60 >60  --   --   --   --    eGFR if non  36 A 52 A  52 A 56 A  --   --   --   --    Calcium 8.8 9.2  9.2 9.2   < > 9.5 9.6 9.3  9.3   Magnesium 1.9 1.7  1.8 1.8  --   --   --   --     < > = values in this interval not " displayed.         CARDIAC BIOMARKERS:  Recent Labs   Lab 07/18/22  0252 07/18/22  1007 07/20/22  0126   CPK  --  129  --    Troponin I 0.019  --  0.119 H         COAGS:  Recent Labs   Lab 10/01/21  1610   INR 1.0         LIPIDS/LFTS:  Recent Labs   Lab 08/07/21  0839 10/01/21  1610 11/13/21  1435 08/24/22  0905 09/29/22  0849 02/10/23  0902 05/10/23  0949 06/08/23  0910   Cholesterol 113 L 106 L  --  118 L  --   --   --   --    Triglycerides 103 124  --  109  --   --   --   --    HDL 35 L 31 L  --  34 L  --   --   --   --    LDL Cholesterol 57.4 L 50.2 L  --  62.2 L  --   --   --   --    Non-HDL Cholesterol 78 75  --  84  --   --   --   --    AST 17 21   < > 23   < > 18 17 15   ALT 19 22   < > 19   < > 14 15 12    < > = values in this interval not displayed.         Hemoglobin A1C   Date Value Ref Range Status   06/08/2023 5.7 (H) 4.0 - 5.6 % Final     Comment:     ADA Screening Guidelines:  5.7-6.4%  Consistent with prediabetes  >or=6.5%  Consistent with diabetes    High levels of fetal hemoglobin interfere with the HbA1C  assay. Heterozygous hemoglobin variants (HbS, HgC, etc)do  not significantly interfere with this assay.   However, presence of multiple variants may affect accuracy.     02/10/2023 6.2 (H) 4.0 - 5.6 % Final     Comment:     ADA Screening Guidelines:  5.7-6.4%  Consistent with prediabetes  >or=6.5%  Consistent with diabetes    High levels of fetal hemoglobin interfere with the HbA1C  assay. Heterozygous hemoglobin variants (HbS, HgC, etc)do  not significantly interfere with this assay.   However, presence of multiple variants may affect accuracy.     11/16/2022 6.4 (H) 4.0 - 5.6 % Final     Comment:     ADA Screening Guidelines:  5.7-6.4%  Consistent with prediabetes  >or=6.5%  Consistent with diabetes    High levels of fetal hemoglobin interfere with the HbA1C  assay. Heterozygous hemoglobin variants (HbS, HgC, etc)do  not significantly interfere with this assay.   However, presence of multiple  variants may affect accuracy.         TSH  Recent Labs   Lab 10/01/21  1610 07/18/22  0252   TSH 1.897 5.291 H         The ASCVD Risk score (Dale DK, et al., 2019) failed to calculate for the following reasons:    The valid total cholesterol range is 130 to 320 mg/dL             ASSESSMENT AND PLAN     Patient Active Problem List   Diagnosis    Essential hypertension    Anemia    Anxiety, unable to wean off BZD    Recurrent major depressive disorder, in partial remission    DDD (degenerative disc disease), cervical    DDD (degenerative disc disease), lumbar    ED (erectile dysfunction)    Facet arthritis of cervical region    Facet arthritis of lumbar region    Benzodiazepine dependence, did not do well with attempt to wean down 2017    Chronic, continuous use of opioids    Type 2 diabetes mellitus without complication, without long-term current use of insulin    Tobacco dependence, patch with irritation; hx of bupropion    Therapeutic opioid-induced constipation (OIC)    Tubular adenoma of colon 5/10/17; 08/04/2023 colonoscopy 10 mm cecal TA.  10 mm descending polypoid mucosa.  Repeat 3 years    Cervical stenosis of spinal canal 10/3/2021 LAMINECTOMY, SPINE, CERVICAL, WITH POSTERIOR FUSION C2-T2    Cataract, bilateral    Open angle with borderline findings and high glaucoma risk in both eyes    Nuclear sclerosis of both eyes    Elevated prolactin level,low testosterone, gynecomastia; MRI pituitary normal 5/2020    Low testosterone in male    Paresthesia of left upper and lower extremity    History of smoking 10-25 pack years    Status post surgery    Impaired mobility and ADLs    Chronic low back pain    Sacroiliac joint pain    Sacroiliitis    Impaired mobility    Arthrodesis status    Foraminal stenosis of lumbar region    Junctional bradycardia    Chronic prescription benzodiazepine use    History of atrial fibrillation 7/2022 hospitalization i n the setting of SHAUNA and hyperK. started on carvedilol during  admission    Type 2 diabetes mellitus with diabetic cataract, without long-term current use of insulin    Unspecified inflammatory spondylopathy, cervical region    Thrombocytopenia    Aortic atherosclerosis    Bradycardia       Dyspnea on exertion.  Further evaluation with the help of a stress test.  Stress test did not show any significant ischemia.  Likely related to his pulmonary issues and smoking.  Smoking cessation has been highly recommended P    Smoking cessation     Hypertension: Well controlled at current therapy.    Because of bradycardia I will decrease the dose carvedilol to 6.25 mg b.i.d.    Follow-up in 6 months.    Thank you very much for involving me in the care of your patient.  Please do not hesitate to contact me if there are any questions.      Adriana Pete MD, FACC, [facility]  Interventional Cardiologist, [facility].           This note was dictated with the help of speech recognition software.  There might be un-intended errors and/or substitutions.

## 2023-09-14 ENCOUNTER — OFFICE VISIT (OUTPATIENT)
Dept: OPTOMETRY | Facility: CLINIC | Age: 68
End: 2023-09-14
Payer: MEDICARE

## 2023-09-14 DIAGNOSIS — E11.36 TYPE 2 DIABETES MELLITUS WITH DIABETIC CATARACT, WITHOUT LONG-TERM CURRENT USE OF INSULIN: Primary | ICD-10-CM

## 2023-09-14 DIAGNOSIS — H25.13 NUCLEAR SCLEROSIS OF BOTH EYES: ICD-10-CM

## 2023-09-14 PROCEDURE — 92014 COMPRE OPH EXAM EST PT 1/>: CPT | Mod: S$GLB,,, | Performed by: OPTOMETRIST

## 2023-09-14 PROCEDURE — 3288F PR FALLS RISK ASSESSMENT DOCUMENTED: ICD-10-PCS | Mod: CPTII,S$GLB,, | Performed by: OPTOMETRIST

## 2023-09-14 PROCEDURE — 3061F NEG MICROALBUMINURIA REV: CPT | Mod: CPTII,S$GLB,, | Performed by: OPTOMETRIST

## 2023-09-14 PROCEDURE — 1160F PR REVIEW ALL MEDS BY PRESCRIBER/CLIN PHARMACIST DOCUMENTED: ICD-10-PCS | Mod: CPTII,S$GLB,, | Performed by: OPTOMETRIST

## 2023-09-14 PROCEDURE — 1126F PR PAIN SEVERITY QUANTIFIED, NO PAIN PRESENT: ICD-10-PCS | Mod: CPTII,S$GLB,, | Performed by: OPTOMETRIST

## 2023-09-14 PROCEDURE — 1101F PR PT FALLS ASSESS DOC 0-1 FALLS W/OUT INJ PAST YR: ICD-10-PCS | Mod: CPTII,S$GLB,, | Performed by: OPTOMETRIST

## 2023-09-14 PROCEDURE — 1101F PT FALLS ASSESS-DOCD LE1/YR: CPT | Mod: CPTII,S$GLB,, | Performed by: OPTOMETRIST

## 2023-09-14 PROCEDURE — 1159F PR MEDICATION LIST DOCUMENTED IN MEDICAL RECORD: ICD-10-PCS | Mod: CPTII,S$GLB,, | Performed by: OPTOMETRIST

## 2023-09-14 PROCEDURE — 3066F NEPHROPATHY DOC TX: CPT | Mod: CPTII,S$GLB,, | Performed by: OPTOMETRIST

## 2023-09-14 PROCEDURE — 1157F PR ADVANCE CARE PLAN OR EQUIV PRESENT IN MEDICAL RECORD: ICD-10-PCS | Mod: CPTII,S$GLB,, | Performed by: OPTOMETRIST

## 2023-09-14 PROCEDURE — 4010F ACE/ARB THERAPY RXD/TAKEN: CPT | Mod: CPTII,S$GLB,, | Performed by: OPTOMETRIST

## 2023-09-14 PROCEDURE — 1157F ADVNC CARE PLAN IN RCRD: CPT | Mod: CPTII,S$GLB,, | Performed by: OPTOMETRIST

## 2023-09-14 PROCEDURE — 3066F PR DOCUMENTATION OF TREATMENT FOR NEPHROPATHY: ICD-10-PCS | Mod: CPTII,S$GLB,, | Performed by: OPTOMETRIST

## 2023-09-14 PROCEDURE — 1160F RVW MEDS BY RX/DR IN RCRD: CPT | Mod: CPTII,S$GLB,, | Performed by: OPTOMETRIST

## 2023-09-14 PROCEDURE — 2023F DILAT RTA XM W/O RTNOPTHY: CPT | Mod: CPTII,S$GLB,, | Performed by: OPTOMETRIST

## 2023-09-14 PROCEDURE — 99999 PR PBB SHADOW E&M-EST. PATIENT-LVL III: CPT | Mod: PBBFAC,,, | Performed by: OPTOMETRIST

## 2023-09-14 PROCEDURE — 1159F MED LIST DOCD IN RCRD: CPT | Mod: CPTII,S$GLB,, | Performed by: OPTOMETRIST

## 2023-09-14 PROCEDURE — 3044F PR MOST RECENT HEMOGLOBIN A1C LEVEL <7.0%: ICD-10-PCS | Mod: CPTII,S$GLB,, | Performed by: OPTOMETRIST

## 2023-09-14 PROCEDURE — 3044F HG A1C LEVEL LT 7.0%: CPT | Mod: CPTII,S$GLB,, | Performed by: OPTOMETRIST

## 2023-09-14 PROCEDURE — 3288F FALL RISK ASSESSMENT DOCD: CPT | Mod: CPTII,S$GLB,, | Performed by: OPTOMETRIST

## 2023-09-14 PROCEDURE — 1126F AMNT PAIN NOTED NONE PRSNT: CPT | Mod: CPTII,S$GLB,, | Performed by: OPTOMETRIST

## 2023-09-14 PROCEDURE — 4010F PR ACE/ARB THEARPY RXD/TAKEN: ICD-10-PCS | Mod: CPTII,S$GLB,, | Performed by: OPTOMETRIST

## 2023-09-14 PROCEDURE — 3061F PR NEG MICROALBUMINURIA RESULT DOCUMENTED/REVIEW: ICD-10-PCS | Mod: CPTII,S$GLB,, | Performed by: OPTOMETRIST

## 2023-09-14 PROCEDURE — 99999 PR PBB SHADOW E&M-EST. PATIENT-LVL III: ICD-10-PCS | Mod: PBBFAC,,, | Performed by: OPTOMETRIST

## 2023-09-14 PROCEDURE — 92014 PR EYE EXAM, EST PATIENT,COMPREHESV: ICD-10-PCS | Mod: S$GLB,,, | Performed by: OPTOMETRIST

## 2023-09-14 PROCEDURE — 2023F PR DILATED RETINAL EXAM W/O EVID OF RETINOPATHY: ICD-10-PCS | Mod: CPTII,S$GLB,, | Performed by: OPTOMETRIST

## 2023-09-14 NOTE — PROGRESS NOTES
Subjective:       Patient ID: Butch Mcdaniel is a 68 y.o. male      Chief Complaint   Patient presents with    Concerns About Ocular Health    Diabetic Eye Exam     History of Present Illness    Dls: 3/15/22 Dr. Carrillo    67 y/o male presents today for diabetic eye exam.   Pt states no changes in vision. Pt wears otc readers.     LBS ?     No tearing  No itching   No burning  No pain  No ha's  No floaters  No flashes    Eye meds  None    Pohx:  None    Fohx:   None      Hemoglobin A1C       Date                     Value               Ref Range             Status                06/08/2023               5.7 (H)             4.0 - 5.6 %           Final                  02/10/2023               6.2 (H)             4.0 - 5.6 %           Final                  11/16/2022               6.4 (H)             4.0 - 5.6 %           Final              Assessment/Plan:     1. Type 2 diabetes mellitus with diabetic cataract, without long-term current use of insulin  No diabetic retinopathy. Discussed with pt the effects of diabetes on vision, importance of good blood sugar control, compliance with meds, and follow up care with PCP. Return in 1 year for dilated eye exam, sooner PRN.  - Ambulatory referral/consult to Optometry    2. Nuclear sclerosis of both eyes  Educated pt on presence of cataracts and effects on vision. No surgery at this time. Recheck in one year, sooner PRN.      Follow up in about 1 year (around 9/14/2024).

## 2023-09-19 ENCOUNTER — LAB VISIT (OUTPATIENT)
Dept: LAB | Facility: HOSPITAL | Age: 68
End: 2023-09-19
Attending: INTERNAL MEDICINE
Payer: MEDICARE

## 2023-09-19 DIAGNOSIS — E11.9 TYPE 2 DIABETES MELLITUS WITHOUT COMPLICATION, WITHOUT LONG-TERM CURRENT USE OF INSULIN: ICD-10-CM

## 2023-09-19 DIAGNOSIS — R79.89 LOW TESTOSTERONE IN MALE: ICD-10-CM

## 2023-09-19 LAB
ALBUMIN SERPL BCP-MCNC: 4 G/DL (ref 3.5–5.2)
ALP SERPL-CCNC: 47 U/L (ref 55–135)
ALT SERPL W/O P-5'-P-CCNC: 12 U/L (ref 10–44)
ANION GAP SERPL CALC-SCNC: 5 MMOL/L (ref 8–16)
AST SERPL-CCNC: 15 U/L (ref 10–40)
BILIRUB SERPL-MCNC: 1 MG/DL (ref 0.1–1)
BUN SERPL-MCNC: 17 MG/DL (ref 8–23)
CALCIUM SERPL-MCNC: 9.3 MG/DL (ref 8.7–10.5)
CHLORIDE SERPL-SCNC: 107 MMOL/L (ref 95–110)
CHOLEST SERPL-MCNC: 87 MG/DL (ref 120–199)
CHOLEST/HDLC SERPL: 3 {RATIO} (ref 2–5)
CO2 SERPL-SCNC: 27 MMOL/L (ref 23–29)
CREAT SERPL-MCNC: 1 MG/DL (ref 0.5–1.4)
ERYTHROCYTE [DISTWIDTH] IN BLOOD BY AUTOMATED COUNT: 12.6 % (ref 11.5–14.5)
EST. GFR  (NO RACE VARIABLE): >60 ML/MIN/1.73 M^2
ESTIMATED AVG GLUCOSE: 128 MG/DL (ref 68–131)
GLUCOSE SERPL-MCNC: 105 MG/DL (ref 70–110)
HBA1C MFR BLD: 6.1 % (ref 4–5.6)
HCT VFR BLD AUTO: 42.4 % (ref 40–54)
HDLC SERPL-MCNC: 29 MG/DL (ref 40–75)
HDLC SERPL: 33.3 % (ref 20–50)
HGB BLD-MCNC: 14.3 G/DL (ref 14–18)
LDLC SERPL CALC-MCNC: 38.6 MG/DL (ref 63–159)
MCH RBC QN AUTO: 33.3 PG (ref 27–31)
MCHC RBC AUTO-ENTMCNC: 33.7 G/DL (ref 32–36)
MCV RBC AUTO: 99 FL (ref 82–98)
NONHDLC SERPL-MCNC: 58 MG/DL
PLATELET # BLD AUTO: ABNORMAL K/UL (ref 150–450)
PMV BLD AUTO: ABNORMAL FL (ref 9.2–12.9)
POTASSIUM SERPL-SCNC: 4.3 MMOL/L (ref 3.5–5.1)
PROT SERPL-MCNC: 6.8 G/DL (ref 6–8.4)
RBC # BLD AUTO: 4.3 M/UL (ref 4.6–6.2)
SODIUM SERPL-SCNC: 139 MMOL/L (ref 136–145)
TESTOST SERPL-MCNC: 994 NG/DL (ref 304–1227)
TRIGL SERPL-MCNC: 97 MG/DL (ref 30–150)
WBC # BLD AUTO: 7.78 K/UL (ref 3.9–12.7)

## 2023-09-19 PROCEDURE — 80053 COMPREHEN METABOLIC PANEL: CPT | Performed by: INTERNAL MEDICINE

## 2023-09-19 PROCEDURE — 85027 COMPLETE CBC AUTOMATED: CPT | Performed by: INTERNAL MEDICINE

## 2023-09-19 PROCEDURE — 80061 LIPID PANEL: CPT | Performed by: INTERNAL MEDICINE

## 2023-09-19 PROCEDURE — 36415 COLL VENOUS BLD VENIPUNCTURE: CPT | Mod: PO | Performed by: INTERNAL MEDICINE

## 2023-09-19 PROCEDURE — 83036 HEMOGLOBIN GLYCOSYLATED A1C: CPT | Performed by: INTERNAL MEDICINE

## 2023-09-19 PROCEDURE — 84403 ASSAY OF TOTAL TESTOSTERONE: CPT | Performed by: INTERNAL MEDICINE

## 2023-09-20 ENCOUNTER — TELEPHONE (OUTPATIENT)
Dept: FAMILY MEDICINE | Facility: CLINIC | Age: 68
End: 2023-09-20
Payer: MEDICARE

## 2023-09-20 DIAGNOSIS — D69.6 THROMBOCYTOPENIA: Primary | ICD-10-CM

## 2023-09-20 NOTE — TELEPHONE ENCOUNTER
----- Message from Fartun Braxton sent at 9/19/2023 12:57 PM CDT -----  .Type: Patient Call Back    Who called: [facility]     What is the request in detail: requesting a callback to discuss orders     Can the clinic reply by [facility]? call    Would the patient rather a call back or a response via [facility]? call    Best call back number: [phone]    Additional Information:

## 2023-09-20 NOTE — TELEPHONE ENCOUNTER
Spoke to Elsi Pugh [facility] lab. Was informed that patient needs a redraw of his CBC, his platelets clumped on smear. Will contact patient & provider for possible redraw.

## 2023-09-20 NOTE — TELEPHONE ENCOUNTER
His platelet counts have been normal previously so I do not think that he needs to repeat this just for the platelets.    Please call the patient-labs are stable.    Testosterone level was normal   A1c good  Liver, kidneys, electrolytes normal   Cholesterol was good.    No changes, stay on current medicines and stay on current testosterone.    He has appointment with me in November, keep that appointment

## 2023-09-21 NOTE — TELEPHONE ENCOUNTER
Spoke to patient's wife in reference to patient's labs being within normal limits, & to keep his appointment in November wit Dr. Evans. & continue to take his testosterone levels were normal. Patient's wife stated understanding.

## 2023-10-12 ENCOUNTER — TELEPHONE (OUTPATIENT)
Dept: FAMILY MEDICINE | Facility: CLINIC | Age: 68
End: 2023-10-12
Payer: MEDICARE

## 2023-10-12 DIAGNOSIS — J98.01 BRONCHOSPASM: Primary | ICD-10-CM

## 2023-10-12 RX ORDER — ALBUTEROL SULFATE 90 UG/1
2 AEROSOL, METERED RESPIRATORY (INHALATION) EVERY 6 HOURS PRN
Qty: 18 G | Refills: 1 | Status: SHIPPED | OUTPATIENT
Start: 2023-10-12 | End: 2024-03-25 | Stop reason: SDUPTHER

## 2023-10-12 NOTE — TELEPHONE ENCOUNTER
----- Message from Melissa Jiang sent at 10/12/2023 12:44 PM CDT -----  Type: RX Refill Request    Who Called: Self     Have you contacted your pharmacy: Yes     Refill or New Rx:REFILL     RX Name and Strength:albuterol 90 mcg/actuation inhaler 18 g     Preferred Pharmacy with phone number:[facility] - [place] [address]   Phone:  [phone]  Fax:  [phone]      Local or Mail Order:Local     Would the patient rather a call back or a response via [facility]? CALL     Best Call Back Number: [phone] (home)

## 2023-10-27 DIAGNOSIS — F13.20 BENZODIAZEPINE DEPENDENCE: ICD-10-CM

## 2023-10-27 DIAGNOSIS — F41.9 ANXIETY: ICD-10-CM

## 2023-10-27 DIAGNOSIS — R79.89 LOW TESTOSTERONE IN MALE: ICD-10-CM

## 2023-10-27 RX ORDER — TESTOSTERONE CYPIONATE 200 MG/ML
INJECTION, SOLUTION INTRAMUSCULAR
Qty: 10 ML | Refills: 0 | Status: SHIPPED | OUTPATIENT
Start: 2023-10-27 | End: 2024-03-27

## 2023-10-27 RX ORDER — ALPRAZOLAM 1 MG/1
TABLET ORAL
Qty: 45 TABLET | Refills: 2 | Status: SHIPPED | OUTPATIENT
Start: 2023-10-27 | End: 2024-02-21

## 2023-10-27 NOTE — TELEPHONE ENCOUNTER
No care due was identified.  [facility] Embedded Care Due Messages. Reference number: [id number].   10/27/2023 9:36:00 AM CDT

## 2023-10-30 NOTE — TELEPHONE ENCOUNTER
LVM - called to see if they would be able to see bill at 1pm on 12/1 instead of angie on 11/29.        Age Appropriate/Patient baseline mental status/Awake

## 2023-11-16 DIAGNOSIS — F33.41 RECURRENT MAJOR DEPRESSIVE DISORDER, IN PARTIAL REMISSION: ICD-10-CM

## 2023-11-16 RX ORDER — CITALOPRAM 40 MG/1
TABLET, FILM COATED ORAL
Qty: 90 TABLET | Refills: 3 | Status: SHIPPED | OUTPATIENT
Start: 2023-11-16

## 2023-11-16 NOTE — TELEPHONE ENCOUNTER
No care due was identified.  [facility] Embedded Care Due Messages. Reference number: [id number].   11/16/2023 12:29:26 PM CST

## 2023-11-16 NOTE — TELEPHONE ENCOUNTER
Refill Routing Note   Medication(s) are not appropriate for processing by [facility] for the following reason(s):        Drug-disease interaction    [facility] action(s):  Defer        Medication Therapy Plan: Bradycardia; Junctional bradycardia    Alert overridden per protocol: Yes   Pharmacist review requested: Yes     Appointments  past 12m or future 3m with PCP    Date Provider   Last Visit   8/18/2023 Gabriel Evans MD   Next Visit   11/20/2023 Gabriel Evans MD   ED visits in past 90 days: 0        Note composed:12:47 PM 11/16/2023

## 2023-11-17 NOTE — TELEPHONE ENCOUNTER
Refill Decision Note   Butch Mcdaniel  is requesting a refill authorization.  Brief Assessment and Rationale for Refill:  Approve     Medication Therapy Plan:         Alert overridden per protocol: Yes   Pharmacist review requested: Yes   Extended chart review required: Yes   Comments:     Note composed:7:24 PM 11/16/2023

## 2023-11-19 NOTE — PROGRESS NOTES
This note was created by combination of typed  and M-Modal dictation.  Transcription errors may be present.  If there are any questions, please contact me.    Assessment and Plan:   Assessment and Plan   Benzodiazepine dependence, did not do well with attempt to wean down 2017  Anxiety, unable to wean off BZD  -stable, on max recommended dose citalopram, max recommended dose BuSpar, Xanax.  I am trying to limit his Xanax use.  Had previously been followed by Psychiatry, recommended inpatient detox but the patient was not interested.  If he requires any further treatment escalation for anxiety will need him to see Psychiatry for that    Chronic, continuous use of opioids  -managed by outside pain mgmt    Low testosterone in male  -pre visit labs stable on testosterone 200 every 2 weeks, injects on the 1st and 15th of each month.  Future labs surveillance.  -     Testosterone; Future; Expected date: 02/19/2024  -     PSA, Screening; Future; Expected date: 02/19/2024    Type 2 diabetes mellitus without complication, without long-term current use of insulin  Type 2 diabetes mellitus with diabetic cataract, without long-term current use of insulin  -pre visit labs stable on metformin, no changes.   -     Lipid Panel; Future; Expected date: 02/19/2024  -     Hemoglobin A1C; Future; Expected date: 02/19/2024  -     Microalbumin/Creatinine Ratio, Urine; Future; Expected date: 02/19/2024  -     CBC Without Differential; Future; Expected date: 02/19/2024  -     Comprehensive Metabolic Panel; Future; Expected date: 05/20/2024  -     metFORMIN (GLUCOPHAGE) 500 MG tablet; Take 1 tablet (500 mg total) by mouth 2 (two) times daily with meals.  Dispense: 180 tablet; Refill: 3    Essential hypertension  -stable.  Monitor. On lower dose coreg due to bradycardia 9/2023 at [facility].    Tobacco dependence, patch with irritation; hx of bupropion  -plans to restart cessation clinic. Hx of nicotine patches with efficacy, his  benefits ran out so he stopped using patches and restarted smoking but committed to cessation.    Aortic atherosclerosis  -on statin.    Hx of SHAUNA hx of CKD  Pre visit creatinine improved. Monitor.    Screening for prostate cancer  -check future labs.  -     PSA, Screening; Future; Expected date: 02/19/2024    Needs flu shot  -     Influenza (FLUAD) - Quadrivalent (Adjuvanted) *Preferred* (65+) (PF)             Medications Discontinued During This Encounter   Medication Reason    metFORMIN (GLUCOPHAGE) 500 MG tablet Reorder       meds sent this encounter:  Medications Ordered This Encounter   Medications    metFORMIN (GLUCOPHAGE) 500 MG tablet     Sig: Take 1 tablet (500 mg total) by mouth 2 (two) times daily with meals.     Dispense:  180 tablet     Refill:  3     This prescription was filled on 8/30/2023. Any refills authorized will be placed on file.         Follow Up: OV around 3/2024 with labs.  Future Appointments   Date Time Provider Department Center   3/18/2024  8:30 AM [facility]   3/18/2024  8:45 AM SPECIMEN, [facility]   3/25/2024  2:20 PM Gabriel Evans MD [facility]          Subjective:   Subjective   Chief Complaint   Patient presents with    Follow-up       HPI  Butch is a 68 y.o. male.     Social History     Tobacco Use    Smoking status: Some Days     Current packs/day: 0.20     Average packs/day: 0.2 packs/day for 32.2 years (8.0 ttl pk-yrs)     Types: Cigarettes, Vaping with nicotine     Start date: 6/20/1991     Last attempt to quit: 6/20/2023    Smokeless tobacco: Never    Tobacco comments:     Approximately 3 cigarettes a day   Substance Use Topics    Alcohol use: No     Alcohol/week: 0.0 standard drinks of alcohol      Social History     Occupational History    Occupation: retired -       Social History     Social History Narrative    Not on file       Last appointment with this clinic was 8/18/2023. Last visit with me  8/18/2023   To summarize last visit and events leading up to today:  DM2   Low testosterone on testosterone.  HTN, lipid   3/16/23 nu stress test neg for ischemia; no arrhythmias  07/27/2023 TTE normal systolic function.  LVEF 65%.  Grade 1 diastolic dysfunction.  Hx of SHAUNA due to dehydration, severe enough to require HD for hyperK, 7/2022.  Hx of AFib during hospitalization 7/2022 in the setting of hyperK. On beta blocker.  Anxiety, BZD dependence. did not tolerate trial of Cymbalta.  Took it for maybe a week.   Has been to see Psychiatry, Psychiatry recommended inpatient detox and the patient was not interested.  Currently have him taking 1.5 tablets in total during the day.  previous plans - restart citalopram, stay on current dose xanax.   Chronic back pain followed by pain mgmt. Narcotic and bill  Thrombocytopenia hx of clumping on microscopy  COPD stable.   Saw ENT 7/28.  Sensorineural hearing loss.  Qualifies for hearing aids.  08/04/2023 colonoscopy 10 mm cecal TA.  10 mm descending polypoid mucosa.  Repeat 3 years  Creatinine remains high.     9/6/23 saw cards, cut his coreg dose due to stephania    9/20/23 labs  CBC platelets clumped  His platelet counts have been normal previously so I do not think that he needs to repeat this just for the platelets.  Testosterone level was normal   A1c good  Liver, kidneys, electrolytes normal   Cholesterol was good.    No changes, stay on current medicines and stay on current testosterone.    Today's visit:    Still stressful at home with son and grandchildren at home  One of grandson with high functioning autism.   Anticipates that son and grandchildren will be living with pt indefinitely.  Taking max recommended dose citalopram, max recommended dose buspar. Xanax takes at night.    HTN  Was not really symptomatic with bradycardia.  Feels no different on the lower dose coreg.     smoking  Actively working on quitting  Plans to re enroll with smoking cessation.  Had been  doing ok but then benefits ran out.  So then stopped taking the nicotine patches and restarted smoking.  Plan to reach out to smoking cessation.    Takes testosterone injection on the 1st and 15th of every months.  Labs 9/2023 stable.     Patient Care Team:  Gabriel Evans MD as PCP - General (Internal Medicine)  Vlad Nava MD (Pain Medicine)  Kaila Carrillo OD as Consulting Physician (Optometry)  Wyatt Ojeda MA as Care Coordinator    Patient Active Problem List    Diagnosis Date Noted    Bradycardia 05/10/2023    Aortic atherosclerosis 02/16/2023    Type 2 diabetes mellitus with diabetic cataract, without long-term current use of insulin 10/14/2022    Unspecified inflammatory spondylopathy, cervical region 10/14/2022    Thrombocytopenia 10/14/2022    History of atrial fibrillation 7/2022 hospitalization i n the setting of SHAUNA and hyperK. started on carvedilol during admission 07/19/2022    Junctional bradycardia 07/18/2022    Chronic prescription benzodiazepine use 07/18/2022    Foraminal stenosis of lumbar region 05/06/2022 5/5/2022 MRI L spine:   *   Multilevel lumbar spondylosis with up to severe right neural foraminal narrowing at L5-S1 with impingement of exiting right L5 nerve root.   *   Moderate thecal sac narrowing at L3-L4.   *   Chronic spondylolysis of L5 with grade 1 anterolisthesis of L5 on S1        Chronic low back pain 12/27/2021    Sacroiliac joint pain 12/27/2021    Sacroiliitis 12/27/2021    Impaired mobility 10/10/2021    Arthrodesis status 10/10/2021     Formatting of this note might be different from the original.  C2-T2, 10/03/21      Impaired mobility and ADLs 10/07/2021    Status post surgery 10/03/2021    History of smoking 10-25 pack years 10/02/2021    Paresthesia of left upper and lower extremity 10/01/2021    Low testosterone in male 09/02/2020 6/2020 Repeat labs prolactin was normal.  Testosterone was low.  Free testosterone was low and sex hormone binding globulin  was normal      Elevated prolactin level,low testosterone, gynecomastia; MRI pituitary normal 5/2020 01/13/2020    Nuclear sclerosis of both eyes 11/04/2019    Open angle with borderline findings and high glaucoma risk in both eyes 10/08/2019    Cataract, bilateral 09/11/2018    Cervical stenosis of spinal canal 10/3/2021 LAMINECTOMY, SPINE, CERVICAL, WITH POSTERIOR FUSION C2-T2 05/07/2018 02/14/2018 MRI C-spine impression:  Slight retrolisthesis of C4 with respect to C5.  Narrowing of the central spinal canal most prominent at the C4-C5, C5-C6, and C6-C7 levels and to a lesser extent at C2-C3 and C3-C4.  Annular disc bulges posteriorly at C2-C3, C3-C4.  Diffuse disc herniation/protrusion posteriorly at C4-C5 level and a disc herniation/extrusion posteriorly at the C5-C6 level with a central disc herniation/protrusion posteriorly at the C6-C7 level.  Narrowing of the neural foramen bilaterally from C3-C4 through C6-C7.  10/1/2021 admitted for L sided numbness after epidural injection, initially CT interpreted at [facility]; however subsequent MRI no hemorrhage but severe spinal canal stenosis    10/1/2021 MRI brain: Negative MRI of the brain for hemorrhage, mass or infarction. Specifically, no evidence of subarachnoid blood or blood products in the extra-axial spaces on SWI or diffusion-weighted images.  In light of the previous CTs and history of epidural injections at outside facility, this raises the question of in ejected contrast in the central spinal canal with migration into the intracranial subarachnoid spaces.  Subarachnoid hemorrhage or exudate are considered less likely.  10/1/2021 MRI C spine: Severe spinal canal stenosis with complete effacement of the CSF and spinal cord compression at the level of C5-C6 due to posterior disc osteophyte complex with superimposed right disc protrusion and congenitally narrow spinal canal.  High T2 signal within the cord secondary to either edema or  myelomalacia.    10/3/2021 LAMINECTOMY, SPINE, CERVICAL, WITH POSTERIOR FUSION C2-T2      Tubular adenoma of colon 5/10/17; 08/04/2023 colonoscopy 10 mm cecal TA.  10 mm descending polypoid mucosa.  Repeat 3 years 05/10/2017     5/10/2017 colonoscopy tubular adenoma  08/04/2023 colonoscopy 10 mm cecal TA.  10 mm descending polypoid mucosa.  Repeat 3 years      Therapeutic opioid-induced constipation (OIC) 04/17/2017    Tobacco dependence, patch with irritation; hx of bupropion 01/12/2016    Type 2 diabetes mellitus without complication, without long-term current use of insulin 08/07/2015    Facet arthritis of cervical region 10/28/2013    Facet arthritis of lumbar region 10/28/2013    Benzodiazepine dependence, did not do well with attempt to wean down 2017 10/28/2013    Chronic, continuous use of opioids 10/28/2013    ED (erectile dysfunction) 07/26/2013    DDD (degenerative disc disease), cervical 05/23/2013    DDD (degenerative disc disease), lumbar 05/23/2013    Essential hypertension 11/01/2012     2/3/2022 TTE LV normal size with moderate concentric hypertrophy and normal systolic function LVEF 60%.  Normal RV size and systolic function.  PA pressure 33  7/18/22 TTE LV normal size with mod concentric hypertrophy; normal systolic function. LVEF 70%. Normal RV size and systolic function. PA pressure 58. Mod pHTN      Anemia 11/01/2012    Anxiety, unable to wean off BZD 11/01/2012    Recurrent major depressive disorder, in partial remission 11/01/2012       PAST MEDICAL PROBLEMS, PAST SURGICAL HISTORY: please see relevant portions of the electronic medical record    ALLERGIES AND MEDICATIONS: updated and reviewed.  Medication List with Changes/Refills   Current Medications    ALBUTEROL (PROVENTIL/VENTOLIN HFA) 90 MCG/ACTUATION INHALER    Inhale 2 puffs into the lungs every 6 (six) hours as needed for Wheezing or Shortness of Breath.    ALPRAZOLAM (XANAX) 1 MG TABLET    TAKE 1 AND ONE-HALF TABLETS BY MOUTH ONCE A  "DAY    AMLODIPINE (NORVASC) 10 MG TABLET    TAKE ONE TABLET BY MOUTH EVERY EVENING    ATORVASTATIN (LIPITOR) 40 MG TABLET    TAKE ONE TABLET BY MOUTH EVERY EVENING    BUSPIRONE (BUSPAR) 30 MG TAB    TAKE ONE TABLET BY MOUTH TWICE DAILY    CARVEDILOL (COREG) 6.25 MG TABLET    Take 1 tablet (6.25 mg total) by mouth 2 (two) times daily.    CITALOPRAM (CELEXA) 40 MG TABLET    Take 1 tablet (40 mg total) by mouth once daily.    FLUTICASONE-SALMETEROL DISKUS INHALER 100-50 MCG    Inhale 1 puff into the lungs.    GABAPENTIN (NEURONTIN) 600 MG TABLET    Take 600 mg by mouth 3 (three) times daily.    HYDRALAZINE (APRESOLINE) 25 MG TABLET    TAKE ONE TABLET BY MOUTH THREE TIMES DAILY    IBUPROFEN (ADVIL,MOTRIN) 600 MG TABLET    Take 600 mg by mouth.    LISINOPRIL (PRINIVIL,ZESTRIL) 20 MG TABLET    Take 1 tablet (20 mg total) by mouth once daily.    METFORMIN (GLUCOPHAGE) 500 MG TABLET    TAKE ONE TABLET BY MOUTH TWICE DAILY WITH MEALS    NALOXONE (NARCAN) 4 MG/ACTUATION SPRY    4mg by nasal route as needed for opioid overdose; may repeat every 2-3 minutes in alternating nostrils until medical help arrives. Call 911    NEEDLE, DISP, 22 G 22 GAUGE X 1" NDLE    Testosterone injection every 2 weeks    NICOTINE, POLACRILEX, (NICORETTE) 2 MG GUM    Take 1 each (2 mg total) by mouth as needed (1-2 per hour in place of cigarette. Not to exceed 5 per day.).    OXYCODONE-ACETAMINOPHEN (PERCOCET)  MG PER TABLET    Take 1 tablet by mouth every 4 (four) hours as needed.    POLYETHYLENE GLYCOL 3350 (MIRALAX ORAL)    Take 1 application by mouth.    SENNA (SENOKOT) 8.6 MG TABLET    Take 1 tablet by mouth 2 (two) times a day.    SYRINGE, DISPOSABLE, (BD LUER-STARLA SYRINGE) 1 ML SYRG    Testosterone injection every 2 weeks    TESTOSTERONE CYPIONATE (DEPOTESTOTERONE CYPIONATE) 200 MG/ML INJECTION    INJECT ONE MILLILITER INTRAMUSCULARLY EVERY 14 DAYS    TIZANIDINE (ZANAFLEX) 4 MG TABLET    Take 4-8 mg by mouth nightly as needed.       " "  Objective:   Objective   Physical Exam   Vitals:    11/20/23 1034   BP: 138/78   Pulse: 71   Temp: 97.5 °F (36.4 °C)   SpO2: 95%   Weight: 95.9 kg (211 lb 6.7 oz)   Height: 5' 11" (1.803 m)    Body mass index is 29.49 kg/m².  Weight: 95.9 kg (211 lb 6.7 oz)   Height: 5' 11" (180.3 cm)     Physical Exam  Constitutional:       General: He is not in acute distress.     Appearance: He is well-developed.   Eyes:      Extraocular Movements: Extraocular movements intact.   Cardiovascular:      Rate and Rhythm: Normal rate and regular rhythm.      Heart sounds: Normal heart sounds. No murmur heard.  Pulmonary:      Effort: Pulmonary effort is normal.      Breath sounds: Normal breath sounds.   Musculoskeletal:         General: Normal range of motion.      Right lower leg: No edema.      Left lower leg: No edema.   Skin:     General: Skin is warm and dry.   Neurological:      Mental Status: He is alert and oriented to person, place, and time.      Coordination: Coordination normal.   Psychiatric:         Behavior: Behavior normal.         Thought Content: Thought content normal.                "

## 2023-11-20 ENCOUNTER — OFFICE VISIT (OUTPATIENT)
Dept: FAMILY MEDICINE | Facility: CLINIC | Age: 68
End: 2023-11-20
Payer: MEDICARE

## 2023-11-20 VITALS
OXYGEN SATURATION: 95 % | SYSTOLIC BLOOD PRESSURE: 138 MMHG | HEART RATE: 71 BPM | BODY MASS INDEX: 29.6 KG/M2 | TEMPERATURE: 98 F | HEIGHT: 71 IN | DIASTOLIC BLOOD PRESSURE: 78 MMHG | WEIGHT: 211.44 LBS

## 2023-11-20 DIAGNOSIS — F41.9 ANXIETY: ICD-10-CM

## 2023-11-20 DIAGNOSIS — I10 ESSENTIAL HYPERTENSION: ICD-10-CM

## 2023-11-20 DIAGNOSIS — I70.0 AORTIC ATHEROSCLEROSIS: ICD-10-CM

## 2023-11-20 DIAGNOSIS — E11.9 TYPE 2 DIABETES MELLITUS WITHOUT COMPLICATION, WITHOUT LONG-TERM CURRENT USE OF INSULIN: ICD-10-CM

## 2023-11-20 DIAGNOSIS — Z12.5 SCREENING FOR PROSTATE CANCER: ICD-10-CM

## 2023-11-20 DIAGNOSIS — E11.36 TYPE 2 DIABETES MELLITUS WITH DIABETIC CATARACT, WITHOUT LONG-TERM CURRENT USE OF INSULIN: ICD-10-CM

## 2023-11-20 DIAGNOSIS — F11.90 CHRONIC, CONTINUOUS USE OF OPIOIDS: ICD-10-CM

## 2023-11-20 DIAGNOSIS — R79.89 LOW TESTOSTERONE IN MALE: ICD-10-CM

## 2023-11-20 DIAGNOSIS — Z23 NEEDS FLU SHOT: ICD-10-CM

## 2023-11-20 DIAGNOSIS — F17.200 TOBACCO DEPENDENCE: Chronic | ICD-10-CM

## 2023-11-20 DIAGNOSIS — F13.20 BENZODIAZEPINE DEPENDENCE: Primary | Chronic | ICD-10-CM

## 2023-11-20 PROCEDURE — 3044F HG A1C LEVEL LT 7.0%: CPT | Mod: CPTII,S$GLB,, | Performed by: INTERNAL MEDICINE

## 2023-11-20 PROCEDURE — 1159F MED LIST DOCD IN RCRD: CPT | Mod: CPTII,S$GLB,, | Performed by: INTERNAL MEDICINE

## 2023-11-20 PROCEDURE — 1100F PR PT FALLS ASSESS DOC 2+ FALLS/FALL W/INJURY/YR: ICD-10-PCS | Mod: CPTII,S$GLB,, | Performed by: INTERNAL MEDICINE

## 2023-11-20 PROCEDURE — 3061F NEG MICROALBUMINURIA REV: CPT | Mod: CPTII,S$GLB,, | Performed by: INTERNAL MEDICINE

## 2023-11-20 PROCEDURE — 1160F PR REVIEW ALL MEDS BY PRESCRIBER/CLIN PHARMACIST DOCUMENTED: ICD-10-PCS | Mod: CPTII,S$GLB,, | Performed by: INTERNAL MEDICINE

## 2023-11-20 PROCEDURE — 3075F SYST BP GE 130 - 139MM HG: CPT | Mod: CPTII,S$GLB,, | Performed by: INTERNAL MEDICINE

## 2023-11-20 PROCEDURE — 1125F PR PAIN SEVERITY QUANTIFIED, PAIN PRESENT: ICD-10-PCS | Mod: CPTII,S$GLB,, | Performed by: INTERNAL MEDICINE

## 2023-11-20 PROCEDURE — 1157F PR ADVANCE CARE PLAN OR EQUIV PRESENT IN MEDICAL RECORD: ICD-10-PCS | Mod: CPTII,S$GLB,, | Performed by: INTERNAL MEDICINE

## 2023-11-20 PROCEDURE — 1100F PTFALLS ASSESS-DOCD GE2>/YR: CPT | Mod: CPTII,S$GLB,, | Performed by: INTERNAL MEDICINE

## 2023-11-20 PROCEDURE — 3008F BODY MASS INDEX DOCD: CPT | Mod: CPTII,S$GLB,, | Performed by: INTERNAL MEDICINE

## 2023-11-20 PROCEDURE — 99214 OFFICE O/P EST MOD 30 MIN: CPT | Mod: S$GLB,,, | Performed by: INTERNAL MEDICINE

## 2023-11-20 PROCEDURE — 3066F NEPHROPATHY DOC TX: CPT | Mod: CPTII,S$GLB,, | Performed by: INTERNAL MEDICINE

## 2023-11-20 PROCEDURE — 1125F AMNT PAIN NOTED PAIN PRSNT: CPT | Mod: CPTII,S$GLB,, | Performed by: INTERNAL MEDICINE

## 2023-11-20 PROCEDURE — 3061F PR NEG MICROALBUMINURIA RESULT DOCUMENTED/REVIEW: ICD-10-PCS | Mod: CPTII,S$GLB,, | Performed by: INTERNAL MEDICINE

## 2023-11-20 PROCEDURE — 3044F PR MOST RECENT HEMOGLOBIN A1C LEVEL <7.0%: ICD-10-PCS | Mod: CPTII,S$GLB,, | Performed by: INTERNAL MEDICINE

## 2023-11-20 PROCEDURE — 1160F RVW MEDS BY RX/DR IN RCRD: CPT | Mod: CPTII,S$GLB,, | Performed by: INTERNAL MEDICINE

## 2023-11-20 PROCEDURE — 90694 FLU VACCINE - QUADRIVALENT - ADJUVANTED: ICD-10-PCS | Mod: S$GLB,,, | Performed by: INTERNAL MEDICINE

## 2023-11-20 PROCEDURE — 4010F PR ACE/ARB THEARPY RXD/TAKEN: ICD-10-PCS | Mod: CPTII,S$GLB,, | Performed by: INTERNAL MEDICINE

## 2023-11-20 PROCEDURE — G0008 ADMIN INFLUENZA VIRUS VAC: HCPCS | Mod: S$GLB,,, | Performed by: INTERNAL MEDICINE

## 2023-11-20 PROCEDURE — 4010F ACE/ARB THERAPY RXD/TAKEN: CPT | Mod: CPTII,S$GLB,, | Performed by: INTERNAL MEDICINE

## 2023-11-20 PROCEDURE — 3078F PR MOST RECENT DIASTOLIC BLOOD PRESSURE < 80 MM HG: ICD-10-PCS | Mod: CPTII,S$GLB,, | Performed by: INTERNAL MEDICINE

## 2023-11-20 PROCEDURE — 1157F ADVNC CARE PLAN IN RCRD: CPT | Mod: CPTII,S$GLB,, | Performed by: INTERNAL MEDICINE

## 2023-11-20 PROCEDURE — G0008 FLU VACCINE - QUADRIVALENT - ADJUVANTED: ICD-10-PCS | Mod: S$GLB,,, | Performed by: INTERNAL MEDICINE

## 2023-11-20 PROCEDURE — 3008F PR BODY MASS INDEX (BMI) DOCUMENTED: ICD-10-PCS | Mod: CPTII,S$GLB,, | Performed by: INTERNAL MEDICINE

## 2023-11-20 PROCEDURE — 3075F PR MOST RECENT SYSTOLIC BLOOD PRESS GE 130-139MM HG: ICD-10-PCS | Mod: CPTII,S$GLB,, | Performed by: INTERNAL MEDICINE

## 2023-11-20 PROCEDURE — 99999 PR PBB SHADOW E&M-EST. PATIENT-LVL V: CPT | Mod: PBBFAC,,, | Performed by: INTERNAL MEDICINE

## 2023-11-20 PROCEDURE — 3078F DIAST BP <80 MM HG: CPT | Mod: CPTII,S$GLB,, | Performed by: INTERNAL MEDICINE

## 2023-11-20 PROCEDURE — 1159F PR MEDICATION LIST DOCUMENTED IN MEDICAL RECORD: ICD-10-PCS | Mod: CPTII,S$GLB,, | Performed by: INTERNAL MEDICINE

## 2023-11-20 PROCEDURE — 99214 PR OFFICE/OUTPT VISIT, EST, LEVL IV, 30-39 MIN: ICD-10-PCS | Mod: S$GLB,,, | Performed by: INTERNAL MEDICINE

## 2023-11-20 PROCEDURE — 3288F PR FALLS RISK ASSESSMENT DOCUMENTED: ICD-10-PCS | Mod: CPTII,S$GLB,, | Performed by: INTERNAL MEDICINE

## 2023-11-20 PROCEDURE — 3066F PR DOCUMENTATION OF TREATMENT FOR NEPHROPATHY: ICD-10-PCS | Mod: CPTII,S$GLB,, | Performed by: INTERNAL MEDICINE

## 2023-11-20 PROCEDURE — 99999 PR PBB SHADOW E&M-EST. PATIENT-LVL V: ICD-10-PCS | Mod: PBBFAC,,, | Performed by: INTERNAL MEDICINE

## 2023-11-20 PROCEDURE — 3288F FALL RISK ASSESSMENT DOCD: CPT | Mod: CPTII,S$GLB,, | Performed by: INTERNAL MEDICINE

## 2023-11-20 PROCEDURE — 90694 VACC AIIV4 NO PRSRV 0.5ML IM: CPT | Mod: S$GLB,,, | Performed by: INTERNAL MEDICINE

## 2023-11-20 RX ORDER — METFORMIN HYDROCHLORIDE 500 MG/1
500 TABLET ORAL 2 TIMES DAILY WITH MEALS
Qty: 180 TABLET | Refills: 3 | Status: SHIPPED | OUTPATIENT
Start: 2023-11-20

## 2024-01-02 ENCOUNTER — CLINICAL SUPPORT (OUTPATIENT)
Dept: SMOKING CESSATION | Facility: CLINIC | Age: 69
End: 2024-01-02
Payer: COMMERCIAL

## 2024-01-02 DIAGNOSIS — F17.200 NICOTINE DEPENDENCE: Primary | ICD-10-CM

## 2024-01-02 PROCEDURE — 99999 PR PBB SHADOW E&M-EST. PATIENT-LVL I: CPT | Mod: PBBFAC,,,

## 2024-01-02 PROCEDURE — 99407 BEHAV CHNG SMOKING > 10 MIN: CPT | Mod: S$GLB,,,

## 2024-01-07 DIAGNOSIS — E78.2 MIXED HYPERLIPIDEMIA: ICD-10-CM

## 2024-01-07 NOTE — TELEPHONE ENCOUNTER
No care due was identified.  Health [facility] Embedded Care Due Messages. Reference number: [id number].   1/07/2024 9:13:06 AM CST

## 2024-01-08 ENCOUNTER — TELEPHONE (OUTPATIENT)
Dept: SMOKING CESSATION | Facility: CLINIC | Age: 69
End: 2024-01-08
Payer: COMMERCIAL

## 2024-01-08 RX ORDER — ATORVASTATIN CALCIUM 40 MG/1
TABLET, FILM COATED ORAL
Qty: 90 TABLET | Refills: 2 | Status: SHIPPED | OUTPATIENT
Start: 2024-01-08

## 2024-01-08 NOTE — TELEPHONE ENCOUNTER
Refill Decision Note   Butch Mcdaniel  is requesting a refill authorization.  Brief Assessment and Rationale for Refill:  Approve     Medication Therapy Plan:         Comments:     Note composed:3:05 PM 01/08/2024

## 2024-01-11 ENCOUNTER — CLINICAL SUPPORT (OUTPATIENT)
Dept: SMOKING CESSATION | Facility: CLINIC | Age: 69
End: 2024-01-11
Payer: COMMERCIAL

## 2024-01-11 DIAGNOSIS — F17.200 NICOTINE DEPENDENCE: Primary | ICD-10-CM

## 2024-01-11 PROCEDURE — 99404 PREV MED CNSL INDIV APPRX 60: CPT | Mod: S$GLB,,,

## 2024-01-11 PROCEDURE — 99999 PR PBB SHADOW E&M-EST. PATIENT-LVL I: CPT | Mod: PBBFAC,,,

## 2024-01-11 RX ORDER — IBUPROFEN 200 MG
1 TABLET ORAL DAILY
Qty: 14 PATCH | Refills: 0 | Status: SHIPPED | OUTPATIENT
Start: 2024-01-11 | End: 2024-01-25 | Stop reason: SDUPTHER

## 2024-01-11 NOTE — Clinical Note
PATIENT PRESENTS FOR INTAKE SMOKING 1 PK PER DAY, AFTER ASSESSMENT AND DISCUSSION RECOMMEND THE 21MG NICOTINE PATCH AND AN ABRUPT QUIT, HE DOES WELL WITH THE PATCHES, ENCOURAGEMENT patient that he can do this,  PROVIDED AND DISCUSSED INTAKE SESSION HANDOUT, WILL FOLLOW

## 2024-01-25 ENCOUNTER — CLINICAL SUPPORT (OUTPATIENT)
Dept: SMOKING CESSATION | Facility: CLINIC | Age: 69
End: 2024-01-25
Payer: COMMERCIAL

## 2024-01-25 DIAGNOSIS — F17.200 NICOTINE DEPENDENCE: Primary | ICD-10-CM

## 2024-01-25 PROCEDURE — 99999 PR PBB SHADOW E&M-EST. PATIENT-LVL II: CPT | Mod: PBBFAC,,,

## 2024-01-25 PROCEDURE — 99403 PREV MED CNSL INDIV APPRX 45: CPT | Mod: S$GLB,,,

## 2024-01-25 RX ORDER — IBUPROFEN 200 MG
1 TABLET ORAL DAILY
Qty: 14 PATCH | Refills: 0 | Status: SHIPPED | OUTPATIENT
Start: 2024-01-25 | End: 2024-02-08

## 2024-01-25 NOTE — PROGRESS NOTES
Individual Follow-Up Form    1/25/2024    Quit Date: N/A    Clinical Status of Patient: Outpatient    Length of Service: 45 minutes    Continuing Medication: yes  Patches    Other Medications: N/A     Target Symptoms: Withdrawal and medication side effects. The following were  rated moderate (3) to severe (4) on TCRS:  Moderate (3): 0  Severe (4): 0    Comments: PATIENT PRESENTS FOR FOLLOW UP TELEPHONICALLY TODAY, HE WAS CONCERNED WITH THE WEATHER AND DRIVING THEREFORE CONTACTED CTTS VIA TELEPHONE. HE IS DOING WELL AND CUTTING SETH KBUT NOT COMPLETELY QUIT, HE IS WEARING THE 21MG NICOTINE PATCH DAILY AND WAS UNABLE TO GET THE 14MG NICOTINE PATCH TO WEAR IN [place] THEREFORE WILL RESEND TO PHARMACY WITH ORDER INSTRUCTIONS TO DOUBLE NICOTINE PATCH, CTTS FEELS HE WILL DO BETTER DOUBLE PATCHING AND HOPEFULLY THIS WILL HELP HIM QUIT COMPLETELY. STRATEGIES AND SESSION HANDOUT DISCUSSED WILL FOLLOW IN CLINIC NEXT FOLLOW UP ON 2/8    Diagnosis: F17.200    Next Visit: 2 weeks

## 2024-01-29 ENCOUNTER — CLINICAL SUPPORT (OUTPATIENT)
Dept: SMOKING CESSATION | Facility: CLINIC | Age: 69
End: 2024-01-29
Payer: COMMERCIAL

## 2024-01-29 DIAGNOSIS — F17.200 NICOTINE DEPENDENCE, UNCOMPLICATED: Primary | ICD-10-CM

## 2024-01-29 PROCEDURE — 99407 BEHAV CHNG SMOKING > 10 MIN: CPT | Mod: S$GLB,,, | Performed by: GENERAL PRACTICE

## 2024-01-29 PROCEDURE — 99999 PR PBB SHADOW E&M-EST. PATIENT-LVL I: CPT | Mod: PBBFAC,,,

## 2024-01-29 NOTE — PROGRESS NOTES
Spoke with patient today in regard to smoking cessation progress for 12 month follow up. He states he is not tobacco free. Patient is already scheduled with CTTS working on Quit attempt # 6. Informed patient of benefit period, future follow ups and contact information if any further help is needed. Will resolve smart form for 12 month follow up for Quit # 5.

## 2024-02-08 ENCOUNTER — CLINICAL SUPPORT (OUTPATIENT)
Dept: SMOKING CESSATION | Facility: CLINIC | Age: 69
End: 2024-02-08
Payer: COMMERCIAL

## 2024-02-08 DIAGNOSIS — F17.200 NICOTINE DEPENDENCE, UNCOMPLICATED: Primary | ICD-10-CM

## 2024-02-08 DIAGNOSIS — F17.200 NICOTINE DEPENDENCE: ICD-10-CM

## 2024-02-08 PROCEDURE — 99999 PR PBB SHADOW E&M-EST. PATIENT-LVL II: CPT | Mod: PBBFAC,,,

## 2024-02-08 PROCEDURE — 99403 PREV MED CNSL INDIV APPRX 45: CPT | Mod: S$GLB,,,

## 2024-02-08 RX ORDER — IBUPROFEN 200 MG
1 TABLET ORAL DAILY
Qty: 14 PATCH | Refills: 0 | Status: SHIPPED | OUTPATIENT
Start: 2024-02-08 | End: 2024-02-27 | Stop reason: SDUPTHER

## 2024-02-08 NOTE — PROGRESS NOTES
Individual Follow-Up Form    2/8/2024    Quit Date: n/a    Clinical Status of Patient: Outpatient    Length of Service: 45 minutes    Continuing Medication: yes  Patches    Other Medications: n/a     Target Symptoms: Withdrawal and medication side effects. The following were  rated moderate (3) to severe (4) on TCRS:  Moderate (3): 0  Severe (4): 0    Comments: PATIENT PRESENTS FOR FOLLOW UP TELEPHONIC TODAY, HE MISSED HIS APPT THEREFORE CTTS REACHED OUT TO CHECK IN ON HIM, HE THOUGHT IT WAS FOR TOMORROW, THEREFORE DISCUSSED WITH HIM HIS PROGRESS, HE IS SMOKING ABOUT 6 CIGARETTES PER DAY, HE IS WEARING THE 21MG NICOTINE PATCH DAILY, RECOMMEND THAT HE CONTINUE TO DO THIS, STRATEGIES AND SESSION HANDOUT DISCUSSED, HE HAS A WIFE THAT SMOKES AND CTTS BELIEVES THAT THE PATIENT WOULD DO MUCH BETTER IF WIFE WOULD BE INTERESTED IN QUITTING AS WELL. ITS DIFFICULT TO QUIT WHEN A SPOUSE CONTINUES TO SMOKE, ENCOURAGEMENT PROVIDED WILL FOLLOW     Diagnosis: F17.210    Next Visit: 2 weeks

## 2024-02-08 NOTE — PROGRESS NOTES
HPI     60 y/o here for screening for diabetic retinopathy with non-dilated   fundus photos per   Dr. Evans.       Last edited by Kassie Bonilla on 3/20/2017  9:43 AM.         Assessment /Plan     For exam results, see Encounter Report.    Controlled type 2 diabetes mellitus without complication, without long-term current use of insulin  -     Diabetic Eye Screening Photo      Please see Dr. Stewart progress note for interpretation.                  Statement Selected

## 2024-02-21 DIAGNOSIS — F13.20 BENZODIAZEPINE DEPENDENCE: ICD-10-CM

## 2024-02-21 DIAGNOSIS — F41.9 ANXIETY: ICD-10-CM

## 2024-02-21 RX ORDER — ALPRAZOLAM 1 MG/1
TABLET ORAL
Qty: 45 TABLET | Refills: 2 | Status: SHIPPED | OUTPATIENT
Start: 2024-02-21 | End: 2024-04-18

## 2024-02-21 NOTE — TELEPHONE ENCOUNTER
Care Due:                  Date            Visit Type   Department     Provider  --------------------------------------------------------------------------------                                [facility]                              PRIMARY      MED/ INTERNAL  Last Visit: 11-      CARE ([facility])   MED/ FREDDY Evans                              [facility]                              PRIMARY      MED/ INTERNAL  Next Visit: 03-      CARE ([facility])   MED/ PEDSKYLER Evans                                                            Last  Test          Frequency    Reason                     Performed    Due Date  --------------------------------------------------------------------------------    HBA1C.......  6 months...  metFORMIN................  09- 03-    Health [facility] Embedded Care Due Messages. Reference number: [id number].   2/21/2024 11:01:51 AM CST

## 2024-02-22 DIAGNOSIS — I10 ESSENTIAL HYPERTENSION: ICD-10-CM

## 2024-02-22 RX ORDER — LISINOPRIL 20 MG/1
20 TABLET ORAL
Qty: 90 TABLET | Refills: 2 | Status: SHIPPED | OUTPATIENT
Start: 2024-02-22

## 2024-02-22 NOTE — TELEPHONE ENCOUNTER
No care due was identified.  Health [facility] Embedded Care Due Messages. Reference number: [id number].   2/22/2024 9:05:01 AM CST

## 2024-02-22 NOTE — TELEPHONE ENCOUNTER
Refill Decision Note   Butch Mcdaniel  is requesting a refill authorization.  Brief Assessment and Rationale for Refill:  Approve     Medication Therapy Plan:       Medication Reconciliation Completed: No   Comments:     No Care Gaps recommended.     Note composed:9:11 AM 02/22/2024

## 2024-02-27 ENCOUNTER — CLINICAL SUPPORT (OUTPATIENT)
Dept: SMOKING CESSATION | Facility: CLINIC | Age: 69
End: 2024-02-27
Payer: COMMERCIAL

## 2024-02-27 DIAGNOSIS — F17.200 NICOTINE DEPENDENCE: Primary | ICD-10-CM

## 2024-02-27 DIAGNOSIS — F17.200 NICOTINE DEPENDENCE: ICD-10-CM

## 2024-02-27 PROCEDURE — 99999 PR PBB SHADOW E&M-EST. PATIENT-LVL II: CPT | Mod: PBBFAC,,,

## 2024-02-27 PROCEDURE — 99403 PREV MED CNSL INDIV APPRX 45: CPT | Mod: S$GLB,,,

## 2024-02-27 RX ORDER — DM/P-EPHED/ACETAMINOPH/DOXYLAM 30-7.5/3
2 LIQUID (ML) ORAL
Qty: 100 LOZENGE | Refills: 0 | Status: SHIPPED | OUTPATIENT
Start: 2024-02-27 | End: 2024-03-20

## 2024-02-27 RX ORDER — IBUPROFEN 200 MG
1 TABLET ORAL DAILY
Qty: 14 PATCH | Refills: 0 | Status: SHIPPED | OUTPATIENT
Start: 2024-02-27 | End: 2024-03-12 | Stop reason: SDUPTHER

## 2024-02-27 NOTE — PROGRESS NOTES
Individual Follow-Up Form    2/27/2024    Quit Date: n/a    Clinical Status of Patient: Outpatient    Length of Service: 45 minutes    Continuing Medication: yes  Patches    Other Medications: n/a     Target Symptoms: Withdrawal and medication side effects. The following were  rated moderate (3) to severe (4) on TCRS:  Moderate (3): 0  Severe (4): 0    Comments: patient presents for follow up smoking 10 per day was smoking a pack per day, wearing 21mg nicotine patch daily, HE IS DOWN FROM 1 PK PER DAY, AFTER DISCUSSION RECOMMEND THAT HE START THE MINI LOZENGE 2MG FOR URGES TO SMOKE AND TO HELP HIM STOP TE 10 HE IS SMOKING, ADVISED TO DO ONE LOZENGE IN PLACE OF EVERY OTHER CIGARETTE PER DAY THEN EVENTUALLY JUST THE LOZENGE AND THE PATCH ONCE HE PICKS A QUIT DATE. HE DENIES NEGATIVE S/E FROM THE PATCHES. WILL FOLLOW UP IN CLINIC IN TWO WEEKS TO SEE HOW HE IS DOING WITH THE ADDITION OF THE NICOTINE LOZENGE, EXPLAINED HOW THE NICOTINE LOZENGE WORKS, HOW TO USE AND HOW TO AVOID NEGATIVE S/E     Diagnosis: F17.200    Next Visit: 2 weeks

## 2024-02-28 RX ORDER — AMLODIPINE BESYLATE 10 MG/1
TABLET ORAL
Qty: 90 TABLET | Refills: 3 | Status: SHIPPED | OUTPATIENT
Start: 2024-02-28

## 2024-03-12 ENCOUNTER — CLINICAL SUPPORT (OUTPATIENT)
Dept: SMOKING CESSATION | Facility: CLINIC | Age: 69
End: 2024-03-12
Payer: COMMERCIAL

## 2024-03-12 DIAGNOSIS — F17.200 NICOTINE DEPENDENCE: ICD-10-CM

## 2024-03-12 PROCEDURE — 99999 PR PBB SHADOW E&M-EST. PATIENT-LVL II: CPT | Mod: PBBFAC,,,

## 2024-03-12 PROCEDURE — 99403 PREV MED CNSL INDIV APPRX 45: CPT | Mod: S$GLB,,,

## 2024-03-12 RX ORDER — IBUPROFEN 200 MG
1 TABLET ORAL DAILY
Qty: 14 PATCH | Refills: 0 | Status: SHIPPED | OUTPATIENT
Start: 2024-03-12 | End: 2024-03-25

## 2024-03-12 NOTE — PROGRESS NOTES
"Individual Follow-Up Form    3/12/2024    Quit Date: N/A    Clinical Status of Patient: Outpatient    Length of Service: 45 minutes    Continuing Medication: yes  Patches    Other Medications: N/A     Target Symptoms: Withdrawal and medication side effects. The following were  rated moderate (3) to severe (4) on TCRS:  Moderate (3): 0  Severe (4): 0    Comments: PATIENT PRESENTS FOR FOLLOW UP TELEPHONIC TODAY, CTTS NOTICED HE WAS NOT IN CLINIC FOR HIS APPT THEREFORE CONTACTED HIM, HE WAS WHEEZING AND NOW FEELING WELL TODAY, DID A "RAPID BREATHING TREATMENT" WHILE ON THE PHONE WITH CTTS AND HE SOUNDED BETTER IMMEDIATELY, HE STATES HE IS SMOKING A FEW CIGARETTES PER DAY, HEI S CURRENTLY ON THE 21MG NICOTINE PATCH. IT WAS RECOMMENDED FOR HIM TO DO THE 21MG AND THE 14MG NICOTINE PATCH IN [place] AND THIS WORKS THE BEST FOR HIM HOWEVER HIS LOCAL PHARMACY GIVES HIM A HARD TIME ABOUT GETTING BOTH. WILL SEND TO [facility]. RECOMMEND THE 14MG NICOTINE PATCH AND THE 21MG NICOTINE PATCH DAILY. RECOMMEND AN ABRUPT QUIT ONCE STARTING TO DOUBLE NICOTINE PATCH, HOPING ADDING THE 14MG NICOTINE PATCH WILL HELP WITH HIS RATE REDUCTION AND GET HIM OVER THE HUMP. WILL FOLLOW     Diagnosis: F17.210    Next Visit: 2 weeks    "

## 2024-03-18 ENCOUNTER — LAB VISIT (OUTPATIENT)
Dept: LAB | Facility: HOSPITAL | Age: 69
End: 2024-03-18
Attending: INTERNAL MEDICINE
Payer: MEDICARE

## 2024-03-18 DIAGNOSIS — R79.89 LOW TESTOSTERONE IN MALE: ICD-10-CM

## 2024-03-18 DIAGNOSIS — E11.9 TYPE 2 DIABETES MELLITUS WITHOUT COMPLICATION, WITHOUT LONG-TERM CURRENT USE OF INSULIN: ICD-10-CM

## 2024-03-18 DIAGNOSIS — Z12.5 SCREENING FOR PROSTATE CANCER: ICD-10-CM

## 2024-03-18 DIAGNOSIS — E11.36 TYPE 2 DIABETES MELLITUS WITH DIABETIC CATARACT, WITHOUT LONG-TERM CURRENT USE OF INSULIN: ICD-10-CM

## 2024-03-18 LAB
ALBUMIN SERPL BCP-MCNC: 4.3 G/DL (ref 3.5–5.2)
ALP SERPL-CCNC: 50 U/L (ref 55–135)
ALT SERPL W/O P-5'-P-CCNC: 20 U/L (ref 10–44)
ANION GAP SERPL CALC-SCNC: 8 MMOL/L (ref 8–16)
AST SERPL-CCNC: 23 U/L (ref 10–40)
BILIRUB SERPL-MCNC: 1.6 MG/DL (ref 0.1–1)
BUN SERPL-MCNC: 16 MG/DL (ref 8–23)
CALCIUM SERPL-MCNC: 10.1 MG/DL (ref 8.7–10.5)
CHLORIDE SERPL-SCNC: 99 MMOL/L (ref 95–110)
CHOLEST SERPL-MCNC: 99 MG/DL (ref 120–199)
CHOLEST/HDLC SERPL: 3.5 {RATIO} (ref 2–5)
CO2 SERPL-SCNC: 29 MMOL/L (ref 23–29)
COMPLEXED PSA SERPL-MCNC: 0.42 NG/ML (ref 0–4)
CREAT SERPL-MCNC: 1.4 MG/DL (ref 0.5–1.4)
ERYTHROCYTE [DISTWIDTH] IN BLOOD BY AUTOMATED COUNT: 13.1 % (ref 11.5–14.5)
EST. GFR  (NO RACE VARIABLE): 54.4 ML/MIN/1.73 M^2
ESTIMATED AVG GLUCOSE: 126 MG/DL (ref 68–131)
GLUCOSE SERPL-MCNC: 131 MG/DL (ref 70–110)
HBA1C MFR BLD: 6 % (ref 4–5.6)
HCT VFR BLD AUTO: 48.3 % (ref 40–54)
HDLC SERPL-MCNC: 28 MG/DL (ref 40–75)
HDLC SERPL: 28.3 % (ref 20–50)
HGB BLD-MCNC: 16 G/DL (ref 14–18)
LDLC SERPL CALC-MCNC: 38.8 MG/DL (ref 63–159)
MCH RBC QN AUTO: 33 PG (ref 27–31)
MCHC RBC AUTO-ENTMCNC: 33.1 G/DL (ref 32–36)
MCV RBC AUTO: 100 FL (ref 82–98)
NONHDLC SERPL-MCNC: 71 MG/DL
PLATELET # BLD AUTO: 68 K/UL (ref 150–450)
PMV BLD AUTO: 12.4 FL (ref 9.2–12.9)
POTASSIUM SERPL-SCNC: 5.2 MMOL/L (ref 3.5–5.1)
PROT SERPL-MCNC: 7.2 G/DL (ref 6–8.4)
RBC # BLD AUTO: 4.85 M/UL (ref 4.6–6.2)
SODIUM SERPL-SCNC: 136 MMOL/L (ref 136–145)
TESTOST SERPL-MCNC: 163 NG/DL (ref 304–1227)
TRIGL SERPL-MCNC: 161 MG/DL (ref 30–150)
WBC # BLD AUTO: 9.56 K/UL (ref 3.9–12.7)

## 2024-03-18 PROCEDURE — 83036 HEMOGLOBIN GLYCOSYLATED A1C: CPT | Performed by: INTERNAL MEDICINE

## 2024-03-18 PROCEDURE — 84403 ASSAY OF TOTAL TESTOSTERONE: CPT | Performed by: INTERNAL MEDICINE

## 2024-03-18 PROCEDURE — 84153 ASSAY OF PSA TOTAL: CPT | Performed by: INTERNAL MEDICINE

## 2024-03-18 PROCEDURE — 36415 COLL VENOUS BLD VENIPUNCTURE: CPT | Mod: PO | Performed by: INTERNAL MEDICINE

## 2024-03-18 PROCEDURE — 80053 COMPREHEN METABOLIC PANEL: CPT | Performed by: INTERNAL MEDICINE

## 2024-03-18 PROCEDURE — 80061 LIPID PANEL: CPT | Performed by: INTERNAL MEDICINE

## 2024-03-18 PROCEDURE — 85027 COMPLETE CBC AUTOMATED: CPT | Performed by: INTERNAL MEDICINE

## 2024-03-19 ENCOUNTER — CLINICAL SUPPORT (OUTPATIENT)
Dept: SMOKING CESSATION | Facility: CLINIC | Age: 69
End: 2024-03-19
Payer: COMMERCIAL

## 2024-03-19 DIAGNOSIS — F17.200 NICOTINE DEPENDENCE: Primary | ICD-10-CM

## 2024-03-19 PROCEDURE — 99999 PR PBB SHADOW E&M-EST. PATIENT-LVL I: CPT | Mod: PBBFAC,,,

## 2024-03-19 PROCEDURE — 99407 BEHAV CHNG SMOKING > 10 MIN: CPT | Mod: S$GLB,,,

## 2024-03-23 PROBLEM — D69.6 THROMBOCYTOPENIA: Status: RESOLVED | Noted: 2022-10-14 | Resolved: 2024-03-23

## 2024-03-23 NOTE — PROGRESS NOTES
This note was created by combination of typed  and M-Modal dictation.  Transcription errors may be present.  If there are any questions, please contact me.    Assessment and Plan:   Assessment and Plan   Anxiety, unable to wean off BZD  Chronic prescription benzodiazepine use  Recurrent major depressive disorder, in partial remission  Benzodiazepine dependence, did not do well with attempt to wean down 2017  -longstanding benzodiazepine use, had previously taken much higher doses.  Anxiety, high stress situation, unfortunately issues with a number family members.    Previously was seen by Psychiatry until Psychiatry left the area.  In regards to his benzodiazepine use psychiatry recommended inpatient detox which the patient was not interested in at the time.    On max dose citalopram.  Discussed that his anxiety still is uncontrolled, notes that he does rely on the alprazolam for sleep but there are other alternatives.  Such as melatonin.    Also that this has high interaction potential with his chronic narcotic.  After discussion he would be agreeable to return back to see a new psychiatrist to discuss his medication regimen.  -     Ambulatory referral/consult to Psychiatry; Future; Expected date: 04/01/2024    Chronic, continuous use of opioids  Chronic low back pain, unspecified back pain laterality, unspecified whether sciatica present  Unspecified cord compression  Unspecified inflammatory spondylopathy, cervical region  -on chronic narcotics, tizanidine, gabapentin, followed by outside pain clinic    Type 2 diabetes mellitus with diabetic cataract, without long-term current use of insulin  Type 2 diabetes mellitus without complication, without long-term current use of insulin  -on metformin, pre visit labs A1c stable.  No changes  -     Comprehensive Metabolic Panel; Future; Expected date: 09/19/2024  -     Lipid Panel; Future; Expected date: 09/19/2024  -     Hemoglobin A1C; Future; Expected date:  09/19/2024  -     CBC Without Differential; Future; Expected date: 09/23/2024    Essential hypertension  -stable on amlodipine, carvedilol, hydralazine  Pre visit labs creatinine mildly elevated.  Dehydrated at the time of his labs?  History of SHAUNA.  Monitor.  Stay hydrated.    Aortic atherosclerosis  -on statin.  Pre visit labs triglycerides elevated though otherwise non HDL was good.  No changes.    Tobacco dependence, patch with irritation; hx of bupropion  Chronic obstructive pulmonary disease, unspecified COPD type  Bronchospasm  -in the active quitting smoking.  Working with smoking cessation.  Notes active wheeze of late, offered him in office nebulizer treatment, he declines, he just needs a refill on his albuterol rescue inhaler.  Has nebulizer at home.  -     albuterol (PROVENTIL/VENTOLIN HFA) 90 mcg/actuation inhaler; Inhale 2 puffs into the lungs every 6 (six) hours as needed for Wheezing or Shortness of Breath.  Dispense: 18 g; Refill: 1    Thrombocytopenia  -historically has had issues with platelet clumping.  Pre visit labs platelet count low but I suspect clumping.  Check future labs    Low testosterone in male  -did not take his scheduled testosterone before his recent lab.  Taking 20 mg every 2 weeks, typically in the 1st in the 15th.  For now no changes, check future labs  -     Testosterone; Future; Expected date: 09/23/2024      Medications Discontinued During This Encounter   Medication Reason    albuterol (PROVENTIL/VENTOLIN HFA) 90 mcg/actuation inhaler Reorder       meds sent this encounter:  Medications Ordered This Encounter   Medications    albuterol (PROVENTIL/VENTOLIN HFA) 90 mcg/actuation inhaler     Sig: Inhale 2 puffs into the lungs every 6 (six) hours as needed for Wheezing or Shortness of Breath.     Dispense:  18 g     Refill:  1         Follow Up:  Follow-up 3 months, hopefully in the interim to have seen Psychiatry.  Labs in 6 months.  Future Appointments   Date Time Provider  Department Center   3/25/2024  2:20 PM Gabriel Evans MD [facility]   3/25/2024  3:30 PM Pauline Welch, RN [facility]          Subjective:   Subjective   Chief Complaint   Patient presents with    Asthma    Anxiety       HPI  Butch is a 68 y.o. male.     Social History     Tobacco Use    Smoking status: Some Days     Current packs/day: 0.20     Average packs/day: 0.2 packs/day for 32.6 years (8.1 ttl pk-yrs)     Types: Cigarettes, Vaping with nicotine     Start date: 6/20/1991     Last attempt to quit: 6/20/2023    Smokeless tobacco: Never    Tobacco comments:     Approximately 3 cigarettes a day   Substance Use Topics    Alcohol use: No     Alcohol/week: 0.0 standard drinks of alcohol      Social History     Occupational History    Occupation: retired -       Social History     Social History Narrative    Not on file       Last appointment with this clinic was 11/20/2023. Last visit with me 11/20/2023   To summarize last visit and events leading up to today:  DM2   Low testosterone on testosterone.  HTN, lipid   3/16/23 nu stress test neg for ischemia; no arrhythmias  07/27/2023 TTE normal systolic function.  LVEF 65%.  Grade 1 diastolic dysfunction.  Hx of SHAUNA due to dehydration, severe enough to require HD for hyperK, 7/2022.  Hx of AFib during hospitalization 7/2022 in the setting of hyperK. On beta blocker.  Anxiety, BZD dependence. did not tolerate trial of Cymbalta.  Took it for maybe a week.   Has been to see Psychiatry, Psychiatry recommended inpatient detox and the patient was not interested.  Currently have him taking 1.5 tablets in total during the day.  on max dose celexa  Chronic back pain followed by pain mgmt. Narcotic and bill  Thrombocytopenia hx of clumping on microscopy  COPD stable.   Saw ENT 7/28.  Sensorineural hearing loss.  Qualifies for hearing aids.  08/04/2023 colonoscopy 10 mm cecal TA.  10 mm descending polypoid mucosa.  Repeat 3  "years  Creatinine remains high.      9/6/23 saw cards, cut his coreg dose due to stephania     9/20/23 labs  CBC platelets clumped  His platelet counts have been normal previously so I do not think that he needs to repeat this just for the platelets.  Testosterone level was normal   A1c good  Liver, kidneys, electrolytes normal   Cholesterol was good.    No changes, stay on current medicines and stay on current testosterone.    Last OV 11/20  Anxiety, BZD dependence, stable. Still not interested in follow up with psychiatry/inpatient detox.  Chronic pain, chronic opioids, managed by outside pain mgmt.  Low testosterone stable.  DM stable  Smoking plans to restart cessation clinic. Hx of nicotine patches with efficacy, his benefits ran out so he stopped using patches and restarted smoking but committed to cessation.     Pre visit labs  CBC WNL  CMP K mild elev, Cr 1.4 from 1.0  Lipid TG high nonHDL 71  A1c 6.0 from 6.1  PSA WNL  Testosterone low 163    Today's visit:    Still with burning pain in the feet and the left hand   Ever since the epidural  Unchanged from ever since the surgery.    Tingling on the left side  And tingling on the feet  Sensitive pins and needles sensation.   Balbina and oxycodone    Last testosterone injection  "the other day"  Had forgot on the 15th to give himself injection  So gave himself after the labs.      Denies CP  No cough  Some wheeze he attributes to smoking  Quitting smoking, not every day at this time  Has rescue albuterol and needs RF  It is unclear if he was taking maintenance inhaler.    High stress.  Still taking alprazolam and citalopram.  3 grandchildren  The oldest 2 are ok  But the youngest, 16, truancy, THC use  Son 33, lives with the patient, alcohol use disorder  2 daughters both with substance use issues.   On max dose citalopram  Xanax on 1.5 mg daily.   We discussed the high risk nature of this medication.  And that he continues to have issues with anxiety, irritability.  " Has not seen Psychiatry ever since his previous psychiatrist left the area.  We discussed Xanax, takes it at night he notes he mainly takes it for insomnia but discussed that he also takes it during the day as well so it has not just for insomnia.  Has heard about melatonin but has not tried it before.  After discussion he would be agreeable to see a new psychiatrist to discuss his regimen.        Patient Care Team:  Gabriel Evans MD as PCP - General (Internal Medicine)  Vlad Nava MD (Pain Medicine)  Kaila Carrillo OD as Consulting Physician (Optometry)  Wyatt Ojeda MA as Care Coordinator    Patient Active Problem List    Diagnosis Date Noted    Bradycardia 05/10/2023    Aortic atherosclerosis 02/16/2023    Type 2 diabetes mellitus with diabetic cataract, without long-term current use of insulin 10/14/2022    Unspecified inflammatory spondylopathy, cervical region 10/14/2022    History of atrial fibrillation 7/2022 hospitalization i n the setting of SHAUNA and hyperK. started on carvedilol during admission 07/19/2022    Junctional bradycardia 07/18/2022    Chronic prescription benzodiazepine use 07/18/2022    Foraminal stenosis of lumbar region 05/06/2022 5/5/2022 MRI L spine:   *   Multilevel lumbar spondylosis with up to severe right neural foraminal narrowing at L5-S1 with impingement of exiting right L5 nerve root.   *   Moderate thecal sac narrowing at L3-L4.   *   Chronic spondylolysis of L5 with grade 1 anterolisthesis of L5 on S1        Chronic low back pain 12/27/2021    Sacroiliac joint pain 12/27/2021    Sacroiliitis 12/27/2021    Impaired mobility 10/10/2021    Arthrodesis status 10/10/2021     Formatting of this note might be different from the original.  C2-T2, 10/03/21      Impaired mobility and ADLs 10/07/2021    Status post surgery 10/03/2021    History of smoking 10-25 pack years 10/02/2021    Paresthesia of left upper and lower extremity 10/01/2021    Low testosterone in male  09/02/2020 6/2020 Repeat labs prolactin was normal.  Testosterone was low.  Free testosterone was low and sex hormone binding globulin was normal      Elevated prolactin level,low testosterone, gynecomastia; MRI pituitary normal 5/2020 01/13/2020    Nuclear sclerosis of both eyes 11/04/2019    Open angle with borderline findings and high glaucoma risk in both eyes 10/08/2019    Cataract, bilateral 09/11/2018    Cervical stenosis of spinal canal 10/3/2021 LAMINECTOMY, SPINE, CERVICAL, WITH POSTERIOR FUSION C2-T2 05/07/2018 02/14/2018 MRI C-spine impression:  Slight retrolisthesis of C4 with respect to C5.  Narrowing of the central spinal canal most prominent at the C4-C5, C5-C6, and C6-C7 levels and to a lesser extent at C2-C3 and C3-C4.  Annular disc bulges posteriorly at C2-C3, C3-C4.  Diffuse disc herniation/protrusion posteriorly at C4-C5 level and a disc herniation/extrusion posteriorly at the C5-C6 level with a central disc herniation/protrusion posteriorly at the C6-C7 level.  Narrowing of the neural foramen bilaterally from C3-C4 through C6-C7.  10/1/2021 admitted for L sided numbness after epidural injection, initially CT interpreted at [facility]; however subsequent MRI no hemorrhage but severe spinal canal stenosis    10/1/2021 MRI brain: Negative MRI of the brain for hemorrhage, mass or infarction. Specifically, no evidence of subarachnoid blood or blood products in the extra-axial spaces on SWI or diffusion-weighted images.  In light of the previous CTs and history of epidural injections at outside facility, this raises the question of in ejected contrast in the central spinal canal with migration into the intracranial subarachnoid spaces.  Subarachnoid hemorrhage or exudate are considered less likely.  10/1/2021 MRI C spine: Severe spinal canal stenosis with complete effacement of the CSF and spinal cord compression at the level of C5-C6 due to posterior disc osteophyte complex with superimposed right  disc protrusion and congenitally narrow spinal canal.  High T2 signal within the cord secondary to either edema or myelomalacia.    10/3/2021 LAMINECTOMY, SPINE, CERVICAL, WITH POSTERIOR FUSION C2-T2      Tubular adenoma of colon 5/10/17; 08/04/2023 colonoscopy 10 mm cecal TA.  10 mm descending polypoid mucosa.  Repeat 3 years 05/10/2017     5/10/2017 colonoscopy tubular adenoma  08/04/2023 colonoscopy 10 mm cecal TA.  10 mm descending polypoid mucosa.  Repeat 3 years      Therapeutic opioid-induced constipation (OIC) 04/17/2017    Tobacco dependence, patch with irritation; hx of bupropion 01/12/2016    Type 2 diabetes mellitus without complication, without long-term current use of insulin 08/07/2015    Facet arthritis of cervical region 10/28/2013    Facet arthritis of lumbar region 10/28/2013    Benzodiazepine dependence, did not do well with attempt to wean down 2017 10/28/2013    Chronic, continuous use of opioids 10/28/2013    ED (erectile dysfunction) 07/26/2013    DDD (degenerative disc disease), cervical 05/23/2013    DDD (degenerative disc disease), lumbar 05/23/2013    Essential hypertension 11/01/2012     2/3/2022 TTE LV normal size with moderate concentric hypertrophy and normal systolic function LVEF 60%.  Normal RV size and systolic function.  PA pressure 33  7/18/22 TTE LV normal size with mod concentric hypertrophy; normal systolic function. LVEF 70%. Normal RV size and systolic function. PA pressure 58. Mod pHTN      Anemia 11/01/2012    Anxiety, unable to wean off BZD 11/01/2012    Recurrent major depressive disorder, in partial remission 11/01/2012       PAST MEDICAL PROBLEMS, PAST SURGICAL HISTORY: please see relevant portions of the electronic medical record    ALLERGIES AND MEDICATIONS: updated and reviewed.  Medication List with Changes/Refills   Current Medications    ALBUTEROL (PROVENTIL/VENTOLIN HFA) 90 MCG/ACTUATION INHALER    Inhale 2 puffs into the lungs every 6 (six) hours as needed for  "Wheezing or Shortness of Breath.    ALPRAZOLAM (XANAX) 1 MG TABLET    TAKE 1 AND ONE-HALF TABLETS BY MOUTH ONCE A DAY    AMLODIPINE (NORVASC) 10 MG TABLET    TAKE ONE TABLET BY MOUTH EVERY EVENING    ATORVASTATIN (LIPITOR) 40 MG TABLET    TAKE ONE TABLET BY MOUTH EVERY EVENING    BUSPIRONE (BUSPAR) 30 MG TAB    TAKE ONE TABLET BY MOUTH TWICE DAILY    CARVEDILOL (COREG) 6.25 MG TABLET    Take 1 tablet (6.25 mg total) by mouth 2 (two) times daily.    CITALOPRAM (CELEXA) 40 MG TABLET    Take 1 tablet (40 mg total) by mouth once daily.    FLUTICASONE-SALMETEROL DISKUS INHALER 100-50 MCG    Inhale 1 puff into the lungs.    GABAPENTIN (NEURONTIN) 600 MG TABLET    Take 600 mg by mouth 3 (three) times daily.    HYDRALAZINE (APRESOLINE) 25 MG TABLET    TAKE ONE TABLET BY MOUTH THREE TIMES DAILY    IBUPROFEN (ADVIL,MOTRIN) 600 MG TABLET    Take 600 mg by mouth.    LISINOPRIL (PRINIVIL,ZESTRIL) 20 MG TABLET    TAKE ONE TABLET BY MOUTH ONCE A DAY    METFORMIN (GLUCOPHAGE) 500 MG TABLET    Take 1 tablet (500 mg total) by mouth 2 (two) times daily with meals.    NALOXONE (NARCAN) 4 MG/ACTUATION SPRY    4mg by nasal route as needed for opioid overdose; may repeat every 2-3 minutes in alternating nostrils until medical help arrives. Call 911    NEEDLE, DISP, 22 G 22 GAUGE X 1" NDLE    Testosterone injection every 2 weeks    NICOTINE (NICODERM CQ) 14 MG/24 HR    Place 1 patch onto the skin once daily. Wear daily in conjunction with the 21mg nicotine patch,    NICOTINE (NICODERM CQ) 21 MG/24 HR    Place 1 patch onto the skin once daily. WEAR DAILY IN CONJUNCTION WITH THE 14MG NICOTINE PATCH, ADVISED TO DO AN ABRUPT QUIT ONCE STARTING DOUBLE NICOTINE PATCHING    OXYCODONE-ACETAMINOPHEN (PERCOCET)  MG PER TABLET    Take 1 tablet by mouth every 4 (four) hours as needed.    POLYETHYLENE GLYCOL 3350 (MIRALAX ORAL)    Take 1 application by mouth.    SENNA (SENOKOT) 8.6 MG TABLET    Take 1 tablet by mouth 2 (two) times a day.    " "SYRINGE, DISPOSABLE, (BD LUER-STARLA SYRINGE) 1 ML SYRG    Testosterone injection every 2 weeks    TESTOSTERONE CYPIONATE (DEPOTESTOTERONE CYPIONATE) 200 MG/ML INJECTION    INJECT ONE MILLILITER INTRAMUSCULARLY EVERY 14 DAYS    TIZANIDINE (ZANAFLEX) 4 MG TABLET    Take 4-8 mg by mouth nightly as needed.         Objective:   Objective   Physical Exam   Vitals:    03/25/24 1401   BP: 134/72   Pulse: 61   Temp: 98.4 °F (36.9 °C)   TempSrc: Oral   SpO2: 98%   Weight: 99 kg (218 lb 5.9 oz)   Height: 5' 11" (1.803 m)    Body mass index is 30.46 kg/m².  Weight: 99 kg (218 lb 5.9 oz)   Height: 5' 11" (180.3 cm)     Physical Exam  Constitutional:       General: He is not in acute distress.     Appearance: He is well-developed.   Eyes:      Extraocular Movements: Extraocular movements intact.   Cardiovascular:      Rate and Rhythm: Normal rate and regular rhythm.      Pulses:           Dorsalis pedis pulses are 1+ on the right side and 1+ on the left side.        Posterior tibial pulses are 1+ on the right side and 1+ on the left side.      Heart sounds: Normal heart sounds. No murmur heard.  Pulmonary:      Effort: Pulmonary effort is normal.      Breath sounds: Wheezing present.      Comments: Some expiratory wheeze bilaterally  Musculoskeletal:         General: Normal range of motion.      Right lower leg: No edema.      Left lower leg: No edema.   Feet:      Right foot:      Protective Sensation: 5 sites tested.  5 sites sensed.      Left foot:      Protective Sensation: 5 sites tested.  5 sites sensed.   Skin:     General: Skin is warm and dry.   Neurological:      Mental Status: He is alert and oriented to person, place, and time.      Coordination: Coordination normal.   Psychiatric:         Behavior: Behavior normal.         Thought Content: Thought content normal.         Component      Latest Ref Rng 9/19/2023 3/18/2024   Sodium      136 - 145 mmol/L 139  136    Potassium      3.5 - 5.1 mmol/L 4.3  5.2 (H)  "   Chloride      95 - 110 mmol/L 107  99    CO2      23 - 29 mmol/L 27  29    Glucose      70 - 110 mg/dL 105  131 (H)    BUN      8 - 23 mg/dL 17  16    Creatinine      0.5 - 1.4 mg/dL 1.0  1.4    Calcium      8.7 - 10.5 mg/dL 9.3  10.1    PROTEIN TOTAL      6.0 - 8.4 g/dL 6.8  7.2    Albumin      3.5 - 5.2 g/dL 4.0  4.3    BILIRUBIN TOTAL      0.1 - 1.0 mg/dL 1.0  1.6 (H)    ALP      55 - 135 U/L 47 (L)  50 (L)    AST      10 - 40 U/L 15  23    ALT      10 - 44 U/L 12  20    eGFR      >60 mL/min/1.73 m^2 >60.0  54.4 !    Anion Gap      8 - 16 mmol/L 5 (L)  8    WBC      3.90 - 12.70 K/uL 7.78  9.56    RBC      4.60 - 6.20 M/uL 4.30 (L)  4.85    Hemoglobin      14.0 - 18.0 g/dL 14.3  16.0    Hematocrit      40.0 - 54.0 % 42.4  48.3    MCV      82 - 98 fL 99 (H)  100 (H)    MCH      27.0 - 31.0 pg 33.3 (H)  33.0 (H)    MCHC      32.0 - 36.0 g/dL 33.7  33.1    RDW      11.5 - 14.5 % 12.6  13.1    Platelet Count      150 - 450 K/uL SEE COMMENT  68 (L)    MPV      9.2 - 12.9 fL SEE COMMENT  12.4    Cholesterol Total      120 - 199 mg/dL 87 (L)  99 (L)    Triglycerides      30 - 150 mg/dL 97  161 (H)    HDL      40 - 75 mg/dL 29 (L)  28 (L)    LDL Cholesterol      63.0 - 159.0 mg/dL 38.6 (L)  38.8 (L)    HDL/Cholesterol Ratio      20.0 - 50.0 % 33.3  28.3    Total Cholesterol/HDL Ratio      2.0 - 5.0  3.0  3.5    Non-HDL Cholesterol      mg/dL 58  71    Urine Microalbumin      ug/mL  46.0    Urine Creatinine      23.0 - 375.0 mg/dL  25.0    MICROALB/CREAT RATIO      0.0 - 30.0 ug/mg  184.0 (H)    Hemoglobin A1C External      4.0 - 5.6 % 6.1 (H)  6.0 (H)    Estimated Avg Glucose      68 - 131 mg/dL 128  126    Testosterone, Total      304 - 1227 ng/dL 994  163 (L)    Prostate Specific Antigen      0.00 - 4.00 ng/mL  0.42       Legend:  (L) Low  (H) High  ! Abnormal

## 2024-03-25 ENCOUNTER — CLINICAL SUPPORT (OUTPATIENT)
Dept: SMOKING CESSATION | Facility: CLINIC | Age: 69
End: 2024-03-25
Payer: COMMERCIAL

## 2024-03-25 ENCOUNTER — OFFICE VISIT (OUTPATIENT)
Dept: FAMILY MEDICINE | Facility: CLINIC | Age: 69
End: 2024-03-25
Payer: MEDICARE

## 2024-03-25 VITALS
WEIGHT: 218.38 LBS | DIASTOLIC BLOOD PRESSURE: 72 MMHG | SYSTOLIC BLOOD PRESSURE: 134 MMHG | OXYGEN SATURATION: 98 % | HEIGHT: 71 IN | TEMPERATURE: 98 F | HEART RATE: 61 BPM | BODY MASS INDEX: 30.57 KG/M2

## 2024-03-25 DIAGNOSIS — F17.200 NICOTINE DEPENDENCE: ICD-10-CM

## 2024-03-25 DIAGNOSIS — G89.29 CHRONIC LOW BACK PAIN, UNSPECIFIED BACK PAIN LATERALITY, UNSPECIFIED WHETHER SCIATICA PRESENT: ICD-10-CM

## 2024-03-25 DIAGNOSIS — E11.9 TYPE 2 DIABETES MELLITUS WITHOUT COMPLICATION, WITHOUT LONG-TERM CURRENT USE OF INSULIN: ICD-10-CM

## 2024-03-25 DIAGNOSIS — Z79.899 CHRONIC PRESCRIPTION BENZODIAZEPINE USE: ICD-10-CM

## 2024-03-25 DIAGNOSIS — J98.01 BRONCHOSPASM: ICD-10-CM

## 2024-03-25 DIAGNOSIS — E11.36 TYPE 2 DIABETES MELLITUS WITH DIABETIC CATARACT, WITHOUT LONG-TERM CURRENT USE OF INSULIN: ICD-10-CM

## 2024-03-25 DIAGNOSIS — G95.20 UNSPECIFIED CORD COMPRESSION: ICD-10-CM

## 2024-03-25 DIAGNOSIS — F11.90 CHRONIC, CONTINUOUS USE OF OPIOIDS: ICD-10-CM

## 2024-03-25 DIAGNOSIS — M46.92 UNSPECIFIED INFLAMMATORY SPONDYLOPATHY, CERVICAL REGION: ICD-10-CM

## 2024-03-25 DIAGNOSIS — I10 ESSENTIAL HYPERTENSION: ICD-10-CM

## 2024-03-25 DIAGNOSIS — R79.89 LOW TESTOSTERONE IN MALE: ICD-10-CM

## 2024-03-25 DIAGNOSIS — M54.50 CHRONIC LOW BACK PAIN, UNSPECIFIED BACK PAIN LATERALITY, UNSPECIFIED WHETHER SCIATICA PRESENT: ICD-10-CM

## 2024-03-25 DIAGNOSIS — F33.41 RECURRENT MAJOR DEPRESSIVE DISORDER, IN PARTIAL REMISSION: ICD-10-CM

## 2024-03-25 DIAGNOSIS — D69.6 THROMBOCYTOPENIA: ICD-10-CM

## 2024-03-25 DIAGNOSIS — F41.9 ANXIETY: Primary | ICD-10-CM

## 2024-03-25 DIAGNOSIS — I70.0 AORTIC ATHEROSCLEROSIS: ICD-10-CM

## 2024-03-25 DIAGNOSIS — J44.9 CHRONIC OBSTRUCTIVE PULMONARY DISEASE, UNSPECIFIED COPD TYPE: ICD-10-CM

## 2024-03-25 DIAGNOSIS — F17.200 TOBACCO DEPENDENCE: Chronic | ICD-10-CM

## 2024-03-25 DIAGNOSIS — F13.20 BENZODIAZEPINE DEPENDENCE: Chronic | ICD-10-CM

## 2024-03-25 PROCEDURE — 99999 PR PBB SHADOW E&M-EST. PATIENT-LVL II: CPT | Mod: PBBFAC,,,

## 2024-03-25 PROCEDURE — 99999 PR PBB SHADOW E&M-EST. PATIENT-LVL V: CPT | Mod: PBBFAC,,, | Performed by: INTERNAL MEDICINE

## 2024-03-25 PROCEDURE — 99214 OFFICE O/P EST MOD 30 MIN: CPT | Mod: S$GLB,,, | Performed by: INTERNAL MEDICINE

## 2024-03-25 PROCEDURE — 99403 PREV MED CNSL INDIV APPRX 45: CPT | Mod: S$GLB,,,

## 2024-03-25 RX ORDER — ALBUTEROL SULFATE 90 UG/1
2 AEROSOL, METERED RESPIRATORY (INHALATION) EVERY 6 HOURS PRN
Qty: 18 G | Refills: 1 | Status: SHIPPED | OUTPATIENT
Start: 2024-03-25

## 2024-03-25 RX ORDER — IBUPROFEN 200 MG
1 TABLET ORAL DAILY
Qty: 14 PATCH | Refills: 0 | Status: SHIPPED | OUTPATIENT
Start: 2024-03-25 | End: 2024-04-08 | Stop reason: SDUPTHER

## 2024-03-25 NOTE — PROGRESS NOTES
Individual Follow-Up Form    3/25/2024    Quit Date: N/A    Clinical Status of Patient: Outpatient    Length of Service: 45 minutes    Continuing Medication: yes  Patches or Nicotine Lozenges    Other Medications: N/A     Target Symptoms: Withdrawal and medication side effects. The following were  rated moderate (3) to severe (4) on TCRS:  Moderate (3): 0  Severe (4): PATCH LOCATION ITCHING (OPAQUE VERSION)     Comments: PATIENT PRESENTS FOR FOLLOW UP SMOKING A FEW CIGARETTES PER DAY, HE SOUNDS BETTER TODAY, NOT WHEEZING AS MUCH, WHEN HE SMOKES LESS HE GENERALLY FEELS BETTER AND BREATHES BETTER, HE IS WEARING THE 21MG NICOTINE PATCH THE CLEAR VERSION AND WAS GIVEN THE 14MG NICOTINE PATCH THE [place] VERSION AND HAD A REACTION TO THE ADHESIVE SO HE HAS NOT WORN THE 14MG IN CONJUNCTION WITH THE 21MG. HE STATES HE IS DOING GREAT WITH JUST THE 21MG NICOTINE PATCH, RECOMMEND THAT HE CONTINUE WITH THIS REGIMEN AND CONTINUE TO WORK ON STRATEGIES TO ELIMINATE HIS SMOKING, ENCOURAGEMENT PROVIDED WILL FOLLOW, HE DENIES NEGATIVE S/E FROM THE CLEAR NICOTINE PATCH     Diagnosis: F17.210    Next Visit: 2 weeks

## 2024-03-25 NOTE — PATIENT INSTRUCTIONS
The [facility] Department requires that patients call and make their own appointments.  Please see the below phone numbers:    [facility] [place]: [phone]  Providers:   SHEREEN Perez PA-C (virtual visits only)      [facility]:  [facility] - [phone]

## 2024-03-27 DIAGNOSIS — R79.89 LOW TESTOSTERONE IN MALE: ICD-10-CM

## 2024-03-27 RX ORDER — TESTOSTERONE CYPIONATE 200 MG/ML
INJECTION, SOLUTION INTRAMUSCULAR
Qty: 10 ML | Refills: 0 | Status: SHIPPED | OUTPATIENT
Start: 2024-03-27

## 2024-03-27 NOTE — TELEPHONE ENCOUNTER
No care due was identified.  [facility] Embedded Care Due Messages. Reference number: [id number].   3/27/2024 11:55:25 AM CDT

## 2024-04-08 ENCOUNTER — CLINICAL SUPPORT (OUTPATIENT)
Dept: SMOKING CESSATION | Facility: CLINIC | Age: 69
End: 2024-04-08
Payer: COMMERCIAL

## 2024-04-08 DIAGNOSIS — F17.200 NICOTINE DEPENDENCE: ICD-10-CM

## 2024-04-08 PROCEDURE — 99404 PREV MED CNSL INDIV APPRX 60: CPT | Mod: S$GLB,,,

## 2024-04-08 PROCEDURE — 99999 PR PBB SHADOW E&M-EST. PATIENT-LVL II: CPT | Mod: PBBFAC,,,

## 2024-04-08 RX ORDER — IBUPROFEN 200 MG
1 TABLET ORAL DAILY
Qty: 14 PATCH | Refills: 0 | Status: SHIPPED | OUTPATIENT
Start: 2024-04-08 | End: 2024-04-22 | Stop reason: SDUPTHER

## 2024-04-08 NOTE — PROGRESS NOTES
Individual Follow-Up Form    4/8/2024    Quit Date: N/A    Clinical Status of Patient: Outpatient    Length of Service: 60 minutes    Continuing Medication: yes  Patches    Other Medications: n/a     Target Symptoms: Withdrawal and medication side effects. The following were  rated moderate (3) to severe (4) on TCRS:  Moderate (3): 0  Severe (4): 0    Comments: PATIENT PRESENTS FOR FOLLOW UP IN CLINIC, HE IS CURRENTLY SMOKING A LITTLE MORE THEN HE WAS 2 WEEKS AGO AND STATES THIS IS DUE TO STRESS. HE IS WEARING THE 21MG NICOTINE PATCH DAILY, RECOMMEND THAT HE CONTINUE THIS SAME REGIMEN, STRATEGIES AND SESSION HANDOUT DISCUSSED, DISCUSSED HIS HOME SITUATION AND STRESS AND HIS REASONS FOR HIS INCREASE IN SMOKING, RECOMMEND STRESS MANAGEMENT TECHNIQUES AND REMINDING HIMSELF HOW WELL HE BREATHS AND FEELS WHEN HE DOES NOT SMOKE, ENCOURAGEMENT PROVIDED AND SESSION HANDOUT     Diagnosis: F17.210    Next Visit: 2 weeks

## 2024-04-09 ENCOUNTER — TELEPHONE (OUTPATIENT)
Dept: FAMILY MEDICINE | Facility: CLINIC | Age: 69
End: 2024-04-09

## 2024-04-09 DIAGNOSIS — F17.200 NICOTINE DEPENDENCE: ICD-10-CM

## 2024-04-09 RX ORDER — IBUPROFEN 200 MG
1 TABLET ORAL DAILY
Qty: 14 PATCH | Refills: 0 | Status: CANCELLED | OUTPATIENT
Start: 2024-04-09 | End: 2024-05-14

## 2024-04-11 ENCOUNTER — CLINICAL SUPPORT (OUTPATIENT)
Dept: SMOKING CESSATION | Facility: CLINIC | Age: 69
End: 2024-04-11
Payer: COMMERCIAL

## 2024-04-11 DIAGNOSIS — F17.200 NICOTINE DEPENDENCE: Primary | ICD-10-CM

## 2024-04-11 DIAGNOSIS — F41.9 ANXIETY: ICD-10-CM

## 2024-04-11 PROCEDURE — 99407 BEHAV CHNG SMOKING > 10 MIN: CPT | Mod: S$GLB,,,

## 2024-04-11 PROCEDURE — 99999 PR PBB SHADOW E&M-EST. PATIENT-LVL I: CPT | Mod: PBBFAC,,,

## 2024-04-11 RX ORDER — BUSPIRONE HYDROCHLORIDE 30 MG/1
TABLET ORAL
Qty: 180 TABLET | Refills: 0 | Status: SHIPPED | OUTPATIENT
Start: 2024-04-11

## 2024-04-18 DIAGNOSIS — F41.9 ANXIETY: ICD-10-CM

## 2024-04-18 DIAGNOSIS — F13.20 BENZODIAZEPINE DEPENDENCE: ICD-10-CM

## 2024-04-18 RX ORDER — ALPRAZOLAM 1 MG/1
TABLET ORAL
Qty: 45 TABLET | Refills: 0 | Status: SHIPPED | OUTPATIENT
Start: 2024-04-18 | End: 2024-06-16

## 2024-04-18 NOTE — TELEPHONE ENCOUNTER
No care due was identified.  [facility] Embedded Care Due Messages. Reference number: [id number].   4/18/2024 8:56:33 AM CDT

## 2024-04-22 ENCOUNTER — CLINICAL SUPPORT (OUTPATIENT)
Dept: SMOKING CESSATION | Facility: CLINIC | Age: 69
End: 2024-04-22
Payer: COMMERCIAL

## 2024-04-22 DIAGNOSIS — F17.200 NICOTINE DEPENDENCE: ICD-10-CM

## 2024-04-22 PROCEDURE — 99999 PR PBB SHADOW E&M-EST. PATIENT-LVL II: CPT | Mod: PBBFAC,,,

## 2024-04-22 PROCEDURE — 99403 PREV MED CNSL INDIV APPRX 45: CPT | Mod: S$GLB,,,

## 2024-04-22 RX ORDER — IBUPROFEN 200 MG
1 TABLET ORAL DAILY
Qty: 14 PATCH | Refills: 0 | Status: SHIPPED | OUTPATIENT
Start: 2024-04-22 | End: 2024-05-21 | Stop reason: SDUPTHER

## 2024-04-22 NOTE — PROGRESS NOTES
Individual Follow-Up Form    4/22/2024    Quit Date: n/a    Clinical Status of Patient: Outpatient    Length of Service: 45 minutes    Continuing Medication: yes  Patches    Other Medications: n/a     Target Symptoms: Withdrawal and medication side effects. The following were  rated moderate (3) to severe (4) on TCRS:  Moderate (3): 0  Severe (4): 0    Comments: patient presents for follow up in clinic, he is currently smoking about 6 cigarettes per day, no relighting, he is down from 10 or so per day, he has a goal set to get to 2-3 cigarettes per day over the next 2 weeks, encouraged this amount, and over time to pick a quit date, he states his wife was just given  some news regarding her health that has her motivated to quit smoking, this will help him, hoping that he can get back to not smoking and feeling better, he breaths so much better when he is not smoking, strategies and session handout discussed, recommend he remain on the 21mg nicotine patch daily untol quit then will wean at his pace the nrt. Will follow in clinic     Diagnosis: F17.200    Next Visit: 2 weeks

## 2024-04-27 NOTE — TELEPHONE ENCOUNTER
----- Message from Gabriel Evans MD sent at 4/29/2022 10:42 AM CDT -----  Regarding: FW: self  How high is his BP getting? What time is he checking? What is reading during the day?  He is on clonidine patch 0.2, can increase to 0.3 but would like more info  ----- Message -----  From: Keeley Paige  Sent: 4/29/2022  10:40 AM CDT  To: Nathan Rios Staff  Subject: self                                             .Type: Patient Call Back    Who called: self     What is the request in detail: states he went from pill to the patch and the patch isn't working correctly. Would like to speak with the nurse regarding the meds. States his bp is elevated in the middle of the night still. Please call.     Can the clinic reply by [facility]?    Would the patient rather a call back or a response via [facility]? Call     Best call back number: .[phone]            
See previous on call note  
ADMIT

## 2024-04-29 ENCOUNTER — TELEPHONE (OUTPATIENT)
Dept: SMOKING CESSATION | Facility: CLINIC | Age: 69
End: 2024-04-29
Payer: MEDICARE

## 2024-05-06 ENCOUNTER — CLINICAL SUPPORT (OUTPATIENT)
Dept: SMOKING CESSATION | Facility: CLINIC | Age: 69
End: 2024-05-06
Payer: COMMERCIAL

## 2024-05-06 DIAGNOSIS — F17.200 NICOTINE DEPENDENCE: Primary | ICD-10-CM

## 2024-05-06 PROCEDURE — 99999 PR PBB SHADOW E&M-EST. PATIENT-LVL II: CPT | Mod: PBBFAC,,,

## 2024-05-06 PROCEDURE — 99402 PREV MED CNSL INDIV APPRX 30: CPT | Mod: S$GLB,,,

## 2024-05-06 NOTE — PROGRESS NOTES
Individual Follow-Up Form    5/6/2024    Quit Date: n/a    Clinical Status of Patient: Outpatient    Length of Service: 30 minutes    Continuing Medication: yes  Patches or Nicotine Lozenges    Other Medications: n/a     Target Symptoms: Withdrawal and medication side effects. The following were  rated moderate (3) to severe (4) on TCRS:  Moderate (3): 0  Severe (4): 0    Comments: patient presents for follow up telephonically today, he forgot about his clinic appt therefore when he did not show ctts contacted him to check in and get him rescheduled. He states he was working on putting a window in that was broken and the appt slipped his mind, he is currently smoking a few cigarettes per day and with him being very busy he has had the same cigarette in his mouth all morning, he has not lit it, he is wearing the 21mg nicotine patch daily and states the mini lozenge works well for him, recommend he continue with this same regimen ,strategies and session handout discussed over the phone, will follow up on 5/21.     Diagnosis: F17.210    Next Visit: 2 weeks

## 2024-05-21 ENCOUNTER — CLINICAL SUPPORT (OUTPATIENT)
Dept: SMOKING CESSATION | Facility: CLINIC | Age: 69
End: 2024-05-21
Payer: COMMERCIAL

## 2024-05-21 ENCOUNTER — PATIENT MESSAGE (OUTPATIENT)
Dept: PSYCHIATRY | Facility: CLINIC | Age: 69
End: 2024-05-21
Payer: MEDICARE

## 2024-05-21 DIAGNOSIS — F17.200 NICOTINE DEPENDENCE: ICD-10-CM

## 2024-05-21 PROCEDURE — 99403 PREV MED CNSL INDIV APPRX 45: CPT | Mod: S$GLB,,,

## 2024-05-21 PROCEDURE — 99999 PR PBB SHADOW E&M-EST. PATIENT-LVL II: CPT | Mod: PBBFAC,,,

## 2024-05-21 RX ORDER — IBUPROFEN 200 MG
1 TABLET ORAL DAILY
Qty: 14 PATCH | Refills: 0 | Status: SHIPPED | OUTPATIENT
Start: 2024-05-21 | End: 2024-06-10 | Stop reason: SDUPTHER

## 2024-05-21 NOTE — PROGRESS NOTES
Individual Follow-Up Form    5/21/2024    Quit Date: n/a    Clinical Status of Patient: Outpatient    Length of Service: 45 minutes    Continuing Medication: yes  Patches    Other Medications: n/a     Target Symptoms: Withdrawal and medication side effects. The following were  rated moderate (3) to severe (4) on TCRS:  Moderate (3): 0  Severe (4): 0    Comments: patient presents for follow up in clinic, he is smoking a little more then normal, he is wearing the 21mg nicotine patch daily, he is smoking about 6 lights per day, down from a pack per day without wearing the patch, he does well while wearing the patches but tends to wear the patches for a long period of time for more longer use then what they should be and he is aware that he should start weaning soon to the 14mg nicotine patch, he states his wife smokes and this makes it harder for him to quit as well as the back pain he is enduring. Strategies and session handout discussed, will follow      Diagnosis: F17.210    Next Visit: 2 weeks

## 2024-05-24 RX ORDER — CARVEDILOL 6.25 MG/1
6.25 TABLET ORAL 2 TIMES DAILY
Qty: 180 TABLET | Refills: 0 | Status: SHIPPED | OUTPATIENT
Start: 2024-05-24

## 2024-05-28 DIAGNOSIS — Z00.00 ENCOUNTER FOR MEDICARE ANNUAL WELLNESS EXAM: ICD-10-CM

## 2024-06-04 ENCOUNTER — TELEPHONE (OUTPATIENT)
Dept: SMOKING CESSATION | Facility: CLINIC | Age: 69
End: 2024-06-04
Payer: MEDICARE

## 2024-06-10 ENCOUNTER — CLINICAL SUPPORT (OUTPATIENT)
Dept: SMOKING CESSATION | Facility: CLINIC | Age: 69
End: 2024-06-10
Payer: COMMERCIAL

## 2024-06-10 DIAGNOSIS — F17.200 NICOTINE DEPENDENCE: Primary | ICD-10-CM

## 2024-06-10 PROCEDURE — 99999 PR PBB SHADOW E&M-EST. PATIENT-LVL I: CPT | Mod: PBBFAC,,,

## 2024-06-10 PROCEDURE — 99407 BEHAV CHNG SMOKING > 10 MIN: CPT | Mod: S$GLB,,,

## 2024-06-10 RX ORDER — IBUPROFEN 200 MG
1 TABLET ORAL DAILY
Qty: 14 PATCH | Refills: 0 | Status: SHIPPED | OUTPATIENT
Start: 2024-06-10 | End: 2024-07-15

## 2024-06-16 DIAGNOSIS — F41.9 ANXIETY: ICD-10-CM

## 2024-06-16 DIAGNOSIS — F13.20 BENZODIAZEPINE DEPENDENCE: ICD-10-CM

## 2024-06-16 RX ORDER — ALPRAZOLAM 1 MG/1
TABLET ORAL
Qty: 45 TABLET | Refills: 0 | Status: SHIPPED | OUTPATIENT
Start: 2024-06-16

## 2024-06-16 NOTE — TELEPHONE ENCOUNTER
No care due was identified.  [facility] Embedded Care Due Messages. Reference number: [id number].   6/16/2024 11:33:37 AM CDT

## 2024-06-17 ENCOUNTER — CLINICAL SUPPORT (OUTPATIENT)
Dept: SMOKING CESSATION | Facility: CLINIC | Age: 69
End: 2024-06-17
Payer: COMMERCIAL

## 2024-06-17 DIAGNOSIS — F17.200 NICOTINE DEPENDENCE: Primary | ICD-10-CM

## 2024-06-17 PROCEDURE — 99407 BEHAV CHNG SMOKING > 10 MIN: CPT | Mod: S$GLB,,,

## 2024-06-17 PROCEDURE — 99999 PR PBB SHADOW E&M-EST. PATIENT-LVL I: CPT | Mod: PBBFAC,,,

## 2024-06-23 RX ORDER — HYDRALAZINE HYDROCHLORIDE 25 MG/1
TABLET, FILM COATED ORAL
Qty: 90 TABLET | Refills: 5 | Status: SHIPPED | OUTPATIENT
Start: 2024-06-23

## 2024-06-24 NOTE — PROGRESS NOTES
This note was created by combination of typed  and M-Modal dictation.  Transcription errors may be present.  If there are any questions, please contact me.    Assessment and Plan:   Assessment and Plan   Benzodiazepine dependence, did not do well with attempt to wean down 2017  Anxiety, unable to wean off BZD  Recurrent major depressive disorder, in partial remission  -has been on SSRI and been on benzodiazepine longstanding.  With concomitant use of opioids, high-risk  Had previously been followed by Psychiatry until Psychiatry left the area and recommendations were rehab to try and wean down off of the benzodiazepine.  He has not been interested in pursuing.  I would wanted him to reestablish with a new Psychiatry for re-evaluation and they had recommended the same thing-rehab either inpatient or intensive outpatient rehab.    Recalls a history of fluoxetine which may have been stronger than Celexa, maybe some emotional blunting but he would be interested in trying that again and see if that can help manage his anxiety better.    Trial of fluoxetine 40.    Stay on BuSpar b.i.d..  If necessary may try increasing the dose of BuSpar   And then trying to wean down benzodiazepine  He is not interested in inpatient rehab or intensive outpatient rehab at this time.  I will broach this again in the future  -     FLUoxetine 40 MG capsule; Take 1 capsule (40 mg total) by mouth once daily.  Dispense: 30 capsule; Refill: 5  -     busPIRone (BUSPAR) 30 MG Tab; Take 1 tablet (30 mg total) by mouth 2 (two) times daily.  Dispense: 180 tablet; Refill: 0    Chronic, continuous use of opioids  -managed by pain clinic.    Has upcoming follow-up with me in September, follow-up on Prozac but also follow up on other chronic medical conditions      Medications Discontinued During This Encounter   Medication Reason    citalopram (CELEXA) 40 MG tablet Alternate therapy    busPIRone (BUSPAR) 30 MG Tab Reorder       meds sent this  encounter:  Medications Ordered This Encounter   Medications    busPIRone (BUSPAR) 30 MG Tab     Sig: Take 1 tablet (30 mg total) by mouth 2 (two) times daily.     Dispense:  180 tablet     Refill:  0    FLUoxetine 40 MG capsule     Sig: Take 1 capsule (40 mg total) by mouth once daily.     Dispense:  30 capsule     Refill:  5     Stop citalopram         Follow Up:   Future Appointments   Date Time Provider Department Center   6/25/2024  1:00 PM Pauline Welch, [facility]   7/9/2024 10:00 AM Pauline Welch RN [facility]   8/13/2024  3:20 PM Adriana Pete MD [facility]   9/18/2024  8:15 AM LAB, [facility]   9/25/2024 11:00 AM Gabriel Evans MD [facility]          Subjective:   Subjective   Chief Complaint   Patient presents with    Follow-up    Hypertension    Diabetes       DIPESH Rae is a 68 y.o. male.     Social History     Tobacco Use    Smoking status: Some Days     Current packs/day: 0.20     Average packs/day: 0.2 packs/day for 32.8 years (8.2 ttl pk-yrs)     Types: Cigarettes, Vaping with nicotine     Start date: 6/20/1991     Last attempt to quit: 6/20/2023    Smokeless tobacco: Never    Tobacco comments:     Approximately 3 cigarettes a day   Substance Use Topics    Alcohol use: No     Alcohol/week: 0.0 standard drinks of alcohol      Social History     Occupational History    Occupation: retired -       Social History     Social History Narrative    Not on file       Last appointment with this clinic was 3/25/2024. Last visit with me 3/25/2024   To summarize last visit and events leading up to today:  DM2   Low testosterone on testosterone.  HTN, lipid   3/16/23 nu stress test neg for ischemia; no arrhythmias  07/27/2023 TTE normal systolic function.  LVEF 65%.  Grade 1 diastolic dysfunction.  Hx of SHAUNA due to dehydration, severe enough to require HD for hyperK, 7/2022.  Hx of AFib during hospitalization 7/2022 in the  setting of hyperK. On beta blocker.  Anxiety, BZD dependence. did not tolerate trial of Cymbalta.  Took it for maybe a week.   Has been to see Psychiatry, Psychiatry recommended inpatient detox and the patient was not interested.  Currently have him taking 1.5 tablets in total during the day.  on max dose celexa  Chronic back pain followed by pain mgmt. Narcotic and bill  Thrombocytopenia hx of clumping on microscopy  COPD stable.   Saw ENT 7/28.  Sensorineural hearing loss.  Qualifies for hearing aids.  08/04/2023 colonoscopy 10 mm cecal TA.  10 mm descending polypoid mucosa.  Repeat 3 years  Creatinine remains high.      9/6/23 saw cards, cut his coreg dose due to stephania     9/20/23 labs  CBC platelets clumped  His platelet counts have been normal previously so I do not think that he needs to repeat this just for the platelets.  Testosterone level was normal   A1c good  Liver, kidneys, electrolytes normal   Cholesterol was good.    No changes, stay on current medicines and stay on current testosterone.     Last OV 11/20  Anxiety, BZD dependence, stable. Still not interested in follow up with psychiatry/inpatient detox.  Chronic pain, chronic opioids, managed by outside pain mgmt.  Low testosterone stable.  DM stable  Smoking plans to restart cessation clinic. Hx of nicotine patches with efficacy, his benefits ran out so he stopped using patches and restarted smoking but committed to cessation.     03/2024 labs  CBC WNL  CMP K mild elev, Cr 1.4 from 1.0  Lipid TG high nonHDL 71  A1c 6.0 from 6.1  PSA WNL  Testosterone low 163    Last visit with me 03/25/2024   Anxiety longstanding benzodiazepine use.  Previously followed by Psychiatry until psychiatrist left the area.  Has been recommended for inpatient detox but the patient was not interested.  On max dose citalopram.  Anxiety is still uncontrolled.  Referred back to Psychiatry to discuss options.  Chronic back pain, chronic opiates followed by outside pain clinic    Diabetes stable  Pre visit labs creatinine mildly elevated.  Dehydration?  History of SHAUNA.  Monitor  Smoking, working with smoking cessation  Thrombocytopenia with a history of platelet clumping.  I suspect clumping check future labs  Did not take his scheduled testosterone before his most recent labs.  On 200 mg every 2 weeks no changes    Was scheduled to see Psychiatry but on pre visit review was deemed inappropriate for general Psychiatry and was recommended for intensive outpatient therapy program       Xanax 6/17, 5/17  Percocet 6/21, 5/24    Labs and follow up scheduled 9/2024    Today's visit:  He has an appointment to see me in September and has a lab appointment at that time but wanted to follow-up sooner to discuss his psychiatric medications.    He was referred to Psychiatry but after they reviewed his chart they felt he was not appropriate for their setting but recommended rehab.    For his chronic benzodiazepine use   He recalls having had Prozac many years ago and at the time he found that it numb him a bit too much he thinks.  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    Always notes that his main stressors are the grandchildren in the house, finds them very stressful.  He was taking Celexa 40, takes BuSpar twice daily and takes the Xanax at night and sometimes 1 during the day.    He has called a history of inpatient rehab for marijuana detoxification.  This was when he was working and there was a 0 tolerance rule in place for work.  He does not think that he can tolerate such a rehab because he notes he is on chronic opiates for chronic pain.  Discussed that it would not be for opioid detox but for benzodiazepine detox.    Reports he is taking his medications as prescribed he does not know the names of the medicines.       Patient  Care Team:  Gabriel Evans MD as PCP - General (Internal Medicine)  Vlad Nava MD (Pain Medicine)  Kaila Carrillo OD as Consulting Physician (Optometry)  Wyatt Ojeda MA as Care Coordinator    Patient Active Problem List    Diagnosis Date Noted    Bradycardia 05/10/2023    Aortic atherosclerosis 02/16/2023    Type 2 diabetes mellitus with diabetic cataract, without long-term current use of insulin 10/14/2022    Unspecified inflammatory spondylopathy, cervical region 10/14/2022    History of atrial fibrillation 7/2022 hospitalization i n the setting of SHAUNA and hyperK. started on carvedilol during admission 07/19/2022    Junctional bradycardia 07/18/2022    Chronic prescription benzodiazepine use 07/18/2022    Foraminal stenosis of lumbar region 05/06/2022 5/5/2022 MRI L spine:   *   Multilevel lumbar spondylosis with up to severe right neural foraminal narrowing at L5-S1 with impingement of exiting right L5 nerve root.   *   Moderate thecal sac narrowing at L3-L4.   *   Chronic spondylolysis of L5 with grade 1 anterolisthesis of L5 on S1        Chronic low back pain 12/27/2021    Sacroiliac joint pain 12/27/2021    Sacroiliitis 12/27/2021    Impaired mobility 10/10/2021    Arthrodesis status 10/10/2021     Formatting of this note might be different from the original.  C2-T2, 10/03/21      Impaired mobility and ADLs 10/07/2021    Status post surgery 10/03/2021    History of smoking 10-25 pack years 10/02/2021    Paresthesia of left upper and lower extremity 10/01/2021    Low testosterone in male 09/02/2020 6/2020 Repeat labs prolactin was normal.  Testosterone was low.  Free testosterone was low and sex hormone binding globulin was normal      Elevated prolactin level,low testosterone, gynecomastia; MRI pituitary normal 5/2020 01/13/2020    Nuclear sclerosis of both eyes 11/04/2019    Open angle with borderline findings and high glaucoma risk in both eyes 10/08/2019    Cataract, bilateral 09/11/2018     Cervical stenosis of spinal canal 10/3/2021 LAMINECTOMY, SPINE, CERVICAL, WITH POSTERIOR FUSION C2-T2 05/07/2018 02/14/2018 MRI C-spine impression:  Slight retrolisthesis of C4 with respect to C5.  Narrowing of the central spinal canal most prominent at the C4-C5, C5-C6, and C6-C7 levels and to a lesser extent at C2-C3 and C3-C4.  Annular disc bulges posteriorly at C2-C3, C3-C4.  Diffuse disc herniation/protrusion posteriorly at C4-C5 level and a disc herniation/extrusion posteriorly at the C5-C6 level with a central disc herniation/protrusion posteriorly at the C6-C7 level.  Narrowing of the neural foramen bilaterally from C3-C4 through C6-C7.  10/1/2021 admitted for L sided numbness after epidural injection, initially CT interpreted at [facility]; however subsequent MRI no hemorrhage but severe spinal canal stenosis    10/1/2021 MRI brain: Negative MRI of the brain for hemorrhage, mass or infarction. Specifically, no evidence of subarachnoid blood or blood products in the extra-axial spaces on SWI or diffusion-weighted images.  In light of the previous CTs and history of epidural injections at outside facility, this raises the question of in ejected contrast in the central spinal canal with migration into the intracranial subarachnoid spaces.  Subarachnoid hemorrhage or exudate are considered less likely.  10/1/2021 MRI C spine: Severe spinal canal stenosis with complete effacement of the CSF and spinal cord compression at the level of C5-C6 due to posterior disc osteophyte complex with superimposed right disc protrusion and congenitally narrow spinal canal.  High T2 signal within the cord secondary to either edema or myelomalacia.    10/3/2021 LAMINECTOMY, SPINE, CERVICAL, WITH POSTERIOR FUSION C2-T2      Tubular adenoma of colon 5/10/17; 08/04/2023 colonoscopy 10 mm cecal TA.  10 mm descending polypoid mucosa.  Repeat 3 years 05/10/2017     5/10/2017 colonoscopy tubular adenoma  08/04/2023 colonoscopy 10 mm  cecal TA.  10 mm descending polypoid mucosa.  Repeat 3 years      Therapeutic opioid-induced constipation (OIC) 04/17/2017    Tobacco dependence, patch with irritation; hx of bupropion 01/12/2016    Type 2 diabetes mellitus without complication, without long-term current use of insulin 08/07/2015    Facet arthritis of cervical region 10/28/2013    Facet arthritis of lumbar region 10/28/2013    Benzodiazepine dependence, did not do well with attempt to wean down 2017 10/28/2013    Chronic, continuous use of opioids 10/28/2013    ED (erectile dysfunction) 07/26/2013    DDD (degenerative disc disease), cervical 05/23/2013    DDD (degenerative disc disease), lumbar 05/23/2013    Essential hypertension 11/01/2012     2/3/2022 TTE LV normal size with moderate concentric hypertrophy and normal systolic function LVEF 60%.  Normal RV size and systolic function.  PA pressure 33  7/18/22 TTE LV normal size with mod concentric hypertrophy; normal systolic function. LVEF 70%. Normal RV size and systolic function. PA pressure 58. Mod pHTN      Anemia 11/01/2012    Anxiety, unable to wean off BZD 11/01/2012    Recurrent major depressive disorder, in partial remission 11/01/2012       PAST MEDICAL PROBLEMS, PAST SURGICAL HISTORY: please see relevant portions of the electronic medical record    ALLERGIES AND MEDICATIONS: updated and reviewed.  Medication List with Changes/Refills   Current Medications    ALBUTEROL (PROVENTIL/VENTOLIN HFA) 90 MCG/ACTUATION INHALER    Inhale 2 puffs into the lungs every 6 (six) hours as needed for Wheezing or Shortness of Breath.    ALPRAZOLAM (XANAX) 1 MG TABLET    TAKE 1 AND ONE-HALF TABLETS BY MOUTH ONCE A DAY    AMLODIPINE (NORVASC) 10 MG TABLET    TAKE ONE TABLET BY MOUTH EVERY EVENING    ATORVASTATIN (LIPITOR) 40 MG TABLET    TAKE ONE TABLET BY MOUTH EVERY EVENING    BUSPIRONE (BUSPAR) 30 MG TAB    TAKE ONE TABLET BY MOUTH TWICE DAILY    CARVEDILOL (COREG) 6.25 MG TABLET    TAKE ONE TABLET BY  "MOUTH TWICE DAILY    CITALOPRAM (CELEXA) 40 MG TABLET    Take 1 tablet (40 mg total) by mouth once daily.    FLUTICASONE-SALMETEROL DISKUS INHALER 100-50 MCG    Inhale 1 puff into the lungs.    GABAPENTIN (NEURONTIN) 600 MG TABLET    Take 600 mg by mouth 3 (three) times daily.    HYDRALAZINE (APRESOLINE) 25 MG TABLET    TAKE ONE TABLET BY MOUTH THREE TIMES DAILY    IBUPROFEN (ADVIL,MOTRIN) 600 MG TABLET    Take 600 mg by mouth.    LISINOPRIL (PRINIVIL,ZESTRIL) 20 MG TABLET    TAKE ONE TABLET BY MOUTH ONCE A DAY    METFORMIN (GLUCOPHAGE) 500 MG TABLET    Take 1 tablet (500 mg total) by mouth 2 (two) times daily with meals.    NALOXONE (NARCAN) 4 MG/ACTUATION SPRY    4mg by nasal route as needed for opioid overdose; may repeat every 2-3 minutes in alternating nostrils until medical help arrives. Call 911    NEEDLE, DISP, 22 G 22 GAUGE X 1" NDLE    Testosterone injection every 2 weeks    NICOTINE (NICODERM CQ) 21 MG/24 HR    Place 1 patch onto the skin once daily. WEAR DAILY IN CONJUNCTION WITH THE 14MG NICOTINE PATCH, ADVISED TO DO AN ABRUPT QUIT ONCE STARTING DOUBLE NICOTINE PATCHING    OXYCODONE-ACETAMINOPHEN (PERCOCET)  MG PER TABLET    Take 1 tablet by mouth every 4 (four) hours as needed.    POLYETHYLENE GLYCOL 3350 (MIRALAX ORAL)    Take 1 application by mouth.    SENNA (SENOKOT) 8.6 MG TABLET    Take 1 tablet by mouth 2 (two) times a day.    SYRINGE, DISPOSABLE, (BD LUER-STARLA SYRINGE) 1 ML SYRG    Testosterone injection every 2 weeks    TESTOSTERONE CYPIONATE (DEPOTESTOTERONE CYPIONATE) 200 MG/ML INJECTION    INJECT ONE MILLILITER INTRAMUSCULARLY EVERY 14 DAYS    TIZANIDINE (ZANAFLEX) 4 MG TABLET    Take 4-8 mg by mouth nightly as needed.         Objective:   Objective   Physical Exam   Vitals:    06/25/24 1113 06/25/24 1140   BP: (!) 140/58 110/60   BP Location:  Left arm   Patient Position:  Sitting   BP Method:  Medium (Manual)   Pulse: (!) 55    Temp: 98.6 °F (37 °C)    TempSrc: Oral    SpO2: 95%  " "  Weight: 105 kg (231 lb 9.5 oz)    Height: 5' 11" (1.803 m)     Body mass index is 32.3 kg/m².            Physical Exam  Constitutional:       General: He is not in acute distress.     Appearance: He is well-developed.   HENT:      Head: Normocephalic and atraumatic.   Eyes:      General: No scleral icterus.  Pulmonary:      Effort: Pulmonary effort is normal.   Skin:     General: Skin is warm and dry.   Neurological:      Mental Status: He is alert and oriented to person, place, and time.   Psychiatric:         Behavior: Behavior normal.         Thought Content: Thought content normal.                "

## 2024-06-25 ENCOUNTER — CLINICAL SUPPORT (OUTPATIENT)
Dept: SMOKING CESSATION | Facility: CLINIC | Age: 69
End: 2024-06-25
Payer: COMMERCIAL

## 2024-06-25 ENCOUNTER — OFFICE VISIT (OUTPATIENT)
Dept: FAMILY MEDICINE | Facility: CLINIC | Age: 69
End: 2024-06-25
Payer: MEDICARE

## 2024-06-25 VITALS
SYSTOLIC BLOOD PRESSURE: 110 MMHG | WEIGHT: 231.56 LBS | DIASTOLIC BLOOD PRESSURE: 60 MMHG | OXYGEN SATURATION: 95 % | HEART RATE: 55 BPM | TEMPERATURE: 99 F | BODY MASS INDEX: 32.42 KG/M2 | HEIGHT: 71 IN

## 2024-06-25 DIAGNOSIS — F33.41 RECURRENT MAJOR DEPRESSIVE DISORDER, IN PARTIAL REMISSION: ICD-10-CM

## 2024-06-25 DIAGNOSIS — F13.20 BENZODIAZEPINE DEPENDENCE: Primary | Chronic | ICD-10-CM

## 2024-06-25 DIAGNOSIS — F17.200 NICOTINE DEPENDENCE: ICD-10-CM

## 2024-06-25 DIAGNOSIS — F11.90 CHRONIC, CONTINUOUS USE OF OPIOIDS: ICD-10-CM

## 2024-06-25 DIAGNOSIS — F41.9 ANXIETY: ICD-10-CM

## 2024-06-25 PROCEDURE — 1125F AMNT PAIN NOTED PAIN PRSNT: CPT | Mod: CPTII,S$GLB,, | Performed by: INTERNAL MEDICINE

## 2024-06-25 PROCEDURE — 99999 PR PBB SHADOW E&M-EST. PATIENT-LVL II: CPT | Mod: PBBFAC,,,

## 2024-06-25 PROCEDURE — 99403 PREV MED CNSL INDIV APPRX 45: CPT | Mod: S$GLB,,,

## 2024-06-25 PROCEDURE — 3044F HG A1C LEVEL LT 7.0%: CPT | Mod: CPTII,S$GLB,, | Performed by: INTERNAL MEDICINE

## 2024-06-25 PROCEDURE — 3072F LOW RISK FOR RETINOPATHY: CPT | Mod: CPTII,S$GLB,, | Performed by: INTERNAL MEDICINE

## 2024-06-25 PROCEDURE — 1160F RVW MEDS BY RX/DR IN RCRD: CPT | Mod: CPTII,S$GLB,, | Performed by: INTERNAL MEDICINE

## 2024-06-25 PROCEDURE — 4010F ACE/ARB THERAPY RXD/TAKEN: CPT | Mod: CPTII,S$GLB,, | Performed by: INTERNAL MEDICINE

## 2024-06-25 PROCEDURE — 3008F BODY MASS INDEX DOCD: CPT | Mod: CPTII,S$GLB,, | Performed by: INTERNAL MEDICINE

## 2024-06-25 PROCEDURE — 99214 OFFICE O/P EST MOD 30 MIN: CPT | Mod: S$GLB,,, | Performed by: INTERNAL MEDICINE

## 2024-06-25 PROCEDURE — 3074F SYST BP LT 130 MM HG: CPT | Mod: CPTII,S$GLB,, | Performed by: INTERNAL MEDICINE

## 2024-06-25 PROCEDURE — 99999 PR PBB SHADOW E&M-EST. PATIENT-LVL III: CPT | Mod: PBBFAC,,, | Performed by: INTERNAL MEDICINE

## 2024-06-25 PROCEDURE — 3078F DIAST BP <80 MM HG: CPT | Mod: CPTII,S$GLB,, | Performed by: INTERNAL MEDICINE

## 2024-06-25 PROCEDURE — 1101F PT FALLS ASSESS-DOCD LE1/YR: CPT | Mod: CPTII,S$GLB,, | Performed by: INTERNAL MEDICINE

## 2024-06-25 PROCEDURE — 3066F NEPHROPATHY DOC TX: CPT | Mod: CPTII,S$GLB,, | Performed by: INTERNAL MEDICINE

## 2024-06-25 PROCEDURE — 3288F FALL RISK ASSESSMENT DOCD: CPT | Mod: CPTII,S$GLB,, | Performed by: INTERNAL MEDICINE

## 2024-06-25 PROCEDURE — 1159F MED LIST DOCD IN RCRD: CPT | Mod: CPTII,S$GLB,, | Performed by: INTERNAL MEDICINE

## 2024-06-25 PROCEDURE — 3060F POS MICROALBUMINURIA REV: CPT | Mod: CPTII,S$GLB,, | Performed by: INTERNAL MEDICINE

## 2024-06-25 RX ORDER — FLUOXETINE HYDROCHLORIDE 40 MG/1
40 CAPSULE ORAL DAILY
Qty: 30 CAPSULE | Refills: 5 | Status: SHIPPED | OUTPATIENT
Start: 2024-06-25

## 2024-06-25 RX ORDER — BUSPIRONE HYDROCHLORIDE 30 MG/1
30 TABLET ORAL 2 TIMES DAILY
Qty: 180 TABLET | Refills: 0 | Status: SHIPPED | OUTPATIENT
Start: 2024-06-25

## 2024-06-25 RX ORDER — IBUPROFEN 200 MG
1 TABLET ORAL DAILY
Qty: 14 PATCH | Refills: 0 | Status: SHIPPED | OUTPATIENT
Start: 2024-06-25 | End: 2024-07-30

## 2024-06-25 NOTE — PROGRESS NOTES
Individual Follow-Up Form    6/25/2024    Quit Date: n/a    Clinical Status of Patient: Outpatient    Length of Service: 45 minutes    Continuing Medication: yes  Patches    Other Medications: n/a     Target Symptoms: Withdrawal and medication side effects. The following were  rated moderate (3) to severe (4) on TCRS:  Moderate (3): 0  Severe (4): 0    Comments: patient presents for follow up following his pcp appt. He is smoking about 10 cigarettes per day, he was doing a lot worse two weeks ago after a stressful episode with his wife in the hospital and the stress of having 3 grandsons in his home brings a lot of stress to him, these are all triggers for him, however today he is doing better, recommend that he continue to use the 21mg nicotine daily. Encouragement and strategies discussed will follow     Diagnosis: F17.200    Next Visit: 2 weeks

## 2024-07-03 DIAGNOSIS — J98.01 BRONCHOSPASM: ICD-10-CM

## 2024-07-03 DIAGNOSIS — E78.2 MIXED HYPERLIPIDEMIA: ICD-10-CM

## 2024-07-03 RX ORDER — ATORVASTATIN CALCIUM 40 MG/1
40 TABLET, FILM COATED ORAL NIGHTLY
Qty: 90 TABLET | Refills: 2 | Status: SHIPPED | OUTPATIENT
Start: 2024-07-03

## 2024-07-03 NOTE — TELEPHONE ENCOUNTER
Refill Decision Note   Butch Mcdaniel  is requesting a refill authorization.  Brief Assessment and Rationale for Refill:  Approve     Medication Therapy Plan:  FLOS      Comments:     Note composed:5:47 PM 07/03/2024

## 2024-07-03 NOTE — TELEPHONE ENCOUNTER
Care Due:                  Date            Visit Type   Department     Provider  --------------------------------------------------------------------------------                                [facility] FAM[facility]                              PRIMARY      MED/ INTERNAL  Last Visit: 06-      CARE ([facility])   MED/ PEDS      Gabriel Evans  Next Visit: None Scheduled  None         None Found                                                            Last  Test          Frequency    Reason                     Performed    Due Date  --------------------------------------------------------------------------------    HBA1C.......  6 months...  metFORMIN................  03- 09-    [facility] Embedded Care Due Messages. Reference number: [id number].   7/03/2024 2:16:58 PM CDT

## 2024-07-03 NOTE — TELEPHONE ENCOUNTER
No care due was identified.  [facility] Embedded Care Due Messages. Reference number: [id number].   7/03/2024 2:41:57 PM CDT

## 2024-07-04 RX ORDER — ALBUTEROL SULFATE 90 UG/1
AEROSOL, METERED RESPIRATORY (INHALATION)
Qty: 20.1 G | Refills: 3 | Status: SHIPPED | OUTPATIENT
Start: 2024-07-04

## 2024-07-04 NOTE — TELEPHONE ENCOUNTER
Refill Decision Note   Butch Mcdaniel  is requesting a refill authorization.  Brief Assessment and Rationale for Refill:  Approve     Medication Therapy Plan:         Comments:     Note composed:5:02 PM 07/04/2024

## 2024-07-09 ENCOUNTER — PATIENT OUTREACH (OUTPATIENT)
Dept: ADMINISTRATIVE | Facility: HOSPITAL | Age: 69
End: 2024-07-09
Payer: MEDICARE

## 2024-07-09 ENCOUNTER — CLINICAL SUPPORT (OUTPATIENT)
Dept: SMOKING CESSATION | Facility: CLINIC | Age: 69
End: 2024-07-09
Payer: COMMERCIAL

## 2024-07-09 DIAGNOSIS — F17.200 NICOTINE DEPENDENCE: Primary | ICD-10-CM

## 2024-07-09 PROCEDURE — 99999 PR PBB SHADOW E&M-EST. PATIENT-LVL II: CPT | Mod: PBBFAC,,,

## 2024-07-09 PROCEDURE — 99403 PREV MED CNSL INDIV APPRX 45: CPT | Mod: S$GLB,,,

## 2024-07-09 NOTE — PROGRESS NOTES
Individual Follow-Up Form    7/9/2024    Quit Date: n/a    Clinical Status of Patient: Outpatient    Length of Service: 45 minutes    Continuing Medication: yes  Patches    Other Medications: n/a     Target Symptoms: Withdrawal and medication side effects. The following were  rated moderate (3) to severe (4) on TCRS:  Moderate (3): 0  Severe (4): 0    Comments: patient presents for follow up smoking about the same 10 cigarettes per day or less, he is wearing the nicotine patch daily 21mg and has been having some issues with stress which cause him to smoke a little more then he has been, his wife is having some health issues as well ans this is another thing that contributes to him smoking a little more. Strategies and session handout discussed this was a telephonic appt due to the heavy rain at the time of the patients appt,     Diagnosis: F17.210    Next Visit: 2 weeks

## 2024-07-15 DIAGNOSIS — F13.20 BENZODIAZEPINE DEPENDENCE: ICD-10-CM

## 2024-07-15 DIAGNOSIS — F41.9 ANXIETY: ICD-10-CM

## 2024-07-15 RX ORDER — ALPRAZOLAM 1 MG/1
TABLET ORAL
Qty: 45 TABLET | Refills: 0 | Status: SHIPPED | OUTPATIENT
Start: 2024-07-15

## 2024-07-15 NOTE — TELEPHONE ENCOUNTER
No care due was identified.  [facility] Embedded Care Due Messages. Reference number: [id number].   7/15/2024 1:40:22 PM CDT

## 2024-07-17 ENCOUNTER — CLINICAL SUPPORT (OUTPATIENT)
Dept: SMOKING CESSATION | Facility: CLINIC | Age: 69
End: 2024-07-17
Payer: COMMERCIAL

## 2024-07-17 DIAGNOSIS — F17.200 NICOTINE DEPENDENCE: Primary | ICD-10-CM

## 2024-07-17 PROCEDURE — 99407 BEHAV CHNG SMOKING > 10 MIN: CPT | Mod: S$GLB,,,

## 2024-07-17 PROCEDURE — 99999 PR PBB SHADOW E&M-EST. PATIENT-LVL I: CPT | Mod: PBBFAC,,,

## 2024-07-22 ENCOUNTER — TELEPHONE (OUTPATIENT)
Dept: SMOKING CESSATION | Facility: CLINIC | Age: 69
End: 2024-07-22
Payer: MEDICARE

## 2024-07-22 NOTE — TELEPHONE ENCOUNTER
1st attempt, patient called in regards to 6 month f/u for quit 6 with Smoking Cessation.  Patient stated he was driving and did not want to talk.  Patient notified of tomorrows scheduled visit.  3 and 6 month f/u for Quit 6 completed.

## 2024-07-23 ENCOUNTER — CLINICAL SUPPORT (OUTPATIENT)
Dept: SMOKING CESSATION | Facility: CLINIC | Age: 69
End: 2024-07-23
Payer: COMMERCIAL

## 2024-07-23 DIAGNOSIS — F17.200 NICOTINE DEPENDENCE: ICD-10-CM

## 2024-07-23 PROCEDURE — 99999 PR PBB SHADOW E&M-EST. PATIENT-LVL II: CPT | Mod: PBBFAC,,,

## 2024-07-23 RX ORDER — IBUPROFEN 200 MG
1 TABLET ORAL DAILY
Qty: 14 PATCH | Refills: 0 | Status: SHIPPED | OUTPATIENT
Start: 2024-07-23 | End: 2024-07-25 | Stop reason: SDUPTHER

## 2024-07-23 NOTE — PROGRESS NOTES
Entered the chart to resend script with jeane bhatia on it       Individual Follow-Up Form    7/23/2024    Quit Date: n/a    Clinical Status of Patient: Outpatient    Length of Service: 45 minutes    Continuing Medication: yes  Patches    Other Medications: n/a     Target Symptoms: Withdrawal and medication side effects. The following were  rated moderate (3) to severe (4) on TCRS:  Moderate (3): 0  Severe (4): 0    Comments: patient presents for follow up in clinic, he is smoking about the same 10 cigarettes per day and continues to wear the nicotine patch 21mg daily, he has complaints of lots of stress at home while raising 4 grandchildren and this is taking a toll on his stress and is a big reason for his smoking, his wife also smokes but is not also back in the program to work on quitting smoking, recommend that he continue to work on strategies to handle stress, continue same regimen with the nicotine patch, will follow     Diagnosis: F17.210    Next Visit: 2 weeks

## 2024-07-25 RX ORDER — IBUPROFEN 200 MG
1 TABLET ORAL DAILY
Qty: 14 PATCH | Refills: 0 | Status: SHIPPED | OUTPATIENT
Start: 2024-07-25 | End: 2024-08-29

## 2024-07-25 RX ORDER — IBUPROFEN 200 MG
1 TABLET ORAL DAILY
Qty: 14 PATCH | Refills: 0 | Status: SHIPPED | OUTPATIENT
Start: 2024-07-25 | End: 2024-07-25 | Stop reason: SDUPTHER

## 2024-08-06 ENCOUNTER — CLINICAL SUPPORT (OUTPATIENT)
Dept: SMOKING CESSATION | Facility: CLINIC | Age: 69
End: 2024-08-06
Payer: COMMERCIAL

## 2024-08-06 DIAGNOSIS — F17.200 NICOTINE DEPENDENCE: Primary | ICD-10-CM

## 2024-08-06 PROCEDURE — 99403 PREV MED CNSL INDIV APPRX 45: CPT | Mod: S$GLB,,,

## 2024-08-06 PROCEDURE — 99999 PR PBB SHADOW E&M-EST. PATIENT-LVL II: CPT | Mod: PBBFAC,,,

## 2024-08-06 RX ORDER — IBUPROFEN 200 MG
1 TABLET ORAL DAILY
Qty: 14 PATCH | Refills: 0 | Status: SHIPPED | OUTPATIENT
Start: 2024-08-06 | End: 2024-09-10

## 2024-08-13 ENCOUNTER — OFFICE VISIT (OUTPATIENT)
Dept: CARDIOLOGY | Facility: CLINIC | Age: 69
End: 2024-08-13
Payer: MEDICARE

## 2024-08-13 VITALS
HEIGHT: 71 IN | WEIGHT: 215.19 LBS | BODY MASS INDEX: 30.13 KG/M2 | HEART RATE: 87 BPM | SYSTOLIC BLOOD PRESSURE: 192 MMHG | DIASTOLIC BLOOD PRESSURE: 88 MMHG | RESPIRATION RATE: 18 BRPM | OXYGEN SATURATION: 95 %

## 2024-08-13 DIAGNOSIS — I10 ESSENTIAL HYPERTENSION: Primary | ICD-10-CM

## 2024-08-13 DIAGNOSIS — I70.0 AORTIC ATHEROSCLEROSIS: ICD-10-CM

## 2024-08-13 DIAGNOSIS — I10 HYPERTENSION, UNSPECIFIED TYPE: ICD-10-CM

## 2024-08-13 DIAGNOSIS — R06.02 SOB (SHORTNESS OF BREATH): ICD-10-CM

## 2024-08-13 DIAGNOSIS — R06.09 DOE (DYSPNEA ON EXERTION): ICD-10-CM

## 2024-08-13 DIAGNOSIS — N52.9 ERECTILE DYSFUNCTION, UNSPECIFIED ERECTILE DYSFUNCTION TYPE: ICD-10-CM

## 2024-08-13 PROCEDURE — 1159F MED LIST DOCD IN RCRD: CPT | Mod: CPTII,S$GLB,, | Performed by: INTERNAL MEDICINE

## 2024-08-13 PROCEDURE — 3044F HG A1C LEVEL LT 7.0%: CPT | Mod: CPTII,S$GLB,, | Performed by: INTERNAL MEDICINE

## 2024-08-13 PROCEDURE — 3008F BODY MASS INDEX DOCD: CPT | Mod: CPTII,S$GLB,, | Performed by: INTERNAL MEDICINE

## 2024-08-13 PROCEDURE — 3060F POS MICROALBUMINURIA REV: CPT | Mod: CPTII,S$GLB,, | Performed by: INTERNAL MEDICINE

## 2024-08-13 PROCEDURE — 3288F FALL RISK ASSESSMENT DOCD: CPT | Mod: CPTII,S$GLB,, | Performed by: INTERNAL MEDICINE

## 2024-08-13 PROCEDURE — 1126F AMNT PAIN NOTED NONE PRSNT: CPT | Mod: CPTII,S$GLB,, | Performed by: INTERNAL MEDICINE

## 2024-08-13 PROCEDURE — 4010F ACE/ARB THERAPY RXD/TAKEN: CPT | Mod: CPTII,S$GLB,, | Performed by: INTERNAL MEDICINE

## 2024-08-13 PROCEDURE — 3079F DIAST BP 80-89 MM HG: CPT | Mod: CPTII,S$GLB,, | Performed by: INTERNAL MEDICINE

## 2024-08-13 PROCEDURE — 3077F SYST BP >= 140 MM HG: CPT | Mod: CPTII,S$GLB,, | Performed by: INTERNAL MEDICINE

## 2024-08-13 PROCEDURE — 99214 OFFICE O/P EST MOD 30 MIN: CPT | Mod: S$GLB,,, | Performed by: INTERNAL MEDICINE

## 2024-08-13 PROCEDURE — 93000 ELECTROCARDIOGRAM COMPLETE: CPT | Mod: S$GLB,,, | Performed by: INTERNAL MEDICINE

## 2024-08-13 PROCEDURE — 3072F LOW RISK FOR RETINOPATHY: CPT | Mod: CPTII,S$GLB,, | Performed by: INTERNAL MEDICINE

## 2024-08-13 PROCEDURE — 99999 PR PBB SHADOW E&M-EST. PATIENT-LVL V: CPT | Mod: PBBFAC,,, | Performed by: INTERNAL MEDICINE

## 2024-08-13 PROCEDURE — 1101F PT FALLS ASSESS-DOCD LE1/YR: CPT | Mod: CPTII,S$GLB,, | Performed by: INTERNAL MEDICINE

## 2024-08-13 PROCEDURE — 3066F NEPHROPATHY DOC TX: CPT | Mod: CPTII,S$GLB,, | Performed by: INTERNAL MEDICINE

## 2024-08-13 RX ORDER — HYDRALAZINE HYDROCHLORIDE 50 MG/1
50 TABLET, FILM COATED ORAL 3 TIMES DAILY
Qty: 90 TABLET | Refills: 11 | Status: SHIPPED | OUTPATIENT
Start: 2024-08-13 | End: 2025-08-13

## 2024-08-13 NOTE — PROGRESS NOTES
CARDIOVASCULAR CONSULTATION    REASON FOR CONSULT:   Butch Mcdaniel is a 68 y.o. male who presents for evaluation.      HISTORY OF PRESENT ILLNESS:     Patient is a pleasant 66-year-old man.  Here for evaluation.  States blood pressure fluctuates a lot at home.  Fluctuates between 110-200 mmHg.  States that he has clonidine at home and whenever his blood pressure is elevated he takes an extra dose of clonidine.  Denies any chest pains at rest on exertion.  Denies orthopnea, PND, swelling of feet.      Notes from February 2022:  Patient here for follow-up.  Denies any chest pains at rest on exertion, orthopnea, PND.  Blood pressure staying at home.  Around 160-170 mmHg.      April 2022:  Patient very concerned about his fluctuating blood pressure.  Today morning his blood pressure was 198/111 mmHg.  Then just prior to coming to clinic it was 135/87 mmHg.  Fluctuates throughout the day.  No anginal chest pains.    The left ventricle is normal in size with moderate concentric hypertrophy and normal systolic function.  The estimated ejection fraction is 60%.  Normal right ventricular size with normal right ventricular systolic function.  The estimated PA systolic pressure is 33 mmHg.     Heart rates varied between 43 and 109 BPM with an average of 62 BPM.  Rare PVCs and PACs. Five atrial pairs. One three beat atrial run.      Notes from December 2022: Patient here for follow-up.  Denies any chest pains at rest on exertion, orthopnea, PND.  However has been experiencing significant dyspnea on exertion lately.  Unfortunately has started smoking again.    Notes from March 23:  Patient here for follow-up.  Stress test did not show any significant ischemia.  Trying to quit smoking.        Normal myocardial perfusion scan. There is no evidence of myocardial ischemia or infarction.    There is mild to moderate apical thinning which is a normal variant.    The gated perfusion images showed an ejection fraction of  56% post stress.    There is normal wall motion post stress.    The ECG portion of the study is negative for ischemia.    The patient reported no chest pain during the stress test.    There were no arrhythmias during stress.      Notes from September 23:  Patient here for follow-up.  Echo showed normal left ventricle systolic function with grade 1 diastolic dysfunction.  Patient denies any chest pains    Notes from August 24: Patient here for follow-up.  Denies chest pains at rest on exertion, orthopnea, PND.  Blood pressure running high at home also.  Today blood pressure elevated.  States did not take his hydralazine today.      PAST MEDICAL HISTORY:     Past Medical History:   Diagnosis Date    Cataract, bilateral 09/11/2018    Degenerative disc disease     Diabetes mellitus type II, controlled     Eye injury as a child    stick hit eye ? 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 both eyes 10/08/2019    Personal history of colonic polyps     Psychiatric problem     Therapy     [facility] many years ago       PAST SURGICAL HISTORY:     Past Surgical History:   Procedure Laterality Date    APPENDECTOMY      COLONOSCOPY N/A 5/10/2017    Procedure: COLONOSCOPY;  Surgeon: Shantanu Marley MD;  Location: [facility];  Service: Endoscopy;  Laterality: N/A;    COLONOSCOPY N/A 8/4/2023    Procedure: COLONOSCOPY;  Surgeon: Gael Rand MD;  Location: [facility];  Service: Endoscopy;  Laterality: N/A;  Referred by: Dr. Gabriel Evans  Prep: golytely  Route instructions [facility]: [facility] and reviewed via phone, pt verbalized understanding-KPvt    POSTERIOR FUSION OF CERVICAL SPINE WITH LAMINECTOMY N/A 10/3/2021    Procedure: LAMINECTOMY, SPINE, CERVICAL, WITH POSTERIOR FUSION C2-T2;  Surgeon: Ana Rosa Geller MD;  Location: [facility] OR [address] [place] [address];  Service: Neurosurgery;  Laterality:  N/A;       ALLERGIES AND MEDICATION:   Review of patient's allergies indicates:  No Known Allergies     Medication List            Accurate as of August 13, 2024  3:37 PM. If you have any questions, ask your nurse or doctor.                CHANGE how you take these medications      hydrALAZINE 50 MG tablet  Commonly known as: APRESOLINE  Take 1 tablet (50 mg total) by mouth 3 (three) times daily.  What changed:   medication strength  how much to take  Changed by: Adriana Pete MD            CONTINUE taking these medications      albuterol 90 mcg/actuation inhaler  Commonly known as: PROVENTIL/VENTOLIN HFA  INHALE TWO PUFFS BY MOUTH EVERY 6 HOURS AS NEEDED FOR WHEEZING OR SHORTNESS OF BREATH     ALPRAZolam 1 MG tablet  Commonly known as: XANAX  TAKE 1 AND ONE-HALF TABLETS BY MOUTH ONCE A DAY     amLODIPine 10 MG tablet  Commonly known as: NORVASC  TAKE ONE TABLET BY MOUTH EVERY EVENING     atorvastatin 40 MG tablet  Commonly known as: LIPITOR  Take 1 tablet (40 mg total) by mouth every evening.     BD LUER-STARLA SYRINGE 1 mL Syrg  Generic drug: syringe (disposable)  Testosterone injection every 2 weeks     busPIRone 30 MG Tab  Commonly known as: BUSPAR  Take 1 tablet (30 mg total) by mouth 2 (two) times daily.     carvediloL 6.25 MG tablet  Commonly known as: COREG  TAKE ONE TABLET BY MOUTH TWICE DAILY     FLUoxetine 40 MG capsule  Take 1 capsule (40 mg total) by mouth once daily.     fluticasone-salmeterol 100-50 mcg/dose 100-50 mcg/dose diskus inhaler  Commonly known as: ADVAIR     gabapentin 600 MG tablet  Commonly known as: NEURONTIN     ibuprofen 600 MG tablet  Commonly known as: ADVIL,MOTRIN     lisinopriL 20 MG tablet  Commonly known as: PRINIVIL,ZESTRIL  TAKE ONE TABLET BY MOUTH ONCE A DAY     metFORMIN 500 MG tablet  Commonly known as: GLUCOPHAGE  Take 1 tablet (500 mg total) by mouth 2 (two) times daily with meals.     MIRALAX ORAL     naloxone 4 mg/actuation Spry  Commonly known as: NARCAN  4mg by nasal  "route as needed for opioid overdose; may repeat every 2-3 minutes in alternating nostrils until medical help arrives. Call 911     needle (disp) 22 G 22 gauge x 1" Ndle  Testosterone injection every 2 weeks     nicotine 21 mg/24 hr  Commonly known as: NICODERM CQ  Place 1 patch onto the skin once daily.     oxyCODONE-acetaminophen  mg per tablet  Commonly known as: PERCOCET     senna 8.6 mg tablet  Commonly known as: SENOKOT  Take 1 tablet by mouth 2 (two) times a day.     testosterone cypionate 200 mg/mL injection  Commonly known as: DEPOTESTOTERONE CYPIONATE  INJECT ONE MILLILITER INTRAMUSCULARLY EVERY 14 DAYS     tiZANidine 4 MG tablet  Commonly known as: ZANAFLEX               Where to Get Your Medications        These medications were sent to [facility]  [address] [address]      Phone: [phone]   hydrALAZINE 50 MG tablet         SOCIAL HISTORY:     Social History     Socioeconomic History    Marital status:     Number of children: 3   Occupational History    Occupation: retired - tug boat captain   Tobacco Use    Smoking status: Some Days     Current packs/day: 0.50     Average packs/day: 1 pack/day for 52.4 years (51.8 ttl pk-yrs)     Types: Cigarettes, Vaping with nicotine     Start date: 1972     Last attempt to quit: 6/20/2023    Smokeless tobacco: Never    Tobacco comments:     Approximately 3 cigarettes a day   Substance and Sexual Activity    Alcohol use: No     Alcohol/week: 0.0 standard drinks of alcohol    Drug use: Yes     Types: Marijuana, Oxycodone, Benzodiazepines     Comment: 4 oxycodones daily; one marijuana cigarette daily    Sexual activity: Yes     Partners: Female     Comment:  with children, disabled      Social Determinants of Health     Financial Resource Strain: Low Risk  (5/10/2023)    Overall Financial Resource Strain (CARDIA)     Difficulty of Paying Living Expenses: Not hard at all   Food Insecurity: " No Food Insecurity (5/10/2023)    Hunger Vital Sign     Worried About Running Out of Food in the Last Year: Never true     Ran Out of Food in the Last Year: Never true   Transportation Needs: No Transportation Needs (5/10/2023)    PRAPARE - Transportation     Lack of Transportation (Medical): No     Lack of Transportation (Non-Medical): No   Physical Activity: Sufficiently Active (5/10/2023)    Exercise Vital Sign     Days of Exercise per Week: 4 days     Minutes of Exercise per Session: 50 min   Stress: Stress Concern Present (4/3/2019)    [facility] - Occupational Stress Questionnaire     Feeling of Stress : Very much   Housing Stability: Unknown (5/10/2023)    Housing Stability Vital Sign     Unable to Pay for Housing in the Last Year: No     Unstable Housing in the Last Year: No       FAMILY HISTORY:     Family History   Problem Relation Name Age of Onset    Heart disease Mother      Heart disease Father      Alcohol abuse Father      No Known Problems Sister      No Known Problems Brother      Diabetes Son      No Known Problems Maternal Aunt      No Known Problems Maternal Uncle      No Known Problems Paternal Aunt      No Known Problems Paternal Uncle      No Known Problems Maternal Grandmother      No Known Problems Maternal Grandfather      No Known Problems Paternal Grandmother      No Known Problems Paternal Grandfather      Amblyopia Neg Hx      Blindness Neg Hx      Cancer Neg Hx      Cataracts Neg Hx      Glaucoma Neg Hx      Hypertension Neg Hx      Macular degeneration Neg Hx      Retinal detachment Neg Hx      Strabismus Neg Hx      Stroke Neg Hx      Thyroid disease Neg Hx      Anxiety disorder Neg Hx      Dementia Neg Hx      Depression Neg Hx         REVIEW OF SYSTEMS:   Review of Systems   Constitutional: Negative.   HENT: Negative.     Eyes: Negative.    Cardiovascular:  Positive for dyspnea on exertion.   Respiratory: Negative.     Endocrine: Negative.   "  Hematologic/Lymphatic: Negative.    Skin: Negative.    Musculoskeletal: Negative.    Gastrointestinal: Negative.    Genitourinary: Negative.    Neurological: Negative.    Psychiatric/Behavioral: Negative.     Allergic/Immunologic: Negative.        A 10 point review of systems was performed and all the pertinent positives have been mentioned. Rest of review of systems was negative.        PHYSICAL EXAM:     Vitals:    08/13/24 1512   BP: (!) 192/88   Pulse: 87   Resp: 18    Body mass index is 30.01 kg/m².  Weight: 97.6 kg (215 lb 2.7 oz)   Height: 5' 11" (180.3 cm)     Physical Exam  Vitals reviewed.   Constitutional:       Appearance: He is well-developed.   HENT:      Head: Normocephalic.   Eyes:      Conjunctiva/sclera: Conjunctivae normal.      Pupils: Pupils are equal, round, and reactive to light.   Cardiovascular:      Rate and Rhythm: Normal rate and regular rhythm.      Heart sounds: Normal heart sounds.   Pulmonary:      Effort: Pulmonary effort is normal.      Breath sounds: Wheezing present.   Abdominal:      General: Bowel sounds are normal.      Palpations: Abdomen is soft.   Musculoskeletal:      Cervical back: Normal range of motion and neck supple.   Skin:     General: Skin is warm.   Neurological:      Mental Status: He is alert and oriented to person, place, and time.           DATA:     Laboratory:  CBC:  Recent Labs   Lab 06/08/23  0910 09/19/23  0921 03/18/24  0840   WBC 9.96 7.78 9.56   Hemoglobin 13.4 L 14.3 16.0   Hematocrit 40.8 42.4 48.3   Platelets 171 SEE COMMENT 68 L       CHEMISTRIES:  Recent Labs   Lab 07/19/22  0518 07/20/22  0126 07/21/22  0531 08/24/22  0905 06/08/23  0910 09/19/23  0921 03/18/24  0840   Glucose 141 H 114 H  113 H 146 H   < > 130 H  130 H 105 131 H   Sodium 139 141  142 143   < > 138  138 139 136   Potassium 5.2 H 4.1  4.1 3.9   < > 4.6  4.6 4.3 5.2 H   BUN 32 H 28 H  29 H 24 H   < > 11  11 17 16   Creatinine 1.9 H 1.4  1.4 1.3   < > 1.5 H  1.5 H 1.0 1.4 "   eGFR if  41 A 60  60 >60  --   --   --   --    eGFR if non  36 A 52 A  52 A 56 A  --   --   --   --    Calcium 8.8 9.2  9.2 9.2   < > 9.3  9.3 9.3 10.1   Magnesium 1.9 1.7  1.8 1.8  --   --   --   --     < > = values in this interval not displayed.       CARDIAC BIOMARKERS:  Recent Labs   Lab 07/18/22  0252 07/18/22  1007 07/20/22  0126   CPK  --  129  --    Troponin I 0.019  --  0.119 H       COAGS:  Recent Labs   Lab 10/01/21  1610   INR 1.0       LIPIDS/LFTS:  Recent Labs   Lab 08/24/22  0905 09/29/22  0849 06/08/23  0910 09/19/23  0921 03/18/24  0840   Cholesterol 118 L  --   --  87 L 99 L   Triglycerides 109  --   --  97 161 H   HDL 34 L  --   --  29 L 28 L   LDL Cholesterol 62.2 L  --   --  38.6 L 38.8 L   Non-HDL Cholesterol 84  --   --  58 71   AST 23   < > 15 15 23   ALT 19   < > 12 12 20    < > = values in this interval not displayed.       Hemoglobin A1C   Date Value Ref Range Status   03/18/2024 6.0 (H) 4.0 - 5.6 % Final     Comment:     ADA Screening Guidelines:  5.7-6.4%  Consistent with prediabetes  >or=6.5%  Consistent with diabetes    High levels of fetal hemoglobin interfere with the HbA1C  assay. Heterozygous hemoglobin variants (HbS, HgC, etc)do  not significantly interfere with this assay.   However, presence of multiple variants may affect accuracy.     09/19/2023 6.1 (H) 4.0 - 5.6 % Final     Comment:     ADA Screening Guidelines:  5.7-6.4%  Consistent with prediabetes  >or=6.5%  Consistent with diabetes    High levels of fetal hemoglobin interfere with the HbA1C  assay. Heterozygous hemoglobin variants (HbS, HgC, etc)do  not significantly interfere with this assay.   However, presence of multiple variants may affect accuracy.     06/08/2023 5.7 (H) 4.0 - 5.6 % Final     Comment:     ADA Screening Guidelines:  5.7-6.4%  Consistent with prediabetes  >or=6.5%  Consistent with diabetes    High levels of fetal hemoglobin interfere with the HbA1C  assay.  Heterozygous hemoglobin variants (HbS, HgC, etc)do  not significantly interfere with this assay.   However, presence of multiple variants may affect accuracy.         TSH  Recent Labs   Lab 10/01/21  1610 07/18/22  0252   TSH 1.897 5.291 H       The ASCVD Risk score (Dale JOHNSON, et al., 2019) failed to calculate for the following reasons:    The valid total cholesterol range is 130 to 320 mg/dL             ASSESSMENT AND PLAN     Patient Active Problem List   Diagnosis    Essential hypertension    Anemia    Anxiety, unable to wean off BZD    Recurrent major depressive disorder, in partial remission    DDD (degenerative disc disease), cervical    DDD (degenerative disc disease), lumbar    ED (erectile dysfunction)    Facet arthritis of cervical region    Facet arthritis of lumbar region    Benzodiazepine dependence, did not do well with attempt to wean down 2017    Chronic, continuous use of opioids    Type 2 diabetes mellitus without complication, without long-term current use of insulin    Tobacco dependence, patch with irritation; hx of bupropion    Therapeutic opioid-induced constipation (OIC)    Tubular adenoma of colon 5/10/17; 08/04/2023 colonoscopy 10 mm cecal TA.  10 mm descending polypoid mucosa.  Repeat 3 years    Cervical stenosis of spinal canal 10/3/2021 LAMINECTOMY, SPINE, CERVICAL, WITH POSTERIOR FUSION C2-T2    Cataract, bilateral    Open angle with borderline findings and high glaucoma risk in both eyes    Nuclear sclerosis of both eyes    Elevated prolactin level,low testosterone, gynecomastia; MRI pituitary normal 5/2020    Low testosterone in male    Paresthesia of left upper and lower extremity    History of smoking 10-25 pack years    Status post surgery    Impaired mobility and ADLs    Chronic low back pain    Sacroiliac joint pain    Sacroiliitis    Impaired mobility    Arthrodesis status    Foraminal stenosis of lumbar region    Junctional bradycardia    Chronic prescription benzodiazepine  use    History of atrial fibrillation 7/2022 hospitalization i n the setting of SHAUNA and hyperK. started on carvedilol during admission    Type 2 diabetes mellitus with diabetic cataract, without long-term current use of insulin    Unspecified inflammatory spondylopathy, cervical region    Aortic atherosclerosis    Bradycardia       Dyspnea on exertion.  Further evaluation with the help of a stress test.  Stress test did not show any significant ischemia.  Likely related to his pulmonary issues and smoking.  Smoking cessation has been highly recommended P    Smoking cessation     Hypertension:  Uncontrolled.  Increase to 50 mg t.i.d..  Monitor blood pressure at home, if continues to be elevated increase to 100 mg t.i.d.    Continue other therapy    Follow-up in 1-3 months.    Thank you very much for involving me in the care of your patient.  Please do not hesitate to contact me if there are any questions.      Adriana Pete MD, FACC, [facility]  Interventional Cardiologist, [facility].           This note was dictated with the help of speech recognition software.  There might be un-intended errors and/or substitutions.

## 2024-08-14 DIAGNOSIS — R79.89 LOW TESTOSTERONE IN MALE: ICD-10-CM

## 2024-08-14 LAB
OHS QRS DURATION: 86 MS
OHS QTC CALCULATION: 432 MS

## 2024-08-14 RX ORDER — TESTOSTERONE CYPIONATE 200 MG/ML
INJECTION, SOLUTION INTRAMUSCULAR
Qty: 10 ML | Refills: 0 | Status: SHIPPED | OUTPATIENT
Start: 2024-08-14

## 2024-08-14 NOTE — TELEPHONE ENCOUNTER
No care due was identified.  [facility] Embedded Care Due Messages. Reference number: [id number].   8/14/2024 3:11:34 PM CDT

## 2024-08-16 DIAGNOSIS — F13.20 BENZODIAZEPINE DEPENDENCE: ICD-10-CM

## 2024-08-16 DIAGNOSIS — F41.9 ANXIETY: ICD-10-CM

## 2024-08-16 RX ORDER — ALPRAZOLAM 1 MG/1
TABLET ORAL
Qty: 45 TABLET | Refills: 0 | Status: SHIPPED | OUTPATIENT
Start: 2024-08-16

## 2024-08-16 NOTE — TELEPHONE ENCOUNTER
No care due was identified.  [facility] Embedded Care Due Messages. Reference number: [id number].   8/16/2024 9:03:46 AM CDT

## 2024-08-20 ENCOUNTER — CLINICAL SUPPORT (OUTPATIENT)
Dept: SMOKING CESSATION | Facility: CLINIC | Age: 69
End: 2024-08-20
Payer: COMMERCIAL

## 2024-08-20 DIAGNOSIS — F17.200 NICOTINE DEPENDENCE: Primary | ICD-10-CM

## 2024-08-20 PROCEDURE — 99999 PR PBB SHADOW E&M-EST. PATIENT-LVL II: CPT | Mod: PBBFAC,,,

## 2024-08-20 PROCEDURE — 99404 PREV MED CNSL INDIV APPRX 60: CPT | Mod: S$GLB,,,

## 2024-08-20 RX ORDER — IBUPROFEN 200 MG
1 TABLET ORAL DAILY
Qty: 14 PATCH | Refills: 0 | Status: SHIPPED | OUTPATIENT
Start: 2024-08-20 | End: 2024-09-24

## 2024-08-20 NOTE — PROGRESS NOTES
Individual Follow-Up Form    8/20/2024    Quit Date: n/a    Clinical Status of Patient: Outpatient    Length of Service: 60 minutes    Continuing Medication: yes  Patches    Other Medications: n/a     Target Symptoms: Withdrawal and medication side effects. The following were  rated moderate (3) to severe (4) on TCRS:  Moderate (3): 0  Severe (4): 0    Comments: patient presents telephonically for follow up smoking 10 cigarettes per day, he is using the 21mg nicotine patch and is now at the point where he is purchasing with a coupon, he has exceeded the 196 patches in a 28 week period that the trust offers in a rolling calendar year. This was explained to him as the goal is to quit and wean down off the patches not stay on patches for an excessive amount of time, at his next follow up will discuss his over all goals if it is to smoke less then what he was or just quit, patient will have to make a decision. Encouraged the patient ot continue to work on rate reduction and self control. Session handout discussed will follow and monitor    Diagnosis: F17.210    Next Visit: 2 weeks     bowel sounds normal/nontender/soft

## 2024-08-27 DIAGNOSIS — E11.9 TYPE 2 DIABETES MELLITUS WITHOUT COMPLICATION, WITHOUT LONG-TERM CURRENT USE OF INSULIN: ICD-10-CM

## 2024-08-27 DIAGNOSIS — E11.36 TYPE 2 DIABETES MELLITUS WITH DIABETIC CATARACT, WITHOUT LONG-TERM CURRENT USE OF INSULIN: ICD-10-CM

## 2024-08-27 RX ORDER — METFORMIN HYDROCHLORIDE 500 MG/1
500 TABLET ORAL 2 TIMES DAILY WITH MEALS
Qty: 180 TABLET | Refills: 0 | Status: SHIPPED | OUTPATIENT
Start: 2024-08-27

## 2024-08-27 NOTE — TELEPHONE ENCOUNTER
Refill Decision Note   Butch Mcdaniel  is requesting a refill authorization.  Brief Assessment and Rationale for Refill:  Approve     Medication Therapy Plan:         Comments:     Note composed:12:49 PM 08/27/2024            
No care due was identified.  [facility] Embedded Care Due Messages. Reference number: [id number].   8/27/2024 10:33:14 AM CDT  
Spray Paint Text: The liquid nitrogen was applied to the skin utilizing a spray paint frosting technique.
Add 52 Modifier (Optional): no
Show Applicator Variable?: Yes
Medical Necessity Information: It is in your best interest to select a reason for this procedure from the list below. All of these items fulfill various CMS LCD requirements except the new and changing color options.
Consent: The patient's consent was obtained including but not limited to risks of crusting, scabbing, blistering, scarring, darker or lighter pigmentary change, recurrence, incomplete removal and infection.
Medical Necessity Clause: This procedure was medically necessary because the lesions that were treated were:
Post-Care Instructions: I reviewed with the patient in detail post-care instructions. Patient is to wear sunprotection, and avoid picking at any of the treated lesions. Pt may apply Vaseline to crusted or scabbing areas.
Detail Level: Detailed

## 2024-09-16 DIAGNOSIS — F41.9 ANXIETY: ICD-10-CM

## 2024-09-16 DIAGNOSIS — F13.20 BENZODIAZEPINE DEPENDENCE: ICD-10-CM

## 2024-09-16 RX ORDER — ALPRAZOLAM 1 MG/1
TABLET ORAL
Qty: 45 TABLET | Refills: 0 | Status: SHIPPED | OUTPATIENT
Start: 2024-09-16

## 2024-09-16 NOTE — TELEPHONE ENCOUNTER
Care Due:                  Date            Visit Type   Department     Provider  --------------------------------------------------------------------------------                                [facility]                              PRIMARY      MED/ INTERNAL  Last Visit: 06-      CARE ([facility])   MED/ FREDDY Evans                              [facility]                              PRIMARY      MED/ INTERNAL  Next Visit: 09-      CARE ([facility])   MED/ FREDDY Evans                                                            Last  Test          Frequency    Reason                     Performed    Due Date  --------------------------------------------------------------------------------    HBA1C.......  6 months...  metFORMIN................  03- 09-    [facility] Embedded Care Due Messages. Reference number: [id number].   9/16/2024 8:34:36 AM CDT

## 2024-09-18 ENCOUNTER — LAB VISIT (OUTPATIENT)
Dept: LAB | Facility: HOSPITAL | Age: 69
End: 2024-09-18
Attending: INTERNAL MEDICINE
Payer: MEDICARE

## 2024-09-18 DIAGNOSIS — R79.89 LOW TESTOSTERONE IN MALE: ICD-10-CM

## 2024-09-18 DIAGNOSIS — E11.36 TYPE 2 DIABETES MELLITUS WITH DIABETIC CATARACT, WITHOUT LONG-TERM CURRENT USE OF INSULIN: ICD-10-CM

## 2024-09-18 DIAGNOSIS — E11.9 TYPE 2 DIABETES MELLITUS WITHOUT COMPLICATION, WITHOUT LONG-TERM CURRENT USE OF INSULIN: ICD-10-CM

## 2024-09-18 LAB
ALBUMIN SERPL BCP-MCNC: 4.1 G/DL (ref 3.5–5.2)
ALP SERPL-CCNC: 52 U/L (ref 55–135)
ALT SERPL W/O P-5'-P-CCNC: 15 U/L (ref 10–44)
ANION GAP SERPL CALC-SCNC: 9 MMOL/L (ref 8–16)
AST SERPL-CCNC: 19 U/L (ref 10–40)
BILIRUB SERPL-MCNC: 1.5 MG/DL (ref 0.1–1)
BUN SERPL-MCNC: 20 MG/DL (ref 8–23)
CALCIUM SERPL-MCNC: 9 MG/DL (ref 8.7–10.5)
CHLORIDE SERPL-SCNC: 106 MMOL/L (ref 95–110)
CHOLEST SERPL-MCNC: 74 MG/DL (ref 120–199)
CHOLEST/HDLC SERPL: 2.6 {RATIO} (ref 2–5)
CO2 SERPL-SCNC: 24 MMOL/L (ref 23–29)
CREAT SERPL-MCNC: 1.3 MG/DL (ref 0.5–1.4)
ERYTHROCYTE [DISTWIDTH] IN BLOOD BY AUTOMATED COUNT: 12.3 % (ref 11.5–14.5)
EST. GFR  (NO RACE VARIABLE): 59.1 ML/MIN/1.73 M^2
ESTIMATED AVG GLUCOSE: 126 MG/DL (ref 68–131)
GLUCOSE SERPL-MCNC: 124 MG/DL (ref 70–110)
HBA1C MFR BLD: 6 % (ref 4–5.6)
HCT VFR BLD AUTO: 45.4 % (ref 40–54)
HDLC SERPL-MCNC: 29 MG/DL (ref 40–75)
HDLC SERPL: 39.2 % (ref 20–50)
HGB BLD-MCNC: 15.3 G/DL (ref 14–18)
LDLC SERPL CALC-MCNC: 29 MG/DL (ref 63–159)
MCH RBC QN AUTO: 33.3 PG (ref 27–31)
MCHC RBC AUTO-ENTMCNC: 33.7 G/DL (ref 32–36)
MCV RBC AUTO: 99 FL (ref 82–98)
NONHDLC SERPL-MCNC: 45 MG/DL
PLATELET # BLD AUTO: ABNORMAL K/UL (ref 150–450)
PMV BLD AUTO: ABNORMAL FL (ref 9.2–12.9)
POTASSIUM SERPL-SCNC: 4.7 MMOL/L (ref 3.5–5.1)
PROT SERPL-MCNC: 6.9 G/DL (ref 6–8.4)
RBC # BLD AUTO: 4.59 M/UL (ref 4.6–6.2)
SODIUM SERPL-SCNC: 139 MMOL/L (ref 136–145)
TESTOST SERPL-MCNC: 764 NG/DL (ref 304–1227)
TRIGL SERPL-MCNC: 80 MG/DL (ref 30–150)
WBC # BLD AUTO: 9.93 K/UL (ref 3.9–12.7)

## 2024-09-18 PROCEDURE — 85027 COMPLETE CBC AUTOMATED: CPT | Performed by: INTERNAL MEDICINE

## 2024-09-18 PROCEDURE — 80061 LIPID PANEL: CPT | Performed by: INTERNAL MEDICINE

## 2024-09-18 PROCEDURE — 83036 HEMOGLOBIN GLYCOSYLATED A1C: CPT | Performed by: INTERNAL MEDICINE

## 2024-09-18 PROCEDURE — 84403 ASSAY OF TOTAL TESTOSTERONE: CPT | Performed by: INTERNAL MEDICINE

## 2024-09-18 PROCEDURE — 80053 COMPREHEN METABOLIC PANEL: CPT | Performed by: INTERNAL MEDICINE

## 2024-09-19 DIAGNOSIS — F33.41 RECURRENT MAJOR DEPRESSIVE DISORDER, IN PARTIAL REMISSION: ICD-10-CM

## 2024-09-19 DIAGNOSIS — F41.9 ANXIETY: ICD-10-CM

## 2024-09-19 NOTE — TELEPHONE ENCOUNTER
No care due was identified.  [facility] Embedded Care Due Messages. Reference number: [id number].   9/19/2024 4:30:00 PM CDT

## 2024-09-20 RX ORDER — FLUOXETINE HYDROCHLORIDE 40 MG/1
40 CAPSULE ORAL
Qty: 90 CAPSULE | Refills: 3 | Status: SHIPPED | OUTPATIENT
Start: 2024-09-20

## 2024-09-20 NOTE — TELEPHONE ENCOUNTER
Refill Routing Note   Medication(s) are not appropriate for processing by [facility] for the following reason(s):        New or recently adjusted medication    [facility] action(s):  Defer               Appointments  past 12m or future 3m with PCP    Date Provider   Last Visit   6/25/2024 Gabriel Evans MD   Next Visit   9/25/2024 Gabriel Evans MD   ED visits in past 90 days: 0        Note composed:7:45 AM 09/20/2024

## 2024-09-24 NOTE — PROGRESS NOTES
This note was created by combination of typed  and M-Modal dictation.  Transcription errors may be present.   This note was also generated with the assistance of ambient listening technology. Verbal consent was obtained by the patient and accompanying visitor(s) for the recording of patient appointment to facilitate this note. I attest to having reviewed and edited the generated note for accuracy, though some syntax or spelling errors may persist. Please contact the author of this note for any clarification.    Assessment and Plan:   Assessment and Plan   Anxiety, unable to wean off BZD  Recurrent major depressive disorder, in partial remission  Benzodiazepine dependence, did not do well with attempt to wean down 2017  -he thinks he is taking citalopram along with the fluoxetine.  He did not bring his bottles in.  I have given him written instructions to stop the citalopram and remain on the fluoxetine.    No change to the benzodiazepine and no change to BuSpar    Chronic, continuous use of opioids  Therapeutic opioid-induced constipation (OIC)  -reports that he had an aberrant UDS which he attributes to cocaine spiked in his marijuana.  He was considering trying to get medical marijuana.  His pain clinic it sounds like is weaning down his narcotic and it does not sound like they are going to continue to manage this.  He was not arranged new pain clinic   But he is considering continuing to see Dr. Walter for nonnarcotic treatments including possible injections.    He inquiring about whether he would benefit from lumbar surgery.  He has had cervical spine surgery and has a bit wary of major surgery.  Discussed with him that he would have to discuss this with his pain clinic as I am not familiar with his anatomy    Low testosterone in male  -pre visit labs testosterone level normal.  No change in regimen  -     Comprehensive Metabolic Panel; Future; Expected date: 03/24/2025  -     CBC Without Differential;  Future; Expected date: 03/24/2025  -     PSA, Screening; Future; Expected date: 03/24/2025  -     Testosterone; Future; Expected date: 03/24/2025    Type 2 diabetes mellitus with diabetic cataract, without long-term current use of insulin  Type 2 diabetes mellitus without complication, without long-term current use of insulin  -pre visit labs A1c stable on metformin.  No changes.  Check future surveillance  -     Comprehensive Metabolic Panel; Future; Expected date: 03/24/2025  -     CBC Without Differential; Future; Expected date: 03/24/2025  -     Hemoglobin A1C; Future; Expected date: 03/24/2025    Essential hypertension  Chronic kidney disease, stage 3a  -blood pressure today better.  Had had his hydralazine increased by Cardiology.  Blood pressures today are good.  No changes    Aortic atherosclerosis  -on statin pre visit labs lipid profile good no changes    Tobacco dependence, patch with irritation; hx of bupropion  -continues to work on cutting down on smoking.  Has been working with smoking cessation class    Tubular adenoma of colon 5/10/17; 08/04/2023 colonoscopy 10 mm cecal TA.  10 mm descending polypoid mucosa.  Repeat 3 years  -colonoscopy will be due 2026    Screening for prostate cancer  -check PSA 6 months around March  -     PSA, Screening; Future; Expected date: 03/24/2025          There are no discontinued medications.    meds sent this encounter:         Follow Up:  Follow-up 3 months for mood.  In the interim pain clinic to manage non opioid options for his back  Future Appointments   Date Time Provider Department Center   9/25/2024 11:00 AM Gabriel Evans MD [facility]   11/18/2024  9:00 AM Adriana Pete MD [facility] CARDIO [facility]          Subjective:   Subjective   Chief Complaint   Patient presents with    Follow-up    passing out    Peripheral Neuropathy       DIPESH Rae is a 69 y.o. male.     Social History     Tobacco Use    Smoking status: Some Days     Current  packs/day: 0.50     Average packs/day: 1 pack/day for 52.6 years (51.9 ttl pk-yrs)     Types: Cigarettes, Vaping with nicotine     Start date: 1972     Last attempt to quit: 6/20/2023    Smokeless tobacco: Never    Tobacco comments:     Approximately 3 cigarettes a day   Substance Use Topics    Alcohol use: No     Alcohol/week: 0.0 standard drinks of alcohol      Social History     Occupational History    Occupation: retired -       Social History     Social History Narrative    Not on file       Patient Care Team:  Gabriel Evans MD as PCP - General (Internal Medicine)  Vlad Nava MD (Pain Medicine)  Kaila Carrillo OD as Consulting Physician (Optometry)  Wyatt Ojeda MA as Care Coordinator    Last appointment with this clinic was 6/25/2024. Last visit with me 6/25/2024   To summarize last visit and events leading up to today:  DM2   Low testosterone on testosterone.  HTN, lipid   3/16/23 nu stress test neg for ischemia; no arrhythmias  07/27/2023 TTE normal systolic function.  LVEF 65%.  Grade 1 diastolic dysfunction.  9/6/23 saw cards, cut his coreg dose due to stephania  Hx of SHAUNA due to dehydration, severe enough to require HD for hyperK, 7/2022.  Hx of AFib during hospitalization 7/2022 in the setting of hyperK. On beta blocker.  Anxiety, BZD dependence. did not tolerate trial of Cymbalta.  Took it for maybe a week.   Has been to see Psychiatry, Psychiatry recommended inpatient detox and the patient was not interested.  Currently have him taking 1.5 tablets in total during the day.  on max dose celexa  Chronic back pain followed by pain mgmt. Narcotic and bill  Thrombocytopenia hx of clumping on microscopy  COPD   Saw ENT 7/28.  Sensorineural hearing loss.  Qualifies for hearing aids.  08/04/2023 colonoscopy 10 mm cecal TA.  10 mm descending polypoid mucosa.  Repeat 3 years  Creatinine remains high.       Was scheduled to see Psychiatry but on pre visit review was deemed inappropriate for  general Psychiatry and was recommended for intensive outpatient therapy program    Last visit with me 06/25/2024   Anxiety, MDD, benzodiazepine dependence.  Previous psychiatrist had recommended inpatient detox to wean off of benzodiazepines but the patient was unwilling.  Trial of fluoxetine stopped the Celexa.  Stay on BuSpar.  If stable may consider trying to wean down benzodiazepine  Chronic opiate dependence followed by pain clinic    Working with smoking cessation clinic    Saw cardiology 08/13/2024.  Hypertension not to goal increase hydralazine 50 t.i.d..    Pre visit labs 09/18/2024   CBC macrocytosis  CMP creatinine 1.3 from 1.4 CKD 3A  Lipid good   A1c 6.0 from 6.0   Testosterone normal 764    PSA due 03/2025    Today's visit:    History of Present Illness    CHIEF COMPLAINT:  Chief Complaint    CHRONIC PAIN:  He reports chronic back pain with neuropathy radiating down both legs. Pain extends around his body and becomes severe after 12 hours without medication. Percocet provides relief for approximately 12 hours. Gabapentin 600 mg TID, which he has been taking for a while, is no longer effective. He describes possible saddle anesthesia, reporting loss of sensation during bowel movements.  He was recently discharged from his previous pain management clinic due to an incident involving marijuana allegedly spiked with cocaine obtained from a dispensary. He reports fear of falling due to foot numbness, citing a recent incident where he fell while attempting to get his dog due to inability to feel his left foot.   He sees Dr. [facility]  It sounds like he was given a final prescription with instructions how to taper it down.  The patient is under the impression that the plan is to tapering down to 1 a day though I suspect that this in fact would be a recommendation to taper completely off.    Looks like he normally takes 4 in a day  He expresses interest in alternative treatments for his back  pain, including epidural injections and potential back surgery. He seeks guidance on whether his back condition might benefit from surgical intervention.  He has not arranged to see a new pain clinic but instead may continue to follow up with Dr. Nava to see if they have non narcotic medication options for pain control treatment.  Possibly injections.  He was wondering if he should see a neurosurgeon for more invasive surgery.  He has had cervical spine surgery.    Discussed with him that he will need to review his back anatomy with Dr. Nava see if he would be a candidate for neurosurgical intervention      MEDICATIONS:  Current medications include:  - Percocet 10/325 for pain management  - Gabapentin 600 mg TID for neuropathy  - Amlodipine, Hydralazine 50 mg TID (with option to increase to 100 mg if needed), and Lisinopril for blood pressure  - Xanax twice daily for anxiety and sleep  - Prozac (fluoxetine) for mood  - Atorvastatin for cholesterol  - Metformin for blood sugar control    ANXIETY AND DEPRESSION:  He experiences anxiety, primarily related to concerns about his grandchildren and his son with diabetes who lives at home. Prozac is helping to manage his anxiety symptoms, but he still requires Xanax twice daily to help with sleep.  On further discussion and review of his medications though, it is possible he was taking both citalopram and fluoxetine.  He did not bring in his medication bottles to review.  I have recommended that he review his medication bottles at home and stop citalopram if he is taking it along with the fluoxetine and just take the fluoxetine.    SUBSTANCE USE:  He is attempting to quit smoking, currently taking a single puff of one cigarette per day before discarding it. He denies using nicotine patches and expresses a desire to quit completely.    MEDICAL HISTORY:  He has a history of hypertension, hyperlipidemia, diabetes, anxiety, and depression. He was previously on Celexa for  depression and anxiety but has been switched to Prozac.    PAST SURGICAL HISTORY:  He reports a history of major neck surgery to straighten his neck. He expresses a desire to avoid further surgeries unless absolutely necessary, as his previous doctor advised him to continue with medication management when possible.    FAMILY HISTORY:  He has three grandsons and a son with diabetes who lives at home with him.      ROS:  Musculoskeletal: reports back pain  Neurological: reports numbness  Psychiatric: reports anxiety         ALLERGIES AND MEDICATIONS: updated and reviewed.  Medication List with Changes/Refills   Current Medications    ALBUTEROL (PROVENTIL/VENTOLIN HFA) 90 MCG/ACTUATION INHALER    INHALE TWO PUFFS BY MOUTH EVERY 6 HOURS AS NEEDED FOR WHEEZING OR SHORTNESS OF BREATH    ALPRAZOLAM (XANAX) 1 MG TABLET    TAKE 1 AND ONE-HALF TABLETS BY MOUTH ONCE A DAY    AMLODIPINE (NORVASC) 10 MG TABLET    TAKE ONE TABLET BY MOUTH EVERY EVENING    ATORVASTATIN (LIPITOR) 40 MG TABLET    Take 1 tablet (40 mg total) by mouth every evening.    BUSPIRONE (BUSPAR) 30 MG TAB    Take 1 tablet (30 mg total) by mouth 2 (two) times daily.    CARVEDILOL (COREG) 6.25 MG TABLET    TAKE ONE TABLET BY MOUTH TWICE DAILY    FLUOXETINE 40 MG CAPSULE    TAKE ONE CAPSULE BY MOUTH ONCE A DAY    FLUTICASONE-SALMETEROL DISKUS INHALER 100-50 MCG    Inhale 1 puff into the lungs.    GABAPENTIN (NEURONTIN) 600 MG TABLET    Take 600 mg by mouth 3 (three) times daily.    HYDRALAZINE (APRESOLINE) 50 MG TABLET    Take 1 tablet (50 mg total) by mouth 3 (three) times daily.    IBUPROFEN (ADVIL,MOTRIN) 600 MG TABLET    Take 600 mg by mouth.    LISINOPRIL (PRINIVIL,ZESTRIL) 20 MG TABLET    TAKE ONE TABLET BY MOUTH ONCE A DAY    METFORMIN (GLUCOPHAGE) 500 MG TABLET    TAKE ONE TABLET BY MOUTH TWICE DAILY WITH MEALS    NALOXONE (NARCAN) 4 MG/ACTUATION SPRY    4mg by nasal route as needed for opioid overdose; may repeat every 2-3 minutes in alternating  "nostrils until medical help arrives. Call 911    NEEDLE, DISP, 22 G 22 GAUGE X 1" NDLE    Testosterone injection every 2 weeks    OXYCODONE-ACETAMINOPHEN (PERCOCET)  MG PER TABLET    Take 1 tablet by mouth every 4 (four) hours as needed.    POLYETHYLENE GLYCOL 3350 (MIRALAX ORAL)    Take 1 application by mouth.    SENNA (SENOKOT) 8.6 MG TABLET    Take 1 tablet by mouth 2 (two) times a day.    SYRINGE, DISPOSABLE, (BD LUER-STARLA SYRINGE) 1 ML SYRG    Testosterone injection every 2 weeks    TESTOSTERONE CYPIONATE (DEPOTESTOTERONE CYPIONATE) 200 MG/ML INJECTION    INJECT ONE MILLILITER INTRAMUSCULARLY EVERY 14 DAYS    TIZANIDINE (ZANAFLEX) 4 MG TABLET    Take 4-8 mg by mouth nightly as needed.         Objective:   Objective   Physical Exam   Vitals:    09/25/24 1053 09/25/24 1133   BP: (!) 142/76 122/60   BP Location: Right arm Left arm   Patient Position: Sitting Sitting   BP Method: Large (Manual) Large (Manual)   Pulse: 60    Temp: 98.7 °F (37.1 °C)    TempSrc: Oral    SpO2: 96%    Weight: 100 kg (220 lb 7.4 oz)    Height: 5' 11" (1.803 m)     Body mass index is 30.75 kg/m².            Physical Exam  Constitutional:       General: He is not in acute distress.     Appearance: He is well-developed.   HENT:      Head: Normocephalic and atraumatic.   Eyes:      General: No scleral icterus.  Cardiovascular:      Pulses:           Dorsalis pedis pulses are 3+ on the right side and 3+ on the left side.        Posterior tibial pulses are 3+ on the right side and 3+ on the left side.   Pulmonary:      Effort: Pulmonary effort is normal.   Musculoskeletal:      Cervical back: No rigidity.      Right lower leg: Edema present.      Left lower leg: Edema present.      Right foot: No deformity.      Left foot: No deformity.   Feet:      Right foot:      Protective Sensation: 5 sites tested.  5 sites sensed.      Skin integrity: No ulcer, blister, skin breakdown, erythema or warmth.      Left foot:      Protective Sensation: 5 " sites tested.  5 sites sensed.      Skin integrity: No ulcer, blister, skin breakdown, erythema or warmth.   Lymphadenopathy:      Cervical: No cervical adenopathy.   Skin:     General: Skin is warm and dry.   Neurological:      Mental Status: He is alert and oriented to person, place, and time.   Psychiatric:      Comments: Mood at baseline.  Drying relaxation techniques during our visit.  Somewhat perseverating and tangential.  No pressured speech.  No internal stimuli.  Normal eye contact normal hygiene.  Appropriate response to questions though a bit slow to respond         Component      Latest Ref Rng 3/18/2024 9/18/2024   Sodium      136 - 145 mmol/L 136  139    Potassium      3.5 - 5.1 mmol/L 5.2 (H)  4.7    Chloride      95 - 110 mmol/L 99  106    CO2      23 - 29 mmol/L 29  24    Glucose      70 - 110 mg/dL 131 (H)  124 (H)    BUN      8 - 23 mg/dL 16  20    Creatinine      0.5 - 1.4 mg/dL 1.4  1.3    Calcium      8.7 - 10.5 mg/dL 10.1  9.0    PROTEIN TOTAL      6.0 - 8.4 g/dL 7.2  6.9    Albumin      3.5 - 5.2 g/dL 4.3  4.1    BILIRUBIN TOTAL      0.1 - 1.0 mg/dL 1.6 (H)  1.5 (H)    ALP      55 - 135 U/L 50 (L)  52 (L)    AST      10 - 40 U/L 23  19    ALT      10 - 44 U/L 20  15    eGFR      >60 mL/min/1.73 m^2 54.4 !  59.1 !    Anion Gap      8 - 16 mmol/L 8  9    WBC      3.90 - 12.70 K/uL 9.56  9.93    RBC      4.60 - 6.20 M/uL 4.85  4.59 (L)    Hemoglobin      14.0 - 18.0 g/dL 16.0  15.3    Hematocrit      40.0 - 54.0 % 48.3  45.4    MCV      82 - 98 fL 100 (H)  99 (H)    MCH      27.0 - 31.0 pg 33.0 (H)  33.3 (H)    MCHC      32.0 - 36.0 g/dL 33.1  33.7    RDW      11.5 - 14.5 % 13.1  12.3    Platelet Count      150 - 450 K/uL 68 (L)  SEE COMMENT    MPV      9.2 - 12.9 fL 12.4  SEE COMMENT    Cholesterol Total      120 - 199 mg/dL 99 (L)  74 (L)    Triglycerides      30 - 150 mg/dL 161 (H)  80    HDL      40 - 75 mg/dL 28 (L)  29 (L)    LDL Cholesterol      63.0 - 159.0 mg/dL 38.8 (L)  29.0 (L)     HDL/Cholesterol Ratio      20.0 - 50.0 % 28.3  39.2    Total Cholesterol/HDL Ratio      2.0 - 5.0  3.5  2.6    Non-HDL Cholesterol      mg/dL 71  45    Urine Microalbumin      ug/mL 46.0     Urine Creatinine      23.0 - 375.0 mg/dL 25.0     MICROALB/CREAT RATIO      0.0 - 30.0 ug/mg 184.0 (H)     Hemoglobin A1C External      4.0 - 5.6 % 6.0 (H)  6.0 (H)    Estimated Avg Glucose      68 - 131 mg/dL 126  126    Testosterone, Total      304 - 1227 ng/dL 163 (L)  764    Prostate Specific Antigen      0.00 - 4.00 ng/mL 0.42        Legend:  (H) High  (L) Low  ! Abnormal

## 2024-09-25 ENCOUNTER — OFFICE VISIT (OUTPATIENT)
Dept: FAMILY MEDICINE | Facility: CLINIC | Age: 69
End: 2024-09-25
Payer: MEDICARE

## 2024-09-25 VITALS
WEIGHT: 220.44 LBS | HEART RATE: 60 BPM | SYSTOLIC BLOOD PRESSURE: 122 MMHG | BODY MASS INDEX: 30.86 KG/M2 | HEIGHT: 71 IN | OXYGEN SATURATION: 96 % | TEMPERATURE: 99 F | DIASTOLIC BLOOD PRESSURE: 60 MMHG

## 2024-09-25 DIAGNOSIS — F41.9 ANXIETY: Primary | ICD-10-CM

## 2024-09-25 DIAGNOSIS — F11.90 CHRONIC, CONTINUOUS USE OF OPIOIDS: ICD-10-CM

## 2024-09-25 DIAGNOSIS — F33.41 RECURRENT MAJOR DEPRESSIVE DISORDER, IN PARTIAL REMISSION: ICD-10-CM

## 2024-09-25 DIAGNOSIS — T40.2X5A THERAPEUTIC OPIOID-INDUCED CONSTIPATION (OIC): ICD-10-CM

## 2024-09-25 DIAGNOSIS — R79.89 LOW TESTOSTERONE IN MALE: ICD-10-CM

## 2024-09-25 DIAGNOSIS — F13.20 BENZODIAZEPINE DEPENDENCE: Chronic | ICD-10-CM

## 2024-09-25 DIAGNOSIS — Z12.5 SCREENING FOR PROSTATE CANCER: ICD-10-CM

## 2024-09-25 DIAGNOSIS — I10 ESSENTIAL HYPERTENSION: ICD-10-CM

## 2024-09-25 DIAGNOSIS — E11.9 TYPE 2 DIABETES MELLITUS WITHOUT COMPLICATION, WITHOUT LONG-TERM CURRENT USE OF INSULIN: ICD-10-CM

## 2024-09-25 DIAGNOSIS — F17.200 TOBACCO DEPENDENCE: Chronic | ICD-10-CM

## 2024-09-25 DIAGNOSIS — K59.03 THERAPEUTIC OPIOID-INDUCED CONSTIPATION (OIC): ICD-10-CM

## 2024-09-25 DIAGNOSIS — E11.36 TYPE 2 DIABETES MELLITUS WITH DIABETIC CATARACT, WITHOUT LONG-TERM CURRENT USE OF INSULIN: ICD-10-CM

## 2024-09-25 DIAGNOSIS — I70.0 AORTIC ATHEROSCLEROSIS: ICD-10-CM

## 2024-09-25 DIAGNOSIS — N18.31 CHRONIC KIDNEY DISEASE, STAGE 3A: ICD-10-CM

## 2024-09-25 DIAGNOSIS — D12.6 TUBULAR ADENOMA OF COLON: Chronic | ICD-10-CM

## 2024-09-25 PROCEDURE — 4010F ACE/ARB THERAPY RXD/TAKEN: CPT | Mod: CPTII,S$GLB,, | Performed by: INTERNAL MEDICINE

## 2024-09-25 PROCEDURE — 1125F AMNT PAIN NOTED PAIN PRSNT: CPT | Mod: CPTII,S$GLB,, | Performed by: INTERNAL MEDICINE

## 2024-09-25 PROCEDURE — 3060F POS MICROALBUMINURIA REV: CPT | Mod: CPTII,S$GLB,, | Performed by: INTERNAL MEDICINE

## 2024-09-25 PROCEDURE — 1100F PTFALLS ASSESS-DOCD GE2>/YR: CPT | Mod: CPTII,S$GLB,, | Performed by: INTERNAL MEDICINE

## 2024-09-25 PROCEDURE — 3066F NEPHROPATHY DOC TX: CPT | Mod: CPTII,S$GLB,, | Performed by: INTERNAL MEDICINE

## 2024-09-25 PROCEDURE — 3044F HG A1C LEVEL LT 7.0%: CPT | Mod: CPTII,S$GLB,, | Performed by: INTERNAL MEDICINE

## 2024-09-25 PROCEDURE — 3078F DIAST BP <80 MM HG: CPT | Mod: CPTII,S$GLB,, | Performed by: INTERNAL MEDICINE

## 2024-09-25 PROCEDURE — 1160F RVW MEDS BY RX/DR IN RCRD: CPT | Mod: CPTII,S$GLB,, | Performed by: INTERNAL MEDICINE

## 2024-09-25 PROCEDURE — 1159F MED LIST DOCD IN RCRD: CPT | Mod: CPTII,S$GLB,, | Performed by: INTERNAL MEDICINE

## 2024-09-25 PROCEDURE — 3074F SYST BP LT 130 MM HG: CPT | Mod: CPTII,S$GLB,, | Performed by: INTERNAL MEDICINE

## 2024-09-25 PROCEDURE — 99999 PR PBB SHADOW E&M-EST. PATIENT-LVL V: CPT | Mod: PBBFAC,,, | Performed by: INTERNAL MEDICINE

## 2024-09-25 PROCEDURE — 3072F LOW RISK FOR RETINOPATHY: CPT | Mod: CPTII,S$GLB,, | Performed by: INTERNAL MEDICINE

## 2024-09-25 PROCEDURE — 3288F FALL RISK ASSESSMENT DOCD: CPT | Mod: CPTII,S$GLB,, | Performed by: INTERNAL MEDICINE

## 2024-09-25 PROCEDURE — 3008F BODY MASS INDEX DOCD: CPT | Mod: CPTII,S$GLB,, | Performed by: INTERNAL MEDICINE

## 2024-09-25 PROCEDURE — 99214 OFFICE O/P EST MOD 30 MIN: CPT | Mod: S$GLB,,, | Performed by: INTERNAL MEDICINE

## 2024-10-10 DIAGNOSIS — F41.9 ANXIETY: ICD-10-CM

## 2024-10-10 NOTE — TELEPHONE ENCOUNTER
No care due was identified.  [facility] Embedded Care Due Messages. Reference number: [id number].   10/10/2024 5:29:18 PM CDT

## 2024-10-11 RX ORDER — BUSPIRONE HYDROCHLORIDE 30 MG/1
30 TABLET ORAL 2 TIMES DAILY
Qty: 180 TABLET | Refills: 3 | Status: SHIPPED | OUTPATIENT
Start: 2024-10-11

## 2024-10-14 DIAGNOSIS — F41.9 ANXIETY: ICD-10-CM

## 2024-10-14 DIAGNOSIS — F13.20 BENZODIAZEPINE DEPENDENCE: ICD-10-CM

## 2024-10-14 RX ORDER — ALPRAZOLAM 1 MG/1
TABLET ORAL
Qty: 45 TABLET | Refills: 2 | Status: SHIPPED | OUTPATIENT
Start: 2024-10-14

## 2024-10-14 NOTE — TELEPHONE ENCOUNTER
No care due was identified.  [facility] Embedded Care Due Messages. Reference number: [id number].   10/14/2024 8:10:18 AM CDT

## 2024-10-21 ENCOUNTER — OFFICE VISIT (OUTPATIENT)
Dept: CARDIOLOGY | Facility: CLINIC | Age: 69
End: 2024-10-21
Payer: MEDICARE

## 2024-10-21 VITALS
OXYGEN SATURATION: 94 % | HEART RATE: 72 BPM | WEIGHT: 209.31 LBS | BODY MASS INDEX: 29.3 KG/M2 | RESPIRATION RATE: 18 BRPM | DIASTOLIC BLOOD PRESSURE: 58 MMHG | SYSTOLIC BLOOD PRESSURE: 140 MMHG | HEIGHT: 71 IN

## 2024-10-21 DIAGNOSIS — I10 ESSENTIAL HYPERTENSION: Primary | ICD-10-CM

## 2024-10-21 DIAGNOSIS — I10 HYPERTENSION, UNSPECIFIED TYPE: ICD-10-CM

## 2024-10-21 DIAGNOSIS — I70.0 AORTIC ATHEROSCLEROSIS: ICD-10-CM

## 2024-10-21 DIAGNOSIS — R06.09 DOE (DYSPNEA ON EXERTION): ICD-10-CM

## 2024-10-21 PROCEDURE — 1125F AMNT PAIN NOTED PAIN PRSNT: CPT | Mod: CPTII,S$GLB,, | Performed by: INTERNAL MEDICINE

## 2024-10-21 PROCEDURE — 3288F FALL RISK ASSESSMENT DOCD: CPT | Mod: CPTII,S$GLB,, | Performed by: INTERNAL MEDICINE

## 2024-10-21 PROCEDURE — 1100F PTFALLS ASSESS-DOCD GE2>/YR: CPT | Mod: CPTII,S$GLB,, | Performed by: INTERNAL MEDICINE

## 2024-10-21 PROCEDURE — 3066F NEPHROPATHY DOC TX: CPT | Mod: CPTII,S$GLB,, | Performed by: INTERNAL MEDICINE

## 2024-10-21 PROCEDURE — 3060F POS MICROALBUMINURIA REV: CPT | Mod: CPTII,S$GLB,, | Performed by: INTERNAL MEDICINE

## 2024-10-21 PROCEDURE — 3044F HG A1C LEVEL LT 7.0%: CPT | Mod: CPTII,S$GLB,, | Performed by: INTERNAL MEDICINE

## 2024-10-21 PROCEDURE — 3008F BODY MASS INDEX DOCD: CPT | Mod: CPTII,S$GLB,, | Performed by: INTERNAL MEDICINE

## 2024-10-21 PROCEDURE — 99214 OFFICE O/P EST MOD 30 MIN: CPT | Mod: S$GLB,,, | Performed by: INTERNAL MEDICINE

## 2024-10-21 PROCEDURE — 99999 PR PBB SHADOW E&M-EST. PATIENT-LVL V: CPT | Mod: PBBFAC,,, | Performed by: INTERNAL MEDICINE

## 2024-10-21 PROCEDURE — 4010F ACE/ARB THERAPY RXD/TAKEN: CPT | Mod: CPTII,S$GLB,, | Performed by: INTERNAL MEDICINE

## 2024-10-21 PROCEDURE — 1159F MED LIST DOCD IN RCRD: CPT | Mod: CPTII,S$GLB,, | Performed by: INTERNAL MEDICINE

## 2024-10-21 PROCEDURE — 3077F SYST BP >= 140 MM HG: CPT | Mod: CPTII,S$GLB,, | Performed by: INTERNAL MEDICINE

## 2024-10-21 PROCEDURE — G2211 COMPLEX E/M VISIT ADD ON: HCPCS | Mod: S$GLB,,, | Performed by: INTERNAL MEDICINE

## 2024-10-21 PROCEDURE — 3072F LOW RISK FOR RETINOPATHY: CPT | Mod: CPTII,S$GLB,, | Performed by: INTERNAL MEDICINE

## 2024-10-21 PROCEDURE — 3078F DIAST BP <80 MM HG: CPT | Mod: CPTII,S$GLB,, | Performed by: INTERNAL MEDICINE

## 2024-10-21 RX ORDER — HYDRALAZINE HYDROCHLORIDE 50 MG/1
100 TABLET, FILM COATED ORAL 3 TIMES DAILY
Qty: 180 TABLET | Refills: 11 | Status: SHIPPED | OUTPATIENT
Start: 2024-10-21 | End: 2024-10-22

## 2024-10-21 NOTE — PROGRESS NOTES
CARDIOVASCULAR CONSULTATION    REASON FOR CONSULT:   Butch Mcdaniel is a 69 y.o. male who presents for evaluation.      HISTORY OF PRESENT ILLNESS:     Patient is a pleasant 66-year-old man.  Here for evaluation.  States blood pressure fluctuates a lot at home.  Fluctuates between 110-200 mmHg.  States that he has clonidine at home and whenever his blood pressure is elevated he takes an extra dose of clonidine.  Denies any chest pains at rest on exertion.  Denies orthopnea, PND, swelling of feet.      Notes from February 2022:  Patient here for follow-up.  Denies any chest pains at rest on exertion, orthopnea, PND.  Blood pressure staying at home.  Around 160-170 mmHg.      April 2022:  Patient very concerned about his fluctuating blood pressure.  Today morning his blood pressure was 198/111 mmHg.  Then just prior to coming to clinic it was 135/87 mmHg.  Fluctuates throughout the day.  No anginal chest pains.    The left ventricle is normal in size with moderate concentric hypertrophy and normal systolic function.  The estimated ejection fraction is 60%.  Normal right ventricular size with normal right ventricular systolic function.  The estimated PA systolic pressure is 33 mmHg.     Heart rates varied between 43 and 109 BPM with an average of 62 BPM.  Rare PVCs and PACs. Five atrial pairs. One three beat atrial run.      Notes from December 2022: Patient here for follow-up.  Denies any chest pains at rest on exertion, orthopnea, PND.  However has been experiencing significant dyspnea on exertion lately.  Unfortunately has started smoking again.    Notes from March 23:  Patient here for follow-up.  Stress test did not show any significant ischemia.  Trying to quit smoking.        Normal myocardial perfusion scan. There is no evidence of myocardial ischemia or infarction.    There is mild to moderate apical thinning which is a normal variant.    The gated perfusion images showed an ejection fraction of  56% post stress.    There is normal wall motion post stress.    The ECG portion of the study is negative for ischemia.    The patient reported no chest pain during the stress test.    There were no arrhythmias during stress.      Notes from September 23:  Patient here for follow-up.  Echo showed normal left ventricle systolic function with grade 1 diastolic dysfunction.  Patient denies any chest pains    Notes from August 24: Patient here for follow-up.  Denies chest pains at rest on exertion, orthopnea, PND.  Blood pressure running high at home also.  Today blood pressure elevated.  States did not take his hydralazine today.    Notes from October 24: Patient here for follow-up.  Blood pressure staying elevated at home also.  Frequently has to take hydralazine 100 mg.  Denies chest pains at rest on exertion, orthopnea or PND      PAST MEDICAL HISTORY:     Past Medical History:   Diagnosis Date    Cataract, bilateral 09/11/2018    Degenerative disc disease     Diabetes mellitus type II, controlled     Eye injury as a child    stick hit eye ? 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 both eyes 10/08/2019    Personal history of colonic polyps     Psychiatric problem     Therapy     [facility] many years ago       PAST SURGICAL HISTORY:     Past Surgical History:   Procedure Laterality Date    APPENDECTOMY      COLONOSCOPY N/A 5/10/2017    Procedure: COLONOSCOPY;  Surgeon: Shantanu Marley MD;  Location: [facility] ENDO;  Service: Endoscopy;  Laterality: N/A;    COLONOSCOPY N/A 8/4/2023    Procedure: COLONOSCOPY;  Surgeon: Gael Rand MD;  Location: [facility] ENDO;  Service: Endoscopy;  Laterality: N/A;  Referred by: Dr. Gabriel Evans  Prep: golytely  Route instructions sent: [facility] and reviewed via phone, pt verbalized understanding-KPvt    POSTERIOR FUSION OF CERVICAL  SPINE WITH LAMINECTOMY N/A 10/3/2021    Procedure: LAMINECTOMY, SPINE, CERVICAL, WITH POSTERIOR FUSION C2-T2;  Surgeon: Ana Rosa Geller MD;  Location: [facility] OR [address] [place] [address];  Service: Neurosurgery;  Laterality: N/A;       ALLERGIES AND MEDICATION:   Review of patient's allergies indicates:  No Known Allergies     Medication List            Accurate as of October 21, 2024 10:18 AM. If you have any questions, ask your nurse or doctor.                CHANGE how you take these medications      hydrALAZINE 50 MG tablet  Commonly known as: APRESOLINE  Take 2 tablets (100 mg total) by mouth 3 (three) times daily.  What changed: how much to take  Changed by: Adriana Pete MD            CONTINUE taking these medications      albuterol 90 mcg/actuation inhaler  Commonly known as: PROVENTIL/VENTOLIN HFA  INHALE TWO PUFFS BY MOUTH EVERY 6 HOURS AS NEEDED FOR WHEEZING OR SHORTNESS OF BREATH     ALPRAZolam 1 MG tablet  Commonly known as: XANAX  TAKE 1 AND ONE-HALF TABLETS BY MOUTH ONCE A DAY     amLODIPine 10 MG tablet  Commonly known as: NORVASC  TAKE ONE TABLET BY MOUTH EVERY EVENING     atorvastatin 40 MG tablet  Commonly known as: LIPITOR  Take 1 tablet (40 mg total) by mouth every evening.     BD LUER-STARLA SYRINGE 1 mL Syrg  Generic drug: syringe (disposable)  Testosterone injection every 2 weeks     busPIRone 30 MG Tab  Commonly known as: BUSPAR  TAKE ONE TABLET BY MOUTH TWICE DAILY     carvediloL 6.25 MG tablet  Commonly known as: COREG  TAKE ONE TABLET BY MOUTH TWICE DAILY     FLUoxetine 40 MG capsule  TAKE ONE CAPSULE BY MOUTH ONCE A DAY     fluticasone-salmeterol 100-50 mcg/dose 100-50 mcg/dose diskus inhaler  Commonly known as: ADVAIR     gabapentin 600 MG tablet  Commonly known as: NEURONTIN     ibuprofen 600 MG tablet  Commonly known as: ADVIL,MOTRIN     lisinopriL 20 MG tablet  Commonly known as: PRINIVIL,ZESTRIL  TAKE ONE TABLET BY MOUTH ONCE A DAY     metFORMIN 500 MG tablet  Commonly known as: GLUCOPHAGE  TAKE ONE TABLET  "BY MOUTH TWICE DAILY WITH MEALS     MIRALAX ORAL     naloxone 4 mg/actuation Spry  Commonly known as: NARCAN  4mg by nasal route as needed for opioid overdose; may repeat every 2-3 minutes in alternating nostrils until medical help arrives. Call 911     needle (disp) 22 G 22 gauge x 1" Ndle  Testosterone injection every 2 weeks     oxyCODONE-acetaminophen  mg per tablet  Commonly known as: PERCOCET     senna 8.6 mg tablet  Commonly known as: SENOKOT  Take 1 tablet by mouth 2 (two) times a day.     testosterone cypionate 200 mg/mL injection  Commonly known as: DEPOTESTOTERONE CYPIONATE  INJECT ONE MILLILITER INTRAMUSCULARLY EVERY 14 DAYS     tiZANidine 4 MG tablet  Commonly known as: ZANAFLEX               Where to Get Your Medications        These medications were sent to [facility]  [address]  [address] [address]      Phone: [phone]   hydrALAZINE 50 MG tablet         SOCIAL HISTORY:     Social History     Socioeconomic History    Marital status:     Number of children: 3   Occupational History    Occupation: retired - tug boat captain   Tobacco Use    Smoking status: Some Days     Current packs/day: 0.50     Average packs/day: 1 pack/day for 52.6 years (51.9 ttl pk-yrs)     Types: Cigarettes, Vaping with nicotine     Start date: 1972     Last attempt to quit: 6/20/2023    Smokeless tobacco: Never    Tobacco comments:     Approximately 3 cigarettes a day   Substance and Sexual Activity    Alcohol use: No     Alcohol/week: 0.0 standard drinks of alcohol    Drug use: Yes     Types: Marijuana, Oxycodone, Benzodiazepines     Comment: 4 oxycodones daily; one marijuana cigarette daily    Sexual activity: Yes     Partners: Female     Comment:  with children, disabled tug boat captain     Social Drivers of Health     Financial Resource Strain: Low Risk  (5/10/2023)    Overall Financial Resource Strain (CARDIA)     Difficulty of Paying Living Expenses: Not hard at all "   Food Insecurity: No Food Insecurity (5/10/2023)    Hunger Vital Sign     Worried About Running Out of Food in the Last Year: Never true     Ran Out of Food in the Last Year: Never true   Transportation Needs: No Transportation Needs (5/10/2023)    PRAPARE - Transportation     Lack of Transportation (Medical): No     Lack of Transportation (Non-Medical): No   Physical Activity: Sufficiently Active (5/10/2023)    Exercise Vital Sign     Days of Exercise per Week: 4 days     Minutes of Exercise per Session: 50 min   Stress: Stress Concern Present (4/3/2019)    [facility] - Occupational Stress Questionnaire     Feeling of Stress : Very much   Housing Stability: Unknown (5/10/2023)    Housing Stability Vital Sign     Unable to Pay for Housing in the Last Year: No     Unstable Housing in the Last Year: No       FAMILY HISTORY:     Family History   Problem Relation Name Age of Onset    Heart disease Mother      Heart disease Father      Alcohol abuse Father      No Known Problems Sister      No Known Problems Brother      Diabetes Son      No Known Problems Maternal Aunt      No Known Problems Maternal Uncle      No Known Problems Paternal Aunt      No Known Problems Paternal Uncle      No Known Problems Maternal Grandmother      No Known Problems Maternal Grandfather      No Known Problems Paternal Grandmother      No Known Problems Paternal Grandfather      Amblyopia Neg Hx      Blindness Neg Hx      Cancer Neg Hx      Cataracts Neg Hx      Glaucoma Neg Hx      Hypertension Neg Hx      Macular degeneration Neg Hx      Retinal detachment Neg Hx      Strabismus Neg Hx      Stroke Neg Hx      Thyroid disease Neg Hx      Anxiety disorder Neg Hx      Dementia Neg Hx      Depression Neg Hx         REVIEW OF SYSTEMS:   Review of Systems   Constitutional: Negative.   HENT: Negative.     Eyes: Negative.    Cardiovascular:  Positive for dyspnea on exertion.   Respiratory: Negative.     Endocrine:  "Negative.    Hematologic/Lymphatic: Negative.    Skin: Negative.    Musculoskeletal: Negative.    Gastrointestinal: Negative.    Genitourinary: Negative.    Neurological: Negative.    Psychiatric/Behavioral: Negative.     Allergic/Immunologic: Negative.        A 10 point review of systems was performed and all the pertinent positives have been mentioned. Rest of review of systems was negative.        PHYSICAL EXAM:     Vitals:    10/21/24 0935   BP: (!) 140/58   Pulse: 72   Resp: 18    Body mass index is 29.2 kg/m².  Weight: 95 kg (209 lb 5.2 oz)   Height: 5' 11" (180.3 cm)     Physical Exam  Vitals reviewed.   Constitutional:       Appearance: He is well-developed.   HENT:      Head: Normocephalic.   Eyes:      Conjunctiva/sclera: Conjunctivae normal.      Pupils: Pupils are equal, round, and reactive to light.   Cardiovascular:      Rate and Rhythm: Normal rate and regular rhythm.      Heart sounds: Normal heart sounds.   Pulmonary:      Effort: Pulmonary effort is normal.      Breath sounds: Wheezing present.   Abdominal:      General: Bowel sounds are normal.      Palpations: Abdomen is soft.   Musculoskeletal:      Cervical back: Normal range of motion and neck supple.   Skin:     General: Skin is warm.   Neurological:      Mental Status: He is alert and oriented to person, place, and time.           DATA:     Laboratory:  CBC:  Recent Labs   Lab 09/19/23  0921 03/18/24  0840 09/18/24  0750   WBC 7.78 9.56 9.93   Hemoglobin 14.3 16.0 15.3   Hematocrit 42.4 48.3 45.4   Platelets SEE COMMENT 68 L SEE COMMENT       CHEMISTRIES:  Recent Labs   Lab 07/19/22  0518 07/20/22  0126 07/21/22  0531 08/24/22  0905 09/19/23  0921 03/18/24  0840 09/18/24  0750   Glucose 141 H 114 H  113 H 146 H   < > 105 131 H 124 H   Sodium 139 141  142 143   < > 139 136 139   Potassium 5.2 H 4.1  4.1 3.9   < > 4.3 5.2 H 4.7   BUN 32 H 28 H  29 H 24 H   < > 17 16 20   Creatinine 1.9 H 1.4  1.4 1.3   < > 1.0 1.4 1.3   eGFR if  " American 41 A 60  60 >60  --   --   --   --    eGFR if non  36 A 52 A  52 A 56 A  --   --   --   --    Calcium 8.8 9.2  9.2 9.2   < > 9.3 10.1 9.0   Magnesium 1.9 1.7  1.8 1.8  --   --   --   --     < > = values in this interval not displayed.       CARDIAC BIOMARKERS:  Recent Labs   Lab 07/18/22  0252 07/18/22  1007 07/20/22  0126   CPK  --  129  --    Troponin I 0.019  --  0.119 H       COAGS:          LIPIDS/LFTS:  Recent Labs   Lab 09/19/23  0921 03/18/24  0840 09/18/24  0750   Cholesterol 87 L 99 L 74 L   Triglycerides 97 161 H 80   HDL 29 L 28 L 29 L   LDL Cholesterol 38.6 L 38.8 L 29.0 L   Non-HDL Cholesterol 58 71 45   AST 15 23 19   ALT 12 20 15       Hemoglobin A1C   Date Value Ref Range Status   09/18/2024 6.0 (H) 4.0 - 5.6 % Final     Comment:     ADA Screening Guidelines:  5.7-6.4%  Consistent with prediabetes  >or=6.5%  Consistent with diabetes    High levels of fetal hemoglobin interfere with the HbA1C  assay. Heterozygous hemoglobin variants (HbS, HgC, etc)do  not significantly interfere with this assay.   However, presence of multiple variants may affect accuracy.     03/18/2024 6.0 (H) 4.0 - 5.6 % Final     Comment:     ADA Screening Guidelines:  5.7-6.4%  Consistent with prediabetes  >or=6.5%  Consistent with diabetes    High levels of fetal hemoglobin interfere with the HbA1C  assay. Heterozygous hemoglobin variants (HbS, HgC, etc)do  not significantly interfere with this assay.   However, presence of multiple variants may affect accuracy.     09/19/2023 6.1 (H) 4.0 - 5.6 % Final     Comment:     ADA Screening Guidelines:  5.7-6.4%  Consistent with prediabetes  >or=6.5%  Consistent with diabetes    High levels of fetal hemoglobin interfere with the HbA1C  assay. Heterozygous hemoglobin variants (HbS, HgC, etc)do  not significantly interfere with this assay.   However, presence of multiple variants may affect accuracy.         TSH  Recent Labs   Lab 07/18/22  0252   TSH 5.291 H        The ASCVD Risk score (Dale JOHNSON, et al., 2019) failed to calculate for the following reasons:    The valid total cholesterol range is 130 to 320 mg/dL             ASSESSMENT AND PLAN     Patient Active Problem List   Diagnosis    Essential hypertension    Anemia    Anxiety, unable to wean off BZD    Recurrent major depressive disorder, in partial remission    DDD (degenerative disc disease), cervical    DDD (degenerative disc disease), lumbar    ED (erectile dysfunction)    Facet arthritis of cervical region    Facet arthritis of lumbar region    Benzodiazepine dependence, did not do well with attempt to wean down 2017    Chronic, continuous use of opioids    Type 2 diabetes mellitus without complication, without long-term current use of insulin    Tobacco dependence, patch with irritation; hx of bupropion    Therapeutic opioid-induced constipation (OIC)    Tubular adenoma of colon 5/10/17; 08/04/2023 colonoscopy 10 mm cecal TA.  10 mm descending polypoid mucosa.  Repeat 3 years    Cervical stenosis of spinal canal 10/3/2021 LAMINECTOMY, SPINE, CERVICAL, WITH POSTERIOR FUSION C2-T2    Cataract, bilateral    Open angle with borderline findings and high glaucoma risk in both eyes    Nuclear sclerosis of both eyes    Elevated prolactin level,low testosterone, gynecomastia; MRI pituitary normal 5/2020    Low testosterone in male    Paresthesia of left upper and lower extremity    History of smoking 10-25 pack years    Status post surgery    Impaired mobility and ADLs    Chronic low back pain    Sacroiliac joint pain    Sacroiliitis    Impaired mobility    Arthrodesis status    Foraminal stenosis of lumbar region    Junctional bradycardia    Chronic prescription benzodiazepine use    History of atrial fibrillation 7/2022 hospitalization i n the setting of SHAUNA and hyperK. started on carvedilol during admission    Type 2 diabetes mellitus with diabetic cataract, without long-term current use of insulin    Unspecified  inflammatory spondylopathy, cervical region    Aortic atherosclerosis    Bradycardia       Dyspnea on exertion.  Further evaluation with the help of a stress test.  Stress test did not show any significant ischemia.  Likely related to his pulmonary issues and smoking.  Smoking cessation has been highly recommended     Smoking cessation     Hypertension:  Uncontrolled.   Increase hydralazine to 100 mg t.i.d.    Lab Results   Component Value Date    LDLCALC 29.0 (L) 09/18/2024     Aortic atherosclerosis: On statins    Dyslipidemia on statin    Continue other therapy    Follow-up in 3 months.    Thank you very much for involving me in the care of your patient.  Please do not hesitate to contact me if there are any questions.      Adriana Pete MD, FACC, [facility]  Interventional Cardiologist, [facility].     Visit today included increased complexity associated with the care of the episodic problem hypertension, dyslipidemia, aortic atherosclerosis addressed and managing the longitudinal care of the patient due to the serious and/or complex managed problem(s)   Patient Active Problem List   Diagnosis    Essential hypertension    Anemia    Anxiety, unable to wean off BZD    Recurrent major depressive disorder, in partial remission    DDD (degenerative disc disease), cervical    DDD (degenerative disc disease), lumbar    ED (erectile dysfunction)    Facet arthritis of cervical region    Facet arthritis of lumbar region    Benzodiazepine dependence, did not do well with attempt to wean down 2017    Chronic, continuous use of opioids    Type 2 diabetes mellitus without complication, without long-term current use of insulin    Tobacco dependence, patch with irritation; hx of bupropion    Therapeutic opioid-induced constipation (OIC)    Tubular adenoma of colon 5/10/17; 08/04/2023 colonoscopy 10 mm cecal TA.  10 mm descending polypoid mucosa.  Repeat 3 years    Cervical stenosis of spinal canal 10/3/2021 LAMINECTOMY,  SPINE, CERVICAL, WITH POSTERIOR FUSION C2-T2    Cataract, bilateral    Open angle with borderline findings and high glaucoma risk in both eyes    Nuclear sclerosis of both eyes    Elevated prolactin level,low testosterone, gynecomastia; MRI pituitary normal 5/2020    Low testosterone in male    Paresthesia of left upper and lower extremity    History of smoking 10-25 pack years    Status post surgery    Impaired mobility and ADLs    Chronic low back pain    Sacroiliac joint pain    Sacroiliitis    Impaired mobility    Arthrodesis status    Foraminal stenosis of lumbar region    Junctional bradycardia    Chronic prescription benzodiazepine use    History of atrial fibrillation 7/2022 hospitalization i n the setting of SHAUNA and hyperK. started on carvedilol during admission    Type 2 diabetes mellitus with diabetic cataract, without long-term current use of insulin    Unspecified inflammatory spondylopathy, cervical region    Aortic atherosclerosis    Bradycardia     .        This note was dictated with the help of speech recognition software.  There might be un-intended errors and/or substitutions.

## 2024-10-21 NOTE — TELEPHONE ENCOUNTER
----- Message from Jake sent at 10/21/2024 10:26 AM CDT -----  Regarding: pharmacy  Type: Patient Call Back    Who called: pharmacy    What is the request in detail:calling to verify the script hydrALAZINE (APRESOLINE) 50 MG tablet and asking could they change 100 mg 1 tablet 3 times a day. Saying that its a lot of medication     Can the clinic reply by [facility]?no    Would the patient rather a call back or a response via [facility]? callback    Best call back number:[phone]    Additional Information:  [facility]   [address] [facility] [place] [address]  Phone: [phone] Fax: [phone]

## 2024-10-22 RX ORDER — HYDRALAZINE HYDROCHLORIDE 100 MG/1
TABLET, FILM COATED ORAL
Qty: 270 TABLET | Refills: 11 | Status: SHIPPED | OUTPATIENT
Start: 2024-10-22

## 2024-11-07 ENCOUNTER — TELEPHONE (OUTPATIENT)
Dept: CARDIOLOGY | Facility: CLINIC | Age: 69
End: 2024-11-07
Payer: MEDICARE

## 2024-11-07 NOTE — TELEPHONE ENCOUNTER
I spoke with patient on today in regards to his high bp message was forwarded to dr. Pete. Patient stated that blood pressure has 165/81, been taking 2 100 mg every 5 hours of his medication, it goes down after 2 hrs then jump back up to 180/90.

## 2024-11-07 NOTE — TELEPHONE ENCOUNTER
----- Message from Jake sent at 11/7/2024  3:36 PM CST -----  Regarding: self  Type: Patient Call Back    Who called:self    What is the request in detail:blood pressure has 165/81, been taking 2 100 mg every 5 hours of his medication, it goes down after 2 hrs then jump back up to 180/90     Can the clinic reply by [facility]?no    Would the patient rather a call back or a response via [facility]? callback    Best call back number:[phone]    Additional Information:

## 2024-11-08 ENCOUNTER — OFFICE VISIT (OUTPATIENT)
Dept: CARDIOLOGY | Facility: CLINIC | Age: 69
End: 2024-11-08
Payer: MEDICARE

## 2024-11-08 VITALS
OXYGEN SATURATION: 95 % | SYSTOLIC BLOOD PRESSURE: 130 MMHG | RESPIRATION RATE: 15 BRPM | WEIGHT: 213.06 LBS | HEIGHT: 71 IN | DIASTOLIC BLOOD PRESSURE: 66 MMHG | HEART RATE: 77 BPM | BODY MASS INDEX: 29.83 KG/M2

## 2024-11-08 DIAGNOSIS — I10 ESSENTIAL HYPERTENSION: Primary | ICD-10-CM

## 2024-11-08 DIAGNOSIS — R06.02 SOB (SHORTNESS OF BREATH): ICD-10-CM

## 2024-11-08 DIAGNOSIS — I70.0 AORTIC ATHEROSCLEROSIS: ICD-10-CM

## 2024-11-08 DIAGNOSIS — R06.09 DOE (DYSPNEA ON EXERTION): ICD-10-CM

## 2024-11-08 DIAGNOSIS — N52.9 ERECTILE DYSFUNCTION, UNSPECIFIED ERECTILE DYSFUNCTION TYPE: ICD-10-CM

## 2024-11-08 DIAGNOSIS — I10 ESSENTIAL HYPERTENSION: ICD-10-CM

## 2024-11-08 DIAGNOSIS — I10 HYPERTENSION, UNSPECIFIED TYPE: ICD-10-CM

## 2024-11-08 PROCEDURE — 99999 PR PBB SHADOW E&M-EST. PATIENT-LVL III: CPT | Mod: PBBFAC,,, | Performed by: INTERNAL MEDICINE

## 2024-11-08 RX ORDER — LISINOPRIL 20 MG/1
20 TABLET ORAL
Qty: 90 TABLET | Refills: 2 | OUTPATIENT
Start: 2024-11-08

## 2024-11-08 RX ORDER — LISINOPRIL 40 MG/1
40 TABLET ORAL DAILY
Qty: 90 TABLET | Refills: 3 | Status: SHIPPED | OUTPATIENT
Start: 2024-11-08

## 2024-11-08 RX ORDER — CARVEDILOL 12.5 MG/1
12.5 TABLET ORAL 2 TIMES DAILY
Qty: 180 TABLET | Refills: 3 | Status: SHIPPED | OUTPATIENT
Start: 2024-11-08

## 2024-11-08 NOTE — TELEPHONE ENCOUNTER
No care due was identified.  [facility] Embedded Care Due Messages. Reference number: [id number].   11/08/2024 12:42:12 PM CST

## 2024-11-08 NOTE — TELEPHONE ENCOUNTER
Refill Decision Note   Butch Mcdaniel  is requesting a refill authorization.    Brief Assessment and Rationale for Refill:   Quick Discontinue       Medication Therapy Plan:   discontinued on 11/8/2024 by Adriana Pete MD, dose increased to 40 mg      Comments:     Note composed:3:24 PM 11/08/2024

## 2024-11-08 NOTE — PROGRESS NOTES
CARDIOVASCULAR CONSULTATION    REASON FOR CONSULT:   Butch Mcdaniel is a 69 y.o. male who presents for evaluation.      HISTORY OF PRESENT ILLNESS:     Patient is a pleasant 66-year-old man.  Here for evaluation.  States blood pressure fluctuates a lot at home.  Fluctuates between 110-200 mmHg.  States that he has clonidine at home and whenever his blood pressure is elevated he takes an extra dose of clonidine.  Denies any chest pains at rest on exertion.  Denies orthopnea, PND, swelling of feet.      Notes from February 2022:  Patient here for follow-up.  Denies any chest pains at rest on exertion, orthopnea, PND.  Blood pressure staying at home.  Around 160-170 mmHg.      April 2022:  Patient very concerned about his fluctuating blood pressure.  Today morning his blood pressure was 198/111 mmHg.  Then just prior to coming to clinic it was 135/87 mmHg.  Fluctuates throughout the day.  No anginal chest pains.    The left ventricle is normal in size with moderate concentric hypertrophy and normal systolic function.  The estimated ejection fraction is 60%.  Normal right ventricular size with normal right ventricular systolic function.  The estimated PA systolic pressure is 33 mmHg.     Heart rates varied between 43 and 109 BPM with an average of 62 BPM.  Rare PVCs and PACs. Five atrial pairs. One three beat atrial run.      Notes from December 2022: Patient here for follow-up.  Denies any chest pains at rest on exertion, orthopnea, PND.  However has been experiencing significant dyspnea on exertion lately.  Unfortunately has started smoking again.    Notes from March 23:  Patient here for follow-up.  Stress test did not show any significant ischemia.  Trying to quit smoking.        Normal myocardial perfusion scan. There is no evidence of myocardial ischemia or infarction.    There is mild to moderate apical thinning which is a normal variant.    The gated perfusion images showed an ejection fraction of  56% post stress.    There is normal wall motion post stress.    The ECG portion of the study is negative for ischemia.    The patient reported no chest pain during the stress test.    There were no arrhythmias during stress.      Notes from September 23:  Patient here for follow-up.  Echo showed normal left ventricle systolic function with grade 1 diastolic dysfunction.  Patient denies any chest pains    Notes from August 24: Patient here for follow-up.  Denies chest pains at rest on exertion, orthopnea, PND.  Blood pressure running high at home also.  Today blood pressure elevated.  States did not take his hydralazine today.    Notes from October 24: Patient here for follow-up.  Blood pressure staying elevated at home also.  Frequently has to take hydralazine 100 mg.  Denies chest pains at rest on exertion, orthopnea or PND    11/08/2024  Blood pressure has been running higher at home.  Denies chest pains at rest on exertion orthopnea or PND.  Has been taking 200 mg hydralazine multiple times a day        PAST MEDICAL HISTORY:     Past Medical History:   Diagnosis Date    Cataract, bilateral 09/11/2018    Degenerative disc disease     Diabetes mellitus type II, controlled     Eye injury as a child    stick hit eye ? 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 both eyes 10/08/2019    Personal history of colonic polyps     Psychiatric problem     Therapy     [facility] many years ago       PAST SURGICAL HISTORY:     Past Surgical History:   Procedure Laterality Date    APPENDECTOMY      COLONOSCOPY N/A 5/10/2017    Procedure: COLONOSCOPY;  Surgeon: Shantanu Marley MD;  Location: [facility] ENDO;  Service: Endoscopy;  Laterality: N/A;    COLONOSCOPY N/A 8/4/2023    Procedure: COLONOSCOPY;  Surgeon: Gael Rand MD;  Location: [facility] ENDO;  Service: Endoscopy;   Laterality: N/A;  Referred by: Dr. Gabriel Evans  Prep: [email or web address]  Route instructions sent: [email or web address] and reviewed via phone, pt verbalized [id number]    POSTERIOR FUSION OF CERVICAL SPINE WITH LAMINECTOMY N/A 10/3/2021    Procedure: LAMINECTOMY, SPINE, CERVICAL, WITH POSTERIOR FUSION C2-T2;  Surgeon: Ana Rosa Geller MD;  Location: [facility] OR [address] [place] [address];  Service: Neurosurgery;  Laterality: N/A;       ALLERGIES AND MEDICATION:   Review of patient's allergies indicates:  No Known Allergies     Medication List            Accurate as of November 8, 2024  3:06 PM. If you have any questions, ask your nurse or doctor.                CHANGE how you take these medications      carvediloL 12.5 MG tablet  Commonly known as: COREG  Take 1 tablet (12.5 mg total) by mouth 2 (two) times daily.  What changed:   medication strength  how much to take  Changed by: Adriana Pete MD     lisinopriL 40 MG tablet  Commonly known as: PRINIVIL,ZESTRIL  Take 1 tablet (40 mg total) by mouth once daily.  What changed:   medication strength  how much to take  when to take this  Changed by: Adriana Pete MD            CONTINUE taking these medications      albuterol 90 mcg/actuation inhaler  Commonly known as: PROVENTIL/VENTOLIN HFA  INHALE TWO PUFFS BY MOUTH EVERY 6 HOURS AS NEEDED FOR WHEEZING OR SHORTNESS OF BREATH     ALPRAZolam 1 MG tablet  Commonly known as: XANAX  TAKE 1 AND ONE-HALF TABLETS BY MOUTH ONCE A DAY     amLODIPine 10 MG tablet  Commonly known as: NORVASC  TAKE ONE TABLET BY MOUTH EVERY EVENING     atorvastatin 40 MG tablet  Commonly known as: LIPITOR  Take 1 tablet (40 mg total) by mouth every evening.     BD LUER-STARLA SYRINGE 1 mL Syrg  Generic drug: syringe (disposable)  Testosterone injection every 2 weeks     busPIRone 30 MG Tab  Commonly known as: BUSPAR  TAKE ONE TABLET BY MOUTH TWICE DAILY     FLUoxetine 40 MG capsule  TAKE ONE CAPSULE BY MOUTH ONCE A DAY     fluticasone-salmeterol 100-50 mcg/dose 100-50 mcg/dose diskus  "inhaler  Commonly known as: ADVAIR     gabapentin 600 MG tablet  Commonly known as: NEURONTIN     hydrALAZINE 100 MG tablet  Commonly known as: APRESOLINE  TAKE ONE tablet (100mg) BY MOUTH THREE TIMES DAILY     ibuprofen 600 MG tablet  Commonly known as: ADVIL,MOTRIN     metFORMIN 500 MG tablet  Commonly known as: GLUCOPHAGE  TAKE ONE TABLET BY MOUTH TWICE DAILY WITH MEALS     MIRALAX ORAL     naloxone 4 mg/actuation Spry  Commonly known as: NARCAN  4mg by nasal route as needed for opioid overdose; may repeat every 2-3 minutes in alternating nostrils until medical help arrives. Call 911     needle (disp) 22 G 22 gauge x 1" Ndle  Testosterone injection every 2 weeks     oxyCODONE-acetaminophen  mg per tablet  Commonly known as: PERCOCET     senna 8.6 mg tablet  Commonly known as: SENOKOT  Take 1 tablet by mouth 2 (two) times a day.     testosterone cypionate 200 mg/mL injection  Commonly known as: DEPOTESTOTERONE CYPIONATE  INJECT ONE MILLILITER INTRAMUSCULARLY EVERY 14 DAYS     tiZANidine 4 MG tablet  Commonly known as: ZANAFLEX               Where to Get Your Medications        These medications were sent to [facility] [place] [address]   [address] [facility] [place] [address]      Phone: [phone]   carvediloL 12.5 MG tablet  lisinopriL 40 MG tablet         SOCIAL HISTORY:     Social History     Socioeconomic History    Marital status:     Number of children: 3   Occupational History    Occupation: retired -    Tobacco Use    Smoking status: Some Days     Current packs/day: 0.50     Average packs/day: 1 pack/day for 52.7 years (51.9 ttl pk-yrs)     Types: Cigarettes, Vaping with nicotine     Start date: 1972     Last attempt to quit: 6/20/2023    Smokeless tobacco: Never    Tobacco comments:     Approximately 3 cigarettes a day   Substance and Sexual Activity    Alcohol use: No     Alcohol/week: 0.0 standard drinks of alcohol    Drug use: Yes     Types: Marijuana, Oxycodone, " Benzodiazepines     Comment: 4 oxycodones daily; one marijuana cigarette daily    Sexual activity: Yes     Partners: Female     Comment:  with children, disabled      Social Drivers of Health     Financial Resource Strain: Low Risk  (5/10/2023)    Overall Financial Resource Strain (CARDIA)     Difficulty of Paying Living Expenses: Not hard at all   Food Insecurity: No Food Insecurity (5/10/2023)    Hunger Vital Sign     Worried About Running Out of Food in the Last Year: Never true     Ran Out of Food in the Last Year: Never true   Transportation Needs: No Transportation Needs (5/10/2023)    PRAPARE - Transportation     Lack of Transportation (Medical): No     Lack of Transportation (Non-Medical): No   Physical Activity: Sufficiently Active (5/10/2023)    Exercise Vital Sign     Days of Exercise per Week: 4 days     Minutes of Exercise per Session: 50 min   Stress: Stress Concern Present (4/3/2019)    [employer] [place] [employer] - Occupational Stress Questionnaire     Feeling of Stress : Very much   Housing Stability: Unknown (5/10/2023)    Housing Stability Vital Sign     Unable to Pay for Housing in the Last Year: No     Unstable Housing in the Last Year: No       FAMILY HISTORY:     Family History   Problem Relation Name Age of Onset    Heart disease Mother      Heart disease Father      Alcohol abuse Father      No Known Problems Sister      No Known Problems Brother      Diabetes Son      No Known Problems Maternal Aunt      No Known Problems Maternal Uncle      No Known Problems Paternal Aunt      No Known Problems Paternal Uncle      No Known Problems Maternal Grandmother      No Known Problems Maternal Grandfather      No Known Problems Paternal Grandmother      No Known Problems Paternal Grandfather      Amblyopia Neg Hx      Blindness Neg Hx      Cancer Neg Hx      Cataracts Neg Hx      Glaucoma Neg Hx      Hypertension Neg Hx      Macular degeneration Neg Hx       "Retinal detachment Neg Hx      Strabismus Neg Hx      Stroke Neg Hx      Thyroid disease Neg Hx      Anxiety disorder Neg Hx      Dementia Neg Hx      Depression Neg Hx         REVIEW OF SYSTEMS:   Review of Systems   Constitutional: Negative.   HENT: Negative.     Eyes: Negative.    Cardiovascular:  Positive for dyspnea on exertion.   Respiratory: Negative.     Endocrine: Negative.    Hematologic/Lymphatic: Negative.    Skin: Negative.    Musculoskeletal: Negative.    Gastrointestinal: Negative.    Genitourinary: Negative.    Neurological: Negative.    Psychiatric/Behavioral: Negative.     Allergic/Immunologic: Negative.        A 10 point review of systems was performed and all the pertinent positives have been mentioned. Rest of review of systems was negative.        PHYSICAL EXAM:     Vitals:    11/08/24 1436   BP: 130/66   Pulse: 77   Resp: 15    Body mass index is 29.71 kg/m².  Weight: 96.6 kg (213 lb 0.8 oz)   Height: 5' 11" (180.3 cm)     Physical Exam  Vitals reviewed.   Constitutional:       Appearance: He is well-developed.   HENT:      Head: Normocephalic.   Eyes:      Conjunctiva/sclera: Conjunctivae normal.      Pupils: Pupils are equal, round, and reactive to light.   Cardiovascular:      Rate and Rhythm: Normal rate and regular rhythm.      Heart sounds: Normal heart sounds.   Pulmonary:      Effort: Pulmonary effort is normal.      Breath sounds: Wheezing present.   Abdominal:      General: Bowel sounds are normal.      Palpations: Abdomen is soft.   Musculoskeletal:      Cervical back: Normal range of motion and neck supple.   Skin:     General: Skin is warm.   Neurological:      Mental Status: He is alert and oriented to person, place, and time.           DATA:     Laboratory:  CBC:  Recent Labs   Lab 09/19/23  0921 03/18/24  0840 09/18/24  0750   WBC 7.78 9.56 9.93   Hemoglobin 14.3 16.0 15.3   Hematocrit 42.4 48.3 45.4   Platelets SEE COMMENT 68 L SEE COMMENT       CHEMISTRIES:  Recent Labs   Lab " 07/19/22  0518 07/20/22  0126 07/21/22  0531 08/24/22  0905 09/19/23  0921 03/18/24  0840 09/18/24  0750   Glucose 141 H 114 H  113 H 146 H   < > 105 131 H 124 H   Sodium 139 141  142 143   < > 139 136 139   Potassium 5.2 H 4.1  4.1 3.9   < > 4.3 5.2 H 4.7   BUN 32 H 28 H  29 H 24 H   < > 17 16 20   Creatinine 1.9 H 1.4  1.4 1.3   < > 1.0 1.4 1.3   eGFR if  41 A 60  60 >60  --   --   --   --    eGFR if non  36 A 52 A  52 A 56 A  --   --   --   --    Calcium 8.8 9.2  9.2 9.2   < > 9.3 10.1 9.0   Magnesium 1.9 1.7  1.8 1.8  --   --   --   --     < > = values in this interval not displayed.       CARDIAC BIOMARKERS:  Recent Labs   Lab 07/18/22  0252 07/18/22  1007 07/20/22  0126   CPK  --  129  --    Troponin I 0.019  --  0.119 H       COAGS:          LIPIDS/LFTS:  Recent Labs   Lab 09/19/23  0921 03/18/24  0840 09/18/24  0750   Cholesterol 87 L 99 L 74 L   Triglycerides 97 161 H 80   HDL 29 L 28 L 29 L   LDL Cholesterol 38.6 L 38.8 L 29.0 L   Non-HDL Cholesterol 58 71 45   AST 15 23 19   ALT 12 20 15       Hemoglobin A1C   Date Value Ref Range Status   09/18/2024 6.0 (H) 4.0 - 5.6 % Final     Comment:     ADA Screening Guidelines:  5.7-6.4%  Consistent with prediabetes  >or=6.5%  Consistent with diabetes    High levels of fetal hemoglobin interfere with the HbA1C  assay. Heterozygous hemoglobin variants (HbS, HgC, etc)do  not significantly interfere with this assay.   However, presence of multiple variants may affect accuracy.     03/18/2024 6.0 (H) 4.0 - 5.6 % Final     Comment:     ADA Screening Guidelines:  5.7-6.4%  Consistent with prediabetes  >or=6.5%  Consistent with diabetes    High levels of fetal hemoglobin interfere with the HbA1C  assay. Heterozygous hemoglobin variants (HbS, HgC, etc)do  not significantly interfere with this assay.   However, presence of multiple variants may affect accuracy.     09/19/2023 6.1 (H) 4.0 - 5.6 % Final     Comment:     ADA Screening  Guidelines:  5.7-6.4%  Consistent with prediabetes  >or=6.5%  Consistent with diabetes    High levels of fetal hemoglobin interfere with the HbA1C  assay. Heterozygous hemoglobin variants (HbS, HgC, etc)do  not significantly interfere with this assay.   However, presence of multiple variants may affect accuracy.         TSH  Recent Labs   Lab 07/18/22  0252   TSH 5.291 H       The ASCVD Risk score (Dale JOHNSON, et al., 2019) failed to calculate for the following reasons:    The valid total cholesterol range is 130 to 320 mg/dL             ASSESSMENT AND PLAN     Patient Active Problem List   Diagnosis    Essential hypertension    Anemia    Anxiety, unable to wean off BZD    Recurrent major depressive disorder, in partial remission    DDD (degenerative disc disease), cervical    DDD (degenerative disc disease), lumbar    ED (erectile dysfunction)    Facet arthritis of cervical region    Facet arthritis of lumbar region    Benzodiazepine dependence, did not do well with attempt to wean down 2017    Chronic, continuous use of opioids    Type 2 diabetes mellitus without complication, without long-term current use of insulin    Tobacco dependence, patch with irritation; hx of bupropion    Therapeutic opioid-induced constipation (OIC)    Tubular adenoma of colon 5/10/17; 08/04/2023 colonoscopy 10 mm cecal TA.  10 mm descending polypoid mucosa.  Repeat 3 years    Cervical stenosis of spinal canal 10/3/2021 LAMINECTOMY, SPINE, CERVICAL, WITH POSTERIOR FUSION C2-T2    Cataract, bilateral    Open angle with borderline findings and high glaucoma risk in both eyes    Nuclear sclerosis of both eyes    Elevated prolactin level,low testosterone, gynecomastia; MRI pituitary normal 5/2020    Low testosterone in male    Paresthesia of left upper and lower extremity    History of smoking 10-25 pack years    Status post surgery    Impaired mobility and ADLs    Chronic low back pain    Sacroiliac joint pain    Sacroiliitis    Impaired  mobility    Arthrodesis status    Foraminal stenosis of lumbar region    Junctional bradycardia    Chronic prescription benzodiazepine use    History of atrial fibrillation 7/2022 hospitalization i n the setting of SHAUNA and hyperK. started on carvedilol during admission    Type 2 diabetes mellitus with diabetic cataract, without long-term current use of insulin    Unspecified inflammatory spondylopathy, cervical region    Aortic atherosclerosis    Bradycardia       Dyspnea on exertion.  Further evaluation with the help of a stress test.  Stress test did not show any significant ischemia.  Likely related to his pulmonary issues and smoking.  Smoking cessation has been highly recommended     Smoking cessation     Hypertension:  Uncontrolled.  Continue hydralazine 100 mg 3 times daily.  Increase lisinopril to 40 mg daily and Coreg to 12.5 mg b.i.d.    Lab Results   Component Value Date    LDLCALC 29.0 (L) 09/18/2024     Aortic atherosclerosis: On statins    Dyslipidemia on statin    Continue other therapy    Follow-up in 1 months.    Thank you very much for involving me in the care of your patient.  Please do not hesitate to contact me if there are any questions.      Adriana Pete MD, FACC, [facility]  Interventional Cardiologist, [facility].     Visit today included increased complexity associated with the care of the episodic problem hypertension, dyslipidemia, aortic atherosclerosis addressed and managing the longitudinal care of the patient due to the serious and/or complex managed problem(s)   Patient Active Problem List   Diagnosis    Essential hypertension    Anemia    Anxiety, unable to wean off BZD    Recurrent major depressive disorder, in partial remission    DDD (degenerative disc disease), cervical    DDD (degenerative disc disease), lumbar    ED (erectile dysfunction)    Facet arthritis of cervical region    Facet arthritis of lumbar region    Benzodiazepine dependence, did not do well with attempt to  wean down 2017    Chronic, continuous use of opioids    Type 2 diabetes mellitus without complication, without long-term current use of insulin    Tobacco dependence, patch with irritation; hx of bupropion    Therapeutic opioid-induced constipation (OIC)    Tubular adenoma of colon 5/10/17; 08/04/2023 colonoscopy 10 mm cecal TA.  10 mm descending polypoid mucosa.  Repeat 3 years    Cervical stenosis of spinal canal 10/3/2021 LAMINECTOMY, SPINE, CERVICAL, WITH POSTERIOR FUSION C2-T2    Cataract, bilateral    Open angle with borderline findings and high glaucoma risk in both eyes    Nuclear sclerosis of both eyes    Elevated prolactin level,low testosterone, gynecomastia; MRI pituitary normal 5/2020    Low testosterone in male    Paresthesia of left upper and lower extremity    History of smoking 10-25 pack years    Status post surgery    Impaired mobility and ADLs    Chronic low back pain    Sacroiliac joint pain    Sacroiliitis    Impaired mobility    Arthrodesis status    Foraminal stenosis of lumbar region    Junctional bradycardia    Chronic prescription benzodiazepine use    History of atrial fibrillation 7/2022 hospitalization i n the setting of SHAUNA and hyperK. started on carvedilol during admission    Type 2 diabetes mellitus with diabetic cataract, without long-term current use of insulin    Unspecified inflammatory spondylopathy, cervical region    Aortic atherosclerosis    Bradycardia     .        This note was dictated with the help of speech recognition software.  There might be un-intended errors and/or substitutions.

## 2024-11-22 RX ORDER — AMLODIPINE BESYLATE 10 MG/1
TABLET ORAL
Qty: 90 TABLET | Refills: 3 | Status: SHIPPED | OUTPATIENT
Start: 2024-11-22

## 2024-12-05 ENCOUNTER — OFFICE VISIT (OUTPATIENT)
Dept: CARDIOLOGY | Facility: CLINIC | Age: 69
End: 2024-12-05
Payer: MEDICARE

## 2024-12-05 VITALS
OXYGEN SATURATION: 96 % | WEIGHT: 214.31 LBS | BODY MASS INDEX: 30 KG/M2 | HEIGHT: 71 IN | RESPIRATION RATE: 18 BRPM | DIASTOLIC BLOOD PRESSURE: 79 MMHG | HEART RATE: 64 BPM | SYSTOLIC BLOOD PRESSURE: 168 MMHG

## 2024-12-05 DIAGNOSIS — I10 ESSENTIAL HYPERTENSION: Primary | ICD-10-CM

## 2024-12-05 LAB
OHS QRS DURATION: 94 MS
OHS QTC CALCULATION: 412 MS

## 2024-12-05 PROCEDURE — 99214 OFFICE O/P EST MOD 30 MIN: CPT | Mod: S$GLB,,, | Performed by: INTERNAL MEDICINE

## 2024-12-05 PROCEDURE — 1126F AMNT PAIN NOTED NONE PRSNT: CPT | Mod: CPTII,S$GLB,, | Performed by: INTERNAL MEDICINE

## 2024-12-05 PROCEDURE — 4010F ACE/ARB THERAPY RXD/TAKEN: CPT | Mod: CPTII,S$GLB,, | Performed by: INTERNAL MEDICINE

## 2024-12-05 PROCEDURE — 3008F BODY MASS INDEX DOCD: CPT | Mod: CPTII,S$GLB,, | Performed by: INTERNAL MEDICINE

## 2024-12-05 PROCEDURE — 1159F MED LIST DOCD IN RCRD: CPT | Mod: CPTII,S$GLB,, | Performed by: INTERNAL MEDICINE

## 2024-12-05 PROCEDURE — 3060F POS MICROALBUMINURIA REV: CPT | Mod: CPTII,S$GLB,, | Performed by: INTERNAL MEDICINE

## 2024-12-05 PROCEDURE — 3078F DIAST BP <80 MM HG: CPT | Mod: CPTII,S$GLB,, | Performed by: INTERNAL MEDICINE

## 2024-12-05 PROCEDURE — 93000 ELECTROCARDIOGRAM COMPLETE: CPT | Mod: S$GLB,,, | Performed by: INTERNAL MEDICINE

## 2024-12-05 PROCEDURE — 3288F FALL RISK ASSESSMENT DOCD: CPT | Mod: CPTII,S$GLB,, | Performed by: INTERNAL MEDICINE

## 2024-12-05 PROCEDURE — 1100F PTFALLS ASSESS-DOCD GE2>/YR: CPT | Mod: CPTII,S$GLB,, | Performed by: INTERNAL MEDICINE

## 2024-12-05 PROCEDURE — 3072F LOW RISK FOR RETINOPATHY: CPT | Mod: CPTII,S$GLB,, | Performed by: INTERNAL MEDICINE

## 2024-12-05 PROCEDURE — 99999 PR PBB SHADOW E&M-EST. PATIENT-LVL V: CPT | Mod: PBBFAC,,, | Performed by: INTERNAL MEDICINE

## 2024-12-05 PROCEDURE — 3044F HG A1C LEVEL LT 7.0%: CPT | Mod: CPTII,S$GLB,, | Performed by: INTERNAL MEDICINE

## 2024-12-05 PROCEDURE — 3077F SYST BP >= 140 MM HG: CPT | Mod: CPTII,S$GLB,, | Performed by: INTERNAL MEDICINE

## 2024-12-05 PROCEDURE — G2211 COMPLEX E/M VISIT ADD ON: HCPCS | Mod: S$GLB,,, | Performed by: INTERNAL MEDICINE

## 2024-12-05 PROCEDURE — 3066F NEPHROPATHY DOC TX: CPT | Mod: CPTII,S$GLB,, | Performed by: INTERNAL MEDICINE

## 2024-12-05 RX ORDER — SPIRONOLACTONE 25 MG/1
25 TABLET ORAL DAILY
Qty: 90 TABLET | Refills: 3 | Status: SHIPPED | OUTPATIENT
Start: 2024-12-05

## 2024-12-05 NOTE — PROGRESS NOTES
CARDIOVASCULAR CONSULTATION    REASON FOR CONSULT:   Butch Mcdaniel is a 69 y.o. male who presents for evaluation.      HISTORY OF PRESENT ILLNESS:     Patient is a pleasant 66-year-old man.  Here for evaluation.  States blood pressure fluctuates a lot at home.  Fluctuates between 110-200 mmHg.  States that he has clonidine at home and whenever his blood pressure is elevated he takes an extra dose of clonidine.  Denies any chest pains at rest on exertion.  Denies orthopnea, PND, swelling of feet.      Notes from February 2022:  Patient here for follow-up.  Denies any chest pains at rest on exertion, orthopnea, PND.  Blood pressure staying at home.  Around 160-170 mmHg.      April 2022:  Patient very concerned about his fluctuating blood pressure.  Today morning his blood pressure was 198/111 mmHg.  Then just prior to coming to clinic it was 135/87 mmHg.  Fluctuates throughout the day.  No anginal chest pains.    The left ventricle is normal in size with moderate concentric hypertrophy and normal systolic function.  The estimated ejection fraction is 60%.  Normal right ventricular size with normal right ventricular systolic function.  The estimated PA systolic pressure is 33 mmHg.     Heart rates varied between 43 and 109 BPM with an average of 62 BPM.  Rare PVCs and PACs. Five atrial pairs. One three beat atrial run.      Notes from December 2022: Patient here for follow-up.  Denies any chest pains at rest on exertion, orthopnea, PND.  However has been experiencing significant dyspnea on exertion lately.  Unfortunately has started smoking again.    Notes from March 23:  Patient here for follow-up.  Stress test did not show any significant ischemia.  Trying to quit smoking.        Normal myocardial perfusion scan. There is no evidence of myocardial ischemia or infarction.    There is mild to moderate apical thinning which is a normal variant.    The gated perfusion images showed an ejection fraction of  56% post stress.    There is normal wall motion post stress.    The ECG portion of the study is negative for ischemia.    The patient reported no chest pain during the stress test.    There were no arrhythmias during stress.      Notes from September 23:  Patient here for follow-up.  Echo showed normal left ventricle systolic function with grade 1 diastolic dysfunction.  Patient denies any chest pains    Notes from August 24: Patient here for follow-up.  Denies chest pains at rest on exertion, orthopnea, PND.  Blood pressure running high at home also.  Today blood pressure elevated.  States did not take his hydralazine today.    Notes from October 24: Patient here for follow-up.  Blood pressure staying elevated at home also.  Frequently has to take hydralazine 100 mg.  Denies chest pains at rest on exertion, orthopnea or PND    11/08/2024  Blood pressure has been running higher at home.  Denies chest pains at rest on exertion orthopnea or PND.  Has been taking 200 mg hydralazine multiple times a day        PAST MEDICAL HISTORY:     Past Medical History:   Diagnosis Date    Cataract, bilateral 09/11/2018    Degenerative disc disease     Diabetes mellitus type II, controlled     Eye injury as a child    stick hit eye ? 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 both eyes 10/08/2019    Personal history of colonic polyps     Psychiatric problem     Therapy     [facility] many years ago       PAST SURGICAL HISTORY:     Past Surgical History:   Procedure Laterality Date    APPENDECTOMY      COLONOSCOPY N/A 5/10/2017    Procedure: COLONOSCOPY;  Surgeon: Shantanu Marley MD;  Location: [facility] ENDO;  Service: Endoscopy;  Laterality: N/A;    COLONOSCOPY N/A 8/4/2023    Procedure: COLONOSCOPY;  Surgeon: Gael Rand MD;  Location: [facility] ENDO;  Service: Endoscopy;   Laterality: N/A;  Referred by: Dr. Gbariel Evans  Prep: golytely  Route instructions sent: [facility] and reviewed via phone, pt verbalized [id number]    POSTERIOR FUSION OF CERVICAL SPINE WITH LAMINECTOMY N/A 10/3/2021    Procedure: LAMINECTOMY, SPINE, CERVICAL, WITH POSTERIOR FUSION C2-T2;  Surgeon: Ana Rosa Geller MD;  Location: [facility] OR [address] [place] [address];  Service: Neurosurgery;  Laterality: N/A;       ALLERGIES AND MEDICATION:   Review of patient's allergies indicates:  No Known Allergies     Medication List            Accurate as of December 5, 2024  2:08 PM. If you have any questions, ask your nurse or doctor.                CONTINUE taking these medications      albuterol 90 mcg/actuation inhaler  Commonly known as: PROVENTIL/VENTOLIN HFA  INHALE TWO PUFFS BY MOUTH EVERY 6 HOURS AS NEEDED FOR WHEEZING OR SHORTNESS OF BREATH     ALPRAZolam 1 MG tablet  Commonly known as: XANAX  TAKE 1 AND ONE-HALF TABLETS BY MOUTH ONCE A DAY     amLODIPine 10 MG tablet  Commonly known as: NORVASC  TAKE ONE TABLET BY MOUTH EVERY EVENING     atorvastatin 40 MG tablet  Commonly known as: LIPITOR  Take 1 tablet (40 mg total) by mouth every evening.     BD LUER-STARLA SYRINGE 1 mL Syrg  Generic drug: syringe (disposable)  Testosterone injection every 2 weeks     busPIRone 30 MG Tab  Commonly known as: BUSPAR  TAKE ONE TABLET BY MOUTH TWICE DAILY     carvediloL 12.5 MG tablet  Commonly known as: COREG  Take 1 tablet (12.5 mg total) by mouth 2 (two) times daily.     FLUoxetine 40 MG capsule  TAKE ONE CAPSULE BY MOUTH ONCE A DAY     fluticasone-salmeterol 100-50 mcg/dose 100-50 mcg/dose diskus inhaler  Commonly known as: ADVAIR     gabapentin 600 MG tablet  Commonly known as: NEURONTIN     hydrALAZINE 100 MG tablet  Commonly known as: APRESOLINE  TAKE ONE tablet (100mg) BY MOUTH THREE TIMES DAILY     ibuprofen 600 MG tablet  Commonly known as: ADVIL,MOTRIN     lisinopriL 40 MG tablet  Commonly known as: PRINIVIL,ZESTRIL  Take 1 tablet (40 mg  "total) by mouth once daily.     metFORMIN 500 MG tablet  Commonly known as: GLUCOPHAGE  TAKE ONE TABLET BY MOUTH TWICE DAILY WITH MEALS     MIRALAX ORAL     naloxone 4 mg/actuation Spry  Commonly known as: NARCAN  4mg by nasal route as needed for opioid overdose; may repeat every 2-3 minutes in alternating nostrils until medical help arrives. Call 911     needle (disp) 22 G 22 gauge x 1" Ndle  Testosterone injection every 2 weeks     oxyCODONE-acetaminophen  mg per tablet  Commonly known as: PERCOCET     senna 8.6 mg tablet  Commonly known as: SENOKOT  Take 1 tablet by mouth 2 (two) times a day.     testosterone cypionate 200 mg/mL injection  Commonly known as: DEPOTESTOTERONE CYPIONATE  INJECT ONE MILLILITER INTRAMUSCULARLY EVERY 14 DAYS     tiZANidine 4 MG tablet  Commonly known as: ZANAFLEX              SOCIAL HISTORY:     Social History     Socioeconomic History    Marital status:     Number of children: 3   Occupational History    Occupation: retired -    Tobacco Use    Smoking status: Some Days     Current packs/day: 0.50     Average packs/day: 1 pack/day for 52.8 years (52.0 ttl pk-yrs)     Types: Cigarettes, Vaping with nicotine     Start date: 1972     Last attempt to quit: 6/20/2023    Smokeless tobacco: Never    Tobacco comments:     Approximately 3 cigarettes a day   Substance and Sexual Activity    Alcohol use: No     Alcohol/week: 0.0 standard drinks of alcohol    Drug use: Yes     Types: Marijuana, Oxycodone, Benzodiazepines     Comment: 4 oxycodones daily; one marijuana cigarette daily    Sexual activity: Yes     Partners: Female     Comment:  with children, disabled      Social Drivers of Health     Financial Resource Strain: Low Risk  (5/10/2023)    Overall Financial Resource Strain (CARDIA)     Difficulty of Paying Living Expenses: Not hard at all   Food Insecurity: No Food Insecurity (5/10/2023)    Hunger Vital Sign     Worried About Running Out " of Food in the Last Year: Never true     Ran Out of Food in the Last Year: Never true   Transportation Needs: No Transportation Needs (5/10/2023)    PRAPARE - Transportation     Lack of Transportation (Medical): No     Lack of Transportation (Non-Medical): No   Physical Activity: Sufficiently Active (5/10/2023)    Exercise Vital Sign     Days of Exercise per Week: 4 days     Minutes of Exercise per Session: 50 min   Stress: Stress Concern Present (4/3/2019)    [facility] - Occupational Stress Questionnaire     Feeling of Stress : Very much   Housing Stability: Unknown (5/10/2023)    Housing Stability Vital Sign     Unable to Pay for Housing in the Last Year: No     Unstable Housing in the Last Year: No       FAMILY HISTORY:     Family History   Problem Relation Name Age of Onset    Heart disease Mother      Heart disease Father      Alcohol abuse Father      No Known Problems Sister      No Known Problems Brother      Diabetes Son      No Known Problems Maternal Aunt      No Known Problems Maternal Uncle      No Known Problems Paternal Aunt      No Known Problems Paternal Uncle      No Known Problems Maternal Grandmother      No Known Problems Maternal Grandfather      No Known Problems Paternal Grandmother      No Known Problems Paternal Grandfather      Amblyopia Neg Hx      Blindness Neg Hx      Cancer Neg Hx      Cataracts Neg Hx      Glaucoma Neg Hx      Hypertension Neg Hx      Macular degeneration Neg Hx      Retinal detachment Neg Hx      Strabismus Neg Hx      Stroke Neg Hx      Thyroid disease Neg Hx      Anxiety disorder Neg Hx      Dementia Neg Hx      Depression Neg Hx         REVIEW OF SYSTEMS:   Review of Systems   Constitutional: Negative.   HENT: Negative.     Eyes: Negative.    Cardiovascular:  Positive for dyspnea on exertion.   Respiratory: Negative.     Endocrine: Negative.    Hematologic/Lymphatic: Negative.    Skin: Negative.    Musculoskeletal: Negative.   "  Gastrointestinal: Negative.    Genitourinary: Negative.    Neurological: Negative.    Psychiatric/Behavioral: Negative.     Allergic/Immunologic: Negative.        A 10 point review of systems was performed and all the pertinent positives have been mentioned. Rest of review of systems was negative.        PHYSICAL EXAM:     Vitals:    12/05/24 1402   BP: (!) 168/79   Pulse: 64   Resp: 18    Body mass index is 29.89 kg/m².  Weight: 97.2 kg (214 lb 4.6 oz)   Height: 5' 11" (180.3 cm)     Physical Exam  Vitals reviewed.   Constitutional:       Appearance: He is well-developed.   HENT:      Head: Normocephalic.   Eyes:      Conjunctiva/sclera: Conjunctivae normal.      Pupils: Pupils are equal, round, and reactive to light.   Cardiovascular:      Rate and Rhythm: Normal rate and regular rhythm.      Heart sounds: Normal heart sounds.   Pulmonary:      Effort: Pulmonary effort is normal.      Breath sounds: Wheezing present.   Abdominal:      General: Bowel sounds are normal.      Palpations: Abdomen is soft.   Musculoskeletal:      Cervical back: Normal range of motion and neck supple.   Skin:     General: Skin is warm.   Neurological:      Mental Status: He is alert and oriented to person, place, and time.           DATA:     Laboratory:  CBC:  Recent Labs   Lab 09/19/23  0921 03/18/24  0840 09/18/24  0750   WBC 7.78 9.56 9.93   Hemoglobin 14.3 16.0 15.3   Hematocrit 42.4 48.3 45.4   Platelets SEE COMMENT 68 L SEE COMMENT       CHEMISTRIES:  Recent Labs   Lab 07/19/22  0518 07/20/22  0126 07/21/22  0531 08/24/22  0905 09/19/23  0921 03/18/24  0840 09/18/24  0750   Glucose 141 H 114 H  113 H 146 H   < > 105 131 H 124 H   Sodium 139 141  142 143   < > 139 136 139   Potassium 5.2 H 4.1  4.1 3.9   < > 4.3 5.2 H 4.7   BUN 32 H 28 H  29 H 24 H   < > 17 16 20   Creatinine 1.9 H 1.4  1.4 1.3   < > 1.0 1.4 1.3   eGFR if  41 A 60  60 >60  --   --   --   --    eGFR if non  36 A 52 A  52 A " 56 A  --   --   --   --    Calcium 8.8 9.2  9.2 9.2   < > 9.3 10.1 9.0   Magnesium 1.9 1.7  1.8 1.8  --   --   --   --     < > = values in this interval not displayed.       CARDIAC BIOMARKERS:  Recent Labs   Lab 07/18/22  0252 07/18/22  1007 07/20/22  0126   CPK  --  129  --    Troponin I 0.019  --  0.119 H       COAGS:          LIPIDS/LFTS:  Recent Labs   Lab 09/19/23  0921 03/18/24  0840 09/18/24  0750   Cholesterol 87 L 99 L 74 L   Triglycerides 97 161 H 80   HDL 29 L 28 L 29 L   LDL Cholesterol 38.6 L 38.8 L 29.0 L   Non-HDL Cholesterol 58 71 45   AST 15 23 19   ALT 12 20 15       Hemoglobin A1C   Date Value Ref Range Status   09/18/2024 6.0 (H) 4.0 - 5.6 % Final     Comment:     ADA Screening Guidelines:  5.7-6.4%  Consistent with prediabetes  >or=6.5%  Consistent with diabetes    High levels of fetal hemoglobin interfere with the HbA1C  assay. Heterozygous hemoglobin variants (HbS, HgC, etc)do  not significantly interfere with this assay.   However, presence of multiple variants may affect accuracy.     03/18/2024 6.0 (H) 4.0 - 5.6 % Final     Comment:     ADA Screening Guidelines:  5.7-6.4%  Consistent with prediabetes  >or=6.5%  Consistent with diabetes    High levels of fetal hemoglobin interfere with the HbA1C  assay. Heterozygous hemoglobin variants (HbS, HgC, etc)do  not significantly interfere with this assay.   However, presence of multiple variants may affect accuracy.     09/19/2023 6.1 (H) 4.0 - 5.6 % Final     Comment:     ADA Screening Guidelines:  5.7-6.4%  Consistent with prediabetes  >or=6.5%  Consistent with diabetes    High levels of fetal hemoglobin interfere with the HbA1C  assay. Heterozygous hemoglobin variants (HbS, HgC, etc)do  not significantly interfere with this assay.   However, presence of multiple variants may affect accuracy.         TSH  Recent Labs   Lab 07/18/22  0252   TSH 5.291 H       The ASCVD Risk score (Dale DK, et al., 2019) failed to calculate for the following  reasons:    The valid total cholesterol range is 130 to 320 mg/dL             ASSESSMENT AND PLAN     Patient Active Problem List   Diagnosis    Essential hypertension    Anemia    Anxiety, unable to wean off BZD    Recurrent major depressive disorder, in partial remission    DDD (degenerative disc disease), cervical    DDD (degenerative disc disease), lumbar    ED (erectile dysfunction)    Facet arthritis of cervical region    Facet arthritis of lumbar region    Benzodiazepine dependence, did not do well with attempt to wean down 2017    Chronic, continuous use of opioids    Type 2 diabetes mellitus without complication, without long-term current use of insulin    Tobacco dependence, patch with irritation; hx of bupropion    Therapeutic opioid-induced constipation (OIC)    Tubular adenoma of colon 5/10/17; 08/04/2023 colonoscopy 10 mm cecal TA.  10 mm descending polypoid mucosa.  Repeat 3 years    Cervical stenosis of spinal canal 10/3/2021 LAMINECTOMY, SPINE, CERVICAL, WITH POSTERIOR FUSION C2-T2    Cataract, bilateral    Open angle with borderline findings and high glaucoma risk in both eyes    Nuclear sclerosis of both eyes    Elevated prolactin level,low testosterone, gynecomastia; MRI pituitary normal 5/2020    Low testosterone in male    Paresthesia of left upper and lower extremity    History of smoking 10-25 pack years    Status post surgery    Impaired mobility and ADLs    Chronic low back pain    Sacroiliac joint pain    Sacroiliitis    Impaired mobility    Arthrodesis status    Foraminal stenosis of lumbar region    Junctional bradycardia    Chronic prescription benzodiazepine use    History of atrial fibrillation 7/2022 hospitalization i n the setting of SHAUNA and hyperK. started on carvedilol during admission    Type 2 diabetes mellitus with diabetic cataract, without long-term current use of insulin    Unspecified inflammatory spondylopathy, cervical region    Aortic atherosclerosis    Bradycardia        Dyspnea on exertion.  Further evaluation with the help of a stress test.  Stress test did not show any significant ischemia.  Likely related to his pulmonary issues and smoking.  Smoking cessation has been highly recommended     Smoking cessation     Hypertension:  Uncontrolled.  Continue hydralazine 100 mg 3 times daily.  Increase lisinopril to 40 mg daily and Coreg to 12.5 mg b.i.d.    Lab Results   Component Value Date    LDLCALC 29.0 (L) 09/18/2024     Aortic atherosclerosis: On statins    Dyslipidemia on statin    Continue other therapy    Follow-up in 1 months.    Thank you very much for involving me in the care of your patient.  Please do not hesitate to contact me if there are any questions.      Adriana Pete MD, FAC, [facility]  Interventional Cardiologist, [facility].     Visit today included increased complexity associated with the care of the episodic problem hypertension, dyslipidemia, aortic atherosclerosis addressed and managing the longitudinal care of the patient due to the serious and/or complex managed problem(s)   Patient Active Problem List   Diagnosis    Essential hypertension    Anemia    Anxiety, unable to wean off BZD    Recurrent major depressive disorder, in partial remission    DDD (degenerative disc disease), cervical    DDD (degenerative disc disease), lumbar    ED (erectile dysfunction)    Facet arthritis of cervical region    Facet arthritis of lumbar region    Benzodiazepine dependence, did not do well with attempt to wean down 2017    Chronic, continuous use of opioids    Type 2 diabetes mellitus without complication, without long-term current use of insulin    Tobacco dependence, patch with irritation; hx of bupropion    Therapeutic opioid-induced constipation (OIC)    Tubular adenoma of colon 5/10/17; 08/04/2023 colonoscopy 10 mm cecal TA.  10 mm descending polypoid mucosa.  Repeat 3 years    Cervical stenosis of spinal canal 10/3/2021 LAMINECTOMY, SPINE, CERVICAL, WITH  POSTERIOR FUSION C2-T2    Cataract, bilateral    Open angle with borderline findings and high glaucoma risk in both eyes    Nuclear sclerosis of both eyes    Elevated prolactin level,low testosterone, gynecomastia; MRI pituitary normal 5/2020    Low testosterone in male    Paresthesia of left upper and lower extremity    History of smoking 10-25 pack years    Status post surgery    Impaired mobility and ADLs    Chronic low back pain    Sacroiliac joint pain    Sacroiliitis    Impaired mobility    Arthrodesis status    Foraminal stenosis of lumbar region    Junctional bradycardia    Chronic prescription benzodiazepine use    History of atrial fibrillation 7/2022 hospitalization i n the setting of SHAUNA and hyperK. started on carvedilol during admission    Type 2 diabetes mellitus with diabetic cataract, without long-term current use of insulin    Unspecified inflammatory spondylopathy, cervical region    Aortic atherosclerosis    Bradycardia     .        This note was dictated with the help of speech recognition software.  There might be un-intended errors and/or substitutions.

## 2025-01-06 ENCOUNTER — OFFICE VISIT (OUTPATIENT)
Dept: CARDIOLOGY | Facility: CLINIC | Age: 70
End: 2025-01-06
Payer: MEDICARE

## 2025-01-06 VITALS
HEART RATE: 52 BPM | RESPIRATION RATE: 18 BRPM | HEIGHT: 71 IN | SYSTOLIC BLOOD PRESSURE: 115 MMHG | DIASTOLIC BLOOD PRESSURE: 62 MMHG | WEIGHT: 211.63 LBS | BODY MASS INDEX: 29.63 KG/M2 | OXYGEN SATURATION: 95 %

## 2025-01-06 DIAGNOSIS — I10 HYPERTENSION, UNSPECIFIED TYPE: ICD-10-CM

## 2025-01-06 DIAGNOSIS — R06.09 DOE (DYSPNEA ON EXERTION): ICD-10-CM

## 2025-01-06 DIAGNOSIS — E11.9 TYPE 2 DIABETES MELLITUS WITHOUT COMPLICATION, WITHOUT LONG-TERM CURRENT USE OF INSULIN: ICD-10-CM

## 2025-01-06 DIAGNOSIS — I70.0 AORTIC ATHEROSCLEROSIS: ICD-10-CM

## 2025-01-06 DIAGNOSIS — I10 ESSENTIAL HYPERTENSION: Primary | ICD-10-CM

## 2025-01-06 PROCEDURE — G2211 COMPLEX E/M VISIT ADD ON: HCPCS | Mod: S$PBB,,, | Performed by: INTERNAL MEDICINE

## 2025-01-06 PROCEDURE — 99214 OFFICE O/P EST MOD 30 MIN: CPT | Mod: S$PBB,,, | Performed by: INTERNAL MEDICINE

## 2025-01-06 PROCEDURE — 99999 PR PBB SHADOW E&M-EST. PATIENT-LVL V: CPT | Mod: PBBFAC,,, | Performed by: INTERNAL MEDICINE

## 2025-01-06 NOTE — PROGRESS NOTES
CARDIOVASCULAR CONSULTATION    REASON FOR CONSULT:   Butch Mcdaniel is a 69 y.o. male who presents for evaluation.      HISTORY OF PRESENT ILLNESS:     Patient is a pleasant 66-year-old man.  Here for evaluation.  States blood pressure fluctuates a lot at home.  Fluctuates between 110-200 mmHg.  States that he has clonidine at home and whenever his blood pressure is elevated he takes an extra dose of clonidine.  Denies any chest pains at rest on exertion.  Denies orthopnea, PND, swelling of feet.      Notes from February 2022:  Patient here for follow-up.  Denies any chest pains at rest on exertion, orthopnea, PND.  Blood pressure staying at home.  Around 160-170 mmHg.      April 2022:  Patient very concerned about his fluctuating blood pressure.  Today morning his blood pressure was 198/111 mmHg.  Then just prior to coming to clinic it was 135/87 mmHg.  Fluctuates throughout the day.  No anginal chest pains.    The left ventricle is normal in size with moderate concentric hypertrophy and normal systolic function.  The estimated ejection fraction is 60%.  Normal right ventricular size with normal right ventricular systolic function.  The estimated PA systolic pressure is 33 mmHg.     Heart rates varied between 43 and 109 BPM with an average of 62 BPM.  Rare PVCs and PACs. Five atrial pairs. One three beat atrial run.      Notes from December 2022: Patient here for follow-up.  Denies any chest pains at rest on exertion, orthopnea, PND.  However has been experiencing significant dyspnea on exertion lately.  Unfortunately has started smoking again.    Notes from March 23:  Patient here for follow-up.  Stress test did not show any significant ischemia.  Trying to quit smoking.        Normal myocardial perfusion scan. There is no evidence of myocardial ischemia or infarction.    There is mild to moderate apical thinning which is a normal variant.    The gated perfusion images showed an ejection fraction of  56% post stress.    There is normal wall motion post stress.    The ECG portion of the study is negative for ischemia.    The patient reported no chest pain during the stress test.    There were no arrhythmias during stress.      Notes from September 23:  Patient here for follow-up.  Echo showed normal left ventricle systolic function with grade 1 diastolic dysfunction.  Patient denies any chest pains    Notes from August 24: Patient here for follow-up.  Denies chest pains at rest on exertion, orthopnea, PND.  Blood pressure running high at home also.  Today blood pressure elevated.  States did not take his hydralazine today.    Notes from October 24: Patient here for follow-up.  Blood pressure staying elevated at home also.  Frequently has to take hydralazine 100 mg.  Denies chest pains at rest on exertion, orthopnea or PND    11/08/2024  Blood pressure has been running higher at home.  Denies chest pains at rest on exertion orthopnea or PND.  Has been taking 200 mg hydralazine multiple times a day    January 25    History of Present Illness    CHIEF COMPLAINT:  Butch presents today for blood pressure management and pain issues.    HYPERTENSION:  Much better controlled.  Usually stays well controlled.  Unless he takes salty diet    PAIN MANAGEMENT:  He reports significant pain that is minimally relieved with multiple doses of medication. Pain episodes contribute to blood pressure elevation, though pain medication helps reduce it.     SOCIAL HISTORY:  He currently smokes up to one pack every two days and reports morning cough when smoke inhalation is excessive.         PAST MEDICAL HISTORY:     Past Medical History:   Diagnosis Date    Cataract, bilateral 09/11/2018    Degenerative disc disease     Diabetes mellitus type II, controlled     Eye injury as a child    stick hit eye ? eye, hit in [place] due to boxing    Hx of psychiatric care     Hyperlipidemia     Hypertension     MRSA (methicillin resistant  Staphylococcus aureus)     Open angle with borderline findings and high glaucoma risk in both eyes 10/08/2019    Personal history of colonic polyps     Psychiatric problem     Therapy     [facility] many years ago       PAST SURGICAL HISTORY:     Past Surgical History:   Procedure Laterality Date    APPENDECTOMY      COLONOSCOPY N/A 5/10/2017    Procedure: COLONOSCOPY;  Surgeon: Shantanu Marley MD;  Location: [facility] ENDO;  Service: Endoscopy;  Laterality: N/A;    COLONOSCOPY N/A 8/4/2023    Procedure: COLONOSCOPY;  Surgeon: Gael Rand MD;  Location: [facility] ENDO;  Service: Endoscopy;  Laterality: N/A;  Referred by: Dr. Gabriel Evans  Prep: golytely  Route instructions sent: myochsdanish and reviewed via phone, pt verbalized understanding-KPvt    POSTERIOR FUSION OF CERVICAL SPINE WITH LAMINECTOMY N/A 10/3/2021    Procedure: LAMINECTOMY, SPINE, CERVICAL, WITH POSTERIOR FUSION C2-T2;  Surgeon: Ana Rosa Geller MD;  Location: [address];  Service: Neurosurgery;  Laterality: N/A;       ALLERGIES AND MEDICATION:   Review of patient's allergies indicates:  No Known Allergies     Medication List            Accurate as of January 6, 2025  3:07 PM. If you have any questions, ask your nurse or doctor.                CONTINUE taking these medications      albuterol 90 mcg/actuation inhaler  Commonly known as: PROVENTIL/VENTOLIN HFA  INHALE TWO PUFFS BY MOUTH EVERY 6 HOURS AS NEEDED FOR WHEEZING OR SHORTNESS OF BREATH     ALPRAZolam 1 MG tablet  Commonly known as: XANAX  TAKE 1 AND ONE-HALF TABLETS BY MOUTH ONCE A DAY     amLODIPine 10 MG tablet  Commonly known as: NORVASC  TAKE ONE TABLET BY MOUTH EVERY EVENING     atorvastatin 40 MG tablet  Commonly known as: LIPITOR  Take 1 tablet (40 mg total) by mouth every evening.     BD LUER-STARLA SYRINGE 1 mL Syrg  Generic drug: syringe (disposable)  Testosterone injection every 2 weeks     busPIRone 30 MG Tab  Commonly known as: BUSPAR  TAKE ONE TABLET BY MOUTH TWICE DAILY     carvediloL 12.5 MG  "tablet  Commonly known as: COREG  Take 1 tablet (12.5 mg total) by mouth 2 (two) times daily.     FLUoxetine 40 MG capsule  TAKE ONE CAPSULE BY MOUTH ONCE A DAY     fluticasone-salmeterol 100-50 mcg/dose 100-50 mcg/dose diskus inhaler  Commonly known as: ADVAIR     gabapentin 600 MG tablet  Commonly known as: NEURONTIN     hydrALAZINE 100 MG tablet  Commonly known as: APRESOLINE  TAKE ONE tablet (100mg) BY MOUTH THREE TIMES DAILY     ibuprofen 600 MG tablet  Commonly known as: ADVIL,MOTRIN     lisinopriL 40 MG tablet  Commonly known as: PRINIVIL,ZESTRIL  Take 1 tablet (40 mg total) by mouth once daily.     metFORMIN 500 MG tablet  Commonly known as: GLUCOPHAGE  TAKE ONE TABLET BY MOUTH TWICE DAILY WITH MEALS     MIRALAX ORAL     naloxone 4 mg/actuation Spry  Commonly known as: NARCAN  4mg by nasal route as needed for opioid overdose; may repeat every 2-3 minutes in alternating nostrils until medical help arrives. Call 911     needle (disp) 22 G 22 gauge x 1" Ndle  Testosterone injection every 2 weeks     oxyCODONE-acetaminophen  mg per tablet  Commonly known as: PERCOCET     senna 8.6 mg tablet  Commonly known as: SENOKOT  Take 1 tablet by mouth 2 (two) times a day.     spironolactone 25 MG tablet  Commonly known as: ALDACTONE  Take 1 tablet (25 mg total) by mouth once daily.     testosterone cypionate 200 mg/mL injection  Commonly known as: DEPOTESTOTERONE CYPIONATE  INJECT ONE MILLILITER INTRAMUSCULARLY EVERY 14 DAYS     tiZANidine 4 MG tablet  Commonly known as: ZANAFLEX              SOCIAL HISTORY:     Social History     Socioeconomic History    Marital status:     Number of children: 3   Occupational History    Occupation: retired - tug boat captain   Tobacco Use    Smoking status: Some Days     Current packs/day: 0.50     Average packs/day: 1 pack/day for 52.8 years (52.0 ttl pk-yrs)     Types: Cigarettes, Vaping with nicotine     Start date: 1972     Last attempt to quit: 6/20/2023    Smokeless " tobacco: Never    Tobacco comments:     Approximately 3 cigarettes a day   Substance and Sexual Activity    Alcohol use: No     Alcohol/week: 0.0 standard drinks of alcohol    Drug use: Yes     Types: Marijuana, Oxycodone, Benzodiazepines     Comment: 4 oxycodones daily; one marijuana cigarette daily    Sexual activity: Yes     Partners: Female     Comment:  with children, disabled      Social Drivers of Health     Financial Resource Strain: Low Risk  (5/10/2023)    Overall Financial Resource Strain (CARDIA)     Difficulty of Paying Living Expenses: Not hard at all   Food Insecurity: No Food Insecurity (5/10/2023)    Hunger Vital Sign     Worried About Running Out of Food in the Last Year: Never true     Ran Out of Food in the Last Year: Never true   Transportation Needs: No Transportation Needs (5/10/2023)    PRAPARE - Transportation     Lack of Transportation (Medical): No     Lack of Transportation (Non-Medical): No   Physical Activity: Sufficiently Active (5/10/2023)    Exercise Vital Sign     Days of Exercise per Week: 4 days     Minutes of Exercise per Session: 50 min   Stress: Stress Concern Present (4/3/2019)    [employer] - Occupational Stress Questionnaire     Feeling of Stress : Very much   Housing Stability: Unknown (5/10/2023)    Housing Stability Vital Sign     Unable to Pay for Housing in the Last Year: No     Unstable Housing in the Last Year: No       FAMILY HISTORY:     Family History   Problem Relation Name Age of Onset    Heart disease Mother      Heart disease Father      Alcohol abuse Father      No Known Problems Sister      No Known Problems Brother      Diabetes Son      No Known Problems Maternal Aunt      No Known Problems Maternal Uncle      No Known Problems Paternal Aunt      No Known Problems Paternal Uncle      No Known Problems Maternal Grandmother      No Known Problems Maternal Grandfather      No Known Problems Paternal  "Grandmother      No Known Problems Paternal Grandfather      Amblyopia Neg Hx      Blindness Neg Hx      Cancer Neg Hx      Cataracts Neg Hx      Glaucoma Neg Hx      Hypertension Neg Hx      Macular degeneration Neg Hx      Retinal detachment Neg Hx      Strabismus Neg Hx      Stroke Neg Hx      Thyroid disease Neg Hx      Anxiety disorder Neg Hx      Dementia Neg Hx      Depression Neg Hx         REVIEW OF SYSTEMS:   Review of Systems   Constitutional: Negative.   HENT: Negative.     Eyes: Negative.    Cardiovascular:  Positive for dyspnea on exertion.   Respiratory: Negative.     Endocrine: Negative.    Hematologic/Lymphatic: Negative.    Skin: Negative.    Musculoskeletal: Negative.    Gastrointestinal: Negative.    Genitourinary: Negative.    Neurological: Negative.    Psychiatric/Behavioral: Negative.     Allergic/Immunologic: Negative.        A 10 point review of systems was performed and all the pertinent positives have been mentioned. Rest of review of systems was negative.        PHYSICAL EXAM:     Vitals:    01/06/25 1308   BP: 115/62   Pulse: (!) 52   Resp: 18    Body mass index is 29.52 kg/m².  Weight: 96 kg (211 lb 10.3 oz)   Height: 5' 11" (180.3 cm)     Physical Exam  Vitals reviewed.   Constitutional:       Appearance: He is well-developed.   HENT:      Head: Normocephalic.   Eyes:      Conjunctiva/sclera: Conjunctivae normal.      Pupils: Pupils are equal, round, and reactive to light.   Cardiovascular:      Rate and Rhythm: Normal rate and regular rhythm.      Heart sounds: Normal heart sounds.   Pulmonary:      Effort: Pulmonary effort is normal.      Breath sounds: Wheezing present.   Abdominal:      General: Bowel sounds are normal.      Palpations: Abdomen is soft.   Musculoskeletal:      Cervical back: Normal range of motion and neck supple.   Skin:     General: Skin is warm.   Neurological:      Mental Status: He is alert and oriented to person, place, and time.           DATA: "     Laboratory:  CBC:  Recent Labs   Lab 09/19/23  0921 03/18/24  0840 09/18/24  0750   WBC 7.78 9.56 9.93   Hemoglobin 14.3 16.0 15.3   Hematocrit 42.4 48.3 45.4   Platelets SEE COMMENT 68 L SEE COMMENT       CHEMISTRIES:  Recent Labs   Lab 07/19/22  0518 07/20/22  0126 07/21/22  0531 08/24/22  0905 09/19/23  0921 03/18/24  0840 09/18/24  0750   Glucose 141 H 114 H  113 H 146 H   < > 105 131 H 124 H   Sodium 139 141  142 143   < > 139 136 139   Potassium 5.2 H 4.1  4.1 3.9   < > 4.3 5.2 H 4.7   BUN 32 H 28 H  29 H 24 H   < > 17 16 20   Creatinine 1.9 H 1.4  1.4 1.3   < > 1.0 1.4 1.3   eGFR if  41 A 60  60 >60  --   --   --   --    eGFR if non  36 A 52 A  52 A 56 A  --   --   --   --    Calcium 8.8 9.2  9.2 9.2   < > 9.3 10.1 9.0   Magnesium 1.9 1.7  1.8 1.8  --   --   --   --     < > = values in this interval not displayed.       CARDIAC BIOMARKERS:  Recent Labs   Lab 07/18/22  0252 07/18/22  1007 07/20/22  0126   CPK  --  129  --    Troponin I 0.019  --  0.119 H       COAGS:          LIPIDS/LFTS:  Recent Labs   Lab 09/19/23  0921 03/18/24  0840 09/18/24  0750   Cholesterol 87 L 99 L 74 L   Triglycerides 97 161 H 80   HDL 29 L 28 L 29 L   LDL Cholesterol 38.6 L 38.8 L 29.0 L   Non-HDL Cholesterol 58 71 45   AST 15 23 19   ALT 12 20 15       Hemoglobin A1C   Date Value Ref Range Status   09/18/2024 6.0 (H) 4.0 - 5.6 % Final     Comment:     ADA Screening Guidelines:  5.7-6.4%  Consistent with prediabetes  >or=6.5%  Consistent with diabetes    High levels of fetal hemoglobin interfere with the HbA1C  assay. Heterozygous hemoglobin variants (HbS, HgC, etc)do  not significantly interfere with this assay.   However, presence of multiple variants may affect accuracy.     03/18/2024 6.0 (H) 4.0 - 5.6 % Final     Comment:     ADA Screening Guidelines:  5.7-6.4%  Consistent with prediabetes  >or=6.5%  Consistent with diabetes    High levels of fetal hemoglobin interfere with the  HbA1C  assay. Heterozygous hemoglobin variants (HbS, HgC, etc)do  not significantly interfere with this assay.   However, presence of multiple variants may affect accuracy.     09/19/2023 6.1 (H) 4.0 - 5.6 % Final     Comment:     ADA Screening Guidelines:  5.7-6.4%  Consistent with prediabetes  >or=6.5%  Consistent with diabetes    High levels of fetal hemoglobin interfere with the HbA1C  assay. Heterozygous hemoglobin variants (HbS, HgC, etc)do  not significantly interfere with this assay.   However, presence of multiple variants may affect accuracy.         TSH  Recent Labs   Lab 07/18/22  0252   TSH 5.291 H       The ASCVD Risk score (Dale JOHNSON, et al., 2019) failed to calculate for the following reasons:    The valid total cholesterol range is 130 to 320 mg/dL             ASSESSMENT AND PLAN     Patient Active Problem List   Diagnosis    Essential hypertension    Anemia    Anxiety, unable to wean off BZD    Recurrent major depressive disorder, in partial remission    DDD (degenerative disc disease), cervical    DDD (degenerative disc disease), lumbar    ED (erectile dysfunction)    Facet arthritis of cervical region    Facet arthritis of lumbar region    Benzodiazepine dependence, did not do well with attempt to wean down 2017    Chronic, continuous use of opioids    Type 2 diabetes mellitus without complication, without long-term current use of insulin    Tobacco dependence, patch with irritation; hx of bupropion    Therapeutic opioid-induced constipation (OIC)    Tubular adenoma of colon 5/10/17; 08/04/2023 colonoscopy 10 mm cecal TA.  10 mm descending polypoid mucosa.  Repeat 3 years    Cervical stenosis of spinal canal 10/3/2021 LAMINECTOMY, SPINE, CERVICAL, WITH POSTERIOR FUSION C2-T2    Cataract, bilateral    Open angle with borderline findings and high glaucoma risk in both eyes    Nuclear sclerosis of both eyes    Elevated prolactin level,low testosterone, gynecomastia; MRI pituitary normal 5/2020     Low testosterone in male    Paresthesia of left upper and lower extremity    History of smoking 10-25 pack years    Status post surgery    Impaired mobility and ADLs    Chronic low back pain    Sacroiliac joint pain    Sacroiliitis    Impaired mobility    Arthrodesis status    Foraminal stenosis of lumbar region    Junctional bradycardia    Chronic prescription benzodiazepine use    History of atrial fibrillation 7/2022 hospitalization i n the setting of SHAUNA and hyperK. started on carvedilol during admission    Type 2 diabetes mellitus with diabetic cataract, without long-term current use of insulin    Unspecified inflammatory spondylopathy, cervical region    Aortic atherosclerosis    Bradycardia       Dyspnea on exertion.  Further evaluation done with the help of a stress test.  Stress test did not show any significant ischemia.  Likely related to his pulmonary issues and smoking.  Smoking cessation has been highly recommended     Smoking cessation     Hypertension:  Continue current therapy.    Lab Results   Component Value Date    LDLCALC 29.0 (L) 09/18/2024     Aortic atherosclerosis: On statins    Dyslipidemia on statin    Lab Results   Component Value Date    LDLCALC 29.0 (L) 09/18/2024       Continue other therapy    Follow-up in 3 months.    Thank you very much for involving me in the care of your patient.  Please do not hesitate to contact me if there are any questions.      Adriana Pete MD, FACC, [facility]  Interventional Cardiologist, [facility].     Visit today included increased complexity associated with the care of the episodic problem hypertension, dyslipidemia, aortic atherosclerosis addressed and managing the longitudinal care of the patient due to the serious and/or complex managed problem(s)   Patient Active Problem List   Diagnosis    Essential hypertension    Anemia    Anxiety, unable to wean off BZD    Recurrent major depressive disorder, in partial remission    DDD (degenerative disc  disease), cervical    DDD (degenerative disc disease), lumbar    ED (erectile dysfunction)    Facet arthritis of cervical region    Facet arthritis of lumbar region    Benzodiazepine dependence, did not do well with attempt to wean down 2017    Chronic, continuous use of opioids    Type 2 diabetes mellitus without complication, without long-term current use of insulin    Tobacco dependence, patch with irritation; hx of bupropion    Therapeutic opioid-induced constipation (OIC)    Tubular adenoma of colon 5/10/17; 08/04/2023 colonoscopy 10 mm cecal TA.  10 mm descending polypoid mucosa.  Repeat 3 years    Cervical stenosis of spinal canal 10/3/2021 LAMINECTOMY, SPINE, CERVICAL, WITH POSTERIOR FUSION C2-T2    Cataract, bilateral    Open angle with borderline findings and high glaucoma risk in both eyes    Nuclear sclerosis of both eyes    Elevated prolactin level,low testosterone, gynecomastia; MRI pituitary normal 5/2020    Low testosterone in male    Paresthesia of left upper and lower extremity    History of smoking 10-25 pack years    Status post surgery    Impaired mobility and ADLs    Chronic low back pain    Sacroiliac joint pain    Sacroiliitis    Impaired mobility    Arthrodesis status    Foraminal stenosis of lumbar region    Junctional bradycardia    Chronic prescription benzodiazepine use    History of atrial fibrillation 7/2022 hospitalization i n the setting of SHAUNA and hyperK. started on carvedilol during admission    Type 2 diabetes mellitus with diabetic cataract, without long-term current use of insulin    Unspecified inflammatory spondylopathy, cervical region    Aortic atherosclerosis    Bradycardia     .        This note was dictated with the help of speech recognition software.  There might be un-intended errors and/or substitutions.

## 2025-01-07 ENCOUNTER — CLINICAL SUPPORT (OUTPATIENT)
Dept: SMOKING CESSATION | Facility: CLINIC | Age: 70
End: 2025-01-07
Payer: COMMERCIAL

## 2025-01-07 DIAGNOSIS — F17.200 NICOTINE DEPENDENCE: Primary | ICD-10-CM

## 2025-01-07 PROCEDURE — 99407 BEHAV CHNG SMOKING > 10 MIN: CPT | Mod: S$GLB,,,

## 2025-01-07 PROCEDURE — 99999 PR PBB SHADOW E&M-EST. PATIENT-LVL I: CPT | Mod: PBBFAC,,,

## 2025-01-08 ENCOUNTER — CLINICAL SUPPORT (OUTPATIENT)
Dept: SMOKING CESSATION | Facility: CLINIC | Age: 70
End: 2025-01-08
Payer: COMMERCIAL

## 2025-01-08 DIAGNOSIS — F17.200 NICOTINE DEPENDENCE: Primary | ICD-10-CM

## 2025-01-08 PROCEDURE — 99404 PREV MED CNSL INDIV APPRX 60: CPT | Mod: S$GLB,,,

## 2025-01-08 PROCEDURE — 99999 PR PBB SHADOW E&M-EST. PATIENT-LVL II: CPT | Mod: PBBFAC,,,

## 2025-01-08 RX ORDER — IBUPROFEN 200 MG
1 TABLET ORAL DAILY
Qty: 14 PATCH | Refills: 0 | Status: SHIPPED | OUTPATIENT
Start: 2025-01-08 | End: 2025-01-08 | Stop reason: SDUPTHER

## 2025-01-08 RX ORDER — IBUPROFEN 200 MG
1 TABLET ORAL DAILY
Qty: 14 PATCH | Refills: 0 | Status: SHIPPED | OUTPATIENT
Start: 2025-01-08 | End: 2025-02-08

## 2025-01-11 DIAGNOSIS — R79.89 LOW TESTOSTERONE IN MALE: ICD-10-CM

## 2025-01-11 NOTE — TELEPHONE ENCOUNTER
No care due was identified.  [facility] Embedded Care Due Messages. Reference number: [id number].   1/11/2025 9:29:44 AM CST

## 2025-01-12 ENCOUNTER — NURSE TRIAGE (OUTPATIENT)
Dept: ADMINISTRATIVE | Facility: CLINIC | Age: 70
End: 2025-01-12
Payer: MEDICARE

## 2025-01-12 ENCOUNTER — OCHSNER VIRTUAL EMERGENCY DEPARTMENT (OUTPATIENT)
Facility: CLINIC | Age: 70
End: 2025-01-12
Payer: MEDICARE

## 2025-01-12 RX ORDER — TESTOSTERONE CYPIONATE 200 MG/ML
INJECTION, SOLUTION INTRAMUSCULAR
Qty: 10 ML | Refills: 0 | Status: SHIPPED | OUTPATIENT
Start: 2025-01-12

## 2025-01-12 RX ORDER — ALPRAZOLAM 1 MG/1
1.5 TABLET ORAL DAILY PRN
Qty: 5 TABLET | Refills: 0 | Status: SHIPPED | OUTPATIENT
Start: 2025-01-12 | End: 2025-01-13 | Stop reason: SDUPTHER

## 2025-01-12 NOTE — TELEPHONE ENCOUNTER
[place]    PCP:  Dr. Gabriel Evans    Pt escalated to [facility] queue.  Pt reports having trouble sleeping.  He states he has been trying to get Xanax refilled since Friday.  He is completely out.  Pharmacy:  [facility] in [place].  C/O insomnia.  Denies pain, stress, tense, drinking caffeine, alcohol/illegal drug use, difficulty, fever, and CP.  Per protocol, care advised is see PCP within 3 days.  NT unable to schedule within timeframe of dispo.  Pt is unable to do a VV.  Pt referred to [facility] Provider (Tito Velazquez MD).  [facility] Provider recommends:  e-visit.  MyC E-visit ordered.  Pt is attempting to do a e-visit with Grandson's assistance.  Appt scheduled with PCP for tomorrow at 0920.  Pt was unable to complete e-visit.  [facility] Provider sent a prescription refill to his preferred pharmacy to fill the gap until Pt can see his PCP tomorrow.  Pt notified of [facility] Provider recommendation.  Pt VU.  NT confirmed with Pharmacy that they did receive the prescription.  Advised to call for worsening/questions/concerns.  VU.    Reason for Disposition   MODERATE - SEVERE insomnia (e.g., interferes with work or school)    Protocols used: Insomnia-A-AH

## 2025-01-12 NOTE — TELEPHONE ENCOUNTER
Transferred to ELY Becker who had taken pt off of SPOK que and attempted to contact pt.      Reason for Disposition   Caller has already spoken with another triager and has no further questions.    Protocols used: No Contact or Duplicate Contact Call-A-AH

## 2025-01-12 NOTE — PLAN OF CARE-OVED
[facility] Plan of Care Note    Referral source: Nurse On-Call      Reason for consult:  medication refill      Additional History: Ran out of his Xanax, no PCP until next week. Trouble sleeping.   No other complaints.       Disposition recommended: Treat in place      Additional Recommendations:  checked, pt is on longeterm xanax.   Will give 3 days supply pending his ability to get in with his pcp.   PCP appointment scheduled for tomorrow by JUDY team.

## 2025-01-13 ENCOUNTER — HOSPITAL ENCOUNTER (OUTPATIENT)
Dept: RADIOLOGY | Facility: HOSPITAL | Age: 70
Discharge: HOME | End: 2025-01-13
Attending: INTERNAL MEDICINE
Payer: MEDICARE

## 2025-01-13 ENCOUNTER — OFFICE VISIT (OUTPATIENT)
Dept: FAMILY MEDICINE | Facility: CLINIC | Age: 70
End: 2025-01-13
Payer: MEDICARE

## 2025-01-13 ENCOUNTER — TELEPHONE (OUTPATIENT)
Dept: FAMILY MEDICINE | Facility: CLINIC | Age: 70
End: 2025-01-13

## 2025-01-13 VITALS
HEIGHT: 71 IN | DIASTOLIC BLOOD PRESSURE: 62 MMHG | WEIGHT: 204.13 LBS | BODY MASS INDEX: 28.58 KG/M2 | SYSTOLIC BLOOD PRESSURE: 102 MMHG | OXYGEN SATURATION: 93 % | TEMPERATURE: 98 F | HEART RATE: 54 BPM

## 2025-01-13 DIAGNOSIS — I10 ESSENTIAL HYPERTENSION: ICD-10-CM

## 2025-01-13 DIAGNOSIS — R05.1 ACUTE COUGH: ICD-10-CM

## 2025-01-13 DIAGNOSIS — Z79.899 CHRONIC PRESCRIPTION BENZODIAZEPINE USE: Primary | ICD-10-CM

## 2025-01-13 DIAGNOSIS — F13.20 BENZODIAZEPINE DEPENDENCE: ICD-10-CM

## 2025-01-13 DIAGNOSIS — Z86.79 HISTORY OF ATRIAL FIBRILLATION: ICD-10-CM

## 2025-01-13 DIAGNOSIS — F41.9 ANXIETY: ICD-10-CM

## 2025-01-13 DIAGNOSIS — J44.9 CHRONIC OBSTRUCTIVE PULMONARY DISEASE, UNSPECIFIED COPD TYPE: ICD-10-CM

## 2025-01-13 DIAGNOSIS — G89.29 CHRONIC LOW BACK PAIN, UNSPECIFIED BACK PAIN LATERALITY, UNSPECIFIED WHETHER SCIATICA PRESENT: ICD-10-CM

## 2025-01-13 DIAGNOSIS — F11.90 CHRONIC, CONTINUOUS USE OF OPIOIDS: ICD-10-CM

## 2025-01-13 DIAGNOSIS — E11.36 TYPE 2 DIABETES MELLITUS WITH DIABETIC CATARACT, WITHOUT LONG-TERM CURRENT USE OF INSULIN: ICD-10-CM

## 2025-01-13 DIAGNOSIS — G95.20 UNSPECIFIED CORD COMPRESSION: ICD-10-CM

## 2025-01-13 DIAGNOSIS — J44.1 COPD WITH ACUTE EXACERBATION: Primary | ICD-10-CM

## 2025-01-13 DIAGNOSIS — F33.41 RECURRENT MAJOR DEPRESSIVE DISORDER, IN PARTIAL REMISSION: ICD-10-CM

## 2025-01-13 DIAGNOSIS — F17.200 TOBACCO DEPENDENCE: Chronic | ICD-10-CM

## 2025-01-13 DIAGNOSIS — M54.50 CHRONIC LOW BACK PAIN, UNSPECIFIED BACK PAIN LATERALITY, UNSPECIFIED WHETHER SCIATICA PRESENT: ICD-10-CM

## 2025-01-13 DIAGNOSIS — E11.9 TYPE 2 DIABETES MELLITUS WITHOUT COMPLICATION, WITHOUT LONG-TERM CURRENT USE OF INSULIN: ICD-10-CM

## 2025-01-13 DIAGNOSIS — N18.31 CHRONIC KIDNEY DISEASE, STAGE 3A: ICD-10-CM

## 2025-01-13 DIAGNOSIS — I70.0 AORTIC ATHEROSCLEROSIS: ICD-10-CM

## 2025-01-13 PROCEDURE — 3288F FALL RISK ASSESSMENT DOCD: CPT | Mod: CPTII,S$GLB,, | Performed by: INTERNAL MEDICINE

## 2025-01-13 PROCEDURE — 3008F BODY MASS INDEX DOCD: CPT | Mod: CPTII,S$GLB,, | Performed by: INTERNAL MEDICINE

## 2025-01-13 PROCEDURE — 99214 OFFICE O/P EST MOD 30 MIN: CPT | Mod: S$GLB,,, | Performed by: INTERNAL MEDICINE

## 2025-01-13 PROCEDURE — 1160F RVW MEDS BY RX/DR IN RCRD: CPT | Mod: CPTII,S$GLB,, | Performed by: INTERNAL MEDICINE

## 2025-01-13 PROCEDURE — 1101F PT FALLS ASSESS-DOCD LE1/YR: CPT | Mod: CPTII,S$GLB,, | Performed by: INTERNAL MEDICINE

## 2025-01-13 PROCEDURE — 99999 PR PBB SHADOW E&M-EST. PATIENT-LVL III: CPT | Mod: PBBFAC,,, | Performed by: INTERNAL MEDICINE

## 2025-01-13 PROCEDURE — 1159F MED LIST DOCD IN RCRD: CPT | Mod: CPTII,S$GLB,, | Performed by: INTERNAL MEDICINE

## 2025-01-13 PROCEDURE — 3078F DIAST BP <80 MM HG: CPT | Mod: CPTII,S$GLB,, | Performed by: INTERNAL MEDICINE

## 2025-01-13 PROCEDURE — 71046 X-RAY EXAM CHEST 2 VIEWS: CPT | Mod: TC,FY,PO

## 2025-01-13 PROCEDURE — 3074F SYST BP LT 130 MM HG: CPT | Mod: CPTII,S$GLB,, | Performed by: INTERNAL MEDICINE

## 2025-01-13 PROCEDURE — 1126F AMNT PAIN NOTED NONE PRSNT: CPT | Mod: CPTII,S$GLB,, | Performed by: INTERNAL MEDICINE

## 2025-01-13 PROCEDURE — 71046 X-RAY EXAM CHEST 2 VIEWS: CPT | Mod: 26,,, | Performed by: STUDENT IN AN ORGANIZED HEALTH CARE EDUCATION/TRAINING PROGRAM

## 2025-01-13 RX ORDER — ALPRAZOLAM 1 MG/1
TABLET ORAL
Qty: 45 TABLET | Refills: 2 | Status: SHIPPED | OUTPATIENT
Start: 2025-01-13

## 2025-01-13 RX ORDER — PREDNISONE 20 MG/1
20 TABLET ORAL 2 TIMES DAILY
Qty: 10 TABLET | Refills: 0 | Status: SHIPPED | OUTPATIENT
Start: 2025-01-13 | End: 2025-01-18

## 2025-01-13 NOTE — PROGRESS NOTES
This note was created by combination of typed  and M-Modal dictation.  Transcription errors may be present.   This note was also generated with the assistance of ambient listening technology. Verbal consent was obtained by the patient and accompanying visitor(s) for the recording of patient appointment to facilitate this note. I attest to having reviewed and edited the generated note for accuracy, though some syntax or spelling errors may persist. Please contact the author of this note for any clarification.    Assessment and Plan:   Assessment and Plan   Chronic prescription benzodiazepine use  Benzodiazepine dependence, did not do well with attempt to wean down 2017  Anxiety, unable to wean off BZD  Recurrent major depressive disorder, in partial remission  - queried, no aberrancy  Taking prescription benzo as prescribed  Intensive monitoring of high risk medication.  -     ALPRAZolam (XANAX) 1 MG tablet; TAKE 1 AND ONE-HALF TABLETS BY MOUTH ONCE A DAY  Dispense: 45 tablet; Refill: 2    Acute cough  Chronic obstructive pulmonary disease, unspecified COPD type  Tobacco dependence, patch with irritation; hx of bupropion  -4 days duration.  Expiratory wheeze.  COPD, smoker.  Check CXR, if no pneumonia then plan for prednisone 20 BID x 5 days.  -     X-Ray Chest PA And Lateral; Future; Expected date: 01/13/2025      Type 2 diabetes mellitus with diabetic cataract, without long-term current use of insulin  Type 2 diabetes mellitus without complication, without long-term current use of insulin  -check future surveillance labs. On metformin.     Essential hypertension  Chronic kidney disease, stage 3a  -check future surveillance labs. Followed by cardiology with recent adjustments to meds.  Denies obvious SE of the meds.  On higher dose coreg, higher dose lisinopril, high dose hydralazine, added spironolactone. On high dose amlodipine.     History of atrial fibrillation 7/2022 hospitalization i n the setting  of SHAUNA and hyperK. started on carvedilol during admission  -sinus today. On BB. Followed by cardiology.     Aortic atherosclerosis  -check future labs on statin    Unspecified cord compression  Chronic, continuous use of opioids  Chronic low back pain, unspecified back pain laterality, unspecified whether sciatica present  -chronic narcotic use, followed by outside pain clinic.          Medications Discontinued During This Encounter   Medication Reason    ALPRAZolam (XANAX) 1 MG tablet Duplicate Order    ALPRAZolam (XANAX) 1 MG tablet Reorder       meds sent this encounter:  Medications Ordered This Encounter   Medications    ALPRAZolam (XANAX) 1 MG tablet     Sig: TAKE 1 AND ONE-HALF TABLETS BY MOUTH ONCE A DAY     Dispense:  45 tablet     Refill:  2         Follow Up: follow up 3 months with labs.   Future Appointments   Date Time Provider Department Center   1/13/2025  9:20 AM Gabriel Evans MD [facility] MED [place]   1/23/2025 10:00 AM Pauline Welch, [facility] SMOKE [place]   3/12/2025  3:40 PM Gabriel Evans MD [facility] FAM MED [place]   4/7/2025  2:20 PM Adriana Pete MD [facility] CARDIO [place]          Subjective:   Subjective   No chief complaint on file.      DIPESH Rae is a 69 y.o. male.     Social History     Tobacco Use    Smoking status: Some Days     Current packs/day: 0.50     Average packs/day: 1 pack/day for 52.9 years (52.0 ttl pk-yrs)     Types: Cigarettes, Vaping with nicotine     Start date: 1972     Last attempt to quit: 6/20/2023    Smokeless tobacco: Never    Tobacco comments:     Approximately 3 cigarettes a day   Substance Use Topics    Alcohol use: No     Alcohol/week: 0.0 standard drinks of alcohol      Social History     Occupational History    Occupation: retired -       Social History     Social History Narrative    Not on file       Patient Care Team:  Gabriel Evans MD as PCP - General (Internal Medicine)  Vlad Nava MD (Pain Medicine)  Kaila Carrillo OD as  Consulting Physician (Optometry)    Last appointment with this clinic was 9/25/2024. Last visit with me 9/25/2024   To summarize last visit and events leading up to today:  HTN  07/27/2023 TTE normal systolic function.  LVEF 65%.  Grade 1 diastolic dysfunction.  9/6/23 saw cards, cut his coreg dose due to stephania  Saw cardiology 08/13/2024.  Hypertension not to goal increase hydralazine 50 t.i.d..  lipid   3/16/23 nu stress test neg for ischemia; no arrhythmias  Hx of AFib during hospitalization 7/2022 in the setting of hyperK. On beta blocker.  COPD Working with smoking cessation clinic  08/04/2023 colonoscopy 10 mm cecal TA.  10 mm descending polypoid mucosa.  Repeat 3 years  DM2   Hx of SHAUNA due to dehydration, severe enough to require HD for hyperK, 7/2022.  Low testosterone on testosterone.  Thrombocytopenia hx of clumping on microscopy  Anxiety, BZD dependence. did not tolerate trial of Cymbalta.  Took it for maybe a week.   Has been to see Psychiatry, Psychiatry recommended inpatient detox and the patient was not interested.  Currently have him taking 1.5 tablets in total during the day.  on max dose celexa  Was scheduled to see Psychiatry but on pre visit review was deemed inappropriate for general Psychiatry and was recommended for intensive outpatient therapy program  6/25/2024 visit with me,  Trial of fluoxetine stopped the Celexa.  Stay on BuSpar.  If stable may consider trying to wean down benzodiazepine  Chronic back pain followed by pain mgmt. Narcotic and bill  Saw ENT 7/28.  Sensorineural hearing loss.  Qualifies for hearing aids.     PSA due 03/2025    Last visit 9/25/24  Anxiety, BZD dependence. he thinks he is taking citalopram along with the fluoxetine. He did not bring his bottles in. I have given him written instructions to stop the citalopram and remain on the fluoxetine.   Chronic pain chronic narcotic, outside pain clinic.   Low testosterone pre visit labs T normal. No changes.   DM stable on  metformin  HTN, CKD, stable. Cards increased hydralazine  Lipid/statin  Smoking, working on cutting down, cessation clinic    10/21/24 cardiology visit, increased hydralazine, 100 TID  11/8/24 cards follow up, increased cored to 12.5 and lisinopril to 40  12/5/24 cards follow up, added spironolactone 25  1/6/25 cards follow up, stable       Percocet 12/19  Alprazolam 12/11    Labs due in March      Today's visit:    History of Present Illness    SOCIAL HISTORY:  - Alcohol Use: Birthday drinking mentioned, has not consumed alcohol since  - Smoking: Currently smoking about 1 cigarette per day, reduced from previous amount    HPI:  Butch reports feeling unwell since Thursday (4 days ago). He initially had diarrhea and anorexia. The diarrhea has resolved, with no episodes in the last 24 hours. He consumed some soup and pasta yesterday. He has a productive cough with mucus, originating from both his sinuses (post-nasal drip) and chest. He also reports dyspnea but denies fever. 1-2 weeks ago, his blood pressure was 200/101 after accidental excessive salt consumption. Butch acknowledges dietary salt restriction. He has reduced smoking to about 1 cigarette a day since becoming ill.    MEDICATIONS:  - Amlodipine  - Carvedilol  - Hydralazine  - Lisinopril, recently increased  - Spironolactone  - Alprazolam  - Fluoxetine (Prozac)  - Buspar  - Percocet, prescribed by Dr. Sands for pain management  - Gabapentin, patient reports minimal use  - Tizanidine, patient reports minimal use    ROS:  General: no fever  Respiratory: reports cough, reports difficulty breathing  Gastrointestinal: no diarrhea         ALLERGIES AND MEDICATIONS: updated and reviewed.  Medication List with Changes/Refills   Current Medications    ALBUTEROL (PROVENTIL/VENTOLIN HFA) 90 MCG/ACTUATION INHALER    INHALE TWO PUFFS BY MOUTH EVERY 6 HOURS AS NEEDED FOR WHEEZING OR SHORTNESS OF BREATH    ALPRAZOLAM (XANAX) 1 MG TABLET    TAKE 1 AND ONE-HALF TABLETS  "BY MOUTH ONCE A DAY    ALPRAZOLAM (XANAX) 1 MG TABLET    Take 1.5 tablets (1.5 mg total) by mouth daily as needed for Anxiety.    AMLODIPINE (NORVASC) 10 MG TABLET    TAKE ONE TABLET BY MOUTH EVERY EVENING    ATORVASTATIN (LIPITOR) 40 MG TABLET    Take 1 tablet (40 mg total) by mouth every evening.    BUSPIRONE (BUSPAR) 30 MG TAB    TAKE ONE TABLET BY MOUTH TWICE DAILY    CARVEDILOL (COREG) 12.5 MG TABLET    Take 1 tablet (12.5 mg total) by mouth 2 (two) times daily.    FLUOXETINE 40 MG CAPSULE    TAKE ONE CAPSULE BY MOUTH ONCE A DAY    FLUTICASONE-SALMETEROL DISKUS INHALER 100-50 MCG    Inhale 1 puff into the lungs.    GABAPENTIN (NEURONTIN) 600 MG TABLET    Take 600 mg by mouth 3 (three) times daily.    HYDRALAZINE (APRESOLINE) 100 MG TABLET    TAKE ONE tablet (100mg) BY MOUTH THREE TIMES DAILY    IBUPROFEN (ADVIL,MOTRIN) 600 MG TABLET    Take 600 mg by mouth.    LISINOPRIL (PRINIVIL,ZESTRIL) 40 MG TABLET    Take 1 tablet (40 mg total) by mouth once daily.    METFORMIN (GLUCOPHAGE) 500 MG TABLET    TAKE ONE TABLET BY MOUTH TWICE DAILY WITH MEALS    NALOXONE (NARCAN) 4 MG/ACTUATION SPRY    4mg by nasal route as needed for opioid overdose; may repeat every 2-3 minutes in alternating nostrils until medical help arrives. Call 911    NEEDLE, DISP, 22 G 22 GAUGE X 1" NDLE    Testosterone injection every 2 weeks    NICOTINE (NICODERM CQ) 21 MG/24 HR    Place 1 patch onto the skin once daily.    OXYCODONE-ACETAMINOPHEN (PERCOCET)  MG PER TABLET    Take 1 tablet by mouth every 4 (four) hours as needed.    POLYETHYLENE GLYCOL 3350 (MIRALAX ORAL)    Take 1 application by mouth.    SENNA (SENOKOT) 8.6 MG TABLET    Take 1 tablet by mouth 2 (two) times a day.    SPIRONOLACTONE (ALDACTONE) 25 MG TABLET    Take 1 tablet (25 mg total) by mouth once daily.    SYRINGE, DISPOSABLE, (BD LUER-STARLA SYRINGE) 1 ML SYRG    Testosterone injection every 2 weeks    TESTOSTERONE CYPIONATE (DEPOTESTOTERONE CYPIONATE) 200 MG/ML INJECTION    " "INJECT ONE MILLILITER INTRAMUSCULARLY EVERY 14 DAYS    TIZANIDINE (ZANAFLEX) 4 MG TABLET    Take 4-8 mg by mouth nightly as needed.         Objective:   Objective   Physical Exam   Vitals:    01/13/25 0934   BP: 102/62   Pulse: (!) 54   Temp: 97.6 °F (36.4 °C)   TempSrc: Oral   SpO2: (!) 93%   Weight: 92.6 kg (204 lb 2.3 oz)   Height: 5' 11" (1.803 m)    Body mass index is 28.47 kg/m².            Physical Exam  Constitutional:       General: He is not in acute distress.     Appearance: He is well-developed.      Comments: Tired appearing but nontoxic   Eyes:      Extraocular Movements: Extraocular movements intact.   Cardiovascular:      Rate and Rhythm: Regular rhythm. Bradycardia present.      Heart sounds: Normal heart sounds. No murmur heard.  Pulmonary:      Effort: Pulmonary effort is normal. No respiratory distress.      Breath sounds: Wheezing and rhonchi present.      Comments: Some mild rhonchi and mild bilateral expiratory wheeze. Normal work of breathing  Musculoskeletal:         General: Normal range of motion.      Right lower leg: No edema.      Left lower leg: No edema.   Skin:     General: Skin is warm and dry.   Neurological:      Mental Status: He is alert and oriented to person, place, and time.      Coordination: Coordination normal.   Psychiatric:         Behavior: Behavior normal.         Thought Content: Thought content normal.                "

## 2025-01-13 NOTE — TELEPHONE ENCOUNTER
Please call pt - his chest X ray - no pneumonia    I am going to call in a steroid for his breathing - his wheezing - prednisone - it's twice a day for 5 days.

## 2025-01-16 NOTE — PROGRESS NOTES
Spoke with patient today in regard to smoking cessation progress for 3 month telephone follow up, he states not tobacco free. Patient states that he has cut down on smoking and is currently enrolled in the program using the nicotine patch. Informed patient of benefit period, future follow ups, and contact information if any further help or support is needed. Will resolve episode and complete smart form for Quit attempt #4 and complete smart form for 3 month follow up on Quit #5.      
Unknown

## 2025-01-23 ENCOUNTER — CLINICAL SUPPORT (OUTPATIENT)
Dept: SMOKING CESSATION | Facility: CLINIC | Age: 70
End: 2025-01-23
Payer: COMMERCIAL

## 2025-01-23 DIAGNOSIS — F17.200 NICOTINE DEPENDENCE: Primary | ICD-10-CM

## 2025-01-23 PROCEDURE — 99403 PREV MED CNSL INDIV APPRX 45: CPT | Mod: S$GLB,,,

## 2025-01-23 PROCEDURE — 99999 PR PBB SHADOW E&M-EST. PATIENT-LVL II: CPT | Mod: PBBFAC,,,

## 2025-01-23 NOTE — PROGRESS NOTES
Individual Follow-Up Form    1/23/2025    Quit Date: n/a    Clinical Status of Patient: Outpatient    Length of Service: 45 minutes    Continuing Medication: yes  Patches    Other Medications: n/a     Target Symptoms: Withdrawal and medication side effects. The following were  rated moderate (3) to severe (4) on TCRS:  Moderate (3): 0  Severe (4): 0    Comments: patient presents for follow up today via telephone due to weather conditions, he is currently smoking a little less then a pack per day, he is up more due to not having the nicotine patch, he has exceeded his patch limit for the year, ctts will call and see when he is eligible again for the nicotine patch and will work on getting him back on track, he sounds winded today, encouraged him to work on cutting back even if he can not get the nicotine patches, strategies discussed to help, he is not interested in oral nrt, encouragement provided will follow     Diagnosis: F17.210    Next Visit: 2 weeks

## 2025-01-24 ENCOUNTER — HOSPITAL ENCOUNTER (OUTPATIENT)
Dept: RADIOLOGY | Facility: HOSPITAL | Age: 70
Discharge: HOME | End: 2025-01-24
Attending: STUDENT IN AN ORGANIZED HEALTH CARE EDUCATION/TRAINING PROGRAM
Payer: MEDICARE

## 2025-01-24 DIAGNOSIS — M51.369 DEGENERATION OF LUMBAR INTERVERTEBRAL DISC: ICD-10-CM

## 2025-01-24 DIAGNOSIS — M47.896 OTHER OSTEOARTHRITIS OF SPINE, LUMBAR REGION: ICD-10-CM

## 2025-01-24 PROCEDURE — 72158 MRI LUMBAR SPINE W/O & W/DYE: CPT | Mod: 26,,, | Performed by: RADIOLOGY

## 2025-01-24 PROCEDURE — 25500020 PHARM REV CODE 255: Performed by: STUDENT IN AN ORGANIZED HEALTH CARE EDUCATION/TRAINING PROGRAM

## 2025-01-24 PROCEDURE — A9585 GADOBUTROL INJECTION: HCPCS | Performed by: STUDENT IN AN ORGANIZED HEALTH CARE EDUCATION/TRAINING PROGRAM

## 2025-01-24 PROCEDURE — 72158 MRI LUMBAR SPINE W/O & W/DYE: CPT | Mod: TC

## 2025-01-24 RX ORDER — GADOBUTROL 604.72 MG/ML
10 INJECTION INTRAVENOUS
Status: COMPLETED | OUTPATIENT
Start: 2025-01-24 | End: 2025-01-24

## 2025-01-24 RX ADMIN — GADOBUTROL 10 ML: 604.72 INJECTION INTRAVENOUS at 01:01

## 2025-02-03 ENCOUNTER — CLINICAL SUPPORT (OUTPATIENT)
Dept: SMOKING CESSATION | Facility: CLINIC | Age: 70
End: 2025-02-03
Payer: COMMERCIAL

## 2025-02-03 DIAGNOSIS — F17.200 NICOTINE DEPENDENCE: Primary | ICD-10-CM

## 2025-02-03 PROCEDURE — 99999 PR PBB SHADOW E&M-EST. PATIENT-LVL I: CPT | Mod: PBBFAC,,,

## 2025-02-03 PROCEDURE — 99407 BEHAV CHNG SMOKING > 10 MIN: CPT | Mod: S$GLB,,,

## 2025-02-13 ENCOUNTER — HOSPITAL ENCOUNTER (OUTPATIENT)
Dept: RADIOLOGY | Facility: HOSPITAL | Age: 70
Discharge: HOME | End: 2025-02-13
Attending: STUDENT IN AN ORGANIZED HEALTH CARE EDUCATION/TRAINING PROGRAM
Payer: MEDICARE

## 2025-02-13 DIAGNOSIS — M51.369 DEGENERATION OF LUMBAR INTERVERTEBRAL DISC: ICD-10-CM

## 2025-02-14 ENCOUNTER — HOSPITAL ENCOUNTER (OUTPATIENT)
Dept: RADIOLOGY | Facility: HOSPITAL | Age: 70
Discharge: HOME | End: 2025-02-14
Attending: STUDENT IN AN ORGANIZED HEALTH CARE EDUCATION/TRAINING PROGRAM
Payer: MEDICARE

## 2025-02-14 DIAGNOSIS — M51.369 DEGENERATION OF LUMBAR INTERVERTEBRAL DISC: ICD-10-CM

## 2025-02-14 PROCEDURE — 72110 X-RAY EXAM L-2 SPINE 4/>VWS: CPT | Mod: 26,,, | Performed by: RADIOLOGY

## 2025-02-14 PROCEDURE — 72110 X-RAY EXAM L-2 SPINE 4/>VWS: CPT | Mod: TC,FY,PO

## 2025-02-18 ENCOUNTER — CLINICAL SUPPORT (OUTPATIENT)
Dept: SMOKING CESSATION | Facility: CLINIC | Age: 70
End: 2025-02-18
Payer: COMMERCIAL

## 2025-02-18 DIAGNOSIS — F17.200 NICOTINE DEPENDENCE: Primary | ICD-10-CM

## 2025-02-20 DIAGNOSIS — E11.36 TYPE 2 DIABETES MELLITUS WITH DIABETIC CATARACT, WITHOUT LONG-TERM CURRENT USE OF INSULIN: ICD-10-CM

## 2025-02-20 DIAGNOSIS — E11.9 TYPE 2 DIABETES MELLITUS WITHOUT COMPLICATION, WITHOUT LONG-TERM CURRENT USE OF INSULIN: ICD-10-CM

## 2025-02-20 RX ORDER — METFORMIN HYDROCHLORIDE 500 MG/1
500 TABLET ORAL 2 TIMES DAILY WITH MEALS
Qty: 180 TABLET | Refills: 0 | Status: SHIPPED | OUTPATIENT
Start: 2025-02-20

## 2025-02-20 NOTE — TELEPHONE ENCOUNTER
Refill Routing Note   Medication(s) are not appropriate for processing by [facility] for the following reason(s):        Required labs abnormal  CREATININE 1.9 (H) 01/24/2025      EGFRNORACEVR 37 (A) 01/24/2025       OR action(s):  Defer             Pharmacist review requested: Yes     Appointments  past 12m or future 3m with PCP    Date Provider   Last Visit   1/13/2025 Gabriel Evans MD   Next Visit   3/12/2025 Gabriel Evans MD   ED visits in past 90 days: 0        Note composed:1:21 PM 02/20/2025

## 2025-02-20 NOTE — TELEPHONE ENCOUNTER
No care due was identified.  [facility] Embedded Care Due Messages. Reference number: [id number].   2/20/2025 10:17:08 AM CST

## 2025-02-20 NOTE — TELEPHONE ENCOUNTER
Refill Routing Note   Medication(s) are not appropriate for processing by [facility] for the following reason(s):        Required labs abnormal:     [facility] action(s):  Defer Care Due Message:          Pharmacist review requested: Yes     Appointments  past 12m or future 3m with PCP    Date Provider   Last Visit   1/13/2025 Gabriel Evans MD   Next Visit   3/12/2025 Gabriel Evans MD   ED visits in past 90 days: 0        Note composed:2:18 PM 02/20/2025

## 2025-03-05 ENCOUNTER — LAB VISIT (OUTPATIENT)
Dept: LAB | Facility: HOSPITAL | Age: 70
End: 2025-03-05
Attending: INTERNAL MEDICINE
Payer: MEDICARE

## 2025-03-05 DIAGNOSIS — Z12.5 SCREENING FOR PROSTATE CANCER: ICD-10-CM

## 2025-03-05 DIAGNOSIS — E11.9 TYPE 2 DIABETES MELLITUS WITHOUT COMPLICATION, WITHOUT LONG-TERM CURRENT USE OF INSULIN: ICD-10-CM

## 2025-03-05 DIAGNOSIS — R79.89 LOW TESTOSTERONE IN MALE: ICD-10-CM

## 2025-03-05 LAB
ALBUMIN SERPL BCP-MCNC: 3.9 G/DL (ref 3.5–5.2)
ALP SERPL-CCNC: 52 U/L (ref 40–150)
ALT SERPL W/O P-5'-P-CCNC: 23 U/L (ref 10–44)
ANION GAP SERPL CALC-SCNC: 9 MMOL/L (ref 8–16)
AST SERPL-CCNC: 41 U/L (ref 10–40)
BILIRUB SERPL-MCNC: 1 MG/DL (ref 0.1–1)
BUN SERPL-MCNC: 36 MG/DL (ref 8–23)
CALCIUM SERPL-MCNC: 9.3 MG/DL (ref 8.7–10.5)
CHLORIDE SERPL-SCNC: 105 MMOL/L (ref 95–110)
CO2 SERPL-SCNC: 25 MMOL/L (ref 23–29)
COMPLEXED PSA SERPL-MCNC: 0.5 NG/ML (ref 0–4)
CREAT SERPL-MCNC: 1.8 MG/DL (ref 0.5–1.4)
ERYTHROCYTE [DISTWIDTH] IN BLOOD BY AUTOMATED COUNT: 14.3 % (ref 11.5–14.5)
EST. GFR  (NO RACE VARIABLE): 40 ML/MIN/1.73 M^2
ESTIMATED AVG GLUCOSE: 134 MG/DL (ref 68–131)
GLUCOSE SERPL-MCNC: 130 MG/DL (ref 70–110)
HBA1C MFR BLD: 6.3 % (ref 4–5.6)
HCT VFR BLD AUTO: 41.6 % (ref 40–54)
HGB BLD-MCNC: 13.1 G/DL (ref 14–18)
MCH RBC QN AUTO: 32.4 PG (ref 27–31)
MCHC RBC AUTO-ENTMCNC: 31.5 G/DL (ref 32–36)
MCV RBC AUTO: 103 FL (ref 82–98)
PLATELET # BLD AUTO: 79 K/UL (ref 150–450)
PMV BLD AUTO: 12.2 FL (ref 9.2–12.9)
POTASSIUM SERPL-SCNC: 5.8 MMOL/L (ref 3.5–5.1)
PROT SERPL-MCNC: 7 G/DL (ref 6–8.4)
RBC # BLD AUTO: 4.04 M/UL (ref 4.6–6.2)
SODIUM SERPL-SCNC: 139 MMOL/L (ref 136–145)
TESTOST SERPL-MCNC: 891 NG/DL (ref 304–1227)
WBC # BLD AUTO: 14.9 K/UL (ref 3.9–12.7)

## 2025-03-05 PROCEDURE — 84403 ASSAY OF TOTAL TESTOSTERONE: CPT | Performed by: INTERNAL MEDICINE

## 2025-03-05 PROCEDURE — 84153 ASSAY OF PSA TOTAL: CPT | Performed by: INTERNAL MEDICINE

## 2025-03-05 PROCEDURE — 36415 COLL VENOUS BLD VENIPUNCTURE: CPT | Mod: PO | Performed by: INTERNAL MEDICINE

## 2025-03-05 PROCEDURE — 83036 HEMOGLOBIN GLYCOSYLATED A1C: CPT | Performed by: INTERNAL MEDICINE

## 2025-03-05 PROCEDURE — 85027 COMPLETE CBC AUTOMATED: CPT | Performed by: INTERNAL MEDICINE

## 2025-03-05 PROCEDURE — 80053 COMPREHEN METABOLIC PANEL: CPT | Performed by: INTERNAL MEDICINE

## 2025-03-07 ENCOUNTER — TELEPHONE (OUTPATIENT)
Dept: FAMILY MEDICINE | Facility: CLINIC | Age: 70
End: 2025-03-07
Payer: MEDICARE

## 2025-03-07 ENCOUNTER — HOSPITAL ENCOUNTER (INPATIENT)
Facility: HOSPITAL | Age: 70
LOS: 2 days | Discharge: LEFT BEFORE DISCHARGE | DRG: 191 | End: 2025-03-09
Attending: STUDENT IN AN ORGANIZED HEALTH CARE EDUCATION/TRAINING PROGRAM | Admitting: INTERNAL MEDICINE
Payer: MEDICARE

## 2025-03-07 DIAGNOSIS — N18.9 ACUTE KIDNEY INJURY SUPERIMPOSED ON CKD: ICD-10-CM

## 2025-03-07 DIAGNOSIS — J44.1 COPD WITH ACUTE EXACERBATION: ICD-10-CM

## 2025-03-07 DIAGNOSIS — I10 ESSENTIAL HYPERTENSION: ICD-10-CM

## 2025-03-07 DIAGNOSIS — N17.9 ACUTE KIDNEY INJURY SUPERIMPOSED ON CKD: ICD-10-CM

## 2025-03-07 DIAGNOSIS — E87.5 HYPERKALEMIA: Primary | ICD-10-CM

## 2025-03-07 DIAGNOSIS — R07.9 CHEST PAIN: ICD-10-CM

## 2025-03-07 LAB
ALBUMIN SERPL BCP-MCNC: 3.9 G/DL (ref 3.5–5.2)
ALLENS TEST: ABNORMAL
ALP SERPL-CCNC: 47 U/L (ref 40–150)
ALT SERPL W/O P-5'-P-CCNC: 30 U/L (ref 10–44)
ANION GAP SERPL CALC-SCNC: 11 MMOL/L (ref 8–16)
ANION GAP SERPL CALC-SCNC: 3 MMOL/L (ref 8–16)
AST SERPL-CCNC: 27 U/L (ref 10–40)
BASOPHILS # BLD AUTO: 0.02 K/UL (ref 0–0.2)
BASOPHILS NFR BLD: 0.2 % (ref 0–1.9)
BILIRUB SERPL-MCNC: 0.9 MG/DL (ref 0.1–1)
BILIRUB UR QL STRIP: NEGATIVE
BNP SERPL-MCNC: 46 PG/ML (ref 0–99)
BUN SERPL-MCNC: 50 MG/DL (ref 8–23)
BUN SERPL-MCNC: 50 MG/DL (ref 8–23)
CALCIUM SERPL-MCNC: 7.7 MG/DL (ref 8.7–10.5)
CALCIUM SERPL-MCNC: 8.2 MG/DL (ref 8.7–10.5)
CHLORIDE SERPL-SCNC: 109 MMOL/L (ref 95–110)
CHLORIDE SERPL-SCNC: 112 MMOL/L (ref 95–110)
CLARITY UR: CLEAR
CO2 SERPL-SCNC: 20 MMOL/L (ref 23–29)
CO2 SERPL-SCNC: 23 MMOL/L (ref 23–29)
COLOR UR: YELLOW
CREAT SERPL-MCNC: 1.6 MG/DL (ref 0.5–1.4)
CREAT SERPL-MCNC: 1.8 MG/DL (ref 0.5–1.4)
DELSYS: ABNORMAL
DIFFERENTIAL METHOD BLD: ABNORMAL
EOSINOPHIL # BLD AUTO: 0 K/UL (ref 0–0.5)
EOSINOPHIL NFR BLD: 0.1 % (ref 0–8)
ERYTHROCYTE [DISTWIDTH] IN BLOOD BY AUTOMATED COUNT: 14.1 % (ref 11.5–14.5)
EST. GFR  (NO RACE VARIABLE): 40 ML/MIN/1.73 M^2
EST. GFR  (NO RACE VARIABLE): 46 ML/MIN/1.73 M^2
GLUCOSE SERPL-MCNC: 120 MG/DL (ref 70–110)
GLUCOSE SERPL-MCNC: 179 MG/DL (ref 70–110)
GLUCOSE UR QL STRIP: ABNORMAL
HCO3 UR-SCNC: 26.1 MMOL/L (ref 24–28)
HCT VFR BLD AUTO: 40.1 % (ref 40–54)
HGB BLD-MCNC: 13.4 G/DL (ref 14–18)
HGB UR QL STRIP: NEGATIVE
IMM GRANULOCYTES # BLD AUTO: 0.03 K/UL (ref 0–0.04)
IMM GRANULOCYTES NFR BLD AUTO: 0.3 % (ref 0–0.5)
KETONES UR QL STRIP: ABNORMAL
LEUKOCYTE ESTERASE UR QL STRIP: NEGATIVE
LYMPHOCYTES # BLD AUTO: 1 K/UL (ref 1–4.8)
LYMPHOCYTES NFR BLD: 11 % (ref 18–48)
MAGNESIUM SERPL-MCNC: 2.2 MG/DL (ref 1.6–2.6)
MCH RBC QN AUTO: 34.3 PG (ref 27–31)
MCHC RBC AUTO-ENTMCNC: 33.4 G/DL (ref 32–36)
MCV RBC AUTO: 103 FL (ref 82–98)
MODE: ABNORMAL
MONOCYTES # BLD AUTO: 1.1 K/UL (ref 0.3–1)
MONOCYTES NFR BLD: 12.2 % (ref 4–15)
NEUTROPHILS # BLD AUTO: 6.7 K/UL (ref 1.8–7.7)
NEUTROPHILS NFR BLD: 76.2 % (ref 38–73)
NITRITE UR QL STRIP: NEGATIVE
NRBC BLD-RTO: 0 /100 WBC
PCO2 BLDA: 52.7 MMHG (ref 35–45)
PH SMN: 7.3 [PH] (ref 7.35–7.45)
PH UR STRIP: 6 [PH] (ref 5–8)
PHOSPHATE SERPL-MCNC: 2.7 MG/DL (ref 2.7–4.5)
PLATELET # BLD AUTO: 125 K/UL (ref 150–450)
PMV BLD AUTO: 12.2 FL (ref 9.2–12.9)
PO2 BLDA: 44 MMHG (ref 40–60)
POC BE: -1 MMOL/L
POC SATURATED O2: 74 % (ref 95–100)
POC TCO2: 28 MMOL/L (ref 24–29)
POTASSIUM SERPL-SCNC: 5.2 MMOL/L (ref 3.5–5.1)
POTASSIUM SERPL-SCNC: 6.3 MMOL/L (ref 3.5–5.1)
PROT SERPL-MCNC: 7.1 G/DL (ref 6–8.4)
PROT UR QL STRIP: NEGATIVE
RBC # BLD AUTO: 3.91 M/UL (ref 4.6–6.2)
SAMPLE: ABNORMAL
SITE: ABNORMAL
SODIUM SERPL-SCNC: 138 MMOL/L (ref 136–145)
SODIUM SERPL-SCNC: 140 MMOL/L (ref 136–145)
SP GR UR STRIP: 1.02 (ref 1–1.03)
TROPONIN I SERPL DL<=0.01 NG/ML-MCNC: <0.006 NG/ML (ref 0–0.03)
URN SPEC COLLECT METH UR: ABNORMAL
UROBILINOGEN UR STRIP-ACNC: NEGATIVE EU/DL
WBC # BLD AUTO: 8.8 K/UL (ref 3.9–12.7)

## 2025-03-07 PROCEDURE — 94640 AIRWAY INHALATION TREATMENT: CPT

## 2025-03-07 PROCEDURE — 83880 ASSAY OF NATRIURETIC PEPTIDE: CPT

## 2025-03-07 PROCEDURE — 93005 ELECTROCARDIOGRAM TRACING: CPT

## 2025-03-07 PROCEDURE — 96375 TX/PRO/DX INJ NEW DRUG ADDON: CPT

## 2025-03-07 PROCEDURE — 83735 ASSAY OF MAGNESIUM: CPT

## 2025-03-07 PROCEDURE — 94644 CONT INHLJ TX 1ST HOUR: CPT

## 2025-03-07 PROCEDURE — 85025 COMPLETE CBC W/AUTO DIFF WBC: CPT

## 2025-03-07 PROCEDURE — 96361 HYDRATE IV INFUSION ADD-ON: CPT

## 2025-03-07 PROCEDURE — 82803 BLOOD GASES ANY COMBINATION: CPT

## 2025-03-07 PROCEDURE — 12000002 HC ACUTE/MED SURGE SEMI-PRIVATE ROOM

## 2025-03-07 PROCEDURE — 82800 BLOOD PH: CPT

## 2025-03-07 PROCEDURE — 25000003 PHARM REV CODE 250: Performed by: STUDENT IN AN ORGANIZED HEALTH CARE EDUCATION/TRAINING PROGRAM

## 2025-03-07 PROCEDURE — 25000242 PHARM REV CODE 250 ALT 637 W/ HCPCS: Performed by: STUDENT IN AN ORGANIZED HEALTH CARE EDUCATION/TRAINING PROGRAM

## 2025-03-07 PROCEDURE — 99900035 HC TECH TIME PER 15 MIN (STAT)

## 2025-03-07 PROCEDURE — 82962 GLUCOSE BLOOD TEST: CPT

## 2025-03-07 PROCEDURE — 81003 URINALYSIS AUTO W/O SCOPE: CPT

## 2025-03-07 PROCEDURE — 80048 BASIC METABOLIC PNL TOTAL CA: CPT | Performed by: STUDENT IN AN ORGANIZED HEALTH CARE EDUCATION/TRAINING PROGRAM

## 2025-03-07 PROCEDURE — 63600175 PHARM REV CODE 636 W HCPCS: Mod: JZ,TB | Performed by: STUDENT IN AN ORGANIZED HEALTH CARE EDUCATION/TRAINING PROGRAM

## 2025-03-07 PROCEDURE — 84484 ASSAY OF TROPONIN QUANT: CPT

## 2025-03-07 PROCEDURE — 80053 COMPREHEN METABOLIC PANEL: CPT

## 2025-03-07 PROCEDURE — 84100 ASSAY OF PHOSPHORUS: CPT

## 2025-03-07 PROCEDURE — 99285 EMERGENCY DEPT VISIT HI MDM: CPT | Mod: 25

## 2025-03-07 PROCEDURE — 96374 THER/PROPH/DIAG INJ IV PUSH: CPT

## 2025-03-07 RX ORDER — METHYLPREDNISOLONE SOD SUCC 125 MG
125 VIAL (EA) INJECTION
Status: COMPLETED | OUTPATIENT
Start: 2025-03-07 | End: 2025-03-07

## 2025-03-07 RX ORDER — SODIUM CHLORIDE 9 MG/ML
1000 INJECTION, SOLUTION INTRAVENOUS
Status: COMPLETED | OUTPATIENT
Start: 2025-03-07 | End: 2025-03-07

## 2025-03-07 RX ORDER — ALBUTEROL SULFATE 90 UG/1
1-2 INHALANT RESPIRATORY (INHALATION) EVERY 6 HOURS PRN
Qty: 8 G | Refills: 2 | Status: CANCELLED | OUTPATIENT
Start: 2025-03-07

## 2025-03-07 RX ORDER — IPRATROPIUM BROMIDE 0.5 MG/2.5ML
1 SOLUTION RESPIRATORY (INHALATION) CONTINUOUS
Status: DISCONTINUED | OUTPATIENT
Start: 2025-03-07 | End: 2025-03-07

## 2025-03-07 RX ORDER — ALBUTEROL SULFATE 2.5 MG/.5ML
10 SOLUTION RESPIRATORY (INHALATION) EVERY 4 HOURS PRN
Status: DISCONTINUED | OUTPATIENT
Start: 2025-03-07 | End: 2025-03-08

## 2025-03-07 RX ORDER — DEXTROSE 50 % IN WATER (D50W) INTRAVENOUS SYRINGE
25
Status: COMPLETED | OUTPATIENT
Start: 2025-03-07 | End: 2025-03-07

## 2025-03-07 RX ADMIN — SODIUM CHLORIDE 1000 ML: 9 INJECTION, SOLUTION INTRAVENOUS at 09:03

## 2025-03-07 RX ADMIN — IPRATROPIUM BROMIDE 1 MG: 0.5 SOLUTION RESPIRATORY (INHALATION) at 08:03

## 2025-03-07 RX ADMIN — ALBUTEROL SULFATE 10 MG: 2.5 SOLUTION RESPIRATORY (INHALATION) at 09:03

## 2025-03-07 RX ADMIN — INSULIN HUMAN 5 UNITS: 100 INJECTION, SOLUTION PARENTERAL at 11:03

## 2025-03-07 RX ADMIN — METHYLPREDNISOLONE SODIUM SUCCINATE 125 MG: 125 INJECTION, POWDER, FOR SOLUTION INTRAMUSCULAR; INTRAVENOUS at 08:03

## 2025-03-07 RX ADMIN — DEXTROSE MONOHYDRATE 25 G: 25 INJECTION, SOLUTION INTRAVENOUS at 11:03

## 2025-03-07 NOTE — Clinical Note
Diagnosis: Hyperkalemia [331913]   Future Attending Provider: RONNIE RUELAS [[id number]]   Reason for IP Medical Treatment  (Clinical interventions that can only be accomplished in the IP setting? ) :: hyperkalemia   Plans for Post-Acute care--if anticipated (pick the single best option):: A. No post acute care anticipated at this time   Special Needs:: No Special Needs [1]

## 2025-03-08 PROBLEM — J44.9 COPD (CHRONIC OBSTRUCTIVE PULMONARY DISEASE): Status: ACTIVE | Noted: 2025-03-08

## 2025-03-08 PROBLEM — N18.30 ACUTE WORSENING OF STAGE 3 CHRONIC KIDNEY DISEASE: Status: ACTIVE | Noted: 2022-07-18

## 2025-03-08 LAB
ANION GAP SERPL CALC-SCNC: 6 MMOL/L (ref 8–16)
ANION GAP SERPL CALC-SCNC: 8 MMOL/L (ref 8–16)
ASCENDING AORTA: 3.31 CM
AV INDEX (PROSTH): 0.73
AV MEAN GRADIENT: 9 MMHG
AV PEAK GRADIENT: 14 MMHG
AV VALVE AREA BY VELOCITY RATIO: 2.6 CM²
AV VALVE AREA: 2.8 CM²
AV VELOCITY RATIO: 0.68
BASOPHILS # BLD AUTO: 0.01 K/UL (ref 0–0.2)
BASOPHILS NFR BLD: 0.2 % (ref 0–1.9)
BSA FOR ECHO PROCEDURE: 2.22 M2
BUN SERPL-MCNC: 39 MG/DL (ref 8–23)
BUN SERPL-MCNC: 44 MG/DL (ref 8–23)
CALCIUM SERPL-MCNC: 7.6 MG/DL (ref 8.7–10.5)
CALCIUM SERPL-MCNC: 7.7 MG/DL (ref 8.7–10.5)
CHLORIDE SERPL-SCNC: 108 MMOL/L (ref 95–110)
CHLORIDE SERPL-SCNC: 110 MMOL/L (ref 95–110)
CO2 SERPL-SCNC: 21 MMOL/L (ref 23–29)
CO2 SERPL-SCNC: 21 MMOL/L (ref 23–29)
CREAT SERPL-MCNC: 1.4 MG/DL (ref 0.5–1.4)
CREAT SERPL-MCNC: 1.5 MG/DL (ref 0.5–1.4)
CV ECHO LV RWT: 0.42 CM
DIFFERENTIAL METHOD BLD: ABNORMAL
DOP CALC AO PEAK VEL: 1.9 M/S
DOP CALC AO VTI: 41.4 CM
DOP CALC LVOT AREA: 3.8 CM2
DOP CALC LVOT DIAMETER: 2.2 CM
DOP CALC LVOT PEAK VEL: 1.3 M/S
DOP CALC LVOT STROKE VOLUME: 115.5 CM3
DOP CALCLVOT PEAK VEL VTI: 30.4 CM
E WAVE DECELERATION TIME: 235 MSEC
E/A RATIO: 0.93
E/E' RATIO: 13 M/S
ECHO LV POSTERIOR WALL: 1.1 CM (ref 0.6–1.1)
EOSINOPHIL # BLD AUTO: 0 K/UL (ref 0–0.5)
EOSINOPHIL NFR BLD: 0 % (ref 0–8)
ERYTHROCYTE [DISTWIDTH] IN BLOOD BY AUTOMATED COUNT: 14.1 % (ref 11.5–14.5)
EST. GFR  (NO RACE VARIABLE): 50 ML/MIN/1.73 M^2
EST. GFR  (NO RACE VARIABLE): 54 ML/MIN/1.73 M^2
FRACTIONAL SHORTENING: 41.5 % (ref 28–44)
GLUCOSE SERPL-MCNC: 180 MG/DL (ref 70–110)
GLUCOSE SERPL-MCNC: 302 MG/DL (ref 70–110)
HCT VFR BLD AUTO: 39.2 % (ref 40–54)
HGB BLD-MCNC: 12.8 G/DL (ref 14–18)
IMM GRANULOCYTES # BLD AUTO: 0.04 K/UL (ref 0–0.04)
IMM GRANULOCYTES NFR BLD AUTO: 0.6 % (ref 0–0.5)
INTERVENTRICULAR SEPTUM: 1.1 CM (ref 0.6–1.1)
IVC DIAMETER: 1.81 CM
IVRT: 74 MSEC
LA MAJOR: 6.6 CM
LA MINOR: 5.6 CM
LA WIDTH: 4.2 CM
LEFT ATRIUM SIZE: 3.9 CM
LEFT ATRIUM VOLUME INDEX: 39 ML/M2
LEFT ATRIUM VOLUME: 84 CM3
LEFT INTERNAL DIMENSION IN SYSTOLE: 3.1 CM (ref 2.1–4)
LEFT VENTRICLE DIASTOLIC VOLUME INDEX: 62.84 ML/M2
LEFT VENTRICLE DIASTOLIC VOLUME: 137 ML
LEFT VENTRICLE MASS INDEX: 104.5 G/M2
LEFT VENTRICLE SYSTOLIC VOLUME INDEX: 17.4 ML/M2
LEFT VENTRICLE SYSTOLIC VOLUME: 38 ML
LEFT VENTRICULAR INTERNAL DIMENSION IN DIASTOLE: 5.3 CM (ref 3.5–6)
LEFT VENTRICULAR MASS: 227.7 G
LV LATERAL E/E' RATIO: 11.1 M/S
LV SEPTAL E/E' RATIO: 15.9 M/S
LVED V (TEICH): 136.99 ML
LVES V (TEICH): 38.19 ML
LVOT MG: 3.71 MMHG
LVOT MV: 0.9 CM/S
LYMPHOCYTES # BLD AUTO: 0.4 K/UL (ref 1–4.8)
LYMPHOCYTES NFR BLD: 6.6 % (ref 18–48)
MAGNESIUM SERPL-MCNC: 2.1 MG/DL (ref 1.6–2.6)
MCH RBC QN AUTO: 33.2 PG (ref 27–31)
MCHC RBC AUTO-ENTMCNC: 32.7 G/DL (ref 32–36)
MCV RBC AUTO: 102 FL (ref 82–98)
MONOCYTES # BLD AUTO: 0.1 K/UL (ref 0.3–1)
MONOCYTES NFR BLD: 0.8 % (ref 4–15)
MV PEAK A VEL: 1.2 M/S
MV PEAK E VEL: 1.11 M/S
MV STENOSIS PRESSURE HALF TIME: 68.15 MS
MV VALVE AREA P 1/2 METHOD: 3.23 CM2
NEUTROPHILS # BLD AUTO: 5.8 K/UL (ref 1.8–7.7)
NEUTROPHILS NFR BLD: 91.8 % (ref 38–73)
NRBC BLD-RTO: 0 /100 WBC
OHS CV RV/LV RATIO: 0.72 CM
PISA TR MAX VEL: 1.2 M/S
PLATELET # BLD AUTO: 164 K/UL (ref 150–450)
PMV BLD AUTO: 11.2 FL (ref 9.2–12.9)
POCT GLUCOSE: 143 MG/DL (ref 70–110)
POCT GLUCOSE: 165 MG/DL (ref 70–110)
POCT GLUCOSE: 232 MG/DL (ref 70–110)
POCT GLUCOSE: 290 MG/DL (ref 70–110)
POCT GLUCOSE: 300 MG/DL (ref 70–110)
POTASSIUM SERPL-SCNC: 4.9 MMOL/L (ref 3.5–5.1)
POTASSIUM SERPL-SCNC: 5.2 MMOL/L (ref 3.5–5.1)
PULM VEIN S/D RATIO: 1.58
PV PEAK D VEL: 0.38 M/S
PV PEAK GRADIENT: 9 MMHG
PV PEAK S VEL: 0.6 M/S
PV PEAK VELOCITY: 1.49 M/S
RA MAJOR: 5.67 CM
RA PRESSURE ESTIMATED: 3 MMHG
RA WIDTH: 4 CM
RBC # BLD AUTO: 3.86 M/UL (ref 4.6–6.2)
RIGHT VENTRICLE DIASTOLIC BASEL DIMENSION: 3.8 CM
RIGHT VENTRICULAR END-DIASTOLIC DIMENSION: 3.83 CM
RV TB RVSP: 4 MMHG
RV TISSUE DOPPLER FREE WALL SYSTOLIC VELOCITY 1 (APICAL 4 CHAMBER VIEW): 15.12 CM/S
SINUS: 2.88 CM
SODIUM SERPL-SCNC: 137 MMOL/L (ref 136–145)
SODIUM SERPL-SCNC: 137 MMOL/L (ref 136–145)
STJ: 2.27 CM
TDI LATERAL: 0.1 M/S
TDI SEPTAL: 0.07 M/S
TDI: 0.09 M/S
TR MAX PG: 5 MMHG
TRICUSPID ANNULAR PLANE SYSTOLIC EXCURSION: 2.49 CM
TROPONIN I SERPL DL<=0.01 NG/ML-MCNC: <0.006 NG/ML (ref 0–0.03)
TV REST PULMONARY ARTERY PRESSURE: 9 MMHG
WBC # BLD AUTO: 6.36 K/UL (ref 3.9–12.7)
Z-SCORE OF LEFT VENTRICULAR DIMENSION IN END DIASTOLE: -3.18
Z-SCORE OF LEFT VENTRICULAR DIMENSION IN END SYSTOLE: -2.83

## 2025-03-08 PROCEDURE — 80048 BASIC METABOLIC PNL TOTAL CA: CPT | Performed by: INTERNAL MEDICINE

## 2025-03-08 PROCEDURE — S4991 NICOTINE PATCH NONLEGEND: HCPCS | Performed by: INTERNAL MEDICINE

## 2025-03-08 PROCEDURE — 94640 AIRWAY INHALATION TREATMENT: CPT

## 2025-03-08 PROCEDURE — 11000001 HC ACUTE MED/SURG PRIVATE ROOM

## 2025-03-08 PROCEDURE — 80048 BASIC METABOLIC PNL TOTAL CA: CPT | Mod: 91 | Performed by: HOSPITALIST

## 2025-03-08 PROCEDURE — 25000003 PHARM REV CODE 250: Performed by: INTERNAL MEDICINE

## 2025-03-08 PROCEDURE — 25000242 PHARM REV CODE 250 ALT 637 W/ HCPCS: Performed by: INTERNAL MEDICINE

## 2025-03-08 PROCEDURE — 83735 ASSAY OF MAGNESIUM: CPT | Performed by: INTERNAL MEDICINE

## 2025-03-08 PROCEDURE — 94761 N-INVAS EAR/PLS OXIMETRY MLT: CPT

## 2025-03-08 PROCEDURE — 25000242 PHARM REV CODE 250 ALT 637 W/ HCPCS: Performed by: HOSPITALIST

## 2025-03-08 PROCEDURE — 84484 ASSAY OF TROPONIN QUANT: CPT | Performed by: INTERNAL MEDICINE

## 2025-03-08 PROCEDURE — 85025 COMPLETE CBC W/AUTO DIFF WBC: CPT | Performed by: INTERNAL MEDICINE

## 2025-03-08 PROCEDURE — 63600175 PHARM REV CODE 636 W HCPCS: Performed by: INTERNAL MEDICINE

## 2025-03-08 PROCEDURE — 25000003 PHARM REV CODE 250: Performed by: HOSPITALIST

## 2025-03-08 PROCEDURE — 25000003 PHARM REV CODE 250: Performed by: STUDENT IN AN ORGANIZED HEALTH CARE EDUCATION/TRAINING PROGRAM

## 2025-03-08 RX ORDER — TIZANIDINE 4 MG/1
4 TABLET ORAL NIGHTLY PRN
Status: DISCONTINUED | OUTPATIENT
Start: 2025-03-08 | End: 2025-03-09 | Stop reason: HOSPADM

## 2025-03-08 RX ORDER — HYDRALAZINE HYDROCHLORIDE 25 MG/1
100 TABLET, FILM COATED ORAL EVERY 8 HOURS
Status: DISCONTINUED | OUTPATIENT
Start: 2025-03-08 | End: 2025-03-09 | Stop reason: HOSPADM

## 2025-03-08 RX ORDER — LISINOPRIL 20 MG/1
40 TABLET ORAL DAILY
Status: DISCONTINUED | OUTPATIENT
Start: 2025-03-08 | End: 2025-03-09 | Stop reason: HOSPADM

## 2025-03-08 RX ORDER — SODIUM CHLORIDE 9 MG/ML
INJECTION, SOLUTION INTRAVENOUS CONTINUOUS
Status: DISCONTINUED | OUTPATIENT
Start: 2025-03-08 | End: 2025-03-08

## 2025-03-08 RX ORDER — PREDNISONE 20 MG/1
40 TABLET ORAL DAILY
Status: DISCONTINUED | OUTPATIENT
Start: 2025-03-08 | End: 2025-03-09 | Stop reason: HOSPADM

## 2025-03-08 RX ORDER — IPRATROPIUM BROMIDE AND ALBUTEROL SULFATE 2.5; .5 MG/3ML; MG/3ML
3 SOLUTION RESPIRATORY (INHALATION) EVERY 4 HOURS
Status: DISCONTINUED | OUTPATIENT
Start: 2025-03-08 | End: 2025-03-09

## 2025-03-08 RX ORDER — ARFORMOTEROL TARTRATE 15 UG/2ML
15 SOLUTION RESPIRATORY (INHALATION) 2 TIMES DAILY
Status: DISCONTINUED | OUTPATIENT
Start: 2025-03-08 | End: 2025-03-09 | Stop reason: HOSPADM

## 2025-03-08 RX ORDER — AMLODIPINE BESYLATE 5 MG/1
10 TABLET ORAL DAILY
Status: DISCONTINUED | OUTPATIENT
Start: 2025-03-08 | End: 2025-03-09 | Stop reason: HOSPADM

## 2025-03-08 RX ORDER — IBUPROFEN 200 MG
1 TABLET ORAL DAILY
Status: DISCONTINUED | OUTPATIENT
Start: 2025-03-08 | End: 2025-03-09 | Stop reason: HOSPADM

## 2025-03-08 RX ORDER — HYDRALAZINE HYDROCHLORIDE 20 MG/ML
10 INJECTION INTRAMUSCULAR; INTRAVENOUS EVERY 6 HOURS PRN
Status: DISCONTINUED | OUTPATIENT
Start: 2025-03-08 | End: 2025-03-09 | Stop reason: HOSPADM

## 2025-03-08 RX ORDER — ACETAMINOPHEN 325 MG/1
650 TABLET ORAL EVERY 4 HOURS PRN
Status: DISCONTINUED | OUTPATIENT
Start: 2025-03-08 | End: 2025-03-09 | Stop reason: HOSPADM

## 2025-03-08 RX ORDER — ONDANSETRON HYDROCHLORIDE 2 MG/ML
4 INJECTION, SOLUTION INTRAVENOUS EVERY 6 HOURS PRN
Status: DISCONTINUED | OUTPATIENT
Start: 2025-03-08 | End: 2025-03-09 | Stop reason: HOSPADM

## 2025-03-08 RX ORDER — BUSPIRONE HYDROCHLORIDE 10 MG/1
30 TABLET ORAL 2 TIMES DAILY
Status: DISCONTINUED | OUTPATIENT
Start: 2025-03-08 | End: 2025-03-09 | Stop reason: HOSPADM

## 2025-03-08 RX ORDER — FLUTICASONE FUROATE AND VILANTEROL 100; 25 UG/1; UG/1
1 POWDER RESPIRATORY (INHALATION) DAILY
Status: DISCONTINUED | OUTPATIENT
Start: 2025-03-08 | End: 2025-03-08

## 2025-03-08 RX ORDER — ALPRAZOLAM 0.5 MG/1
1.5 TABLET ORAL NIGHTLY
Status: DISCONTINUED | OUTPATIENT
Start: 2025-03-08 | End: 2025-03-09 | Stop reason: HOSPADM

## 2025-03-08 RX ORDER — ENOXAPARIN SODIUM 100 MG/ML
40 INJECTION SUBCUTANEOUS EVERY 24 HOURS
Status: DISCONTINUED | OUTPATIENT
Start: 2025-03-08 | End: 2025-03-09 | Stop reason: HOSPADM

## 2025-03-08 RX ORDER — PREDNISONE 20 MG/1
40 TABLET ORAL DAILY
Status: DISCONTINUED | OUTPATIENT
Start: 2025-03-08 | End: 2025-03-08

## 2025-03-08 RX ORDER — CARVEDILOL 12.5 MG/1
12.5 TABLET ORAL 2 TIMES DAILY
Status: DISCONTINUED | OUTPATIENT
Start: 2025-03-08 | End: 2025-03-09 | Stop reason: HOSPADM

## 2025-03-08 RX ORDER — INSULIN ASPART 100 [IU]/ML
0-5 INJECTION, SOLUTION INTRAVENOUS; SUBCUTANEOUS
Status: DISCONTINUED | OUTPATIENT
Start: 2025-03-08 | End: 2025-03-09 | Stop reason: HOSPADM

## 2025-03-08 RX ORDER — IPRATROPIUM BROMIDE AND ALBUTEROL SULFATE 2.5; .5 MG/3ML; MG/3ML
3 SOLUTION RESPIRATORY (INHALATION)
Status: DISCONTINUED | OUTPATIENT
Start: 2025-03-08 | End: 2025-03-08

## 2025-03-08 RX ORDER — FUROSEMIDE 10 MG/ML
40 INJECTION INTRAMUSCULAR; INTRAVENOUS ONCE
Status: COMPLETED | OUTPATIENT
Start: 2025-03-08 | End: 2025-03-08

## 2025-03-08 RX ORDER — SODIUM CHLORIDE 0.9 % (FLUSH) 0.9 %
10 SYRINGE (ML) INJECTION EVERY 12 HOURS PRN
Status: DISCONTINUED | OUTPATIENT
Start: 2025-03-08 | End: 2025-03-09 | Stop reason: HOSPADM

## 2025-03-08 RX ORDER — ATORVASTATIN CALCIUM 40 MG/1
40 TABLET, FILM COATED ORAL NIGHTLY
Status: DISCONTINUED | OUTPATIENT
Start: 2025-03-08 | End: 2025-03-09 | Stop reason: HOSPADM

## 2025-03-08 RX ORDER — GLUCAGON 1 MG
1 KIT INJECTION
Status: DISCONTINUED | OUTPATIENT
Start: 2025-03-08 | End: 2025-03-09 | Stop reason: HOSPADM

## 2025-03-08 RX ORDER — IBUPROFEN 200 MG
16 TABLET ORAL
Status: DISCONTINUED | OUTPATIENT
Start: 2025-03-08 | End: 2025-03-09 | Stop reason: HOSPADM

## 2025-03-08 RX ORDER — POLYETHYLENE GLYCOL 3350 17 G/17G
17 POWDER, FOR SOLUTION ORAL 2 TIMES DAILY PRN
Status: DISCONTINUED | OUTPATIENT
Start: 2025-03-08 | End: 2025-03-09 | Stop reason: HOSPADM

## 2025-03-08 RX ORDER — BUDESONIDE 0.5 MG/2ML
0.5 INHALANT ORAL EVERY 12 HOURS
Status: DISCONTINUED | OUTPATIENT
Start: 2025-03-08 | End: 2025-03-09 | Stop reason: HOSPADM

## 2025-03-08 RX ORDER — ALPRAZOLAM 0.5 MG/1
1.5 TABLET ORAL DAILY
Status: DISCONTINUED | OUTPATIENT
Start: 2025-03-08 | End: 2025-03-08

## 2025-03-08 RX ORDER — SENNOSIDES 8.6 MG/1
1 TABLET ORAL 2 TIMES DAILY
Status: DISCONTINUED | OUTPATIENT
Start: 2025-03-08 | End: 2025-03-09 | Stop reason: HOSPADM

## 2025-03-08 RX ORDER — FLUOXETINE HYDROCHLORIDE 20 MG/1
40 CAPSULE ORAL DAILY
Status: DISCONTINUED | OUTPATIENT
Start: 2025-03-08 | End: 2025-03-09 | Stop reason: HOSPADM

## 2025-03-08 RX ORDER — OXYCODONE AND ACETAMINOPHEN 10; 325 MG/1; MG/1
1 TABLET ORAL EVERY 6 HOURS PRN
Refills: 0 | Status: DISCONTINUED | OUTPATIENT
Start: 2025-03-08 | End: 2025-03-09 | Stop reason: HOSPADM

## 2025-03-08 RX ORDER — IBUPROFEN 200 MG
24 TABLET ORAL
Status: DISCONTINUED | OUTPATIENT
Start: 2025-03-08 | End: 2025-03-09 | Stop reason: HOSPADM

## 2025-03-08 RX ORDER — SIMETHICONE 80 MG
1 TABLET,CHEWABLE ORAL 4 TIMES DAILY PRN
Status: DISCONTINUED | OUTPATIENT
Start: 2025-03-08 | End: 2025-03-09 | Stop reason: HOSPADM

## 2025-03-08 RX ORDER — IPRATROPIUM BROMIDE AND ALBUTEROL SULFATE 2.5; .5 MG/3ML; MG/3ML
3 SOLUTION RESPIRATORY (INHALATION) EVERY 4 HOURS PRN
Status: DISCONTINUED | OUTPATIENT
Start: 2025-03-08 | End: 2025-03-09 | Stop reason: HOSPADM

## 2025-03-08 RX ORDER — GABAPENTIN 300 MG/1
600 CAPSULE ORAL 3 TIMES DAILY
Status: DISCONTINUED | OUTPATIENT
Start: 2025-03-08 | End: 2025-03-09 | Stop reason: HOSPADM

## 2025-03-08 RX ORDER — AMLODIPINE BESYLATE 5 MG/1
10 TABLET ORAL NIGHTLY
Status: DISCONTINUED | OUTPATIENT
Start: 2025-03-08 | End: 2025-03-08

## 2025-03-08 RX ORDER — TALC
6 POWDER (GRAM) TOPICAL NIGHTLY PRN
Status: DISCONTINUED | OUTPATIENT
Start: 2025-03-08 | End: 2025-03-09 | Stop reason: HOSPADM

## 2025-03-08 RX ADMIN — IPRATROPIUM BROMIDE AND ALBUTEROL SULFATE 3 ML: 2.5; .5 SOLUTION RESPIRATORY (INHALATION) at 01:03

## 2025-03-08 RX ADMIN — LISINOPRIL 40 MG: 20 TABLET ORAL at 09:03

## 2025-03-08 RX ADMIN — BUSPIRONE HYDROCHLORIDE 30 MG: 10 TABLET ORAL at 08:03

## 2025-03-08 RX ADMIN — ATORVASTATIN CALCIUM 40 MG: 40 TABLET, FILM COATED ORAL at 08:03

## 2025-03-08 RX ADMIN — INSULIN ASPART 1 UNITS: 100 INJECTION, SOLUTION INTRAVENOUS; SUBCUTANEOUS at 09:03

## 2025-03-08 RX ADMIN — BUSPIRONE HYDROCHLORIDE 30 MG: 10 TABLET ORAL at 09:03

## 2025-03-08 RX ADMIN — IPRATROPIUM BROMIDE AND ALBUTEROL SULFATE 3 ML: 2.5; .5 SOLUTION RESPIRATORY (INHALATION) at 09:03

## 2025-03-08 RX ADMIN — ALPRAZOLAM 1.5 MG: 0.5 TABLET ORAL at 02:03

## 2025-03-08 RX ADMIN — HYDRALAZINE HYDROCHLORIDE 100 MG: 25 TABLET ORAL at 08:03

## 2025-03-08 RX ADMIN — ALPRAZOLAM 1.5 MG: 0.5 TABLET ORAL at 08:03

## 2025-03-08 RX ADMIN — AMLODIPINE BESYLATE 10 MG: 5 TABLET ORAL at 01:03

## 2025-03-08 RX ADMIN — ENOXAPARIN SODIUM 40 MG: 40 INJECTION SUBCUTANEOUS at 04:03

## 2025-03-08 RX ADMIN — GABAPENTIN 600 MG: 300 CAPSULE ORAL at 01:03

## 2025-03-08 RX ADMIN — CARVEDILOL 12.5 MG: 12.5 TABLET, FILM COATED ORAL at 09:03

## 2025-03-08 RX ADMIN — Medication 1 PATCH: at 09:03

## 2025-03-08 RX ADMIN — PREDNISONE 40 MG: 20 TABLET ORAL at 09:03

## 2025-03-08 RX ADMIN — GABAPENTIN 600 MG: 300 CAPSULE ORAL at 08:03

## 2025-03-08 RX ADMIN — IPRATROPIUM BROMIDE AND ALBUTEROL SULFATE 3 ML: 2.5; .5 SOLUTION RESPIRATORY (INHALATION) at 08:03

## 2025-03-08 RX ADMIN — SODIUM CHLORIDE: 9 INJECTION, SOLUTION INTRAVENOUS at 02:03

## 2025-03-08 RX ADMIN — ARFORMOTEROL TARTRATE 15 MCG: 15 SOLUTION RESPIRATORY (INHALATION) at 08:03

## 2025-03-08 RX ADMIN — FUROSEMIDE 40 MG: 10 INJECTION, SOLUTION INTRAVENOUS at 01:03

## 2025-03-08 RX ADMIN — SODIUM ZIRCONIUM CYCLOSILICATE 5 G: 5 POWDER, FOR SUSPENSION ORAL at 12:03

## 2025-03-08 RX ADMIN — HYDRALAZINE HYDROCHLORIDE 100 MG: 25 TABLET ORAL at 01:03

## 2025-03-08 RX ADMIN — HYDRALAZINE HYDROCHLORIDE 100 MG: 25 TABLET ORAL at 05:03

## 2025-03-08 RX ADMIN — GABAPENTIN 600 MG: 300 CAPSULE ORAL at 02:03

## 2025-03-08 RX ADMIN — GABAPENTIN 600 MG: 300 CAPSULE ORAL at 09:03

## 2025-03-08 RX ADMIN — CARVEDILOL 12.5 MG: 12.5 TABLET, FILM COATED ORAL at 08:03

## 2025-03-08 RX ADMIN — BUDESONIDE 0.5 MG: 0.5 INHALANT RESPIRATORY (INHALATION) at 09:03

## 2025-03-08 RX ADMIN — BUDESONIDE 0.5 MG: 0.5 INHALANT RESPIRATORY (INHALATION) at 08:03

## 2025-03-08 RX ADMIN — INSULIN ASPART 1 UNITS: 100 INJECTION, SOLUTION INTRAVENOUS; SUBCUTANEOUS at 02:03

## 2025-03-08 RX ADMIN — OXYCODONE AND ACETAMINOPHEN 1 TABLET: 10; 325 TABLET ORAL at 08:03

## 2025-03-08 RX ADMIN — ARFORMOTEROL TARTRATE 15 MCG: 15 SOLUTION RESPIRATORY (INHALATION) at 09:03

## 2025-03-08 RX ADMIN — OXYCODONE AND ACETAMINOPHEN 1 TABLET: 10; 325 TABLET ORAL at 01:03

## 2025-03-08 RX ADMIN — INSULIN ASPART 3 UNITS: 100 INJECTION, SOLUTION INTRAVENOUS; SUBCUTANEOUS at 05:03

## 2025-03-08 RX ADMIN — SODIUM CHLORIDE 500 ML: 9 INJECTION, SOLUTION INTRAVENOUS at 01:03

## 2025-03-08 RX ADMIN — FLUOXETINE HYDROCHLORIDE 40 MG: 20 CAPSULE ORAL at 09:03

## 2025-03-08 RX ADMIN — SODIUM CHLORIDE 1000 ML: 9 INJECTION, SOLUTION INTRAVENOUS at 05:03

## 2025-03-08 NOTE — PLAN OF CARE
Problem: Adult Inpatient Plan of Care  Goal: Plan of Care Review  Outcome: Progressing     Problem: Electrolyte Imbalance  Goal: Electrolyte Balance  Outcome: Progressing

## 2025-03-08 NOTE — ED PROVIDER NOTES
Encounter Date: 3/7/2025       History     Chief Complaint   Patient presents with    Abnormal Lab     Pt reports to ED for abnormal potassium and for SOB with wheezing for 1 month.      69 y.o. male with history below presents for evaluation of wheezing, hyperkalemia.  Patient was evaluated with labs outpatient.  When his primary care provider called him to discuss his hyperkalemia on labs, they noticed wheezing and dyspnea and encouraged the patient follow up in the emergency room immediately.  The patient reports to me that he has had at least 1 month but likely longer of wheezing and dyspnea.  No fevers or chills.  Endorses nonproductive cough.  Continues to smoke however is trying to cut back.  The patient otherwise denies Chest Pain, Abdominal Pain, N/V/D/Constipation, Eye complaints or Visual changes, and Ear complaints    Triage vitals were reviewed and are: Initial Vitals [03/07/25 1902]  BP: (!) 144/65  Pulse: 77  Resp: (!) 23  Temp: 98 °F (36.7 °C)  SpO2: 95 %  MAP: n/a    The Patient:   has a past medical history of Cataract, bilateral (09/11/2018), Degenerative disc disease, Diabetes mellitus type II, controlled, Eye injury (as a child), psychiatric care, Hyperlipidemia, Hypertension, MRSA (methicillin resistant Staphylococcus aureus), Open angle with borderline findings and high glaucoma risk in both eyes (10/08/2019), Personal history of colonic polyps, Psychiatric problem, and Therapy.   has a past surgical history that includes Appendectomy; Colonoscopy (N/A, 5/10/2017); Posterior fusion of cervical spine with laminectomy (N/A, 10/3/2021); and Colonoscopy (N/A, 8/4/2023).   reports that he has been smoking cigarettes and vaping with nicotine. He started smoking about 53 years ago. He has a 52.1 pack-year smoking history. He has never used smokeless tobacco. He reports current drug use. Drugs: Marijuana, Oxycodone, and Benzodiazepines. He reports that he does not drink alcohol.  Has allergies listed  as: Patient has no known allergies.        The history is provided by the patient. No  was used.     Review of patient's allergies indicates:  No Known Allergies  Past Medical History:   Diagnosis Date    Cataract, bilateral 09/11/2018    Degenerative disc disease     Diabetes mellitus type II, controlled     Eye injury as a child    stick hit eye ? 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 both eyes 10/08/2019    Personal history of colonic polyps     Psychiatric problem     Therapy     [facility] many years ago     Past Surgical History:   Procedure Laterality Date    APPENDECTOMY      COLONOSCOPY N/A 5/10/2017    Procedure: COLONOSCOPY;  Surgeon: Shantanu Marley MD;  Location: [facility] ENDO;  Service: Endoscopy;  Laterality: N/A;    COLONOSCOPY N/A 8/4/2023    Procedure: COLONOSCOPY;  Surgeon: Gael Rand MD;  Location: [facility] ENDO;  Service: Endoscopy;  Laterality: N/A;  Referred by: Dr. Gabriel Evans  Prep: [email or web address]  Route instructions sent: [facility] and reviewed via phone, pt verbalized [id number]    POSTERIOR FUSION OF CERVICAL SPINE WITH LAMINECTOMY N/A 10/3/2021    Procedure: LAMINECTOMY, SPINE, CERVICAL, WITH POSTERIOR FUSION C2-T2;  Surgeon: Ana Rosa Geller MD;  Location: [facility] OR [address] [place] [address];  Service: Neurosurgery;  Laterality: N/A;     Family History   Problem Relation Name Age of Onset    Heart disease Mother      Heart disease Father      Alcohol abuse Father      No Known Problems Sister      No Known Problems Brother      Diabetes Son      No Known Problems Maternal Aunt      No Known Problems Maternal Uncle      No Known Problems Paternal Aunt      No Known Problems Paternal Uncle      No Known Problems Maternal Grandmother      No Known Problems Maternal Grandfather      No Known Problems Paternal Grandmother      No  Known Problems Paternal Grandfather      Amblyopia Neg Hx      Blindness Neg Hx      Cancer Neg Hx      Cataracts Neg Hx      Glaucoma Neg Hx      Hypertension Neg Hx      Macular degeneration Neg Hx      Retinal detachment Neg Hx      Strabismus Neg Hx      Stroke Neg Hx      Thyroid disease Neg Hx      Anxiety disorder Neg Hx      Dementia Neg Hx      Depression Neg Hx       Social History[1]  Review of Systems   All other systems reviewed and are negative.      Physical Exam     Initial Vitals [03/07/25 1902]   BP Pulse Resp Temp SpO2   (!) 144/65 77 (!) 23 98 °F (36.7 °C) 95 %      MAP       --         Physical Exam    Nursing note and vitals reviewed.  Constitutional: He appears well-developed and well-nourished.   Body mass index is 30.13 kg/m².   HENT:   Head: Normocephalic and atraumatic.   Nose: Nose normal. Mouth/Throat: Oropharynx is clear and moist.   Eyes: EOM are normal. Pupils are equal, round, and reactive to light.   Neck: Neck supple.   Cardiovascular:  Normal rate, regular rhythm, normal heart sounds and intact distal pulses.           Pulmonary/Chest:   SpO2 95%.  Tachypneic.  Speaking in 3-4 word sentences.  Diffuse biphasic wheeze with associated audible wheezing.   Musculoskeletal:      Cervical back: Neck supple.     Neurological: He is alert and oriented to person, place, and time. GCS score is 15. GCS eye subscore is 4. GCS verbal subscore is 5. GCS motor subscore is 6.   Skin: Skin is warm and dry. Capillary refill takes less than 2 seconds.   Psychiatric: He has a normal mood and affect. His behavior is normal.         ED Course   Critical Care    Date/Time: 3/7/2025 8:32 PM    Performed by: Car Restrepo PA-C  Authorized by: Jose Hensley MD  Total critical care time (exclusive of procedural time) : 0 minutes  Comments: A total of 35 minutes of critical care time was spent by me in the evaluation and management of this patient. Critical care time was performed independently of  other procedures and teaching time and included taking a history; performing a physical exam; review of medical charts and documentation; ordering and interpreting labs, imaging and other tests; discussion of patient presentation with consultants; discussion of goals of care of the patient; reevaluation of the patients condition; determination of the patient disposition; and documentation.          Labs Reviewed   CBC W/ AUTO DIFFERENTIAL - Abnormal       Result Value    WBC 8.80      RBC 3.91 (*)     Hemoglobin 13.4 (*)     Hematocrit 40.1       (*)     MCH 34.3 (*)     MCHC 33.4      RDW 14.1      Platelets 125 (*)     MPV 12.2      Immature Granulocytes 0.3      Gran # (ANC) 6.7      Immature Grans (Abs) 0.03      Lymph # 1.0      Mono # 1.1 (*)     Eos # 0.0      Baso # 0.02      nRBC 0      Gran % 76.2 (*)     Lymph % 11.0 (*)     Mono % 12.2      Eosinophil % 0.1      Basophil % 0.2      Differential Method Automated     COMPREHENSIVE METABOLIC PANEL - Abnormal    Sodium 140      Potassium 5.2 (*)     Chloride 109      CO2 20 (*)     Glucose 179 (*)     BUN 50 (*)     Creatinine 1.8 (*)     Calcium 8.2 (*)     Total Protein 7.1      Albumin 3.9      Total Bilirubin 0.9      Alkaline Phosphatase 47      AST 27      ALT 30      eGFR 40 (*)     Anion Gap 11     URINALYSIS, REFLEX TO URINE CULTURE - Abnormal    Specimen UA Urine, Clean Catch      Color, UA Yellow      Appearance, UA Clear      pH, UA 6.0      Specific Gravity, UA 1.020      Protein, UA Negative      Glucose, UA Trace (*)     Ketones, UA Trace (*)     Bilirubin (UA) Negative      Occult Blood UA Negative      Nitrite, UA Negative      Urobilinogen, UA Negative      Leukocytes, UA Negative      Narrative:     Specimen Source->Urine   BASIC METABOLIC PANEL - Abnormal    Sodium 138      Potassium 6.3 (*)     Chloride 112 (*)     CO2 23      Glucose 120 (*)     BUN 50 (*)     Creatinine 1.6 (*)     Calcium 7.7 (*)     Anion Gap 3 (*)     eGFR 46  (*)     Narrative:       K  critical result(s) called and verbal readback obtained from    FERMIN KODI  by KRISTIE 03/07/2025 23:24   ISTAT PROCEDURE - Abnormal    POC PH 7.302 (*)     POC PCO2 52.7 (*)     POC PO2 44      POC HCO3 26.1      POC BE -1      POC SATURATED O2 74      POC TCO2 28      Sample VENOUS      Site Other      Allens Test N/A      DelSys Room Air      Mode SPONT     POCT GLUCOSE - Abnormal    POCT Glucose 232 (*)    POCT GLUCOSE - Abnormal    POCT Glucose 143 (*)    TROPONIN I    Troponin I <0.006     B-TYPE NATRIURETIC PEPTIDE    BNP 46     MAGNESIUM    Magnesium 2.2     PHOSPHORUS    Phosphorus 2.7     BASIC METABOLIC PANEL   MAGNESIUM   CBC W/ AUTO DIFFERENTIAL   TROPONIN I   POCT GLUCOSE MONITORING CONTINUOUS          Imaging Results              CT Chest Without Contrast (Final result)  Result time 03/08/25 01:59:06      Final result by Mara Kinsey MD (03/08/25 01:59:06)                   Impression:      No acute intrathoracic abnormalities identified.  No lung consolidation.      Electronically signed by: Mara Kinsey MD  Date:    03/08/2025  Time:    01:59               Narrative:    EXAMINATION:  CT CHEST WITHOUT CONTRAST    CLINICAL HISTORY:  Cough, persistent;Interstitial lung disease;    TECHNIQUE:  Low dose axial images, sagittal and coronal reformations were obtained from the thoracic inlet to the lung bases. Contrast was not administered.    COMPARISON:  None.    FINDINGS:  Structures at the base of the neck are unremarkable.  Aorta is non-aneurysmal.  Coronary artery calcification and mild aortic atherosclerosis are seen.  The heart is normal in size without pericardial effusion.  The esophagus is unremarkable along its course.    The trachea and bronchi are patent.  The lungs are symmetrically expanded.  Lungs show no consolidation or pleural effusion.  Minimal suspected scarring is seen at the left lung base.    The visualized abdominal structures show no acute  abnormalities.  Partially visualized 4.5 cm left renal cyst is seen.  No acute osseous abnormality identified.  Partially visualized cervicothoracic fusion hardware is seen.  Degenerative changes are seen in the spine.  Extrathoracic soft tissues are unremarkable.                                       X-Ray Chest 1 View (Final result)  Result time 03/07/25 20:32:22      Final result by Sid Beltran MD (03/07/25 20:32:22)                   Impression:      1. Interstitial findings are accentuated by habitus, edema or other pneumonitis can present in this fashion.  Correlation is advised.      Electronically signed by: Sid Beltran MD  Date:    03/07/2025  Time:    20:32               Narrative:    EXAMINATION:  XR CHEST 1 VIEW    CLINICAL HISTORY:  shortness of breath;    TECHNIQUE:  Single frontal view of the chest was performed.    COMPARISON:  01/13/2025    FINDINGS:  The cardiomediastinal silhouette is not enlarged noting calcification of the aorta..  There is no pleural effusion.  The trachea is midline.  The lungs are symmetrically expanded bilaterally with mildly coarse interstitial attenuation.  No large focal consolidation seen.  There is no pneumothorax.  The osseous structures are remarkable for degenerative change..  There are surgical changes of the cervical spine.                                       Medications   hydrALAZINE injection 10 mg (has no administration in time range)   0.9% NaCl infusion ( Intravenous New Bag 3/8/25 0229)   sodium chloride 0.9% flush 10 mL (has no administration in time range)   enoxaparin injection 40 mg (has no administration in time range)   acetaminophen tablet 650 mg (has no administration in time range)   polyethylene glycol packet 17 g (has no administration in time range)   ondansetron injection 4 mg (has no administration in time range)   melatonin tablet 6 mg (has no administration in time range)   simethicone chewable tablet 80 mg (has no administration  in time range)   glucose chewable tablet 16 g (has no administration in time range)   glucose chewable tablet 24 g (has no administration in time range)   dextrose 50% injection 12.5 g (has no administration in time range)   dextrose 50% injection 25 g (has no administration in time range)   glucagon (human recombinant) injection 1 mg (has no administration in time range)   insulin aspart U-100 pen 0-5 Units (1 Units Subcutaneous Given 3/8/25 0224)   albuterol-ipratropium 2.5 mg-0.5 mg/3 mL nebulizer solution 3 mL (has no administration in time range)   atorvastatin tablet 40 mg (has no administration in time range)   busPIRone tablet 30 mg (30 mg Oral Not Given 3/8/25 0030)   carvediloL tablet 12.5 mg (has no administration in time range)   FLUoxetine capsule 40 mg (has no administration in time range)   gabapentin capsule 600 mg (600 mg Oral Given 3/8/25 0128)   hydrALAZINE tablet 100 mg (has no administration in time range)   lisinopriL tablet 40 mg (has no administration in time range)   oxyCODONE-acetaminophen  mg per tablet 1 tablet (has no administration in time range)   senna tablet 1 tablet (has no administration in time range)   tiZANidine tablet 4 mg (has no administration in time range)   budesonide nebulizer solution 0.5 mg (has no administration in time range)   arformoteroL nebulizer solution 15 mcg (has no administration in time range)   albuterol-ipratropium 2.5 mg-0.5 mg/3 mL nebulizer solution 3 mL (has no administration in time range)   ALPRAZolam tablet 1.5 mg (1.5 mg Oral Given 3/8/25 0223)   amLODIPine tablet 10 mg (has no administration in time range)   predniSONE tablet 40 mg (has no administration in time range)   nicotine 21 mg/24 hr 1 patch (has no administration in time range)   methylPREDNISolone sodium succinate injection 125 mg (125 mg Intravenous Given 3/7/25 2047)   0.9% NaCl infusion (0 mLs Intravenous Stopped 3/7/25 2349)   insulin regular injection 5 Units 0.05 mL (5 Units  Intravenous Given 3/7/25 2345)   dextrose 50 % in water (D50W) injection 25 g (25 g Intravenous Given 3/7/25 2343)   sodium zirconium cyclosilicate packet 5 g (5 g Oral Given 3/8/25 0008)   sodium chloride 0.9% bolus 500 mL 500 mL (0 mLs Intravenous Stopped 3/8/25 0227)   furosemide injection 40 mg (40 mg Intravenous Given 3/8/25 0128)     Medical Decision Making  Encounter Date: 3/7/2025  --------------------------------------------------------------------------  69 y.o. male presents for evaluation of wheezing.  Hemodynamically stable. Afebrile. Phonating and protecting the airway spontaneously. No clinical evidence for cardiovascular instability or impending airway compromise.  Remainder of physical exam as above.    Additional or Independent Historians available and contributing to the history as above: Primary Care Provider or Care Facility  Prior medical records, when available, were reviewed. This includes a review of the patients comorbidities, medications, and recent hospital or outpatient notes.   Comorbid Conditions affecting evaluation, treatment or discharge planning:  Hypertension, diabetes  Social Determinates of Health identified and considered in the evaluation and treatment of this patient: None Identified or significantly impacting evaluation and treatment    Differential diagnoses includes but is not limited to:   PE, MI/ACS, pneumothorax, pericardial effusion/tamonade, pneumonia, lung abscess, pericarditis/myocarditis, pleural effusion, lung mass, CHF exacerbation, asthma exacerbation, COPD exacerbation, aspirated/ingested foreign body, airway obstruction, CO poisoning, anemia, metabolic derangement, allergy/atopy, influenza, viral URI, viral syndrome.  --------------------------------------------------------------------------  All available clinical tests were reviewed by me and documented in the ED course.  Vitals range:   Temp:  [98 °F (36.7 °C)-98.9 °F (37.2 °C)] 98.9 °F (37.2 °C)  Pulse:   "[60-84] 84  Resp:  [14-25] 19  SpO2:  [94 %-97 %] 94 %  BP: (120-156)/(56-99) 142/65  Estimated body mass index is 30.13 kg/m² as calculated from the following:    Height as of this encounter: 5' 11" (1.803 m).    Weight as of this encounter: 98 kg (216 lb).    ED MEDS GIVEN:  Medications  hydrALAZINE injection 10 mg (has no administration in time range)  0.9% NaCl infusion ( Intravenous New Bag 3/8/25 0229)  sodium chloride 0.9% flush 10 mL (has no administration in time range)  enoxaparin injection 40 mg (has no administration in time range)  acetaminophen tablet 650 mg (has no administration in time range)  polyethylene glycol packet 17 g (has no administration in time range)  ondansetron injection 4 mg (has no administration in time range)  melatonin tablet 6 mg (has no administration in time range)  simethicone chewable tablet 80 mg (has no administration in time range)  glucose chewable tablet 16 g (has no administration in time range)  glucose chewable tablet 24 g (has no administration in time range)  dextrose 50% injection 12.5 g (has no administration in time range)  dextrose 50% injection 25 g (has no administration in time range)  glucagon (human recombinant) injection 1 mg (has no administration in time range)  insulin aspart U-100 pen 0-5 Units (1 Units Subcutaneous Given 3/8/25 0224)  albuterol-ipratropium 2.5 mg-0.5 mg/3 mL nebulizer solution 3 mL (has no administration in time range)  atorvastatin tablet 40 mg (has no administration in time range)  busPIRone tablet 30 mg (30 mg Oral Not Given 3/8/25 0030)  carvediloL tablet 12.5 mg (has no administration in time range)  FLUoxetine capsule 40 mg (has no administration in time range)  gabapentin capsule 600 mg (600 mg Oral Given 3/8/25 0128)  hydrALAZINE tablet 100 mg (has no administration in time range)  lisinopriL tablet 40 mg (has no administration in time range)  oxyCODONE-acetaminophen  mg per tablet 1 tablet (has no administration in time " range)  senna tablet 1 tablet (has no administration in time range)  tiZANidine tablet 4 mg (has no administration in time range)  budesonide nebulizer solution 0.5 mg (has no administration in time range)  arformoteroL nebulizer solution 15 mcg (has no administration in time range)   albuterol-ipratropium 2.5 mg-0.5 mg/3 mL nebulizer solution 3 mL (has no administration in time range)  ALPRAZolam tablet 1.5 mg (1.5 mg Oral Given 3/8/25 0223)  amLODIPine tablet 10 mg (has no administration in time range)  predniSONE tablet 40 mg (has no administration in time range)  nicotine 21 mg/24 hr 1 patch (has no administration in time range)  methylPREDNISolone sodium succinate injection 125 mg (125 mg Intravenous Given 3/7/25 2047)  0.9% NaCl infusion (0 mLs Intravenous Stopped 3/7/25 2349)  insulin regular injection 5 Units 0.05 mL (5 Units Intravenous Given 3/7/25 2345)  dextrose 50 % in water (D50W) injection 25 g (25 g Intravenous Given 3/7/25 2343)  sodium zirconium cyclosilicate packet 5 g (5 g Oral Given 3/8/25 0008)  sodium chloride 0.9% bolus 500 mL 500 mL (0 mLs Intravenous Stopped 3/8/25 0227)  furosemide injection 40 mg (40 mg Intravenous Given 3/8/25 0128)    Procedures Performed: Critical Care  --------------------------------------------------------------------------  Problem #1:  Hyperkalemia  The outpatient presents with outpatient labs demonstrating hyperkalemia.  This is likely secondary to addition of spironolactone to antihypertensive list.  However, after trial of shifting and excretion, potassium up trending.  EKG without significant changes.  Discussed with Hospital Medicine for further evaluation and workup of hyperkalemia.    Problem #2:  COPD exacerbation  The above patient presents for evaluation of dyspnea and wheezing.  This seems to be a subacute/chronic issue.  Patient has been using an empty rescue inhaler.  He has never been evaluated by pulmonology.  Improving but not completely resolved  wheezing and dyspnea after albuterol and steroids.  Will admit to hospital medicine for further evaluation.    Problem #3:  SHAUNA on CKD  The outpatient presents with acute kidney injury on CKD.  Suspect mild dehydration component.  Improving with IV fluids.  However given the above hyperkalemia, may need Lasix and renal dosing of medication.    The patient presentation is consistent with a diagnosis that poses significant threat of bodily harm or function with need for further evaluation, inpatient treatment or observation. As such, Hospital Medicine team was consulted for admission.   DISCLAIMER: This note was prepared with [employer] voice recognition transcription software. Garbled syntax, mangled pronouns, and other bizarre constructions may be attributed to that software system.    Final diagnoses:  [J44.1] COPD with acute exacerbation  [E87.5] Hyperkalemia (Primary)  [N17.9, N18.9] Acute kidney injury superimposed on CKD               Amount and/or Complexity of Data Reviewed  Labs:  Decision-making details documented in ED Course.  Radiology:  Decision-making details documented in ED Course.  ECG/medicine tests:  Decision-making details documented in ED Course.    Risk  OTC drugs.  Prescription drug management.  Decision regarding hospitalization.               ED Course as of 03/08/25 0355   Fri Mar 07, 2025   2015 BP(!): 144/65 [AN]   2015 Temp: 98 °F (36.7 °C) [AN]   2015 Pulse: 77 [AN]   2015 Resp(!): 23 [AN]   2015 SpO2: 95 % [AN]   2108 ISTAT PROCEDURE(!)  VBG 7.3 / 52.7 / 26.1. Likely chronic [AN]   2109 CBC Auto Differential(!)  No leukocytosis.  Stable anemia.  Mild thrombocytopenia platelet count 125. [AN]   2109 Comprehensive Metabolic Panel(!)  Hyperkalemia 5.2.  No EKG changes.  No other significant metabolic abnormality.  SHAUNA creatinine 1.8, BUN 50 with EGFR of 40 [AN]   2109 Troponin I: <0.006 [AN]   2109 BNP: 46 [AN]   2109 Phosphorus Level: 2.7 [AN]   2109 Magnesium : 2.2 [AN]   2122 EKG  12-lead  EKG independently interpreted by me after review by attending physician.  Demonstrates sinus rhythm rate of 74 beats per minute.  Normal axis, normal intervals.  No acute ST elevation, depression or T-wave inversion to suggest ischemia.  No STEMI. [AN]   2123 X-Ray Chest 1 View  Independent interpretation:  No acute cardiopulmonary abnormality.  Diffuse coarse interstitial change similar to previous. [AN]   2328 Potassium(!!): 6.3 [AN]   2329 Basic metabolic panel(!!)  Renal function improving.  Worsening hyperkalemia. [AN]   2355 EKG 12-lead  Repeat EKG independently interpreted by me after you by attending physician.  Demonstrates sinus rhythm rate of 60 beats per minute.  Normal axis, normal intervals.  No acute ST elevation, depression or T-wave inversion to suggest ischemia.  No STEMI. [AN]      ED Course User Index  [AN] Car Restrepo PA-C                           Clinical Impression:  Final diagnoses:  [J44.1] COPD with acute exacerbation  [E87.5] Hyperkalemia (Primary)  [N17.9, N18.9] Acute kidney injury superimposed on CKD          ED Disposition Condition    Admit Stable                    [1]   Social History  Tobacco Use    Smoking status: Some Days     Current packs/day: 0.50     Average packs/day: 1 pack/day for 53.0 years (52.1 ttl pk-yrs)     Types: Cigarettes, Vaping with nicotine     Start date: 1972     Last attempt to quit: 6/20/2023    Smokeless tobacco: Never    Tobacco comments:     Approximately 3 cigarettes a day   Substance Use Topics    Alcohol use: No     Alcohol/week: 0.0 standard drinks of alcohol    Drug use: Yes     Types: Marijuana, Oxycodone, Benzodiazepines     Comment: 4 oxycodones daily; one marijuana cigarette daily        Car Restrepo PA-C  03/08/25 0355

## 2025-03-08 NOTE — ASSESSMENT & PLAN NOTE
Patient's blood pressure range in the last 24 hours was: BP  Min: 120/56  Max: 156/70.The patient's inpatient anti-hypertensive regimen is listed below:  Current Antihypertensives  hydrALAZINE injection 10 mg, Every 6 hours PRN, Intravenous for SBP>180 or DBP>90  amLODIPine tablet 10 mg, Nightly, Oral  carvediloL tablet 12.5 mg, 2 times daily, Oral  hydrALAZINE tablet 100 mg, Every 8 hours, Oral  lisinopriL tablet 40 mg, Daily, Oral  furosemide injection 40 mg, Once, Intravenous    Plan  - BP is controlled, no changes needed to their regimen  - Hold parameters in place for low/normal BP and HR.   -Echo ordered to help with needs regarding his BP and development of possible pulmn HTN given cont. Smoking.

## 2025-03-08 NOTE — PHARMACY MED REC
"Admission Medication History     The home medication history was taken by Allyson Alvarado.    You may go to "Admission" then "Reconcile Home Medications" tabs to review and/or act upon these items.     The home medication list has been updated by the Pharmacy department.   Please read ALL comments highlighted in yellow.   Please address this information as you see fit.    Feel free to contact us if you have any questions or require assistance.      Medications listed below were obtained from: Patient/family and Analytic software- [employer]  (Not in a hospital admission)          Allyson Alvarado  [phone]                 .          "

## 2025-03-08 NOTE — ASSESSMENT & PLAN NOTE
Reviewed PCP notes and EMR. Given c/o persistent SOB/HARRINGTON and non-productive cough will get non-contrasted CT chest (also h/o smoking). NO h/o screening CT done that I can see in the past few years. Cont. Prn and schedule duonebs. Wean steroids to PO. No signs of PNA and will thus hold off on ABX. F/u on echo to eval if development of pulmonary HTN could be the cause of his persistent Symptoms.   Encouraged to stop smoking or cont. To cut back. Ordered nicotine patch per request of patient.

## 2025-03-08 NOTE — H&P
[facility] Emergency Dept  [facility] Medicine  History & Physical    Patient Name: Butch Mcdaniel  MRN: [id number]  Patient Class: IP- Inpatient  Admission Date: 3/7/2025  Attending Physician:  Dr. Alka Jim   Primary Care Provider: Gabriel Evans MD    Patient information was obtained from .     Subjective:     Principal Problem:Hyperkalemia    Chief Complaint:   Chief Complaint   Patient presents with    Abnormal Lab     Pt reports to ED for abnormal potassium and for SOB with wheezing for 1 month.         HPI: 69 y.o.  man with h/o Essential HTN,  CKD 3B (Creatinine close to 1.4), NIDDM type 2 (A1c 6.3 March 2025), Chronic anxiety/depression, Chronic pain, Smoker (on Buspar), HLD, Peripheral neuropathy presents to the ER this evening per the request of his PCP due to abnormal labs and c/o SOB with wheezing for 1 month. NO fevers or chills. Denies to be any non-productive dry cough. Smokes daily but has cut back. No chest pain or N/V.   States was started on Spironolactone back in 12/2024 by Dr. Pete for BP control. Since then the BP has been good. Does admit to eatting lots of tomatoes and potatoes as well.     Triage vitals were :  BP: (!) 144/65  Pulse: 77  Resp: (!) 23  Temp: 98 °F (36.7 °C)  SpO2: 95 % on room air.     Work up in the ED noted normal WBC and stable H/H. Lytes with K initially 5.2 but repeat 6.3 (same as last night) despite shifting with Insulin, Albuterolm, Lokelma. EKG with no peaked T waves or arrythmias. Liver enzymes unremarkable. Renal function slightly elevated at 1.8 (baseline closer to 1.4).  Troponin and BNP wnl. UA with ketones but no signs of UTI. Ph 7.3.     CXR:     1. Interstitial findings are accentuated by habitus, edema or other pneumonitis can present in this fashion.  Correlation is advised.     Started on Cont. Nebs and give Lokelma with repeat K 6.3. We have been consulted for further work up of his hyperkalemia.        Past Medical  History:   Diagnosis Date    Cataract, bilateral 09/11/2018    Degenerative disc disease     Diabetes mellitus type II, controlled     Eye injury as a child    stick hit eye ? 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 Gabriel Evans  Prep: golytely  Route instructions sent: [facility] and reviewed via phone, pt verbalized understanding-KPvt    POSTERIOR FUSION OF CERVICAL SPINE WITH LAMINECTOMY N/A 10/3/2021    Procedure: LAMINECTOMY, SPINE, CERVICAL, WITH POSTERIOR FUSION C2-T2;  Surgeon: Ana Rosa Geller MD;  Location: [facility] OR [address] [place] [address];  Service: Neurosurgery;  Laterality: N/A;       Review of patient's allergies indicates:  No Known Allergies    No current facility-administered medications on file prior to encounter.     Current Outpatient Medications on File Prior to Encounter   Medication Sig    albuterol (PROVENTIL/VENTOLIN HFA) 90 mcg/actuation inhaler INHALE TWO PUFFS BY MOUTH EVERY 6 HOURS AS NEEDED FOR WHEEZING OR SHORTNESS OF BREATH    ALPRAZolam (XANAX) 1 MG tablet TAKE 1 AND ONE-HALF TABLETS BY MOUTH ONCE A DAY    amLODIPine (NORVASC) 10 MG tablet TAKE ONE TABLET BY MOUTH EVERY EVENING    atorvastatin (LIPITOR) 40 MG tablet Take 1 tablet (40 mg total) by mouth every evening.    busPIRone (BUSPAR) 30 MG Tab TAKE ONE TABLET BY MOUTH TWICE DAILY     "carvediloL (COREG) 12.5 MG tablet Take 1 tablet (12.5 mg total) by mouth 2 (two) times daily.    FLUoxetine 40 MG capsule TAKE ONE CAPSULE BY MOUTH ONCE A DAY    fluticasone-salmeterol diskus inhaler 100-50 mcg Inhale 1 puff into the lungs.    gabapentin (NEURONTIN) 600 MG tablet Take 600 mg by mouth 3 (three) times daily.    hydrALAZINE (APRESOLINE) 100 MG tablet TAKE ONE tablet (100mg) BY MOUTH THREE TIMES DAILY    ibuprofen (ADVIL,MOTRIN) 600 MG tablet Take 600 mg by mouth. (Patient not taking: Reported on 1/6/2025)    lisinopriL (PRINIVIL,ZESTRIL) 40 MG tablet Take 1 tablet (40 mg total) by mouth once daily.    metFORMIN (GLUCOPHAGE) 500 MG tablet TAKE ONE TABLET BY MOUTH TWICE DAILY WITH MEALS    naloxone (NARCAN) 4 mg/actuation Spry 4mg by nasal route as needed for opioid overdose; may repeat every 2-3 minutes in alternating nostrils until medical help arrives. Call 911    needle, disp, 22 G 22 gauge x 1" Ndle Testosterone injection every 2 weeks    oxyCODONE-acetaminophen (PERCOCET)  mg per tablet Take 1 tablet by mouth every 4 (four) hours as needed. (Patient not taking: Reported on 1/6/2025)    POLYETHYLENE GLYCOL 3350 (MIRALAX ORAL) Take 1 application by mouth. (Patient not taking: Reported on 1/6/2025)    senna (SENOKOT) 8.6 mg tablet Take 1 tablet by mouth 2 (two) times a day.    spironolactone (ALDACTONE) 25 MG tablet Take 1 tablet (25 mg total) by mouth once daily.    syringe, disposable, (BD LUER-STARLA SYRINGE) 1 mL Syrg Testosterone injection every 2 weeks    testosterone cypionate (DEPOTESTOTERONE CYPIONATE) 200 mg/mL injection INJECT ONE MILLILITER INTRAMUSCULARLY EVERY 14 DAYS    tiZANidine (ZANAFLEX) 4 MG tablet Take 4-8 mg by mouth nightly as needed.     Family History       Problem Relation (Age of Onset)    Alcohol abuse Father    Diabetes Son    Heart disease Mother, Father    No Known Problems Sister, Brother, Maternal Aunt, Maternal Uncle, Paternal Aunt, Paternal Uncle, Maternal " Grandmother, Maternal Grandfather, Paternal Grandmother, Paternal Grandfather          Tobacco Use    Smoking status: Some Days     Current packs/day: 0.50     Average packs/day: 1 pack/day for 53.0 years (52.1 ttl pk-yrs)     Types: Cigarettes, Vaping with nicotine     Start date: 1972     Last attempt to quit: 6/20/2023    Smokeless tobacco: Never    Tobacco comments:     Approximately 3 cigarettes a day   Substance and Sexual Activity    Alcohol use: No     Alcohol/week: 0.0 standard drinks of alcohol    Drug use: Yes     Types: Marijuana, Oxycodone, Benzodiazepines     Comment: 4 oxycodones daily; one marijuana cigarette daily    Sexual activity: Yes     Partners: Female     Comment:  with children, disabled      Review of Systems   Constitutional:  Positive for activity change. Negative for appetite change, chills, diaphoresis, fatigue and fever.   HENT:  Negative for congestion and sore throat.    Eyes:  Negative for visual disturbance.   Respiratory:  Positive for shortness of breath and wheezing. Negative for cough and chest tightness.    Cardiovascular:  Negative for chest pain, palpitations and leg swelling.   Gastrointestinal:  Negative for abdominal distention, abdominal pain, constipation, diarrhea, nausea and vomiting.   Genitourinary:  Negative for dysuria.   Musculoskeletal:  Positive for arthralgias and back pain. Negative for myalgias.   Neurological:  Positive for numbness (peripheral neuropathy). Negative for dizziness, syncope, light-headedness and headaches.   Psychiatric/Behavioral:  The patient is nervous/anxious.         Chronic anxiety/depression      Objective:     Vital Signs (Most Recent):  Temp: 98 °F (36.7 °C) (03/07/25 1902)  Pulse: 71 (03/08/25 0012)  Resp: 15 (03/08/25 0012)  BP: (!) 156/70 (03/08/25 0012)  SpO2: 95 % (03/08/25 0000) Vital Signs (24h Range):  Temp:  [98 °F (36.7 °C)] 98 °F (36.7 °C)  Pulse:  [60-77] 71  Resp:  [14-25] 15  SpO2:  [95 %-97 %] 95  %  BP: (120-156)/(56-70) 156/70     Weight: 98 kg (216 lb)  Body mass index is 30.13 kg/m².     Physical Exam  Vitals and nursing note reviewed.   Constitutional:       General: He is not in acute distress.     Appearance: He is obese. He is not ill-appearing, toxic-appearing or diaphoretic.   HENT:      Head: Normocephalic and atraumatic.      Nose: Nose normal. No congestion or rhinorrhea.      Mouth/Throat:      Mouth: Mucous membranes are dry.      Pharynx: Oropharynx is clear. No oropharyngeal exudate or posterior oropharyngeal erythema.   Eyes:      General: No scleral icterus.     Extraocular Movements: Extraocular movements intact.      Conjunctiva/sclera: Conjunctivae normal.      Pupils: Pupils are equal, round, and reactive to light.   Neck:      Vascular: No carotid bruit.      Comments: No JVD  Cardiovascular:      Rate and Rhythm: Normal rate and regular rhythm.      Pulses: Normal pulses.      Heart sounds: No murmur heard.     No friction rub. No gallop.   Pulmonary:      Effort: Pulmonary effort is normal. No respiratory distress.      Breath sounds: Normal breath sounds. No wheezing, rhonchi or rales.   Abdominal:      General: Bowel sounds are normal. There is no distension.      Palpations: Abdomen is soft.      Tenderness: There is no abdominal tenderness. There is no guarding or rebound.   Musculoskeletal:         General: No swelling. Normal range of motion.      Cervical back: Normal range of motion and neck supple.      Right lower leg: No edema.      Left lower leg: No edema.   Skin:     General: Skin is warm and dry.      Capillary Refill: Capillary refill takes less than 2 seconds.      Coloration: Skin is not pale.   Neurological:      General: No focal deficit present.      Mental Status: He is alert and oriented to person, place, and time.      Cranial Nerves: No cranial nerve deficit.      Motor: No weakness.      Coordination: Coordination normal.   Psychiatric:         Mood and  Affect: Mood normal.         Behavior: Behavior normal.              CRANIAL NERVES     CN III, IV, VI   Pupils are equal, round, and reactive to light.       Recent Results (from the past 24 hours)   CBC Auto Differential    Collection Time: 03/07/25  7:57 PM   Result Value Ref Range    WBC 8.80 3.90 - 12.70 K/uL    RBC 3.91 (L) 4.60 - 6.20 M/uL    Hemoglobin 13.4 (L) 14.0 - 18.0 g/dL    Hematocrit 40.1 40.0 - 54.0 %     (H) 82 - 98 fL    MCH 34.3 (H) 27.0 - 31.0 pg    MCHC 33.4 32.0 - 36.0 g/dL    RDW 14.1 11.5 - 14.5 %    Platelets 125 (L) 150 - 450 K/uL    MPV 12.2 9.2 - 12.9 fL    Immature Granulocytes 0.3 0.0 - 0.5 %    Gran # (ANC) 6.7 1.8 - 7.7 K/uL    Immature Grans (Abs) 0.03 0.00 - 0.04 K/uL    Lymph # 1.0 1.0 - 4.8 K/uL    Mono # 1.1 (H) 0.3 - 1.0 K/uL    Eos # 0.0 0.0 - 0.5 K/uL    Baso # 0.02 0.00 - 0.20 K/uL    nRBC 0 0 /100 WBC    Gran % 76.2 (H) 38.0 - 73.0 %    Lymph % 11.0 (L) 18.0 - 48.0 %    Mono % 12.2 4.0 - 15.0 %    Eosinophil % 0.1 0.0 - 8.0 %    Basophil % 0.2 0.0 - 1.9 %    Differential Method Automated    Comprehensive Metabolic Panel    Collection Time: 03/07/25  7:57 PM   Result Value Ref Range    Sodium 140 136 - 145 mmol/L    Potassium 5.2 (H) 3.5 - 5.1 mmol/L    Chloride 109 95 - 110 mmol/L    CO2 20 (L) 23 - 29 mmol/L    Glucose 179 (H) 70 - 110 mg/dL    BUN 50 (H) 8 - 23 mg/dL    Creatinine 1.8 (H) 0.5 - 1.4 mg/dL    Calcium 8.2 (L) 8.7 - 10.5 mg/dL    Total Protein 7.1 6.0 - 8.4 g/dL    Albumin 3.9 3.5 - 5.2 g/dL    Total Bilirubin 0.9 0.1 - 1.0 mg/dL    Alkaline Phosphatase 47 40 - 150 U/L    AST 27 10 - 40 U/L    ALT 30 10 - 44 U/L    eGFR 40 (A) >60 mL/min/1.73 m^2    Anion Gap 11 8 - 16 mmol/L   Troponin I    Collection Time: 03/07/25  7:57 PM   Result Value Ref Range    Troponin I <0.006 0.000 - 0.026 ng/mL   Brain Natriuretic Peptide    Collection Time: 03/07/25  7:57 PM   Result Value Ref Range    BNP 46 0 - 99 pg/mL   Magnesium    Collection Time: 03/07/25  7:57 PM    Result Value Ref Range    Magnesium 2.2 1.6 - 2.6 mg/dL   Phosphorus    Collection Time: 03/07/25  7:57 PM   Result Value Ref Range    Phosphorus 2.7 2.7 - 4.5 mg/dL   ISTAT PROCEDURE    Collection Time: 03/07/25  8:50 PM   Result Value Ref Range    POC PH 7.302 (L) 7.35 - 7.45    POC PCO2 52.7 (H) 35 - 45 mmHg    POC PO2 44 40 - 60 mmHg    POC HCO3 26.1 24 - 28 mmol/L    POC BE -1 -2 to 2 mmol/L    POC SATURATED O2 74 95 - 100 %    POC TCO2 28 24 - 29 mmol/L    Sample VENOUS     Site Other     Allens Test N/A     DelSys Room Air     Mode SPONT    Urinalysis, Reflex to Urine Culture Urine, Clean Catch    Collection Time: 03/07/25  9:54 PM    Specimen: Urine   Result Value Ref Range    Specimen UA Urine, Clean Catch     Color, UA Yellow Yellow, Straw, Georgie    Appearance, UA Clear Clear    pH, UA 6.0 5.0 - 8.0    Specific Gravity, UA 1.020 1.005 - 1.030    Protein, UA Negative Negative    Glucose, UA Trace (A) Negative    Ketones, UA Trace (A) Negative    Bilirubin (UA) Negative Negative    Occult Blood UA Negative Negative    Nitrite, UA Negative Negative    Urobilinogen, UA Negative <2.0 EU/dL    Leukocytes, UA Negative Negative   Basic metabolic panel    Collection Time: 03/07/25 10:41 PM   Result Value Ref Range    Sodium 138 136 - 145 mmol/L    Potassium 6.3 (HH) 3.5 - 5.1 mmol/L    Chloride 112 (H) 95 - 110 mmol/L    CO2 23 23 - 29 mmol/L    Glucose 120 (H) 70 - 110 mg/dL    BUN 50 (H) 8 - 23 mg/dL    Creatinine 1.6 (H) 0.5 - 1.4 mg/dL    Calcium 7.7 (L) 8.7 - 10.5 mg/dL    Anion Gap 3 (L) 8 - 16 mmol/L    eGFR 46 (A) >60 mL/min/1.73 m^2       Microbiology Results (last 7 days)       ** No results found for the last 168 hours. **            Imaging Results              X-Ray Chest 1 View (Final result)  Result time 03/07/25 20:32:22      Final result by Sid Beltran MD (03/07/25 20:32:22)                   Impression:      1. Interstitial findings are accentuated by habitus, edema or other pneumonitis  can present in this fashion.  Correlation is advised.      Electronically signed by: Sid Beltran MD  Date:    03/07/2025  Time:    20:32               Narrative:    EXAMINATION:  XR CHEST 1 VIEW    CLINICAL HISTORY:  shortness of breath;    TECHNIQUE:  Single frontal view of the chest was performed.    COMPARISON:  01/13/2025    FINDINGS:  The cardiomediastinal silhouette is not enlarged noting calcification of the aorta..  There is no pleural effusion.  The trachea is midline.  The lungs are symmetrically expanded bilaterally with mildly coarse interstitial attenuation.  No large focal consolidation seen.  There is no pneumothorax.  The osseous structures are remarkable for degenerative change..  There are surgical changes of the cervical spine.                                        Assessment/Plan:     * Hyperkalemia  Hyperkalemia is likely due to Medication induced->suspect combination Spironolactone and Lisinopril. He does tend to run high normal (5.2).  The patients most recent potassium results are listed below.  Recent Labs     03/05/25 0915 03/07/25 1957 03/07/25  2241   K 5.8* 5.2* 6.3*     Plan  - Monitor for arrhythmias with EKG and with continuous telemetry.   - Treat the hyperkalemia with Potassium Binders, IV insulin and dextrose, Nebulized albuterol sulfate, and Will add a dose of Lasix as well .   - Monitor potassium: Every 12 hours  - HOLD the Spironolactone and adjust diet to renal for now.           Acute worsening of stage 3 chronic kidney disease  SHAUNA is likely due to pre-renal azotemia due to dehydration->possibly due to K sparing diuretic.  IVF started and will need to give Lasix to try and remove k via urinary source.  Baseline creatinine is  1.4 . Most recent creatinine and eGFR are listed below.  Recent Labs     03/05/25 0915 03/07/25 1957 03/07/25  2241   CREATININE 1.8* 1.8* 1.6*   EGFRNORACEVR 40.0* 40* 46*      Plan  - F/u on repeat labs and monitor UOP with lasix given x1.   -  "Avoid nephrotoxins and renally dose meds for GFR listed above  - Monitor urine output, serial BMP, and adjust therapy as needed  - Possible nephrology consult pending repeat labs.     Essential hypertension  Patient's blood pressure range in the last 24 hours was: BP  Min: 120/56  Max: 156/70.The patient's inpatient anti-hypertensive regimen is listed below:  Current Antihypertensives  hydrALAZINE injection 10 mg, Every 6 hours PRN, Intravenous for SBP>180 or DBP>90  amLODIPine tablet 10 mg, Nightly, Oral  carvediloL tablet 12.5 mg, 2 times daily, Oral  hydrALAZINE tablet 100 mg, Every 8 hours, Oral  lisinopriL tablet 40 mg, Daily, Oral  furosemide injection 40 mg, Once, Intravenous    Plan  - BP is controlled, no changes needed to their regimen  - Hold parameters in place for low/normal BP and HR.   -Echo ordered to help with needs regarding his BP and development of possible pulmn HTN given cont. Smoking.     Type 2 diabetes mellitus without complication, without long-term current use of insulin  Patient's FSGs are controlled on current medication regimen.  Last A1c reviewed-   Lab Results   Component Value Date    HGBA1C 6.3 (H) 03/05/2025     Most recent fingerstick glucose reviewed- No results for input(s): "POCTGLUCOSE" in the last 24 hours.  Current correctional scale  Low  Maintain anti-hyperglycemic dose as follows-   Antihyperglycemics (From admission, onward)      Start     Stop Route Frequency Ordered    03/08/25 0108  insulin aspart U-100 pen 0-5 Units         -- SubQ Before meals & nightly PRN 03/08/25 0008          Hold Oral hypoglycemics while patient is in the hospital->Metformin       COPD (chronic obstructive pulmonary disease)  Reviewed PCP notes and EMR. Given c/o persistent SOB/HARRINGTON and non-productive cough will get non-contrasted CT chest (also h/o smoking). NO h/o screening CT done that I can see in the past few years. Cont. Prn and schedule duonebs. Wean steroids to PO. No signs of PNA and " will thus hold off on ABX. F/u on echo to eval if development of pulmonary HTN could be the cause of his persistent Symptoms.   Encouraged to stop smoking or cont. To cut back. Ordered nicotine patch per request of patient.     Anxiety, unable to wean off BZD  Reviewed his La  and noted chronic xanax use at 1.5mg daily to as far back ast 3/2023. Filled by same group. Will resume this. Also noted chronic low back pain with same oxycodone-MARICHUY 10/325mg refill at 90tabs for 30 days. Will resume this medications as well.         VTE Risk Mitigation (From admission, onward)           Ordered     enoxaparin injection 40 mg  Daily         03/08/25 0005     IP VTE HIGH RISK PATIENT  Once         03/08/25 0005     Place sequential compression device  Until discontinued         03/08/25 0005                     CODE STATUS: FULL CODE                Alka Jim MD  Department of Hospital Medicine  [facility] - Emergency Dept

## 2025-03-08 NOTE — ASSESSMENT & PLAN NOTE
Reviewed his La  and noted chronic xanax use at 1.5mg daily to as far back ast 3/2023. Filled by same group. Will resume this. Also noted chronic low back pain with same oxycodone-MARICHUY 10/325mg refill at 90tabs for 30 days. Will resume this medications as well.

## 2025-03-08 NOTE — TELEPHONE ENCOUNTER
Pre visit labs drawn with SHAUNA on CKD but potassium remains elevated.    Was started on spironolactone in December.  He is also on lisinopril.    I called the patient to notify him of his lab results.    He is audibly wheezing and speaking in short current sentences.  Notes that he is having difficulties breathing but he can not be more specific about duration.    Recommended he be evaluated tonight for this    I have asked him to ask ED to re-evaluate his electrolytes  I would recommend he stopped the lisinopril and continue the spironolactone

## 2025-03-08 NOTE — SUBJECTIVE & OBJECTIVE
Past Medical History:   Diagnosis Date    Cataract, bilateral 09/11/2018    Degenerative disc disease     Diabetes mellitus type II, controlled     Eye injury as a child    stick hit eye ? 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 both eyes 10/08/2019    Personal history of colonic polyps     Psychiatric problem     Therapy     [facility] many years ago       Past Surgical History:   Procedure Laterality Date    APPENDECTOMY      COLONOSCOPY N/A 5/10/2017    Procedure: COLONOSCOPY;  Surgeon: Shantanu Marley MD;  Location: [facility] ENDO;  Service: Endoscopy;  Laterality: N/A;    COLONOSCOPY N/A 8/4/2023    Procedure: COLONOSCOPY;  Surgeon: Gael Rand MD;  Location: [facility] ENDO;  Service: Endoscopy;  Laterality: N/A;  Referred by: Dr. Gabriel Evans  Prep: golytely  Route instructions sent: [facility] and reviewed via phone, pt verbalized understanding-KPvt    POSTERIOR FUSION OF CERVICAL SPINE WITH LAMINECTOMY N/A 10/3/2021    Procedure: LAMINECTOMY, SPINE, CERVICAL, WITH POSTERIOR FUSION C2-T2;  Surgeon: Ana Rosa Geller MD;  Location: [facility] OR [address] [place] [address];  Service: Neurosurgery;  Laterality: N/A;       Review of patient's allergies indicates:  No Known Allergies    No current facility-administered medications on file prior to encounter.     Current Outpatient Medications on File Prior to Encounter   Medication Sig    albuterol (PROVENTIL/VENTOLIN HFA) 90 mcg/actuation inhaler INHALE TWO PUFFS BY MOUTH EVERY 6 HOURS AS NEEDED FOR WHEEZING OR SHORTNESS OF BREATH    ALPRAZolam (XANAX) 1 MG tablet TAKE 1 AND ONE-HALF TABLETS BY MOUTH ONCE A DAY    amLODIPine (NORVASC) 10 MG tablet TAKE ONE TABLET BY MOUTH EVERY EVENING    atorvastatin (LIPITOR) 40 MG tablet Take 1 tablet (40 mg total) by mouth every evening.    busPIRone (BUSPAR) 30 MG Tab TAKE ONE TABLET BY MOUTH TWICE  "DAILY    carvediloL (COREG) 12.5 MG tablet Take 1 tablet (12.5 mg total) by mouth 2 (two) times daily.    FLUoxetine 40 MG capsule TAKE ONE CAPSULE BY MOUTH ONCE A DAY    fluticasone-salmeterol diskus inhaler 100-50 mcg Inhale 1 puff into the lungs.    gabapentin (NEURONTIN) 600 MG tablet Take 600 mg by mouth 3 (three) times daily.    hydrALAZINE (APRESOLINE) 100 MG tablet TAKE ONE tablet (100mg) BY MOUTH THREE TIMES DAILY    ibuprofen (ADVIL,MOTRIN) 600 MG tablet Take 600 mg by mouth. (Patient not taking: Reported on 1/6/2025)    lisinopriL (PRINIVIL,ZESTRIL) 40 MG tablet Take 1 tablet (40 mg total) by mouth once daily.    metFORMIN (GLUCOPHAGE) 500 MG tablet TAKE ONE TABLET BY MOUTH TWICE DAILY WITH MEALS    naloxone (NARCAN) 4 mg/actuation Spry 4mg by nasal route as needed for opioid overdose; may repeat every 2-3 minutes in alternating nostrils until medical help arrives. Call 911    needle, disp, 22 G 22 gauge x 1" Ndle Testosterone injection every 2 weeks    oxyCODONE-acetaminophen (PERCOCET)  mg per tablet Take 1 tablet by mouth every 4 (four) hours as needed. (Patient not taking: Reported on 1/6/2025)    POLYETHYLENE GLYCOL 3350 (MIRALAX ORAL) Take 1 application by mouth. (Patient not taking: Reported on 1/6/2025)    senna (SENOKOT) 8.6 mg tablet Take 1 tablet by mouth 2 (two) times a day.    spironolactone (ALDACTONE) 25 MG tablet Take 1 tablet (25 mg total) by mouth once daily.    syringe, disposable, (BD LUER-STARLA SYRINGE) 1 mL Syrg Testosterone injection every 2 weeks    testosterone cypionate (DEPOTESTOTERONE CYPIONATE) 200 mg/mL injection INJECT ONE MILLILITER INTRAMUSCULARLY EVERY 14 DAYS    tiZANidine (ZANAFLEX) 4 MG tablet Take 4-8 mg by mouth nightly as needed.     Family History       Problem Relation (Age of Onset)    Alcohol abuse Father    Diabetes Son    Heart disease Mother, Father    No Known Problems Sister, Brother, Maternal Aunt, Maternal Uncle, Paternal Aunt, Paternal Uncle, " Maternal Grandmother, Maternal Grandfather, Paternal Grandmother, Paternal Grandfather          Tobacco Use    Smoking status: Some Days     Current packs/day: 0.50     Average packs/day: 1 pack/day for 53.0 years (52.1 ttl pk-yrs)     Types: Cigarettes, Vaping with nicotine     Start date: 1972     Last attempt to quit: 6/20/2023    Smokeless tobacco: Never    Tobacco comments:     Approximately 3 cigarettes a day   Substance and Sexual Activity    Alcohol use: No     Alcohol/week: 0.0 standard drinks of alcohol    Drug use: Yes     Types: Marijuana, Oxycodone, Benzodiazepines     Comment: 4 oxycodones daily; one marijuana cigarette daily    Sexual activity: Yes     Partners: Female     Comment:  with children, disabled      Review of Systems   Constitutional:  Positive for activity change. Negative for appetite change, chills, diaphoresis, fatigue and fever.   HENT:  Negative for congestion and sore throat.    Eyes:  Negative for visual disturbance.   Respiratory:  Positive for shortness of breath and wheezing. Negative for cough and chest tightness.    Cardiovascular:  Negative for chest pain, palpitations and leg swelling.   Gastrointestinal:  Negative for abdominal distention, abdominal pain, constipation, diarrhea, nausea and vomiting.   Genitourinary:  Negative for dysuria.   Musculoskeletal:  Positive for arthralgias and back pain. Negative for myalgias.   Neurological:  Positive for numbness (peripheral neuropathy). Negative for dizziness, syncope, light-headedness and headaches.   Psychiatric/Behavioral:  The patient is nervous/anxious.         Chronic anxiety/depression      Objective:     Vital Signs (Most Recent):  Temp: 98 °F (36.7 °C) (03/07/25 1902)  Pulse: 71 (03/08/25 0012)  Resp: 15 (03/08/25 0012)  BP: (!) 156/70 (03/08/25 0012)  SpO2: 95 % (03/08/25 0000) Vital Signs (24h Range):  Temp:  [98 °F (36.7 °C)] 98 °F (36.7 °C)  Pulse:  [60-77] 71  Resp:  [14-25] 15  SpO2:  [95  %-97 %] 95 %  BP: (120-156)/(56-70) 156/70     Weight: 98 kg (216 lb)  Body mass index is 30.13 kg/m².     Physical Exam  Vitals and nursing note reviewed.   Constitutional:       General: He is not in acute distress.     Appearance: He is obese. He is not ill-appearing, toxic-appearing or diaphoretic.   HENT:      Head: Normocephalic and atraumatic.      Nose: Nose normal. No congestion or rhinorrhea.      Mouth/Throat:      Mouth: Mucous membranes are dry.      Pharynx: Oropharynx is clear. No oropharyngeal exudate or posterior oropharyngeal erythema.   Eyes:      General: No scleral icterus.     Extraocular Movements: Extraocular movements intact.      Conjunctiva/sclera: Conjunctivae normal.      Pupils: Pupils are equal, round, and reactive to light.   Neck:      Vascular: No carotid bruit.      Comments: No JVD  Cardiovascular:      Rate and Rhythm: Normal rate and regular rhythm.      Pulses: Normal pulses.      Heart sounds: No murmur heard.     No friction rub. No gallop.   Pulmonary:      Effort: Pulmonary effort is normal. No respiratory distress.      Breath sounds: Normal breath sounds. No wheezing, rhonchi or rales.   Abdominal:      General: Bowel sounds are normal. There is no distension.      Palpations: Abdomen is soft.      Tenderness: There is no abdominal tenderness. There is no guarding or rebound.   Musculoskeletal:         General: No swelling. Normal range of motion.      Cervical back: Normal range of motion and neck supple.      Right lower leg: No edema.      Left lower leg: No edema.   Skin:     General: Skin is warm and dry.      Capillary Refill: Capillary refill takes less than 2 seconds.      Coloration: Skin is not pale.   Neurological:      General: No focal deficit present.      Mental Status: He is alert and oriented to person, place, and time.      Cranial Nerves: No cranial nerve deficit.      Motor: No weakness.      Coordination: Coordination normal.   Psychiatric:          Mood and Affect: Mood normal.         Behavior: Behavior normal.              CRANIAL NERVES     CN III, IV, VI   Pupils are equal, round, and reactive to light.       Recent Results (from the past 24 hours)   CBC Auto Differential    Collection Time: 03/07/25  7:57 PM   Result Value Ref Range    WBC 8.80 3.90 - 12.70 K/uL    RBC 3.91 (L) 4.60 - 6.20 M/uL    Hemoglobin 13.4 (L) 14.0 - 18.0 g/dL    Hematocrit 40.1 40.0 - 54.0 %     (H) 82 - 98 fL    MCH 34.3 (H) 27.0 - 31.0 pg    MCHC 33.4 32.0 - 36.0 g/dL    RDW 14.1 11.5 - 14.5 %    Platelets 125 (L) 150 - 450 K/uL    MPV 12.2 9.2 - 12.9 fL    Immature Granulocytes 0.3 0.0 - 0.5 %    Gran # (ANC) 6.7 1.8 - 7.7 K/uL    Immature Grans (Abs) 0.03 0.00 - 0.04 K/uL    Lymph # 1.0 1.0 - 4.8 K/uL    Mono # 1.1 (H) 0.3 - 1.0 K/uL    Eos # 0.0 0.0 - 0.5 K/uL    Baso # 0.02 0.00 - 0.20 K/uL    nRBC 0 0 /100 WBC    Gran % 76.2 (H) 38.0 - 73.0 %    Lymph % 11.0 (L) 18.0 - 48.0 %    Mono % 12.2 4.0 - 15.0 %    Eosinophil % 0.1 0.0 - 8.0 %    Basophil % 0.2 0.0 - 1.9 %    Differential Method Automated    Comprehensive Metabolic Panel    Collection Time: 03/07/25  7:57 PM   Result Value Ref Range    Sodium 140 136 - 145 mmol/L    Potassium 5.2 (H) 3.5 - 5.1 mmol/L    Chloride 109 95 - 110 mmol/L    CO2 20 (L) 23 - 29 mmol/L    Glucose 179 (H) 70 - 110 mg/dL    BUN 50 (H) 8 - 23 mg/dL    Creatinine 1.8 (H) 0.5 - 1.4 mg/dL    Calcium 8.2 (L) 8.7 - 10.5 mg/dL    Total Protein 7.1 6.0 - 8.4 g/dL    Albumin 3.9 3.5 - 5.2 g/dL    Total Bilirubin 0.9 0.1 - 1.0 mg/dL    Alkaline Phosphatase 47 40 - 150 U/L    AST 27 10 - 40 U/L    ALT 30 10 - 44 U/L    eGFR 40 (A) >60 mL/min/1.73 m^2    Anion Gap 11 8 - 16 mmol/L   Troponin I    Collection Time: 03/07/25  7:57 PM   Result Value Ref Range    Troponin I <0.006 0.000 - 0.026 ng/mL   Brain Natriuretic Peptide    Collection Time: 03/07/25  7:57 PM   Result Value Ref Range    BNP 46 0 - 99 pg/mL   Magnesium    Collection Time: 03/07/25   7:57 PM   Result Value Ref Range    Magnesium 2.2 1.6 - 2.6 mg/dL   Phosphorus    Collection Time: 03/07/25  7:57 PM   Result Value Ref Range    Phosphorus 2.7 2.7 - 4.5 mg/dL   ISTAT PROCEDURE    Collection Time: 03/07/25  8:50 PM   Result Value Ref Range    POC PH 7.302 (L) 7.35 - 7.45    POC PCO2 52.7 (H) 35 - 45 mmHg    POC PO2 44 40 - 60 mmHg    POC HCO3 26.1 24 - 28 mmol/L    POC BE -1 -2 to 2 mmol/L    POC SATURATED O2 74 95 - 100 %    POC TCO2 28 24 - 29 mmol/L    Sample VENOUS     Site Other     Allens Test N/A     DelSys Room Air     Mode SPONT    Urinalysis, Reflex to Urine Culture Urine, Clean Catch    Collection Time: 03/07/25  9:54 PM    Specimen: Urine   Result Value Ref Range    Specimen UA Urine, Clean Catch     Color, UA Yellow Yellow, Straw, Georgie    Appearance, UA Clear Clear    pH, UA 6.0 5.0 - 8.0    Specific Gravity, UA 1.020 1.005 - 1.030    Protein, UA Negative Negative    Glucose, UA Trace (A) Negative    Ketones, UA Trace (A) Negative    Bilirubin (UA) Negative Negative    Occult Blood UA Negative Negative    Nitrite, UA Negative Negative    Urobilinogen, UA Negative <2.0 EU/dL    Leukocytes, UA Negative Negative   Basic metabolic panel    Collection Time: 03/07/25 10:41 PM   Result Value Ref Range    Sodium 138 136 - 145 mmol/L    Potassium 6.3 (HH) 3.5 - 5.1 mmol/L    Chloride 112 (H) 95 - 110 mmol/L    CO2 23 23 - 29 mmol/L    Glucose 120 (H) 70 - 110 mg/dL    BUN 50 (H) 8 - 23 mg/dL    Creatinine 1.6 (H) 0.5 - 1.4 mg/dL    Calcium 7.7 (L) 8.7 - 10.5 mg/dL    Anion Gap 3 (L) 8 - 16 mmol/L    eGFR 46 (A) >60 mL/min/1.73 m^2       Microbiology Results (last 7 days)       ** No results found for the last 168 hours. **            Imaging Results              X-Ray Chest 1 View (Final result)  Result time 03/07/25 20:32:22      Final result by Sid Beltran MD (03/07/25 20:32:22)                   Impression:      1. Interstitial findings are accentuated by habitus, edema or other  pneumonitis can present in this fashion.  Correlation is advised.      Electronically signed by: Sid Beltran MD  Date:    03/07/2025  Time:    20:32               Narrative:    EXAMINATION:  XR CHEST 1 VIEW    CLINICAL HISTORY:  shortness of breath;    TECHNIQUE:  Single frontal view of the chest was performed.    COMPARISON:  01/13/2025    FINDINGS:  The cardiomediastinal silhouette is not enlarged noting calcification of the aorta..  There is no pleural effusion.  The trachea is midline.  The lungs are symmetrically expanded bilaterally with mildly coarse interstitial attenuation.  No large focal consolidation seen.  There is no pneumothorax.  The osseous structures are remarkable for degenerative change..  There are surgical changes of the cervical spine.

## 2025-03-08 NOTE — ASSESSMENT & PLAN NOTE
Hyperkalemia is likely due to Medication induced->suspect combination Spironolactone and Lisinopril. He does tend to run high normal (5.2).  The patients most recent potassium results are listed below.  Recent Labs     03/05/25  0915 03/07/25 1957 03/07/25  2241   K 5.8* 5.2* 6.3*     Plan  - Monitor for arrhythmias with EKG and with continuous telemetry.   - Treat the hyperkalemia with Potassium Binders, IV insulin and dextrose, Nebulized albuterol sulfate, and Will add a dose of Lasix as well .   - Monitor potassium: Every 12 hours  - HOLD the Spironolactone and adjust diet to renal for now.

## 2025-03-08 NOTE — PLAN OF CARE
[facility] - Emergency Dept  Initial Discharge Assessment       Primary Care Provider: Gabriel Evans MD  Case Management Assessment     PCP: See above  Pharmacy: [facility]    Patient Arrived From: home with spouse  Existing Help at Home: spouse    Barriers to Discharge: none    Discharge Plan:    A. home    B. home with family      Discharge planning assessment completed with patient's assistance.  Patient is from home  and independent.  Patient has  no DME or  OP services.    When medically stable, patient will discharge to  home .  Patient's family will transport home.  Patient will need followup appointments  with PCP and/or as ordered by the discharging provider.  Case Management will continue to follow and assist with discharge planning.      Admission Diagnosis: Hyperkalemia [E87.5]    Admission Date: 3/7/2025  Expected Discharge Date:     Transition of Care Barriers: None    Payor: [employer] [facility] / Plan: [employer] CHOICES / Product Type: Medicare Advantage /     Extended Emergency Contact Information  Primary Emergency Contact: JonasDeann  Address: [address] [place] [address]  Home Phone: [phone]  Mobile Phone: [phone]  Relation: Spouse    Discharge Plan A: Home  Discharge Plan B: Home with family      [facility]  [address]  [address] [place] [place] [address]  Phone: [phone] Fax: [phone]      Initial Assessment (most recent)       Adult Discharge Assessment - 03/08/25 1121          Discharge Assessment    Assessment Type Discharge Planning Assessment     Confirmed/corrected address, phone number and insurance Yes     Confirmed Demographics Correct on Facesheet     Source of Information patient     When was your last doctors appointment? 03/13/25     Reason For Admission Hyperkalemia     People in Home spouse     Facility Arrived From: From home with spouse, son, and grandchildren     Do you expect to  return to your current living situation? Yes     Do you have help at home or someone to help you manage your care at home? Yes     Who are your caregiver(s) and their phone number(s)? wife:  Deann Mcdaniel;  [phone]     Prior to hospitilization cognitive status: Alert/Oriented     Current cognitive status: Alert/Oriented     Walking or Climbing Stairs Difficulty no     Dressing/Bathing Difficulty no     Equipment Currently Used at Home none     Readmission within 30 days? No     Patient currently being followed by outpatient case management? No     Do you currently have service(s) that help you manage your care at home? No     Do you take prescription medications? Yes     Do you have prescription coverage? Yes     Coverage Payor: [employer] MGD [facility] - [employer] CHO[employer] -     Do you have any problems affording any of your prescribed medications? No     Is the patient taking medications as prescribed? yes     Who is going to help you get home at discharge? Will call family     How do you get to doctors appointments? car, drives self     Are you on dialysis? No     Do you take coumadin? No     Discharge Plan A Home     Discharge Plan B Home with family     DME Needed Upon Discharge  none     Discharge Plan discussed with: Patient     Transition of Care Barriers None

## 2025-03-08 NOTE — HPI
69 y.o.  man with h/o Essential HTN,  CKD 3B (Creatinine close to 1.4), NIDDM type 2 (A1c 6.3 March 2025), Chronic anxiety/depression, Chronic pain, Smoker (on Buspar), HLD, Peripheral neuropathy presents to the ER this evening per the request of his PCP due to abnormal labs and c/o SOB with wheezing for 1 month. NO fevers or chills. Denies to be any non-productive dry cough. Smokes daily but has cut back. No chest pain or N/V.   States was started on Spironolactone back in 12/2024 by Dr. Pete for BP control. Since then the BP has been good. Does admit to eatting lots of tomatoes and potatoes as well.     Triage vitals were :  BP: (!) 144/65  Pulse: 77  Resp: (!) 23  Temp: 98 °F (36.7 °C)  SpO2: 95 % on room air.     Work up in the ED noted normal WBC and stable H/H. Lytes with K initially 5.2 but repeat 6.3 (same as last night) despite shifting with Insulin, Albuterolm, Lokelma. EKG with no peaked T waves or arrythmias. Liver enzymes unremarkable. Renal function slightly elevated at 1.8 (baseline closer to 1.4).  Troponin and BNP wnl. UA with ketones but no signs of UTI. Ph 7.3.     CXR:     1. Interstitial findings are accentuated by habitus, edema or other pneumonitis can present in this fashion.  Correlation is advised.     Started on Cont. Nebs and give Lokelma with repeat K 6.3. We have been consulted for further work up of his hyperkalemia.

## 2025-03-08 NOTE — CARE UPDATE
Assumed care of Mr Butch Mcdaniel who was admitted with hyperK and suspected COPD exacerbation. Feeling much better this afternoon but still wheezing. K better but still elevated- repeat now. Will monitor overnight.   Ami Chen MD  03/08/2025 3:11 PM

## 2025-03-08 NOTE — ED NOTES
Pt called by his Dr and told to come to the ER for elevated potasium levels. Pt has complaints at this time    LOC: Pt is awake alert and aware of environment, oriented X3 and speaking appropriately  Appearance: Pt is in no acute distress, Pt is well groomed and clean  Skin: skin is warm and dry with normal turgor, mucus membranes are moist and pink, skin is intact with no bruising or breakdown  Muskuloskeletal: Pt moves all extremities well, there is no obvious swelling or deformities noted, pulses are intact.  Respiratory: Airway is open and patent, respirations are spontaneous and even.  Cardiac: no edema and cap refill is <3sec  Abdomen: soft, non-tender and non-distended  Neuro: Pt follows commands easily and has no obvious deficits

## 2025-03-08 NOTE — ASSESSMENT & PLAN NOTE
"Patient's FSGs are controlled on current medication regimen.  Last A1c reviewed-   Lab Results   Component Value Date    HGBA1C 6.3 (H) 03/05/2025     Most recent fingerstick glucose reviewed- No results for input(s): "POCTGLUCOSE" in the last 24 hours.  Current correctional scale  Low  Maintain anti-hyperglycemic dose as follows-   Antihyperglycemics (From admission, onward)      Start     Stop Route Frequency Ordered    03/08/25 0108  insulin aspart U-100 pen 0-5 Units         -- SubQ Before meals & nightly PRN 03/08/25 0008          Hold Oral hypoglycemics while patient is in the hospital->Metformin     "

## 2025-03-08 NOTE — ASSESSMENT & PLAN NOTE
SHAUNA is likely due to pre-renal azotemia due to dehydration->possibly due to K sparing diuretic.  IVF started and will need to give Lasix to try and remove k via urinary source.  Baseline creatinine is  1.4 . Most recent creatinine and eGFR are listed below.  Recent Labs     03/05/25  0915 03/07/25 1957 03/07/25  2241   CREATININE 1.8* 1.8* 1.6*   EGFRNORACEVR 40.0* 40* 46*      Plan  - F/u on repeat labs and monitor UOP with lasix given x1.   - Avoid nephrotoxins and renally dose meds for GFR listed above  - Monitor urine output, serial BMP, and adjust therapy as needed  - Possible nephrology consult pending repeat labs.

## 2025-03-09 ENCOUNTER — HOSPITAL ENCOUNTER (EMERGENCY)
Facility: HOSPITAL | Age: 70
Discharge: HOME | End: 2025-03-10
Attending: STUDENT IN AN ORGANIZED HEALTH CARE EDUCATION/TRAINING PROGRAM
Payer: MEDICARE

## 2025-03-09 VITALS
WEIGHT: 216.06 LBS | TEMPERATURE: 99 F | OXYGEN SATURATION: 92 % | SYSTOLIC BLOOD PRESSURE: 133 MMHG | BODY MASS INDEX: 30.25 KG/M2 | RESPIRATION RATE: 18 BRPM | DIASTOLIC BLOOD PRESSURE: 63 MMHG | HEART RATE: 76 BPM | HEIGHT: 71 IN

## 2025-03-09 DIAGNOSIS — R06.00 DYSPNEA: ICD-10-CM

## 2025-03-09 DIAGNOSIS — R05.9 COUGH: ICD-10-CM

## 2025-03-09 DIAGNOSIS — J44.1 COPD EXACERBATION: Primary | ICD-10-CM

## 2025-03-09 DIAGNOSIS — R73.9 HYPERGLYCEMIA: ICD-10-CM

## 2025-03-09 PROBLEM — N18.31 ACUTE RENAL FAILURE SUPERIMPOSED ON STAGE 3A CHRONIC KIDNEY DISEASE: Status: ACTIVE | Noted: 2022-07-18

## 2025-03-09 PROBLEM — R00.1 BRADYCARDIA: Status: RESOLVED | Noted: 2023-05-10 | Resolved: 2025-03-09

## 2025-03-09 LAB
ALLENS TEST: ABNORMAL
BASOPHILS # BLD AUTO: 0.01 K/UL (ref 0–0.2)
BASOPHILS NFR BLD: 0.1 % (ref 0–1.9)
DELSYS: ABNORMAL
DIFFERENTIAL METHOD BLD: ABNORMAL
EOSINOPHIL # BLD AUTO: 0 K/UL (ref 0–0.5)
EOSINOPHIL NFR BLD: 0 % (ref 0–8)
ERYTHROCYTE [DISTWIDTH] IN BLOOD BY AUTOMATED COUNT: 14.1 % (ref 11.5–14.5)
FLOW: 3
HCO3 UR-SCNC: 22.8 MMOL/L (ref 24–28)
HCT VFR BLD AUTO: 44.9 % (ref 40–54)
HGB BLD-MCNC: 14.7 G/DL (ref 14–18)
IMM GRANULOCYTES # BLD AUTO: 0.06 K/UL (ref 0–0.04)
IMM GRANULOCYTES NFR BLD AUTO: 0.4 % (ref 0–0.5)
LYMPHOCYTES # BLD AUTO: 1.2 K/UL (ref 1–4.8)
LYMPHOCYTES NFR BLD: 8 % (ref 18–48)
MCH RBC QN AUTO: 33.5 PG (ref 27–31)
MCHC RBC AUTO-ENTMCNC: 32.7 G/DL (ref 32–36)
MCV RBC AUTO: 102 FL (ref 82–98)
MODE: ABNORMAL
MONOCYTES # BLD AUTO: 1 K/UL (ref 0.3–1)
MONOCYTES NFR BLD: 6.4 % (ref 4–15)
NEUTROPHILS # BLD AUTO: 12.6 K/UL (ref 1.8–7.7)
NEUTROPHILS NFR BLD: 85.1 % (ref 38–73)
NRBC BLD-RTO: 0 /100 WBC
PCO2 BLDA: 39.6 MMHG (ref 35–45)
PH SMN: 7.37 [PH] (ref 7.35–7.45)
PLATELET # BLD AUTO: 171 K/UL (ref 150–450)
PMV BLD AUTO: 11.8 FL (ref 9.2–12.9)
PO2 BLDA: 115 MMHG (ref 80–100)
POC BE: -2 MMOL/L
POC SATURATED O2: 98 % (ref 95–100)
POC TCO2: 24 MMOL/L (ref 23–27)
POCT GLUCOSE: 141 MG/DL (ref 70–110)
POCT GLUCOSE: 158 MG/DL (ref 70–110)
POCT GLUCOSE: 237 MG/DL (ref 70–110)
RBC # BLD AUTO: 4.39 M/UL (ref 4.6–6.2)
SAMPLE: ABNORMAL
SITE: ABNORMAL
SP02: 100
WBC # BLD AUTO: 14.81 K/UL (ref 3.9–12.7)

## 2025-03-09 PROCEDURE — 94799 UNLISTED PULMONARY SVC/PX: CPT

## 2025-03-09 PROCEDURE — 83880 ASSAY OF NATRIURETIC PEPTIDE: CPT | Performed by: STUDENT IN AN ORGANIZED HEALTH CARE EDUCATION/TRAINING PROGRAM

## 2025-03-09 PROCEDURE — 94761 N-INVAS EAR/PLS OXIMETRY MLT: CPT | Mod: XB

## 2025-03-09 PROCEDURE — 94640 AIRWAY INHALATION TREATMENT: CPT

## 2025-03-09 PROCEDURE — 99900035 HC TECH TIME PER 15 MIN (STAT)

## 2025-03-09 PROCEDURE — 99285 EMERGENCY DEPT VISIT HI MDM: CPT | Mod: 25

## 2025-03-09 PROCEDURE — S4991 NICOTINE PATCH NONLEGEND: HCPCS | Performed by: INTERNAL MEDICINE

## 2025-03-09 PROCEDURE — 84484 ASSAY OF TROPONIN QUANT: CPT | Performed by: STUDENT IN AN ORGANIZED HEALTH CARE EDUCATION/TRAINING PROGRAM

## 2025-03-09 PROCEDURE — 27000221 HC OXYGEN, UP TO 24 HOURS

## 2025-03-09 PROCEDURE — 25000242 PHARM REV CODE 250 ALT 637 W/ HCPCS: Performed by: INTERNAL MEDICINE

## 2025-03-09 PROCEDURE — 63600175 PHARM REV CODE 636 W HCPCS: Mod: JZ,TB | Performed by: STUDENT IN AN ORGANIZED HEALTH CARE EDUCATION/TRAINING PROGRAM

## 2025-03-09 PROCEDURE — 94761 N-INVAS EAR/PLS OXIMETRY MLT: CPT

## 2025-03-09 PROCEDURE — 25000242 PHARM REV CODE 250 ALT 637 W/ HCPCS: Performed by: HOSPITALIST

## 2025-03-09 PROCEDURE — 25000003 PHARM REV CODE 250: Performed by: HOSPITALIST

## 2025-03-09 PROCEDURE — 85025 COMPLETE CBC W/AUTO DIFF WBC: CPT | Performed by: STUDENT IN AN ORGANIZED HEALTH CARE EDUCATION/TRAINING PROGRAM

## 2025-03-09 PROCEDURE — 25000242 PHARM REV CODE 250 ALT 637 W/ HCPCS: Performed by: STUDENT IN AN ORGANIZED HEALTH CARE EDUCATION/TRAINING PROGRAM

## 2025-03-09 PROCEDURE — 80053 COMPREHEN METABOLIC PANEL: CPT | Performed by: STUDENT IN AN ORGANIZED HEALTH CARE EDUCATION/TRAINING PROGRAM

## 2025-03-09 PROCEDURE — 36600 WITHDRAWAL OF ARTERIAL BLOOD: CPT

## 2025-03-09 PROCEDURE — 83735 ASSAY OF MAGNESIUM: CPT | Performed by: STUDENT IN AN ORGANIZED HEALTH CARE EDUCATION/TRAINING PROGRAM

## 2025-03-09 PROCEDURE — 82803 BLOOD GASES ANY COMBINATION: CPT

## 2025-03-09 PROCEDURE — 63600175 PHARM REV CODE 636 W HCPCS: Performed by: INTERNAL MEDICINE

## 2025-03-09 PROCEDURE — 96374 THER/PROPH/DIAG INJ IV PUSH: CPT

## 2025-03-09 PROCEDURE — 25000003 PHARM REV CODE 250: Performed by: INTERNAL MEDICINE

## 2025-03-09 RX ORDER — ALBUTEROL SULFATE 2.5 MG/.5ML
15 SOLUTION RESPIRATORY (INHALATION)
Status: COMPLETED | OUTPATIENT
Start: 2025-03-09 | End: 2025-03-09

## 2025-03-09 RX ORDER — IPRATROPIUM BROMIDE 0.5 MG/2.5ML
1 SOLUTION RESPIRATORY (INHALATION)
Status: COMPLETED | OUTPATIENT
Start: 2025-03-09 | End: 2025-03-09

## 2025-03-09 RX ORDER — METHYLPREDNISOLONE SOD SUCC 125 MG
125 VIAL (EA) INJECTION
Status: COMPLETED | OUTPATIENT
Start: 2025-03-09 | End: 2025-03-09

## 2025-03-09 RX ORDER — IPRATROPIUM BROMIDE AND ALBUTEROL SULFATE 2.5; .5 MG/3ML; MG/3ML
3 SOLUTION RESPIRATORY (INHALATION)
Status: DISCONTINUED | OUTPATIENT
Start: 2025-03-09 | End: 2025-03-09 | Stop reason: HOSPADM

## 2025-03-09 RX ADMIN — IPRATROPIUM BROMIDE AND ALBUTEROL SULFATE 3 ML: 2.5; .5 SOLUTION RESPIRATORY (INHALATION) at 12:03

## 2025-03-09 RX ADMIN — HYDRALAZINE HYDROCHLORIDE 100 MG: 25 TABLET ORAL at 01:03

## 2025-03-09 RX ADMIN — HYDRALAZINE HYDROCHLORIDE 100 MG: 25 TABLET ORAL at 05:03

## 2025-03-09 RX ADMIN — BUSPIRONE HYDROCHLORIDE 30 MG: 10 TABLET ORAL at 09:03

## 2025-03-09 RX ADMIN — LISINOPRIL 40 MG: 20 TABLET ORAL at 09:03

## 2025-03-09 RX ADMIN — GABAPENTIN 600 MG: 300 CAPSULE ORAL at 02:03

## 2025-03-09 RX ADMIN — ARFORMOTEROL TARTRATE 15 MCG: 15 SOLUTION RESPIRATORY (INHALATION) at 07:03

## 2025-03-09 RX ADMIN — IPRATROPIUM BROMIDE AND ALBUTEROL SULFATE 3 ML: 2.5; .5 SOLUTION RESPIRATORY (INHALATION) at 07:03

## 2025-03-09 RX ADMIN — FLUOXETINE HYDROCHLORIDE 40 MG: 20 CAPSULE ORAL at 09:03

## 2025-03-09 RX ADMIN — Medication 1 PATCH: at 09:03

## 2025-03-09 RX ADMIN — PREDNISONE 40 MG: 20 TABLET ORAL at 09:03

## 2025-03-09 RX ADMIN — SENNOSIDES 1 TABLET: 8.6 TABLET, FILM COATED ORAL at 09:03

## 2025-03-09 RX ADMIN — OXYCODONE AND ACETAMINOPHEN 1 TABLET: 10; 325 TABLET ORAL at 10:03

## 2025-03-09 RX ADMIN — INSULIN ASPART 2 UNITS: 100 INJECTION, SOLUTION INTRAVENOUS; SUBCUTANEOUS at 05:03

## 2025-03-09 RX ADMIN — OXYCODONE AND ACETAMINOPHEN 1 TABLET: 10; 325 TABLET ORAL at 04:03

## 2025-03-09 RX ADMIN — BUDESONIDE 0.5 MG: 0.5 INHALANT RESPIRATORY (INHALATION) at 07:03

## 2025-03-09 RX ADMIN — IPRATROPIUM BROMIDE AND ALBUTEROL SULFATE 3 ML: 2.5; .5 SOLUTION RESPIRATORY (INHALATION) at 03:03

## 2025-03-09 RX ADMIN — ALBUTEROL SULFATE 15 MG: 2.5 SOLUTION RESPIRATORY (INHALATION) at 11:03

## 2025-03-09 RX ADMIN — IPRATROPIUM BROMIDE 1 MG: 0.5 SOLUTION RESPIRATORY (INHALATION) at 11:03

## 2025-03-09 RX ADMIN — AMLODIPINE BESYLATE 10 MG: 5 TABLET ORAL at 09:03

## 2025-03-09 RX ADMIN — METHYLPREDNISOLONE SODIUM SUCCINATE 125 MG: 125 INJECTION, POWDER, FOR SOLUTION INTRAMUSCULAR; INTRAVENOUS at 11:03

## 2025-03-09 RX ADMIN — GABAPENTIN 600 MG: 300 CAPSULE ORAL at 09:03

## 2025-03-09 RX ADMIN — CARVEDILOL 12.5 MG: 12.5 TABLET, FILM COATED ORAL at 09:03

## 2025-03-09 RX ADMIN — ENOXAPARIN SODIUM 40 MG: 40 INJECTION SUBCUTANEOUS at 04:03

## 2025-03-09 RX ADMIN — OXYCODONE AND ACETAMINOPHEN 1 TABLET: 10; 325 TABLET ORAL at 06:03

## 2025-03-09 NOTE — ASSESSMENT & PLAN NOTE
Reviewed PCP notes and EMR.   - CT no changes  - started steroids, nebs  - no infection identified  - improving but still very wheezy  - tobacco cessation counseling

## 2025-03-09 NOTE — ASSESSMENT & PLAN NOTE
Hyperkalemia is likely due to Medication induced->suspect combination Spironolactone and Lisinopril. He does tend to run high normal (5.2).  The patients most recent potassium results are listed below.  Recent Labs     03/07/25  2241 03/08/25  0507 03/08/25  1519   K 6.3* 5.2* 4.9     Plan  - resolved  - check BMP in AM

## 2025-03-09 NOTE — ASSESSMENT & PLAN NOTE
SHAUNA is likely due to pre-renal azotemia due to dehydration->possibly due to K sparing diuretic.  IVF started and will need to give Lasix to try and remove k via urinary source.  Baseline creatinine is 1.4. Most recent creatinine and eGFR are listed below.  Recent Labs     03/07/25  2241 03/08/25  0507 03/08/25  1519   CREATININE 1.6* 1.4 1.5*   EGFRNORACEVR 46* 54* 50*      Plan  - F/u on repeat labs and monitor UOP with lasix given x1.   - Avoid nephrotoxins and renally dose meds for GFR listed above  - Monitor urine output, serial BMP, and adjust therapy as needed  - this has improved back to baseline

## 2025-03-09 NOTE — SUBJECTIVE & OBJECTIVE
Interval History: Up walking around the room, just took a shower. He is still wheezing.     Review of Systems   Respiratory:  Positive for shortness of breath and wheezing.    Cardiovascular:  Negative for chest pain.   Gastrointestinal:  Negative for abdominal pain.   Psychiatric/Behavioral:  Negative for confusion.      Objective:     Vital Signs (Most Recent):  Temp: 98.2 °F (36.8 °C) (03/09/25 1106)  Pulse: (!) 56 (03/09/25 1200)  Resp: 18 (03/09/25 1200)  BP: (!) 154/71 (03/09/25 1106)  SpO2: (!) 93 % (03/09/25 1200) Vital Signs (24h Range):  Temp:  [98.2 °F (36.8 °C)-99.1 °F (37.3 °C)] 98.2 °F (36.8 °C)  Pulse:  [56-87] 56  Resp:  [16-24] 18  SpO2:  [92 %-97 %] 93 %  BP: (116-163)/(60-71) 154/71     Weight: 98 kg (216 lb 0.8 oz)  Body mass index is 30.13 kg/m².    Intake/Output Summary (Last 24 hours) at 3/9/2025 1228  Last data filed at 3/9/2025 0900  Gross per 24 hour   Intake 120 ml   Output 700 ml   Net -580 ml         Physical Exam  Vitals and nursing note reviewed.   Constitutional:       Appearance: He is obese. He is not ill-appearing.   Cardiovascular:      Rate and Rhythm: Normal rate and regular rhythm.   Pulmonary:      Effort: Pulmonary effort is normal.      Breath sounds: Wheezing present.      Comments: RA  Abdominal:      General: Bowel sounds are normal.      Palpations: Abdomen is soft.   Musculoskeletal:      Right lower leg: No edema.      Left lower leg: No edema.   Skin:     General: Skin is warm and dry.   Neurological:      Mental Status: He is alert. Mental status is at baseline.               Significant Labs: All pertinent labs within the past 24 hours have been reviewed.    Significant Imaging: I have reviewed all pertinent imaging results/findings within the past 24 hours.

## 2025-03-09 NOTE — HOSPITAL COURSE
Mr Butch Mcdaniel was admitted with hyperK and COPD exacerbation. HyperK resolved with IV lasix. Started steroids, nebs for COPD exacerbation. Stable on room air, able to walk around, able to speak, but still wheezing. He has opted to leave the hospital against medical advice.

## 2025-03-09 NOTE — ASSESSMENT & PLAN NOTE
Patient's blood pressure range in the last 24 hours was: BP  Min: 116/60  Max: 163/68.The patient's inpatient anti-hypertensive regimen is listed below:  Current Antihypertensives  hydrALAZINE injection 10 mg, Every 6 hours PRN, Intravenous for SBP>180 or DBP>90  amLODIPine tablet 10 mg, Nightly, Oral  carvediloL tablet 12.5 mg, 2 times daily, Oral  hydrALAZINE tablet 100 mg, Every 8 hours, Oral  lisinopriL tablet 40 mg, Daily, Oral  furosemide injection 40 mg, Once, Intravenous    Plan  - BP is controlled, no changes needed to their regimen  - Hold parameters in place for low/normal BP and HR.

## 2025-03-09 NOTE — PLAN OF CARE
Problem: Adult Inpatient Plan of Care  Goal: Plan of Care Review  Outcome: Progressing  Goal: Patient-Specific Goal (Individualized)  Outcome: Progressing  Goal: Absence of Hospital-Acquired Illness or Injury  Outcome: Progressing  Goal: Optimal Comfort and Wellbeing  Outcome: Progressing  Goal: Readiness for Transition of Care  Outcome: Progressing     Problem: Electrolyte Imbalance  Goal: Electrolyte Balance  Outcome: Progressing     Problem: Diabetes Comorbidity  Goal: Blood Glucose Level Within Targeted Range  Outcome: Progressing

## 2025-03-09 NOTE — PROGRESS NOTES
[facility] Medicine  Progress Note    Patient Name: Butch Mcdaniel  MRN: [id number]  Patient Class: IP- Inpatient   Admission Date: 3/7/2025  Length of Stay: 2 days  Attending Physician: Ami Chen MD  Primary Care Provider: Gabriel Evans MD        Subjective     Principal Problem:Chronic obstructive pulmonary disease with acute exacerbation        HPI:  69 y.o.  man with h/o Essential HTN,  CKD 3B (Creatinine close to 1.4), NIDDM type 2 (A1c 6.3 March 2025), Chronic anxiety/depression, Chronic pain, Smoker (on Buspar), HLD, Peripheral neuropathy presents to the ER this evening per the request of his PCP due to abnormal labs and c/o SOB with wheezing for 1 month. NO fevers or chills. Denies to be any non-productive dry cough. Smokes daily but has cut back. No chest pain or N/V.   States was started on Spironolactone back in 12/2024 by Dr. Pete for BP control. Since then the BP has been good. Does admit to eatting lots of tomatoes and potatoes as well.     Triage vitals were :  BP: (!) 144/65  Pulse: 77  Resp: (!) 23  Temp: 98 °F (36.7 °C)  SpO2: 95 % on room air.     Work up in the ED noted normal WBC and stable H/H. Lytes with K initially 5.2 but repeat 6.3 (same as last night) despite shifting with Insulin, Albuterolm, Lokelma. EKG with no peaked T waves or arrythmias. Liver enzymes unremarkable. Renal function slightly elevated at 1.8 (baseline closer to 1.4).  Troponin and BNP wnl. UA with ketones but no signs of UTI. Ph 7.3.     CXR:     1. Interstitial findings are accentuated by habitus, edema or other pneumonitis can present in this fashion.  Correlation is advised.     Started on Cont. Nebs and give Lokelma with repeat K 6.3. We have been consulted for further work up of his hyperkalemia.        Overview/Hospital Course:  Mr Butch Mcdaniel was admitted with hyperK and COPD exacerbation. HyperK resolved with IV lasix. Started steroids, nebs  for COPD exacerbation. Stable on room air, able to walk around, able to speak, but still wheezing.     Interval History: Up walking around the room, just took a shower. He is still wheezing.     Review of Systems   Respiratory:  Positive for shortness of breath and wheezing.    Cardiovascular:  Negative for chest pain.   Gastrointestinal:  Negative for abdominal pain.   Psychiatric/Behavioral:  Negative for confusion.      Objective:     Vital Signs (Most Recent):  Temp: 98.2 °F (36.8 °C) (03/09/25 1106)  Pulse: (!) 56 (03/09/25 1200)  Resp: 18 (03/09/25 1200)  BP: (!) 154/71 (03/09/25 1106)  SpO2: (!) 93 % (03/09/25 1200) Vital Signs (24h Range):  Temp:  [98.2 °F (36.8 °C)-99.1 °F (37.3 °C)] 98.2 °F (36.8 °C)  Pulse:  [56-87] 56  Resp:  [16-24] 18  SpO2:  [92 %-97 %] 93 %  BP: (116-163)/(60-71) 154/71     Weight: 98 kg (216 lb 0.8 oz)  Body mass index is 30.13 kg/m².    Intake/Output Summary (Last 24 hours) at 3/9/2025 1228  Last data filed at 3/9/2025 0900  Gross per 24 hour   Intake 120 ml   Output 700 ml   Net -580 ml         Physical Exam  Vitals and nursing note reviewed.   Constitutional:       Appearance: He is obese. He is not ill-appearing.   Cardiovascular:      Rate and Rhythm: Normal rate and regular rhythm.   Pulmonary:      Effort: Pulmonary effort is normal.      Breath sounds: Wheezing present.      Comments: RA  Abdominal:      General: Bowel sounds are normal.      Palpations: Abdomen is soft.   Musculoskeletal:      Right lower leg: No edema.      Left lower leg: No edema.   Skin:     General: Skin is warm and dry.   Neurological:      Mental Status: He is alert. Mental status is at baseline.               Significant Labs: All pertinent labs within the past 24 hours have been reviewed.    Significant Imaging: I have reviewed all pertinent imaging results/findings within the past 24 hours.      Assessment & Plan  Chronic obstructive pulmonary disease with acute exacerbation  Reviewed PCP notes  and EMR.   - CT no changes  - started steroids, nebs  - no infection identified  - improving but still very wheezy  - tobacco cessation counseling  Essential hypertension  Patient's blood pressure range in the last 24 hours was: BP  Min: 116/60  Max: 163/68.The patient's inpatient anti-hypertensive regimen is listed below:  Current Antihypertensives  hydrALAZINE injection 10 mg, Every 6 hours PRN, Intravenous for SBP>180 or DBP>90  amLODIPine tablet 10 mg, Nightly, Oral  carvediloL tablet 12.5 mg, 2 times daily, Oral  hydrALAZINE tablet 100 mg, Every 8 hours, Oral  lisinopriL tablet 40 mg, Daily, Oral  furosemide injection 40 mg, Once, Intravenous    Plan  - BP is controlled, no changes needed to their regimen  - Hold parameters in place for low/normal BP and HR.   Anxiety, unable to wean off BZD  Reviewed his La  and noted chronic xanax use at 1.5mg daily to as far back ast 3/2023. Filled by same group. Will resume this. Also noted chronic low back pain with same oxycodone-MARICHUY 10/325mg refill at 90tabs for 30 days. Will resume this medications as well.     Type 2 diabetes mellitus without complication, without long-term current use of insulin  Patient's FSGs are controlled on current medication regimen.  Last A1c reviewed-   Lab Results   Component Value Date    HGBA1C 6.3 (H) 03/05/2025     Most recent fingerstick glucose reviewed-   Recent Labs   Lab 03/08/25  1701 03/08/25  2127 03/09/25  0736 03/09/25  1104   POCTGLUCOSE 290* 300* 141* 158*     Current correctional scale  Low  Maintain anti-hyperglycemic dose as follows-   Antihyperglycemics (From admission, onward)      Start     Stop Route Frequency Ordered    03/08/25 0108  insulin aspart U-100 pen 0-5 Units         -- SubQ Before meals & nightly PRN 03/08/25 0008          Hold Oral hypoglycemics while patient is in the hospital->Metformin     Hyperkalemia  Hyperkalemia is likely due to Medication induced->suspect combination Spironolactone and  Lisinopril. He does tend to run high normal (5.2).  The patients most recent potassium results are listed below.  Recent Labs     03/07/25  2241 03/08/25  0507 03/08/25  1519   K 6.3* 5.2* 4.9     Plan  - resolved  - check BMP in AM        Acute renal failure superimposed on stage 3a chronic kidney disease  SHAUNA is likely due to pre-renal azotemia due to dehydration->possibly due to K sparing diuretic.  IVF started and will need to give Lasix to try and remove k via urinary source.  Baseline creatinine is  1.4 . Most recent creatinine and eGFR are listed below.  Recent Labs     03/07/25  2241 03/08/25  0507 03/08/25  1519   CREATININE 1.6* 1.4 1.5*   EGFRNORACEVR 46* 54* 50*      Plan  - F/u on repeat labs and monitor UOP with lasix given x1.   - Avoid nephrotoxins and renally dose meds for GFR listed above  - Monitor urine output, serial BMP, and adjust therapy as needed  - this has improved back to baseline   VTE Risk Mitigation (From admission, onward)           Ordered     enoxaparin injection 40 mg  Daily         03/08/25 0005     IP VTE HIGH RISK PATIENT  Once         03/08/25 0005     Place sequential compression device  Until discontinued         03/08/25 0005                    Discharge Planning   LOPEZ:      Code Status: Full Code   Medical Readiness for Discharge Date:   Discharge Plan A: Home                        Ami Chen MD  Department of Hospital Medicine   [facility] - Tele[facility]

## 2025-03-09 NOTE — ASSESSMENT & PLAN NOTE
Patient's FSGs are controlled on current medication regimen.  Last A1c reviewed-   Lab Results   Component Value Date    HGBA1C 6.3 (H) 03/05/2025     Most recent fingerstick glucose reviewed-   Recent Labs   Lab 03/08/25  1701 03/08/25  2127 03/09/25  0736 03/09/25  1104   POCTGLUCOSE 290* 300* 141* 158*     Current correctional scale  Low  Maintain anti-hyperglycemic dose as follows-   Antihyperglycemics (From admission, onward)      Start     Stop Route Frequency Ordered    03/08/25 0108  insulin aspart U-100 pen 0-5 Units         -- SubQ Before meals & nightly PRN 03/08/25 0008          Hold Oral hypoglycemics while patient is in the hospital->Metformin

## 2025-03-10 ENCOUNTER — TELEPHONE (OUTPATIENT)
Dept: FAMILY MEDICINE | Facility: CLINIC | Age: 70
End: 2025-03-10
Payer: MEDICARE

## 2025-03-10 VITALS
HEART RATE: 70 BPM | TEMPERATURE: 98 F | RESPIRATION RATE: 19 BRPM | BODY MASS INDEX: 30.8 KG/M2 | OXYGEN SATURATION: 98 % | WEIGHT: 220 LBS | DIASTOLIC BLOOD PRESSURE: 60 MMHG | SYSTOLIC BLOOD PRESSURE: 140 MMHG | HEIGHT: 71 IN

## 2025-03-10 LAB
ALBUMIN SERPL BCP-MCNC: 3.9 G/DL (ref 3.5–5.2)
ALP SERPL-CCNC: 41 U/L (ref 40–150)
ALT SERPL W/O P-5'-P-CCNC: 26 U/L (ref 10–44)
ANION GAP SERPL CALC-SCNC: 11 MMOL/L (ref 8–16)
AST SERPL-CCNC: 27 U/L (ref 10–40)
BILIRUB SERPL-MCNC: 1 MG/DL (ref 0.1–1)
BNP SERPL-MCNC: 42 PG/ML (ref 0–99)
BUN SERPL-MCNC: 36 MG/DL (ref 8–23)
CALCIUM SERPL-MCNC: 8.2 MG/DL (ref 8.7–10.5)
CHLORIDE SERPL-SCNC: 106 MMOL/L (ref 95–110)
CO2 SERPL-SCNC: 20 MMOL/L (ref 23–29)
CREAT SERPL-MCNC: 1.5 MG/DL (ref 0.5–1.4)
EST. GFR  (NO RACE VARIABLE): 50 ML/MIN/1.73 M^2
GLUCOSE SERPL-MCNC: 251 MG/DL (ref 70–110)
MAGNESIUM SERPL-MCNC: 2.1 MG/DL (ref 1.6–2.6)
POTASSIUM SERPL-SCNC: 4.9 MMOL/L (ref 3.5–5.1)
PROT SERPL-MCNC: 7.6 G/DL (ref 6–8.4)
SODIUM SERPL-SCNC: 137 MMOL/L (ref 136–145)
TROPONIN I SERPL DL<=0.01 NG/ML-MCNC: <0.006 NG/ML (ref 0–0.03)

## 2025-03-10 PROCEDURE — 25000003 PHARM REV CODE 250: Performed by: STUDENT IN AN ORGANIZED HEALTH CARE EDUCATION/TRAINING PROGRAM

## 2025-03-10 PROCEDURE — S4991 NICOTINE PATCH NONLEGEND: HCPCS | Performed by: STUDENT IN AN ORGANIZED HEALTH CARE EDUCATION/TRAINING PROGRAM

## 2025-03-10 RX ORDER — ALBUTEROL SULFATE 90 UG/1
1-2 INHALANT RESPIRATORY (INHALATION) EVERY 6 HOURS PRN
Qty: 6.7 G | Refills: 2 | Status: SHIPPED | OUTPATIENT
Start: 2025-03-10 | End: 2025-03-10

## 2025-03-10 RX ORDER — LEVOFLOXACIN 500 MG/1
500 TABLET, FILM COATED ORAL DAILY
Qty: 5 TABLET | Refills: 0 | Status: SHIPPED | OUTPATIENT
Start: 2025-03-10 | End: 2025-03-10

## 2025-03-10 RX ORDER — PREDNISONE 20 MG/1
40 TABLET ORAL DAILY
Qty: 10 TABLET | Refills: 0 | Status: SHIPPED | OUTPATIENT
Start: 2025-03-10 | End: 2025-03-10

## 2025-03-10 RX ORDER — ALBUTEROL SULFATE 90 UG/1
1-2 INHALANT RESPIRATORY (INHALATION) EVERY 6 HOURS PRN
Qty: 6.7 G | Refills: 2 | Status: SHIPPED | OUTPATIENT
Start: 2025-03-10 | End: 2025-03-12 | Stop reason: SDUPTHER

## 2025-03-10 RX ORDER — LEVOFLOXACIN 500 MG/1
500 TABLET, FILM COATED ORAL DAILY
Qty: 5 TABLET | Refills: 0 | Status: SHIPPED | OUTPATIENT
Start: 2025-03-10 | End: 2025-03-15

## 2025-03-10 RX ORDER — IBUPROFEN 200 MG
1 TABLET ORAL DAILY
Qty: 30 PATCH | Refills: 0 | Status: SHIPPED | OUTPATIENT
Start: 2025-03-10 | End: 2025-03-10

## 2025-03-10 RX ORDER — IBUPROFEN 200 MG
1 TABLET ORAL
Status: DISCONTINUED | OUTPATIENT
Start: 2025-03-10 | End: 2025-03-10 | Stop reason: HOSPADM

## 2025-03-10 RX ORDER — PREDNISONE 20 MG/1
40 TABLET ORAL DAILY
Qty: 10 TABLET | Refills: 0 | Status: SHIPPED | OUTPATIENT
Start: 2025-03-10 | End: 2025-03-15

## 2025-03-10 RX ORDER — GUAIFENESIN 100 MG/5ML
100-200 LIQUID ORAL EVERY 4 HOURS PRN
Qty: 60 ML | Refills: 0 | Status: SHIPPED | OUTPATIENT
Start: 2025-03-10 | End: 2025-03-20

## 2025-03-10 RX ORDER — GUAIFENESIN 100 MG/5ML
100-200 LIQUID ORAL EVERY 4 HOURS PRN
Qty: 60 ML | Refills: 0 | Status: SHIPPED | OUTPATIENT
Start: 2025-03-10 | End: 2025-03-10

## 2025-03-10 RX ORDER — IBUPROFEN 200 MG
1 TABLET ORAL DAILY
Qty: 30 PATCH | Refills: 0 | Status: SHIPPED | OUTPATIENT
Start: 2025-03-10

## 2025-03-10 RX ADMIN — Medication 1 PATCH: at 01:03

## 2025-03-10 NOTE — NURSING
Pt got up and walked pass nurses station saying Im leaving, he pulled out his IV and walked off the unit

## 2025-03-10 NOTE — PROGRESS NOTES
HOSPITALIST ON CALL NOTE:    Contacted by the RN regarding pt just walked passed nurses station, stated he was leaving, pulled out IV, and walked off unit. He was A&O apparently and refused to sign the AMA. Admitted on 3/8 for hyperkalemia and COPD exacerbation. Unable to talk to the patient cause he left the unit just as I was notified. He did not have a PEC on chart and no concerns for harm to himself noted.

## 2025-03-10 NOTE — ASSESSMENT & PLAN NOTE
Hyperkalemia is likely due to Medication induced->suspect combination Spironolactone and Lisinopril. He does tend to run high normal (5.2).  The patients most recent potassium results are listed below.  Recent Labs     03/08/25  0507 03/08/25  1519 03/09/25  2328   K 5.2* 4.9 4.9     Plan  - resolved  - check BMP in AM

## 2025-03-10 NOTE — ED NOTES
68 y/o M COPD presenting to the emergency department for evaluation of respiratory distress and shortness for breath. Patient was seen in the hospital recently evaluated for COPD exacerbation hyperkalemia. Of note patient recently eloped from the hospital after refusing to sign an AMA form. He went home to eat and smoked a cigarette and had significant respiratory distress after this. Resp at bedside.

## 2025-03-10 NOTE — ASSESSMENT & PLAN NOTE
SHAUNA is likely due to pre-renal azotemia due to dehydration->possibly due to K sparing diuretic.  IVF started and will need to give Lasix to try and remove k via urinary source.  Baseline creatinine is 1.4. Most recent creatinine and eGFR are listed below.  Recent Labs     03/08/25  0507 03/08/25  1519 03/09/25  2328   CREATININE 1.4 1.5* 1.5*   EGFRNORACEVR 54* 50* 50*      Plan  - F/u on repeat labs and monitor UOP with lasix given x1.   - Avoid nephrotoxins and renally dose meds for GFR listed above  - Monitor urine output, serial BMP, and adjust therapy as needed  - this has improved back to baseline

## 2025-03-10 NOTE — TELEPHONE ENCOUNTER
----- Message from Warren sent at 3/10/2025 12:57 PM CDT -----  Regarding: Self  Who Called: SelfSymptoms (please be specific):  COPD How long has patient had these symptoms:   pt doesn't know Pharmacy: [facility] - [id number] [facility] [place] [id number]: [phone] Fax: [phone] the patient rather a call back or a response via [facility]?  Call  back Best Call Back Number: .[phone] Information: Pt is requesting a breathing machine with medication

## 2025-03-10 NOTE — ED PROVIDER NOTES
Encounter Date: 3/9/2025       History     Chief Complaint   Patient presents with    Shortness of Breath     SOB     HPI]'  '  69-year-old male with a past medical history of COPD presenting to the emergency department for evaluation of respiratory distress and shortness for breath.  Patient was seen in the hospital recently evaluated for COPD exacerbation hyperkalemia.  Of note patient recently eloped from the hospital after refusing to sign an AMA form.  He went home to eat and smoked a cigarette and had significant respiratory distress after this.  Did not use any bronchodilators at home prior to returning to emergency department.  Of note patient has hyperkalemia resolved while in the hospital.  His breathing had reportedly improved and he was on room air at time of his elopement.  CT of the chest done yesterday without acute intrathoracic abnormalities identified and no lung consolidation.  Patient denies chest pain, abdominal pain, nausea, vomiting, fever, chills, leg swelling, leg pain, dysuria, hematuria.   Review of patient's allergies indicates:  No Known Allergies  Past Medical History:   Diagnosis Date    Cataract, bilateral 09/11/2018    Degenerative disc disease     Diabetes mellitus type II, controlled     Eye injury as a child    stick hit eye ? 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 Gabriel Evans  Prep: golytely  Route instructions sent: [facility] and reviewed via phone, pt verbalized understanding-KPvt    POSTERIOR FUSION OF CERVICAL SPINE WITH LAMINECTOMY N/A 10/3/2021    Procedure: LAMINECTOMY, SPINE, CERVICAL, WITH POSTERIOR FUSION C2-T2;  Surgeon: Ana Rosa Geller MD;  Location: [facility] OR [address] [place] [address];  Service: Neurosurgery;  Laterality: N/A;     Family History   Problem Relation Name Age of Onset    Heart disease Mother      Heart disease Father      Alcohol abuse Father      No Known Problems Sister      No Known Problems Brother      Diabetes Son      No Known Problems Maternal Aunt      No Known Problems Maternal Uncle      No Known Problems Paternal Aunt      No Known Problems Paternal Uncle      No Known Problems Maternal Grandmother      No Known Problems Maternal Grandfather      No Known Problems Paternal Grandmother      No Known Problems Paternal Grandfather      Amblyopia Neg Hx      Blindness Neg Hx      Cancer Neg Hx      Cataracts Neg Hx      Glaucoma Neg Hx      Hypertension Neg Hx      Macular degeneration Neg Hx      Retinal detachment Neg Hx      Strabismus Neg Hx      Stroke Neg Hx      Thyroid disease Neg Hx      Anxiety disorder Neg Hx      Dementia Neg Hx      Depression Neg Hx       Social History[1]  Review of Systems  See HPI  Physical Exam     Initial Vitals [03/09/25 2316]   BP Pulse Resp Temp SpO2   (!) 178/84 75 18 97.8 °F (36.6 °C) 95 %      MAP       --         Physical Exam    Nursing note and vitals reviewed.  Constitutional: He appears well-developed and well-nourished. He is not diaphoretic. No distress.   HENT:   Head: Normocephalic and atraumatic.   Right Ear: External ear normal.   Left Ear: External ear normal.   Nose: Nose normal.   Posterior oropharynx is nonedematous.  No uvular edema.  Symmetrical.    Hoarse voice initially which improved to normal after cough.   Eyes: Conjunctivae are normal. No scleral  icterus.   Neck: Neck supple. No tracheal deviation present.   Cardiovascular:  Normal rate, regular rhythm, normal heart sounds and intact distal pulses.     Exam reveals no gallop and no friction rub.       No murmur heard.  Pulmonary/Chest: He is in respiratory distress. He has wheezes.   Upper respiratory noises noted.  Inspiratory expiratory wheezing noted bilaterally.  Speaking in short sentences.  No accessory muscle usage.  Satting 95% on room air.   Abdominal: Abdomen is soft. Bowel sounds are normal. There is no abdominal tenderness.   Musculoskeletal:      Cervical back: Neck supple.     Neurological: He is alert and oriented to person, place, and time. GCS score is 15.   Skin: Skin is warm and dry.   Psychiatric: He has a normal mood and affect. His behavior is normal. Thought content normal.         ED Course   Procedures  Labs Reviewed   CBC W/ AUTO DIFFERENTIAL - Abnormal       Result Value    WBC 14.81 (*)     RBC 4.39 (*)     Hemoglobin 14.7      Hematocrit 44.9       (*)     MCH 33.5 (*)     MCHC 32.7      RDW 14.1      Platelets 171      MPV 11.8      Immature Granulocytes 0.4      Gran # (ANC) 12.6 (*)     Immature Grans (Abs) 0.06 (*)     Lymph # 1.2      Mono # 1.0      Eos # 0.0      Baso # 0.01      nRBC 0      Gran % 85.1 (*)     Lymph % 8.0 (*)     Mono % 6.4      Eosinophil % 0.0      Basophil % 0.1      Differential Method Automated     COMPREHENSIVE METABOLIC PANEL - Abnormal    Sodium 137      Potassium 4.9      Chloride 106      CO2 20 (*)     Glucose 251 (*)     BUN 36 (*)     Creatinine 1.5 (*)     Calcium 8.2 (*)     Total Protein 7.6      Albumin 3.9      Total Bilirubin 1.0      Alkaline Phosphatase 41      AST 27      ALT 26      eGFR 50 (*)     Anion Gap 11     ISTAT PROCEDURE - Abnormal    POC PH 7.368      POC PCO2 39.6      POC PO2 115 (*)     POC HCO3 22.8 (*)     POC BE -2      POC SATURATED O2 98      POC TCO2 24      Sample ARTERIAL      Site RR      Allens Test  N/A      DelSys Nasal Can      Mode SPONT      Flow 3      Sp02 100     MAGNESIUM    Magnesium 2.1     TROPONIN I    Troponin I <0.006     B-TYPE NATRIURETIC PEPTIDE    BNP 42            Imaging Results              X-Ray Chest AP Portable (Final result)  Result time 03/09/25 23:41:00      Final result by Mara Kinsey MD (03/09/25 23:41:00)                   Impression:      No acute cardiopulmonary process identified.      Electronically signed by: Mara Kinsey MD  Date:    03/09/2025  Time:    23:41               Narrative:    EXAMINATION:  XR CHEST AP PORTABLE    CLINICAL HISTORY:  Cough, unspecified    TECHNIQUE:  Single frontal view of the chest was performed.    COMPARISON:  March 2025.    FINDINGS:  Cardiac silhouette is normal in size.  Lungs are symmetrically expanded.  No evidence of focal consolidative process, pneumothorax, or significant pleural effusion.  No acute osseous abnormality identified.  Partially visualized cervicothoracic fusion hardware is seen involving the lower neck.                                       Medications   nicotine 21 mg/24 hr 1 patch (1 patch Transdermal Patch Applied 3/10/25 0129)   albuterol sulfate nebulizer solution 15 mg (15 mg Nebulization Given 3/9/25 2337)   ipratropium 0.02 % nebulizer solution 1 mg (1 mg Nebulization Given 3/9/25 2337)   methylPREDNISolone sodium succinate injection 125 mg (125 mg Intravenous Given 3/9/25 2328)     Medical Decision Making  Amount and/or Complexity of Data Reviewed  Radiology:  Decision-making details documented in ED Course.    Risk  OTC drugs.  Prescription drug management.               ED Course as of 03/10/25 0141   Mon Mar 10, 2025   0101 X-Ray Chest AP Portable     FINDINGS:  Cardiac silhouette is normal in size.  Lungs are symmetrically expanded.  No evidence of focal consolidative process, pneumothorax, or significant pleural effusion.  No acute osseous abnormality identified.  Partially visualized cervicothoracic  fusion hardware is seen involving the lower neck.     Impression:     No acute cardiopulmonary process identified.   [CC]   1313 69-year-old male presenting to the emergency department for evaluation of shortness for breath after smoking a cigarette.  He recently eloped from the hospital.  According to hospital medicine and plan was to likely discharge tomorrow.  Patient improved significantly after Solu-Medrol and albuterol treatment in the emergency department.  He is satting well on room air.  He was never hypoxic.  He is not tachypneic.  Chest x-ray without evidence of pneumonia or other findings.  Recent CT shows no evidence of pneumonia.  Blood pressure improved.  Mildly hyperglycemic in the emergency department.  No acidemia, doubt DKA.  Leukocytosis noted in the setting of recent steroid use.  He is afebrile, doubt infectious etiology.  Given COPD exacerbation in the high-risk individual start on levofloxacin for 5 days.  BNP normal, troponin normal, denies chest pain, doubt ACS.  Creatinine at patient's baseline.  Mild hypocalcemia.  I have prescribed guaifenesin as an expectorant, prednisone, albuterol inhaler counseled to use every 4 hours for the next 24-48 hours as well as levofloxacin.  Vitals are stable patient ambulating without difficulty in the emergency department.  Discussed the case with hospital medicine, dr. Jim, who the patient eloped from few hours prior to arrival, who agree with disposition.  Strict return precautions given. I believe patient is appropriate for discharge and continued outpatient evaulation/treatment.  I discussed with the patient/family the diagnosis, treatment plan, indications for return to the emergency department, and for expected follow-up. The patient/family verbalized an understanding. The patient/family  asked if there are any questions or concerns. We discuss the case, until all issues are addressed to the patient/family's satisfaction. Patient/family understands  and is agreeable to the plan. Patient is stable and ready for discharge.   [CC]   0140 Differential Diagnosis includes, but is not limited to:  PE, MI/ACS, pneumothorax, pericardial effusion/tamonade, pneumonia, lung abscess, pericarditis/myocarditis, pleural effusion, lung mass, CHF exacerbation, asthma exacerbation, COPD exacerbation, aspirated/ingested foreign body, airway obstruction, CO poisoning, anemia, metabolic derangement, allergy/atopy, influenza, viral URI, viral syndrome.  [CC]      ED Course User Index  [CC] Jet Arias MD                           Clinical Impression:  Final diagnoses:  [R05.9] Cough  [R06.00] Dyspnea  [J44.1] COPD exacerbation (Primary)  [R73.9] Hyperglycemia          ED Disposition Condition    Discharge Stable          ED Prescriptions       Medication Sig Dispense Start Date End Date Auth. Provider    albuterol (PROVENTIL/VENTOLIN HFA) 90 mcg/actuation inhaler  (Status: Discontinued) Inhale 1-2 puffs into the lungs every 6 (six) hours as needed for Wheezing. Rescue 6.7 g 3/10/2025 3/10/2025 Jet Arias MD    predniSONE (DELTASONE) 20 MG tablet  (Status: Discontinued) Take 2 tablets (40 mg total) by mouth once daily. for 5 days 10 tablet 3/10/2025 3/10/2025 Jet Arias MD    nicotine (NICODERM CQ) 21 mg/24 hr  (Status: Discontinued) Place 1 patch onto the skin once daily. 30 patch 3/10/2025 3/10/2025 Jet Arias MD    levoFLOXacin (LEVAQUIN) 500 MG tablet  (Status: Discontinued) Take 1 tablet (500 mg total) by mouth once daily. for 5 days 5 tablet 3/10/2025 3/10/2025 Jet Arias MD    guaiFENesin 100 mg/5 ml (ROBITUSSIN) 100 mg/5 mL syrup  (Status: Discontinued) Take 5-10 mLs (100-200 mg total) by mouth every 4 (four) hours as needed for Cough. 60 mL 3/10/2025 3/10/2025 Jet Arias MD    albuterol (PROVENTIL/VENTOLIN HFA) 90 mcg/actuation inhaler Inhale 1-2 puffs into the lungs every 6 (six) hours as needed for Wheezing. Rescue 6.7 g  3/10/2025 -- Jet Arias MD    guaiFENesin 100 mg/5 ml (ROBITUSSIN) 100 mg/5 mL syrup Take 5-10 mLs (100-200 mg total) by mouth every 4 (four) hours as needed for Cough. 60 mL 3/10/2025 3/20/2025 Jet Arias MD    levoFLOXacin (LEVAQUIN) 500 MG tablet Take 1 tablet (500 mg total) by mouth once daily. for 5 days 5 tablet 3/10/2025 3/15/2025 Jet Arias MD    nicotine (NICODERM CQ) 21 mg/24 hr Place 1 patch onto the skin once daily. 30 patch 3/10/2025 -- Jet Arias MD    predniSONE (DELTASONE) 20 MG tablet Take 2 tablets (40 mg total) by mouth once daily. for 5 days 10 tablet 3/10/2025 3/15/2025 Jet Arias MD          Follow-up Information       Follow up With Specialties Details Why Contact Info    Gabriel Evans MD Internal Medicine Schedule an appointment as soon as possible for a visit in 3 days  [address] [facility]  [place] [address]  [phone]      [facility] Emergency Dept Emergency Medicine Go to  If symptoms worsen [address] [facility] [place] [id number] [phone]             Jet Arias MD  03/10/25 0140         [1]   Social History  Tobacco Use    Smoking status: Some Days     Current packs/day: 0.50     Average packs/day: 1 pack/day for 53.0 years (52.1 ttl pk-yrs)     Types: Cigarettes, Vaping with nicotine     Start date: 1972     Last attempt to quit: 6/20/2023    Smokeless tobacco: Never    Tobacco comments:     Approximately 3 cigarettes a day   Substance Use Topics    Alcohol use: No     Alcohol/week: 0.0 standard drinks of alcohol    Drug use: Yes     Types: Marijuana, Oxycodone, Benzodiazepines     Comment: 4 oxycodones daily; one marijuana cigarette daily        Jet Arias MD  03/10/25 0141

## 2025-03-10 NOTE — ASSESSMENT & PLAN NOTE
Patient's FSGs are controlled on current medication regimen.  Last A1c reviewed-   Lab Results   Component Value Date    HGBA1C 6.3 (H) 03/05/2025     Most recent fingerstick glucose reviewed-   Recent Labs   Lab 03/09/25  0736 03/09/25  1104 03/09/25  1630   POCTGLUCOSE 141* 158* 237*     Current correctional scale  Low  Maintain anti-hyperglycemic dose as follows-   Antihyperglycemics (From admission, onward)      None          Hold Oral hypoglycemics while patient is in the hospital->Metformin

## 2025-03-10 NOTE — DISCHARGE SUMMARY
[facility] Medicine  Discharge Summary      Patient Name: Butch Mcdaniel  MRN: [id number]  GINETTE: [id number]  Patient Class: IP- Inpatient  Admission Date: 3/7/2025  Hospital Length of Stay: 2 days  Discharge Date and Time:  3/9/25 7pm left AMA  Attending Physician: No att. providers found   Discharging Provider: Ami Chen MD  Primary Care Provider: Gabriel Evans MD    Primary Care Team: Networked reference to record PCT     HPI:   69 y.o.  man with h/o Essential HTN,  CKD 3B (Creatinine close to 1.4), NIDDM type 2 (A1c 6.3 March 2025), Chronic anxiety/depression, Chronic pain, Smoker (on Buspar), HLD, Peripheral neuropathy presents to the ER this evening per the request of his PCP due to abnormal labs and c/o SOB with wheezing for 1 month. NO fevers or chills. Denies to be any non-productive dry cough. Smokes daily but has cut back. No chest pain or N/V.   States was started on Spironolactone back in 12/2024 by Dr. Pete for BP control. Since then the BP has been good. Does admit to eatting lots of tomatoes and potatoes as well.     Triage vitals were :  BP: (!) 144/65  Pulse: 77  Resp: (!) 23  Temp: 98 °F (36.7 °C)  SpO2: 95 % on room air.     Work up in the ED noted normal WBC and stable H/H. Lytes with K initially 5.2 but repeat 6.3 (same as last night) despite shifting with Insulin, Albuterolm, Lokelma. EKG with no peaked T waves or arrythmias. Liver enzymes unremarkable. Renal function slightly elevated at 1.8 (baseline closer to 1.4).  Troponin and BNP wnl. UA with ketones but no signs of UTI. Ph 7.3.     CXR:     1. Interstitial findings are accentuated by habitus, edema or other pneumonitis can present in this fashion.  Correlation is advised.     Started on Cont. Nebs and give Lokelma with repeat K 6.3. We have been consulted for further work up of his hyperkalemia.        * No surgery found *      Hospital Course:   Mr Butch Mcdaniel was  admitted with hyperK and COPD exacerbation. HyperK resolved with IV lasix. Started steroids, nebs for COPD exacerbation. Stable on room air, able to walk around, able to speak, but still wheezing. He has opted to leave the hospital against medical advice.      Goals of Care Treatment Preferences:  Code Status: Full Code      SDOH Screening:  The patient declined to be screened for utility difficulties, food insecurity, transport difficulties, housing insecurity, and interpersonal safety, so no concerns could be identified this admission.     Consults:     Assessment & Plan  Chronic obstructive pulmonary disease with acute exacerbation  Reviewed PCP notes and EMR.   - CT no changes  - started steroids, nebs  - no infection identified  - improving but still very wheezy  - tobacco cessation counseling  Essential hypertension  Patient's blood pressure range in the last 24 hours was: BP  Min: 133/63  Max: 178/84.The patient's inpatient anti-hypertensive regimen is listed below:  Current Antihypertensives  hydrALAZINE injection 10 mg, Every 6 hours PRN, Intravenous for SBP>180 or DBP>90  amLODIPine tablet 10 mg, Nightly, Oral  carvediloL tablet 12.5 mg, 2 times daily, Oral  hydrALAZINE tablet 100 mg, Every 8 hours, Oral  lisinopriL tablet 40 mg, Daily, Oral  furosemide injection 40 mg, Once, Intravenous    Plan  - BP is controlled, no changes needed to their regimen  - Hold parameters in place for low/normal BP and HR.   Anxiety, unable to wean off BZD  Reviewed his [place]  and noted chronic xanax use at 1.5mg daily to as far back ast 3/2023. Filled by same group. Will resume this. Also noted chronic low back pain with same oxycodone-MARICHUY 10/325mg refill at 90tabs for 30 days. Will resume this medications as well.     Type 2 diabetes mellitus without complication, without long-term current use of insulin  Patient's FSGs are controlled on current medication regimen.  Last A1c reviewed-   Lab Results   Component Value Date     HGBA1C 6.3 (H) 03/05/2025     Most recent fingerstick glucose reviewed-   Recent Labs   Lab 03/09/25  0736 03/09/25  1104 03/09/25  1630   POCTGLUCOSE 141* 158* 237*     Current correctional scale  Low  Maintain anti-hyperglycemic dose as follows-   Antihyperglycemics (From admission, onward)      None          Hold Oral hypoglycemics while patient is in the hospital->Metformin     Hyperkalemia  Hyperkalemia is likely due to Medication induced->suspect combination Spironolactone and Lisinopril. He does tend to run high normal (5.2).  The patients most recent potassium results are listed below.  Recent Labs     03/08/25  0507 03/08/25  1519 03/09/25  2328   K 5.2* 4.9 4.9     Plan  - resolved  - check BMP in AM        Acute renal failure superimposed on stage 3a chronic kidney disease  SHAUNA is likely due to pre-renal azotemia due to dehydration->possibly due to K sparing diuretic.  IVF started and will need to give Lasix to try and remove k via urinary source.  Baseline creatinine is  1.4 . Most recent creatinine and eGFR are listed below.  Recent Labs     03/08/25  0507 03/08/25  1519 03/09/25  2328   CREATININE 1.4 1.5* 1.5*   EGFRNORACEVR 54* 50* 50*      Plan  - F/u on repeat labs and monitor UOP with lasix given x1.   - Avoid nephrotoxins and renally dose meds for GFR listed above  - Monitor urine output, serial BMP, and adjust therapy as needed  - this has improved back to baseline     Final Active Diagnoses:    Diagnosis Date Noted POA    PRINCIPAL PROBLEM:  Chronic obstructive pulmonary disease with acute exacerbation [J44.1] 03/08/2025 Yes    Hyperkalemia [E87.5] 07/18/2022 Yes    Acute renal failure superimposed on stage 3a chronic kidney disease [N17.9, N18.31] 07/18/2022 Yes    Type 2 diabetes mellitus without complication, without long-term current use of insulin [E11.9] 08/07/2015 Yes    Essential hypertension [I10] 11/01/2012 Yes    Anxiety, unable to wean off BZD [F41.9] 11/01/2012 Yes      Problems  Resolved During this Admission:       Discharged Condition: against medical advice    Disposition: Left Against Medical Adv*    Follow Up: with PCP    Patient Instructions:   No discharge procedures on file.- he left AMA    Significant Diagnostic Studies: N/A    Pending Diagnostic Studies:       None           Medications:  Home Medications:      Medication List        ASK your doctor about these medications      albuterol 90 mcg/actuation inhaler  Commonly known as: PROVENTIL/VENTOLIN HFA  INHALE TWO PUFFS BY MOUTH EVERY 6 HOURS AS NEEDED FOR WHEEZING OR SHORTNESS OF BREATH     ALPRAZolam 1 MG tablet  Commonly known as: XANAX  TAKE 1 AND ONE-HALF TABLETS BY MOUTH ONCE A DAY     amLODIPine 10 MG tablet  Commonly known as: NORVASC  TAKE ONE TABLET BY MOUTH EVERY EVENING     atorvastatin 40 MG tablet  Commonly known as: LIPITOR  Take 1 tablet (40 mg total) by mouth every evening.     BD LUER-STARLA SYRINGE 1 mL Syrg  Generic drug: syringe (disposable)  Testosterone injection every 2 weeks     busPIRone 30 MG Tab  Commonly known as: BUSPAR  TAKE ONE TABLET BY MOUTH TWICE DAILY     carvediloL 12.5 MG tablet  Commonly known as: COREG  Take 1 tablet (12.5 mg total) by mouth 2 (two) times daily.     FLUoxetine 40 MG capsule  TAKE ONE CAPSULE BY MOUTH ONCE A DAY     fluticasone-salmeterol 100-50 mcg/dose 100-50 mcg/dose diskus inhaler  Commonly known as: ADVAIR  Inhale 1 puff into the lungs.     gabapentin 600 MG tablet  Commonly known as: NEURONTIN  Take 600 mg by mouth 3 (three) times daily.     hydrALAZINE 100 MG tablet  Commonly known as: APRESOLINE  TAKE ONE tablet (100mg) BY MOUTH THREE TIMES DAILY     ibuprofen 600 MG tablet  Commonly known as: ADVIL,MOTRIN  Take 600 mg by mouth.     lisinopriL 40 MG tablet  Commonly known as: PRINIVIL,ZESTRIL  Take 1 tablet (40 mg total) by mouth once daily.     metFORMIN 500 MG tablet  Commonly known as: GLUCOPHAGE  TAKE ONE TABLET BY MOUTH TWICE DAILY WITH MEALS     MIRALAX  "ORAL  Take 1 application by mouth.     naloxone 4 mg/actuation Spry  Commonly known as: NARCAN  4mg by nasal route as needed for opioid overdose; may repeat every 2-3 minutes in alternating nostrils until medical help arrives. Call 911     needle (disp) 22 G 22 gauge x 1" Ndle  Testosterone injection every 2 weeks     oxyCODONE-acetaminophen  mg per tablet  Commonly known as: PERCOCET  Take 1 tablet by mouth every 4 (four) hours as needed.     senna 8.6 mg tablet  Commonly known as: SENOKOT  Take 1 tablet by mouth 2 (two) times a day.     spironolactone 25 MG tablet  Commonly known as: ALDACTONE  Take 1 tablet (25 mg total) by mouth once daily.     testosterone cypionate 200 mg/mL injection  Commonly known as: DEPOTESTOTERONE CYPIONATE  INJECT ONE MILLILITER INTRAMUSCULARLY EVERY 14 DAYS     tiZANidine 4 MG tablet  Commonly known as: ZANAFLEX  Take 4-8 mg by mouth nightly as needed.              Indwelling Lines/Drains at time of discharge:   Lines/Drains/Airways       None                   Time spent on the discharge of patient: 10 minutes         Ami Chen MD  Department of Hospital Medicine  [facility] - Telemetry  "

## 2025-03-10 NOTE — DISCHARGE INSTRUCTIONS
Please return with any new or worsening symptoms.  I have prescribed nicotine patches, please discontinue smoking tobacco or you will continue to worsen.    Please use inhaler every 4 hours, 4 puffs, for the next 48 hours while awake.  Return to the ER with any new or worsening symptoms.

## 2025-03-10 NOTE — ASSESSMENT & PLAN NOTE
Patient's blood pressure range in the last 24 hours was: BP  Min: 133/63  Max: 178/84.The patient's inpatient anti-hypertensive regimen is listed below:  Current Antihypertensives  hydrALAZINE injection 10 mg, Every 6 hours PRN, Intravenous for SBP>180 or DBP>90  amLODIPine tablet 10 mg, Nightly, Oral  carvediloL tablet 12.5 mg, 2 times daily, Oral  hydrALAZINE tablet 100 mg, Every 8 hours, Oral  lisinopriL tablet 40 mg, Daily, Oral  furosemide injection 40 mg, Once, Intravenous    Plan  - BP is controlled, no changes needed to their regimen  - Hold parameters in place for low/normal BP and HR.

## 2025-03-11 ENCOUNTER — CLINICAL SUPPORT (OUTPATIENT)
Dept: SMOKING CESSATION | Facility: CLINIC | Age: 70
End: 2025-03-11
Payer: COMMERCIAL

## 2025-03-11 DIAGNOSIS — F17.200 NICOTINE DEPENDENCE: Primary | ICD-10-CM

## 2025-03-11 LAB
OHS QRS DURATION: 82 MS
OHS QRS DURATION: 84 MS
OHS QTC CALCULATION: 390 MS
OHS QTC CALCULATION: 404 MS

## 2025-03-11 PROCEDURE — 99999 PR PBB SHADOW E&M-EST. PATIENT-LVL I: CPT | Mod: PBBFAC,,,

## 2025-03-11 PROCEDURE — 99403 PREV MED CNSL INDIV APPRX 45: CPT | Mod: S$GLB,,,

## 2025-03-11 NOTE — PROGRESS NOTES
Individual Follow-Up Form    3/11/2025    Quit Date: n/a    Clinical Status of Patient: Outpatient    Length of Service: 45 minutes    Continuing Medication: yes  Patches    Other Medications: n/a     Target Symptoms: Withdrawal and medication side effects. The following were  rated moderate (3) to severe (4) on TCRS:  Moderate (3): 0  Severe (4): 0    Comments: patient presents for follow up telephonically, he is sick and unable to come to clinic, he is smoking less but still smoking, wearing the 21mg nicotine patch daily, he has not been able to completely quit smoking, this has been a problem for him for a long time, ctts continues to encourage him but he never quits just does rate reduction while wearing the patch, strategies and session handout discussed, continuing to motivate, he denies negative s/e from the patch, reminded him that he does not have patch benefits until July therefore patch scripts will have to go through his normal medical insurance, will follow     Diagnosis: F17.200    Next Visit: 2 weeks

## 2025-03-12 ENCOUNTER — OFFICE VISIT (OUTPATIENT)
Dept: FAMILY MEDICINE | Facility: CLINIC | Age: 70
End: 2025-03-12
Payer: MEDICARE

## 2025-03-12 VITALS
TEMPERATURE: 99 F | DIASTOLIC BLOOD PRESSURE: 70 MMHG | SYSTOLIC BLOOD PRESSURE: 160 MMHG | WEIGHT: 213.63 LBS | OXYGEN SATURATION: 95 % | BODY MASS INDEX: 29.91 KG/M2 | HEART RATE: 89 BPM | HEIGHT: 71 IN

## 2025-03-12 DIAGNOSIS — F17.200 TOBACCO DEPENDENCE: Chronic | ICD-10-CM

## 2025-03-12 DIAGNOSIS — E11.36 TYPE 2 DIABETES MELLITUS WITH DIABETIC CATARACT, WITHOUT LONG-TERM CURRENT USE OF INSULIN: ICD-10-CM

## 2025-03-12 DIAGNOSIS — G89.29 CHRONIC LOW BACK PAIN, UNSPECIFIED BACK PAIN LATERALITY, UNSPECIFIED WHETHER SCIATICA PRESENT: ICD-10-CM

## 2025-03-12 DIAGNOSIS — F11.90 CHRONIC, CONTINUOUS USE OF OPIOIDS: ICD-10-CM

## 2025-03-12 DIAGNOSIS — E11.9 TYPE 2 DIABETES MELLITUS WITHOUT COMPLICATION, WITHOUT LONG-TERM CURRENT USE OF INSULIN: ICD-10-CM

## 2025-03-12 DIAGNOSIS — Z79.899 CHRONIC PRESCRIPTION BENZODIAZEPINE USE: ICD-10-CM

## 2025-03-12 DIAGNOSIS — J44.1 CHRONIC OBSTRUCTIVE PULMONARY DISEASE WITH ACUTE EXACERBATION: ICD-10-CM

## 2025-03-12 DIAGNOSIS — M54.50 CHRONIC LOW BACK PAIN, UNSPECIFIED BACK PAIN LATERALITY, UNSPECIFIED WHETHER SCIATICA PRESENT: ICD-10-CM

## 2025-03-12 DIAGNOSIS — I10 ESSENTIAL HYPERTENSION: ICD-10-CM

## 2025-03-12 DIAGNOSIS — N18.31 ACUTE RENAL FAILURE SUPERIMPOSED ON STAGE 3A CHRONIC KIDNEY DISEASE, UNSPECIFIED ACUTE RENAL FAILURE TYPE: Primary | ICD-10-CM

## 2025-03-12 DIAGNOSIS — M48.061 FORAMINAL STENOSIS OF LUMBAR REGION: ICD-10-CM

## 2025-03-12 DIAGNOSIS — R79.89 LOW TESTOSTERONE IN MALE: ICD-10-CM

## 2025-03-12 DIAGNOSIS — F13.20 BENZODIAZEPINE DEPENDENCE: Chronic | ICD-10-CM

## 2025-03-12 DIAGNOSIS — N17.9 ACUTE RENAL FAILURE SUPERIMPOSED ON STAGE 3A CHRONIC KIDNEY DISEASE, UNSPECIFIED ACUTE RENAL FAILURE TYPE: Primary | ICD-10-CM

## 2025-03-12 DIAGNOSIS — E87.5 HYPERKALEMIA: ICD-10-CM

## 2025-03-12 DIAGNOSIS — F41.9 ANXIETY: ICD-10-CM

## 2025-03-12 PROCEDURE — 1125F AMNT PAIN NOTED PAIN PRSNT: CPT | Mod: CPTII,S$GLB,, | Performed by: INTERNAL MEDICINE

## 2025-03-12 PROCEDURE — 99999 PR PBB SHADOW E&M-EST. PATIENT-LVL V: CPT | Mod: PBBFAC,,, | Performed by: INTERNAL MEDICINE

## 2025-03-12 PROCEDURE — G2211 COMPLEX E/M VISIT ADD ON: HCPCS | Mod: S$GLB,,, | Performed by: INTERNAL MEDICINE

## 2025-03-12 PROCEDURE — 99214 OFFICE O/P EST MOD 30 MIN: CPT | Mod: S$GLB,,, | Performed by: INTERNAL MEDICINE

## 2025-03-12 PROCEDURE — 1160F RVW MEDS BY RX/DR IN RCRD: CPT | Mod: CPTII,S$GLB,, | Performed by: INTERNAL MEDICINE

## 2025-03-12 PROCEDURE — 3078F DIAST BP <80 MM HG: CPT | Mod: CPTII,S$GLB,, | Performed by: INTERNAL MEDICINE

## 2025-03-12 PROCEDURE — 1101F PT FALLS ASSESS-DOCD LE1/YR: CPT | Mod: CPTII,S$GLB,, | Performed by: INTERNAL MEDICINE

## 2025-03-12 PROCEDURE — 3044F HG A1C LEVEL LT 7.0%: CPT | Mod: CPTII,S$GLB,, | Performed by: INTERNAL MEDICINE

## 2025-03-12 PROCEDURE — 3288F FALL RISK ASSESSMENT DOCD: CPT | Mod: CPTII,S$GLB,, | Performed by: INTERNAL MEDICINE

## 2025-03-12 PROCEDURE — 3077F SYST BP >= 140 MM HG: CPT | Mod: CPTII,S$GLB,, | Performed by: INTERNAL MEDICINE

## 2025-03-12 PROCEDURE — 3008F BODY MASS INDEX DOCD: CPT | Mod: CPTII,S$GLB,, | Performed by: INTERNAL MEDICINE

## 2025-03-12 PROCEDURE — 1159F MED LIST DOCD IN RCRD: CPT | Mod: CPTII,S$GLB,, | Performed by: INTERNAL MEDICINE

## 2025-03-12 PROCEDURE — 1111F DSCHRG MED/CURRENT MED MERGE: CPT | Mod: CPTII,S$GLB,, | Performed by: INTERNAL MEDICINE

## 2025-03-12 PROCEDURE — 4010F ACE/ARB THERAPY RXD/TAKEN: CPT | Mod: CPTII,S$GLB,, | Performed by: INTERNAL MEDICINE

## 2025-03-12 RX ORDER — ALBUTEROL SULFATE 0.83 MG/ML
2.5 SOLUTION RESPIRATORY (INHALATION) EVERY 6 HOURS PRN
Qty: 30 EACH | Refills: 5 | Status: SHIPPED | OUTPATIENT
Start: 2025-03-12 | End: 2026-03-12

## 2025-03-12 RX ORDER — ALBUTEROL SULFATE 90 UG/1
2 INHALANT RESPIRATORY (INHALATION) EVERY 4 HOURS PRN
Qty: 18 G | Refills: 3 | Status: SHIPPED | OUTPATIENT
Start: 2025-03-12

## 2025-03-12 RX ORDER — FLUTICASONE FUROATE, UMECLIDINIUM BROMIDE AND VILANTEROL TRIFENATATE 200; 62.5; 25 UG/1; UG/1; UG/1
1 POWDER RESPIRATORY (INHALATION) DAILY
Qty: 60 EACH | Refills: 5 | Status: SHIPPED | OUTPATIENT
Start: 2025-03-12

## 2025-03-12 RX ORDER — CLONIDINE 0.1 MG/24H
1 PATCH, EXTENDED RELEASE TRANSDERMAL
Qty: 4 PATCH | Refills: 5 | Status: SHIPPED | OUTPATIENT
Start: 2025-03-12 | End: 2026-03-12

## 2025-03-12 NOTE — ASSESSMENT & PLAN NOTE
03/12/2025: Pre visit labs testosterone normal on current dose 200 mg every 2 weeks.  Surveillance PSA was normal.  No changes  Orders:    Testosterone; Future

## 2025-03-12 NOTE — ASSESSMENT & PLAN NOTE
03/12/2025: Recent hospitalization for SHAUNA and also COPD exacerbation.  Has not been on maintenance inhalers.  He went to the ED after leaving [facility] and ultimately was discharged on prednisone and Levaquin.    Still wheezing.    Requesting his own nebulizer machine.  He used his wife's nebulizer and found it very helpful.    Offered him nebulizer treatment here in the office and he declines  Nebulizer ordered and albuterol for nebulizer   Trelegy for maintenance inhaler.  If not covered may need to try alternative such as Spiriva or Incruse  Orders:    albuterol (PROVENTIL) 2.5 mg /3 mL (0.083 %) nebulizer solution; Take 3 mLs (2.5 mg total) by nebulization every 6 (six) hours as needed for Wheezing. Rescue    NEBULIZER FOR HOME USE    fluticasone-umeclidin-vilanter (TRELEGY ELLIPTA) 200-62.5-25 mcg inhaler; Inhale 1 puff into the lungs once daily.    Ambulatory referral/consult to Pulmonology; Future    albuterol (PROVENTIL/VENTOLIN HFA) 90 mcg/actuation inhaler; Inhale 2 puffs into the lungs every 4 (four) hours as needed for Wheezing. Rescue

## 2025-03-12 NOTE — ASSESSMENT & PLAN NOTE
03/12/2025:  Recent hospitalization for hyperkalemia and SHAUNA.  Was diuresed and potassium went down.  No changes to his medicines.    I suspect this is from combination of lisinopril and spironolactone.  Spironolactone was started in late fall.    Stop spironolactone   Stay on all other medicines.  He is on max dose of all medicines except for hydralazine.  He is supposed to take it 3 times daily but he can only take it twice daily because of scheduling and can not remember to take it more often than that.    Will try him with clonidine.  May cause sedation and drowsiness but he notes that he does not really sleep well anyway and he has high anxiety so this may help with that as well.  Start him on clonidine patch 0.1 mg and titrate up.    Nurse visit 1 week for BP check and BMP off of spironolactone  Orders:    Basic Metabolic Panel; Future    cloNIDine 0.1 mg/24 hr td ptwk (CATAPRES) 0.1 mg/24 hr; Place 1 patch onto the skin every 7 days.

## 2025-03-12 NOTE — ASSESSMENT & PLAN NOTE
03/12/2025:  Recent hospitalization for hyperkalemia and SAHUNA.  Was diuresed and potassium went down.  No changes to his medicines.    I suspect this is from combination of lisinopril and spironolactone.  Spironolactone was started in late fall.    Stop spironolactone   Stay on all other medicines.  He is on max dose of all medicines except for hydralazine.  He is supposed to take it 3 times daily but he can only take it twice daily because of scheduling and can not remember to take it more often than that.    Will try him with clonidine.  May cause sedation and drowsiness but he notes that he does not really sleep well anyway and he has high anxiety so this may help with that as well.  Start him on clonidine patch 0.1 mg and titrate up.    Nurse visit 1 week for BP check and BMP off of spironolactone  Orders:    Basic Metabolic Panel; Future    cloNIDine 0.1 mg/24 hr td ptwk (CATAPRES) 0.1 mg/24 hr; Place 1 patch onto the skin every 7 days.

## 2025-03-12 NOTE — ASSESSMENT & PLAN NOTE
03/12/2025:  Managed by outside pain clinic.  On chronic Percocet, tizanidine and gabapentin managed by outside pain clinic

## 2025-03-12 NOTE — PATIENT INSTRUCTIONS
Stop spironolactone  Stay on all other BP meds  Will send in Trele for inhaler  And nebulizer treatment  Start clonidine patch one a week    Repeat labs and BP check in a week.

## 2025-03-12 NOTE — ASSESSMENT & PLAN NOTE
03/12/2025:  Pre visit labs A1c stable on metformin.  On gabapentin more for chronic back pain  Orders:    Comprehensive Metabolic Panel; Future    Lipid Panel; Future    Hemoglobin A1C; Future    CBC Without Differential; Future

## 2025-03-12 NOTE — PROGRESS NOTES
This note was created by combination of typed  and M-Modal dictation.  Transcription errors may be present.   This note was also generated with the assistance of ambient listening technology. Verbal consent was obtained by the patient and accompanying visitor(s) for the recording of patient appointment to facilitate this note. I attest to having reviewed and edited the generated note for accuracy, though some syntax or spelling errors may persist. Please contact the author of this note for any clarification.    Assessment and Plan:   Assessment and Plan   Assessment & Plan  Acute renal failure superimposed on stage 3a chronic kidney disease, unspecified acute renal failure type  Hyperkalemia  Essential hypertension  03/12/2025:  Recent hospitalization for hyperkalemia and SHAUNA.  Was diuresed and potassium went down.  No changes to his medicines.    I suspect this is from combination of lisinopril and spironolactone.  Spironolactone was started in late fall.    Stop spironolactone   Stay on all other medicines.  He is on max dose of all medicines except for hydralazine.  He is supposed to take it 3 times daily but he can only take it twice daily because of scheduling and can not remember to take it more often than that.    Will try him with clonidine.  May cause sedation and drowsiness but he notes that he does not really sleep well anyway and he has high anxiety so this may help with that as well.  Start him on clonidine patch 0.1 mg and titrate up.    Nurse visit 1 week for BP check and BMP off of spironolactone  Orders:    Basic Metabolic Panel; Future    cloNIDine 0.1 mg/24 hr td ptwk (CATAPRES) 0.1 mg/24 hr; Place 1 patch onto the skin every 7 days.    Tobacco dependence, patch with irritation; hx of bupropion  Chronic obstructive pulmonary disease with acute exacerbation  03/12/2025: Recent hospitalization for SHAUNA and also COPD exacerbation.  Has not been on maintenance inhalers.  He went to the ED  after leaving [facility] and ultimately was discharged on prednisone and Levaquin.    Still wheezing.    Requesting his own nebulizer machine.  He used his wife's nebulizer and found it very helpful.    Offered him nebulizer treatment here in the office and he declines  Nebulizer ordered and albuterol for nebulizer   Trelegy for maintenance inhaler.  If not covered may need to try alternative such as Spiriva or Incruse  Orders:    albuterol (PROVENTIL) 2.5 mg /3 mL (0.083 %) nebulizer solution; Take 3 mLs (2.5 mg total) by nebulization every 6 (six) hours as needed for Wheezing. Rescue    NEBULIZER FOR HOME USE    fluticasone-umeclidin-vilanter (TRELEGY ELLIPTA) 200-62.5-25 mcg inhaler; Inhale 1 puff into the lungs once daily.    Ambulatory referral/consult to Pulmonology; Future    albuterol (PROVENTIL/VENTOLIN HFA) 90 mcg/actuation inhaler; Inhale 2 puffs into the lungs every 4 (four) hours as needed for Wheezing. Rescue    Anxiety, unable to wean off BZD  Benzodiazepine dependence, did not do well with attempt to wean down 2017  Chronic prescription benzodiazepine use  03/12/2025:  Stable on fluoxetine, BuSpar, alprazolam longstanding.  Had previously consulted with Psychiatry who had recommended inpatient detox for his benzo and he refused.       Type 2 diabetes mellitus without complication, without long-term current use of insulin  Type 2 diabetes mellitus with diabetic cataract, without long-term current use of insulin  03/12/2025:  Pre visit labs A1c stable on metformin.  On gabapentin more for chronic back pain  Orders:    Comprehensive Metabolic Panel; Future    Lipid Panel; Future    Hemoglobin A1C; Future    CBC Without Differential; Future    Low testosterone in male  03/12/2025: Pre visit labs testosterone normal on current dose 200 mg every 2 weeks.  Surveillance PSA was normal.  No changes  Orders:    Testosterone; Future    Chronic, continuous use of opioids  Foraminal stenosis of lumbar region  Chronic  low back pain, unspecified back pain laterality, unspecified whether sciatica present  03/12/2025:  Managed by outside pain clinic.  On chronic Percocet, tizanidine and gabapentin managed by outside pain clinic         Medications Discontinued During This Encounter   Medication Reason    albuterol (PROVENTIL/VENTOLIN HFA) 90 mcg/actuation inhaler Duplicate Order    albuterol (PROVENTIL/VENTOLIN HFA) 90 mcg/actuation inhaler Reorder    spironolactone (ALDACTONE) 25 MG tablet Side effects       meds sent this encounter:  Medications Ordered This Encounter   Medications    albuterol (PROVENTIL) 2.5 mg /3 mL (0.083 %) nebulizer solution     Sig: Take 3 mLs (2.5 mg total) by nebulization every 6 (six) hours as needed for Wheezing. Rescue     Dispense:  30 each     Refill:  5    albuterol (PROVENTIL/VENTOLIN HFA) 90 mcg/actuation inhaler     Sig: Inhale 2 puffs into the lungs every 4 (four) hours as needed for Wheezing. Rescue     Dispense:  18 g     Refill:  3     This prescription was filled on 7/3/2024. Any refills authorized will be placed on file.    cloNIDine 0.1 mg/24 hr td ptwk (CATAPRES) 0.1 mg/24 hr     Sig: Place 1 patch onto the skin every 7 days.     Dispense:  4 patch     Refill:  5    fluticasone-umeclidin-vilanter (TRELEGY ELLIPTA) 200-62.5-25 mcg inhaler     Sig: Inhale 1 puff into the lungs once daily.     Dispense:  60 each     Refill:  5         Follow Up:  Nurse visit 1 week for BP check on new start clonidine.  BMP off of spironolactone.  Future Appointments   Date Time Provider Department Center   3/18/2025 10:00 AM Pauline Welch RN [facility]   4/7/2025  2:20 PM Adriana Pete MD [facility]          Subjective:   Subjective   Chief Complaint   Patient presents with    Follow-up       HPI  Butch is a 69 y.o. male.     Social History     Socioeconomic History    Marital status:     Number of children: 3   Occupational History    Occupation: retired -     Tobacco Use    Smoking status: Some Days     Current packs/day: 0.50     Average packs/day: 1 pack/day for 53.0 years (52.1 ttl pk-yrs)     Types: Cigarettes, Vaping with nicotine     Start date: 1972     Last attempt to quit: 6/20/2023    Smokeless tobacco: Never    Tobacco comments:     Approximately 3 cigarettes a day   Substance and Sexual Activity    Alcohol use: No     Alcohol/week: 0.0 standard drinks of alcohol    Drug use: Yes     Types: Marijuana, Oxycodone, Benzodiazepines     Comment: 4 oxycodones daily; one marijuana cigarette daily    Sexual activity: Yes     Partners: Female     Comment:  with children, disabled      Social Drivers of Health     Financial Resource Strain: Patient Declined (3/8/2025)    Overall Financial Resource Strain (CARDIA)     Difficulty of Paying Living Expenses: Patient declined   Food Insecurity: Patient Declined (3/8/2025)    Hunger Vital Sign     Worried About Running Out of Food in the Last Year: Patient declined     Ran Out of Food in the Last Year: Patient declined   Transportation Needs: No Transportation Needs (5/10/2023)    PRAPARE - Transportation     Lack of Transportation (Medical): No     Lack of Transportation (Non-Medical): No   Physical Activity: Sufficiently Active (5/10/2023)    Exercise Vital Sign     Days of Exercise per Week: 4 days     Minutes of Exercise per Session: 50 min   Stress: Patient Declined (3/8/2025)    [employer] [employer] - Occupational Stress Questionnaire     Feeling of Stress : Patient declined   Housing Stability: Patient Declined (3/8/2025)    Housing Stability Vital Sign     Unable to Pay for Housing in the Last Year: Patient declined     Homeless in the Last Year: Patient declined       Last appointment with this clinic was 1/13/2025. Last visit with me 1/13/2025   To summarize last visit and events leading up to today:  HTN  07/27/2023 TTE normal systolic function.  LVEF 65%.  Grade 1  diastolic dysfunction.  Higher dose carvedilol side effect of bradycardia.  10/21/24 cardiology visit, increased hydralazine, 100 TID  11/8/24 cards follow up, increased cored to 12.5 and lisinopril to 40  12/5/24 cards follow up, added spironolactone 25  1/6/25 cards follow up, stable  lipid   3/16/23 nu stress test neg for ischemia; no arrhythmias  Hx of AFib during hospitalization 7/2022 in the setting of hyperK. On beta blocker.  COPD Working with smoking cessation clinic  08/04/2023 colonoscopy 10 mm cecal TA.  10 mm descending polypoid mucosa.  Repeat 3 years  DM2   Hx of SHAUNA due to dehydration, severe enough to require HD for hyperK, 7/2022.  Low testosterone on testosterone.  Thrombocytopenia hx of clumping on microscopy  Anxiety, BZD dependence. did not tolerate trial of Cymbalta.  Took it for maybe a week.   Has been to see Psychiatry, Psychiatry recommended inpatient detox and the patient was not interested.  Currently have him taking 1.5 tablets in total during the day.  on max dose celexa  Was scheduled to see Psychiatry but on pre visit review was deemed inappropriate for general Psychiatry and was recommended for intensive outpatient therapy program  6/25/2024 visit with me,  Trial of fluoxetine stopped the Celexa.  Stay on BuSpar.  If stable may consider trying to wean down benzodiazepine  Chronic back pain followed by pain mgmt. Narcotic and bill  Saw ENT 7/28.  Sensorineural hearing loss.  Qualifies for hearing aids.       Last visit me 01/13/2025  Acute cough with underlying COPD.  Plan was chest x-ray and if negative for pneumonia than prednisone for 5 days   Hypertension CKD recent addition of spironolactone.    Labs done 01/24/2025 BMP creatinine 1.9 from 1.3    And labs 03/05/2025  CMP creatinine 1.8 from 1.9 CKD IIIb, potassium elevated 5.8  CBC anemia  A1c 6.3 from 6.0   PSA normal   Testosterone normal    I had called him about his labs prior to his visit, was severely wheezing and I  directed him to the ED.      Hospitalization 3/7 through 03/09/2025 for COPD exacerbation and hyperkalemia.  Left AMA.  Hospital Course:   Mr Butch Mcdaniel was admitted with hyperK and COPD exacerbation. HyperK resolved with IV lasix. Started steroids, nebs for COPD exacerbation. Stable on room air, able to walk around, able to speak, but still wheezing. He has opted to leave the hospital against medical advice.    Subsequently presented back to the ED 03/09/2025 dyspnea discharged on prednisone, [place]    Most recent CMP  Cr 1.5    Today's visit:    History of Present Illness    SOCIAL HISTORY:  - Smoking: Currently using nicotine patches    HPI:  Butch recently had a significant episode of wheezing leading to hospitalization. He was discharged but returned to the emergency room shortly after, receiving antibiotics and steroids. Butch's wife administered a nebulizer treatment at home yesterday using her own machine, which helped alleviate his wheezing. Butch currently uses an albuterol inhaler for breathing issues but does not have a maintenance inhaler. He has difficulty walking due to pain, particularly in his feet. Butch takes Xanax for anxiety and oxycodone for pain relief. He expressed dissatisfaction with his recent hospital stay, citing issues with meal delivery and perceived neglect by the nursing staff. Butch is using nicotine patches to aid in smoking cessation.    MEDICATIONS:  - Amlodipine, for blood pressure  - Carvedilol, for blood pressure  - Hydralazine, twice daily, for blood pressure  - Lisinopril, for blood pressure  - Spironolactone, for blood pressure  - Metformin, twice daily, for blood sugar  - Atorvastatin, for cholesterol  - Testosterone, injection every 2 weeks  - Gabapentin, for nerves and nerve pain  - Tizanidine (Zanaflex), for muscle relaxation  - Percocet (Oxycodone), for pain, especially foot pain  - Xanax, for anxiety  - Fluoxetine, for mental health  -  Buspar, for mental health  - Albuterol inhaler, for breathing  - Nicotine patch, for smoking cessation        ROS:  Respiratory: reports wheezing, reports difficulty breathing  Musculoskeletal: reports limb pain  Psychiatric: reports anxiety, reports sleep difficulty         Patient Care Team:  Gabriel Evans MD as PCP - General (Internal Medicine)  Vlad Nava MD (Pain Medicine)  Kaila Carrillo OD as Consulting Physician (Optometry)    Patient Active Problem List    Diagnosis Date Noted    Chronic obstructive pulmonary disease with acute exacerbation 03/08/2025    Aortic atherosclerosis 02/16/2023    Type 2 diabetes mellitus with diabetic cataract, without long-term current use of insulin 10/14/2022    Unspecified inflammatory spondylopathy, cervical region 10/14/2022    History of atrial fibrillation 7/2022 hospitalization i n the setting of SHAUNA and hyperK. started on carvedilol during admission 07/19/2022    Hyperkalemia 07/18/2022    Acute renal failure superimposed on stage 3a chronic kidney disease 07/18/2022    Junctional bradycardia 07/18/2022    Chronic prescription benzodiazepine use 07/18/2022    Foraminal stenosis of lumbar region 05/06/2022 5/5/2022 MRI L spine:   *   Multilevel lumbar spondylosis with up to severe right neural foraminal narrowing at L5-S1 with impingement of exiting right L5 nerve root.   *   Moderate thecal sac narrowing at L3-L4.   *   Chronic spondylolysis of L5 with grade 1 anterolisthesis of L5 on S1        Chronic low back pain 12/27/2021    Sacroiliac joint pain 12/27/2021    Sacroiliitis 12/27/2021    Impaired mobility 10/10/2021    Arthrodesis status 10/10/2021     Formatting of this note might be different from the original.  C2-T2, 10/03/21      Impaired mobility and ADLs 10/07/2021    Status post surgery 10/03/2021    History of smoking 10-25 pack years 10/02/2021    Paresthesia of left upper and lower extremity 10/01/2021    Low testosterone in male 09/02/2020      6/2020 Repeat labs prolactin was normal.  Testosterone was low.  Free testosterone was low and sex hormone binding globulin was normal      Elevated prolactin level,low testosterone, gynecomastia; MRI pituitary normal 5/2020 01/13/2020    Nuclear sclerosis of both eyes 11/04/2019    Open angle with borderline findings and high glaucoma risk in both eyes 10/08/2019    Cataract, bilateral 09/11/2018    Cervical stenosis of spinal canal 10/3/2021 LAMINECTOMY, SPINE, CERVICAL, WITH POSTERIOR FUSION C2-T2 05/07/2018 02/14/2018 MRI C-spine impression:  Slight retrolisthesis of C4 with respect to C5.  Narrowing of the central spinal canal most prominent at the C4-C5, C5-C6, and C6-C7 levels and to a lesser extent at C2-C3 and C3-C4.  Annular disc bulges posteriorly at C2-C3, C3-C4.  Diffuse disc herniation/protrusion posteriorly at C4-C5 level and a disc herniation/extrusion posteriorly at the C5-C6 level with a central disc herniation/protrusion posteriorly at the C6-C7 level.  Narrowing of the neural foramen bilaterally from C3-C4 through C6-C7.  10/1/2021 admitted for L sided numbness after epidural injection, initially CT interpreted at [facility]; however subsequent MRI no hemorrhage but severe spinal canal stenosis    10/1/2021 MRI brain: Negative MRI of the brain for hemorrhage, mass or infarction. Specifically, no evidence of subarachnoid blood or blood products in the extra-axial spaces on SWI or diffusion-weighted images.  In light of the previous CTs and history of epidural injections at outside facility, this raises the question of in ejected contrast in the central spinal canal with migration into the intracranial subarachnoid spaces.  Subarachnoid hemorrhage or exudate are considered less likely.  10/1/2021 MRI C spine: Severe spinal canal stenosis with complete effacement of the CSF and spinal cord compression at the level of C5-C6 due to posterior disc osteophyte complex with superimposed right disc protrusion  and congenitally narrow spinal canal.  High T2 signal within the cord secondary to either edema or myelomalacia.    10/3/2021 LAMINECTOMY, SPINE, CERVICAL, WITH POSTERIOR FUSION C2-T2      Tubular adenoma of colon 5/10/17; 08/04/2023 colonoscopy 10 mm cecal TA.  10 mm descending polypoid mucosa.  Repeat 3 years 05/10/2017     5/10/2017 colonoscopy tubular adenoma  08/04/2023 colonoscopy 10 mm cecal TA.  10 mm descending polypoid mucosa.  Repeat 3 years      Therapeutic opioid-induced constipation (OIC) 04/17/2017    Tobacco dependence, patch with irritation; hx of bupropion 01/12/2016    Type 2 diabetes mellitus without complication, without long-term current use of insulin 08/07/2015    Facet arthritis of cervical region 10/28/2013    Facet arthritis of lumbar region 10/28/2013    Benzodiazepine dependence, did not do well with attempt to wean down 2017 10/28/2013    Chronic, continuous use of opioids 10/28/2013    ED (erectile dysfunction) 07/26/2013    DDD (degenerative disc disease), cervical 05/23/2013    DDD (degenerative disc disease), lumbar 05/23/2013    Essential hypertension 11/01/2012     2/3/2022 TTE LV normal size with moderate concentric hypertrophy and normal systolic function LVEF 60%.  Normal RV size and systolic function.  PA pressure 33  7/18/22 TTE LV normal size with mod concentric hypertrophy; normal systolic function. LVEF 70%. Normal RV size and systolic function. PA pressure 58. Mod pHTN      Anemia 11/01/2012    Anxiety, unable to wean off BZD 11/01/2012    Recurrent major depressive disorder, in partial remission 11/01/2012       PAST MEDICAL PROBLEMS, PAST SURGICAL HISTORY: please see relevant portions of the electronic medical record    ALLERGIES AND MEDICATIONS: updated and reviewed.  Medication List with Changes/Refills   Current Medications    ALBUTEROL (PROVENTIL/VENTOLIN HFA) 90 MCG/ACTUATION INHALER    INHALE TWO PUFFS BY MOUTH EVERY 6 HOURS AS NEEDED FOR WHEEZING OR SHORTNESS OF  "BREATH    ALBUTEROL (PROVENTIL/VENTOLIN HFA) 90 MCG/ACTUATION INHALER    Inhale 1-2 puffs into the lungs every 6 (six) hours as needed for Wheezing. Rescue    ALPRAZOLAM (XANAX) 1 MG TABLET    TAKE 1 AND ONE-HALF TABLETS BY MOUTH ONCE A DAY    AMLODIPINE (NORVASC) 10 MG TABLET    TAKE ONE TABLET BY MOUTH EVERY EVENING    ATORVASTATIN (LIPITOR) 40 MG TABLET    Take 1 tablet (40 mg total) by mouth every evening.    BUSPIRONE (BUSPAR) 30 MG TAB    TAKE ONE TABLET BY MOUTH TWICE DAILY    CARVEDILOL (COREG) 12.5 MG TABLET    Take 1 tablet (12.5 mg total) by mouth 2 (two) times daily.    FLUOXETINE 40 MG CAPSULE    TAKE ONE CAPSULE BY MOUTH ONCE A DAY    FLUTICASONE-SALMETEROL DISKUS INHALER 100-50 MCG    Inhale 1 puff into the lungs.    GABAPENTIN (NEURONTIN) 600 MG TABLET    Take 600 mg by mouth 3 (three) times daily.    GUAIFENESIN 100 MG/5 ML (ROBITUSSIN) 100 MG/5 ML SYRUP    Take 5-10 mLs (100-200 mg total) by mouth every 4 (four) hours as needed for Cough.    HYDRALAZINE (APRESOLINE) 100 MG TABLET    TAKE ONE tablet (100mg) BY MOUTH THREE TIMES DAILY    IBUPROFEN (ADVIL,MOTRIN) 600 MG TABLET    Take 600 mg by mouth.    LEVOFLOXACIN (LEVAQUIN) 500 MG TABLET    Take 1 tablet (500 mg total) by mouth once daily. for 5 days    LISINOPRIL (PRINIVIL,ZESTRIL) 40 MG TABLET    Take 1 tablet (40 mg total) by mouth once daily.    METFORMIN (GLUCOPHAGE) 500 MG TABLET    TAKE ONE TABLET BY MOUTH TWICE DAILY WITH MEALS    NALOXONE (NARCAN) 4 MG/ACTUATION SPRY    4mg by nasal route as needed for opioid overdose; may repeat every 2-3 minutes in alternating nostrils until medical help arrives. Call 911    NEEDLE, DISP, 22 G 22 GAUGE X 1" NDLE    Testosterone injection every 2 weeks    NICOTINE (NICODERM CQ) 21 MG/24 HR    Place 1 patch onto the skin once daily.    OXYCODONE-ACETAMINOPHEN (PERCOCET)  MG PER TABLET    Take 1 tablet by mouth every 4 (four) hours as needed.    POLYETHYLENE GLYCOL 3350 (MIRALAX ORAL)    Take 1 " "application by mouth.    PREDNISONE (DELTASONE) 20 MG TABLET    Take 2 tablets (40 mg total) by mouth once daily. for 5 days    SENNA (SENOKOT) 8.6 MG TABLET    Take 1 tablet by mouth 2 (two) times a day.    SPIRONOLACTONE (ALDACTONE) 25 MG TABLET    Take 1 tablet (25 mg total) by mouth once daily.    SYRINGE, DISPOSABLE, (BD LUER-STARLA SYRINGE) 1 ML SYRG    Testosterone injection every 2 weeks    TESTOSTERONE CYPIONATE (DEPOTESTOTERONE CYPIONATE) 200 MG/ML INJECTION    INJECT ONE MILLILITER INTRAMUSCULARLY EVERY 14 DAYS    TIZANIDINE (ZANAFLEX) 4 MG TABLET    Take 4-8 mg by mouth nightly as needed.         Objective:   Objective   Physical Exam   Vitals:    03/12/25 1538   BP: (!) 160/70   Pulse: 89   Temp: 99.4 °F (37.4 °C)   TempSrc: Oral   SpO2: 95%   Weight: 96.9 kg (213 lb 10 oz)   Height: 5' 11" (1.803 m)    Body mass index is 29.79 kg/m².  Weight: 96.9 kg (213 lb 10 oz)   Height: 5' 11" (180.3 cm)     Physical Exam  Constitutional:       General: He is not in acute distress.     Appearance: He is well-developed.   Eyes:      Extraocular Movements: Extraocular movements intact.   Cardiovascular:      Rate and Rhythm: Normal rate and regular rhythm.      Heart sounds: Normal heart sounds. No murmur heard.  Pulmonary:      Effort: Pulmonary effort is normal.      Comments: Diffuse expiratory wheeze bilaterally.  Speaking in whole sentences.  Musculoskeletal:         General: Normal range of motion.      Right lower leg: No edema.      Left lower leg: No edema.   Skin:     General: Skin is warm and dry.   Neurological:      Mental Status: He is alert and oriented to person, place, and time.      Coordination: Coordination normal.   Psychiatric:         Behavior: Behavior normal.         Thought Content: Thought content normal.                "

## 2025-03-12 NOTE — ASSESSMENT & PLAN NOTE
03/12/2025:  Stable on fluoxetine, BuSpar, alprazolam longstanding.  Had previously consulted with Psychiatry who had recommended inpatient detox for his benzo and he refused.

## 2025-03-18 ENCOUNTER — LAB VISIT (OUTPATIENT)
Dept: LAB | Facility: HOSPITAL | Age: 70
End: 2025-03-18
Attending: INTERNAL MEDICINE
Payer: MEDICARE

## 2025-03-18 ENCOUNTER — CLINICAL SUPPORT (OUTPATIENT)
Dept: SMOKING CESSATION | Facility: CLINIC | Age: 70
End: 2025-03-18
Payer: COMMERCIAL

## 2025-03-18 ENCOUNTER — RESULTS FOLLOW-UP (OUTPATIENT)
Dept: FAMILY MEDICINE | Facility: CLINIC | Age: 70
End: 2025-03-18
Payer: MEDICARE

## 2025-03-18 DIAGNOSIS — E87.5 HYPERKALEMIA: ICD-10-CM

## 2025-03-18 DIAGNOSIS — N17.9 ACUTE RENAL FAILURE SUPERIMPOSED ON STAGE 3A CHRONIC KIDNEY DISEASE, UNSPECIFIED ACUTE RENAL FAILURE TYPE: ICD-10-CM

## 2025-03-18 DIAGNOSIS — F17.200 NICOTINE DEPENDENCE: Primary | ICD-10-CM

## 2025-03-18 DIAGNOSIS — N18.31 ACUTE RENAL FAILURE SUPERIMPOSED ON STAGE 3A CHRONIC KIDNEY DISEASE, UNSPECIFIED ACUTE RENAL FAILURE TYPE: ICD-10-CM

## 2025-03-18 LAB
ANION GAP SERPL CALC-SCNC: 8 MMOL/L (ref 8–16)
BUN SERPL-MCNC: 26 MG/DL (ref 8–23)
CALCIUM SERPL-MCNC: 10 MG/DL (ref 8.7–10.5)
CHLORIDE SERPL-SCNC: 104 MMOL/L (ref 95–110)
CO2 SERPL-SCNC: 25 MMOL/L (ref 23–29)
CREAT SERPL-MCNC: 1.3 MG/DL (ref 0.5–1.4)
EST. GFR  (NO RACE VARIABLE): 59.1 ML/MIN/1.73 M^2
GLUCOSE SERPL-MCNC: 155 MG/DL (ref 70–110)
POTASSIUM SERPL-SCNC: 5.1 MMOL/L (ref 3.5–5.1)
SODIUM SERPL-SCNC: 137 MMOL/L (ref 136–145)

## 2025-03-18 PROCEDURE — 36415 COLL VENOUS BLD VENIPUNCTURE: CPT | Mod: PO | Performed by: INTERNAL MEDICINE

## 2025-03-18 PROCEDURE — 99404 PREV MED CNSL INDIV APPRX 60: CPT | Mod: S$GLB,,,

## 2025-03-18 PROCEDURE — 99999 PR PBB SHADOW E&M-EST. PATIENT-LVL II: CPT | Mod: PBBFAC,,,

## 2025-03-18 PROCEDURE — 80048 BASIC METABOLIC PNL TOTAL CA: CPT | Performed by: INTERNAL MEDICINE

## 2025-03-18 RX ORDER — NICOTINE POLACRILEX 2 MG/1
2 LOZENGE ORAL
Qty: 162 EACH | Refills: 0 | Status: SHIPPED | OUTPATIENT
Start: 2025-03-18 | End: 2025-04-18

## 2025-03-18 RX ORDER — IBUPROFEN 200 MG
1 TABLET ORAL DAILY
Qty: 28 PATCH | Refills: 0 | Status: SHIPPED | OUTPATIENT
Start: 2025-03-18 | End: 2025-04-18

## 2025-03-18 NOTE — Clinical Note
Patient presents for intake smoking 10-15 cigarettes per day, wheezing and having a lot of pain currently, almost did not make his appt, informed him that his benefits for nicotine patches are not eligible again until July per the smoking cessation trust however patient states he was able to get the patches for free after discharging from the hospital last month, will attempt to send possibly normal medial insurance covering??, recommend an abrupt quit and patient is trying the nicotine lozenge this time as well, session handout discussed, reminded the patient how the nrt works and what to expect, will follow

## 2025-03-18 NOTE — LETTER
March 18, 2025    Butch Mcdaniel  [address] [facility] [address]             [facility]  [address] [place] [address]  [place] [address]  Phone: [phone]  Fax: [phone] Dear Mr. Mcdaniel:    Below are the results from your recent visit. Your labs were stable.  Stay off the spironolactone.    Resulted Orders   Basic Metabolic Panel   Result Value Ref Range    Sodium 137 136 - 145 mmol/L    Potassium 5.1 3.5 - 5.1 mmol/L    Chloride 104 95 - 110 mmol/L    CO2 25 23 - 29 mmol/L    Glucose 155 (H) 70 - 110 mg/dL    BUN 26 (H) 8 - 23 mg/dL    Creatinine 1.3 0.5 - 1.4 mg/dL    Calcium 10.0 8.7 - 10.5 mg/dL    Anion Gap 8 8 - 16 mmol/L    eGFR 59.1 (A) >60 mL/min/1.73 m^2    Narrative    Send normal result to authorizing provider's In Basket if  patient is active on MyChart:->Yes       Please don't hesitate to call our office if you have any questions or concerns.    Wishing you good health,    Gabriel Evans MD

## 2025-03-19 NOTE — PROGRESS NOTES
BMP stable CKD stage 3a  Stopped spironolactone last OV  Cr 1.3, at worst was 1.8 with ARB and spironolactone  No changes.

## 2025-03-20 ENCOUNTER — CLINICAL SUPPORT (OUTPATIENT)
Dept: FAMILY MEDICINE | Facility: CLINIC | Age: 70
End: 2025-03-20
Payer: MEDICARE

## 2025-03-20 VITALS — HEART RATE: 56 BPM | DIASTOLIC BLOOD PRESSURE: 64 MMHG | SYSTOLIC BLOOD PRESSURE: 114 MMHG

## 2025-03-20 DIAGNOSIS — Z01.30 BLOOD PRESSURE CHECK: Primary | ICD-10-CM

## 2025-03-20 NOTE — PROGRESS NOTES
Review of patient's allergies indicates:  No Known Allergies  Creatinine   Date Value Ref Range Status   03/18/2025 1.3 0.5 - 1.4 mg/dL Final     Sodium   Date Value Ref Range Status   03/18/2025 137 136 - 145 mmol/L Final     Potassium   Date Value Ref Range Status   03/18/2025 5.1 3.5 - 5.1 mmol/L Final   ]  Patient verifies taking blood pressure medications on a regular basis at the same time of the day.   Current Medications[1]  Does patient have record of home blood pressure readings no.   Last dose of blood pressure medication was taken at 3/20/2025@ 0700.  Patient is asymptomatic.   Complains of none.      ,   .    Blood pressure reading after 15 minutes was 114/64, Pulse 56.  Dr. Evans notified.         [1]   Current Outpatient Medications:     albuterol (PROVENTIL) 2.5 mg /3 mL (0.083 %) nebulizer solution, Take 3 mLs (2.5 mg total) by nebulization every 6 (six) hours as needed for Wheezing. Rescue, Disp: 30 each, Rfl: 5    albuterol (PROVENTIL/VENTOLIN HFA) 90 mcg/actuation inhaler, Inhale 2 puffs into the lungs every 4 (four) hours as needed for Wheezing. Rescue, Disp: 18 g, Rfl: 3    ALPRAZolam (XANAX) 1 MG tablet, TAKE 1 AND ONE-HALF TABLETS BY MOUTH ONCE A DAY, Disp: 45 tablet, Rfl: 2    amLODIPine (NORVASC) 10 MG tablet, TAKE ONE TABLET BY MOUTH EVERY EVENING, Disp: 90 tablet, Rfl: 3    atorvastatin (LIPITOR) 40 MG tablet, Take 1 tablet (40 mg total) by mouth every evening., Disp: 90 tablet, Rfl: 2    busPIRone (BUSPAR) 30 MG Tab, TAKE ONE TABLET BY MOUTH TWICE DAILY, Disp: 180 tablet, Rfl: 3    carvediloL (COREG) 12.5 MG tablet, Take 1 tablet (12.5 mg total) by mouth 2 (two) times daily., Disp: 180 tablet, Rfl: 3    cloNIDine 0.1 mg/24 hr td ptwk (CATAPRES) 0.1 mg/24 hr, Place 1 patch onto the skin every 7 days., Disp: 4 patch, Rfl: 5    FLUoxetine 40 MG capsule, TAKE ONE CAPSULE BY MOUTH ONCE A DAY, Disp: 90 capsule, Rfl: 3    fluticasone-salmeterol diskus inhaler 100-50 mcg, Inhale 1 puff into the  "lungs., Disp: , Rfl:     fluticasone-umeclidin-vilanter (TRELEGY ELLIPTA) 200-62.5-25 mcg inhaler, Inhale 1 puff into the lungs once daily., Disp: 60 each, Rfl: 5    gabapentin (NEURONTIN) 600 MG tablet, Take 600 mg by mouth 3 (three) times daily., Disp: , Rfl:     guaiFENesin 100 mg/5 ml (ROBITUSSIN) 100 mg/5 mL syrup, Take 5-10 mLs (100-200 mg total) by mouth every 4 (four) hours as needed for Cough., Disp: 60 mL, Rfl: 0    hydrALAZINE (APRESOLINE) 100 MG tablet, TAKE ONE tablet (100mg) BY MOUTH THREE TIMES DAILY, Disp: 270 tablet, Rfl: 11    ibuprofen (ADVIL,MOTRIN) 600 MG tablet, Take 600 mg by mouth. (Patient not taking: Reported on 10/21/2024), Disp: , Rfl:     lisinopriL (PRINIVIL,ZESTRIL) 40 MG tablet, Take 1 tablet (40 mg total) by mouth once daily., Disp: 90 tablet, Rfl: 3    metFORMIN (GLUCOPHAGE) 500 MG tablet, TAKE ONE TABLET BY MOUTH TWICE DAILY WITH MEALS, Disp: 180 tablet, Rfl: 0    naloxone (NARCAN) 4 mg/actuation Spry, 4mg by nasal route as needed for opioid overdose; may repeat every 2-3 minutes in alternating nostrils until medical help arrives. Call 911, Disp: 1 each, Rfl: 11    nebulizer and compressor Nighat, USE AS DIRECTED, Disp: 1 each, Rfl: 0    needle, disp, 22 G 22 gauge x 1" Ndle, Testosterone injection every 2 weeks, Disp: 6 each, Rfl: 0    nicotine (NICODERM CQ) 21 mg/24 hr, Place 1 patch onto the skin once daily., Disp: 30 patch, Rfl: 0    nicotine (NICODERM CQ) 21 mg/24 hr, Place 1 patch onto the skin once daily., Disp: 28 patch, Rfl: 0    nicotine, polacrilex, 2 mg lzmn, Take 1 lozenge (2 mg total) by mouth as needed (1 per hour as needed in place of a cigarette, max of 15 per day, cherry flavor)., Disp: 162 each, Rfl: 0    oxyCODONE-acetaminophen (PERCOCET)  mg per tablet, Take 1 tablet by mouth every 4 (four) hours as needed. (Patient not taking: Reported on 10/21/2024), Disp: , Rfl:     POLYETHYLENE GLYCOL 3350 (MIRALAX ORAL), Take 1 application by mouth. (Patient not " taking: Reported on 10/21/2024), Disp: , Rfl:     senna (SENOKOT) 8.6 mg tablet, Take 1 tablet by mouth 2 (two) times a day., Disp: , Rfl:     syringe, disposable, (BD LUER-STARLA SYRINGE) 1 mL Syrg, Testosterone injection every 2 weeks, Disp: 6 Syringe, Rfl: 0    testosterone cypionate (DEPOTESTOTERONE CYPIONATE) 200 mg/mL injection, INJECT ONE MILLILITER INTRAMUSCULARLY EVERY 14 DAYS, Disp: 10 mL, Rfl: 0    tiZANidine (ZANAFLEX) 4 MG tablet, Take 4-8 mg by mouth nightly as needed., Disp: , Rfl:

## 2025-03-24 DIAGNOSIS — Z00.00 ENCOUNTER FOR MEDICARE ANNUAL WELLNESS EXAM: ICD-10-CM

## 2025-04-01 ENCOUNTER — CLINICAL SUPPORT (OUTPATIENT)
Dept: SMOKING CESSATION | Facility: CLINIC | Age: 70
End: 2025-04-01
Payer: MEDICARE

## 2025-04-01 DIAGNOSIS — E78.2 MIXED HYPERLIPIDEMIA: ICD-10-CM

## 2025-04-01 DIAGNOSIS — F17.200 NICOTINE DEPENDENCE: Primary | ICD-10-CM

## 2025-04-01 PROCEDURE — 99404 PREV MED CNSL INDIV APPRX 60: CPT | Mod: S$GLB,,,

## 2025-04-01 PROCEDURE — 99999 PR PBB SHADOW E&M-EST. PATIENT-LVL I: CPT | Mod: PBBFAC,,,

## 2025-04-01 RX ORDER — IBUPROFEN 200 MG
1 TABLET ORAL DAILY
Qty: 28 PATCH | Refills: 0 | Status: SHIPPED | OUTPATIENT
Start: 2025-04-01 | End: 2025-05-02

## 2025-04-01 NOTE — PROGRESS NOTES
Individual Follow-Up Form    4/1/2025    Quit Date: n/a    Clinical Status of Patient: Outpatient    Length of Service: 60 minutes    Continuing Medication: yes  Patches    Other Medications: n/a     Target Symptoms: Withdrawal and medication side effects. The following were  rated moderate (3) to severe (4) on TCRS:  Moderate (3): 0  Severe (4): 0    Comments: patient presents for follow up smoking 10  cigarettes per day but states he is not smoking the entire thing, he is wearing the 21mg nicotine patch daily, recommend that he continue to wear the patch daily and work on strategies to continue to cut back and pick a quit date, [facility] has followed this patient in the program from many years with many attempts at quit with no true quit, he has multiple exacerbations with his lung disease and at times he can barely talk without being sob, session handout discussed will follow     Diagnosis: F17.200    Next Visit: 2 weeks

## 2025-04-02 RX ORDER — ATORVASTATIN CALCIUM 40 MG/1
40 TABLET, FILM COATED ORAL NIGHTLY
Qty: 90 TABLET | Refills: 1 | Status: SHIPPED | OUTPATIENT
Start: 2025-04-02

## 2025-04-02 NOTE — TELEPHONE ENCOUNTER
No care due was identified.  [facility] Embedded Care Due Messages. Reference number: [id number].   4/01/2025 3:32:37 PM CDT  
Refill Decision Note   Butch Mcdaniel  is requesting a refill authorization.  Brief Assessment and Rationale for Refill:  Approve     Medication Therapy Plan:         Pharmacist review requested: Yes   Extended chart review required: Yes   Comments:     Note composed:11:17 AM 04/02/2025               
Refill Routing Note   Medication(s) are not appropriate for processing by [facility] for the following reason(s):        Drug-disease interactionatorvastatin and Acute kidney injury superimposed on CKD; Acute renal failure superimposed on stage 3a chronic kidney disease; Acute renal failure superimposed on stage 3a chronic kidney disease, unspecified acute renal failure type     [facility] action(s):  Defer             Pharmacist review requested: Yes     Appointments  past 12m or future 3m with PCP    Date Provider   Last Visit   3/12/2025 Gabriel Evans MD   Next Visit   6/13/2025 Gabriel Evans MD   ED visits in past 90 days: 1        Note composed:8:05 AM 04/02/2025          
24-Jul-2023 15:58

## 2025-04-08 ENCOUNTER — TELEPHONE (OUTPATIENT)
Dept: SMOKING CESSATION | Facility: CLINIC | Age: 70
End: 2025-04-08
Payer: MEDICARE

## 2025-04-08 DIAGNOSIS — F41.9 ANXIETY: ICD-10-CM

## 2025-04-08 DIAGNOSIS — F13.20 BENZODIAZEPINE DEPENDENCE: ICD-10-CM

## 2025-04-08 RX ORDER — ALPRAZOLAM 1 MG/1
TABLET ORAL
Qty: 45 TABLET | Refills: 2 | Status: SHIPPED | OUTPATIENT
Start: 2025-04-08

## 2025-04-08 NOTE — TELEPHONE ENCOUNTER
No care due was identified.  [facility] Embedded Care Due Messages. Reference number: [id number].   4/08/2025 8:02:33 AM CDT

## 2025-04-15 ENCOUNTER — CLINICAL SUPPORT (OUTPATIENT)
Dept: SMOKING CESSATION | Facility: CLINIC | Age: 70
End: 2025-04-15
Payer: COMMERCIAL

## 2025-04-15 DIAGNOSIS — F17.200 NICOTINE DEPENDENCE: ICD-10-CM

## 2025-04-15 PROCEDURE — 99999 PR PBB SHADOW E&M-EST. PATIENT-LVL II: CPT | Mod: PBBFAC,,,

## 2025-04-15 PROCEDURE — 99404 PREV MED CNSL INDIV APPRX 60: CPT | Mod: S$GLB,,,

## 2025-04-15 RX ORDER — IBUPROFEN 200 MG
1 TABLET ORAL DAILY
Qty: 14 PATCH | Refills: 0 | Status: SHIPPED | OUTPATIENT
Start: 2025-04-15 | End: 2025-05-16

## 2025-04-15 RX ORDER — IBUPROFEN 200 MG
1 TABLET ORAL DAILY
Qty: 14 PATCH | Refills: 0 | Status: SHIPPED | OUTPATIENT
Start: 2025-04-15 | End: 2025-04-15

## 2025-04-15 NOTE — PROGRESS NOTES
Individual Follow-Up Form    4/15/2025    Quit Date: n/a    Clinical Status of Patient: Outpatient    Length of Service: 60 minutes    Continuing Medication: yes  Patches    Other Medications: n/a     Target Symptoms: Withdrawal and medication side effects. The following were  rated moderate (3) to severe (4) on TCRS:  Moderate (3): 0  Severe (4): 0    Comments: patient presents for follow up in clinic, he is currently smoking less then 10 cigarettes per day and not finishing the entire cigarette, he is wearing the 21mg nicotine patch daily, recommend that he continue to work on strategies to maintain urges and thoughts to smoke, he has a lot of pain and discomfort which results in this being much harder of a behaviors to break, encouragement provided will follow, denies negative s/e from the patch     Diagnosis: F17.210    Next Visit: 2 weeks

## 2025-04-30 DIAGNOSIS — E11.9 TYPE 2 DIABETES MELLITUS WITHOUT COMPLICATION: ICD-10-CM

## 2025-05-01 ENCOUNTER — CLINICAL SUPPORT (OUTPATIENT)
Dept: SMOKING CESSATION | Facility: CLINIC | Age: 70
End: 2025-05-01
Payer: COMMERCIAL

## 2025-05-01 DIAGNOSIS — F17.200 NICOTINE DEPENDENCE: Primary | ICD-10-CM

## 2025-05-01 PROCEDURE — 99404 PREV MED CNSL INDIV APPRX 60: CPT | Mod: S$GLB,,,

## 2025-05-01 NOTE — PROGRESS NOTES
Individual Follow-Up Form    5/1/2025    Quit Date: n/a    Clinical Status of Patient: Outpatient    Length of Service: 60 minutes    Continuing Medication: yes  Patches    Other Medications: n/a     Target Symptoms: Withdrawal and medication side effects. The following were  rated moderate (3) to severe (4) on TCRS:  Moderate (3): 0  Severe (4): 0    Comments: patient presents for follow up telephonically today, he did not attend in the clinic and contacted ctts to inform that he was not able to get here to [facility] due to an issue with his truck, he is only smoking 2 cigarettes per day, he is tired of coughing and tired of wheezing and not being able to breath. He is using breathing treatments a lot and very motivated today to quit stating that when he spits up the mucus from his cough it taste like cigarette and that is nasty per patient, these are motivating things to help him reach his much needed goal of quit, he is wearing the Nicotine patch 21mg and denies negative s/e from the patch, recommend that he continue with this regimen and continue to work on strategies to quit, session handout discussed will follow     Diagnosis: F17.200    Next Visit: 2 weeks

## 2025-05-05 ENCOUNTER — PATIENT MESSAGE (OUTPATIENT)
Dept: ADMINISTRATIVE | Facility: HOSPITAL | Age: 70
End: 2025-05-05
Payer: MEDICARE

## 2025-05-06 ENCOUNTER — OFFICE VISIT (OUTPATIENT)
Dept: PULMONOLOGY | Facility: CLINIC | Age: 70
End: 2025-05-06
Payer: MEDICARE

## 2025-05-06 VITALS
OXYGEN SATURATION: 96 % | WEIGHT: 208 LBS | SYSTOLIC BLOOD PRESSURE: 160 MMHG | BODY MASS INDEX: 29.12 KG/M2 | HEIGHT: 71 IN | DIASTOLIC BLOOD PRESSURE: 79 MMHG | HEART RATE: 74 BPM

## 2025-05-06 DIAGNOSIS — I10 ESSENTIAL HYPERTENSION: ICD-10-CM

## 2025-05-06 DIAGNOSIS — J44.1 CHRONIC OBSTRUCTIVE PULMONARY DISEASE WITH ACUTE EXACERBATION: Primary | ICD-10-CM

## 2025-05-06 DIAGNOSIS — F17.201 TOBACCO DEPENDENCE IN REMISSION: ICD-10-CM

## 2025-05-06 DIAGNOSIS — N18.32 STAGE 3B CHRONIC KIDNEY DISEASE: ICD-10-CM

## 2025-05-06 DIAGNOSIS — F51.8 OTHER SLEEP DISORDERS NOT DUE TO A SUBSTANCE OR KNOWN PHYSIOLOGICAL CONDITION: ICD-10-CM

## 2025-05-06 PROCEDURE — 3078F DIAST BP <80 MM HG: CPT | Mod: CPTII,S$GLB,, | Performed by: INTERNAL MEDICINE

## 2025-05-06 PROCEDURE — 3288F FALL RISK ASSESSMENT DOCD: CPT | Mod: CPTII,S$GLB,, | Performed by: INTERNAL MEDICINE

## 2025-05-06 PROCEDURE — 99999 PR PBB SHADOW E&M-EST. PATIENT-LVL V: CPT | Mod: PBBFAC,,, | Performed by: INTERNAL MEDICINE

## 2025-05-06 PROCEDURE — 99204 OFFICE O/P NEW MOD 45 MIN: CPT | Mod: S$GLB,,, | Performed by: INTERNAL MEDICINE

## 2025-05-06 PROCEDURE — 1125F AMNT PAIN NOTED PAIN PRSNT: CPT | Mod: CPTII,S$GLB,, | Performed by: INTERNAL MEDICINE

## 2025-05-06 PROCEDURE — 3044F HG A1C LEVEL LT 7.0%: CPT | Mod: CPTII,S$GLB,, | Performed by: INTERNAL MEDICINE

## 2025-05-06 PROCEDURE — 3008F BODY MASS INDEX DOCD: CPT | Mod: CPTII,S$GLB,, | Performed by: INTERNAL MEDICINE

## 2025-05-06 PROCEDURE — 3077F SYST BP >= 140 MM HG: CPT | Mod: CPTII,S$GLB,, | Performed by: INTERNAL MEDICINE

## 2025-05-06 PROCEDURE — 4010F ACE/ARB THERAPY RXD/TAKEN: CPT | Mod: CPTII,S$GLB,, | Performed by: INTERNAL MEDICINE

## 2025-05-06 PROCEDURE — 1159F MED LIST DOCD IN RCRD: CPT | Mod: CPTII,S$GLB,, | Performed by: INTERNAL MEDICINE

## 2025-05-06 PROCEDURE — 1100F PTFALLS ASSESS-DOCD GE2>/YR: CPT | Mod: CPTII,S$GLB,, | Performed by: INTERNAL MEDICINE

## 2025-05-06 PROCEDURE — 1160F RVW MEDS BY RX/DR IN RCRD: CPT | Mod: CPTII,S$GLB,, | Performed by: INTERNAL MEDICINE

## 2025-05-06 RX ORDER — ALBUTEROL SULFATE 90 UG/1
2 INHALANT RESPIRATORY (INHALATION) EVERY 6 HOURS PRN
Qty: 18 G | Refills: 11 | Status: CANCELLED | OUTPATIENT
Start: 2025-05-06 | End: 2026-05-06

## 2025-05-06 RX ORDER — ALBUTEROL SULFATE 0.83 MG/ML
2.5 SOLUTION RESPIRATORY (INHALATION) EVERY 6 HOURS PRN
Qty: 30 EACH | Refills: 5 | Status: SHIPPED | OUTPATIENT
Start: 2025-05-06 | End: 2026-05-06

## 2025-05-06 RX ORDER — TIOTROPIUM BROMIDE INHALATION SPRAY 3.12 UG/1
2 SPRAY, METERED RESPIRATORY (INHALATION) DAILY
Qty: 4 G | Refills: 11 | Status: SHIPPED | OUTPATIENT
Start: 2025-05-06 | End: 2026-05-06

## 2025-05-06 RX ORDER — ALBUTEROL SULFATE 90 UG/1
2 INHALANT RESPIRATORY (INHALATION) EVERY 4 HOURS PRN
Qty: 18 G | Refills: 3 | Status: SHIPPED | OUTPATIENT
Start: 2025-05-06

## 2025-05-06 NOTE — PROGRESS NOTES
General Pulmonary Clinic  New Patient Visit      Chief Complaint: COPD     HPI     Butch Mcdaniel is a 69 y.o. male with CKD IIIb (13.-1.5), type 2 DM, HLD, neuropathy, active smoking presenting with chief complaint of recent hospitalization for presumed COPD exacerbation    # presumed COPD exacerbation  # shortness of breath    Discharge summary from 3/9/25 reviewed admitted for presumed COPD exacerbation w/ shortness of breath/wheezing.    CT chest 3/8/25 personally interpreted  - bronchial wall thickening  - L lung scarring v atelecassis  - RLL 2 cysts    Given steroids, left AMA    He experiences shortness of breath with exertion, such as excessive walking or intense activity. He is unable to run due to breathing difficulties. He started Albuterol in March and currently uses it daily to assess response. He was previously prescribed Trelegy but was unable to obtain it due to cost barriers.    He reports nocturnal breathing treatments due to severe wheezing and shortness of breath sleeping     SMOKING HISTORY:  He recently quit smoking a few days ago after an extensive 45-year history of smoking 1-2 packs per day. Prior to quitting, he was smoking 5 cigarettes per day. He is currently participating in a smoking cessation program and using nicotine patches.    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 He was also exposed to cleaning chemicals called [employer] or [employer]. His home had water damage and mold, which was remediated approximately one year ago.    HYPERTENSION  Hypertensive today but denies chest pain, palpitations, vision changes or shortness of breath above his baseline.    MEDICAL HISTORY:  He has a history of kidney disease, diabetes, high cholesterol, and neuropathy.       ROS:  General: -fever, -chills, -fatigue, -weight gain, +weight loss, +difficulty staying asleep  Eyes: -vision changes, -redness,  -discharge  ENT: -ear pain, -nasal congestion, -sore throat  Cardiovascular: -chest pain, -palpitations, -lower extremity edema  Respiratory: -cough, -shortness of breath, +exertional dyspnea, +difficulty breathing, +wheezing, +intermittent breathing while sleeping, +waking at night coughing, +exercise intolerance, +snoring  Gastrointestinal: -abdominal pain, -nausea, -vomiting, -diarrhea, -constipation, -blood in stool  Genitourinary: -dysuria, -hematuria, -frequency  Musculoskeletal: -joint pain, -muscle pain  Skin: -rash, -lesion  Neurological: -headache, -dizziness, -numbness, -tingling  Psychiatric: -anxiety, -depression, -sleep difficulty       RONDA:yes snoring, ys frequent night awakenings, gets restorative sleep, does not naps       Childhood: no history of wheezing, coughing, asthma; no recurrent pneumonias or infections, born   Exposures  Former smoker: 1-2 ppd x 45 years, quit sunday  no marijuana  no vaping  + mold or water damage in home - remediated recently   Pets - dog  Occupation:  -     Records reviewed with the following findings ---    CT chest 3/8/25 personally interpreted  - bronchial wall thickening  - L lung scarring v atelecassis  - RLL 2 cysts      Objective   Past History     Past Medical History:  Past Medical History:   Diagnosis Date    Cataract, bilateral 09/11/2018    Degenerative disc disease     Diabetes mellitus type II, controlled     Eye injury as a child    stick hit eye ? 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 Gabriel Evans  Prep: golytely  Route instructions [facility]: [facility] and reviewed via phone, pt verbalized understanding-KPvt    POSTERIOR FUSION OF CERVICAL SPINE WITH LAMINECTOMY N/A 10/3/2021    Procedure: LAMINECTOMY, SPINE, CERVICAL, WITH POSTERIOR FUSION C2-T2;  Surgeon: Ana Rosa Geller MD;  Location: [facility] OR [address] [place] [address];  Service: Neurosurgery;  Laterality: N/A;         Social History:   Social History     Socioeconomic History    Marital status:     Number of children: 3   Occupational History    Occupation: retired -    Tobacco Use    Smoking status: Some Days     Current packs/day: 0.50     Average packs/day: 1 pack/day for 53.2 years (52.2 ttl pk-yrs)     Types: Cigarettes, Vaping with nicotine     Start date: 1972     Last attempt to quit: 6/20/2023    Smokeless tobacco: Never    Tobacco comments:     Approximately 3 cigarettes a day     Stopped smoking 05/03/25   Substance and Sexual Activity    Alcohol use: No     Alcohol/week: 0.0 standard drinks of alcohol    Drug use: Yes     Types: Marijuana, Oxycodone, Benzodiazepines     Comment: 4 oxycodones daily; one marijuana cigarette daily    Sexual activity: Yes     Partners: Female     Comment:  with children, disabled      Social Drivers of Health     Financial Resource Strain: Patient Declined (3/8/2025)    Overall Financial Resource Strain (CARDIA)     Difficulty of Paying Living Expenses: Patient declined   Food Insecurity: Patient Declined (3/8/2025)    Hunger Vital Sign     Worried About Running Out of Food in the Last Year: Patient declined     Ran Out of Food in the Last Year: Patient declined   Transportation Needs: No Transportation Needs (5/10/2023)    PRAPARE - Transportation  "    Lack of Transportation (Medical): No     Lack of Transportation (Non-Medical): No   Physical Activity: Sufficiently Active (5/10/2023)    Exercise Vital Sign     Days of Exercise per Week: 4 days     Minutes of Exercise per Session: 50 min   Stress: Patient Declined (3/8/2025)    [employer] - Occupational Stress Questionnaire     Feeling of Stress : Patient declined   Housing Stability: Patient Declined (3/8/2025)    Housing Stability Vital Sign     Unable to Pay for Housing in the Last Year: Patient declined     Homeless in the Last Year: Patient declined         Family Hx:  No family history of pulmonary fibrosis, pre-mature graying of hair, cirrhosis, or hematologic malignancies  No family history of emphysema or spontaneous PTX  no family history of lung cancer or lymphoma    Allergies and Pertinent Medications   Allergies and Medications Reviewed    Patient has no known allergies.  [unfilled]             Physical Exam   BP (!) 160/79   Pulse 74   Ht 5' 11" (1.803 m)   Wt 94.3 kg (208 lb 0.1 oz)   SpO2 96%   BMI 29.01 kg/m²       Constitutional: No acute distress, Atraumatic   HEENT: moist mucus membranes, extraocular movements intact  Cardiovascular: regular rate and rhythm, no murmurs, rubs or gallops  Pulmonary: normal respiratory rate and chest rise, no chest wall deformity, + scant rhonchi that cleared with cough  Abdominal: non-distended, bowel sounds present  Musculoskeletal: No lower extremity edema, no clubbing  Neurological: normal speech/edwina, moves all extremities against gravity  Skin: no finger cyanosis, no rashes on exposed body parts  Psych: Appropriate affect, normal mood       Diagnostic Studies      All diagnostic studies relevant to chief complaint reviewed personally    Labs:  Lab Results   Component Value Date    WBC 14.81 (H) 03/09/2025    HGB 14.7 03/09/2025     03/09/2025     (H) 03/09/2025     Lab Results   Component Value Date " "    03/18/2025    K 5.1 03/18/2025    CO2 25 03/18/2025    BUN 26 (H) 03/18/2025    CREATININE 1.3 03/18/2025    MG 2.1 03/09/2025     Lab Results   Component Value Date    AST 27 03/09/2025    ALT 26 03/09/2025    ALBUMIN 3.9 03/09/2025    ALBUMIN 4.3 05/12/2020    PROT 7.6 03/09/2025    BILITOT 1.0 03/09/2025         PFTs:  Pulmonary Functions Testing Results:  No results found for: "FEV1", "FVC", "ZOX6AWL", "TLC", "DLCO"      FVC FEV1 FEV/FVC TLC DLCO 6MWT Interpret                                                         TTE:  Results for orders placed during the hospital encounter of 03/07/25    Echo Saline Bubble? No    Interpretation Summary    Technically difficult study    Left Ventricle: The left ventricle is normal in size. Normal wall thickness. Unable to assess wall motion. There is normal systolic function with a visually estimated ejection fraction of 55 - 60%. There is indeterminate diastolic function. Inconclusive left ventricular filling pressure.    Right Ventricle: The right ventricle is normal in size. Systolic function is normal.    Left Atrium: mildly dilated    Aortic Valve: The aortic valve is a trileaflet valve. There is mild aortic valve sclerosis.    IVC/SVC: Normal venous pressure at 3 mmHg.            Assessment and Plan   Butch Mcdaniel is a 69 y.o. male  with CKD IIIb (13.-1.5), type 2 DM, HLD, neuropathy, active smoking presenting with chief complaint of recent hospitalization for presumed COPD exacerbation     Suspect possible COPD or chronic bronchitis based on extensive smoking history and current respiratory symptoms.   Considered sleep apnea given report of waking up short of breath at night.   Evaluated for potential occupational exposures from work history as .   Assessed renal function and HTN management, noting elevated BP at visit.      PRESUMED CHRONIC OBSTRUCTIVE PULMONARY DISEASE (COPD): chronic, worsening   Explained potential airway " changes that could indicate chronic bronchitis or COPD.   Discussed importance of proper inhaler technique, including holding breath for 10 seconds after inhalation to allow medication to settle in lungs.   Started Spiriva inhaler: 2 puffs daily. (Unable to afford spiriva)   Ordered pulmonary function test to assess for COPD.  Consider pulm rehab    OBSTRUCTIVE SLEEP APNEA: chronic, stable   Awakening w/ wheezing and shortness of breath   Provided information on sleep apnea and its potential health impacts.   Ordered sleep study to evaluate for sleep apnea.    SMOKING CESSATION:   Butch to continue smoking cessation efforts.  Qualifies for lung cancer screening due 3/2026    HYPERTENSION - chronic, worsening  Patient requested I reach out to Dr. Pete  Will check BP at home    CKD IIIb  He is unsure of etiology. Continue hydration follow up w PCP    FOLLOW-UP:   Follow up in early July to assess effectiveness of new inhaler.   Contact the office if unable to afford prescribed medication.         Explained to the patient I work Monday-Thursdays, so any results or messages received after working hours Thursday through Monday AM would not be addressed until I was back in the office. If he/she experienced any acute symptoms he/she should go the emergency room for immediate help.    Differential, diagnoses, diagnostic work up, and possible treatments were discussed with the patient. Questions were answered.    Viridiana Castellon MD  Pulmonary-Critical Care Medicine    This note was generated with the assistance of ambient listening technology. Verbal consent was obtained by the patient and accompanying visitor(s) for the recording of patient appointment to facilitate this note. I attest to having reviewed and edited the generated note for accuracy, though some syntax or spelling errors may persist. Please contact the author of this note for any clarification.            For this visit, the following time was spent  preparing  to see the patient (e.g., review of tests) 12 minutes  obtaining and/or reviewing separately obtained history 0 minutes  Performing a medically necessary appropriate examination and/or evaluation 16 minutes  Counseling and educating the patient/family/caregiver 16 minutes  Ordering medications, tests, or procedures 2 minutes  Referring and communicating with other health care professionals (when not reported separately) 0minutes  Documenting clinical information in the electronic or other health record 5minutes  Care coordination (not reported separately) 0minutes    Total time = 49 minutes

## 2025-05-06 NOTE — PATIENT INSTRUCTIONS
DO NOT USE INHALERS FOR AT LEAST 24 HOURS BEFORE LUNG FUNCTION TESTING UNLESS SHORT OF BREATH    I would like to prescribe you an inhaler. If the inhaler is expensive, please ask the pharmacy if it is because of your deductible or if your insurance prefers a different inhaler -- please get the names of those inhalers and message me back if so.      I am prescribing you spiriva to help with your breathing. Please take 2 puff in the morning very day.    Common side effects include dry mouth, palpitations, headache    I am also prescribing an albuterol inhaler to take AS NEEDED for shortness of breath above your baseline. This medication can increase your heart rate. If using for more than 2 times per day, please let me know.    Please watch a video on appropriate use of your inhaler as it is crucial that is delivered to your airways (and not the back of your mouth to be effective.)    Videos:    How to use a meter-dosed inhaler: https://www.[employer].com/watch?v=9ipqxF-4p5g    How to use a spacer (Aerochamber) with meter-dosed inhaler: https://www.[employer].com/watch?v=ltB-ZOjlkgY    How to use a respimat inhaler: https://youtu.be/HWr_NM1ChHg        For General information on Inhalers:    https://www.[employer].org/conditions/medications/asthma-medications/devices

## 2025-05-08 ENCOUNTER — OFFICE VISIT (OUTPATIENT)
Dept: CARDIOLOGY | Facility: CLINIC | Age: 70
End: 2025-05-08
Payer: MEDICARE

## 2025-05-08 ENCOUNTER — CLINICAL SUPPORT (OUTPATIENT)
Dept: SMOKING CESSATION | Facility: CLINIC | Age: 70
End: 2025-05-08
Payer: COMMERCIAL

## 2025-05-08 ENCOUNTER — LAB VISIT (OUTPATIENT)
Dept: LAB | Facility: HOSPITAL | Age: 70
End: 2025-05-08
Attending: INTERNAL MEDICINE
Payer: MEDICARE

## 2025-05-08 VITALS
HEIGHT: 71 IN | SYSTOLIC BLOOD PRESSURE: 136 MMHG | BODY MASS INDEX: 30.15 KG/M2 | OXYGEN SATURATION: 97 % | RESPIRATION RATE: 18 BRPM | HEART RATE: 73 BPM | DIASTOLIC BLOOD PRESSURE: 67 MMHG | WEIGHT: 215.38 LBS

## 2025-05-08 DIAGNOSIS — R06.09 DOE (DYSPNEA ON EXERTION): ICD-10-CM

## 2025-05-08 DIAGNOSIS — I10 HYPERTENSION, UNSPECIFIED TYPE: ICD-10-CM

## 2025-05-08 DIAGNOSIS — F17.200 NICOTINE DEPENDENCE: Primary | ICD-10-CM

## 2025-05-08 DIAGNOSIS — E87.5 HYPERKALEMIA: Primary | ICD-10-CM

## 2025-05-08 DIAGNOSIS — J44.1 COPD WITH ACUTE EXACERBATION: ICD-10-CM

## 2025-05-08 DIAGNOSIS — I70.0 AORTIC ATHEROSCLEROSIS: ICD-10-CM

## 2025-05-08 DIAGNOSIS — E87.5 HYPERKALEMIA: ICD-10-CM

## 2025-05-08 DIAGNOSIS — N52.9 ERECTILE DYSFUNCTION, UNSPECIFIED ERECTILE DYSFUNCTION TYPE: ICD-10-CM

## 2025-05-08 DIAGNOSIS — I10 ESSENTIAL HYPERTENSION: ICD-10-CM

## 2025-05-08 LAB
ANION GAP (OHS): 9 MMOL/L (ref 8–16)
BUN SERPL-MCNC: 25 MG/DL (ref 8–23)
CALCIUM SERPL-MCNC: 9.3 MG/DL (ref 8.7–10.5)
CHLORIDE SERPL-SCNC: 100 MMOL/L (ref 95–110)
CO2 SERPL-SCNC: 28 MMOL/L (ref 23–29)
CREAT SERPL-MCNC: 1.3 MG/DL (ref 0.5–1.4)
GFR SERPLBLD CREATININE-BSD FMLA CKD-EPI: 59 ML/MIN/1.73/M2
GLUCOSE SERPL-MCNC: 126 MG/DL (ref 70–110)
POTASSIUM SERPL-SCNC: 4.8 MMOL/L (ref 3.5–5.1)
SODIUM SERPL-SCNC: 137 MMOL/L (ref 136–145)

## 2025-05-08 PROCEDURE — 1100F PTFALLS ASSESS-DOCD GE2>/YR: CPT | Mod: CPTII,S$GLB,, | Performed by: INTERNAL MEDICINE

## 2025-05-08 PROCEDURE — 3288F FALL RISK ASSESSMENT DOCD: CPT | Mod: CPTII,S$GLB,, | Performed by: INTERNAL MEDICINE

## 2025-05-08 PROCEDURE — 3075F SYST BP GE 130 - 139MM HG: CPT | Mod: CPTII,S$GLB,, | Performed by: INTERNAL MEDICINE

## 2025-05-08 PROCEDURE — 99999 PR PBB SHADOW E&M-EST. PATIENT-LVL V: CPT | Mod: PBBFAC,,, | Performed by: INTERNAL MEDICINE

## 2025-05-08 PROCEDURE — 36415 COLL VENOUS BLD VENIPUNCTURE: CPT | Mod: PO

## 2025-05-08 PROCEDURE — 99407 BEHAV CHNG SMOKING > 10 MIN: CPT | Mod: S$GLB,,,

## 2025-05-08 PROCEDURE — 4010F ACE/ARB THERAPY RXD/TAKEN: CPT | Mod: CPTII,S$GLB,, | Performed by: INTERNAL MEDICINE

## 2025-05-08 PROCEDURE — 3078F DIAST BP <80 MM HG: CPT | Mod: CPTII,S$GLB,, | Performed by: INTERNAL MEDICINE

## 2025-05-08 PROCEDURE — 3008F BODY MASS INDEX DOCD: CPT | Mod: CPTII,S$GLB,, | Performed by: INTERNAL MEDICINE

## 2025-05-08 PROCEDURE — 82310 ASSAY OF CALCIUM: CPT

## 2025-05-08 PROCEDURE — 99214 OFFICE O/P EST MOD 30 MIN: CPT | Mod: S$GLB,,, | Performed by: INTERNAL MEDICINE

## 2025-05-08 PROCEDURE — 99999 PR PBB SHADOW E&M-EST. PATIENT-LVL I: CPT | Mod: PBBFAC,,,

## 2025-05-08 PROCEDURE — 3044F HG A1C LEVEL LT 7.0%: CPT | Mod: CPTII,S$GLB,, | Performed by: INTERNAL MEDICINE

## 2025-05-08 PROCEDURE — 1125F AMNT PAIN NOTED PAIN PRSNT: CPT | Mod: CPTII,S$GLB,, | Performed by: INTERNAL MEDICINE

## 2025-05-08 PROCEDURE — 1159F MED LIST DOCD IN RCRD: CPT | Mod: CPTII,S$GLB,, | Performed by: INTERNAL MEDICINE

## 2025-05-08 PROCEDURE — G2211 COMPLEX E/M VISIT ADD ON: HCPCS | Mod: S$GLB,,, | Performed by: INTERNAL MEDICINE

## 2025-05-08 RX ORDER — CARVEDILOL 12.5 MG/1
12.5 TABLET ORAL 2 TIMES DAILY
Qty: 180 TABLET | Refills: 3 | Status: SHIPPED | OUTPATIENT
Start: 2025-05-08

## 2025-05-08 NOTE — PROGRESS NOTES
CARDIOVASCULAR CONSULTATION    REASON FOR CONSULT:   Butch Mcdaniel is a 69 y.o. male who presents for evaluation.      HISTORY OF PRESENT ILLNESS:     Patient is a pleasant 66-year-old man.  Here for evaluation.  States blood pressure fluctuates a lot at home.  Fluctuates between 110-200 mmHg.  States that he has clonidine at home and whenever his blood pressure is elevated he takes an extra dose of clonidine.  Denies any chest pains at rest on exertion.  Denies orthopnea, PND, swelling of feet.      Notes from February 2022:  Patient here for follow-up.  Denies any chest pains at rest on exertion, orthopnea, PND.  Blood pressure staying at home.  Around 160-170 mmHg.      April 2022:  Patient very concerned about his fluctuating blood pressure.  Today morning his blood pressure was 198/111 mmHg.  Then just prior to coming to clinic it was 135/87 mmHg.  Fluctuates throughout the day.  No anginal chest pains.    The left ventricle is normal in size with moderate concentric hypertrophy and normal systolic function.  The estimated ejection fraction is 60%.  Normal right ventricular size with normal right ventricular systolic function.  The estimated PA systolic pressure is 33 mmHg.     Heart rates varied between 43 and 109 BPM with an average of 62 BPM.  Rare PVCs and PACs. Five atrial pairs. One three beat atrial run.      Notes from December 2022: Patient here for follow-up.  Denies any chest pains at rest on exertion, orthopnea, PND.  However has been experiencing significant dyspnea on exertion lately.  Unfortunately has started smoking again.    Notes from March 23:  Patient here for follow-up.  Stress test did not show any significant ischemia.  Trying to quit smoking.        Normal myocardial perfusion scan. There is no evidence of myocardial ischemia or infarction.    There is mild to moderate apical thinning which is a normal variant.    The gated perfusion images showed an ejection fraction of  56% post stress.    There is normal wall motion post stress.    The ECG portion of the study is negative for ischemia.    The patient reported no chest pain during the stress test.    There were no arrhythmias during stress.      Notes from September 23:  Patient here for follow-up.  Echo showed normal left ventricle systolic function with grade 1 diastolic dysfunction.  Patient denies any chest pains    Notes from August 24: Patient here for follow-up.  Denies chest pains at rest on exertion, orthopnea, PND.  Blood pressure running high at home also.  Today blood pressure elevated.  States did not take his hydralazine today.    Notes from October 24: Patient here for follow-up.  Blood pressure staying elevated at home also.  Frequently has to take hydralazine 100 mg.  Denies chest pains at rest on exertion, orthopnea or PND    11/08/2024  Blood pressure has been running higher at home.  Denies chest pains at rest on exertion orthopnea or PND.  Has been taking 200 mg hydralazine multiple times a day    January 25    History of Present Illness    CHIEF COMPLAINT:  Butch presents today for blood pressure management and pain issues.    HYPERTENSION:  Much better controlled.  Usually stays well controlled.  Unless he takes salty diet    PAIN MANAGEMENT:  He reports significant pain that is minimally relieved with multiple doses of medication. Pain episodes contribute to blood pressure elevation, though pain medication helps reduce it.     SOCIAL HISTORY:  He currently smokes up to one pack every two days and reports morning cough when smoke inhalation is excessive.       May 25:    History of Present Illness    CHIEF COMPLAINT:  Butch presents today for follow up of HTN.  Recently admitted with a hyperkalemia.  Spironolactone stopped.  Left AMA    RECENT HOSPITALIZATION:  He was hospitalized 2 weeks ago for dyspnea. Potassium was elevated and treated with Lasix during the admission.    SOCIAL HISTORY:  He quit  smoking     CURRENT MEDICATIONS:  He takes Amlodipine 10mg, Lipitor 40mg, Carvedilol, Clonidine patch, Hydralazine 3 times daily, and Lisinopril 40mg. Spironolactone was recently discontinued.             PAST MEDICAL HISTORY:     Past Medical History:   Diagnosis Date    Cataract, bilateral 09/11/2018    Degenerative disc disease     Diabetes mellitus type II, controlled     Eye injury as a child    stick hit eye ? 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 both eyes 10/08/2019    Personal history of colonic polyps     Psychiatric problem     Therapy     [facility] many years ago       PAST SURGICAL HISTORY:     Past Surgical History:   Procedure Laterality Date    APPENDECTOMY      COLONOSCOPY N/A 5/10/2017    Procedure: COLONOSCOPY;  Surgeon: Shantanu Marley MD;  Location: [facility] [place] ENDO;  Service: Endoscopy;  Laterality: N/A;    COLONOSCOPY N/A 8/4/2023    Procedure: COLONOSCOPY;  Surgeon: Gael Rand MD;  Location: [facility];  Service: Endoscopy;  Laterality: N/A;  Referred by: Dr. Gabriel Evans  Prep: golytely  Route instructions sent: [facility] and reviewed via phone, pt verbalized understanding-[id number]    POSTERIOR FUSION OF CERVICAL SPINE WITH LAMINECTOMY N/A 10/3/2021    Procedure: LAMINECTOMY, SPINE, CERVICAL, WITH POSTERIOR FUSION C2-T2;  Surgeon: Ana Rosa Geller MD;  Location: [address];  Service: Neurosurgery;  Laterality: N/A;       ALLERGIES AND MEDICATION:   Review of patient's allergies indicates:  No Known Allergies     Medication List            Accurate as of May 8, 2025 12:05 PM. If you have any questions, ask your nurse or doctor.                CONTINUE taking these medications      * albuterol 90 mcg/actuation inhaler  Commonly known as: PROVENTIL/VENTOLIN HFA  Inhale 2 puffs into the lungs every 4 (four) hours as needed for Wheezing.  "Rescue     * albuterol 2.5 mg /3 mL (0.083 %) nebulizer solution  Commonly known as: PROVENTIL  Take 3 mLs (2.5 mg total) by nebulization every 6 (six) hours as needed for Wheezing. Rescue     ALPRAZolam 1 MG tablet  Commonly known as: XANAX  TAKE ONE & ONE-HALF TABLET BY MOUTH ONCE A DAY     amLODIPine 10 MG tablet  Commonly known as: NORVASC  TAKE ONE TABLET BY MOUTH EVERY EVENING     atorvastatin 40 MG tablet  Commonly known as: LIPITOR  TAKE ONE TABLET BY MOUTH EVERY EVENING     BD LUER-STARLA SYRINGE 1 mL Syrg  Generic drug: syringe (disposable)  Testosterone injection every 2 weeks     busPIRone 30 MG Tab  Commonly known as: BUSPAR  TAKE ONE TABLET BY MOUTH TWICE DAILY     carvediloL 12.5 MG tablet  Commonly known as: COREG  Take 1 tablet (12.5 mg total) by mouth 2 (two) times daily.     cloNIDine 0.1 mg/24 hr td ptwk 0.1 mg/24 hr  Commonly known as: CATAPRES  Place 1 patch onto the skin every 7 days.     COMP-AIR NEBULIZER COMPRESSOR Nighat  Generic drug: nebulizer and compressor  USE AS DIRECTED     FLUoxetine 40 MG capsule  TAKE ONE CAPSULE BY MOUTH ONCE A DAY     gabapentin 600 MG tablet  Commonly known as: NEURONTIN     hydrALAZINE 100 MG tablet  Commonly known as: APRESOLINE  TAKE ONE tablet (100mg) BY MOUTH THREE TIMES DAILY     ibuprofen 600 MG tablet  Commonly known as: ADVIL,MOTRIN     lisinopriL 40 MG tablet  Commonly known as: PRINIVIL,ZESTRIL  Take 1 tablet (40 mg total) by mouth once daily.     metFORMIN 500 MG tablet  Commonly known as: GLUCOPHAGE  TAKE ONE TABLET BY MOUTH TWICE DAILY WITH MEALS     MIRALAX ORAL     naloxone 4 mg/actuation Spry  Commonly known as: NARCAN  4mg by nasal route as needed for opioid overdose; may repeat every 2-3 minutes in alternating nostrils until medical help arrives. Call 911     needle (disp) 22 G 22 gauge x 1" Ndle  Testosterone injection every 2 weeks     nicotine 21 mg/24 hr  Commonly known as: NICODERM CQ  Place 1 patch onto the skin once daily.   "   oxyCODONE-acetaminophen  mg per tablet  Commonly known as: PERCOCET     senna 8.6 mg tablet  Commonly known as: SENOKOT  Take 1 tablet by mouth 2 (two) times a day.     SPIRIVA RESPIMAT 2.5 mcg/actuation inhaler  Generic drug: tiotropium bromide  Inhale 2 puffs into the lungs Daily. Controller     testosterone cypionate 200 mg/mL injection  Commonly known as: DEPOTESTOTERONE CYPIONATE  INJECT ONE MILLILITER INTRAMUSCULARLY EVERY 14 DAYS     tiZANidine 4 MG tablet  Commonly known as: ZANAFLEX           * This list has 2 medication(s) that are the same as other medications prescribed for you. Read the directions carefully, and ask your doctor or other care provider to review them with you.                   Where to Get Your Medications        These medications were sent to [facility]  [address] [address]      Phone: [phone]   carvediloL 12.5 MG tablet         SOCIAL HISTORY:     Social History     Socioeconomic History    Marital status:     Number of children: 3   Occupational History    Occupation: retired - tug boat captain   Tobacco Use    Smoking status: Some Days     Current packs/day: 0.50     Average packs/day: 1 pack/day for 53.2 years (52.2 ttl pk-yrs)     Types: Cigarettes, Vaping with nicotine     Start date: 1972     Last attempt to quit: 6/20/2023    Smokeless tobacco: Never    Tobacco comments:     Approximately 3 cigarettes a day     Stopped smoking 05/03/25   Substance and Sexual Activity    Alcohol use: No     Alcohol/week: 0.0 standard drinks of alcohol    Drug use: Yes     Types: Marijuana, Oxycodone, Benzodiazepines     Comment: 4 oxycodones daily; one marijuana cigarette daily    Sexual activity: Yes     Partners: Female     Comment:  with children, disabled      Social Drivers of Health     Financial Resource Strain: Patient Declined (3/8/2025)    Overall Financial Resource Strain (CARDIA)     Difficulty of  Paying Living Expenses: Patient declined   Food Insecurity: Patient Declined (3/8/2025)    Hunger Vital Sign     Worried About Running Out of Food in the Last Year: Patient declined     Ran Out of Food in the Last Year: Patient declined   Transportation Needs: No Transportation Needs (5/10/2023)    PRAPARE - Transportation     Lack of Transportation (Medical): No     Lack of Transportation (Non-Medical): No   Physical Activity: Sufficiently Active (5/10/2023)    Exercise Vital Sign     Days of Exercise per Week: 4 days     Minutes of Exercise per Session: 50 min   Stress: Patient Declined (3/8/2025)    [employer] - Occupational Stress Questionnaire     Feeling of Stress : Patient declined   Housing Stability: Patient Declined (3/8/2025)    Housing Stability Vital Sign     Unable to Pay for Housing in the Last Year: Patient declined     Homeless in the Last Year: Patient declined       FAMILY HISTORY:     Family History   Problem Relation Name Age of Onset    Heart disease Mother      Heart disease Father      Alcohol abuse Father      No Known Problems Sister      No Known Problems Brother      Diabetes Son      No Known Problems Maternal Aunt      No Known Problems Maternal Uncle      No Known Problems Paternal Aunt      No Known Problems Paternal Uncle      No Known Problems Maternal Grandmother      No Known Problems Maternal Grandfather      No Known Problems Paternal Grandmother      No Known Problems Paternal Grandfather      Amblyopia Neg Hx      Blindness Neg Hx      Cancer Neg Hx      Cataracts Neg Hx      Glaucoma Neg Hx      Hypertension Neg Hx      Macular degeneration Neg Hx      Retinal detachment Neg Hx      Strabismus Neg Hx      Stroke Neg Hx      Thyroid disease Neg Hx      Anxiety disorder Neg Hx      Dementia Neg Hx      Depression Neg Hx         REVIEW OF SYSTEMS:   Review of Systems   Constitutional: Negative.   HENT: Negative.     Eyes: Negative.   "  Cardiovascular:  Positive for dyspnea on exertion.   Respiratory: Negative.     Endocrine: Negative.    Hematologic/Lymphatic: Negative.    Skin: Negative.    Musculoskeletal: Negative.    Gastrointestinal: Negative.    Genitourinary: Negative.    Neurological: Negative.    Psychiatric/Behavioral: Negative.     Allergic/Immunologic: Negative.        A 10 point review of systems was performed and all the pertinent positives have been mentioned. Rest of review of systems was negative.        PHYSICAL EXAM:     Vitals:    05/08/25 1051   BP: 136/67   Pulse: 73   Resp: 18    Body mass index is 30.04 kg/m².  Weight: 97.7 kg (215 lb 6.2 oz)   Height: 5' 11" (180.3 cm)     Physical Exam  Vitals reviewed.   Constitutional:       Appearance: He is well-developed.   HENT:      Head: Normocephalic.   Eyes:      Conjunctiva/sclera: Conjunctivae normal.      Pupils: Pupils are equal, round, and reactive to light.   Cardiovascular:      Rate and Rhythm: Normal rate and regular rhythm.      Heart sounds: Normal heart sounds.   Pulmonary:      Effort: Pulmonary effort is normal.      Breath sounds: Wheezing present.   Abdominal:      General: Bowel sounds are normal.      Palpations: Abdomen is soft.   Musculoskeletal:      Cervical back: Normal range of motion and neck supple.   Skin:     General: Skin is warm.   Neurological:      Mental Status: He is alert and oriented to person, place, and time.           DATA:     Laboratory:  CBC:  Recent Labs   Lab 03/07/25 1957 03/08/25 0507 03/09/25 2328   WBC 8.80 6.36 14.81 H   Hemoglobin 13.4 L 12.8 L 14.7   Hematocrit 40.1 39.2 L 44.9   Platelets 125 L 164 171       CHEMISTRIES:  Recent Labs   Lab 07/19/22  0518 07/20/22  0126 07/21/22  0531 08/24/22  0905 03/07/25 1957 03/07/25  2241 03/08/25  0507 03/08/25  1519 03/09/25  2328 03/18/25  0834   Glucose 141 H 114 H  113 H 146 H   < > 179 H   < > 180 H 302 H 251 H 155 H   Sodium 139 141  142 143   < > 140   < > 137 137 137 137 "   Potassium 5.2 H 4.1  4.1 3.9   < > 5.2 H   < > 5.2 H 4.9 4.9 5.1   BUN 32 H 28 H  29 H 24 H   < > 50 H   < > 44 H 39 H 36 H 26 H   Creatinine 1.9 H 1.4  1.4 1.3   < > 1.8 H   < > 1.4 1.5 H 1.5 H 1.3   eGFR if  41 A 60  60 >60  --   --   --   --   --   --   --    eGFR if non  36 A 52 A  52 A 56 A  --   --   --   --   --   --   --    Calcium 8.8 9.2  9.2 9.2   < > 8.2 L   < > 7.7 L 7.6 L 8.2 L 10.0   Magnesium 1.9 1.7  1.8 1.8  --  2.2  --  2.1  --  2.1  --     < > = values in this interval not displayed.       CARDIAC BIOMARKERS:  Recent Labs   Lab 07/18/22  1007 07/20/22  0126 03/07/25 1957 03/08/25  0507 03/09/25  2328     --   --   --   --    Troponin I  --    < > <0.006 <0.006 <0.006    < > = values in this interval not displayed.       COAGS:          LIPIDS/LFTS:  Recent Labs   Lab 09/19/23  0921 03/18/24  0840 09/18/24  0750 03/05/25  0915 03/07/25 1957 03/09/25  2328   Cholesterol 87 L 99 L 74 L  --   --   --    Triglycerides 97 161 H 80  --   --   --    HDL 29 L 28 L 29 L  --   --   --    LDL Cholesterol 38.6 L 38.8 L 29.0 L  --   --   --    Non-HDL Cholesterol 58 71 45  --   --   --    AST 15 23 19 41 H 27 27   ALT 12 20 15 23 30 26       Hemoglobin A1C   Date Value Ref Range Status   03/05/2025 6.3 (H) 4.0 - 5.6 % Final     Comment:     ADA Screening Guidelines:  5.7-6.4%  Consistent with prediabetes  >or=6.5%  Consistent with diabetes    High levels of fetal hemoglobin interfere with the HbA1C  assay. Heterozygous hemoglobin variants (HbS, HgC, etc)do  not significantly interfere with this assay.   However, presence of multiple variants may affect accuracy.     09/18/2024 6.0 (H) 4.0 - 5.6 % Final     Comment:     ADA Screening Guidelines:  5.7-6.4%  Consistent with prediabetes  >or=6.5%  Consistent with diabetes    High levels of fetal hemoglobin interfere with the HbA1C  assay. Heterozygous hemoglobin variants (HbS, HgC, etc)do  not significantly  interfere with this assay.   However, presence of multiple variants may affect accuracy.     03/18/2024 6.0 (H) 4.0 - 5.6 % Final     Comment:     ADA Screening Guidelines:  5.7-6.4%  Consistent with prediabetes  >or=6.5%  Consistent with diabetes    High levels of fetal hemoglobin interfere with the HbA1C  assay. Heterozygous hemoglobin variants (HbS, HgC, etc)do  not significantly interfere with this assay.   However, presence of multiple variants may affect accuracy.         TSH  Recent Labs   Lab 07/18/22  0252   TSH 5.291 H       The ASCVD Risk score (Dale JOHNSON, et al., 2019) failed to calculate for the following reasons:    The valid total cholesterol range is 130 to 320 mg/dL             ASSESSMENT AND PLAN     Patient Active Problem List   Diagnosis    Essential hypertension    Anemia    Anxiety, unable to wean off BZD    Recurrent major depressive disorder, in partial remission    DDD (degenerative disc disease), cervical    DDD (degenerative disc disease), lumbar    ED (erectile dysfunction)    Facet arthritis of cervical region    Facet arthritis of lumbar region    Benzodiazepine dependence, did not do well with attempt to wean down 2017    Chronic, continuous use of opioids    Type 2 diabetes mellitus without complication, without long-term current use of insulin    Tobacco dependence, patch with irritation; hx of bupropion    Therapeutic opioid-induced constipation (OIC)    Tubular adenoma of colon 5/10/17; 08/04/2023 colonoscopy 10 mm cecal TA.  10 mm descending polypoid mucosa.  Repeat 3 years    Cervical stenosis of spinal canal 10/3/2021 LAMINECTOMY, SPINE, CERVICAL, WITH POSTERIOR FUSION C2-T2    Cataract, bilateral    Open angle with borderline findings and high glaucoma risk in both eyes    Nuclear sclerosis of both eyes    Elevated prolactin level,low testosterone, gynecomastia; MRI pituitary normal 5/2020    Low testosterone in male    Paresthesia of left upper and lower extremity    History  of smoking 10-25 pack years    Status post surgery    Impaired mobility and ADLs    Chronic low back pain    Sacroiliac joint pain    Sacroiliitis    Impaired mobility    Arthrodesis status    Foraminal stenosis of lumbar region    Hyperkalemia    Acute renal failure superimposed on stage 3a chronic kidney disease    Junctional bradycardia    Chronic prescription benzodiazepine use    History of atrial fibrillation 7/2022 hospitalization i n the setting of SHUANA and hyperK. started on carvedilol during admission    Type 2 diabetes mellitus with diabetic cataract, without long-term current use of insulin    Unspecified inflammatory spondylopathy, cervical region    Aortic atherosclerosis    Chronic obstructive pulmonary disease with acute exacerbation       Dyspnea on exertion.  Further evaluation done with the help of a stress test.  Stress test did not show any significant ischemia.  Likely related to his pulmonary issues and smoking.  Smoking cessation has been highly recommended .  Patient recently quit smoking    Smoking cessation patient recently quit smoking    Hypertension:  Continue current therapy.    Lab Results   Component Value Date    LDLCALC 29.0 (L) 09/18/2024     Aortic atherosclerosis: On statins    Dyslipidemia on statin    Lab Results   Component Value Date    LDLCALC 29.0 (L) 09/18/2024     Hyperkalemia: Spironolactone has been discontinued.  Check BMP today    Follow-up in 1 months.    Thank you very much for involving me in the care of your patient.  Please do not hesitate to contact me if there are any questions.      Adriana Pete MD, FACC, [facility]  Interventional Cardiologist, [facility].     Visit today included increased complexity associated with the care of the episodic problem hypertension, dyslipidemia, aortic atherosclerosis addressed and managing the longitudinal care of the patient due to the serious and/or complex managed problem(s)   Patient Active Problem List   Diagnosis     Essential hypertension    Anemia    Anxiety, unable to wean off BZD    Recurrent major depressive disorder, in partial remission    DDD (degenerative disc disease), cervical    DDD (degenerative disc disease), lumbar    ED (erectile dysfunction)    Facet arthritis of cervical region    Facet arthritis of lumbar region    Benzodiazepine dependence, did not do well with attempt to wean down 2017    Chronic, continuous use of opioids    Type 2 diabetes mellitus without complication, without long-term current use of insulin    Tobacco dependence, patch with irritation; hx of bupropion    Therapeutic opioid-induced constipation (OIC)    Tubular adenoma of colon 5/10/17; 08/04/2023 colonoscopy 10 mm cecal TA.  10 mm descending polypoid mucosa.  Repeat 3 years    Cervical stenosis of spinal canal 10/3/2021 LAMINECTOMY, SPINE, CERVICAL, WITH POSTERIOR FUSION C2-T2    Cataract, bilateral    Open angle with borderline findings and high glaucoma risk in both eyes    Nuclear sclerosis of both eyes    Elevated prolactin level,low testosterone, gynecomastia; MRI pituitary normal 5/2020    Low testosterone in male    Paresthesia of left upper and lower extremity    History of smoking 10-25 pack years    Status post surgery    Impaired mobility and ADLs    Chronic low back pain    Sacroiliac joint pain    Sacroiliitis    Impaired mobility    Arthrodesis status    Foraminal stenosis of lumbar region    Hyperkalemia    Acute renal failure superimposed on stage 3a chronic kidney disease    Junctional bradycardia    Chronic prescription benzodiazepine use    History of atrial fibrillation 7/2022 hospitalization i n the setting of SHAUNA and hyperK. started on carvedilol during admission    Type 2 diabetes mellitus with diabetic cataract, without long-term current use of insulin    Unspecified inflammatory spondylopathy, cervical region    Aortic atherosclerosis    Chronic obstructive pulmonary disease with acute exacerbation      .        This note was dictated with the help of speech recognition software.  There might be un-intended errors and/or substitutions.

## 2025-05-13 ENCOUNTER — PATIENT OUTREACH (OUTPATIENT)
Dept: ADMINISTRATIVE | Facility: HOSPITAL | Age: 70
End: 2025-05-13
Payer: MEDICARE

## 2025-05-15 ENCOUNTER — CLINICAL SUPPORT (OUTPATIENT)
Dept: SMOKING CESSATION | Facility: CLINIC | Age: 70
End: 2025-05-15
Payer: COMMERCIAL

## 2025-05-15 DIAGNOSIS — F17.200 NICOTINE DEPENDENCE: Primary | ICD-10-CM

## 2025-05-15 PROCEDURE — 99999 PR PBB SHADOW E&M-EST. PATIENT-LVL I: CPT | Mod: PBBFAC,,,

## 2025-05-15 PROCEDURE — 99404 PREV MED CNSL INDIV APPRX 60: CPT | Mod: S$GLB,,,

## 2025-05-15 RX ORDER — IBUPROFEN 200 MG
1 TABLET ORAL DAILY
Qty: 14 PATCH | Refills: 0 | Status: SHIPPED | OUTPATIENT
Start: 2025-05-15 | End: 2025-06-15

## 2025-05-15 NOTE — PROGRESS NOTES
"Individual Follow-Up Form    5/15/2025    Quit Date: 5/1    Clinical Status of Patient: Outpatient    Length of Service: 60 minutes    Continuing Medication: yes  Patches    Other Medications: n/a     Target Symptoms: Withdrawal and medication side effects. The following were  rated moderate (3) to severe (4) on TCRS:  Moderate (3): 0  Severe (4): 0    Comments: patient presents in clinic for his appt and continues to be a non smoker, about two weeks ago patient was seen in [facility] at the  by ctts and his smoking was discussed, he reported then that he was wearing his nicotine patch and not smoking, he was breathing and doing much better, he continues to not smoke, he quit smoking on 5/1 and has had a few small slips but states they "taste nasty" therefore he throws out and does not smoke, he is wearing the 21mg nicotine patch daily, recommend that he continue to wear this dose of the patch and continue to work on strategies to remain a non smoker, session handout and encouragement provided, he denies negative s/e from the patch, disused dropping the patch and the reduction in the dose over time and when he is ready, at this point he wants to remain on same dose, will follow in clinic in two weeks     Diagnosis: F17.210    Next Visit: 2 weeks    "

## 2025-05-27 DIAGNOSIS — E11.36 TYPE 2 DIABETES MELLITUS WITH DIABETIC CATARACT, WITHOUT LONG-TERM CURRENT USE OF INSULIN: ICD-10-CM

## 2025-05-27 DIAGNOSIS — E11.9 TYPE 2 DIABETES MELLITUS WITHOUT COMPLICATION, WITHOUT LONG-TERM CURRENT USE OF INSULIN: ICD-10-CM

## 2025-05-27 RX ORDER — METFORMIN HYDROCHLORIDE 500 MG/1
500 TABLET ORAL 2 TIMES DAILY WITH MEALS
Qty: 180 TABLET | Refills: 1 | Status: SHIPPED | OUTPATIENT
Start: 2025-05-27

## 2025-05-27 NOTE — TELEPHONE ENCOUNTER
Refill Decision Note   Butch Mcdaniel  is requesting a refill authorization.  Brief Assessment and Rationale for Refill:  Approve     Medication Therapy Plan:         Pharmacist review requested: Yes   Extended chart review required: Yes   Comments:     Note composed:12:14 PM 05/27/2025

## 2025-05-27 NOTE — TELEPHONE ENCOUNTER
No care due was identified.  [facility] Embedded Care Due Messages. Reference number: [id number].   5/27/2025 9:49:03 AM CDT

## 2025-05-27 NOTE — TELEPHONE ENCOUNTER
Refill Routing Note   Medication(s) are not appropriate for processing by [facility] for the following reason(s):        Drug-disease interaction    [facility] action(s):  Defer        Medication Therapy Plan: metFORMIN and Stage 3b chronic kidney disease    Pharmacist review requested: Yes     Appointments  past 12m or future 3m with PCP    Date Provider   Last Visit   3/12/2025 Gabriel Evans MD   Next Visit   6/13/2025 Gabriel Evans MD   ED visits in past 90 days: 1        Note composed:10:01 AM 05/27/2025

## 2025-05-29 DIAGNOSIS — R79.89 LOW TESTOSTERONE IN MALE: ICD-10-CM

## 2025-05-29 RX ORDER — TESTOSTERONE CYPIONATE 200 MG/ML
INJECTION, SOLUTION INTRAMUSCULAR
Qty: 10 ML | Refills: 0 | Status: SHIPPED | OUTPATIENT
Start: 2025-05-29

## 2025-05-29 NOTE — TELEPHONE ENCOUNTER
No care due was identified.  [facility] Embedded Care Due Messages. Reference number: [id number].   5/29/2025 10:59:51 AM CDT

## 2025-06-03 ENCOUNTER — TELEPHONE (OUTPATIENT)
Dept: PULMONOLOGY | Facility: HOSPITAL | Age: 70
End: 2025-06-03
Payer: MEDICARE

## 2025-06-03 DIAGNOSIS — F17.200 NICOTINE DEPENDENCE: ICD-10-CM

## 2025-06-03 RX ORDER — IBUPROFEN 200 MG
TABLET ORAL
Qty: 30 PATCH | Refills: 0 | Status: SHIPPED | OUTPATIENT
Start: 2025-06-03

## 2025-06-04 ENCOUNTER — CLINICAL SUPPORT (OUTPATIENT)
Dept: SMOKING CESSATION | Facility: CLINIC | Age: 70
End: 2025-06-04
Payer: COMMERCIAL

## 2025-06-04 DIAGNOSIS — F17.200 NICOTINE DEPENDENCE: Primary | ICD-10-CM

## 2025-06-04 PROCEDURE — 99407 BEHAV CHNG SMOKING > 10 MIN: CPT | Mod: S$GLB,,,

## 2025-06-04 PROCEDURE — 99999 PR PBB SHADOW E&M-EST. PATIENT-LVL I: CPT | Mod: PBBFAC,,,

## 2025-06-05 ENCOUNTER — CLINICAL SUPPORT (OUTPATIENT)
Dept: SMOKING CESSATION | Facility: CLINIC | Age: 70
End: 2025-06-05
Payer: COMMERCIAL

## 2025-06-05 ENCOUNTER — LAB VISIT (OUTPATIENT)
Dept: LAB | Facility: HOSPITAL | Age: 70
End: 2025-06-05
Attending: INTERNAL MEDICINE
Payer: MEDICARE

## 2025-06-05 DIAGNOSIS — E11.9 TYPE 2 DIABETES MELLITUS WITHOUT COMPLICATION: ICD-10-CM

## 2025-06-05 DIAGNOSIS — E11.9 TYPE 2 DIABETES MELLITUS WITHOUT COMPLICATION, WITHOUT LONG-TERM CURRENT USE OF INSULIN: ICD-10-CM

## 2025-06-05 DIAGNOSIS — F17.200 NICOTINE DEPENDENCE: Primary | ICD-10-CM

## 2025-06-05 DIAGNOSIS — R79.89 LOW TESTOSTERONE IN MALE: ICD-10-CM

## 2025-06-05 LAB
ALBUMIN SERPL BCP-MCNC: 4 G/DL (ref 3.5–5.2)
ALBUMIN/CREAT UR: 274.5 UG/MG
ALP SERPL-CCNC: 49 UNIT/L (ref 40–150)
ALT SERPL W/O P-5'-P-CCNC: 11 UNIT/L (ref 10–44)
ANION GAP (OHS): 12 MMOL/L (ref 8–16)
AST SERPL-CCNC: 13 UNIT/L (ref 11–45)
BILIRUB SERPL-MCNC: 1.3 MG/DL (ref 0.1–1)
BUN SERPL-MCNC: 16 MG/DL (ref 8–23)
CALCIUM SERPL-MCNC: 8.7 MG/DL (ref 8.7–10.5)
CHLORIDE SERPL-SCNC: 104 MMOL/L (ref 95–110)
CHOLEST SERPL-MCNC: 89 MG/DL (ref 120–199)
CHOLEST/HDLC SERPL: 2.5 {RATIO} (ref 2–5)
CO2 SERPL-SCNC: 23 MMOL/L (ref 23–29)
CREAT SERPL-MCNC: 1.5 MG/DL (ref 0.5–1.4)
CREAT UR-MCNC: 231 MG/DL (ref 23–375)
EAG (OHS): 128 MG/DL (ref 68–131)
ERYTHROCYTE [DISTWIDTH] IN BLOOD BY AUTOMATED COUNT: 12.8 % (ref 11.5–14.5)
GFR SERPLBLD CREATININE-BSD FMLA CKD-EPI: 50 ML/MIN/1.73/M2
GLUCOSE SERPL-MCNC: 136 MG/DL (ref 70–110)
HBA1C MFR BLD: 6.1 % (ref 4–5.6)
HCT VFR BLD AUTO: 45.7 % (ref 40–54)
HDLC SERPL-MCNC: 35 MG/DL (ref 40–75)
HDLC SERPL: 39.3 % (ref 20–50)
HGB BLD-MCNC: 14.4 GM/DL (ref 14–18)
LDLC SERPL CALC-MCNC: 35.2 MG/DL (ref 63–159)
MCH RBC QN AUTO: 32.7 PG (ref 27–31)
MCHC RBC AUTO-ENTMCNC: 31.5 G/DL (ref 32–36)
MCV RBC AUTO: 104 FL (ref 82–98)
MICROALBUMIN UR-MCNC: 634 UG/ML (ref ?–5000)
NONHDLC SERPL-MCNC: 54 MG/DL
PLATELET # BLD AUTO: 97 K/UL (ref 150–450)
PMV BLD AUTO: 11.9 FL (ref 9.2–12.9)
POTASSIUM SERPL-SCNC: 4.7 MMOL/L (ref 3.5–5.1)
PROT SERPL-MCNC: 6.8 GM/DL (ref 6–8.4)
RBC # BLD AUTO: 4.4 M/UL (ref 4.6–6.2)
SODIUM SERPL-SCNC: 139 MMOL/L (ref 136–145)
TESTOST SERPL-MCNC: 1102 NG/DL (ref 304–1227)
TRIGL SERPL-MCNC: 94 MG/DL (ref 30–150)
WBC # BLD AUTO: 11.37 K/UL (ref 3.9–12.7)

## 2025-06-05 PROCEDURE — 84403 ASSAY OF TOTAL TESTOSTERONE: CPT

## 2025-06-05 PROCEDURE — 36415 COLL VENOUS BLD VENIPUNCTURE: CPT | Mod: PO

## 2025-06-05 PROCEDURE — 85027 COMPLETE CBC AUTOMATED: CPT

## 2025-06-05 PROCEDURE — 83036 HEMOGLOBIN GLYCOSYLATED A1C: CPT

## 2025-06-05 PROCEDURE — 99404 PREV MED CNSL INDIV APPRX 60: CPT | Mod: S$GLB,,,

## 2025-06-05 PROCEDURE — 80061 LIPID PANEL: CPT

## 2025-06-05 PROCEDURE — 82043 UR ALBUMIN QUANTITATIVE: CPT

## 2025-06-05 PROCEDURE — 99999 PR PBB SHADOW E&M-EST. PATIENT-LVL II: CPT | Mod: PBBFAC,,,

## 2025-06-05 PROCEDURE — 82947 ASSAY GLUCOSE BLOOD QUANT: CPT

## 2025-06-06 ENCOUNTER — TELEPHONE (OUTPATIENT)
Dept: SLEEP MEDICINE | Facility: HOSPITAL | Age: 70
End: 2025-06-06
Payer: MEDICARE

## 2025-06-12 NOTE — ASSESSMENT & PLAN NOTE
Stable on fluoxetine, BuSpar, alprazolam longstanding.  Had previously consulted with Psychiatry who had recommended inpatient detox for his benzo and he refused.

## 2025-06-12 NOTE — ASSESSMENT & PLAN NOTE
Blood pressure today is actually a bit low which he is surprised to see.  He attributes this to recent pain pill intake and feels more relaxed at this time and in less pain than he normally does.  Reports he checks his blood pressures at home and they can be high.  He is prescribed hydralazine t.i.d. but sometimes will take it b.i.d. if his blood pressure is good.  He notes no subsequent rebound hypertension  Followed by Cardiology.  Stay on clonidine patch, amlodipine, carvedilol, hydralazine, lisinopril

## 2025-06-12 NOTE — ASSESSMENT & PLAN NOTE
Pre visit labs testosterone high he normally injects testosterone with 1st in the 15th of the month so it was only a few days after his injection.  However he tells me that he took his next injection yesterday, 6/12, because he anticipated he is going to be busy on 06/15.  Last couple of readings has been high but has been really close to the time of injection.    Return next Friday for testosterone level and if still high decreased down to 100 mg every 2 weeks  Orders:    Comprehensive Metabolic Panel; Future    Lipid Panel; Future    CBC Without Differential; Future    Testosterone,Total; Future    Testosterone,Total; Future

## 2025-06-12 NOTE — ASSESSMENT & PLAN NOTE
Reports successful smoking cessation for the past week.  Using nicotine patch.  Congratulated him on his progress.

## 2025-06-12 NOTE — PROGRESS NOTES
This note was created by combination of typed  and M-Modal dictation.  Transcription errors may be present.   This note was also generated with the assistance of ambient listening technology. Verbal consent was obtained by the patient and accompanying visitor(s) for the recording of patient appointment to facilitate this note. I attest to having reviewed and edited the generated note for accuracy, though some syntax or spelling errors may persist. Please contact the author of this note for any clarification.    Assessment and Plan:   Assessment and Plan   Assessment & Plan  Low testosterone in male  Pre visit labs testosterone high he normally injects testosterone with 1st in the 15th of the month so it was only a few days after his injection.  However he tells me that he took his next injection yesterday, 6/12, because he anticipated he is going to be busy on 06/15.  Last couple of readings has been high but has been really close to the time of injection.    Return next Friday for testosterone level and if still high decreased down to 100 mg every 2 weeks  Orders:    Comprehensive Metabolic Panel; Future    Lipid Panel; Future    CBC Without Differential; Future    Testosterone,Total; Future    Testosterone,Total; Future    Thrombocytopenia  Variable platelet count anywhere between normal to thrombocytopenia.  Most recent labs with thrombocytopenia.  Monitor.  No cross-sectional imaging of liver and spleen.  If persisting we will need to check  Orders:    CBC Without Differential; Future    Type 2 diabetes mellitus with diabetic cataract, without long-term current use of insulin  Type 2 diabetes mellitus without complication, without long-term current use of insulin  Pre visit labs A1c good.  On metformin.  On ACE inhibitor.  On statin.  Needs to update eye exam.  Urine with micro albuminuria on ACE inhibitor.  With a history of CKD and history of renal failure on temporary dialysis.  Discussed dietary  changes.  Drinks a lot of soda.  Eats a lot of protein.  Drink more water   Stop soda  Continue to monitor  Orders:    Comprehensive Metabolic Panel; Future    Hemoglobin A1C; Future    Microalbumin/Creatinine Ratio, Urine; Future    Essential hypertension  Blood pressure today is actually a bit low which he is surprised to see.  He attributes this to recent pain pill intake and feels more relaxed at this time and in less pain than he normally does.  Reports he checks his blood pressures at home and they can be high.  He is prescribed hydralazine t.i.d. but sometimes will take it b.i.d. if his blood pressure is good.  He notes no subsequent rebound hypertension  Followed by Cardiology.  Stay on clonidine patch, amlodipine, carvedilol, hydralazine, lisinopril       Benzodiazepine dependence, did not do well with attempt to wean down 2017  Anxiety, unable to wean off BZD  Chronic prescription benzodiazepine use  Stable on fluoxetine, BuSpar, alprazolam longstanding.  Had previously consulted with Psychiatry who had recommended inpatient detox for his benzo and he refused.       History of atrial fibrillation 7/2022 hospitalization i n the setting of SHAUNA and hyperK. started on carvedilol during admission  Sinus rhythm today.  Not on anticoagulant.  On beta-blocker.  Followed by Cardiology    Chronic obstructive pulmonary disease with acute exacerbation  Tobacco dependence, patch with irritation; hx of bupropion  Reports successful smoking cessation for the past week.  Using nicotine patch.  Congratulated him on his progress.       Chronic, continuous use of opioids  Chronic low back pain, unspecified back pain laterality, unspecified whether sciatica present  Therapeutic opioid-induced constipation (OIC)  Managed by outside pain clinic.  On chronic Percocet, tizanidine and gabapentin managed by outside pain clinic        Tubular adenoma of colon 5/10/17; 08/04/2023 colonoscopy 10 mm cecal TA.  10 mm descending polypoid  mucosa.  Repeat 3 years  He will be due for repeat colonoscopy 2026         There are no discontinued medications.    meds sent this encounter:         Follow Up:  Repeat labs next Friday.  Follow up 3 months with labs  Future Appointments   Date Time Provider Department Center   6/19/2025  9:30 AM Pauline Welch RN [facility] SMOKE [place]   6/20/2025 10:15 AM LAB, [facility] LAB [place]   8/28/2025  1:40 PM Adriana Pete MD [facility] CARDIO [place]   9/8/2025 10:15 AM LAB, [facility] LAB [place]          Subjective:   Subjective   Chief Complaint   Patient presents with    acute kidney injury     3 months follow up.       DIPESH Rae is a 69 y.o. male.     Social History     Socioeconomic History    Marital status:     Number of children: 3   Occupational History    Occupation: retired -    Tobacco Use    Smoking status: Former     Current packs/day: 0.50     Average packs/day: 1 pack/day for 53.3 years (52.2 ttl pk-yrs)     Types: Cigarettes, Vaping with nicotine     Start date: 1972     Quit date: 6/20/2023    Smokeless tobacco: Never    Tobacco comments:     Approximately 3 cigarettes a day     Stopped smoking 05/03/25   Substance and Sexual Activity    Alcohol use: No     Alcohol/week: 0.0 standard drinks of alcohol    Drug use: Yes     Types: Marijuana, Oxycodone, Benzodiazepines     Comment: 4 oxycodones daily; one marijuana cigarette daily    Sexual activity: Yes     Partners: Female     Comment:  with children, disabled      Social Drivers of Health     Financial Resource Strain: Patient Declined (3/8/2025)    Overall Financial Resource Strain (CARDIA)     Difficulty of Paying Living Expenses: Patient declined   Food Insecurity: Patient Declined (3/8/2025)    Hunger Vital Sign     Worried About Running Out of Food in the Last Year: Patient declined     Ran Out of Food in the Last Year: Patient declined   Transportation Needs: No Transportation Needs (5/10/2023)     PRAPARE - Transportation     Lack of Transportation (Medical): No     Lack of Transportation (Non-Medical): No   Physical Activity: Sufficiently Active (5/10/2023)    Exercise Vital Sign     Days of Exercise per Week: 4 days     Minutes of Exercise per Session: 50 min   Stress: Patient Declined (3/8/2025)    [employer] [place] [employer] - Occupational Stress Questionnaire     Feeling of Stress : Patient declined   Housing Stability: Patient Declined (3/8/2025)    Housing Stability Vital Sign     Unable to Pay for Housing in the Last Year: Patient declined     Homeless in the Last Year: Patient declined       Last appointment with this clinic was 3/20/2025. Last visit with me 3/12/2025   To summarize last visit and events leading up to today:  HTN  07/27/2023 TTE normal systolic function.  LVEF 65%.  Grade 1 diastolic dysfunction.  Higher dose carvedilol side effect of bradycardia.  10/21/24 cardiology visit, increased hydralazine, 100 TID  11/8/24 cards follow up, increased cored to 12.5 and lisinopril to 40  12/5/24 cards follow up, added spironolactone 25  1/6/25 cards follow up, stable  lipid   3/16/23 nu stress test neg for ischemia; no arrhythmias  Hx of AFib during hospitalization 7/2022 in the setting of hyperK. On beta blocker.  COPD Working with smoking cessation clinic  08/04/2023 colonoscopy 10 mm cecal TA.  10 mm descending polypoid mucosa.  Repeat 3 years  DM2   Hx of SHAUNA due to dehydration, severe enough to require HD for hyperK, 7/2022.  Low testosterone on testosterone.  Thrombocytopenia hx of clumping on microscopy  Anxiety, BZD dependence. did not tolerate trial of Cymbalta.  Took it for maybe a week.   Has been to see Psychiatry, Psychiatry recommended inpatient detox and the patient was not interested.  Currently have him taking 1.5 tablets in total during the day.  on max dose celexa  Was scheduled to see Psychiatry but on pre visit review was deemed inappropriate for general  Psychiatry and was recommended for intensive outpatient therapy program  6/25/2024 visit with me,  Trial of fluoxetine stopped the Celexa.  Stay on BuSpar.  If stable may consider trying to wean down benzodiazepine  Chronic back pain followed by pain mgmt. Narcotic and bill  Saw ENT 7/28.  Sensorineural hearing loss.  Qualifies for hearing aids.       Last visit me 01/13/2025  Acute cough with underlying COPD.  Plan was chest x-ray and if negative for pneumonia than prednisone for 5 days   Hypertension CKD recent addition of spironolactone.     Labs done 01/24/2025 BMP creatinine 1.9 from 1.3     And labs 03/05/2025  CMP creatinine 1.8 from 1.9 CKD IIIb, potassium elevated 5.8  CBC anemia  A1c 6.3 from 6.0   PSA normal   Testosterone normal     I had called him about his labs prior to his visit, was severely wheezing and I directed him to the ED.       Hospitalization 3/7 through 03/09/2025 for COPD exacerbation and hyperkalemia.  Left AMA.  Hospital Course:   Mr Butch Mcdaniel was admitted with hyperK and COPD exacerbation. HyperK resolved with IV lasix. Started steroids, nebs for COPD exacerbation. Stable on room air, able to walk around, able to speak, but still wheezing. He has opted to leave the hospital against medical advice.     Subsequently presented back to the ED 03/09/2025 dyspnea discharged on prednisone, Levaquin     Most recent CMP  Cr 1.5    Last visit 03/12/2025  SHAUNA on CKD 3A recent hospitalization for hyperkalemia and SHAUNA.  Diuresed.  Suspect this is from combo of lisinopril and spironolactone.  Stop spironolactone stay on all others.  On hydralazine prescribed t.i.d. but takes it b.i.d..  Trial of clonidine trial of patch  COPD smoking recent treatment with prednisone and Levaquin still wheezing.  Ordered nebulizer, ordered Trelegy if not covered then Spiriva or Incruse  Anxiety chronic benzo use stable on fluoxetine BuSpar and alprazolam he had previously refused inpatient detox  for benzos  Diabetes A1c stable on metformin.  Gabapentin for chronic back pain   Low testosterone on supplement 200 every 2 weeks  Chronic opioid managed by outside pain clinic    BMP stable CKD stage 3a  Stopped spironolactone last OV  Cr 1.3, at worst was 1.8 with ARB and spironolactone  No changes.     BP check 03/20/2025 stable.  Mild bradycardia    05/06/2025 pulmonology consult.  Spiriva.  Ordered sleep study    Saw cardiology 05/08/2025.  No changes  Repeat BMP creatinine 1.3 stable potassium 4.8    Pre visit labs   CMP creatinine 1.5 from 1.3.  LFTs normal.  Potassium normal.  CBC n thrombocytopenia new  Lipid good   A1c 6.1 from 6.3  Microalbumin present 274  Testosterone high 1102    Would drop it down to 100    PSA due 03/2026      Today's visit:    History of Present Illness    SOCIAL HISTORY:  - Root beer: Drinks 4-5 per day    HPI:  Butch reports variable blood pressure recently. He checks his blood pressure at home, sometimes twice daily, noting it has been generally normal, below 150, but occasionally spikes to 180. He recalls one morning when it was 180 systolic. Butch mentions tinnitus, which he attributes to pain medication taken earlier in the day, making him feel more relaxed than usual.    Butch reports a fall 1-2 weeks ago from the second step, injuring his back and neck. He describes significant discomfort after the incident and ongoing neck pain.    Butch quit smoking 1-2 weeks ago and is now using nicotine patches.     He has chronic kidney disease, though he seems surprised to learn that he still has this condition.    Regarding testosterone therapy, patient self-administers injections on the 1st and 15th of each month, regardless of the day of the week. He reports having taken his most recent injection yesterday, which was earlier than his usual schedule due to anticipated busyness around the 15th.    MEDICATIONS:  - Clonidine patch for blood pressure  - Amlodipine, daily, for  blood pressure  - Carvedilol, twice daily, for blood pressure  - Lisinopril, daily, for blood pressure  - Hydralazine, 2-3 times daily as needed, for blood pressure  - Atorvastatin (Lipitor) for cholesterol  - Metformin, twice daily, for blood sugar  - Gabapentin  - Tizanidine (Zanaflex) as muscle relaxer  - Percocet for pain  - Fluoxetine (Prozac)  - Alprazolam (Xanax)  - Buspirone (Buspar)  - Testosterone injection, every 2 weeks (1st and 15th of the month) 200 mg  - Nicotine patches for smoking cessation    ROS:  ENT: reports tinnitus  Musculoskeletal: reports back pain, reports neck pain         Patient Care Team:  Gabriel Evans MD as PCP - General (Internal Medicine)  Vlad Nava MD (Pain Medicine)  Kaila Carrillo OD as Consulting Physician (Optometry)    Patient Active Problem List    Diagnosis Date Noted    Chronic obstructive pulmonary disease with acute exacerbation 03/08/2025    Aortic atherosclerosis 02/16/2023    Type 2 diabetes mellitus with diabetic cataract, without long-term current use of insulin 10/14/2022    Unspecified inflammatory spondylopathy, cervical region 10/14/2022    History of atrial fibrillation 7/2022 hospitalization i n the setting of SHAUNA and hyperK. started on carvedilol during admission 07/19/2022    Hyperkalemia 07/18/2022    Acute renal failure superimposed on stage 3a chronic kidney disease 07/18/2022    Junctional bradycardia 07/18/2022    Chronic prescription benzodiazepine use 07/18/2022    Foraminal stenosis of lumbar region 05/06/2022 5/5/2022 MRI L spine:   *   Multilevel lumbar spondylosis with up to severe right neural foraminal narrowing at L5-S1 with impingement of exiting right L5 nerve root.   *   Moderate thecal sac narrowing at L3-L4.   *   Chronic spondylolysis of L5 with grade 1 anterolisthesis of L5 on S1        Chronic low back pain 12/27/2021    Sacroiliac joint pain 12/27/2021    Sacroiliitis 12/27/2021    Impaired mobility 10/10/2021    Arthrodesis  status 10/10/2021     Formatting of this note might be different from the original.  C2-T2, 10/03/21      Impaired mobility and ADLs 10/07/2021    Status post surgery 10/03/2021    History of smoking 10-25 pack years 10/02/2021    Paresthesia of left upper and lower extremity 10/01/2021    Low testosterone in male 09/02/2020 6/2020 Repeat labs prolactin was normal.  Testosterone was low.  Free testosterone was low and sex hormone binding globulin was normal      Elevated prolactin level,low testosterone, gynecomastia; MRI pituitary normal 5/2020 01/13/2020    Nuclear sclerosis of both eyes 11/04/2019    Open angle with borderline findings and high glaucoma risk in both eyes 10/08/2019    Cataract, bilateral 09/11/2018    Cervical stenosis of spinal canal 10/3/2021 LAMINECTOMY, SPINE, CERVICAL, WITH POSTERIOR FUSION C2-T2 05/07/2018 02/14/2018 MRI C-spine impression:  Slight retrolisthesis of C4 with respect to C5.  Narrowing of the central spinal canal most prominent at the C4-C5, C5-C6, and C6-C7 levels and to a lesser extent at C2-C3 and C3-C4.  Annular disc bulges posteriorly at C2-C3, C3-C4.  Diffuse disc herniation/protrusion posteriorly at C4-C5 level and a disc herniation/extrusion posteriorly at the C5-C6 level with a central disc herniation/protrusion posteriorly at the C6-C7 level.  Narrowing of the neural foramen bilaterally from C3-C4 through C6-C7.  10/1/2021 admitted for L sided numbness after epidural injection, initially CT interpreted at [facility]; however subsequent MRI no hemorrhage but severe spinal canal stenosis    10/1/2021 MRI brain: Negative MRI of the brain for hemorrhage, mass or infarction. Specifically, no evidence of subarachnoid blood or blood products in the extra-axial spaces on SWI or diffusion-weighted images.  In light of the previous CTs and history of epidural injections at outside facility, this raises the question of in ejected contrast in the central spinal canal with  migration into the intracranial subarachnoid spaces.  Subarachnoid hemorrhage or exudate are considered less likely.  10/1/2021 MRI C spine: Severe spinal canal stenosis with complete effacement of the CSF and spinal cord compression at the level of C5-C6 due to posterior disc osteophyte complex with superimposed right disc protrusion and congenitally narrow spinal canal.  High T2 signal within the cord secondary to either edema or myelomalacia.    10/3/2021 LAMINECTOMY, SPINE, CERVICAL, WITH POSTERIOR FUSION C2-T2      Tubular adenoma of colon 5/10/17; 08/04/2023 colonoscopy 10 mm cecal TA.  10 mm descending polypoid mucosa.  Repeat 3 years 05/10/2017     5/10/2017 colonoscopy tubular adenoma  08/04/2023 colonoscopy 10 mm cecal TA.  10 mm descending polypoid mucosa.  Repeat 3 years      Therapeutic opioid-induced constipation (OIC) 04/17/2017    Tobacco dependence, patch with irritation; hx of bupropion 01/12/2016    Type 2 diabetes mellitus without complication, without long-term current use of insulin 08/07/2015    Facet arthritis of cervical region 10/28/2013    Facet arthritis of lumbar region 10/28/2013    Benzodiazepine dependence, did not do well with attempt to wean down 2017 10/28/2013    Chronic, continuous use of opioids 10/28/2013    ED (erectile dysfunction) 07/26/2013    DDD (degenerative disc disease), cervical 05/23/2013    DDD (degenerative disc disease), lumbar 05/23/2013    Essential hypertension 11/01/2012     2/3/2022 TTE LV normal size with moderate concentric hypertrophy and normal systolic function LVEF 60%.  Normal RV size and systolic function.  PA pressure 33  7/18/22 TTE LV normal size with mod concentric hypertrophy; normal systolic function. LVEF 70%. Normal RV size and systolic function. PA pressure 58. Mod pHTN      Anemia 11/01/2012    Anxiety, unable to wean off BZD 11/01/2012    Recurrent major depressive disorder, in partial remission 11/01/2012       PAST MEDICAL PROBLEMS, PAST  "SURGICAL HISTORY: please see relevant portions of the electronic medical record    ALLERGIES AND MEDICATIONS: updated and reviewed.  Medication List with Changes/Refills   Current Medications    ALBUTEROL (PROVENTIL) 2.5 MG /3 ML (0.083 %) NEBULIZER SOLUTION    Take 3 mLs (2.5 mg total) by nebulization every 6 (six) hours as needed for Wheezing. Rescue    ALBUTEROL (PROVENTIL/VENTOLIN HFA) 90 MCG/ACTUATION INHALER    Inhale 2 puffs into the lungs every 4 (four) hours as needed for Wheezing. Rescue    ALPRAZOLAM (XANAX) 1 MG TABLET    TAKE ONE & ONE-HALF TABLET BY MOUTH ONCE A DAY    AMLODIPINE (NORVASC) 10 MG TABLET    TAKE ONE TABLET BY MOUTH EVERY EVENING    ATORVASTATIN (LIPITOR) 40 MG TABLET    TAKE ONE TABLET BY MOUTH EVERY EVENING    BUSPIRONE (BUSPAR) 30 MG TAB    TAKE ONE TABLET BY MOUTH TWICE DAILY    CARVEDILOL (COREG) 12.5 MG TABLET    Take 1 tablet (12.5 mg total) by mouth 2 (two) times daily.    CLONIDINE 0.1 MG/24 HR TD PTWK (CATAPRES) 0.1 MG/24 HR    Place 1 patch onto the skin every 7 days.    FLUOXETINE 40 MG CAPSULE    TAKE ONE CAPSULE BY MOUTH ONCE A DAY    GABAPENTIN (NEURONTIN) 600 MG TABLET    Take 600 mg by mouth 3 (three) times daily.    HYDRALAZINE (APRESOLINE) 100 MG TABLET    TAKE ONE tablet (100mg) BY MOUTH THREE TIMES DAILY    IBUPROFEN (ADVIL,MOTRIN) 600 MG TABLET    Take 600 mg by mouth.    LISINOPRIL (PRINIVIL,ZESTRIL) 40 MG TABLET    Take 1 tablet (40 mg total) by mouth once daily.    METFORMIN (GLUCOPHAGE) 500 MG TABLET    TAKE ONE TABLET BY MOUTH TWICE DAILY WITH MEALS    NALOXONE (NARCAN) 4 MG/ACTUATION SPRY    4mg by nasal route as needed for opioid overdose; may repeat every 2-3 minutes in alternating nostrils until medical help arrives. Call 911    NEBULIZER AND COMPRESSOR NADINE    USE AS DIRECTED    NEEDLE, DISP, 22 G 22 GAUGE X 1" NDLE    Testosterone injection every 2 weeks    NICOTINE (NICODERM CQ) 21 MG/24 HR    PLACE ONE PATCH ON TO SKIN ONCE A DAY    " "OXYCODONE-ACETAMINOPHEN (PERCOCET)  MG PER TABLET    Take 1 tablet by mouth every 4 (four) hours as needed.    POLYETHYLENE GLYCOL 3350 (MIRALAX ORAL)    Take 1 application  by mouth.    SENNA (SENOKOT) 8.6 MG TABLET    Take 1 tablet by mouth 2 (two) times a day.    SYRINGE, DISPOSABLE, (BD LUER-STARLA SYRINGE) 1 ML SYRG    Testosterone injection every 2 weeks    TESTOSTERONE CYPIONATE (DEPOTESTOTERONE CYPIONATE) 200 MG/ML INJECTION    INJECT ONE MILLILITER INTRAMUSCULARLY EVERY 14 DAYS    TIOTROPIUM BROMIDE (SPIRIVA RESPIMAT) 2.5 MCG/ACTUATION INHALER    Inhale 2 puffs into the lungs Daily. Controller    TIZANIDINE (ZANAFLEX) 4 MG TABLET    Take 4-8 mg by mouth nightly as needed.         Objective:   Objective   Physical Exam   Vitals:    06/13/25 1358   BP: 92/60   Pulse: (!) 56   Temp: 98.7 °F (37.1 °C)   TempSrc: Oral   SpO2: 95%   Weight: 100.5 kg (221 lb 9 oz)   Height: 5' 11" (1.803 m)    Body mass index is 30.9 kg/m².  Weight: 100.5 kg (221 lb 9 oz)   Height: 5' 11" (180.3 cm)     Physical Exam  Constitutional:       General: He is not in acute distress.     Appearance: He is well-developed.   Eyes:      Extraocular Movements: Extraocular movements intact.   Cardiovascular:      Rate and Rhythm: Normal rate and regular rhythm.      Heart sounds: Normal heart sounds. No murmur heard.  Pulmonary:      Effort: Pulmonary effort is normal.      Breath sounds: Normal breath sounds.   Abdominal:      General: There is no distension.      Palpations: There is no mass.      Tenderness: There is no abdominal tenderness. There is no guarding.      Comments: No palpable organomegaly   Musculoskeletal:         General: Normal range of motion.      Right lower leg: Edema present.      Left lower leg: Edema present.      Comments: 1+ ankle edema bilaterally   Skin:     General: Skin is warm and dry.   Neurological:      Mental Status: He is alert and oriented to person, place, and time.      Coordination: Coordination " normal.   Psychiatric:         Behavior: Behavior normal.         Thought Content: Thought content normal.

## 2025-06-12 NOTE — ASSESSMENT & PLAN NOTE
Managed by outside pain clinic.  On chronic Percocet, tizanidine and gabapentin managed by outside pain clinic

## 2025-06-12 NOTE — ASSESSMENT & PLAN NOTE
Pre visit labs A1c good.  On metformin.  On ACE inhibitor.  On statin.  Needs to update eye exam.  Urine with micro albuminuria on ACE inhibitor.  With a history of CKD and history of renal failure on temporary dialysis.  Discussed dietary changes.  Drinks a lot of soda.  Eats a lot of protein.  Drink more water   Stop soda  Continue to monitor  Orders:    Comprehensive Metabolic Panel; Future    Hemoglobin A1C; Future    Microalbumin/Creatinine Ratio, Urine; Future

## 2025-06-13 ENCOUNTER — OFFICE VISIT (OUTPATIENT)
Dept: FAMILY MEDICINE | Facility: CLINIC | Age: 70
End: 2025-06-13
Payer: MEDICARE

## 2025-06-13 VITALS
HEIGHT: 71 IN | TEMPERATURE: 99 F | SYSTOLIC BLOOD PRESSURE: 92 MMHG | BODY MASS INDEX: 31.02 KG/M2 | OXYGEN SATURATION: 95 % | HEART RATE: 56 BPM | WEIGHT: 221.56 LBS | DIASTOLIC BLOOD PRESSURE: 60 MMHG

## 2025-06-13 DIAGNOSIS — Z79.899 CHRONIC PRESCRIPTION BENZODIAZEPINE USE: ICD-10-CM

## 2025-06-13 DIAGNOSIS — D69.6 THROMBOCYTOPENIA: ICD-10-CM

## 2025-06-13 DIAGNOSIS — D12.6 TUBULAR ADENOMA OF COLON: Chronic | ICD-10-CM

## 2025-06-13 DIAGNOSIS — F17.200 TOBACCO DEPENDENCE: Chronic | ICD-10-CM

## 2025-06-13 DIAGNOSIS — F13.20 BENZODIAZEPINE DEPENDENCE: Chronic | ICD-10-CM

## 2025-06-13 DIAGNOSIS — F11.90 CHRONIC, CONTINUOUS USE OF OPIOIDS: ICD-10-CM

## 2025-06-13 DIAGNOSIS — Z86.79 HISTORY OF ATRIAL FIBRILLATION: ICD-10-CM

## 2025-06-13 DIAGNOSIS — G89.29 CHRONIC LOW BACK PAIN, UNSPECIFIED BACK PAIN LATERALITY, UNSPECIFIED WHETHER SCIATICA PRESENT: ICD-10-CM

## 2025-06-13 DIAGNOSIS — T40.2X5A THERAPEUTIC OPIOID-INDUCED CONSTIPATION (OIC): ICD-10-CM

## 2025-06-13 DIAGNOSIS — E11.36 TYPE 2 DIABETES MELLITUS WITH DIABETIC CATARACT, WITHOUT LONG-TERM CURRENT USE OF INSULIN: ICD-10-CM

## 2025-06-13 DIAGNOSIS — R79.89 LOW TESTOSTERONE IN MALE: Primary | ICD-10-CM

## 2025-06-13 DIAGNOSIS — K59.03 THERAPEUTIC OPIOID-INDUCED CONSTIPATION (OIC): ICD-10-CM

## 2025-06-13 DIAGNOSIS — M54.50 CHRONIC LOW BACK PAIN, UNSPECIFIED BACK PAIN LATERALITY, UNSPECIFIED WHETHER SCIATICA PRESENT: ICD-10-CM

## 2025-06-13 DIAGNOSIS — E11.9 TYPE 2 DIABETES MELLITUS WITHOUT COMPLICATION, WITHOUT LONG-TERM CURRENT USE OF INSULIN: ICD-10-CM

## 2025-06-13 DIAGNOSIS — F41.9 ANXIETY: ICD-10-CM

## 2025-06-13 DIAGNOSIS — J44.1 CHRONIC OBSTRUCTIVE PULMONARY DISEASE WITH ACUTE EXACERBATION: ICD-10-CM

## 2025-06-13 DIAGNOSIS — I10 ESSENTIAL HYPERTENSION: ICD-10-CM

## 2025-06-13 PROCEDURE — 99999 PR PBB SHADOW E&M-EST. PATIENT-LVL V: CPT | Mod: PBBFAC,,, | Performed by: INTERNAL MEDICINE

## 2025-06-19 ENCOUNTER — LAB VISIT (OUTPATIENT)
Dept: LAB | Facility: HOSPITAL | Age: 70
End: 2025-06-19
Attending: INTERNAL MEDICINE
Payer: MEDICARE

## 2025-06-19 ENCOUNTER — CLINICAL SUPPORT (OUTPATIENT)
Dept: SMOKING CESSATION | Facility: CLINIC | Age: 70
End: 2025-06-19
Payer: COMMERCIAL

## 2025-06-19 ENCOUNTER — RESULTS FOLLOW-UP (OUTPATIENT)
Dept: FAMILY MEDICINE | Facility: CLINIC | Age: 70
End: 2025-06-19

## 2025-06-19 DIAGNOSIS — D69.6 THROMBOCYTOPENIA: ICD-10-CM

## 2025-06-19 DIAGNOSIS — D69.6 THROMBOCYTOPENIA: Primary | ICD-10-CM

## 2025-06-19 DIAGNOSIS — F17.200 NICOTINE DEPENDENCE: Primary | ICD-10-CM

## 2025-06-19 DIAGNOSIS — R79.89 LOW TESTOSTERONE IN MALE: ICD-10-CM

## 2025-06-19 LAB
ERYTHROCYTE [DISTWIDTH] IN BLOOD BY AUTOMATED COUNT: 12.7 % (ref 11.5–14.5)
HCT VFR BLD AUTO: 43.8 % (ref 40–54)
HGB BLD-MCNC: 14.4 GM/DL (ref 14–18)
MCH RBC QN AUTO: 33.6 PG (ref 27–31)
MCHC RBC AUTO-ENTMCNC: 32.9 G/DL (ref 32–36)
MCV RBC AUTO: 102 FL (ref 82–98)
PLATELET # BLD AUTO: 66 K/UL (ref 150–450)
PMV BLD AUTO: 12.7 FL (ref 9.2–12.9)
RBC # BLD AUTO: 4.29 M/UL (ref 4.6–6.2)
TESTOST SERPL-MCNC: 604 NG/DL (ref 304–1227)
WBC # BLD AUTO: 8.83 K/UL (ref 3.9–12.7)

## 2025-06-19 PROCEDURE — 99404 PREV MED CNSL INDIV APPRX 60: CPT | Mod: S$GLB,,,

## 2025-06-19 PROCEDURE — 36415 COLL VENOUS BLD VENIPUNCTURE: CPT | Mod: PO

## 2025-06-19 PROCEDURE — 84403 ASSAY OF TOTAL TESTOSTERONE: CPT

## 2025-06-19 PROCEDURE — 85027 COMPLETE CBC AUTOMATED: CPT

## 2025-06-19 NOTE — PROGRESS NOTES
Individual Follow-Up Form    6/19/2025    Quit Date: 5/5 with multiple slips     Clinical Status of Patient: Outpatient    Length of Service: 60 minutes    Continuing Medication: yes  Patches    Other Medications: n/a     Target Symptoms: Withdrawal and medication side effects. The following were  rated moderate (3) to severe (4) on TCRS:  Moderate (3): 0  Severe (4): 0    Comments: patient presents for follow up in clinic, he is still quit, he quit on 5/5 but did have multiple slips, he states his wife is still smoking and he has a lot of stress which causes him to slip up, encouraged him to try to avoid these slips at all cost, informed him that if he continues to have these slips he will fall back into smoking, he needs to work on strategies and willpower to overcome the stress and not smoke, session handout discussed will follow     Diagnosis: F17.200    Next Visit: 2 weeks

## 2025-06-26 ENCOUNTER — TELEPHONE (OUTPATIENT)
Dept: FAMILY MEDICINE | Facility: CLINIC | Age: 70
End: 2025-06-26
Payer: MEDICARE

## 2025-06-26 NOTE — TELEPHONE ENCOUNTER
----- Message from Gabriel Evans MD sent at 6/24/2025  3:48 PM CDT -----      ----- Message -----  From: Lab, Background User  Sent: 6/19/2025   4:04 PM CDT  To: Gabriel Evans MD

## 2025-06-30 DIAGNOSIS — F41.9 ANXIETY: ICD-10-CM

## 2025-06-30 DIAGNOSIS — F13.20 BENZODIAZEPINE DEPENDENCE: ICD-10-CM

## 2025-06-30 RX ORDER — ALPRAZOLAM 1 MG/1
TABLET ORAL
Qty: 45 TABLET | Refills: 2 | Status: SHIPPED | OUTPATIENT
Start: 2025-07-02

## 2025-06-30 NOTE — TELEPHONE ENCOUNTER
No care due was identified.  [facility] Embedded Care Due Messages. Reference number: [id number].   6/30/2025 11:54:43 AM CDT

## 2025-07-02 ENCOUNTER — TELEPHONE (OUTPATIENT)
Dept: FAMILY MEDICINE | Facility: CLINIC | Age: 70
End: 2025-07-02
Payer: MEDICARE

## 2025-07-02 DIAGNOSIS — D69.6 THROMBOCYTOPENIA: Primary | ICD-10-CM

## 2025-07-02 NOTE — TELEPHONE ENCOUNTER
Spoke with Silvia with lab client services she states blood was spun and they were unable to do platelet count. Needs new orders and [t needs to have labs redrawn.

## 2025-07-02 NOTE — TELEPHONE ENCOUNTER
----- Message from Gage Kumar sent at 7/1/2025  4:16 AM CDT -----  Regarding: Client Services  Contact: [phone]  Good Morning,     My name is Kumar Nolasco, I work in the Lab Client Services. We had a problem with some lab work on this patient. If someone from your office could call us at [phone] or [phone] that would be great. Anyone in my department can help.      Thank you,

## 2025-07-03 ENCOUNTER — LAB VISIT (OUTPATIENT)
Dept: LAB | Facility: HOSPITAL | Age: 70
End: 2025-07-03
Attending: INTERNAL MEDICINE
Payer: MEDICARE

## 2025-07-03 ENCOUNTER — TELEPHONE (OUTPATIENT)
Dept: FAMILY MEDICINE | Facility: CLINIC | Age: 70
End: 2025-07-03
Payer: MEDICARE

## 2025-07-03 DIAGNOSIS — D69.6 THROMBOCYTOPENIA: ICD-10-CM

## 2025-07-03 PROCEDURE — 85049 AUTOMATED PLATELET COUNT: CPT

## 2025-07-03 PROCEDURE — 36415 COLL VENOUS BLD VENIPUNCTURE: CPT | Mod: PO

## 2025-07-03 NOTE — TELEPHONE ENCOUNTER
Patient was contacted by nurse joya and informed labs needed to be redrawn and appointment was scheduled

## 2025-07-03 NOTE — TELEPHONE ENCOUNTER
Pt phone call was returned. Lvm. Provider will like pt to return to the lab for blood work. Once the blood work returns they would speak about treatment if any treatment is needed.

## 2025-07-03 NOTE — TELEPHONE ENCOUNTER
Copied from CRM #[id number]. Topic: General Inquiry - Return Call  >> Jul 3, 2025  7:38 AM Med Assistant Georgie wrote:  Type:  Patient Returning Call    Who Called:  Patient    Who Left Message for Patient:   Pushpa Cormier LPN      Does the patient know what this is regarding?:  yes    Would the patient rather a call back or a response via [facility]?   call    Best Call Back Number:  [phone]

## 2025-07-03 NOTE — TELEPHONE ENCOUNTER
Copied from CRM #[id number]. Topic: General Inquiry - Patient Advice  >> Jul 3, 2025  1:11 PM Karin wrote:  Who called:self      What is the request in detail:pt would like for you to give him a call in regards of his blood results     Can the clinic reply by [facility]?     Would the patient rather a call back or a response via [facility]?callback      Best call back number:.[phone]       Additional Information:

## 2025-07-04 LAB
PLATELET # BLD AUTO: 235 K/UL (ref 150–450)
PMV BLD AUTO: 10.3 FL (ref 9.2–12.9)

## 2025-07-06 ENCOUNTER — RESULTS FOLLOW-UP (OUTPATIENT)
Dept: FAMILY MEDICINE | Facility: CLINIC | Age: 70
End: 2025-07-06

## 2025-07-07 ENCOUNTER — TELEPHONE (OUTPATIENT)
Dept: FAMILY MEDICINE | Facility: CLINIC | Age: 70
End: 2025-07-07
Payer: MEDICARE

## 2025-07-07 ENCOUNTER — CLINICAL SUPPORT (OUTPATIENT)
Dept: SMOKING CESSATION | Facility: CLINIC | Age: 70
End: 2025-07-07
Payer: COMMERCIAL

## 2025-07-07 DIAGNOSIS — F41.9 ANXIETY: ICD-10-CM

## 2025-07-07 DIAGNOSIS — F17.200 NICOTINE DEPENDENCE: Primary | ICD-10-CM

## 2025-07-07 PROCEDURE — 99404 PREV MED CNSL INDIV APPRX 60: CPT | Mod: S$GLB,,,

## 2025-07-07 RX ORDER — IBUPROFEN 200 MG
1 TABLET ORAL DAILY
Qty: 14 PATCH | Refills: 0 | Status: SHIPPED | OUTPATIENT
Start: 2025-07-07 | End: 2025-07-21

## 2025-07-07 RX ORDER — BUSPIRONE HYDROCHLORIDE 30 MG/1
30 TABLET ORAL 2 TIMES DAILY
Qty: 180 TABLET | Refills: 3 | Status: SHIPPED | OUTPATIENT
Start: 2025-07-07

## 2025-07-07 NOTE — TELEPHONE ENCOUNTER
----- Message from Gabriel Evans MD sent at 7/6/2025  6:17 PM CDT -----      ----- Message -----  From: Lab, Background User  Sent: 7/4/2025  12:01 AM CDT  To: Gabriel Evans MD

## 2025-07-07 NOTE — TELEPHONE ENCOUNTER
No care due was identified.  [facility] Embedded Care Due Messages. Reference number: [id number].   7/07/2025 11:56:45 AM CDT

## 2025-07-07 NOTE — PROGRESS NOTES
Individual Follow-Up Form    7/7/2025    Quit Date: 5/15 but multiple slips    Clinical Status of Patient: Outpatient    Length of Service: 60 minutes    Continuing Medication: yes  Patches    Other Medications: n/a     Target Symptoms: Withdrawal and medication side effects. The following were  rated moderate (3) to severe (4) on TCRS:  Moderate (3): 0  Severe (4): 0    Comments: patient presents for follow up in clinic, he was late and ctts checked him in, called him and he was in the parking lot, encouraged patient to come up to office for his appt, he is not doing well with the smoking, he quit smoking on 5/15 but had multiple slips with a lapse due to stress, he states that he will be moving out of his home that him and wife had for over 40 yrs. This caused him to slip up and lapase but he is getting back on track, he knows he has to get on track with the smoking for his health. Encouragement provided, strategies and session handout discussed will follow     Diagnosis: F17.210    Next Visit: 2 weeks

## 2025-07-21 ENCOUNTER — CLINICAL SUPPORT (OUTPATIENT)
Dept: SMOKING CESSATION | Facility: CLINIC | Age: 70
End: 2025-07-21
Payer: COMMERCIAL

## 2025-07-21 DIAGNOSIS — F17.200 NICOTINE DEPENDENCE: Primary | ICD-10-CM

## 2025-07-21 PROCEDURE — 99404 PREV MED CNSL INDIV APPRX 60: CPT | Mod: S$GLB,,,

## 2025-07-21 RX ORDER — IBUPROFEN 200 MG
1 TABLET ORAL DAILY
Qty: 14 PATCH | Refills: 0 | Status: SHIPPED | OUTPATIENT
Start: 2025-07-21 | End: 2025-08-04

## 2025-07-21 NOTE — PROGRESS NOTES
Individual Follow-Up Form    7/21/2025    Quit Date: n/a    Clinical Status of Patient: Outpatient    Length of Service: 60 minutes    Continuing Medication: yes  Patches    Other Medications: n/a     Target Symptoms: Withdrawal and medication side effects. The following were  rated moderate (3) to severe (4) on TCRS:  Moderate (3): 0  Severe (4): 0    Comments: patient presents for follow up today telephonically, he is pretty compliant with his appts therefore ctts reached out to him to check in on him and see how he was doing and if all was ok, he is wheezing and has picked up smoking again, he has no patch on today, his willpower is dropped a little right now, encouraged him to get back on track asap, he is currently on the 21mg nicotine patch daily, recommend he continue this and put a patch on now, he did not have one on. Encouragement provided, discussed strategies to help him get on track, session handout discussed will follow     Diagnosis: F17.210    Next Visit: 2 weeks

## 2025-08-04 ENCOUNTER — CLINICAL SUPPORT (OUTPATIENT)
Dept: SMOKING CESSATION | Facility: CLINIC | Age: 70
End: 2025-08-04
Payer: COMMERCIAL

## 2025-08-04 DIAGNOSIS — F17.200 NICOTINE DEPENDENCE: Primary | ICD-10-CM

## 2025-08-04 PROCEDURE — 99404 PREV MED CNSL INDIV APPRX 60: CPT | Mod: S$GLB,,,

## 2025-08-04 RX ORDER — IBUPROFEN 200 MG
1 TABLET ORAL DAILY
Qty: 14 PATCH | Refills: 0 | Status: SHIPPED | OUTPATIENT
Start: 2025-08-04 | End: 2025-08-18

## 2025-08-04 NOTE — PROGRESS NOTES
Individual Follow-Up Form    8/4/2025    Quit Date: n/a    Clinical Status of Patient: Outpatient    Length of Service: 60 minutes    Continuing Medication: yes  Patches    Other Medications: n/a     Target Symptoms: Withdrawal and medication side effects. The following were  rated moderate (3) to severe (4) on TCRS:  Moderate (3): 0  Severe (4): 0    Comments:  patient presents for follow up smoking a few cigarettes every so often but wearing the 21mg nicotine patch, he is having some stress with his marriage right now and possibly has to move out which is taking a toll on him, he sounds good today and his wheezing is not apparent to me which is usually a sign that he is doing good with the smoking, he is complaint with his nicotine patch, recommend he continue with this dose and work on strategies to help cut back, session handout discussed will follow     Diagnosis: F17.200    Next Visit: 2 weeks

## 2025-08-07 NOTE — ASSESSMENT & PLAN NOTE
Managed by outside pain clinic.  On chronic Percocet, tizanidine and gabapentin managed by outside pain clinic  08/08/2025: Stable no changes

## 2025-08-07 NOTE — PROGRESS NOTES
This note was created by combination of typed  and M-Modal dictation.  Transcription errors may be present.   This note was also generated with the assistance of ambient listening technology. Verbal consent was obtained by the patient and accompanying visitor(s) for the recording of patient appointment to facilitate this note. I attest to having reviewed and edited the generated note for accuracy, though some syntax or spelling errors may persist. Please contact the author of this note for any clarification.    Assessment and Plan:   Assessment and Plan   Assessment & Plan  Tinea cruris  Not consistent with STI.  Diflucan x2.  Recommended Zeasorb antifungal drying powder to prevent recurrence.  Orders:    fluconazole (DIFLUCAN) 150 MG Tab; Take 1 tablet (150 mg total) by mouth Every 3 (three) days. for 3 days    Male erectile disorder  New partner, , notes issues with ED and inability to achieve orgasm.  History of Cialis and had no issues with the previously in his requesting to resume.  Restart Cialis, side effect profile discussed.  Orders:    tadalafiL (CIALIS) 20 MG Tab; Take 1 tablet (20 mg total) by mouth once daily.    Low testosterone in male  Most recent testosterone level stable on current regimen 200 every 2 weeks.        Essential hypertension  Stage 3a chronic kidney disease  06/13/2025:  Blood pressure today is actually a bit low which he is surprised to see.  He attributes this to recent pain pill intake and feels more relaxed at this time and in less pain than he normally does.  Reports he checks his blood pressures at home and they can be high.  He is prescribed hydralazine t.i.d. but sometimes will take it b.i.d. if his blood pressure is good.  He notes no subsequent rebound hypertension  Followed by Cardiology.  Stay on clonidine patch, amlodipine, carvedilol, hydralazine, lisinopril  08/08/2025: Blood pressure today stable.  Thinks he is taking hydralazine t.i.d..  No  changes.        Type 2 diabetes mellitus with diabetic cataract, without long-term current use of insulin  Type 2 diabetes mellitus without complication, without long-term current use of insulin  last check was good on metformin.  Urine micro albuminuria present, on ACE inhibitor          Anemia, unspecified type  Iron profile 07/2022 normal ferritin/elevated, iron profile normal.  B12 was normal.  MMA was normal.  Folate was normal  Most recent labs 06/19/2025 normal hemoglobin.  2023 colonoscopy tubular adenoma repeat 3 years due 2026        Benzodiazepine dependence, did not do well with attempt to wean down 2017  Anxiety, unable to wean off BZD  Stable on fluoxetine, BuSpar, alprazolam longstanding.  Had previously consulted with Psychiatry who had recommended inpatient detox for his benzo and he refused.  08/08/2025: Stable        Chronic, continuous use of opioids  Managed by outside pain clinic.  On chronic Percocet, tizanidine and gabapentin managed by outside pain clinic  08/08/2025: Stable no changes          There are no discontinued medications.    meds sent this encounter:  Medications Ordered This Encounter   Medications    fluconazole (DIFLUCAN) 150 MG Tab     Sig: Take 1 tablet (150 mg total) by mouth Every 3 (three) days. for 3 days     Dispense:  2 tablet     Refill:  0    tadalafiL (CIALIS) 20 MG Tab     Sig: Take 1 tablet (20 mg total) by mouth once daily.     Dispense:  30 tablet     Refill:  0     Medications Ordered This Encounter   Medications    fluconazole (DIFLUCAN) 150 MG Tab     Sig: Take 1 tablet (150 mg total) by mouth Every 3 (three) days. for 3 days     Dispense:  2 tablet     Refill:  0    tadalafiL (CIALIS) 20 MG Tab     Sig: Take 1 tablet (20 mg total) by mouth once daily.     Dispense:  30 tablet     Refill:  0        Follow Up: OV with me 9/2025  Future Appointments   Date Time Provider Department Center   8/18/2025 11:00 AM Turlich, Pauline N, RN [facility]   8/28/2025   1:40 PM Adriana Pete MD [facility] CARDIO [facility]   9/8/2025 10:15 AM [facility], [facility]   9/10/2025 11:00 AM Gabriel Evans MD [facility] FAM MED [facility]          Subjective:   Subjective   Chief Complaint   Patient presents with    Rash     On the groin region for months.       DIPESH Rae is a 69 y.o. male.     Social History     Socioeconomic History    Marital status:     Number of children: 3   Occupational History    Occupation: retired - tug boat captain   Tobacco Use    Smoking status: Every Day     Current packs/day: 0.50     Average packs/day: 1 pack/day for 53.4 years (52.3 ttl pk-yrs)     Types: Cigarettes, Vaping with nicotine     Start date: 1972     Last attempt to quit: 6/20/2023    Smokeless tobacco: Never    Tobacco comments:     Approximately 3 cigarettes a day     Stopped smoking 05/03/25   Substance and Sexual Activity    Alcohol use: No     Alcohol/week: 0.0 standard drinks of alcohol    Drug use: Yes     Types: Marijuana, Oxycodone, Benzodiazepines     Comment: 4 oxycodones daily; one marijuana cigarette daily    Sexual activity: Yes     Partners: Female     Comment:  with children, disabled      Social Drivers of Health     Financial Resource Strain: Patient Declined (3/8/2025)    Overall Financial Resource Strain (CARDIA)     Difficulty of Paying Living Expenses: Patient declined   Food Insecurity: Patient Declined (3/8/2025)    Hunger Vital Sign     Worried About Running Out of Food in the Last Year: Patient declined     Ran Out of Food in the Last Year: Patient declined   Transportation Needs: No Transportation Needs (5/10/2023)    PRAPARE - Transportation     Lack of Transportation (Medical): No     Lack of Transportation (Non-Medical): No   Physical Activity: Sufficiently Active (5/10/2023)    Exercise Vital Sign     Days of Exercise per Week: 4 days     Minutes of Exercise per Session: 50 min   Stress: Patient Declined (3/8/2025)    [employer] - Occupational Stress Questionnaire     Feeling of Stress : Patient declined   Housing Stability: Patient Declined (3/8/2025)    Housing Stability Vital Sign     Unable to Pay for Housing in the Last Year: Patient declined     Homeless in the Last Year: Patient declined       Last appointment with this clinic was 6/13/2025. Last visit with me 6/13/2025   To summarize last visit and events leading up to today:  HTN  07/27/2023 TTE normal systolic function.  LVEF 65%.  Grade 1 diastolic dysfunction.  Higher dose carvedilol side effect of bradycardia.  10/21/24 cardiology visit, increased hydralazine, 100 TID  11/8/24 cards follow up, increased cored to 12.5 and lisinopril to 40  12/5/24 cards follow up, added spironolactone 25  1/6/25 cards follow up, stable  03/12/2025 SHAUNA stop spironolactone, start clonidine patch  lipid   3/16/23 nu stress test neg for ischemia; no arrhythmias  Hx of AFib during hospitalization 7/2022 in the setting of hyperK. On beta blocker.  COPD Working with smoking cessation clinic  Hospitalization 3/7 through 03/09/2025 for COPD exacerbation and hyperkalemia.  Left AMA.  Subsequently presented back to the ED 03/09/2025 dyspnea discharged on prednisone, Levaquin  05/06/2025 pulmonology consult.  Spiriva.  Ordered sleep study  DM2   08/04/2023 colonoscopy 10 mm cecal TA.  10 mm descending polypoid mucosa.  Repeat 3 years  Hx of SHAUNA due to dehydration, severe enough to require HD for hyperK, 7/2022.  Low testosterone on testosterone.  Thrombocytopenia hx of clumping on microscopy  Anxiety, BZD dependence. did not tolerate trial of Cymbalta.  Took it for maybe a week.   Has been to see Psychiatry, Psychiatry recommended inpatient detox and the patient was not interested.  Currently have him taking 1.5 tablets in total during the day.  on max dose celexa  Was scheduled to see Psychiatry but on pre visit review was deemed inappropriate for general Psychiatry and was  recommended for intensive outpatient therapy program  6/25/2024 visit with me,  Trial of fluoxetine stop the Celexa.  Stay on BuSpar.  If stable may consider trying to wean down benzodiazepine  Chronic back pain followed by pain mgmt. Narcotic and bill  Saw ENT 7/28.  Sensorineural hearing loss.  Qualifies for hearing aids.       PSA due 03/2026    Last visit me 06/13/2025  Low testosterone injects the 1st and the 15th of the month.  Pre visit labs drawn soon after injection.  Plan to repeat mid dose   Thrombocytopenia variable platelet count ranging from normal to thrombocytopenia.  Monitor.  No cross-sectional imaging of liver or spleen.    Diabetes pre visit labs A1c is good on metformin.  ACE inhibitor and statin.  Urine microalbuminuria.  On ACE inhibitor.  Hypertension BP was actually a little low.  He attributed to recent pain pill intake  Anxiety benzo stable on fluoxetine BuSpar and alprazolam.  Had previously consult with Psychiatry who had recommended inpatient detox for benzodiazepines and he had declined.  History of AFib sinus rhythm not on anticoagulant  Chronic narcotic dependency outside pain clinic   Tubular adenoma repeat colonoscopy 2026    Testosterone better.  Mid point between injections.  Previous was high but that was soon after injection.  CBC thrombocytopenia.  Previously seen but previously has been noted to have clumping on smear.          Platelet count normal on repeat with blue top tube.  Previous thrombocytopenia from clotting during blood collection.  No further testing.          Attending smoking cessation  Appointment with me in September   Cardiology 8/28      Today's visit:    History of Present Illness    SOCIAL HISTORY:  - Occupation: Retired, former captain on a boat    Marital status: , living in an apartment    HPI:  Butch reports erectile dysfunction and inability to ejaculate during a recent sexual encounter, unable to maintain an erection long enough to  achieve orgasm. His partner noticed he was only producing a small amount of ejaculate. He has not been sexually active for the past 2 years due to his wife's hysterectomy and their subsequent separation. Butch also reports a rash on the inside of his thighs and underneath his scrotum for an extended period. He has been applying Dr. Perez's alcohol to the rash, which has started to improve. He denies any pain, itching, bleeding, or drainage associated with the rash. Butch mentions excessive sweating, which may be contributing to the rash. He was previously prescribed Viagra or Cialis for erectile dysfunction years ago but has not used it recently. Butch expresses interest in being evaluated for Alzheimer's and dementia, citing concerns about his memory, though he states he can recall important information like his social security number and 's license number.    Butch denies any lumps or bumps in his testicles.    MEDICATIONS:  - Clonidine patch, for blood pressure  - Amlodipine, for blood pressure  - Carvedilol, for blood pressure  - Lisinopril, for blood pressure  - Hydralazine 100 mg, twice daily, for blood pressure  - Atorvastatin (Lipitor), for cholesterol  - Percocet, for pain  - Gabapentin, for pain  - Tizanidine, for pain  - Fluoxetine (Prozac), for mood  - Buspar, for mood  - Xanax, for mood  - Testosterone, every 2 weeks    ROS:  Genitourinary: no abnormal bleeding  Male Genitourinary: reports erectile dysfunction, reports change in sexual performance, reports sexual difficulty, pain, or concern, reports delayed orgasm  Integumentary: reports rash  Neurological: reports memory loss  Endocrine: reports excessive sweating         Patient Care Team:  Gabriel Evans MD as PCP - General (Internal Medicine)  Vlad Nava MD (Pain Medicine)  Kaila Carrillo OD as Consulting Physician (Optometry)    Patient Active Problem List    Diagnosis Date Noted    Chronic obstructive pulmonary disease with  acute exacerbation 03/08/2025    Aortic atherosclerosis 02/16/2023    Type 2 diabetes mellitus with diabetic cataract, without long-term current use of insulin 10/14/2022    Unspecified inflammatory spondylopathy, cervical region 10/14/2022    History of atrial fibrillation 7/2022 hospitalization i n the setting of SHAUNA and hyperK. started on carvedilol during admission 07/19/2022    Hyperkalemia 07/18/2022    Stage 3a chronic kidney disease 07/18/2022    Junctional bradycardia 07/18/2022    Chronic prescription benzodiazepine use 07/18/2022    Foraminal stenosis of lumbar region 05/06/2022 5/5/2022 MRI L spine:   *   Multilevel lumbar spondylosis with up to severe right neural foraminal narrowing at L5-S1 with impingement of exiting right L5 nerve root.   *   Moderate thecal sac narrowing at L3-L4.   *   Chronic spondylolysis of L5 with grade 1 anterolisthesis of L5 on S1        Chronic low back pain 12/27/2021    Sacroiliac joint pain 12/27/2021    Sacroiliitis 12/27/2021    Impaired mobility 10/10/2021    Arthrodesis status 10/10/2021     Formatting of this note might be different from the original.  C2-T2, 10/03/21      Impaired mobility and ADLs 10/07/2021    Status post surgery 10/03/2021    History of smoking 10-25 pack years 10/02/2021    Paresthesia of left upper and lower extremity 10/01/2021    Low testosterone in male 09/02/2020 6/2020 Repeat labs prolactin was normal.  Testosterone was low.  Free testosterone was low and sex hormone binding globulin was normal      Elevated prolactin level,low testosterone, gynecomastia; MRI pituitary normal 5/2020 01/13/2020    Nuclear sclerosis of both eyes 11/04/2019    Open angle with borderline findings and high glaucoma risk in both eyes 10/08/2019    Cataract, bilateral 09/11/2018    Cervical stenosis of spinal canal 10/3/2021 LAMINECTOMY, SPINE, CERVICAL, WITH POSTERIOR FUSION C2-T2 05/07/2018 02/14/2018 MRI C-spine impression:  Slight retrolisthesis  of C4 with respect to C5.  Narrowing of the central spinal canal most prominent at the C4-C5, C5-C6, and C6-C7 levels and to a lesser extent at C2-C3 and C3-C4.  Annular disc bulges posteriorly at C2-C3, C3-C4.  Diffuse disc herniation/protrusion posteriorly at C4-C5 level and a disc herniation/extrusion posteriorly at the C5-C6 level with a central disc herniation/protrusion posteriorly at the C6-C7 level.  Narrowing of the neural foramen bilaterally from C3-C4 through C6-C7.  10/1/2021 admitted for L sided numbness after epidural injection, initially CT interpreted at [facility]; however subsequent MRI no hemorrhage but severe spinal canal stenosis    10/1/2021 MRI brain: Negative MRI of the brain for hemorrhage, mass or infarction. Specifically, no evidence of subarachnoid blood or blood products in the extra-axial spaces on SWI or diffusion-weighted images.  In light of the previous CTs and history of epidural injections at outside facility, this raises the question of in ejected contrast in the central spinal canal with migration into the intracranial subarachnoid spaces.  Subarachnoid hemorrhage or exudate are considered less likely.  10/1/2021 MRI C spine: Severe spinal canal stenosis with complete effacement of the CSF and spinal cord compression at the level of C5-C6 due to posterior disc osteophyte complex with superimposed right disc protrusion and congenitally narrow spinal canal.  High T2 signal within the cord secondary to either edema or myelomalacia.    10/3/2021 LAMINECTOMY, SPINE, CERVICAL, WITH POSTERIOR FUSION C2-T2      Tubular adenoma of colon 5/10/17; 08/04/2023 colonoscopy 10 mm cecal TA.  10 mm descending polypoid mucosa.  Repeat 3 years 05/10/2017     5/10/2017 colonoscopy tubular adenoma  08/04/2023 colonoscopy 10 mm cecal TA.  10 mm descending polypoid mucosa.  Repeat 3 years      Therapeutic opioid-induced constipation (OIC) 04/17/2017    Tobacco dependence, patch with irritation; hx of  bupropion 01/12/2016    Type 2 diabetes mellitus without complication, without long-term current use of insulin 08/07/2015    Facet arthritis of cervical region 10/28/2013    Facet arthritis of lumbar region 10/28/2013    Benzodiazepine dependence, did not do well with attempt to wean down 2017 10/28/2013    Chronic, continuous use of opioids 10/28/2013    ED (erectile dysfunction) 07/26/2013    DDD (degenerative disc disease), cervical 05/23/2013    DDD (degenerative disc disease), lumbar 05/23/2013    Essential hypertension 11/01/2012     2/3/2022 TTE LV normal size with moderate concentric hypertrophy and normal systolic function LVEF 60%.  Normal RV size and systolic function.  PA pressure 33  7/18/22 TTE LV normal size with mod concentric hypertrophy; normal systolic function. LVEF 70%. Normal RV size and systolic function. PA pressure 58. Mod pHTN      Anemia 11/01/2012    Anxiety, unable to wean off BZD 11/01/2012    Recurrent major depressive disorder, in partial remission 11/01/2012       PAST MEDICAL PROBLEMS, PAST SURGICAL HISTORY: please see relevant portions of the electronic medical record    ALLERGIES AND MEDICATIONS: updated and reviewed.  Medication List with Changes/Refills   New Medications    FLUCONAZOLE (DIFLUCAN) 150 MG TAB    Take 1 tablet (150 mg total) by mouth Every 3 (three) days. for 3 days    TADALAFIL (CIALIS) 20 MG TAB    Take 1 tablet (20 mg total) by mouth once daily.   Current Medications    ALBUTEROL (PROVENTIL) 2.5 MG /3 ML (0.083 %) NEBULIZER SOLUTION    Take 3 mLs (2.5 mg total) by nebulization every 6 (six) hours as needed for Wheezing. Rescue    ALBUTEROL (PROVENTIL/VENTOLIN HFA) 90 MCG/ACTUATION INHALER    Inhale 2 puffs into the lungs every 4 (four) hours as needed for Wheezing. Rescue    ALPRAZOLAM (XANAX) 1 MG TABLET    TAKE ONE & ONE-HALF TABLET BY MOUTH ONCE A DAY    AMLODIPINE (NORVASC) 10 MG TABLET    TAKE ONE TABLET BY MOUTH EVERY EVENING    ATORVASTATIN (LIPITOR) 40  "MG TABLET    TAKE ONE TABLET BY MOUTH EVERY EVENING    BUSPIRONE (BUSPAR) 30 MG TAB    TAKE ONE TABLET BY MOUTH TWICE DAILY    CARVEDILOL (COREG) 12.5 MG TABLET    Take 1 tablet (12.5 mg total) by mouth 2 (two) times daily.    CLONIDINE 0.1 MG/24 HR TD PTWK (CATAPRES) 0.1 MG/24 HR    Place 1 patch onto the skin every 7 days.    FLUOXETINE 40 MG CAPSULE    TAKE ONE CAPSULE BY MOUTH ONCE A DAY    GABAPENTIN (NEURONTIN) 600 MG TABLET    Take 600 mg by mouth 3 (three) times daily.    HYDRALAZINE (APRESOLINE) 100 MG TABLET    TAKE ONE tablet (100mg) BY MOUTH THREE TIMES DAILY    IBUPROFEN (ADVIL,MOTRIN) 600 MG TABLET    Take 600 mg by mouth.    LISINOPRIL (PRINIVIL,ZESTRIL) 40 MG TABLET    Take 1 tablet (40 mg total) by mouth once daily.    METFORMIN (GLUCOPHAGE) 500 MG TABLET    TAKE ONE TABLET BY MOUTH TWICE DAILY WITH MEALS    NALOXONE (NARCAN) 4 MG/ACTUATION SPRY    4mg by nasal route as needed for opioid overdose; may repeat every 2-3 minutes in alternating nostrils until medical help arrives. Call 911    NEBULIZER AND COMPRESSOR NADINE    USE AS DIRECTED    NEEDLE, DISP, 22 G 22 GAUGE X 1" NDLE    Testosterone injection every 2 weeks    NICOTINE (NICODERM CQ) 21 MG/24 HR    Place 1 patch onto the skin once daily. for 14 days    OXYCODONE-ACETAMINOPHEN (PERCOCET)  MG PER TABLET    Take 1 tablet by mouth every 4 (four) hours as needed.    POLYETHYLENE GLYCOL 3350 (MIRALAX ORAL)    Take 1 application  by mouth.    SENNA (SENOKOT) 8.6 MG TABLET    Take 1 tablet by mouth 2 (two) times a day.    SYRINGE, DISPOSABLE, (BD LUER-STARLA SYRINGE) 1 ML SYRG    Testosterone injection every 2 weeks    TESTOSTERONE CYPIONATE (DEPOTESTOTERONE CYPIONATE) 200 MG/ML INJECTION    INJECT ONE MILLILITER INTRAMUSCULARLY EVERY 14 DAYS    TIOTROPIUM BROMIDE (SPIRIVA RESPIMAT) 2.5 MCG/ACTUATION INHALER    Inhale 2 puffs into the lungs Daily. Controller    TIZANIDINE (ZANAFLEX) 4 MG TABLET    Take 4-8 mg by mouth nightly as needed.       " "  Objective:   Objective   Physical Exam   Vitals:    08/08/25 1305   BP: 132/68   Pulse: 77   Temp: 99 °F (37.2 °C)   TempSrc: Oral   SpO2: 95%   Weight: 97.7 kg (215 lb 6.2 oz)   Height: 5' 11" (1.803 m)    Body mass index is 30.04 kg/m².  Weight: 97.7 kg (215 lb 6.2 oz)   Height: 5' 11" (180.3 cm)     Physical Exam  Constitutional:       General: He is not in acute distress.     Appearance: He is well-developed.   HENT:      Head: Normocephalic and atraumatic.   Eyes:      General: No scleral icterus.  Pulmonary:      Effort: Pulmonary effort is normal.   Genitourinary:         Comments: The bilateral inguinal areas with mild erythema well defined no scale no induration, no ulceration no drainage. On the inferior scrotum as well.  Skin:     General: Skin is warm and dry.   Neurological:      Mental Status: He is alert and oriented to person, place, and time.   Psychiatric:         Behavior: Behavior normal.         Thought Content: Thought content normal.                "

## 2025-08-07 NOTE — ASSESSMENT & PLAN NOTE
Stable on fluoxetine, BuSpar, alprazolam longstanding.  Had previously consulted with Psychiatry who had recommended inpatient detox for his benzo and he refused.  08/08/2025: Stable

## 2025-08-07 NOTE — ASSESSMENT & PLAN NOTE
Iron profile 07/2022 normal ferritin/elevated, iron profile normal.  B12 was normal.  MMA was normal.  Folate was normal  Most recent labs 06/19/2025 normal hemoglobin.  2023 colonoscopy tubular adenoma repeat 3 years due 2026

## 2025-08-08 ENCOUNTER — OFFICE VISIT (OUTPATIENT)
Dept: FAMILY MEDICINE | Facility: CLINIC | Age: 70
End: 2025-08-08
Payer: MEDICARE

## 2025-08-08 VITALS
DIASTOLIC BLOOD PRESSURE: 68 MMHG | SYSTOLIC BLOOD PRESSURE: 132 MMHG | HEART RATE: 77 BPM | TEMPERATURE: 99 F | BODY MASS INDEX: 30.15 KG/M2 | WEIGHT: 215.38 LBS | OXYGEN SATURATION: 95 % | HEIGHT: 71 IN

## 2025-08-08 DIAGNOSIS — F11.90 CHRONIC, CONTINUOUS USE OF OPIOIDS: ICD-10-CM

## 2025-08-08 DIAGNOSIS — D64.9 ANEMIA, UNSPECIFIED TYPE: ICD-10-CM

## 2025-08-08 DIAGNOSIS — I10 ESSENTIAL HYPERTENSION: ICD-10-CM

## 2025-08-08 DIAGNOSIS — F13.20 BENZODIAZEPINE DEPENDENCE: Chronic | ICD-10-CM

## 2025-08-08 DIAGNOSIS — R79.89 LOW TESTOSTERONE IN MALE: ICD-10-CM

## 2025-08-08 DIAGNOSIS — B35.6 TINEA CRURIS: Primary | ICD-10-CM

## 2025-08-08 DIAGNOSIS — E11.36 TYPE 2 DIABETES MELLITUS WITH DIABETIC CATARACT, WITHOUT LONG-TERM CURRENT USE OF INSULIN: ICD-10-CM

## 2025-08-08 DIAGNOSIS — N18.31 STAGE 3A CHRONIC KIDNEY DISEASE: ICD-10-CM

## 2025-08-08 DIAGNOSIS — E11.9 TYPE 2 DIABETES MELLITUS WITHOUT COMPLICATION, WITHOUT LONG-TERM CURRENT USE OF INSULIN: ICD-10-CM

## 2025-08-08 DIAGNOSIS — F41.9 ANXIETY: ICD-10-CM

## 2025-08-08 DIAGNOSIS — N52.9 MALE ERECTILE DISORDER: ICD-10-CM

## 2025-08-08 PROCEDURE — 99999 PR PBB SHADOW E&M-EST. PATIENT-LVL V: CPT | Mod: PBBFAC,,, | Performed by: INTERNAL MEDICINE

## 2025-08-08 RX ORDER — FLUCONAZOLE 150 MG/1
150 TABLET ORAL
Qty: 2 TABLET | Refills: 0 | Status: SHIPPED | OUTPATIENT
Start: 2025-08-08 | End: 2025-08-11

## 2025-08-08 RX ORDER — TADALAFIL 20 MG/1
20 TABLET ORAL DAILY
Qty: 30 TABLET | Refills: 0 | Status: SHIPPED | OUTPATIENT
Start: 2025-08-08 | End: 2026-08-08

## 2025-08-08 NOTE — ASSESSMENT & PLAN NOTE
06/13/2025:  Blood pressure today is actually a bit low which he is surprised to see.  He attributes this to recent pain pill intake and feels more relaxed at this time and in less pain than he normally does.  Reports he checks his blood pressures at home and they can be high.  He is prescribed hydralazine t.i.d. but sometimes will take it b.i.d. if his blood pressure is good.  He notes no subsequent rebound hypertension  Followed by Cardiology.  Stay on clonidine patch, amlodipine, carvedilol, hydralazine, lisinopril  08/08/2025: Blood pressure today stable.  Thinks he is taking hydralazine t.i.d..  No changes.

## 2025-08-18 ENCOUNTER — CLINICAL SUPPORT (OUTPATIENT)
Dept: SMOKING CESSATION | Facility: CLINIC | Age: 70
End: 2025-08-18
Payer: MEDICARE

## 2025-08-18 DIAGNOSIS — F17.200 NICOTINE DEPENDENCE: Primary | ICD-10-CM

## 2025-08-18 PROCEDURE — 99404 PREV MED CNSL INDIV APPRX 60: CPT | Mod: S$GLB,,,

## 2025-08-18 RX ORDER — DM/P-EPHED/ACETAMINOPH/DOXYLAM 30-7.5/3
2 LIQUID (ML) ORAL
Qty: 100 LOZENGE | Refills: 0 | Status: SHIPPED | OUTPATIENT
Start: 2025-08-18 | End: 2025-09-18

## 2025-08-18 RX ORDER — IBUPROFEN 200 MG
1 TABLET ORAL DAILY
Qty: 14 PATCH | Refills: 0 | Status: SHIPPED | OUTPATIENT
Start: 2025-08-18 | End: 2025-09-01

## 2025-08-26 DIAGNOSIS — E11.9 TYPE 2 DIABETES MELLITUS WITHOUT COMPLICATION, WITHOUT LONG-TERM CURRENT USE OF INSULIN: ICD-10-CM

## 2025-08-26 DIAGNOSIS — E11.36 TYPE 2 DIABETES MELLITUS WITH DIABETIC CATARACT, WITHOUT LONG-TERM CURRENT USE OF INSULIN: ICD-10-CM

## 2025-08-27 RX ORDER — METFORMIN HYDROCHLORIDE 500 MG/1
500 TABLET ORAL 2 TIMES DAILY WITH MEALS
Qty: 180 TABLET | Refills: 1 | Status: SHIPPED | OUTPATIENT
Start: 2025-08-27

## 2025-08-27 RX ORDER — LISINOPRIL 40 MG/1
40 TABLET ORAL DAILY
Qty: 90 TABLET | Refills: 3 | Status: SHIPPED | OUTPATIENT
Start: 2025-08-27

## 2025-08-28 ENCOUNTER — TELEPHONE (OUTPATIENT)
Dept: SMOKING CESSATION | Facility: CLINIC | Age: 70
End: 2025-08-28
Payer: MEDICARE

## 2025-08-28 ENCOUNTER — CLINICAL SUPPORT (OUTPATIENT)
Dept: SMOKING CESSATION | Facility: CLINIC | Age: 70
End: 2025-08-28
Payer: COMMERCIAL

## 2025-08-28 DIAGNOSIS — F17.200 NICOTINE DEPENDENCE: ICD-10-CM

## 2025-08-28 PROCEDURE — 99404 PREV MED CNSL INDIV APPRX 60: CPT | Mod: S$GLB,,,

## 2025-08-28 RX ORDER — IBUPROFEN 200 MG
1 TABLET ORAL DAILY
Qty: 14 PATCH | Refills: 0 | Status: SHIPPED | OUTPATIENT
Start: 2025-08-28 | End: 2025-09-11

## 2025-09-02 LAB
OHS QRS DURATION: 84 MS
OHS QTC CALCULATION: 375 MS

## 2025-09-03 DIAGNOSIS — F41.9 ANXIETY: ICD-10-CM

## 2025-09-03 DIAGNOSIS — F33.41 RECURRENT MAJOR DEPRESSIVE DISORDER, IN PARTIAL REMISSION: ICD-10-CM

## 2025-09-03 RX ORDER — FLUOXETINE HYDROCHLORIDE 40 MG/1
40 CAPSULE ORAL
Qty: 90 CAPSULE | Refills: 3 | Status: SHIPPED | OUTPATIENT
Start: 2025-09-03

## (undated) DEVICE — DRESSING AQUACEL FOAM 3 X 3

## (undated) DEVICE — BIT DRILL ADJUSTABLE  2.9X36MM

## (undated) DEVICE — PINS SKULL ADULT MAYFIELD
Type: IMPLANTABLE DEVICE | Site: CRANIAL | Status: NON-FUNCTIONAL
Removed: 2021-10-03

## (undated) DEVICE — ROUTER TAPERED 2.3MM

## (undated) DEVICE — STAPLER SKIN ROTATING HEAD

## (undated) DEVICE — DRESSING MEPILEX BORDER 4 X 4

## (undated) DEVICE — TRAY FOLEY 16FR INFECTION CONT

## (undated) DEVICE — TIP KERRISON RONGEUR THIN 1MM

## (undated) DEVICE — DRESSING AQUACEL FOAM 5 X 5

## (undated) DEVICE — HEMOSTAT SURGICEL 4X8IN

## (undated) DEVICE — ELECTRODE REM PLYHSV RETURN 9

## (undated) DEVICE — SUT ETHIBOND 0 CR/CT-1 8-18

## (undated) DEVICE — CORD BIPOLAR 12 FOOT

## (undated) DEVICE — KIT EVACUATOR 3-SPRING 1/8 DRN

## (undated) DEVICE — SUT CTD VICRYL 2-0 CR/CT-2

## (undated) DEVICE — DRESSING SURGICAL 1/2X1/2

## (undated) DEVICE — BLADE 4IN EDGE INSULATED

## (undated) DEVICE — NDL 22GA X1 1/2 REG BEVEL

## (undated) DEVICE — DRESSING AQUACEL SACRAL 9 X 9

## (undated) DEVICE — SEE MEDLINE ITEM 146292

## (undated) DEVICE — ELECTRODE BLADE INSULATED 1 IN

## (undated) DEVICE — SEE MEDLINE ITEM 156905

## (undated) DEVICE — DRAPE C ARM 42 X 120 10/BX

## (undated) DEVICE — BUR BONE CUT MICRO TPS 3X3.8MM

## (undated) DEVICE — PACK SET UP CONVERTORS

## (undated) DEVICE — KIT SURGIFLO HEMOSTATIC MATRIX

## (undated) DEVICE — DRAPE INCISE IOBAN 2 23X17IN

## (undated) DEVICE — MARKER SKIN STND TIP BLUE BARR

## (undated) DEVICE — BLADE MILL BONE MEDIUM

## (undated) DEVICE — SYS DURASEAL SPINE

## (undated) DEVICE — SUT STRATAFIX 1 PDS CT-1

## (undated) DEVICE — SEALER AQUAMANTYS 2.3 BIPOLAR

## (undated) DEVICE — DRAPE STERI INSTRUMENT 1018

## (undated) DEVICE — DRESSING SURGICAL 1X3

## (undated) DEVICE — TUBE FRAZIER 5MM 2FT SOFT TIP

## (undated) DEVICE — BURR RND FLUT SFT TOUCH 2.0MM

## (undated) DEVICE — SUT VICRYL PLUS 0 CT1 18IN

## (undated) DEVICE — SUT MCRYL PLUS 4-0 PS2 27IN

## (undated) DEVICE — SYS CLSR DERMABOND PRINEO 22CM

## (undated) DEVICE — DRAPE THYROID WITH ARMBOARD

## (undated) DEVICE — DRAIN HEMOVAC 3/16 LARGE

## (undated) DEVICE — SYS PRINEO SKIN CLOSURE

## (undated) DEVICE — TAPE SILK 3IN